# Patient Record
Sex: FEMALE | Race: WHITE | NOT HISPANIC OR LATINO | Employment: FULL TIME | URBAN - METROPOLITAN AREA
[De-identification: names, ages, dates, MRNs, and addresses within clinical notes are randomized per-mention and may not be internally consistent; named-entity substitution may affect disease eponyms.]

---

## 2017-07-17 ENCOUNTER — HOSPITAL ENCOUNTER (OUTPATIENT)
Dept: LAB | Facility: CLINIC | Age: 57
Discharge: HOME | End: 2017-07-17
Attending: ORTHOPAEDIC SURGERY | Admitting: ORTHOPAEDIC SURGERY
Payer: COMMERCIAL

## 2017-07-17 DIAGNOSIS — Z01.812 PRE-OPERATIVE LABORATORY EXAMINATION: ICD-10-CM

## 2017-07-17 LAB
MRSA DNA SPEC QL NAA+PROBE: NORMAL
SPECIMEN SOURCE: NORMAL

## 2017-07-17 PROCEDURE — 87641 MR-STAPH DNA AMP PROBE: CPT | Performed by: ORTHOPAEDIC SURGERY

## 2017-07-17 PROCEDURE — 40000830 ZZHCL STATISTIC STAPH AUREUS METH RESIST SCREEN PCR: Performed by: ORTHOPAEDIC SURGERY

## 2017-07-17 PROCEDURE — 40000829 ZZHCL STATISTIC STAPH AUREUS SUSCEPT SCREEN PCR: Performed by: ORTHOPAEDIC SURGERY

## 2017-07-17 PROCEDURE — 87640 STAPH A DNA AMP PROBE: CPT | Performed by: ORTHOPAEDIC SURGERY

## 2017-08-09 RX ORDER — ALBUTEROL SULFATE 90 UG/1
2 AEROSOL, METERED RESPIRATORY (INHALATION) EVERY 6 HOURS PRN
Status: ON HOLD | COMMUNITY
End: 2019-10-11

## 2017-08-09 RX ORDER — FLUTICASONE PROPIONATE 50 MCG
1 SPRAY, SUSPENSION (ML) NASAL 2 TIMES DAILY
COMMUNITY

## 2017-08-10 ENCOUNTER — ANESTHESIA (OUTPATIENT)
Dept: SURGERY | Facility: CLINIC | Age: 57
DRG: 470 | End: 2017-08-10
Payer: COMMERCIAL

## 2017-08-10 ENCOUNTER — HOSPITAL ENCOUNTER (INPATIENT)
Facility: CLINIC | Age: 57
LOS: 4 days | Discharge: NURSING, REHAB OR LONG-TERM CARE | DRG: 470 | End: 2017-08-14
Attending: ORTHOPAEDIC SURGERY | Admitting: ORTHOPAEDIC SURGERY
Payer: COMMERCIAL

## 2017-08-10 ENCOUNTER — ANESTHESIA EVENT (OUTPATIENT)
Dept: SURGERY | Facility: CLINIC | Age: 57
DRG: 470 | End: 2017-08-10
Payer: COMMERCIAL

## 2017-08-10 DIAGNOSIS — Z96.651 S/P TOTAL KNEE REPLACEMENT USING CEMENT, RIGHT: Primary | ICD-10-CM

## 2017-08-10 LAB
CREAT SERPL-MCNC: 0.6 MG/DL (ref 0.52–1.04)
GFR SERPL CREATININE-BSD FRML MDRD: NORMAL ML/MIN/1.7M2
PLATELET # BLD AUTO: 300 10E9/L (ref 150–450)
POTASSIUM SERPL-SCNC: 3.2 MMOL/L (ref 3.4–5.3)

## 2017-08-10 PROCEDURE — 25000128 H RX IP 250 OP 636: Performed by: ORTHOPAEDIC SURGERY

## 2017-08-10 PROCEDURE — 40000169 ZZH STATISTIC PRE-PROCEDURE ASSESSMENT I: Performed by: ORTHOPAEDIC SURGERY

## 2017-08-10 PROCEDURE — 25000128 H RX IP 250 OP 636

## 2017-08-10 PROCEDURE — 25000125 ZZHC RX 250: Performed by: ANESTHESIOLOGY

## 2017-08-10 PROCEDURE — 27810169 ZZH OR IMPLANT GENERAL: Performed by: ORTHOPAEDIC SURGERY

## 2017-08-10 PROCEDURE — 25000125 ZZHC RX 250: Performed by: ORTHOPAEDIC SURGERY

## 2017-08-10 PROCEDURE — 25000132 ZZH RX MED GY IP 250 OP 250 PS 637: Performed by: PHYSICIAN ASSISTANT

## 2017-08-10 PROCEDURE — 27110028 ZZH OR GENERAL SUPPLY NON-STERILE: Performed by: ORTHOPAEDIC SURGERY

## 2017-08-10 PROCEDURE — 25000128 H RX IP 250 OP 636: Performed by: ANESTHESIOLOGY

## 2017-08-10 PROCEDURE — 82565 ASSAY OF CREATININE: CPT | Performed by: PHYSICIAN ASSISTANT

## 2017-08-10 PROCEDURE — 25800025 ZZH RX 258: Performed by: ORTHOPAEDIC SURGERY

## 2017-08-10 PROCEDURE — 84132 ASSAY OF SERUM POTASSIUM: CPT | Performed by: ANESTHESIOLOGY

## 2017-08-10 PROCEDURE — 27210995 ZZH RX 272: Performed by: ORTHOPAEDIC SURGERY

## 2017-08-10 PROCEDURE — 37000009 ZZH ANESTHESIA TECHNICAL FEE, EACH ADDTL 15 MIN: Performed by: ORTHOPAEDIC SURGERY

## 2017-08-10 PROCEDURE — 36000063 ZZH SURGERY LEVEL 4 EA 15 ADDTL MIN: Performed by: ORTHOPAEDIC SURGERY

## 2017-08-10 PROCEDURE — 25000125 ZZHC RX 250: Performed by: NURSE ANESTHETIST, CERTIFIED REGISTERED

## 2017-08-10 PROCEDURE — 25000128 H RX IP 250 OP 636: Performed by: NURSE ANESTHETIST, CERTIFIED REGISTERED

## 2017-08-10 PROCEDURE — 85049 AUTOMATED PLATELET COUNT: CPT | Performed by: PHYSICIAN ASSISTANT

## 2017-08-10 PROCEDURE — 36000093 ZZH SURGERY LEVEL 4 1ST 30 MIN: Performed by: ORTHOPAEDIC SURGERY

## 2017-08-10 PROCEDURE — 25000125 ZZHC RX 250: Performed by: PHYSICIAN ASSISTANT

## 2017-08-10 PROCEDURE — 12000007 ZZH R&B INTERMEDIATE

## 2017-08-10 PROCEDURE — 25000566 ZZH SEVOFLURANE, EA 15 MIN: Performed by: ORTHOPAEDIC SURGERY

## 2017-08-10 PROCEDURE — 27210794 ZZH OR GENERAL SUPPLY STERILE: Performed by: ORTHOPAEDIC SURGERY

## 2017-08-10 PROCEDURE — 71000012 ZZH RECOVERY PHASE 1 LEVEL 1 FIRST HR: Performed by: ORTHOPAEDIC SURGERY

## 2017-08-10 PROCEDURE — 36415 COLL VENOUS BLD VENIPUNCTURE: CPT | Performed by: ANESTHESIOLOGY

## 2017-08-10 PROCEDURE — 71000013 ZZH RECOVERY PHASE 1 LEVEL 1 EA ADDTL HR: Performed by: ORTHOPAEDIC SURGERY

## 2017-08-10 PROCEDURE — 0SRC0J9 REPLACEMENT OF RIGHT KNEE JOINT WITH SYNTHETIC SUBSTITUTE, CEMENTED, OPEN APPROACH: ICD-10-PCS | Performed by: ORTHOPAEDIC SURGERY

## 2017-08-10 PROCEDURE — 37000008 ZZH ANESTHESIA TECHNICAL FEE, 1ST 30 MIN: Performed by: ORTHOPAEDIC SURGERY

## 2017-08-10 PROCEDURE — C1776 JOINT DEVICE (IMPLANTABLE): HCPCS | Performed by: ORTHOPAEDIC SURGERY

## 2017-08-10 PROCEDURE — 36415 COLL VENOUS BLD VENIPUNCTURE: CPT | Performed by: PHYSICIAN ASSISTANT

## 2017-08-10 PROCEDURE — 25000128 H RX IP 250 OP 636: Performed by: PHYSICIAN ASSISTANT

## 2017-08-10 DEVICE — IMPLANTABLE DEVICE: Type: IMPLANTABLE DEVICE | Site: KNEE | Status: FUNCTIONAL

## 2017-08-10 DEVICE — BONE CEMENT STRK SIMPLEX P SPEEDSET 6192-1-001: Type: IMPLANTABLE DEVICE | Site: KNEE | Status: FUNCTIONAL

## 2017-08-10 DEVICE — IMP COMP FEM JJ ATTUNE POST STAB RT NRW CEM SZ5 150410225: Type: IMPLANTABLE DEVICE | Site: KNEE | Status: FUNCTIONAL

## 2017-08-10 DEVICE — IMP TIB BASE JJ ATTUNE FX BR SYS CEM SZ5 150600005: Type: IMPLANTABLE DEVICE | Site: KNEE | Status: FUNCTIONAL

## 2017-08-10 RX ORDER — METOCLOPRAMIDE HYDROCHLORIDE 5 MG/ML
10 INJECTION INTRAMUSCULAR; INTRAVENOUS EVERY 6 HOURS PRN
Status: DISCONTINUED | OUTPATIENT
Start: 2017-08-10 | End: 2017-08-14 | Stop reason: HOSPADM

## 2017-08-10 RX ORDER — ACETAMINOPHEN 325 MG/1
975 TABLET ORAL EVERY 8 HOURS
Status: COMPLETED | OUTPATIENT
Start: 2017-08-10 | End: 2017-08-13

## 2017-08-10 RX ORDER — ONDANSETRON 4 MG/1
4 TABLET, ORALLY DISINTEGRATING ORAL EVERY 30 MIN PRN
Status: DISCONTINUED | OUTPATIENT
Start: 2017-08-10 | End: 2017-08-10 | Stop reason: HOSPADM

## 2017-08-10 RX ORDER — ONDANSETRON 2 MG/ML
4 INJECTION INTRAMUSCULAR; INTRAVENOUS EVERY 6 HOURS PRN
Status: DISCONTINUED | OUTPATIENT
Start: 2017-08-10 | End: 2017-08-14 | Stop reason: HOSPADM

## 2017-08-10 RX ORDER — OXYCODONE HYDROCHLORIDE 5 MG/1
5-10 TABLET ORAL
Status: DISCONTINUED | OUTPATIENT
Start: 2017-08-10 | End: 2017-08-14 | Stop reason: HOSPADM

## 2017-08-10 RX ORDER — FENTANYL CITRATE 50 UG/ML
25-50 INJECTION, SOLUTION INTRAMUSCULAR; INTRAVENOUS
Status: DISCONTINUED | OUTPATIENT
Start: 2017-08-10 | End: 2017-08-10 | Stop reason: HOSPADM

## 2017-08-10 RX ORDER — ALBUTEROL SULFATE 90 UG/1
2 AEROSOL, METERED RESPIRATORY (INHALATION) EVERY 6 HOURS PRN
Status: DISCONTINUED | OUTPATIENT
Start: 2017-08-10 | End: 2017-08-14 | Stop reason: HOSPADM

## 2017-08-10 RX ORDER — LEVOTHYROXINE SODIUM 112 UG/1
112 TABLET ORAL EVERY OTHER DAY
Status: DISCONTINUED | OUTPATIENT
Start: 2017-08-11 | End: 2017-08-14 | Stop reason: HOSPADM

## 2017-08-10 RX ORDER — MAGNESIUM HYDROXIDE 1200 MG/15ML
LIQUID ORAL PRN
Status: DISCONTINUED | OUTPATIENT
Start: 2017-08-10 | End: 2017-08-10 | Stop reason: HOSPADM

## 2017-08-10 RX ORDER — SODIUM CHLORIDE, SODIUM LACTATE, POTASSIUM CHLORIDE, CALCIUM CHLORIDE 600; 310; 30; 20 MG/100ML; MG/100ML; MG/100ML; MG/100ML
INJECTION, SOLUTION INTRAVENOUS CONTINUOUS
Status: DISCONTINUED | OUTPATIENT
Start: 2017-08-10 | End: 2017-08-10

## 2017-08-10 RX ORDER — OXYCODONE HCL 10 MG/1
10 TABLET, FILM COATED, EXTENDED RELEASE ORAL EVERY 12 HOURS
Status: DISPENSED | OUTPATIENT
Start: 2017-08-10 | End: 2017-08-12

## 2017-08-10 RX ORDER — NEOSTIGMINE METHYLSULFATE 1 MG/ML
VIAL (ML) INJECTION PRN
Status: DISCONTINUED | OUTPATIENT
Start: 2017-08-10 | End: 2017-08-10

## 2017-08-10 RX ORDER — MONTELUKAST SODIUM 10 MG/1
10 TABLET ORAL AT BEDTIME
Status: DISCONTINUED | OUTPATIENT
Start: 2017-08-10 | End: 2017-08-14 | Stop reason: HOSPADM

## 2017-08-10 RX ORDER — SODIUM CHLORIDE, SODIUM LACTATE, POTASSIUM CHLORIDE, CALCIUM CHLORIDE 600; 310; 30; 20 MG/100ML; MG/100ML; MG/100ML; MG/100ML
INJECTION, SOLUTION INTRAVENOUS CONTINUOUS
Status: DISCONTINUED | OUTPATIENT
Start: 2017-08-10 | End: 2017-08-10 | Stop reason: HOSPADM

## 2017-08-10 RX ORDER — ACETAMINOPHEN 325 MG/1
650 TABLET ORAL EVERY 4 HOURS PRN
Status: DISCONTINUED | OUTPATIENT
Start: 2017-08-13 | End: 2017-08-14 | Stop reason: HOSPADM

## 2017-08-10 RX ORDER — ESTRADIOL 0.1 MG/G
1 CREAM VAGINAL
Status: DISCONTINUED | OUTPATIENT
Start: 2017-08-11 | End: 2017-08-14 | Stop reason: HOSPADM

## 2017-08-10 RX ORDER — LIDOCAINE HYDROCHLORIDE 20 MG/ML
INJECTION, SOLUTION INFILTRATION; PERINEURAL PRN
Status: DISCONTINUED | OUTPATIENT
Start: 2017-08-10 | End: 2017-08-10

## 2017-08-10 RX ORDER — GLYCOPYRROLATE 0.2 MG/ML
INJECTION, SOLUTION INTRAMUSCULAR; INTRAVENOUS PRN
Status: DISCONTINUED | OUTPATIENT
Start: 2017-08-10 | End: 2017-08-10

## 2017-08-10 RX ORDER — MULTIPLE VITAMINS W/ MINERALS TAB 9MG-400MCG
1 TAB ORAL DAILY
Status: DISCONTINUED | OUTPATIENT
Start: 2017-08-10 | End: 2017-08-14 | Stop reason: HOSPADM

## 2017-08-10 RX ORDER — SCOLOPAMINE TRANSDERMAL SYSTEM 1 MG/1
1 PATCH, EXTENDED RELEASE TRANSDERMAL
Status: DISCONTINUED | OUTPATIENT
Start: 2017-08-10 | End: 2017-08-10 | Stop reason: HOSPADM

## 2017-08-10 RX ORDER — PROPOFOL 10 MG/ML
INJECTION, EMULSION INTRAVENOUS CONTINUOUS PRN
Status: DISCONTINUED | OUTPATIENT
Start: 2017-08-10 | End: 2017-08-10

## 2017-08-10 RX ORDER — FENTANYL CITRATE 50 UG/ML
INJECTION, SOLUTION INTRAMUSCULAR; INTRAVENOUS PRN
Status: DISCONTINUED | OUTPATIENT
Start: 2017-08-10 | End: 2017-08-10

## 2017-08-10 RX ORDER — MUPIROCIN 20 MG/G
OINTMENT TOPICAL 2 TIMES DAILY
Status: DISCONTINUED | OUTPATIENT
Start: 2017-08-10 | End: 2017-08-14 | Stop reason: HOSPADM

## 2017-08-10 RX ORDER — SODIUM CHLORIDE 9 MG/ML
INJECTION, SOLUTION INTRAVENOUS CONTINUOUS
Status: DISCONTINUED | OUTPATIENT
Start: 2017-08-10 | End: 2017-08-11

## 2017-08-10 RX ORDER — FLUTICASONE PROPIONATE 50 MCG
1-2 SPRAY, SUSPENSION (ML) NASAL DAILY
Status: DISCONTINUED | OUTPATIENT
Start: 2017-08-11 | End: 2017-08-14 | Stop reason: HOSPADM

## 2017-08-10 RX ORDER — LISINOPRIL AND HYDROCHLOROTHIAZIDE 12.5; 2 MG/1; MG/1
2 TABLET ORAL DAILY
Status: DISCONTINUED | OUTPATIENT
Start: 2017-08-10 | End: 2017-08-14 | Stop reason: HOSPADM

## 2017-08-10 RX ORDER — CEFAZOLIN SODIUM 1 G/3ML
1 INJECTION, POWDER, FOR SOLUTION INTRAMUSCULAR; INTRAVENOUS EVERY 8 HOURS
Status: COMPLETED | OUTPATIENT
Start: 2017-08-10 | End: 2017-08-11

## 2017-08-10 RX ORDER — ONDANSETRON 2 MG/ML
4 INJECTION INTRAMUSCULAR; INTRAVENOUS EVERY 30 MIN PRN
Status: DISCONTINUED | OUTPATIENT
Start: 2017-08-10 | End: 2017-08-10 | Stop reason: HOSPADM

## 2017-08-10 RX ORDER — AMOXICILLIN 250 MG
1-2 CAPSULE ORAL 2 TIMES DAILY
Status: DISCONTINUED | OUTPATIENT
Start: 2017-08-10 | End: 2017-08-14 | Stop reason: HOSPADM

## 2017-08-10 RX ORDER — DEXAMETHASONE SODIUM PHOSPHATE 4 MG/ML
INJECTION, SOLUTION INTRA-ARTICULAR; INTRALESIONAL; INTRAMUSCULAR; INTRAVENOUS; SOFT TISSUE PRN
Status: DISCONTINUED | OUTPATIENT
Start: 2017-08-10 | End: 2017-08-10

## 2017-08-10 RX ORDER — PROCHLORPERAZINE MALEATE 5 MG
5-10 TABLET ORAL EVERY 6 HOURS PRN
Status: DISCONTINUED | OUTPATIENT
Start: 2017-08-10 | End: 2017-08-14 | Stop reason: HOSPADM

## 2017-08-10 RX ORDER — NALOXONE HYDROCHLORIDE 0.4 MG/ML
.1-.4 INJECTION, SOLUTION INTRAMUSCULAR; INTRAVENOUS; SUBCUTANEOUS
Status: DISCONTINUED | OUTPATIENT
Start: 2017-08-10 | End: 2017-08-14 | Stop reason: HOSPADM

## 2017-08-10 RX ORDER — ONDANSETRON 4 MG/1
4 TABLET, ORALLY DISINTEGRATING ORAL EVERY 6 HOURS PRN
Status: DISCONTINUED | OUTPATIENT
Start: 2017-08-10 | End: 2017-08-14 | Stop reason: HOSPADM

## 2017-08-10 RX ORDER — LIDOCAINE 40 MG/G
CREAM TOPICAL
Status: DISCONTINUED | OUTPATIENT
Start: 2017-08-10 | End: 2017-08-14 | Stop reason: HOSPADM

## 2017-08-10 RX ORDER — ONDANSETRON 2 MG/ML
INJECTION INTRAMUSCULAR; INTRAVENOUS PRN
Status: DISCONTINUED | OUTPATIENT
Start: 2017-08-10 | End: 2017-08-10

## 2017-08-10 RX ORDER — CEFAZOLIN SODIUM 1 G/3ML
1 INJECTION, POWDER, FOR SOLUTION INTRAMUSCULAR; INTRAVENOUS SEE ADMIN INSTRUCTIONS
Status: DISCONTINUED | OUTPATIENT
Start: 2017-08-10 | End: 2017-08-10

## 2017-08-10 RX ORDER — HYDROXYZINE HYDROCHLORIDE 25 MG/1
25 TABLET, FILM COATED ORAL EVERY 6 HOURS PRN
Status: DISCONTINUED | OUTPATIENT
Start: 2017-08-10 | End: 2017-08-14 | Stop reason: HOSPADM

## 2017-08-10 RX ORDER — HYDROMORPHONE HYDROCHLORIDE 1 MG/ML
.3-.5 INJECTION, SOLUTION INTRAMUSCULAR; INTRAVENOUS; SUBCUTANEOUS
Status: DISCONTINUED | OUTPATIENT
Start: 2017-08-10 | End: 2017-08-14 | Stop reason: HOSPADM

## 2017-08-10 RX ORDER — EPHEDRINE SULFATE 50 MG/ML
INJECTION, SOLUTION INTRAMUSCULAR; INTRAVENOUS; SUBCUTANEOUS PRN
Status: DISCONTINUED | OUTPATIENT
Start: 2017-08-10 | End: 2017-08-10

## 2017-08-10 RX ORDER — HYDROMORPHONE HYDROCHLORIDE 1 MG/ML
.3-.5 INJECTION, SOLUTION INTRAMUSCULAR; INTRAVENOUS; SUBCUTANEOUS EVERY 5 MIN PRN
Status: DISCONTINUED | OUTPATIENT
Start: 2017-08-10 | End: 2017-08-10 | Stop reason: HOSPADM

## 2017-08-10 RX ORDER — PROPOFOL 10 MG/ML
INJECTION, EMULSION INTRAVENOUS PRN
Status: DISCONTINUED | OUTPATIENT
Start: 2017-08-10 | End: 2017-08-10

## 2017-08-10 RX ORDER — HYDRALAZINE HYDROCHLORIDE 20 MG/ML
2.5-5 INJECTION INTRAMUSCULAR; INTRAVENOUS EVERY 10 MIN PRN
Status: DISCONTINUED | OUTPATIENT
Start: 2017-08-10 | End: 2017-08-10 | Stop reason: HOSPADM

## 2017-08-10 RX ORDER — POLYETHYLENE GLYCOL 3350 17 G/17G
17 POWDER, FOR SOLUTION ORAL DAILY
Status: DISCONTINUED | OUTPATIENT
Start: 2017-08-11 | End: 2017-08-14 | Stop reason: HOSPADM

## 2017-08-10 RX ORDER — LABETALOL HYDROCHLORIDE 5 MG/ML
10 INJECTION, SOLUTION INTRAVENOUS
Status: DISCONTINUED | OUTPATIENT
Start: 2017-08-10 | End: 2017-08-10 | Stop reason: HOSPADM

## 2017-08-10 RX ORDER — CEFAZOLIN SODIUM 2 G/100ML
2 INJECTION, SOLUTION INTRAVENOUS
Status: COMPLETED | OUTPATIENT
Start: 2017-08-10 | End: 2017-08-10

## 2017-08-10 RX ORDER — FEXOFENADINE HCL 180 MG/1
180 TABLET ORAL DAILY
Status: DISCONTINUED | OUTPATIENT
Start: 2017-08-11 | End: 2017-08-14 | Stop reason: HOSPADM

## 2017-08-10 RX ORDER — METOCLOPRAMIDE 5 MG/1
10 TABLET ORAL EVERY 6 HOURS PRN
Status: DISCONTINUED | OUTPATIENT
Start: 2017-08-10 | End: 2017-08-14 | Stop reason: HOSPADM

## 2017-08-10 RX ORDER — ALBUTEROL SULFATE 0.83 MG/ML
2.5 SOLUTION RESPIRATORY (INHALATION) EVERY 4 HOURS PRN
Status: DISCONTINUED | OUTPATIENT
Start: 2017-08-10 | End: 2017-08-10 | Stop reason: HOSPADM

## 2017-08-10 RX ORDER — FENTANYL CITRATE 50 UG/ML
50-100 INJECTION, SOLUTION INTRAMUSCULAR; INTRAVENOUS EVERY 5 MIN PRN
Status: DISCONTINUED | OUTPATIENT
Start: 2017-08-10 | End: 2017-08-10 | Stop reason: HOSPADM

## 2017-08-10 RX ORDER — MEPERIDINE HYDROCHLORIDE 25 MG/ML
12.5 INJECTION INTRAMUSCULAR; INTRAVENOUS; SUBCUTANEOUS EVERY 5 MIN PRN
Status: DISCONTINUED | OUTPATIENT
Start: 2017-08-10 | End: 2017-08-10 | Stop reason: HOSPADM

## 2017-08-10 RX ORDER — HYDROMORPHONE HYDROCHLORIDE 1 MG/ML
INJECTION, SOLUTION INTRAMUSCULAR; INTRAVENOUS; SUBCUTANEOUS
Status: COMPLETED
Start: 2017-08-10 | End: 2017-08-10

## 2017-08-10 RX ADMIN — ROCURONIUM BROMIDE 5 MG: 10 INJECTION INTRAVENOUS at 11:15

## 2017-08-10 RX ADMIN — SCOPALAMINE 1 PATCH: 1 PATCH, EXTENDED RELEASE TRANSDERMAL at 11:05

## 2017-08-10 RX ADMIN — SENNOSIDES AND DOCUSATE SODIUM 2 TABLET: 8.6; 5 TABLET ORAL at 22:08

## 2017-08-10 RX ADMIN — PROPOFOL 200 MG: 10 INJECTION, EMULSION INTRAVENOUS at 11:15

## 2017-08-10 RX ADMIN — PROPOFOL 50 MCG/KG/MIN: 10 INJECTION, EMULSION INTRAVENOUS at 11:15

## 2017-08-10 RX ADMIN — SUCCINYLCHOLINE CHLORIDE 100 MG: 20 INJECTION, SOLUTION INTRAMUSCULAR; INTRAVENOUS at 11:15

## 2017-08-10 RX ADMIN — HYDROMORPHONE HYDROCHLORIDE 0.5 MG: 1 INJECTION, SOLUTION INTRAMUSCULAR; INTRAVENOUS; SUBCUTANEOUS at 16:15

## 2017-08-10 RX ADMIN — OXYCODONE HYDROCHLORIDE 10 MG: 10 TABLET, FILM COATED, EXTENDED RELEASE ORAL at 18:29

## 2017-08-10 RX ADMIN — GLYCOPYRROLATE 0.6 MG: 0.2 INJECTION, SOLUTION INTRAMUSCULAR; INTRAVENOUS at 12:45

## 2017-08-10 RX ADMIN — DEXMEDETOMIDINE HYDROCHLORIDE 8 MCG: 100 INJECTION, SOLUTION INTRAVENOUS at 12:46

## 2017-08-10 RX ADMIN — SODIUM CHLORIDE: 9 INJECTION, SOLUTION INTRAVENOUS at 16:00

## 2017-08-10 RX ADMIN — PHENYLEPHRINE HYDROCHLORIDE 100 MCG: 10 INJECTION, SOLUTION INTRAMUSCULAR; INTRAVENOUS; SUBCUTANEOUS at 11:31

## 2017-08-10 RX ADMIN — MUPIROCIN: 20 OINTMENT TOPICAL at 22:26

## 2017-08-10 RX ADMIN — HYDROMORPHONE HYDROCHLORIDE 0.2 MG: 1 INJECTION, SOLUTION INTRAMUSCULAR; INTRAVENOUS; SUBCUTANEOUS at 12:26

## 2017-08-10 RX ADMIN — Medication 5 MG: at 11:28

## 2017-08-10 RX ADMIN — ACETAMINOPHEN 975 MG: 325 TABLET, FILM COATED ORAL at 18:29

## 2017-08-10 RX ADMIN — ROCURONIUM BROMIDE 10 MG: 10 INJECTION INTRAVENOUS at 11:35

## 2017-08-10 RX ADMIN — PROPOFOL 30 MG: 10 INJECTION, EMULSION INTRAVENOUS at 13:04

## 2017-08-10 RX ADMIN — DEXAMETHASONE SODIUM PHOSPHATE 4 MG: 4 INJECTION, SOLUTION INTRA-ARTICULAR; INTRALESIONAL; INTRAMUSCULAR; INTRAVENOUS; SOFT TISSUE at 11:29

## 2017-08-10 RX ADMIN — FENTANYL CITRATE 100 MCG: 50 INJECTION, SOLUTION INTRAMUSCULAR; INTRAVENOUS at 11:15

## 2017-08-10 RX ADMIN — MONTELUKAST SODIUM 10 MG: 10 TABLET, FILM COATED ORAL at 22:08

## 2017-08-10 RX ADMIN — MIDAZOLAM HYDROCHLORIDE 2 MG: 1 INJECTION, SOLUTION INTRAMUSCULAR; INTRAVENOUS at 10:55

## 2017-08-10 RX ADMIN — DEXMEDETOMIDINE HYDROCHLORIDE 12 MCG: 100 INJECTION, SOLUTION INTRAVENOUS at 12:41

## 2017-08-10 RX ADMIN — LIDOCAINE HYDROCHLORIDE 80 MG: 20 INJECTION, SOLUTION INFILTRATION; PERINEURAL at 11:15

## 2017-08-10 RX ADMIN — ROCURONIUM BROMIDE 10 MG: 10 INJECTION INTRAVENOUS at 11:51

## 2017-08-10 RX ADMIN — SODIUM CHLORIDE, POTASSIUM CHLORIDE, SODIUM LACTATE AND CALCIUM CHLORIDE: 600; 310; 30; 20 INJECTION, SOLUTION INTRAVENOUS at 10:39

## 2017-08-10 RX ADMIN — PHENYLEPHRINE HYDROCHLORIDE 100 MCG: 10 INJECTION, SOLUTION INTRAMUSCULAR; INTRAVENOUS; SUBCUTANEOUS at 11:24

## 2017-08-10 RX ADMIN — CEFAZOLIN SODIUM 2 G: 2 INJECTION, SOLUTION INTRAVENOUS at 11:30

## 2017-08-10 RX ADMIN — FENTANYL CITRATE 50 MCG: 50 INJECTION, SOLUTION INTRAMUSCULAR; INTRAVENOUS at 11:33

## 2017-08-10 RX ADMIN — ONDANSETRON 4 MG: 2 INJECTION INTRAMUSCULAR; INTRAVENOUS at 12:44

## 2017-08-10 RX ADMIN — HYDROMORPHONE HYDROCHLORIDE 0.5 MG: 1 INJECTION, SOLUTION INTRAMUSCULAR; INTRAVENOUS; SUBCUTANEOUS at 19:54

## 2017-08-10 RX ADMIN — ROCURONIUM BROMIDE 25 MG: 10 INJECTION INTRAVENOUS at 11:28

## 2017-08-10 RX ADMIN — NEOSTIGMINE METHYLSULFATE 4 MG: 1 INJECTION INTRAMUSCULAR; INTRAVENOUS; SUBCUTANEOUS at 12:45

## 2017-08-10 RX ADMIN — FLUTICASONE FUROATE AND VILANTEROL TRIFENATATE 1 PUFF: 200; 25 POWDER RESPIRATORY (INHALATION) at 22:26

## 2017-08-10 RX ADMIN — ONDANSETRON 4 MG: 2 INJECTION INTRAMUSCULAR; INTRAVENOUS at 11:11

## 2017-08-10 RX ADMIN — CEFAZOLIN SODIUM 1 G: 1 INJECTION, POWDER, FOR SOLUTION INTRAMUSCULAR; INTRAVENOUS at 19:20

## 2017-08-10 RX ADMIN — FENTANYL CITRATE 100 MCG: 50 INJECTION, SOLUTION INTRAMUSCULAR; INTRAVENOUS at 10:55

## 2017-08-10 RX ADMIN — SODIUM CHLORIDE, POTASSIUM CHLORIDE, SODIUM LACTATE AND CALCIUM CHLORIDE: 600; 310; 30; 20 INJECTION, SOLUTION INTRAVENOUS at 11:39

## 2017-08-10 RX ADMIN — HYDROMORPHONE HYDROCHLORIDE 0.3 MG: 1 INJECTION, SOLUTION INTRAMUSCULAR; INTRAVENOUS; SUBCUTANEOUS at 12:05

## 2017-08-10 NOTE — ANESTHESIA CARE TRANSFER NOTE
Patient: Aleena Ontiveros    Procedure(s):  RIGHT TOTAL KNEE ARTHROPLASTY  - Wound Class: I-Clean    Diagnosis: RIGHT KNEE OSTEOARTHRITIS   Diagnosis Additional Information: No value filed.    Anesthesia Type:   Periph. Nerve Block for postop pain, General, ETT     Note:  Airway :Face Mask  Patient transferred to:PACU  Comments: Neuromuscular blockade reversed after TOF 4/4, spontaneous respirations, adequate tidal volumes, followed commands to voice, oropharynx suctioned with soft flexible catheter, extubated atraumatically, extubated with suction, airway patent after extubation.  Oxygen via facemask at 10 liters per minute to PACU. Oxygen tubing connected to wall O2 in PACU, SpO2, NiBP, and EKG monitors and alarms on and functioning, Jade Hugger warmer connected to patient gown, report on patient's clinical status given to PACU RN, RN questions answered.       Vitals: (Last set prior to Anesthesia Care Transfer)    CRNA VITALS  8/10/2017 1250 - 8/10/2017 1327      8/10/2017             Pulse: 76    SpO2: 99 %    Resp Rate (observed): 15    Resp Rate (set): 10                Electronically Signed By: JAZMIN Vogt CRNA  August 10, 2017  1:27 PM

## 2017-08-10 NOTE — ANESTHESIA PREPROCEDURE EVALUATION
Anesthesia Evaluation     . Pt has had prior anesthetic.     History of anesthetic complications   - PONV        ROS/MED HX    ENT/Pulmonary:     (+)Intermittent asthma , . .   (-) sleep apnea   Neurologic:       Cardiovascular:     (+) hypertension----. : . . . :. .       METS/Exercise Tolerance:     Hematologic:         Musculoskeletal:   (+) arthritis, , , -       GI/Hepatic:        (-) GERD   Renal/Genitourinary:         Endo:     (+) thyroid problem hypothyroidism, .      Psychiatric:         Infectious Disease:         Malignancy:         Other:                                    Anesthesia Plan      History & Physical Review  History and physical reviewed and following examination; no interval change.    ASA Status:  2 .    NPO Status:  > 8 hours    Plan for Periph. Nerve Block for postop pain, General and ETT with Intravenous induction. Maintenance will be Balanced.    PONV prophylaxis:  Ondansetron (or other 5HT-3), Dexamethasone or Solumedrol and Scopolamine patch (Propofol gtt)       Postoperative Care  Postoperative pain management:  Multi-modal analgesia.      Consents  Anesthetic plan, risks, benefits and alternatives discussed with:  Patient..                      Procedure: Procedure(s):  ARTHROPLASTY KNEE  Preop diagnosis: RIGHT KNEE OSTEOARTHRITIS     Allergies   Allergen Reactions     No Clinical Screening - See Comments      Prolonged use bandaids and some tapes.     Past Medical History:   Diagnosis Date     Arthritis     osteoarthritis of both knee     Hypertension      PONV (postoperative nausea and vomiting)      Thyroid disease      Uncomplicated asthma      Past Surgical History:   Procedure Laterality Date     ABDOMEN SURGERY  ,         BACK SURGERY      spinal fusion     ENT SURGERY      tonsilectomy     GYN SURGERY  13    hysterectomy     ORTHOPEDIC SURGERY      sinal fusion,hand     thumb mass resection       THYROIDECTOMY  2014    Procedure: THYROIDECTOMY;   Surgeon: Krzysztof Marquez MD;  Location: [facility]     Prior to Admission medications    Medication Sig Start Date End Date Taking? Authorizing Provider   IBUPROFEN PO Take 200-600 mg by mouth 4 times daily as needed for moderate pain   Yes Reported, Patient   Omega-3 Fatty Acids (FISH OIL PO) Take 1 capsule by mouth daily   Yes Reported, Patient   Multiple Vitamins-Minerals (DAILY DENIZ MAXIMUM MULTIVITAMIN PO) Take 1 packet by mouth 3 times daily DOTERRA   Yes Reported, Patient   albuterol (PROAIR HFA/PROVENTIL HFA/VENTOLIN HFA) 108 (90 BASE) MCG/ACT Inhaler Inhale 2 puffs into the lungs every 6 hours as needed for shortness of breath / dyspnea or wheezing   Yes Reported, Patient   Levothyroxine Sodium (SYNTHROID PO) Take 125 mcg by mouth every other day (alternate with 112 mcg dose)   Yes Reported, Patient   Levothyroxine Sodium (SYNTHROID PO) Take 112 mcg by mouth every other day (alternate with 125 mcg dose)   Yes Reported, Patient   fluticasone (FLONASE) 50 MCG/ACT spray Spray 1-2 sprays into both nostrils daily   Yes Reported, Patient   Fexofenadine HCl (ALLEGRA PO) Take 180 mg by mouth daily   Yes Reported, Patient   Acetaminophen (TYLENOL PO) Take 325-650 mg by mouth 3 times daily as needed    Yes Reported, Patient   mupirocin (BACTROBAN) 2 % nasal ointment Apply 1 each into each nare 2 times daily Apply small amount to each nostril 2 times per day for 7 days prior to surgery. 8/3/17 8/10/17 Yes Reported, Patient   estradiol (ESTRACE VAGINAL) 0.1 MG/GM vaginal cream Place 1 g vaginally three times a week 10/21/15  Yes Efra Fermin MD   lisinopril-hydrochlorothiazide (PRINZIDE,ZESTORETIC) 20-12.5 MG per tablet Take 2 tablets by mouth daily    Yes Reported, Patient   mometasone-formoterol (DULERA) 200-5 MCG/ACT oral inhaler Inhale 2 puffs into the lungs 2 times daily   Yes Reported, Patient   Montelukast Sodium (SINGULAIR PO) Take 10 mg by mouth At Bedtime    Yes Reported, Patient     Current  Facility-Administered Medications Ordered in Epic   Medication Dose Route Frequency Last Rate Last Dose     ceFAZolin sodium-dextrose (ANCEF) infusion 2 g  2 g Intravenous Pre-Op/Pre-procedure x 1 dose         ceFAZolin (ANCEF) 1 g vial to attach to  ml bag for ADULT or 50 ml bag for PEDS  1 g Intravenous See Admin Instructions         lidocaine 1 % 1 mL  1 mL Other Q1H PRN         sodium chloride (PF) 0.9% PF flush 3 mL  3 mL Intracatheter Q1H PRN         lactated ringers infusion   Intravenous Continuous         No current [facility]-ordered outpatient prescriptions on file.     Wt Readings from Last 1 Encounters:   05/13/14 79.5 kg (175 lb 3.2 oz)     Temp Readings from Last 1 Encounters:   05/14/14 36.9  C (98.4  F) (Oral)     BP Readings from Last 6 Encounters:   05/14/14 119/75   03/27/14 112/68     Pulse Readings from Last 4 Encounters:   03/27/14 64     Resp Readings from Last 1 Encounters:   05/14/14 16     SpO2 Readings from Last 1 Encounters:   05/14/14 98%     Recent Labs   Lab Test  05/14/14   0730   GLC  115*   SIXTO  7.5*     No results for input(s): AST, ALT in the last 59537 hours.    Invalid input(s): ALP, BILT, LPSE  Recent Labs   Lab Test  05/13/14   1710   PLT  304     No results for input(s): INR in the last 92146 hours.    Invalid input(s): APTT   No results for input(s): TROPI in the last 57311 hours.  RECENT LABS:   ECG:   ECHO:   CXR:

## 2017-08-10 NOTE — IP AVS SNAPSHOT
MRN:[id number]                      After Visit Summary   8/10/2017    Aleena Ontiveros    MRN: [id number]           Thank you!     Thank you for choosing [facility] for your care. Our goal is always to provide you with excellent care. Hearing back from our patients is one way we can continue to improve our services. Please take a few minutes to complete the written survey that you may receive in the mail after you visit with us. Thank you!        Patient Information     Date Of Birth          1960        Designated Caregiver       Most Recent Value    Caregiver    Will someone help with your care after discharge? no      About your hospital stay     You were admitted on:  August 10, 2017 You last received care in the:  [place] [facility] [address] [facility]    You were discharged on:  August 14, 2017        Reason for your hospital stay       S/P right total knee arthroplasty                  Who to Call     For medical emergencies, please call 911.  For non-urgent questions about your medical care, please call your primary care provider or clinic, [phone]  For questions related to your surgery, please call your surgery clinic        Attending Provider     Provider Specialty    Grady Alvarez MD Orthopedics       Primary Care Provider Office Phone # Fax #    Arabella Conner [phone]      After Care Instructions     Activity - Up ad hernán           Advance Diet as Tolerated       Follow this diet upon discharge: Regular            Fall precautions           General info for SNF       Length of Stay Estimate: Short Term Care: Estimated # of Days <30  Condition at Discharge: Improving  Level of care:skilled   Rehabilitation Potential: Excellent  Admission H&P remains valid and up-to-date: Yes  Recent Chemotherapy: N/A  Use Nursing Home Standing Orders: Yes            Mantoux instructions       Give two-step Mantoux (PPD) Per Facility Policy Yes            Weight  "bearing status       WBAT            Wound care       Site:   Right knee   Instructions:  Dry dressing changes daily                  Follow-up Appointments     Follow Up and recommended labs and tests       Follow up with Dr. Alvarez in clinic in 2 weeks.  Please call [phone] with questions.                  Additional Services     Occupational Therapy Adult Consult       Evaluate and treat as clinically indicated.    Reason:  S/P right total knee arthroplasty            Physical Therapy Adult Consult       Evaluate and treat as clinically indicated.    Reason:  S/P right total knee arthroplasty                  Future tests that were ordered for you     AntiEmbolism Stockings       Bilateral below knee length.On in the morning, off at night                  Further instructions from your care team       TOTAL KNEE REPLACEMENT TAKE HOME INSTRUCTIONS  Your surgeon will answer any questions about your progress. General guidelines for your care are listed below. Your surgeon may give additional instructions for your care at home. Please follow them carefully    Activity Level  1. Physical activity may be resumed gradually according to your comfort level and your surgeon s instructions. Follow your exercise program as instructed by your therapist. Do exercises at least twice a day. you may ice your knee after exercising.  2. Complete exercises two hours before bedtime to minimize the effect pain may have on sleep.  Refer to pages 19-22 of you \"Total Knee Replacement\" booklet for details  3. Do not wear your knee immobilizer unless your doctor has specifically told you to continue it.    Good Health Practices  1. Maintain an adequate fluid intake and eat a well balanced diet.  2. Be sure to include the basic food groups, such as dairy products, meat/fish, vegetables, and fruit. Each of these foods contribute to your wound healing and increasing your strength.  3. Surgery, decreased activity and pain medication all " contribute to a decrease in bowel activity that can result in constipation. It is recommended that you increase your liquid intake, add fiber to your diet, increase activity, and decrease pain medication use. If you have any problems, notify your physician.  4. Wear your anti-embolism stockings day and night until seen by your surgeon if you were issued these at discharge.  (Not all patients will have anti-embolism stockings) Remove twice a day for one hour at a time. You may hand wash and air dry your stockings.  5. Notify your dentist of your total knee surgery and call your dentist one week before a dental appointment for antibiotics, if your dentist will not prescribe antibiotics then call your surgeon to ask for next steps.      Incision/Dressing Care  1. Keep incision clean and dry per surgeons instructions  2. Cover incision if you are still having drainage.  3.  If you have a waterproof dressing __________________________   Shower.    Things to Watch For  1. Check incision daily for increased redness, tenderness, swelling, or drainage along the incision line. If these occur, please notify your doctor. Also, call if you develop a fever above 101 .  2. Please notify your doctor if you experience any calf pain and/or if you have surgical pain not relieved by the pain medication prescribed by your doctor.  Follow up appointment:______________________________  Nurse signature _________________________________ Date ________ Time _____  Patient signature _____________________________ Date _____________________        Revised 05/8/17    You are discharging to:    [facility] [address]   [address]  [id number]      Pending Results     No orders found from 8/8/2017 to 8/11/2017.            Statement of Approval     Ordered          08/13/17 0826  I have reviewed and agree with all the recommendations and orders detailed in this document.  EFFECTIVE NOW     Approved and electronically signed by:   "Grady Alvarez MD             Admission Information     Date & Time Provider Department Dept. Phone    8/10/2017 Grady Alvarez MD [place] [facility] [address] [facility] [phone]      Your Vitals Were     Blood Pressure Pulse Temperature Respirations Height Weight    123/72 (BP Location: Left arm) 84 98.7  F (37.1  C) (Oral) 16 1.575 m (5' 2\") 79.4 kg (175 lb)    Pulse Oximetry BMI (Body Mass Index)                94% 32.01 kg/m2          [facility] Information     [facility] lets you send messages to your doctor, view your test results, renew your prescriptions, schedule appointments and more. To sign up, go to [email or web address] . Click on \"Log in\" on the left side of the screen, which will take you to the Welcome page. Then click on \"Sign up Now\" on the right side of the page.     You will be asked to enter the access code listed below, as well as some personal information. Please follow the directions to create your username and password.     Your access code is: [email or web address]  Expires: 2017  8:11 AM     Your access code will  in 90 days. If you need help or a new code, please call your [facility] clinic or [phone].        Care EveryWhere ID     This is your Care EveryWhere ID. This could be used by other organizations to access your [facility] medical records  [id number]        Equal Access to Services     [facility] JORDI : Hadii eufemia ernst hadasho Somanjuali, waaxda luqadaha, qaybta kaalmada arnavyasai, sonny garcia . So [facility] [place] [phone].    ATENCIÓN: Si habla español, tiene a alejandro disposición servicios gratuitos de asistencia lingüística. Llame al [phone].    We comply with applicable federal civil rights laws and [place] laws. We do not discriminate on the basis of race, color, national origin, age, disability sex, sexual orientation or gender identity.               Review of your medicines      START taking        Dose / Directions    enoxaparin 40 " MG/0.4ML injection   Commonly known as:  LOVENOX        Dose:  40 mg   Inject 0.4 mLs (40 mg) Subcutaneous every 24 hours for 11 days   Quantity:  4.4 mL   Refills:  0       * oxyCODONE 5 MG IR tablet   Commonly known as:  ROXICODONE        Dose:  5-10 mg   Take 1-2 tablets (5-10 mg) by mouth every 3 hours as needed for moderate to severe pain   Quantity:  80 tablet   Refills:  0       * oxyCODONE 10 MG 12 hr tablet   Commonly known as:  OxyCONTIN        Dose:  10 mg   Take 1 tablet (10 mg) by mouth every 12 hours   Quantity:  30 tablet   Refills:  0       senna-docusate 8.6-50 MG per tablet   Commonly known as:  SENOKOT-S;PERICOLACE        Dose:  1-2 tablet   Take 1-2 tablets by mouth 2 times daily   Quantity:  60 tablet   Refills:  0       * Notice:  This list has 2 medication(s) that are the same as other medications prescribed for you. Read the directions carefully, and ask your doctor or other care provider to review them with you.      CONTINUE these medicines which may have CHANGED, or have new prescriptions. If we are uncertain of the size of tablets/capsules you have at home, strength may be listed as something that might have changed.        Dose / Directions    acetaminophen 325 MG tablet   Commonly known as:  TYLENOL   This may have changed:    - medication strength  - how much to take  - when to take this  - reasons to take this        Dose:  650 mg   Take 2 tablets (650 mg) by mouth every 4 hours as needed for other (surgical pain)   Quantity:  60 tablet   Refills:  0         CONTINUE these medicines which have NOT CHANGED        Dose / Directions    albuterol 108 (90 BASE) MCG/ACT Inhaler   Commonly known as:  PROAIR HFA/PROVENTIL HFA/VENTOLIN HFA        Dose:  2 puff   Inhale 2 puffs into the lungs every 6 hours as needed for shortness of breath / dyspnea or wheezing   Refills:  0       ALLEGRA PO        Dose:  180 mg   Take 180 mg by mouth daily   Refills:  0       DAILY DENIZ MAXIMUM MULTIVITAMIN PO         Dose:  1 packet   Take 1 packet by mouth 3 times daily DOTERRA   Refills:  0       estradiol 0.1 MG/GM cream   Commonly known as:  ESTRACE VAGINAL   Used for:  Vaginal atrophy        Dose:  1 g   Place 1 g vaginally three times a week   Quantity:  42.5 g   Refills:  0       FISH OIL PO        Dose:  1 capsule   Take 1 capsule by mouth daily   Refills:  0       fluticasone 50 MCG/ACT spray   Commonly known as:  FLONASE        Dose:  1-2 spray   Spray 1-2 sprays into both nostrils daily   Refills:  0       IBUPROFEN PO        Dose:  200-600 mg   Take 200-600 mg by mouth 4 times daily as needed for moderate pain   Refills:  0       lisinopril-hydrochlorothiazide 20-12.5 MG per tablet   Commonly known as:  PRINZIDE/ZESTORETIC        Dose:  2 tablet   Take 2 tablets by mouth daily   Refills:  0       mometasone-formoterol 200-5 MCG/ACT oral inhaler   Commonly known as:  DULERA        Dose:  2 puff   Inhale 2 puffs into the lungs 2 times daily   Refills:  0       SINGULAIR PO        Dose:  10 mg   Take 10 mg by mouth At Bedtime   Refills:  0       * SYNTHROID PO        Dose:  125 mcg   Take 125 mcg by mouth every other day (alternate with 112 mcg dose)   Refills:  0       * SYNTHROID PO        Dose:  112 mcg   Take 112 mcg by mouth every other day (alternate with 125 mcg dose)   Refills:  0       * Notice:  This list has 2 medication(s) that are the same as other medications prescribed for you. Read the directions carefully, and ask your doctor or other care provider to review them with you.      STOP taking     mupirocin 2 % nasal ointment   Commonly known as:  BACTROBAN                Where to get your medicines      These medications were sent to [facility] - [address]  [address], [place] [address]     Phone:  [phone]     acetaminophen 325 MG tablet    enoxaparin 40 MG/0.4ML injection    senna-docusate 8.6-50 MG per tablet         Some of these will need a  paper prescription and others can be bought over the counter. Ask your nurse if you have questions.     Bring a paper prescription for each of these medications     oxyCODONE 10 MG 12 hr tablet    oxyCODONE 5 MG IR tablet                Protect others around you: Learn how to safely use, store and throw away your medicines at www.disposemymeds.org.             Medication List: This is a list of all your medications and when to take them. Check marks below indicate your daily home schedule. Keep this list as a reference.      Medications           Morning Afternoon Evening Bedtime As Needed    acetaminophen 325 MG tablet   Commonly known as:  TYLENOL   Take 2 tablets (650 mg) by mouth every 4 hours as needed for other (surgical pain)   Last time this was given:  650 mg on 8/14/2017  1:15 PM                                albuterol 108 (90 BASE) MCG/ACT Inhaler   Commonly known as:  PROAIR HFA/PROVENTIL HFA/VENTOLIN HFA   Inhale 2 puffs into the lungs every 6 hours as needed for shortness of breath / dyspnea or wheezing   Last time this was given:  2 puffs on 8/14/2017  8:02 AM                                ALLEGRA PO   Take 180 mg by mouth daily   Last time this was given:  180 mg on 8/14/2017  8:00 AM                                DAILY DENIZ MAXIMUM MULTIVITAMIN PO   Take 1 packet by mouth 3 times daily DOTERRA                                enoxaparin 40 MG/0.4ML injection   Commonly known as:  LOVENOX   Inject 0.4 mLs (40 mg) Subcutaneous every 24 hours for 11 days   Last time this was given:  40 mg on 8/14/2017  7:55 AM                                estradiol 0.1 MG/GM cream   Commonly known as:  ESTRACE VAGINAL   Place 1 g vaginally three times a week   Last time this was given:  1 g on 8/14/2017  8:04 AM                                FISH OIL PO   Take 1 capsule by mouth daily                                fluticasone 50 MCG/ACT spray   Commonly known as:  FLONASE   Spray 1-2 sprays into both nostrils daily    Last time this was given:  2 sprays on 8/14/2017  9:20 AM                                IBUPROFEN PO   Take 200-600 mg by mouth 4 times daily as needed for moderate pain                                lisinopril-hydrochlorothiazide 20-12.5 MG per tablet   Commonly known as:  PRINZIDE/ZESTORETIC   Take 2 tablets by mouth daily   Last time this was given:  2 tablets on 8/14/2017  8:00 AM                                mometasone-formoterol 200-5 MCG/ACT oral inhaler   Commonly known as:  DULERA   Inhale 2 puffs into the lungs 2 times daily                                * oxyCODONE 5 MG IR tablet   Commonly known as:  ROXICODONE   Take 1-2 tablets (5-10 mg) by mouth every 3 hours as needed for moderate to severe pain   Last time this was given:  5 mg on 8/14/2017  1:15 PM                                * oxyCODONE 10 MG 12 hr tablet   Commonly known as:  OxyCONTIN   Take 1 tablet (10 mg) by mouth every 12 hours   Last time this was given:  10 mg on 8/12/2017  5:39 AM                                senna-docusate 8.6-50 MG per tablet   Commonly known as:  SENOKOT-S;PERICOLACE   Take 1-2 tablets by mouth 2 times daily   Last time this was given:  2 tablets on 8/14/2017  8:00 AM                                SINGULAIR PO   Take 10 mg by mouth At Bedtime   Last time this was given:  10 mg on 8/13/2017  9:00 PM                                * SYNTHROID PO   Take 125 mcg by mouth every other day (alternate with 112 mcg dose)   Last time this was given:  125 mcg on 8/14/2017  8:01 AM                                * SYNTHROID PO   Take 112 mcg by mouth every other day (alternate with 125 mcg dose)   Last time this was given:  125 mcg on 8/14/2017  8:01 AM                                * Notice:  This list has 4 medication(s) that are the same as other medications prescribed for you. Read the directions carefully, and ask your doctor or other care provider to review them with you.

## 2017-08-10 NOTE — PLAN OF CARE
Problem: Goal Outcome Summary  Goal: Goal Outcome Summary  Outcome: No Change  Settled at 15:05. CMS intact. VSS on 2L O2. Tolerating ice chips. Moves well, assist of 1 to reposition. Ankle tightness present. Minimal pain. No nausea. Very sleepy/lethargic. Continue to monitor.

## 2017-08-10 NOTE — PROGRESS NOTES
Admission medication history interview status for the 8/10/2017  admission is complete. See EPIC admission navigator for prior to admission medications     Medication history source reliability:Good    Medication history interview source(s):Patient    Medication history resources (including written lists, pill bottles, clinic record):None    [facility]    Additional medication history information not noted on PTA med list :None    Time spent in this activity: 40 minutes    Prior to Admission medications    Medication Sig Last Dose Taking? Auth Provider   IBUPROFEN PO Take 200-600 mg by mouth 4 times daily as needed for moderate pain 8/2/2017 at PRN Yes Reported, Patient   Omega-3 Fatty Acids (FISH OIL PO) Take 1 capsule by mouth daily Past Month at AM Yes Reported, Patient   Multiple Vitamins-Minerals (DAILY DENIZ MAXIMUM MULTIVITAMIN PO) Take 1 packet by mouth 3 times daily DOTERRA 8/3/2017 at Unknown time Yes Reported, Patient   albuterol (PROAIR HFA/PROVENTIL HFA/VENTOLIN HFA) 108 (90 BASE) MCG/ACT Inhaler Inhale 2 puffs into the lungs every 6 hours as needed for shortness of breath / dyspnea or wheezing 8/2/2017 at PRN Yes Reported, Patient   Levothyroxine Sodium (SYNTHROID PO) Take 125 mcg by mouth every other day (alternate with 112 mcg dose) 8/10/2017 at 0715 Yes Reported, Patient   Levothyroxine Sodium (SYNTHROID PO) Take 112 mcg by mouth every other day (alternate with 125 mcg dose) 8/9/2017 at AM Yes Reported, Patient   fluticasone (FLONASE) 50 MCG/ACT spray Spray 1-2 sprays into both nostrils daily 8/10/2017 at 0715 Yes Reported, Patient   Fexofenadine HCl (ALLEGRA PO) Take 180 mg by mouth daily 8/10/2017 at 0715 Yes Reported, Patient   Acetaminophen (TYLENOL PO) Take 325-650 mg by mouth 3 times daily as needed  8/10/2017 at 0000 Yes Reported, Patient   mupirocin (BACTROBAN) 2 % nasal ointment Apply 1 each into each nare 2 times daily Apply small amount to each nostril 2 times per day for 7  days prior to surgery. 8/9/2017 at HS Yes Reported, Patient   estradiol (ESTRACE VAGINAL) 0.1 MG/GM vaginal cream Place 1 g vaginally three times a week 8/9/2017 at Unknown time Yes Efra Fermin MD   lisinopril-hydrochlorothiazide (PRINZIDE,ZESTORETIC) 20-12.5 MG per tablet Take 2 tablets by mouth daily  8/9/2017 at AM Yes Reported, Patient   mometasone-formoterol (DULERA) 200-5 MCG/ACT oral inhaler Inhale 2 puffs into the lungs 2 times daily 8/10/2017 at 0715 Yes Reported, Patient   Montelukast Sodium (SINGULAIR PO) Take 10 mg by mouth At Bedtime  8/9/2017 at HS Yes Reported, Patient

## 2017-08-10 NOTE — IP AVS SNAPSHOT
"` `     [place] [address] [facility]: [phone]                                              INTERAGENCY TRANSFER FORM - NURSING   8/10/2017                    Hospital Admission Date: 8/10/2017  LOREN GAYLE   : 1960  Sex: Female        Attending Provider: Grady Alvarez MD     Allergies:  Erythromycin    Infection:  None   Service:  SURGERY    Ht:  1.575 m (5' 2\")   Wt:  79.4 kg (175 lb)   Admission Wt:  79.4 kg (175 lb)    BMI:  32.01 kg/m 2   BSA:  1.86 m 2            Patient PCP Information     Provider PCP Type    Arabella Conner MD General      Current Code Status     Date Active Code Status Order ID Comments User Context       Prior      Code Status History     Date Active Date Inactive Code Status Order ID Comments User Context    2014  7:33 AM  Full Code [id number]  Dari Benito MD Outpatient    2014  3:58 PM 2014  7:33 AM Full Code [id number]  Dari Benito MD Inpatient      Advance Directives        Does patient have a scanned Advance Directive/ACP document in EPIC?           No        Hospital Problems as of 2017              Priority Class Noted POA    S/P total knee replacement using cement, right Medium  8/10/2017 Yes    S/P total knee arthroplasty, right Medium  2017 Yes      Non-Hospital Problems as of 2017              Priority Class Noted    S/P complete thyroidectomy Medium  2014    S/P total thyroidectomy Medium  2014      Immunizations     None         END      ASSESSMENT     Discharge Profile Flowsheet     EXPECTED DISCHARGE     Passing flatus  yes 17 0931    Expected Discharge Date  17 1216   COMMUNICATION ASSESSMENT      DISCHARGE NEEDS ASSESSMENT     Patient's communication style  spoken language (English or Bilingual) 08/10/17 1033    Patient/family verbalizes understanding of discharge plan recommendations?  Yes 17 1216   FINAL RESOURCES      Medical Team notified of plan?  yes 17 " "1216   Resources List  Transitional Care 08/14/17 1216    Equipment Currently Used at Home  none (Borrowed a std walker) 08/11/17 0851   PAS Number  [id number] 08/14/17 1218    Transportation Available  other (see comments) 08/14/17 1216   SKIN      # of Referrals Placed by CTS  Post Acute Facilities 08/14/17 1216   Inspection of bony prominences  Full 08/14/17 0931    ASSESSMENT OF FUNCTIONAL STATUS     Procedural focused assessment (identify areas inspected)   Spine;Hip, left;Hip, right;Buttock, left;Buttock, right;Sacrum;Coccyx;Knee, left;Knee, right 08/10/17 1315    Assesssment of Functional Status  Needs placement in a SNF/TCF for rehabilitation 08/11/17 1200   Skin WDL  ex 08/14/17 0931    GASTROINTESTINAL (ADULT,PEDIATRIC,OB)     Skin Integrity  incision(s);scar(s) 08/14/17 0931    GI WDL  WDL 08/14/17 0931   SAFETY      All Quadrants Bowel Sounds  audible and normoactive 08/14/17 0931   Safety WDL  WDL 08/14/17 0931    Last Bowel Movement  08/13/17 08/14/17 0931                      Assessment WDL (Within Defined Limits) Definitions           Safety WDL     Effective: 09/28/15    Row Information: <b>WDL Definition:</b> Bed in low position, wheels locked; call light in reach; upper side rails up x 2; ID band on<br> <font color=\"gray\"><i>Item=AS safety wdl>>List=AS safety wdl>>Version=F14</i></font>      Skin WDL     Effective: 09/28/15    Row Information: <b>WDL Definition:</b> Warm; dry; intact; elastic; without discoloration; pressure points without redness<br> <font color=\"gray\"><i>Item=AS skin wdl>>List=AS skin wdl>>Version=F14</i></font>      Vitals     Vital Signs Flowsheet     VITAL SIGNS     Pain Intervention(s)  Medication (See eMAR) 08/14/17 0333    Temp  98.7  F (37.1  C) 08/14/17 0745   Response to Interventions  Relief 08/14/17 0916    Temp src  Oral 08/14/17 0745   ANALGESIA SIDE EFFECTS MONITORING      Resp  16 08/14/17 0916   Side Effects Monitoring: Respiratory Quality  R 08/14/17 0916    " "Pulse  84 08/12/17 1551   Side Effects Monitoring: Respiratory Depth  N 08/14/17 0916    Heart Rate  81 08/14/17 0745   Side Effects Monitoring: Sedation Level  1 08/14/17 0916    Pulse/Heart Rate Source  Monitor 08/14/17 0745   HEIGHT AND WEIGHT      BP  123/72 08/14/17 0745   Height  1.575 m (5' 2\") 08/10/17 1119    BP Location  Left arm 08/14/17 0745   Weight  79.4 kg (175 lb) 08/10/17 1119    OXYGEN THERAPY     BSA (Calculated - sq m)  1.86 08/10/17 1119    SpO2  94 % 08/14/17 0745   BMI (Calculated)  32.07 08/10/17 1119    O2 Device  None (Room air) 08/14/17 0745   POSITIONING      Oxygen Delivery  1.5 LPM 08/11/17 0049   Body Position  supine, head elevated 08/13/17 2302    PAIN/COMFORT     Head of Bed (HOB)  HOB at 20 degrees 08/13/17 2302    Patient Currently in Pain  MARU 08/14/17 0333   Positioning/Transfer Devices  pillows 08/13/17 1017    Preferred Pain Scale  number (Numeric Rating Pain Scale) 08/13/17 1511   ECG      0-10 Pain Scale  4 08/14/17 0234   ECG Rhythm  Normal sinus rhythm 08/10/17 1435    Word Pain Scale  2 08/10/17 1457   Ectopy  None 08/10/17 1435    Pain Location  Knee 08/14/17 0235   DAILY CARE      Pain Orientation  Right 08/13/17 2040   Activity Type  ambulated in magana;ambulated to bathroom 08/14/17 0931    Pain Descriptors  Aching 08/13/17 0305   Activity Level of Assistance  assistance, stand-by 08/14/17 0931    Pain Management Interventions  no interventions per patient request 08/12/17 2258   Activity Assistive Device  walker;gait belt 08/13/17 1017            Patient Lines/Drains/Airways Status    Active LINES/DRAINS/AIRWAYS     Name: Placement date: Placement time: Site: Days: Last dressing change:    Incision/Surgical Site 08/10/17 Right Knee 08/10/17   1215    4             Patient Lines/Drains/Airways Status    Active PICC/CVC     None            Intake/Output Detail Report     Date Intake     Output   Net    Shift P.O. I.V. IV Piggyback Total Urine Blood Total       Noc " 08/12/17 2300 - 08/13/17 0659 -- -- -- -- -- -- -- 0    Day 08/13/17 0700 - 08/13/17 1459 400 -- -- 400 -- -- -- 400    Nisha 08/13/17 1500 - 08/13/17 2259 680 -- -- 680 -- -- -- 680    Noc 08/13/17 2300 - 08/14/17 0659 -- -- -- -- -- -- -- 0    Day 08/14/17 0700 - 08/14/17 1459 -- -- -- -- -- -- -- 0      Last Void/BM       Most Recent Value    Urine Occurrence 1 at 08/14/2017 0238    Stool Occurrence 1 at 08/13/2017 2230      Case Management/Discharge Planning     Case Management/Discharge Planning Flowsheet     REFERRAL INFORMATION     Assesssment of Functional Status  Needs placement in a SNF/TCF for rehabilitation 08/11/17 1200    Admission Type  inpatient 08/11/17 1200   COPING/STRESS      Referral Source  physician 08/11/17 1200   Major Change/Loss/Stressor  none 08/10/17 1033    # of Referrals Placed by CTS  Post Acute Facilities 08/14/17 1216   EXPECTED DISCHARGE      Post Acute Facilities  TCU 08/11/17 1200   Expected Discharge Date  08/14/17 08/14/17 1216    Reason For Consult  discharge planning;facility placement 08/11/17 1200   DISCHARGE PLANNING      Record Reviewed  clinical discipline documentation;history and physical;medical record 08/11/17 1200   Patient/family verbalizes understanding of discharge plan recommendations?  Yes 08/14/17 1216     Assigned to Case  Loly Walden 08/11/17 1200   Medical Team notified of plan?  yes 08/14/17 1216    LIVING ENVIRONMENT     Transportation Available  other (see comments) 08/14/17 1216    Lives With  child(ace), adult;spouse 08/11/17 1200   FINAL RESOURCES      Living Arrangements  house 08/11/17 1200   Equipment Currently Used at Home  none (Borrowed a std walker) 08/11/17 0851    Provides Primary Care For  no one 08/11/17 1200   Resources List  Transitional Care 08/14/17 1216    Quality Of Family Relationships  involved 08/11/17 1200   PAS Number  [id number] 08/14/17 1218    ASSESSMENT OF FAMILY/SOCIAL SUPPORT     ABUSE RISK SCREEN       Marital Status   08/11/17 1200   QUESTION TO PATIENT:  Has a member of your family or a partner(now or in the past) intimidated, hurt, manipulated, or controlled you in any way?  no 08/10/17 1024    Who is your support system?  ;Children 08/11/17 1200   QUESTION TO PATIENT: Do you feel safe going back to the place where you are living?  yes 08/10/17 1024    Description of Support System  Involved 08/11/17 1200   OBSERVATION: Is there reason to believe there has been maltreatment of a vulnerable adult (ie. Physical/Sexual/Emotional abuse, self neglect, lack of adequate food, shelter, medical care, or financial exploitation)?  no 08/10/17 1024    Support Assessment  Adequate family and caregiver support 08/11/17 1200   (R) MENTAL HEALTH SUICIDE RISK      Quality of Family Relationships  involved 08/11/17 1200   Are you depressed or being treated for depression?  No 08/10/17 1050    ASSESSMENT OF FUNCTIONAL STATUS

## 2017-08-10 NOTE — IP AVS SNAPSHOT
[address] [facility]    [address] [place] [address]    Phone:  [phone]                                       After Visit Summary   8/10/2017    Aleena Ontiveros    MRN: [id number]           After Visit Summary Signature Page     I have received my discharge instructions, and my questions have been answered. I have discussed any challenges I see with this plan with the nurse or doctor.    ..........................................................................................................................................  Patient/Patient Representative Signature      ..........................................................................................................................................  Patient Representative Print Name and Relationship to Patient    ..................................................               ................................................  Date                                            Time    ..........................................................................................................................................  Reviewed by Signature/Title    ...................................................              ..............................................  Date                                                            Time

## 2017-08-10 NOTE — IP AVS SNAPSHOT
"          [place] [address] [facility]: [phone]                                              INTERAGENCY TRANSFER FORM - LAB / IMAGING / EKG / EMG RESULTS   8/10/2017                    Hospital Admission Date: 8/10/2017  LOREN GAYLE   : 1960  Sex: Female        Attending Provider: Grady Alvarez MD     Allergies:  Erythromycin    Infection:  None   Service:  SURGERY    Ht:  1.575 m (5' 2\")   Wt:  79.4 kg (175 lb)   Admission Wt:  79.4 kg (175 lb)    BMI:  32.01 kg/m 2   BSA:  1.86 m 2            Patient PCP Information     Provider PCP Type    Arabella Conner MD General         Lab Results - 3 Days      Creatinine [[id number]]  Resulted: 17, Result status: Final result    Ordering provider: Tati Whatley [facility]  17 0000 Resulting lab: [facility]    Specimen Information    Type Source Collected On   Blood  17          Components       Value Reference Range Flag Lab   Creatinine 0.66 0.52 - 1.04 mg/dL  FrStHsLb   GFR Estimate -- >60 mL/min/1.7m2  FrStHsLb   Result:         >90  Non  GFR Calc     GFR Estimate If Black -- >60 mL/min/1.7m2  FrStHsLb   Result:         >90   GFR Calc              Potassium [[id number]]  Resulted: 1753, Result status: Final result    Ordering provider: Grady Alvarez MD  17 0000 Resulting lab: [facility]    Specimen Information    Type Source Collected On   Blood  17          Components       Value Reference Range Flag Lab   Potassium 4.2 3.4 - 5.3 mmol/L  FrStHsLb            Platelet count [[id number]]  Resulted: 17 0635, Result status: Final result    Ordering provider: Elizabeth Merino PA-C  17 0000 Resulting lab: [facility]    Specimen Information    Type Source Collected On   Blood  17          Components       Value Reference Range Flag Lab   Platelet Count 307 150 - 450 10e9/L  " FrStHsLb            Glucose [[id number]] (Abnormal)  Resulted: 08/12/17 0621, Result status: Final result    Ordering provider: Elizabeth Merino PA-C  08/12/17 0317 Resulting lab: [facility]    Specimen Information    Type Source Collected On   Blood  08/12/17 0558          Components       Value Reference Range Flag Lab   Glucose 132 70 - 99 mg/dL H FrStHsLb            Hemoglobin [[id number]] (Abnormal)  Resulted: 08/12/17 0615, Result status: Final result    Ordering provider: Elizabeth Merino PA-C  08/12/17 0000 Resulting lab: [facility]    Specimen Information    Type Source Collected On   Blood  08/12/17 0558          Components       Value Reference Range Flag Lab   Hemoglobin 10.3 11.7 - 15.7 g/dL L FrStHsLb            Potassium [[id number]]  Resulted: 08/11/17 0731, Result status: Final result    Ordering provider: Elizabeth Merino PA-C  08/11/17 0636 Resulting lab: [facility]    Specimen Information    Type Source Collected On     08/11/17 0636          Components       Value Reference Range Flag Lab   Potassium 4.6 3.4 - 5.3 mmol/L  FrStHsLb            Glucose [[id number]] (Abnormal)  Resulted: 08/11/17 0712, Result status: Final result    Ordering provider: Grady Alvarez MD  08/11/17 0001 Resulting lab: [facility]    Specimen Information    Type Source Collected On   Blood  08/11/17 0636          Components       Value Reference Range Flag Lab   Glucose 129 70 - 99 mg/dL H FrStHsLb            Hemoglobin [[id number]] (Abnormal)  Resulted: 08/11/17 0703, Result status: Final result    Ordering provider: Elizabeth Merino PA-C  08/11/17 0001 Resulting lab: [facility]    Specimen Information    Type Source Collected On   Blood  08/11/17 0636          Components       Value Reference Range Flag Lab   Hemoglobin 10.7 11.7 - 15.7 g/dL L FrStHsLb            Creatinine [[id number]]  Resulted: 08/10/17  1914, Result status: Final result    Ordering provider: Elizabeth Merino PA-C  08/10/17 1510 Resulting lab: [facility]    Specimen Information    Type Source Collected On   Blood  08/10/17 1820          Components       Value Reference Range Flag Lab   Creatinine 0.60 0.52 - 1.04 mg/dL  FrStHsLb   GFR Estimate -- >60 mL/min/1.7m2  FrStHsLb   Result:         >90  Non  GFR Calc     GFR Estimate If Black -- >60 mL/min/1.7m2  FrStHsLb   Result:         >90   GFR Calc              Platelet count [[id number]]  Resulted: 08/10/17 1855, Result status: Final result    Ordering provider: Elizabeth Merino PA-C  08/10/17 1510 Resulting lab: [facility]    Specimen Information    Type Source Collected On   Blood  08/10/17 1820          Components       Value Reference Range Flag Lab   Platelet Count 300 150 - 450 10e9/L  FrStHsLb            Potassium [[id number]] (Abnormal)  Resulted: 08/10/17 1056, Result status: Final result    Ordering provider: Fish Vidal MD  08/10/17 1021 Resulting lab: [facility]    Specimen Information    Type Source Collected On   Blood  08/10/17 1030          Components       Value Reference Range Flag Lab   Potassium 3.2 3.4 - 5.3 mmol/L L FrStHsLb            Testing Performed By     Lab - Abbreviation Name Director Address Valid Date Range    14 - FrStHsLb [facility] Unknown [address] [place] [address] 05/08/15 1057 - Present            Unresulted Labs (24h ago through future)    Start       Ordered    08/13/17 0600  Platelet count  (enoxaparin (LOVENOX) (Weight  kg with CrCl greater than 30 mL/min is prechecked))  EVERY THREE DAYS,   Routine     Comments:  Repeat every 3 days while on VTE prophylaxis. If no result is listed, this lab has not been done the past 365 days. LATEST LAB RESULT: Platelet Count (10e9/L)       Date                     Value                  "05/13/2014               304              ----------      08/10/17 1510    08/13/17 0600  Creatinine  EVERY THREE DAYS,   Routine     Comments:  Last Lab Result: No results found for: CR    08/10/17 1845    Unscheduled  Hemoglobin  CONDITIONAL X 2,   Routine     Comments:  Release on POD 1 and POD 2 if the morning Hgb is less than 8.0    08/10/17 1510    Unscheduled  Potassium  (Potassium Replacement - \"Standard\" - For K levels less than 3.4 mmol/L - UU,UR,UA,RH,SH,PH,WY )  CONDITIONAL (SPECIFY),   Routine     Comments:  Obtain Potassium Level for these conditions:  *IF no potassium result within 24 hours before initiation of order set, draw potassium level with next lab collect.    *2 HOURS AFTER last IV potassium replacement dose and 4 hours after an oral replacement dose.  *Next morning after potassium dose.     Repeat Potassium Replacement if necessary.    08/11/17 0000          Encounter-Level Documents:     There are no encounter-level documents.      Order-Level Documents - 08/10/2017:     Scan on 8/9/2017  6:23 AM by Outside, Provider : [id number] (below)            "

## 2017-08-10 NOTE — IP AVS SNAPSHOT
` [place] [id number] [facility]: [phone]            Medication Administration Report for Aleena Ontiveros as of 08/14/17 1318   Legend:    Given Hold Not Given Due Canceled Entry Other Actions    Time Time (Time) Time  Time-Action       Inactive    Active    Linked        Medications 08/08/17 08/09/17 08/10/17 08/11/17 08/12/17 08/13/17 08/14/17    acetaminophen (TYLENOL) tablet 650 mg  Dose: 650 mg Freq: EVERY 4 HOURS PRN Route: PO  PRN Reason: other  PRN Comment: surgical pain  Start: 08/13/17 0000   Admin Instructions: May give first dose 4 hours after last scheduled dose of acetaminophen  Maximum acetaminophen dose from all sources = 75 mg/kg/day not to exceed 4 grams/day.           1315 (650 mg)-Given           albuterol (PROAIR HFA/PROVENTIL HFA/VENTOLIN HFA) Inhaler 2 puff  Dose: 2 puff Freq: EVERY 6 HOURS PRN Route: IN  PRN Reasons: shortness of breath / dyspnea,wheezing  Start: 08/10/17 1510       0526 (2 puff)-Given          0802 (2 puff)-Given           benzocaine-menthol (CHLORASEPTIC) 6-10 MG lozenge 1-2 lozenge  Dose: 1-2 lozenge Freq: EVERY 1 HOUR PRN Route: BU  PRN Reason: sore throat  PRN Comment: sore throat without fever  Start: 08/10/17 1510              bisacodyl (DULCOLAX) Suppository 10 mg  Dose: 10 mg Freq: DAILY PRN Route: RE  PRN Reason: constipation  Start: 08/13/17 1441         2100 (10 mg)-Given            enoxaparin (LOVENOX) injection 40 mg  Dose: 40 mg Freq: EVERY 24 HOURS Route: SC  Start: 08/11/17 0800   Admin Instructions: Check to make sure start date/time is 12-24 hours post op unless documented complication, AND no sooner than 22 hours post op if spinal anesthesia used.   Continue until discharge to home. HOLD if platelet count falls below 50% of baseline or less than 100,000/ L and notify provider.        0747 (40 mg)-Given        0837 (40 mg)-Given        0832 (40 mg)-Given        0755 (40 mg)-Given           estradiol (ESTRACE) cream 1 g  Dose: 1 g  Freq: Once per day on Mon Wed Fri Route: VA  Start: 08/11/17 0900       0831 (1 g)-Given          0804 (1 g)-Given           fexofenadine (ALLEGRA) tablet 180 mg  Dose: 180 mg Freq: DAILY Route: PO  Start: 08/11/17 0900       0825 (180 mg)-Given        0838 (180 mg)-Given        0833 (180 mg)-Given        0800 (180 mg)-Given           fluticasone (FLONASE) 50 MCG/ACT spray 1-2 spray  Dose: 1-2 spray Freq: DAILY Route: BOTH NOSTRIL  Start: 08/11/17 0900       0832 (2 spray)-Given        0846 (2 spray)-Given        0849 (2 spray)-Given        0920 (2 spray)-Given           fluticasone-vilanterol (BREO ELLIPTA) 200-25 MCG/INH oral inhaler 1 puff  Dose: 1 puff Freq: DAILY Route: IN  Start: 08/11/17 0900   Admin Instructions: Therapeutic interchange for Dulera BID.<br>Rinse mouth after use.       2226 (1 puff)-Given        0832 (1 puff)-Given        0846 (1 puff)-Given        0849 (1 puff)-Given        0803 (1 puff)-Given           HYDROmorphone (PF) (DILAUDID) injection 0.3-0.5 mg  Dose: 0.3-0.5 mg Freq: EVERY 2 HOURS PRN Route: IV  PRN Reason: severe pain  PRN Comment: or patient unable to take PO  Start: 08/10/17 1510   Admin Instructions: Hold while on PCA..       1615 (0.5 mg)-Given [C]       1954 (0.5 mg)-Given        0050 (0.5 mg)-Given       0317 (0.5 mg)-Given              hydrOXYzine (ATARAX) tablet 25 mg  Dose: 25 mg Freq: EVERY 6 HOURS PRN Route: PO  PRN Reason: itching  Start: 08/10/17 1510   Admin Instructions: Caution to be used when administering multiple Central Nervous System (CNS) depressing meds within a short time frame.         1000 (25 mg)-Given       1840 (25 mg)-Given        0957 (25 mg)-Given            levothyroxine (SYNTHROID/LEVOTHROID) tablet 112 mcg  Dose: 112 mcg Freq: EVERY OTHER DAY Route: PO  Start: 08/11/17 0900       0830 (112 mcg)-Given         0833 (112 mcg)-Given            levothyroxine (SYNTHROID/LEVOTHROID) tablet 125 mcg  Dose: 125 mcg Freq: EVERY OTHER DAY Route: PO  Start:  "08/12/17 0900        0837 (125 mcg)-Given         0801 (125 mcg)-Given           lidocaine (LMX4) cream  Freq: EVERY 1 HOUR PRN Route: Top  PRN Reason: pain  PRN Comment: with VAD insertion or accessing implanted port.  Start: 08/10/17 1510   Admin Instructions: Do NOT give if patient has a history of allergy to any local anesthetic or any \"ricky\" product.   Apply 30 minutes prior to VAD insertion or port access.  MAX Dose:  2.5 g (  of 5 g tube)               lidocaine 1 % 1 mL  Dose: 1 mL Freq: EVERY 1 HOUR PRN Route: OTHER  PRN Comment: mild pain with VAD insertion or accessing implanted port  Start: 08/10/17 1510   Admin Instructions: Do NOT give if patient has a history of allergy to any local anesthetic or any \"ricky\" product. MAX dose 1 mL subcutaneous OR intradermal in divided doses.               lisinopril-hydrochlorothiazide (PRINZIDE/ZESTORETIC) 20-12.5 MG per tablet 2 tablet  Dose: 2 tablet Freq: DAILY Route: PO  Start: 08/10/17 1515      (1716)-Not Given        (0859)-Not Given        0838 (2 tablet)-Given [C]        (0849)-Not Given        0800 (2 tablet)-Given           metoclopramide (REGLAN) tablet 10 mg  Dose: 10 mg Freq: EVERY 6 HOURS PRN Route: PO  PRN Reasons: nausea,vomiting  Start: 08/10/17 1510   Admin Instructions: This is Step 3 of nausea and vomiting management.  Give if nausea not resolved 15 minutes after giving prochlorperazine (COMPAZINE).  If nausea not resolved in 15-30 minutes, Notify provider.              Or  metoclopramide (REGLAN) injection 10 mg  Dose: 10 mg Freq: EVERY 6 HOURS PRN Route: IV  PRN Reasons: nausea,vomiting  Start: 08/10/17 1510   Admin Instructions: This is Step 3 of nausea and vomiting management.  Give if nausea not resolved 15 minutes after giving prochlorperazine (COMPAZINE).  If nausea not resolved in 15-30 minutes, Notify provider.  Avoid use if patient has full bowel obstruction or perforation. Irritant.               montelukast (SINGULAIR) tablet 10 " mg  Dose: 10 mg Freq: AT BEDTIME Route: PO  Start: 08/10/17 2200      2208 (10 mg)-Given        2157 (10 mg)-Given        2110 (10 mg)-Given        2100 (10 mg)-Given        [ ] 2200           multivitamin, therapeutic with minerals (THERA-VIT-M) tablet 1 tablet  Dose: 1 tablet Freq: DAILY Route: PO  Start: 08/10/17 1715      (1716)-Not Given        0825 (1 tablet)-Given        0838 (1 tablet)-Given        0833 (1 tablet)-Given        0800 (1 tablet)-Given           mupirocin (BACTROBAN) 2 % ointment  Freq: 2 TIMES DAILY Route: BOTH NOSTRIL  Start: 08/10/17 2100      2226 ( )-Given        0832 ( )-Given       2047 ( )-Given        0845 ( )-Given       (2127)-Not Given [C]        0849 ( )-Given       (2003)-Not Given        (0920)-Not Given       [ ] 2100           naloxone (NARCAN) injection 0.1-0.4 mg  Dose: 0.1-0.4 mg Freq: EVERY 2 MIN PRN Route: IV  PRN Reason: opioid reversal  Start: 08/10/17 1510   Admin Instructions: For respiratory rate LESS than or EQUAL to 8.  Partial reversal dose:  0.1 mg titrated q 2 minutes for Analgesia Side Effects Monitoring Sedation Level of 3 (frequently drowsy, arousable, drifts to sleep during conversation).Full reversal dose:  0.4 mg bolus for Analgesia Side Effects Monitoring Sedation Level of 4 (somnolent, minimal or no response to stimulation).               ondansetron (ZOFRAN-ODT) ODT tab 4 mg  Dose: 4 mg Freq: EVERY 6 HOURS PRN Route: PO  PRN Reasons: nausea,vomiting  Start: 08/10/17 1510   Admin Instructions: This is Step 1 of nausea and vomiting management.  If nausea not resolved in 15 minutes, go to Step 2 prochlorperazine (COMPAZINE). Do not push through foil backing. Peel back foil and gently remove. Place on tongue immediately. Administration with liquid unnecessary              Or  ondansetron (ZOFRAN) injection 4 mg  Dose: 4 mg Freq: EVERY 6 HOURS PRN Route: IV  PRN Reasons: nausea,vomiting  Start: 08/10/17 1510   Admin Instructions: This is Step 1 of nausea and  vomiting management.  If nausea not resolved in 15 minutes, go to Step 2 prochlorperazine (COMPAZINE).  Irritant.               oxyCODONE (ROXICODONE) IR tablet 5-10 mg  Dose: 5-10 mg Freq: EVERY 3 HOURS PRN Route: PO  PRN Reason: moderate to severe pain  Start: 08/10/17 1510   Admin Instructions: IF CrCl is UNKNOWN start at lowest end of dosing range. Hold while on PCA or with regular IV opioid dosing.        0825 (10 mg)-Given       1429 (10 mg)-Given       1726 (10 mg)-Given       2046 (10 mg)-Given        0539 (5 mg)-Given       0837 (10 mg)-Given       1255 (10 mg)-Given       1639 (5 mg)-Given        0102 (5 mg)-Given       0957 (5 mg)-Given       1439 (10 mg)-Given       1953 (10 mg)-Given        0233 (10 mg)-Given       0755 (10 mg)-Given       1315 (5 mg)-Given           polyethylene glycol (MIRALAX/GLYCOLAX) Packet 17 g  Dose: 17 g Freq: DAILY Route: PO  Start: 08/11/17 0900   Admin Instructions: 1 Packet = 17 grams. Mixed prescribed dose in 8 ounces of water. Follow with 8 oz. of water.        0830 (17 g)-Given        0837 (17 g)-Given        0832 (17 g)-Given        0757 (17 g)-Given                  potassium chloride (KLOR-CON) Packet 20-40 mEq  Dose: 20-40 mEq Freq: EVERY 2 HOURS PRN Route: ORAL OR FEED  PRN Reason: potassium supplementation  Start: 08/10/17 3209   Admin Instructions: Use if unable to tolerate tablets.  If Serum K+ 3.0-3.3, dose = 60 mEq po total dose (40 mEq x1 followed in 2 hours by 20 mEq x1). Recheck K+ level 4 hours after dose and the next AM.  If Serum K+ 2.5-2.9, dose = 80 mEq po total dose (40 mEq Q2H x2). Recheck K+ level 4 hours after dose and the next AM.  If Serum K+ less than 2.5, See IV order.  Dissolve packet contents in 4-8 ounces of cold water or juice.               potassium chloride 10 mEq in 100 mL intermittent infusion  Dose: 10 mEq Freq: EVERY 1 HOUR PRN Route: IV  PRN Reason: potassium supplementation  Start: 08/10/17 0923   Admin Instructions: Infuse via  PERIPHERAL LINE or CENTRAL LINE. Use for central line replacement if patient weight less than 65 kg, if patient is on TPN with high potassium content or if unit does not stock 20 mEq bags.   If Serum K+ 3.0-3.3, dose = 10 mEq/hr x4 doses (40 mEq IV total dose). Recheck K+ level 2 hours after dose and the next AM.   If Serum K+ less than 3.0, dose = 10 mEq/hr x6 doses (60 mEq IV total dose). Recheck K+ level 2 hours after dose and the next AM.               potassium chloride 10 mEq in 100 mL intermittent infusion with 10 mg lidocaine  Dose: 10 mEq Freq: EVERY 1 HOUR PRN Route: IV  PRN Reason: potassium supplementation  Start: 08/10/17 2359   Admin Instructions: Infuse via PERIPHERAL LINE. Use potassium with lidocaine for pain with peripheral administration.  If Serum K+ 3.0-3.3, dose = 10 mEq/hr x4 doses (40 mEq IV total dose). Recheck K+ level 2 hours after dose and the next AM.  If Serum K+ less than 3.0, dose = 10 mEq/hr x6 doses (60 mEq IV total dose). Recheck K+ level 2 hours after dose and the next AM.               potassium chloride 20 mEq in 50 mL intermittent infusion  Dose: 20 mEq Freq: EVERY 1 HOUR PRN Route: IV  PRN Reason: potassium supplementation  Start: 08/10/17 2359   Admin Instructions: Infuse via CENTRAL LINE Only. May need EKG if less than 65 kg or on TPN - Max rate is 0.3 mEq/kg/hr for patients not on EKG monitoring.   If Serum K+ 3.0-3.3, dose = 20 mEq/hr x2 doses (40 mEq IV total dose). Recheck K+ level 2 hours after dose and the next AM.  If Serum K+ less than 3.0, dose = 20 mEq/hr x3 doses (60 mEq IV total dose). Recheck K+ level 2 hours after dose and the next AM.               potassium chloride SA (K-DUR/KLOR-CON M) CR tablet 20-40 mEq  Dose: 20-40 mEq Freq: EVERY 2 HOURS PRN Route: PO  PRN Reason: potassium supplementation  Start: 08/10/17 2359   Admin Instructions: Use if able to take PO.   If Serum K+ 3.0-3.3, dose = 60 mEq po total dose (40 mEq x1 followed in 2 hours by 20 mEq x1).  Recheck K+ level 4 hours after dose and the next AM.  If Serum K+ 2.5-2.9, dose = 80 mEq po total dose (40 mEq Q2H x2). Recheck K+ level 4 hours after dose and the next AM.  If Serum K+ less than 2.5, See IV order.  DO NOT CRUSH        0043 (40 mEq)-Given       0306 (20 mEq)-Given              prochlorperazine (COMPAZINE) injection 5-10 mg  Dose: 5-10 mg Freq: EVERY 6 HOURS PRN Route: IV  PRN Reasons: nausea,vomiting  Start: 08/10/17 1510   Admin Instructions: This is Step 2 of nausea and vomiting management.   If nausea not resolved in 15 minutes, give metoclopramide (REGLAN), if ordered (step 3 of nausea and vomiting management)              Or  prochlorperazine (COMPAZINE) tablet 5-10 mg  Dose: 5-10 mg Freq: EVERY 6 HOURS PRN Route: PO  PRN Reasons: nausea,vomiting  Start: 08/10/17 1510   Admin Instructions: This is Step 2 of nausea and vomiting management.   If nausea not resolved in 15 minutes, give metoclopramide (REGLAN), if ordered (step 3 of nausea and vomiting management)               senna-docusate (SENOKOT-S;PERICOLACE) 8.6-50 MG per tablet 1-2 tablet  Dose: 1-2 tablet Freq: 2 TIMES DAILY Route: PO  Start: 08/10/17 2100   Admin Instructions: Start with 1 tablet PO BID, If no bowel movement in 24 hours, increase to 2 tablets PO BID.  Hold for loose stools.       2208 (2 tablet)-Given        0919 (2 tablet)-Given       2047 (2 tablet)-Given        0837 (2 tablet)-Given       2110 (2 tablet)-Given        0833 (2 tablet)-Given       2000 (2 tablet)-Given        0800 (2 tablet)-Given       [ ] 2100           sodium chloride (PF) 0.9% PF flush 3 mL  Dose: 3 mL Freq: EVERY 1 HOUR PRN Route: IK  PRN Reason: line flush  PRN Comment: for peripheral IV flush post IV meds  Start: 08/10/17 1510             Completed Medications  Medications 08/08/17 08/09/17 08/10/17 08/11/17 08/12/17 08/13/17 08/14/17         Dose: 975 mg Freq: EVERY 8 HOURS Route: PO  Start: 08/10/17 1700   End: 08/13/17 0832   Admin Instructions:  Do not use if patient has an active opioid/acetaminophen analgesic order for pain  Maximum acetaminophen dose from all sources = 75 mg/kg/day not to exceed 4 grams/day.       1829 (975 mg)-Given        0043 (975 mg)-Given       0825 (975 mg)-Given       1644 (975 mg)-Given        0018 (975 mg)-Given       0838 (975 mg)-Given       1639 (975 mg)-Given        0102 (975 mg)-Given       0832 (975 mg)-Given           Discontinued Medications  Medications 08/08/17 08/09/17 08/10/17 08/11/17 08/12/17 08/13/17 08/14/17         Rate: 75 mL/hr Freq: CONTINUOUS Route: IV  Last Dose: Stopped (08/11/17 1003)  Start: 08/10/17 1515   End: 08/11/17 1635   Admin Instructions: Change to saline lock when PO well tolerated.       1600 ( )-New Bag [C]        0530 ( )-New Bag       1003-Stopped       1635-Med Discontinued            Dose: 10 mg Freq: EVERY 12 HOURS Route: PO  Start: 08/10/17 1800   End: 08/12/17 1759   Admin Instructions: Hold while on PCA or regular IV opioid dosing.  Start 12 hours after pre-op dose.       1829 (10 mg)-Given        (0527)-Not Given [C]       0747 (10 mg)-Given       (1727)-Not Given [C]        0539 (10 mg)-Given       1759-Med Discontinued           Dose: 3 mL Freq: EVERY 8 HOURS Route: IK  Start: 08/10/17 1515   End: 08/13/17 1416   Admin Instructions: And Q1H PRN, to lock peripheral IV dormant line.       (1716)-Not Given               (0859)-Not Given       1426 (3 mL)-Given       2155 (3 mL)-Given        0542 (3 mL)-Given       (1555)-Not Given       (2128)-Not Given               (0619)-Not Given       1416-Med Discontinued

## 2017-08-10 NOTE — ANESTHESIA PROCEDURE NOTES
Peripheral nerve/Neuraxial procedure note : Femoral  Pre-Procedure  Performed by MEGAN NGO  Location: pre-op      Pre-Anesthestic Checklist: patient identified, IV checked, site marked, risks and benefits discussed, informed consent, monitors and equipment checked, pre-op evaluation, at physician/surgeon's request and post-op pain management    Timeout  Correct Patient: Yes   Correct Procedure: Yes   Correct Site: Yes   Correct Laterality: Yes   Correct Position: Yes   Site Marked: Yes   .   Procedure Documentation    .    Procedure:    Femoral.  Local skin infiltrated with mL of 1% lidocaine.     Ultrasound used to identify targeted nerve, plexus, or vascular marker and placed a needle adjacent to it., Ultrasound was used to visualize the spread of the anesthetic in close proximity to the above stated nerve. A permanent image is entered into the patient's record.  Patient Prep;mask, sterile gloves, chlorhexidine gluconate and isopropyl alcohol.  .  Needle: short bevel Insertion Method: Single Shot.       Assessment/Narrative  Paresthesias: No.  Injection made incrementally with aspirations every 5 mL..  The placement was negative for: blood aspirated, painful injection and site bleeding.  Bolus given via needle..   Secured via.   Complications: none.

## 2017-08-10 NOTE — BRIEF OP NOTE
[facility] Brief Operative Note    Pre-operative diagnosis: RIGHT KNEE OSTEOARTHRITIS    Post-operative diagnosis same   Procedure: Procedure(s):  RIGHT TOTAL KNEE ARTHROPLASTY  - Wound Class: I-Clean   Surgeon(s): Surgeon(s) and Role:     * Grady Alvarez MD - Primary     * Elizabeth Merino PA-C - Assisting   Estimated blood loss: 50 mL    Specimens: * No specimens in log *   Findings: See op report

## 2017-08-10 NOTE — IP AVS SNAPSHOT
"[place] [address] [facility]: [phone]                                              INTERAGENCY TRANSFER FORM - PHYSICIAN ORDERS   8/10/2017                    Hospital Admission Date: 8/10/2017  LOREN GAYLE   : 1960  Sex: Female        Attending Provider: (none)    Allergies:  Erythromycin    Infection:  None   Service:  SURGERY    Ht:  1.575 m (5' 2\")   Wt:  79.4 kg (175 lb)   Admission Wt:  79.4 kg (175 lb)    BMI:  32.01 kg/m 2   BSA:  1.86 m 2            Patient PCP Information     Provider PCP Type    Arabella Conner MD General      ED Clinical Impression     Diagnosis Description Comment Added By Time Added    S/P total knee replacement using cement, 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, right [Z96.651]  Elizabeth Merino PA-C 2017  9:49 AM      Hospital Problems as of 2017              Priority Class Noted POA    S/P total knee replacement using cement, right Medium  8/10/2017 Yes    S/P total knee arthroplasty, right Medium  2017 Yes      Non-Hospital Problems as of 2017              Priority Class Noted    S/P complete thyroidectomy Medium  2014    S/P total thyroidectomy Medium  2014      Code Status History     Date Active Date Inactive Code Status Order ID Comments User Context    2014  7:33 AM  Full Code [id number]  Dari Benito MD Outpatient    2014  3:58 PM 2014  7:33 AM Full Code [id number]  Dari Benito MD Inpatient         Medication Review      START taking        Dose / Directions Comments    enoxaparin 40 MG/0.4ML injection   Commonly known as:  LOVENOX   Used for:  S/P total knee replacement using cement, right        Dose:  40 mg   Inject 0.4 mLs (40 mg) Subcutaneous every 24 hours for 11 days   Quantity:  4.4 mL   Refills:  0        * oxyCODONE 5 MG IR tablet   Commonly known as:  ROXICODONE   Used for:  S/P total knee replacement using cement, right        Dose:  5-10 mg   Take 1-2 " tablets (5-10 mg) by mouth every 3 hours as needed for moderate to severe pain   Quantity:  80 tablet   Refills:  0        * oxyCODONE 10 MG 12 hr tablet   Commonly known as:  OxyCONTIN   Used for:  S/P total knee replacement using cement, right        Dose:  10 mg   Take 1 tablet (10 mg) by mouth every 12 hours   Quantity:  30 tablet   Refills:  0    If not covered by insurance, please fill: MS Contin #30 15 mg 1-3 tabs PO Q 12 hours prn pain       senna-docusate 8.6-50 MG per tablet   Commonly known as:  SENOKOT-S;PERICOLACE   Used for:  S/P total knee replacement using cement, right        Dose:  1-2 tablet   Take 1-2 tablets by mouth 2 times daily   Quantity:  60 tablet   Refills:  0        * Notice:  This list has 2 medication(s) that are the same as other medications prescribed for you. Read the directions carefully, and ask your doctor or other care provider to review them with you.      CONTINUE these medications which may have CHANGED, or have new prescriptions. If we are uncertain of the size of tablets/capsules you have at home, strength may be listed as something that might have changed.        Dose / Directions Comments    acetaminophen 325 MG tablet   Commonly known as:  TYLENOL   This may have changed:    - medication strength  - how much to take  - when to take this  - reasons to take this   Used for:  S/P total knee replacement using cement, right        Dose:  650 mg   Take 2 tablets (650 mg) by mouth every 4 hours as needed for other (surgical pain)   Quantity:  60 tablet   Refills:  0          CONTINUE these medications which have NOT CHANGED        Dose / Directions Comments    albuterol 108 (90 BASE) MCG/ACT Inhaler   Commonly known as:  PROAIR HFA/PROVENTIL HFA/VENTOLIN HFA        Dose:  2 puff   Inhale 2 puffs into the lungs every 6 hours as needed for shortness of breath / dyspnea or wheezing   Refills:  0        ALLEGRA PO        Dose:  180 mg   Take 180 mg by mouth daily   Refills:  0         DAILY DENIZ MAXIMUM MULTIVITAMIN PO        Dose:  1 packet   Take 1 packet by mouth 3 times daily DOTERRA   Refills:  0        estradiol 0.1 MG/GM cream   Commonly known as:  ESTRACE VAGINAL   Used for:  Vaginal atrophy        Dose:  1 g   Place 1 g vaginally three times a week   Quantity:  42.5 g   Refills:  0    Over due for annual exam. Needs to be seen in office for more fills.       FISH OIL PO        Dose:  1 capsule   Take 1 capsule by mouth daily   Refills:  0        fluticasone 50 MCG/ACT spray   Commonly known as:  FLONASE        Dose:  1-2 spray   Spray 1-2 sprays into both nostrils daily   Refills:  0        IBUPROFEN PO        Dose:  200-600 mg   Take 200-600 mg by mouth 4 times daily as needed for moderate pain   Refills:  0        lisinopril-hydrochlorothiazide 20-12.5 MG per tablet   Commonly known as:  PRINZIDE/ZESTORETIC        Dose:  2 tablet   Take 2 tablets by mouth daily   Refills:  0        mometasone-formoterol 200-5 MCG/ACT oral inhaler   Commonly known as:  DULERA        Dose:  2 puff   Inhale 2 puffs into the lungs 2 times daily   Refills:  0        SINGULAIR PO        Dose:  10 mg   Take 10 mg by mouth At Bedtime   Refills:  0        * SYNTHROID PO        Dose:  125 mcg   Take 125 mcg by mouth every other day (alternate with 112 mcg dose)   Refills:  0        * SYNTHROID PO        Dose:  112 mcg   Take 112 mcg by mouth every other day (alternate with 125 mcg dose)   Refills:  0        * Notice:  This list has 2 medication(s) that are the same as other medications prescribed for you. Read the directions carefully, and ask your doctor or other care provider to review them with you.      STOP taking     mupirocin 2 % nasal ointment   Commonly known as:  BACTROBAN                     Further instructions from your care team       TOTAL KNEE REPLACEMENT TAKE HOME INSTRUCTIONS  Your surgeon will answer any questions about your progress. General guidelines for your care are listed below. Your  "surgeon may give additional instructions for your care at home. Please follow them carefully    Activity Level  1. Physical activity may be resumed gradually according to your comfort level and your surgeon s instructions. Follow your exercise program as instructed by your therapist. Do exercises at least twice a day. you may ice your knee after exercising.  2. Complete exercises two hours before bedtime to minimize the effect pain may have on sleep.  Refer to pages 19-22 of you \"Total Knee Replacement\" booklet for details  3. Do not wear your knee immobilizer unless your doctor has specifically told you to continue it.    Good Health Practices  1. Maintain an adequate fluid intake and eat a well balanced diet.  2. Be sure to include the basic food groups, such as dairy products, meat/fish, vegetables, and fruit. Each of these foods contribute to your wound healing and increasing your strength.  3. Surgery, decreased activity and pain medication all contribute to a decrease in bowel activity that can result in constipation. It is recommended that you increase your liquid intake, add fiber to your diet, increase activity, and decrease pain medication use. If you have any problems, notify your physician.  4. Wear your anti-embolism stockings day and night until seen by your surgeon if you were issued these at discharge.  (Not all patients will have anti-embolism stockings) Remove twice a day for one hour at a time. You may hand wash and air dry your stockings.  5. Notify your dentist of your total knee surgery and call your dentist one week before a dental appointment for antibiotics, if your dentist will not prescribe antibiotics then call your surgeon to ask for next steps.      Incision/Dressing Care  1. Keep incision clean and dry per surgeons instructions  2. Cover incision if you are still having drainage.  3.  If you have a waterproof dressing __________________________   Shower.    Things to Watch For  1. " Check incision daily for increased redness, tenderness, swelling, or drainage along the incision line. If these occur, please notify your doctor. Also, call if you develop a fever above 101 .  2. Please notify your doctor if you experience any calf pain and/or if you have surgical pain not relieved by the pain medication prescribed by your doctor.  Follow up appointment:______________________________  Nurse signature _________________________________ Date ________ Time _____  Patient signature _____________________________ Date _____________________        Revised 05/8/17    You are discharging to:    [facility]   [address] [place]  [address]  [phone]    Summary of Visit     Reason for your hospital stay       S/P right total knee arthroplasty             After Care     Activity - Up ad hernán           Advance Diet as Tolerated       Follow this diet upon discharge: Regular       Fall precautions           General info for SNF       Length of Stay Estimate: Short Term Care: Estimated # of Days <30  Condition at Discharge: Improving  Level of care:skilled   Rehabilitation Potential: Excellent  Admission H&P remains valid and up-to-date: Yes  Recent Chemotherapy: N/A  Use Nursing Home Standing Orders: Yes       Mantoux instructions       Give two-step Mantoux (PPD) Per Facility Policy Yes       Weight bearing status       WBAT       Wound care       Site:   Right knee   Instructions:  Dry dressing changes daily             Referrals     Occupational Therapy Adult Consult       Evaluate and treat as clinically indicated.    Reason:  S/P right total knee arthroplasty       Physical Therapy Adult Consult       Evaluate and treat as clinically indicated.    Reason:  S/P right total knee arthroplasty             Supplies     AntiEmbolism Stockings       Bilateral below knee length.On in the morning, off at night             Your next 10 appointments already scheduled     Dec 04, 2017   Procedure with Grady  Son Alvarez MD   [facility] (--)    [address]  [facility] [address] [phone]              Follow-Up Appointment Instructions     Future Labs/Procedures    Follow Up and recommended labs and tests     Comments:    Follow up with Dr. Alvarez in clinic in 2 weeks.  Please call [phone] with questions.      Follow-Up Appointment Instructions     Follow Up and recommended labs and tests       Follow up with Dr. Alvarez in clinic in 2 weeks.  Please call [phone] with questions.             Statement of Approval     Ordered          08/13/17 0826  I have reviewed and agree with all the recommendations and orders detailed in this document.  EFFECTIVE NOW     Approved and electronically signed by:  Grady Alvarez MD

## 2017-08-10 NOTE — IP AVS SNAPSHOT
` [place] [address] [facility]: [phone]                 INTERAGENCY TRANSFER FORM - NOTES (H&P, Discharge Summary, Consults, Procedures, Therapies)   8/10/2017                    Hospital Admission Date: 8/10/2017  ALEENA ONTIVEROS   : 1960  Sex: Female        Patient PCP Information     Provider PCP Type    Arabella Conner MD General         History & Physicals      H&P signed by Luis Sheffield at 2017  6:22 AM      Author:  Luis Sheffield Service:  (none) Author Type:  Physician    Filed:  2017  6:22 AM Date of Service:  2017  6:21 AM Creation Time:  2017  6:22 AM    Status:  Signed :  Luis Sheffield (Physician)     Scan on 2017  6:22 AM by Nettie, Provider : [facility] 2017 1          Revision History        User Key Date/Time User Provider Type Action    > [N/A] 2017  6:22 AM Nettie Provider Physician Sign                     Discharge Summaries      Discharge Summaries by Kath White PA-C at 2017  7:38 AM     Author:  Kath White PA-C Service:  Orthopedics Author Type:  Physician Assistant - C    Filed:  2017  7:39 AM Date of Service:  2017  7:38 AM Creation Time:  2017  7:38 AM    Status:  Signed :  Kath White PA-C (Physician Assistant - C)         Discharge Summary    Aleena Ontiveros MRN# [id number]   YOB: 1960 Age: 57 year old     Date of Admission:  8/10/2017  Date of Discharge:  2017  Admitting Physician:  Grady Alvarez MD  Discharge Physician:  Grady Alvarez MD     Primary Provider: Darlene Conner          Admission Diagnoses:   Osteoarthritis right knee          Discharge Diagnosis:   Same           Surgical Procedure:   right knee replacement           Secondary Diagnosis:     Patient Active Problem List   Diagnosis     S/P complete thyroidectomy     S/P total thyroidectomy     S/P total knee replacement using cement,  right              Discharge Disposition:   Discharged to short-term care facility           Medications Prior to Admission:     Prescriptions Prior to Admission   Medication Sig Dispense Refill Last Dose     IBUPROFEN PO Take 200-600 mg by mouth 4 times daily as needed for moderate pain   8/2/2017 at PRN     Omega-3 Fatty Acids (FISH OIL PO) Take 1 capsule by mouth daily   Past Month at AM     Multiple Vitamins-Minerals (DAILY DENIZ MAXIMUM MULTIVITAMIN PO) Take 1 packet by mouth 3 times daily DOTERRA   8/3/2017 at Unknown time     albuterol (PROAIR HFA/PROVENTIL HFA/VENTOLIN HFA) 108 (90 BASE) MCG/ACT Inhaler Inhale 2 puffs into the lungs every 6 hours as needed for shortness of breath / dyspnea or wheezing   8/2/2017 at PRN     Levothyroxine Sodium (SYNTHROID PO) Take 125 mcg by mouth every other day (alternate with 112 mcg dose)   8/10/2017 at 0715     Levothyroxine Sodium (SYNTHROID PO) Take 112 mcg by mouth every other day (alternate with 125 mcg dose)   8/9/2017 at AM     fluticasone (FLONASE) 50 MCG/ACT spray Spray 1-2 sprays into both nostrils daily   8/10/2017 at 0715     Fexofenadine HCl (ALLEGRA PO) Take 180 mg by mouth daily   8/10/2017 at 0715     estradiol (ESTRACE VAGINAL) 0.1 MG/GM vaginal cream Place 1 g vaginally three times a week 42.5 g 0 8/9/2017 at Unknown time     lisinopril-hydrochlorothiazide (PRINZIDE,ZESTORETIC) 20-12.5 MG per tablet Take 2 tablets by mouth daily    8/9/2017 at AM     mometasone-formoterol (DULERA) 200-5 MCG/ACT oral inhaler Inhale 2 puffs into the lungs 2 times daily   8/10/2017 at 0715     Montelukast Sodium (SINGULAIR PO) Take 10 mg by mouth At Bedtime    8/9/2017 at HS     [DISCONTINUED] Acetaminophen (TYLENOL PO) Take 325-650 mg by mouth 3 times daily as needed    8/10/2017 at 0000     [DISCONTINUED] mupirocin (BACTROBAN) 2 % nasal ointment Apply 1 each into each nare 2 times daily Apply small amount to each nostril 2 times per day for 7 days prior to surgery.    8/9/2017 at HS             Discharge Medications:     Current Discharge Medication List      START taking these medications    Details   oxyCODONE (ROXICODONE) 5 MG IR tablet Take 1-2 tablets (5-10 mg) by mouth every 3 hours as needed for moderate to severe pain  Qty: 80 tablet, Refills: 0    Associated Diagnoses: S/P total knee replacement using cement, right      enoxaparin (LOVENOX) 40 MG/0.4ML injection Inject 0.4 mLs (40 mg) Subcutaneous every 24 hours for 11 days  Qty: 4.4 mL, Refills: 0    Associated Diagnoses: S/P total knee replacement using cement, right      senna-docusate (SENOKOT-S;PERICOLACE) 8.6-50 MG per tablet Take 1-2 tablets by mouth 2 times daily  Qty: 60 tablet, Refills: 0    Associated Diagnoses: S/P total knee replacement using cement, right      oxyCODONE (OXYCONTIN) 10 MG 12 hr tablet Take 1 tablet (10 mg) by mouth every 12 hours  Qty: 30 tablet, Refills: 0    Comments: If not covered by insurance, please fill: MS Contin #30 15 mg 1-3 tabs PO Q 12 hours prn pain  Associated Diagnoses: S/P total knee replacement using cement, right         CONTINUE these medications which have CHANGED    Details   acetaminophen (TYLENOL) 325 MG tablet Take 2 tablets (650 mg) by mouth every 4 hours as needed for other (surgical pain)  Qty: 60 tablet, Refills: 0    Associated Diagnoses: S/P total knee replacement using cement, right         CONTINUE these medications which have NOT CHANGED    Details   IBUPROFEN PO Take 200-600 mg by mouth 4 times daily as needed for moderate pain      Omega-3 Fatty Acids (FISH OIL PO) Take 1 capsule by mouth daily      Multiple Vitamins-Minerals (DAILY DENIZ MAXIMUM MULTIVITAMIN PO) Take 1 packet by mouth 3 times daily DOTERRA      albuterol (PROAIR HFA/PROVENTIL HFA/VENTOLIN HFA) 108 (90 BASE) MCG/ACT Inhaler Inhale 2 puffs into the lungs every 6 hours as needed for shortness of breath / dyspnea or wheezing      !! Levothyroxine Sodium (SYNTHROID PO) Take 125 mcg by mouth every  other day (alternate with 112 mcg dose)      !! Levothyroxine Sodium (SYNTHROID PO) Take 112 mcg by mouth every other day (alternate with 125 mcg dose)      fluticasone (FLONASE) 50 MCG/ACT spray Spray 1-2 sprays into both nostrils daily      Fexofenadine HCl (ALLEGRA PO) Take 180 mg by mouth daily      estradiol (ESTRACE VAGINAL) 0.1 MG/GM vaginal cream Place 1 g vaginally three times a week  Qty: 42.5 g, Refills: 0    Comments: Over due for annual exam. Needs to be seen in office for more fills.  Associated Diagnoses: Vaginal atrophy      lisinopril-hydrochlorothiazide (PRINZIDE,ZESTORETIC) 20-12.5 MG per tablet Take 2 tablets by mouth daily       mometasone-formoterol (DULERA) 200-5 MCG/ACT oral inhaler Inhale 2 puffs into the lungs 2 times daily      Montelukast Sodium (SINGULAIR PO) Take 10 mg by mouth At Bedtime        !! - Potential duplicate medications found. Please discuss with provider.      STOP taking these medications       mupirocin (BACTROBAN) 2 % nasal ointment Comments:   Reason for Stopping:                     Consultations:   No consultations were requested during this admission           Hospital Course:   The patient was admitted after the surical procedure.The patient underwent an uneventfulright knee replacement. Postoperatively, anticoagulation  with Lovenox was started. No transfusion was required. The patient will be discharged to short-term care facility. Home medications have been reconciled. oxycodone 5 mg. was prescribed for pain. Lovenox  will be prescribed.             Pending Results:   None           Discharge Instructions and Follow-Up:        Discharge activity: Activity as tolerated   Discharge follow-up: Follow up with Dr. Alvarez in 2 weeks   Outpatient therapy: None    Home Care agency: None    Supplies and equipment: None        Wound care: dry dressing daily and as needed   Other instructions: None[CP1.1]           Revision History        User Key Date/Time User Provider Type  Action    > CP1.1 2017  7:39 AM Kath White PA-C Physician Assistant - LORENA Sign                  Consult Notes     No notes of this type exist for this encounter.         Progress Notes - Physician (Notes from 08/10/17 through 17)      Progress Notes by Kath White PA-C at 2017  7:37 AM     Author:  Kath White PA-C Service:  Orthopedics Author Type:  Physician Assistant - LORENA    Filed:  2017  7:51 AM Date of Service:  2017  7:37 AM Creation Time:  2017  7:37 AM    Status:  Signed :  Kath White PA-C (Physician Assistant - LORENA)         POD #3  S/P Right TKA    Patient awake in bed.  Pain controlled.  Tolerating oral intake.  No events overnight.     Alert and orient to person, place, and time.  Vital Sign Ranges  Temperature Temp  Av.1  F (37.3  C)  Min: 98.5  F (36.9  C)  Max: 99.7  F (37.6  C)   Blood pressure Systolic (24hrs), Av , Min:90 , Max:129        Diastolic (24hrs), Av, Min:50, Max:77      Pulse Pulse  Av  Min: 84  Max: 84   Respirations Resp  Av  Min: 16  Max: 16   Pulse oximetry SpO2  Av.8 %  Min: 92 %  Max: 96 %       Right knee dressing is clean, dry, and intact.  Bilateral calves are soft, non-tender.  Right lower extremity is NVI.    Hgb 10.3 from     A/P  1. S/P Right TKA   Continue Lovenox for DVT prophylaxis. Mobilize with PT/OT WBAT.  Continue current pain regiment.    2. Disposition   Anticipate d/c to TCU today.    Kath White PA-C  [phone]    [facility]  [phone][CP1.1]       Revision History        User Key Date/Time User Provider Type Action    > CP1.1 2017  7:51 AM Kath White PA-C Physician Assistant - C Sign            Progress Notes by Elizabeth Merino PA-C at 2017  9:47 AM     Author:  Elizabeth Merino PA-C Service:  Orthopedics Author Type:  Physician Assistant - C    Filed:  2017  9:48 AM Date of Service:  2017   9:47 AM Creation Time:  2017  9:47 AM    Status:  Signed :  Elizabeth Merino PA-C (Physician Assistant - C)         POD # 2  S/P Right TKA    Patient comfortable.  Pain controlled.  Tolerating oral intake.  No events overnight.     Alert and orient to person, place, and time.  Vital Sign Ranges  Temperature Temp  Av.3  F (37.4  C)  Min: 97.4  F (36.3  C)  Max: 100  F (37.8  C)   Blood pressure Systolic (24hrs), Av , Min:97 , Max:141        Diastolic (24hrs), Av, Min:54, Max:81      Pulse Pulse  Av.5  Min: 81  Max: 84   Respirations Resp  Avg: 15.9  Min: 14  Max: 17   Pulse oximetry SpO2  Av.3 %  Min: 90 %  Max: 97 %       Dressing is clean, dry, and intact.  Bilateral calves are soft, non-tender.  Right lower extremity is NVI.    Hgb 10.3    A/P  1. S/P Right TKA   Continue Lovenox for DVT prophylaxis.  Mobilize with PT/OT WBAT.  Continue current pain regiment.    2. Disposition   Anticipate d/c to TCU tomorrow.[KB1.1]    Elizabeth Merino[KB1.2]   [phone]    [facility]  [phone][KB1.1]     Revision History        User Key Date/Time User Provider Type Action    > KB1.2 2017  9:48 AM Elizabeth Merino PA-C Physician Assistant Jaime CARRILLO Sign     KB1.1 2017  9:47 AM Elizabeth Merino PA-C Physician Assistant - C             Progress Notes by Loly Walden LSW at 2017 12:01 PM     Author:  Loly Walden LSW Service:  Social Work Author Type:      Filed:  2017  3:02 PM Date of Service:  2017 12:01 PM Creation Time:  2017 12:01 PM    Status:  Addendum :  Loly Walden LSW ()         Care Transition Initial Assessment -   Reason For Consult: discharge planning, facility placement  Met with: Patient    Active Problems:    S/P total knee replacement using cement, right         DATA  Lives With: child(ace), adult (17yo), spouse  Living Arrangements:  house  Description of Support System: Involved  Who is your support system?: , Children  Support Assessment: Adequate family and caregiver support. Independent at home.  Identified issues/concerns regarding health management: PT recommends TCU which is in line with ortho note. Pt in agreement. She has pre-registered at [facility]. Is also open to other facilities. Agreed to referrals to [facility] and [facility]. She would like a private room and understands it is an additional fee per day. Her spouse will transport.      Quality Of Family Relationships: involved  Transportation Available: car, family or friend will provide    ASSESSMENT  Cognitive Status:  Appears alert and oriented. Her own decision maker.   Concerns to be addressed: On-going DC planning.     PLAN  Financial costs for the patient includes: Private room fee.  Patient given options and choices for discharge: Yes.  Patient/family is agreeable to the plan?  YES  Patient Goals and Preferences: TCU.  Patient anticipates discharging to: TCU.    HUGO Ward, LGSW  [phone][JJ1.1]    Addendum: Pt accepted at all three facilities. After discussion, pt would like [facility]. Macie at [facility] will start working on [employer] authorization.[JJ1.2]       PAS-RR    D: Per DHS regulation, SW completed and submitted PAS-RR to [place] [employer] on Aging Direct Connect via the Senior LinkAge Line.  PAS-RR confirmation # is : [id number]    I: MICHAELLE spoke with pt and they are aware a PAS-RR has been submitted.  MICHAELLE reviewed with pt that they may be contacted for a follow up appointment within 10 days of hospital discharge if their SNF stay is < 30 days.  Contact information for [facility] LinkAge Line was also provided.    A: Pt verbalized understanding.    P: Further questions may be directed to [facility] LinkAge Line at #[phone], option #4 for PAS-RR staff.[JJ1.3]                   Revision History        User Key Date/Time User Provider Type Action    > JJ1.3 8/11/2017   3:02 PM Loly Walden LSW  Addend     JJ1.2 8/11/2017  2:33 PM Loly Walden LSW  Addend     JJ1.1 8/11/2017 12:08 PM Loly Walden LSW  Sign            Progress Notes by Bert Monroy PT at 8/11/2017  8:00 AM     Author:  Bert Monroy PT Service:  (none) Author Type:  Physical Therapist    Filed:  8/11/2017 10:22 AM Date of Service:  8/11/2017  8:00 AM Creation Time:  8/11/2017 10:22 AM    Status:  Signed :  Bert Monroy PT (Physical Therapist)          08/11/17 0800   Quick Adds   Type of Visit Initial PT Evaluation   Living Environment   Lives With child(ace), dependent;spouse  ( disabled with s/p Guillian-barre' syndrome.)   Living Arrangements house  (2 story with LL)   Home Accessibility stairs to enter home;stairs within home   Number of Stairs to Enter Home 4   Number of Stairs Within Home (Full flights up and down)   Stair Railings at Home inside, present on right side   Transportation Available car;family or friend will provide   Self-Care   Dominant Hand right   Usual Activity Tolerance good   Current Activity Tolerance fair   Regular Exercise no   Equipment Currently Used at Home none  (Borrowed a std walker)   Functional Level Prior   Ambulation 0-->independent   Transferring 0-->independent   Toileting 0-->independent   Bathing 0-->independent   Dressing 0-->independent   Eating 0-->independent   Communication 0-->understands/communicates without difficulty   Swallowing 0-->swallows foods/liquids without difficulty   Cognition 0 - no cognition issues reported   Fall history within last six months yes   Number of times patient has fallen within last six months 4   Which of the above functional risks had a recent onset or change? ambulation   General Information   Onset of Illness/Injury or Date of Surgery - Date 08/10/17   Referring Physician Grady Alvarez   Patient/Family Goals  Statement Disch to TCU prior to returning home with limited help of he  and teenage daughter.   Pertinent History of Current Problem (include personal factors and/or comorbidities that impact the POC) Progressive pain and immobility in both knees.   Precautions/Limitations other (see comments)  (knee immobilizer at noc.)   Weight-Bearing Status - RLE weight-bearing as tolerated   Cognitive Status Examination   Orientation orientation to person, place and time   Level of Consciousness alert   Follows Commands and Answers Questions 100% of the time;able to follow multistep instructions   Personal Safety and Judgment intact   Memory intact   Pain Assessment   Patient Currently in Pain Yes, see Vital Sign flowsheet  (5/10 R knee pain.)   Integumentary/Edema   Integumentary/Edema Comments Surgical site covered by postop dressing and wrap. significant postop edema appearent.   Posture    Posture Forward head position   Posture Comments Able to self-correct sitting and standing slouch   Range of Motion (ROM)   ROM Comment R knee ROM limited by postop pain and edema.   Strength   Strength Comments R L/E astrength inhibited by postop pain and edema.   Bed Mobility   Bed Mobility Bed mobility analysis   Bed Mobility Comments Needs Walter and vc for bed mobility, supine to sit at EOB.   Bed Mobility Analysis   Bed Mobility Limitations decreased ability to use legs for bridging/pushing   Impairments Contributing to Impaired Bed Mobility decreased flexibility;pain;decreased ROM;decreased strength   Transfer Skills   Transfer Comments Needs Walter and vc for transfers, sit to stand and back to sit, WBAT R with FWW.   Gait   Gait Gait Analysis   Gait Comments Needs Walter and vc for gait with FWW, 100', WBAT R, followed by w/c.   Gait Analysis   Gait Pattern Used swing-through gait   Gait Deviations Noted decreased skylar;decreased velocity of limb motion;decreased stride length   Impairments Contributing to Gait Deviations  "decreased flexibility;pain;decreased ROM;decreased strength   Balance   Balance no deficits were identified   Balance Comments Balance well supported on FWW.   Sensory Examination   Sensory Perception no deficits were identified   Coordination   Coordination no deficits were identified   Muscle Tone   Muscle Tone no deficits were identified   Modality Interventions   Planned Modality Interventions Cryotherapy   General Therapy Interventions   Planned Therapy Interventions bed mobility training;gait training   Clinical Impression   Criteria for Skilled Therapeutic Intervention yes, treatment indicated   PT Diagnosis Impaired mobility and gait.   Influenced by the following impairments Pain, edema, and weakness.   Functional limitations due to impairments Limited mobility and gait.   Clinical Presentation Stable/Uncomplicated   Clinical Presentation Rationale Functional assessment and clinical judgement.   Clinical Decision Making (Complexity) Low complexity   Therapy Frequency` 2 times/day   Predicted Duration of Therapy Intervention (days/wks) 3 days.   Anticipated Equipment Needs at Discharge walker   Anticipated Discharge Disposition Transitional Care Facility   Risk & Benefits of therapy have been explained Yes   Patient, Family & other staff in agreement with plan of care Yes   [facility] TM \"6 Clicks\"   2016, Trustees of [facility], under license to [employer].  All rights reserved.   6 Clicks Short Forms Basic Mobility Inpatient Short Form   [facility] AM-PAC  \"6 Clicks\" V.2 Basic Mobility Inpatient Short Form   1. Turning from your back to your side while in a flat bed without using bedrails? 3 - A Little   2. Moving from lying on your back to sitting on the side of a flat bed without using bedrails? 3 - A Little   3. Moving to and from a bed to a chair (including a wheelchair)? 3 - A Little   4. Standing up from a chair using your arms (e.g., wheelchair, or bedside chair)? 3 - A " Little   5. To walk in hospital room? 3 - A Little   6. Climbing 3-5 steps with a railing? 2 - A Lot   Basic Mobility Raw Score (Score out of 24.Lower scores equate to lower levels of function) 17   Total Evaluation Time   Total Evaluation Time (Minutes) 15[CL1.1]        Revision History        User Key Date/Time User Provider Type Action    > CL1.1 2017 10:22 AM Bert Monroy, PT Physical Therapist Sign            Progress Notes by Kath White PA-C at 2017  6:34 AM     Author:  Kath White PA-C Service:  Orthopedics Author Type:  Physician Assistant - C    Filed:  2017  7:28 AM Date of Service:  2017  6:34 AM Creation Time:  2017  6:34 AM    Status:  Signed :  Kath White PA-C (Physician Assistant - LORENA)         POD # 1  S/P Right TKA    Patient[CP1.1] awake in bed[CP1.2].  Pain controlled.  Tolerating oral intake.[CP1.1]  Hard time urinating overnight.[CP1.2]    Alert and orient to person, place, and time.  Vital Sign Ranges  Temperature Temp  Av.8  F (36.6  C)  Min: 96  F (35.6  C)  Max: 99  F (37.2  C)   Blood pressure Systolic (24hrs), Av , Min:94 , Max:136        Diastolic (24hrs), Av, Min:50, Max:88      Pulse Pulse  Av  Min: 64  Max: 64   Respirations Resp  Avg: 15.4  Min: 11  Max: 20   Pulse oximetry SpO2  Av.7 %  Min: 96 %  Max: 100 %       Right knee dressing is clean, dry, and intact.  Bilateral calves are soft, non-tender.  Right lower extremity is NVI.    Hgb pending    A/P  1. S/P Right TKA   Continue Lovenox for DVT prophylaxis.  Finish antibiotics today.  Mobilize with PT/OT WBAT.  Continue current pain regiment.    2. Disposition   Anticipate d/c to[CP1.1] TCU.[CP1.2]    Kath White PA-C  [phone]    [facility]  [phone][CP1.1]       Revision History        User Key Date/Time User Provider Type Action    > CP1.2 2017  7:28 AM Kath White PA-C Physician Assistant - C Sign     CP1.1  8/11/2017  6:34 AM Kath White PA-C Physician Assistant - LORENA             Progress Notes by Yari Brito at 8/10/2017 10:26 AM     Author:  Yari Brito Service:  (none) Author Type:  (none)    Filed:  8/10/2017 10:27 AM Date of Service:  8/10/2017 10:26 AM Creation Time:  8/10/2017 10:26 AM    Status:  Signed :  Yari Brito         Admission medication history interview status for the 8/10/2017  admission is complete. See EPIC admission navigator for prior to admission medications     Medication history source reliability:Good    Medication history interview source(s):Patient    Medication history resources (including written lists, pill bottles, clinic record):None    [facility] pharmacy[facility]    Additional medication history information not noted on PTA med list :None    Time spent in this activity: 40 minutes    Prior to Admission medications    Medication Sig Last Dose Taking? Auth Provider   IBUPROFEN PO Take 200-600 mg by mouth 4 times daily as needed for moderate pain 8/2/2017 at PRN Yes Reported, Patient   Omega-3 Fatty Acids (FISH OIL PO) Take 1 capsule by mouth daily Past Month at AM Yes Reported, Patient   Multiple Vitamins-Minerals (DAILY DENIZ MAXIMUM MULTIVITAMIN PO) Take 1 packet by mouth 3 times daily DOTERRA 8/3/2017 at Unknown time Yes Reported, Patient   albuterol (PROAIR HFA/PROVENTIL HFA/VENTOLIN HFA) 108 (90 BASE) MCG/ACT Inhaler Inhale 2 puffs into the lungs every 6 hours as needed for shortness of breath / dyspnea or wheezing 8/2/2017 at PRN Yes Reported, Patient   Levothyroxine Sodium (SYNTHROID PO) Take 125 mcg by mouth every other day (alternate with 112 mcg dose) 8/10/2017 at 0715 Yes Reported, Patient   Levothyroxine Sodium (SYNTHROID PO) Take 112 mcg by mouth every other day (alternate with 125 mcg dose) 8/9/2017 at AM Yes Reported, Patient   fluticasone (FLONASE) 50 MCG/ACT spray Spray 1-2 sprays into both nostrils daily 8/10/2017 at 0715 Yes Reported,  Patient   Fexofenadine HCl (ALLEGRA PO) Take 180 mg by mouth daily 8/10/2017 at 0715 Yes Reported, Patient   Acetaminophen (TYLENOL PO) Take 325-650 mg by mouth 3 times daily as needed  8/10/2017 at 0000 Yes Reported, Patient   mupirocin (BACTROBAN) 2 % nasal ointment Apply 1 each into each nare 2 times daily Apply small amount to each nostril 2 times per day for 7 days prior to surgery. 8/9/2017 at HS Yes Reported, Patient   estradiol (ESTRACE VAGINAL) 0.1 MG/GM vaginal cream Place 1 g vaginally three times a week 8/9/2017 at Unknown time Yes Efra Fermin MD   lisinopril-hydrochlorothiazide (PRINZIDE,ZESTORETIC) 20-12.5 MG per tablet Take 2 tablets by mouth daily  8/9/2017 at AM Yes Reported, Patient   mometasone-formoterol (DULERA) 200-5 MCG/ACT oral inhaler Inhale 2 puffs into the lungs 2 times daily 8/10/2017 at 0715 Yes Reported, Patient   Montelukast Sodium (SINGULAIR PO) Take 10 mg by mouth At Bedtime  8/9/2017 at HS Yes Reported, Patient[AH1.1]            Revision History        User Key Date/Time User Provider Type Action    > AH1.1 8/10/2017 10:27 AM Yari Brito (none) Sign                  Procedure Notes     No notes of this type exist for this encounter.         Progress Notes - Therapies (Notes from 08/10/17 through 08/13/17)      Progress Notes by Bert Monroy PT at 8/11/2017  8:00 AM     Author:  Bert Monroy PT Service:  (none) Author Type:  Physical Therapist    Filed:  8/11/2017 10:22 AM Date of Service:  8/11/2017  8:00 AM Creation Time:  8/11/2017 10:22 AM    Status:  Signed :  Bert Monroy PT (Physical Therapist)          08/11/17 0800   Quick Adds   Type of Visit Initial PT Evaluation   Living Environment   Lives With child(ace), dependent;spouse  ( disabled with s/p Guillian-barre' syndrome.)   Living Arrangements house  (2 story with LL)   Home Accessibility stairs to enter home;stairs within home   Number of Stairs to Enter Home 4   Number of Stairs  Within Home (Full flights up and down)   Stair Railings at Home inside, present on right side   Transportation Available car;family or friend will provide   Self-Care   Dominant Hand right   Usual Activity Tolerance good   Current Activity Tolerance fair   Regular Exercise no   Equipment Currently Used at Home none  (Borrowed a std walker)   Functional Level Prior   Ambulation 0-->independent   Transferring 0-->independent   Toileting 0-->independent   Bathing 0-->independent   Dressing 0-->independent   Eating 0-->independent   Communication 0-->understands/communicates without difficulty   Swallowing 0-->swallows foods/liquids without difficulty   Cognition 0 - no cognition issues reported   Fall history within last six months yes   Number of times patient has fallen within last six months 4   Which of the above functional risks had a recent onset or change? ambulation   General Information   Onset of Illness/Injury or Date of Surgery - Date 08/10/17   Referring Physician Grady Alvarez   Patient/Family Goals Statement Disch to TCU prior to returning home with limited help of he  and teenage daughter.   Pertinent History of Current Problem (include personal factors and/or comorbidities that impact the POC) Progressive pain and immobility in both knees.   Precautions/Limitations other (see comments)  (knee immobilizer at noc.)   Weight-Bearing Status - RLE weight-bearing as tolerated   Cognitive Status Examination   Orientation orientation to person, place and time   Level of Consciousness alert   Follows Commands and Answers Questions 100% of the time;able to follow multistep instructions   Personal Safety and Judgment intact   Memory intact   Pain Assessment   Patient Currently in Pain Yes, see Vital Sign flowsheet  (5/10 R knee pain.)   Integumentary/Edema   Integumentary/Edema Comments Surgical site covered by postop dressing and wrap. significant postop edema appearent.   Posture    Posture Forward head  position   Posture Comments Able to self-correct sitting and standing slouch   Range of Motion (ROM)   ROM Comment R knee ROM limited by postop pain and edema.   Strength   Strength Comments R L/E astrength inhibited by postop pain and edema.   Bed Mobility   Bed Mobility Bed mobility analysis   Bed Mobility Comments Needs Walter and vc for bed mobility, supine to sit at EOB.   Bed Mobility Analysis   Bed Mobility Limitations decreased ability to use legs for bridging/pushing   Impairments Contributing to Impaired Bed Mobility decreased flexibility;pain;decreased ROM;decreased strength   Transfer Skills   Transfer Comments Needs Walter and vc for transfers, sit to stand and back to sit, WBAT R with FWW.   Gait   Gait Gait Analysis   Gait Comments Needs Walter and vc for gait with FWW, 100', WBAT R, followed by w/c.   Gait Analysis   Gait Pattern Used swing-through gait   Gait Deviations Noted decreased skylar;decreased velocity of limb motion;decreased stride length   Impairments Contributing to Gait Deviations decreased flexibility;pain;decreased ROM;decreased strength   Balance   Balance no deficits were identified   Balance Comments Balance well supported on FWW.   Sensory Examination   Sensory Perception no deficits were identified   Coordination   Coordination no deficits were identified   Muscle Tone   Muscle Tone no deficits were identified   Modality Interventions   Planned Modality Interventions Cryotherapy   General Therapy Interventions   Planned Therapy Interventions bed mobility training;gait training   Clinical Impression   Criteria for Skilled Therapeutic Intervention yes, treatment indicated   PT Diagnosis Impaired mobility and gait.   Influenced by the following impairments Pain, edema, and weakness.   Functional limitations due to impairments Limited mobility and gait.   Clinical Presentation Stable/Uncomplicated   Clinical Presentation Rationale Functional assessment and clinical judgement.   Clinical  "Decision Making (Complexity) Low complexity   Therapy Frequency` 2 times/day   Predicted Duration of Therapy Intervention (days/wks) 3 days.   Anticipated Equipment Needs at Discharge walker   Anticipated Discharge Disposition Transitional Care Facility   Risk & Benefits of therapy have been explained Yes   Patient, Family & other staff in agreement with plan of care Yes   [facility] TM \"6 Clicks\"   2016, Trustees of [facility], under license to [employer].  All rights reserved.   6 Clicks Short Forms Basic Mobility Inpatient Short Form   [facility]  \"6 Clicks\" V.2 Basic Mobility Inpatient Short Form   1. Turning from your back to your side while in a flat bed without using bedrails? 3 - A Little   2. Moving from lying on your back to sitting on the side of a flat bed without using bedrails? 3 - A Little   3. Moving to and from a bed to a chair (including a wheelchair)? 3 - A Little   4. Standing up from a chair using your arms (e.g., wheelchair, or bedside chair)? 3 - A Little   5. To walk in hospital room? 3 - A Little   6. Climbing 3-5 steps with a railing? 2 - A Lot   Basic Mobility Raw Score (Score out of 24.Lower scores equate to lower levels of function) 17   Total Evaluation Time   Total Evaluation Time (Minutes) 15[CL1.1]        Revision History        User Key Date/Time User Provider Type Action    > CL1.1 8/11/2017 10:22 AM Bert Monroy, PT Physical Therapist Sign            Progress Notes by Yari Brito at 8/10/2017 10:26 AM     Author:  Yari Brito Service:  (none) Author Type:  (none)    Filed:  8/10/2017 10:27 AM Date of Service:  8/10/2017 10:26 AM Creation Time:  8/10/2017 10:26 AM    Status:  Signed :  Yari Brito         Admission medication history interview status for the 8/10/2017  admission is complete. See EPIC admission navigator for prior to admission medications     Medication history source reliability:Good    Medication history " interview source(s):Patient    Medication history resources (including written lists, pill bottles, clinic record):None    [facility] pharmacy[facility]    Additional medication history information not noted on PTA med list :None    Time spent in this activity: 40 minutes    Prior to Admission medications    Medication Sig Last Dose Taking? Auth Provider   IBUPROFEN PO Take 200-600 mg by mouth 4 times daily as needed for moderate pain 8/2/2017 at PRN Yes Reported, Patient   Omega-3 Fatty Acids (FISH OIL PO) Take 1 capsule by mouth daily Past Month at AM Yes Reported, Patient   Multiple Vitamins-Minerals (DAILY DENIZ MAXIMUM MULTIVITAMIN PO) Take 1 packet by mouth 3 times daily DOTERRA 8/3/2017 at Unknown time Yes Reported, Patient   albuterol (PROAIR HFA/PROVENTIL HFA/VENTOLIN HFA) 108 (90 BASE) MCG/ACT Inhaler Inhale 2 puffs into the lungs every 6 hours as needed for shortness of breath / dyspnea or wheezing 8/2/2017 at PRN Yes Reported, Patient   Levothyroxine Sodium (SYNTHROID PO) Take 125 mcg by mouth every other day (alternate with 112 mcg dose) 8/10/2017 at 0715 Yes Reported, Patient   Levothyroxine Sodium (SYNTHROID PO) Take 112 mcg by mouth every other day (alternate with 125 mcg dose) 8/9/2017 at AM Yes Reported, Patient   fluticasone (FLONASE) 50 MCG/ACT spray Spray 1-2 sprays into both nostrils daily 8/10/2017 at 0715 Yes Reported, Patient   Fexofenadine HCl (ALLEGRA PO) Take 180 mg by mouth daily 8/10/2017 at 0715 Yes Reported, Patient   Acetaminophen (TYLENOL PO) Take 325-650 mg by mouth 3 times daily as needed  8/10/2017 at 0000 Yes Reported, Patient   mupirocin (BACTROBAN) 2 % nasal ointment Apply 1 each into each nare 2 times daily Apply small amount to each nostril 2 times per day for 7 days prior to surgery. 8/9/2017 at HS Yes Reported, Patient   estradiol (ESTRACE VAGINAL) 0.1 MG/GM vaginal cream Place 1 g vaginally three times a week 8/9/2017 at Unknown time Yes Efra Fermin MD    lisinopril-hydrochlorothiazide (PRINZIDE,ZESTORETIC) 20-12.5 MG per tablet Take 2 tablets by mouth daily  8/9/2017 at AM Yes Reported, Patient   mometasone-formoterol (DULERA) 200-5 MCG/ACT oral inhaler Inhale 2 puffs into the lungs 2 times daily 8/10/2017 at 0715 Yes Reported, Patient   Montelukast Sodium (SINGULAIR PO) Take 10 mg by mouth At Bedtime  8/9/2017 at HS Yes Reported, Patient[AH1.1]            Revision History        User Key Date/Time User Provider Type Action    > AH1.1 8/10/2017 10:27 AM Yari Brito (none) Sign

## 2017-08-10 NOTE — IP AVS SNAPSHOT
` ` Patient Information     Patient Name Sex     Aleena Ontiveros ([id number]) Female 1960       Room Bed    [facility] 55-02      Patient Demographics     Address Phone E-mail Address    [address] [address] [phone] (Home)  [phone] (Mobile) *Preferred* [email or web address] ([employer])      Patient Ethnicity & Race     Ethnic Group Patient Race    American White      Emergency Contact(s)     Name Relation Home Work Mobile    Lam Ontiveros  Spouse [phone]      Documents on File        Status Date Received Description       Documents for the Patient    Insurance Card  () 06     Privacy Notice - [place]  06     Waiver - Payment  06     Insurance Card  () 09     Waiver - Payment  09     DOTTIE Face Sheet Received () 09     Insurance Card  () 09     Waiver - Payment  04/13/10     DOTTIE Face Sheet Received () 04/13/10     Insurance Card  () 04/13/10     DOTTIE Face Sheet Received () 08/30/10     Waiver - Payment  08/30/10     External Medication Information Consent       Patient ID Received 13     Consent for Services - Hospital/Clinic  ()      Consent for Services - Hospital/Clinic Received () 13     Insurance Card Received () 13     Consent for EHR Access  13 Copied from existing Consent for services - C/HOD collected on 2013    [facility] Specified Other       Patient ID Received 14     Insurance Card Received 14     Consent for Services - Hospital/Clinic Received () 14     Consent for EHR Access Received 14     Privacy Notice - [place] Received 14     Consent to Communicate Received 14     HIM QUETA Authorization  14     Consent for Services/Privacy Notice - Hospital/Clinic       Insurance Card Received 08/10/17     Consent for Services/Privacy Notice - Hospital/Clinic Received 08/10/17         Documents for the Encounter    H/P - History and Physical  08/09/17 [facility], HISTORY AND PHY 08/03/2017    CMS IM for Patient Signature       Lab Result - HIM Scan  08/09/17 LABS, [facility]    EKG Cardiac - HIM Scan  08/09/17 EKG, [facility]      Admission Information     Attending Provider Admitting Provider Admission Type Admission Date/Time    Grady Alvarez MD Wulf, Corey Allen, MD Elective 08/10/17  0958    Discharge Date Hospital Service Auth/Cert Status Service Area     Surgery [facility] SERVICES    Unit Room/Bed Admission Status       [facility] ORTHO SPEC UNIT 5510/5510-02 Admission (Confirmed)       Admission     Complaint    RIGHT KNEE OSTEOARTHRITIS , S/P total knee replacement using cement, right, S/P total knee arthroplasty, right, S/P total knee arthroplasty, right      Hospital Account     Name Acct ID Class Status Primary Coverage    Aleena Ontiveros [id number] Inpatient Open MEDICA - MEDICA ESSENTIAL            Guarantor Account (for Hospital Account #[id number])     Name Relation to Pt Service Area Active? Acct Type    Aleena Ontiveros  FCS Yes Personal/Family    Address Phone          [address] [place] [address] [phone](H)  [phone](O)              Coverage Information (for Hospital Account #[id number])     F/O Payor/Plan Precert #    MEDICA/MEDICA ESSENTIAL [id number]    Subscriber Subscriber #    Aleena Ontiveros [id number]    Address Phone    [address] [address]  [place] [address] [phone]

## 2017-08-10 NOTE — ANESTHESIA POSTPROCEDURE EVALUATION
Patient: Aleena Ontiveros    Procedure(s):  RIGHT TOTAL KNEE ARTHROPLASTY  - Wound Class: I-Clean    Diagnosis:RIGHT KNEE OSTEOARTHRITIS   Diagnosis Additional Information: No value filed.    Anesthesia Type:  Periph. Nerve Block for postop pain, General, ETT    Note:  Anesthesia Post Evaluation    Patient location during evaluation: PACU  Patient participation: Able to fully participate in evaluation  Level of consciousness: awake, awake and alert and responsive to verbal stimuli  Pain management: adequate  Airway patency: patent  Cardiovascular status: acceptable  Respiratory status: acceptable  Hydration status: acceptable  PONV: none     Anesthetic complications: None          Last vitals:  Vitals:    08/10/17 1440 08/10/17 1450 08/10/17 1505   BP: 125/81  122/79   Pulse:   64   Resp: 11 16 16   Temp: 37.2  C (99  F)  36.7  C (98  F)   SpO2: 98% 97% 97%         Electronically Signed By: Roxy Galarza  August 10, 2017  4:05 PM

## 2017-08-11 ENCOUNTER — APPOINTMENT (OUTPATIENT)
Dept: PHYSICAL THERAPY | Facility: CLINIC | Age: 57
DRG: 470 | End: 2017-08-11
Attending: PHYSICIAN ASSISTANT
Payer: COMMERCIAL

## 2017-08-11 ENCOUNTER — APPOINTMENT (OUTPATIENT)
Dept: PHYSICAL THERAPY | Facility: CLINIC | Age: 57
DRG: 470 | End: 2017-08-11
Attending: ORTHOPAEDIC SURGERY
Payer: COMMERCIAL

## 2017-08-11 LAB
GLUCOSE SERPL-MCNC: 129 MG/DL (ref 70–99)
HGB BLD-MCNC: 10.7 G/DL (ref 11.7–15.7)
POTASSIUM SERPL-SCNC: 4.6 MMOL/L (ref 3.4–5.3)

## 2017-08-11 PROCEDURE — 84132 ASSAY OF SERUM POTASSIUM: CPT | Performed by: PHYSICIAN ASSISTANT

## 2017-08-11 PROCEDURE — 97116 GAIT TRAINING THERAPY: CPT | Mod: GP | Performed by: PHYSICAL THERAPIST

## 2017-08-11 PROCEDURE — 36415 COLL VENOUS BLD VENIPUNCTURE: CPT | Performed by: PHYSICIAN ASSISTANT

## 2017-08-11 PROCEDURE — 97161 PT EVAL LOW COMPLEX 20 MIN: CPT | Mod: GP | Performed by: PHYSICAL THERAPIST

## 2017-08-11 PROCEDURE — 25000132 ZZH RX MED GY IP 250 OP 250 PS 637: Performed by: ORTHOPAEDIC SURGERY

## 2017-08-11 PROCEDURE — 40000193 ZZH STATISTIC PT WARD VISIT: Performed by: PHYSICAL THERAPIST

## 2017-08-11 PROCEDURE — 85018 HEMOGLOBIN: CPT | Performed by: PHYSICIAN ASSISTANT

## 2017-08-11 PROCEDURE — 82947 ASSAY GLUCOSE BLOOD QUANT: CPT | Performed by: PHYSICIAN ASSISTANT

## 2017-08-11 PROCEDURE — 97530 THERAPEUTIC ACTIVITIES: CPT | Mod: GP | Performed by: PHYSICAL THERAPIST

## 2017-08-11 PROCEDURE — 25000128 H RX IP 250 OP 636: Performed by: PHYSICIAN ASSISTANT

## 2017-08-11 PROCEDURE — 12000007 ZZH R&B INTERMEDIATE

## 2017-08-11 PROCEDURE — 97110 THERAPEUTIC EXERCISES: CPT | Mod: GP | Performed by: PHYSICAL THERAPIST

## 2017-08-11 PROCEDURE — 25000132 ZZH RX MED GY IP 250 OP 250 PS 637: Performed by: PHYSICIAN ASSISTANT

## 2017-08-11 RX ORDER — POTASSIUM CHLORIDE 29.8 MG/ML
20 INJECTION INTRAVENOUS
Status: DISCONTINUED | OUTPATIENT
Start: 2017-08-10 | End: 2017-08-14 | Stop reason: HOSPADM

## 2017-08-11 RX ORDER — POTASSIUM CHLORIDE 7.45 MG/ML
10 INJECTION INTRAVENOUS
Status: DISCONTINUED | OUTPATIENT
Start: 2017-08-10 | End: 2017-08-14 | Stop reason: HOSPADM

## 2017-08-11 RX ORDER — POTASSIUM CHLORIDE 1.5 G/1.58G
20-40 POWDER, FOR SOLUTION ORAL
Status: DISCONTINUED | OUTPATIENT
Start: 2017-08-10 | End: 2017-08-14 | Stop reason: HOSPADM

## 2017-08-11 RX ORDER — POTASSIUM CL/LIDO/0.9 % NACL 10MEQ/0.1L
10 INTRAVENOUS SOLUTION, PIGGYBACK (ML) INTRAVENOUS
Status: DISCONTINUED | OUTPATIENT
Start: 2017-08-10 | End: 2017-08-14 | Stop reason: HOSPADM

## 2017-08-11 RX ORDER — POTASSIUM CHLORIDE 1500 MG/1
20-40 TABLET, EXTENDED RELEASE ORAL
Status: DISCONTINUED | OUTPATIENT
Start: 2017-08-10 | End: 2017-08-14 | Stop reason: HOSPADM

## 2017-08-11 RX ADMIN — SENNOSIDES AND DOCUSATE SODIUM 2 TABLET: 8.6; 5 TABLET ORAL at 09:19

## 2017-08-11 RX ADMIN — SODIUM CHLORIDE: 9 INJECTION, SOLUTION INTRAVENOUS at 05:30

## 2017-08-11 RX ADMIN — OXYCODONE HYDROCHLORIDE 10 MG: 5 TABLET ORAL at 17:26

## 2017-08-11 RX ADMIN — LEVOTHYROXINE SODIUM 112 MCG: 112 TABLET ORAL at 08:30

## 2017-08-11 RX ADMIN — SENNOSIDES AND DOCUSATE SODIUM 2 TABLET: 8.6; 5 TABLET ORAL at 20:47

## 2017-08-11 RX ADMIN — ALBUTEROL SULFATE 2 PUFF: 90 AEROSOL, METERED RESPIRATORY (INHALATION) at 05:26

## 2017-08-11 RX ADMIN — ACETAMINOPHEN 975 MG: 325 TABLET, FILM COATED ORAL at 00:43

## 2017-08-11 RX ADMIN — ENOXAPARIN SODIUM 40 MG: 40 INJECTION SUBCUTANEOUS at 07:47

## 2017-08-11 RX ADMIN — FLUTICASONE FUROATE AND VILANTEROL TRIFENATATE 1 PUFF: 200; 25 POWDER RESPIRATORY (INHALATION) at 08:32

## 2017-08-11 RX ADMIN — MONTELUKAST SODIUM 10 MG: 10 TABLET, FILM COATED ORAL at 21:57

## 2017-08-11 RX ADMIN — OXYCODONE HYDROCHLORIDE 10 MG: 10 TABLET, FILM COATED, EXTENDED RELEASE ORAL at 07:47

## 2017-08-11 RX ADMIN — MUPIROCIN: 20 OINTMENT TOPICAL at 20:47

## 2017-08-11 RX ADMIN — POTASSIUM CHLORIDE 40 MEQ: 1500 TABLET, EXTENDED RELEASE ORAL at 00:43

## 2017-08-11 RX ADMIN — FLUTICASONE PROPIONATE 2 SPRAY: 50 SPRAY, METERED NASAL at 08:32

## 2017-08-11 RX ADMIN — OXYCODONE HYDROCHLORIDE 10 MG: 5 TABLET ORAL at 14:29

## 2017-08-11 RX ADMIN — MULTIPLE VITAMINS W/ MINERALS TAB 1 TABLET: TAB at 08:25

## 2017-08-11 RX ADMIN — CEFAZOLIN SODIUM 1 G: 1 INJECTION, POWDER, FOR SOLUTION INTRAMUSCULAR; INTRAVENOUS at 03:06

## 2017-08-11 RX ADMIN — ACETAMINOPHEN 975 MG: 325 TABLET, FILM COATED ORAL at 16:44

## 2017-08-11 RX ADMIN — MUPIROCIN: 20 OINTMENT TOPICAL at 08:32

## 2017-08-11 RX ADMIN — FEXOFENADINE HYDROCHLORIDE 180 MG: 180 TABLET, FILM COATED ORAL at 08:25

## 2017-08-11 RX ADMIN — ESTRADIOL 1 G: 0.1 CREAM VAGINAL at 08:31

## 2017-08-11 RX ADMIN — HYDROMORPHONE HYDROCHLORIDE 0.5 MG: 1 INJECTION, SOLUTION INTRAMUSCULAR; INTRAVENOUS; SUBCUTANEOUS at 03:17

## 2017-08-11 RX ADMIN — ACETAMINOPHEN 975 MG: 325 TABLET, FILM COATED ORAL at 08:25

## 2017-08-11 RX ADMIN — OXYCODONE HYDROCHLORIDE 10 MG: 5 TABLET ORAL at 20:46

## 2017-08-11 RX ADMIN — OXYCODONE HYDROCHLORIDE 10 MG: 5 TABLET ORAL at 08:25

## 2017-08-11 RX ADMIN — HYDROMORPHONE HYDROCHLORIDE 0.5 MG: 1 INJECTION, SOLUTION INTRAMUSCULAR; INTRAVENOUS; SUBCUTANEOUS at 00:50

## 2017-08-11 RX ADMIN — POLYETHYLENE GLYCOL 3350 17 G: 17 POWDER, FOR SOLUTION ORAL at 08:30

## 2017-08-11 RX ADMIN — POTASSIUM CHLORIDE 20 MEQ: 1500 TABLET, EXTENDED RELEASE ORAL at 03:06

## 2017-08-11 NOTE — PROGRESS NOTES
POD # 1  S/P Right TKA    Patient awake in bed.  Pain controlled.  Tolerating oral intake.  Hard time urinating overnight.    Alert and orient to person, place, and time.  Vital Sign Ranges  Temperature Temp  Av.8  F (36.6  C)  Min: 96  F (35.6  C)  Max: 99  F (37.2  C)   Blood pressure Systolic (24hrs), Av , Min:94 , Max:136        Diastolic (24hrs), Av, Min:50, Max:88      Pulse Pulse  Av  Min: 64  Max: 64   Respirations Resp  Avg: 15.4  Min: 11  Max: 20   Pulse oximetry SpO2  Av.7 %  Min: 96 %  Max: 100 %       Right knee dressing is clean, dry, and intact.  Bilateral calves are soft, non-tender.  Right lower extremity is NVI.    Hgb pending    A/P  1. S/P Right TKA   Continue Lovenox for DVT prophylaxis.  Finish antibiotics today.  Mobilize with PT/OT WBAT.  Continue current pain regiment.    2. Disposition   Anticipate d/c to TCU.    Kath White PA-C  [phone]    CAW  [phone]

## 2017-08-11 NOTE — PROGRESS NOTES
08/11/17 0800   Quick Adds   Type of Visit Initial PT Evaluation   Living Environment   Lives With child(ace), dependent;spouse  ( disabled with s/p Guillian-barre' syndrome.)   Living Arrangements house  (2 story with LL)   Home Accessibility stairs to enter home;stairs within home   Number of Stairs to Enter Home 4   Number of Stairs Within Home (Full flights up and down)   Stair Railings at Home inside, present on right side   Transportation Available car;family or friend will provide   Self-Care   Dominant Hand right   Usual Activity Tolerance good   Current Activity Tolerance fair   Regular Exercise no   Equipment Currently Used at Home none  (Borrowed a std walker)   Functional Level Prior   Ambulation 0-->independent   Transferring 0-->independent   Toileting 0-->independent   Bathing 0-->independent   Dressing 0-->independent   Eating 0-->independent   Communication 0-->understands/communicates without difficulty   Swallowing 0-->swallows foods/liquids without difficulty   Cognition 0 - no cognition issues reported   Fall history within last six months yes   Number of times patient has fallen within last six months 4   Which of the above functional risks had a recent onset or change? ambulation   General Information   Onset of Illness/Injury or Date of Surgery - Date 08/10/17   Referring Physician Grady Alvarez   Patient/Family Goals Statement Disch to TCU prior to returning home with limited help of he  and teenage daughter.   Pertinent History of Current Problem (include personal factors and/or comorbidities that impact the POC) Progressive pain and immobility in both knees.   Precautions/Limitations other (see comments)  (knee immobilizer at noc.)   Weight-Bearing Status - RLE weight-bearing as tolerated   Cognitive Status Examination   Orientation orientation to person, place and time   Level of Consciousness alert   Follows Commands and Answers Questions 100% of the time;able to follow  multistep instructions   Personal Safety and Judgment intact   Memory intact   Pain Assessment   Patient Currently in Pain Yes, see Vital Sign flowsheet  (5/10 R knee pain.)   Integumentary/Edema   Integumentary/Edema Comments Surgical site covered by postop dressing and wrap. significant postop edema appearent.   Posture    Posture Forward head position   Posture Comments Able to self-correct sitting and standing slouch   Range of Motion (ROM)   ROM Comment R knee ROM limited by postop pain and edema.   Strength   Strength Comments R L/E astrength inhibited by postop pain and edema.   Bed Mobility   Bed Mobility Bed mobility analysis   Bed Mobility Comments Needs Walter and vc for bed mobility, supine to sit at EOB.   Bed Mobility Analysis   Bed Mobility Limitations decreased ability to use legs for bridging/pushing   Impairments Contributing to Impaired Bed Mobility decreased flexibility;pain;decreased ROM;decreased strength   Transfer Skills   Transfer Comments Needs Walter and vc for transfers, sit to stand and back to sit, WBAT R with FWW.   Gait   Gait Gait Analysis   Gait Comments Needs Walter and vc for gait with FWW, 100', WBAT R, followed by w/c.   Gait Analysis   Gait Pattern Used swing-through gait   Gait Deviations Noted decreased skylar;decreased velocity of limb motion;decreased stride length   Impairments Contributing to Gait Deviations decreased flexibility;pain;decreased ROM;decreased strength   Balance   Balance no deficits were identified   Balance Comments Balance well supported on FWW.   Sensory Examination   Sensory Perception no deficits were identified   Coordination   Coordination no deficits were identified   Muscle Tone   Muscle Tone no deficits were identified   Modality Interventions   Planned Modality Interventions Cryotherapy   General Therapy Interventions   Planned Therapy Interventions bed mobility training;gait training   Clinical Impression   Criteria for Skilled Therapeutic  "Intervention yes, treatment indicated   PT Diagnosis Impaired mobility and gait.   Influenced by the following impairments Pain, edema, and weakness.   Functional limitations due to impairments Limited mobility and gait.   Clinical Presentation Stable/Uncomplicated   Clinical Presentation Rationale Functional assessment and clinical judgement.   Clinical Decision Making (Complexity) Low complexity   Therapy Frequency` 2 times/day   Predicted Duration of Therapy Intervention (days/wks) 3 days.   Anticipated Equipment Needs at Discharge walker   Anticipated Discharge Disposition Transitional Care Facility   Risk & Benefits of therapy have been explained Yes   Patient, Family & other staff in agreement with plan of care Yes   [facility] TM \"6 Clicks\"   2016, Trustees of [facility], under license to [employer].  All rights reserved.   6 Clicks Short Forms Basic Mobility Inpatient Short Form   [facility] AM-PAC  \"6 Clicks\" V.2 Basic Mobility Inpatient Short Form   1. Turning from your back to your side while in a flat bed without using bedrails? 3 - A Little   2. Moving from lying on your back to sitting on the side of a flat bed without using bedrails? 3 - A Little   3. Moving to and from a bed to a chair (including a wheelchair)? 3 - A Little   4. Standing up from a chair using your arms (e.g., wheelchair, or bedside chair)? 3 - A Little   5. To walk in hospital room? 3 - A Little   6. Climbing 3-5 steps with a railing? 2 - A Lot   Basic Mobility Raw Score (Score out of 24.Lower scores equate to lower levels of function) 17   Total Evaluation Time   Total Evaluation Time (Minutes) 15     "

## 2017-08-11 NOTE — PLAN OF CARE
Problem: Goal Outcome Summary  Goal: Goal Outcome Summary  Outcome: Improving  7084-5178:  A&OX4.  Tolerating clear liquid diet.  Taking IV dilaudid for pain.  Patient is due to void.  Patient transferred from bed to bedside commode and tolerated it well but not able to void.  Bladder scanned for 107mL, denies feeling uncomfortable.  IV fluids infusing.  Fluids encouraged.  Progressing per plan of care.

## 2017-08-11 NOTE — PLAN OF CARE
Problem: Goal Outcome Summary  Goal: Goal Outcome Summary  Outcome: Improving  A&O. CMS intact. Bowel sounds active and present. VSS. Dressing CDI. Up with SBA, walker, gait belt. C/o R knee pain, decreased with oxycodone. Pt voiding adequately, IVF d/c'd. Discharge plan pending, pt anticipates discharging to [place].

## 2017-08-11 NOTE — PLAN OF CARE
Problem: Goal Outcome Summary  Goal: Goal Outcome Summary  Outcome: Improving  Pt A&O, VSS, pain well controlled with IV dilaudid overnight. Refusing PO meds until after breakfast. Up w/ 1 & walker to BR. Voiding adequately. PO K+ replacement, recheck this AM. Progressing per plan of care.

## 2017-08-11 NOTE — OP NOTE
PREOPERATIVE DIAGNOSIS:  Right end-stage patellofemoral degenerative joint disease.       POSTOPERATIVE DIAGNOSES:  Right end-stage patellofemoral degenerative joint disease.      PROCEDURE:  Right total knee arthroplasty.      SURGEON:  Grady Alvarez MD      ASSISTANT:  Elizabeth Bunn PA-C was present for the entire procedure, and her assistance was critical in patient positioning, prepping and draping, implantation of the prosthetic device, deep wound closure, superficial wound closure, dressing placement and patient transfer.      ANESTHESIA:  General.      TOURNIQUET TIME:  80 minutes.      INTRAOPERATIVE COMPLICATIONS:  None.       DESCRIPTION OF PROCEDURE:  After identification of the patient, correct extremity and review of Informed Consent the patient Aleena Ontiveros was brought to the operating room on 08/10/2017 where general anesthesia was induced.  Perioperative antibiotics were given.  A nonsterile tourniquet was applied to the right lower extremity.  The right lower extremity was prepped and draped in the usual sterile manner.  After observation and a surgical pause the right leg was exsanguinated, and the tourniquet was inflated.        A midline incision was then made 2 fingerbreadths superior to the proximal pole of the patella and centered over the medial aspect of the tibial tubercle.  Dissection was taken down sharply, and medial and lateral skin flaps were elevated.  A standard medial parapatellar arthrotomy was then performed.  A small medial release was performed.  The anterior horns of the medial and lateral meniscus were incised.  The soft tissues from the distal anterior femur were excised.  The retropatellar fat pad was excised.  The patella was then everted, and the knee was flexed.        The starting reamer was then placed in the intramedullary canal of the femur from the distal aspect.  The distal femoral cutting guide was pinned for an 11 mm resection.  A 5-degree valgus cut was set,  and the distal femur was resected.  The extramedullary tibial guide was then placed in line with the anterior medial border of the tibia.  The guide was pinned for an 11 mm resection with 0-degree slope.  The proximal tibia was then resected.  The extension gap was balanced with a 7 mm spacer block.  The distal femur was sized for a size 5 femoral component.  The 4-in-1 cutting block was placed in 3 degrees of external rotation.  The anterior and posterior femoral cuts were made.  The flexion gap was balanced with a 7 mm spacer block.  The anterior and posterior chamfer cuts were made.  The medial and lateral menisci were excised.  Osteophytes from the posterolateral and medial femoral condyles were removed.        The box cutting guide was then placed in a slightly lateral position and pinned.  The box cut was made.  A 2-pronged tibial retractor was used to retract the femur posteriorly the tibia anteriorly.  A size 5 tibial component was noted be the appropriate size, and external rotation was set at the junction of the medial and middle third of the tibial tubercle.  The proximal tibia was prepped, and the trial tray was left in place.        The trial 5 femoral component was impacted.  A narrow component was noted be the appropriate width.  A 5 mm trial poly was placed, and the knee was brought out in 2 degrees of hyperextension and 130 degrees of flexion.  The patella was noted to track centrally within the trochlea.  The patella measured 21 mm in width.  A 9 mm resection was performed.  A 35 mm patellar component was noted to be the appropriate size.  The patellar trial was lugged.  The trial was placed.  The patella was noted to track centrally within the trochlea.  The femoral component was lugged.        All trial prosthesis was removed.  The cut ends of the bone were irrigated with copious normal saline via pulse lavage to remove all marrow elements.  The real size 5 tibial component was then cemented into  place.  The real size 5 femoral component was cemented into place.  The real highly cross-linked posterior stabilized poly was impacted into place.  The knee was brought into full extension.  The real size 35 patellar component was cemented into place and clamped.        Dilute Betadine solution was placed in the knee and allowed to sit for 3 minutes while the cement was hardening.  The knee was then irrigated copiously with normal saline via pulse lavage.  The knee was placed in 90 degrees of flexion, and the arthrotomy was closed with interrupted 0 Vicryl suture.  We placed 2-0 Monocryl subcutaneously in the incision.  Staples were placed in the epidermis.  Sterile dressings were applied.  The tourniquet was deflated.  The patient was recovered briefly in the operating room and then transferred to the Post-Anesthetic Care Unit for further recovery.  The patient tolerated the procedure well.         NASRIN SILVESTRE MD             D: 08/10/2017 13:03   T: 2017 02:42   MT: [id number]      Name:     LOREN GAYLE   MRN:      [id number]        Account:        [id number]   :      1960           Procedure Date: 08/10/2017      Document: [id number]       cc: Arabella Conner MD

## 2017-08-11 NOTE — PROGRESS NOTES
Care Transition Initial Assessment - MICHAELLE  Reason For Consult: discharge planning, facility placement  Met with: Patient    Active Problems:    S/P total knee replacement using cement, right         DATA  Lives With: child(ace), adult (17yo), spouse  Living Arrangements: house  Description of Support System: Involved  Who is your support system?: , Children  Support Assessment: Adequate family and caregiver support. Independent at home.  Identified issues/concerns regarding health management: PT recommends TCU which is in line with ortho note. Pt in agreement. She has pre-registered at [facility]. Is also open to other facilities. Agreed to referrals to [facility] and [facility]. She would like a private room and understands it is an additional fee per day. Her spouse will transport.      Quality Of Family Relationships: involved  Transportation Available: car, family or friend will provide    ASSESSMENT  Cognitive Status:  Appears alert and oriented. Her own decision maker.   Concerns to be addressed: On-going DC planning.     PLAN  Financial costs for the patient includes: Private room fee.  Patient given options and choices for discharge: Yes.  Patient/family is agreeable to the plan?  YES  Patient Goals and Preferences: TCU.  Patient anticipates discharging to: TCU.    HUGO Ward, LGSW  [id number]    Addendum: Pt accepted at all three facilities. After discussion, pt would like [facility]. Macie at [facility] will start working on [employer] authorization.       PAS-RR    D: Per DHS regulation, MICHAELLE completed and submitted PAS-RR to [place] [employer] via the Senior Link[employer] Line.  PAS-RR confirmation # is : [id number]    I: MICHAELLE spoke with pt and they are aware a PAS-RR has been submitted.  MICHAELLE reviewed with pt that they may be contacted for a follow up appointment within 10 days of hospital discharge if their SNF stay is < 30 days.  Contact information for Senior [employer] Line was also provided.    A:  Pt verbalized understanding.    P: Further questions may be directed to Senior LinkAge Line at #[phone], option #4 for PAS-RR staff.

## 2017-08-12 ENCOUNTER — APPOINTMENT (OUTPATIENT)
Dept: PHYSICAL THERAPY | Facility: CLINIC | Age: 57
DRG: 470 | End: 2017-08-12
Attending: ORTHOPAEDIC SURGERY
Payer: COMMERCIAL

## 2017-08-12 LAB
GLUCOSE SERPL-MCNC: 132 MG/DL (ref 70–99)
HGB BLD-MCNC: 10.3 G/DL (ref 11.7–15.7)

## 2017-08-12 PROCEDURE — 97116 GAIT TRAINING THERAPY: CPT | Mod: GP

## 2017-08-12 PROCEDURE — 25000132 ZZH RX MED GY IP 250 OP 250 PS 637: Performed by: ORTHOPAEDIC SURGERY

## 2017-08-12 PROCEDURE — 85018 HEMOGLOBIN: CPT | Performed by: PHYSICIAN ASSISTANT

## 2017-08-12 PROCEDURE — 82947 ASSAY GLUCOSE BLOOD QUANT: CPT | Performed by: PHYSICIAN ASSISTANT

## 2017-08-12 PROCEDURE — 97110 THERAPEUTIC EXERCISES: CPT | Mod: GP

## 2017-08-12 PROCEDURE — 12000007 ZZH R&B INTERMEDIATE

## 2017-08-12 PROCEDURE — 36415 COLL VENOUS BLD VENIPUNCTURE: CPT | Performed by: PHYSICIAN ASSISTANT

## 2017-08-12 PROCEDURE — 40000193 ZZH STATISTIC PT WARD VISIT

## 2017-08-12 PROCEDURE — 25000128 H RX IP 250 OP 636: Performed by: PHYSICIAN ASSISTANT

## 2017-08-12 PROCEDURE — 97530 THERAPEUTIC ACTIVITIES: CPT | Mod: GP

## 2017-08-12 PROCEDURE — 25000132 ZZH RX MED GY IP 250 OP 250 PS 637: Performed by: PHYSICIAN ASSISTANT

## 2017-08-12 PROCEDURE — 40000894 ZZH STATISTIC OT IP EVAL DEFER: Performed by: OCCUPATIONAL THERAPIST

## 2017-08-12 RX ORDER — OXYCODONE HCL 10 MG/1
10 TABLET, FILM COATED, EXTENDED RELEASE ORAL EVERY 12 HOURS
Qty: 30 TABLET | Refills: 0 | Status: SHIPPED | OUTPATIENT
Start: 2017-08-12 | End: 2017-08-17

## 2017-08-12 RX ORDER — OXYCODONE HYDROCHLORIDE 5 MG/1
5-10 TABLET ORAL
Qty: 80 TABLET | Refills: 0 | Status: SHIPPED | OUTPATIENT
Start: 2017-08-12 | End: 2017-08-17

## 2017-08-12 RX ORDER — AMOXICILLIN 250 MG
1-2 CAPSULE ORAL 2 TIMES DAILY
Qty: 60 TABLET | Refills: 0 | Status: SHIPPED | OUTPATIENT
Start: 2017-08-12 | End: 2017-08-15

## 2017-08-12 RX ORDER — OXYCODONE HCL 10 MG/1
10 TABLET, FILM COATED, EXTENDED RELEASE ORAL EVERY 12 HOURS
Qty: 30 TABLET | Refills: 0 | Status: SHIPPED | OUTPATIENT
Start: 2017-08-12 | End: 2017-08-12

## 2017-08-12 RX ORDER — ACETAMINOPHEN 325 MG/1
650 TABLET ORAL EVERY 4 HOURS PRN
Qty: 60 TABLET | Refills: 0 | Status: ON HOLD | OUTPATIENT
Start: 2017-08-13 | End: 2017-12-13

## 2017-08-12 RX ADMIN — MUPIROCIN: 20 OINTMENT TOPICAL at 08:45

## 2017-08-12 RX ADMIN — ACETAMINOPHEN 975 MG: 325 TABLET, FILM COATED ORAL at 08:38

## 2017-08-12 RX ADMIN — SENNOSIDES AND DOCUSATE SODIUM 2 TABLET: 8.6; 5 TABLET ORAL at 08:37

## 2017-08-12 RX ADMIN — OXYCODONE HYDROCHLORIDE 10 MG: 5 TABLET ORAL at 08:37

## 2017-08-12 RX ADMIN — OXYCODONE HYDROCHLORIDE 5 MG: 5 TABLET ORAL at 16:39

## 2017-08-12 RX ADMIN — OXYCODONE HYDROCHLORIDE 10 MG: 5 TABLET ORAL at 12:55

## 2017-08-12 RX ADMIN — FLUTICASONE FUROATE AND VILANTEROL TRIFENATATE 1 PUFF: 200; 25 POWDER RESPIRATORY (INHALATION) at 08:46

## 2017-08-12 RX ADMIN — LEVOTHYROXINE SODIUM 125 MCG: 75 TABLET ORAL at 08:37

## 2017-08-12 RX ADMIN — SENNOSIDES AND DOCUSATE SODIUM 2 TABLET: 8.6; 5 TABLET ORAL at 21:10

## 2017-08-12 RX ADMIN — ACETAMINOPHEN 975 MG: 325 TABLET, FILM COATED ORAL at 16:39

## 2017-08-12 RX ADMIN — LISINOPRIL AND HYDROCHLOROTHIAZIDE 2 TABLET: 12.5; 2 TABLET ORAL at 08:38

## 2017-08-12 RX ADMIN — MONTELUKAST SODIUM 10 MG: 10 TABLET, FILM COATED ORAL at 21:10

## 2017-08-12 RX ADMIN — OXYCODONE HYDROCHLORIDE 10 MG: 10 TABLET, FILM COATED, EXTENDED RELEASE ORAL at 05:39

## 2017-08-12 RX ADMIN — OXYCODONE HYDROCHLORIDE 5 MG: 5 TABLET ORAL at 05:39

## 2017-08-12 RX ADMIN — FEXOFENADINE HYDROCHLORIDE 180 MG: 180 TABLET, FILM COATED ORAL at 08:38

## 2017-08-12 RX ADMIN — ACETAMINOPHEN 975 MG: 325 TABLET, FILM COATED ORAL at 00:18

## 2017-08-12 RX ADMIN — ENOXAPARIN SODIUM 40 MG: 40 INJECTION SUBCUTANEOUS at 08:37

## 2017-08-12 RX ADMIN — MULTIPLE VITAMINS W/ MINERALS TAB 1 TABLET: TAB at 08:38

## 2017-08-12 RX ADMIN — POLYETHYLENE GLYCOL 3350 17 G: 17 POWDER, FOR SOLUTION ORAL at 08:37

## 2017-08-12 RX ADMIN — FLUTICASONE PROPIONATE 2 SPRAY: 50 SPRAY, METERED NASAL at 08:46

## 2017-08-12 RX ADMIN — HYDROXYZINE HYDROCHLORIDE 25 MG: 25 TABLET ORAL at 10:00

## 2017-08-12 RX ADMIN — HYDROXYZINE HYDROCHLORIDE 25 MG: 25 TABLET ORAL at 18:40

## 2017-08-12 NOTE — PLAN OF CARE
"Problem: Goal Outcome Summary  Goal: Goal Outcome Summary  PT: Orders received, chart reviewed, Eval completed, and treatment started, s/p R TKA on 8/10/17.  Discharge Planner PT   Patient plan for discharge: \"Anticipate DC to TCU.\"            Current status: PT: Needs Walter and vc for exercise, bed mobility, transfers, and gait with FWW, WBAT R, 100', followed by w/c.  Barriers to return to prior living situation: Postop pain, edema, weakness, and steps to enter and within her home, with very limited assist from her  who is disabled from history of Guillian-barre' syndrome.  Recommendations for discharge: TBD POD#2.  Rationale for recommendations: TBD POD#2.       Entered by: Bert Monroy 08/11/2017 8:55 PM         "

## 2017-08-12 NOTE — PLAN OF CARE
Problem: Goal Outcome Summary  Goal: Goal Outcome Summary  Discharge Planner PT   Patient plan for discharge: TCU  Current status: pt transfers sit/stand with SBA and sit/supine with Min A and use of bed rail. AAROM right knee 11-65. Pt rates pain 8/10 and increasing with activity. Pt declines stairs until this afternoon's session.  Barriers to return to prior living situation: Postop pain, ROM, edema, weakness, and steps to enter and within her home, with very limited assist from her  who is disabled from history of Guillian-barre' syndrome  Recommendations for discharge: TCU per surgeon  Rationale for recommendations: pt needs assist with all functional mobility.       Entered by: Isabela Flores 08/12/2017 11:00 AM

## 2017-08-12 NOTE — PLAN OF CARE
Problem: Goal Outcome Summary  Goal: Goal Outcome Summary  Outcome: No Change  Pt A&O but lethargic at beginning of shift. PRN oxycodone held,VSS w/ low grade temp tylenol given for pain and temp. Pt cleared throughout night and more alert PRN oxycodone given at 6am, pt sleeping comfortably in bed now. Progressing per plan of care.

## 2017-08-12 NOTE — PROGRESS NOTES
POD # 2  S/P Right TKA    Patient comfortable.  Pain controlled.  Tolerating oral intake.  No events overnight.     Alert and orient to person, place, and time.  Vital Sign Ranges  Temperature Temp  Av.3  F (37.4  C)  Min: 97.4  F (36.3  C)  Max: 100  F (37.8  C)   Blood pressure Systolic (24hrs), Av , Min:97 , Max:141        Diastolic (24hrs), Av, Min:54, Max:81      Pulse Pulse  Av.5  Min: 81  Max: 84   Respirations Resp  Avg: 15.9  Min: 14  Max: 17   Pulse oximetry SpO2  Av.3 %  Min: 90 %  Max: 97 %       Dressing is clean, dry, and intact.  Bilateral calves are soft, non-tender.  Right lower extremity is NVI.    Hgb 10.3    A/P  1. S/P Right TKA   Continue Lovenox for DVT prophylaxis.  Mobilize with PT/OT WBAT.  Continue current pain regiment.    2. Disposition   Anticipate d/c to TCU tomorrow.    Elizabeth Merion   [phone]    [facility]  [phone]

## 2017-08-12 NOTE — PLAN OF CARE
Problem: Goal Outcome Summary  Goal: Goal Outcome Summary  OT: Following chart review, OT eval deferred to next level of care, pt discharging to TCU, Orders completed

## 2017-08-12 NOTE — PLAN OF CARE
Problem: Goal Outcome Summary  Goal: Goal Outcome Summary  Outcome: Improving  Up with 1/walker to bathroom. Voiding well. Pain controlled with oxycodone every 3 hours. Immobilizer on at HS. Will continue to monitor.

## 2017-08-13 ENCOUNTER — APPOINTMENT (OUTPATIENT)
Dept: PHYSICAL THERAPY | Facility: CLINIC | Age: 57
DRG: 470 | End: 2017-08-13
Attending: ORTHOPAEDIC SURGERY
Payer: COMMERCIAL

## 2017-08-13 PROBLEM — Z96.651 S/P TOTAL KNEE ARTHROPLASTY, RIGHT: Status: ACTIVE | Noted: 2017-08-13

## 2017-08-13 LAB
CREAT SERPL-MCNC: 0.66 MG/DL (ref 0.52–1.04)
GFR SERPL CREATININE-BSD FRML MDRD: NORMAL ML/MIN/1.7M2
PLATELET # BLD AUTO: 307 10E9/L (ref 150–450)
POTASSIUM SERPL-SCNC: 4.2 MMOL/L (ref 3.4–5.3)

## 2017-08-13 PROCEDURE — 25000132 ZZH RX MED GY IP 250 OP 250 PS 637: Performed by: PHYSICIAN ASSISTANT

## 2017-08-13 PROCEDURE — 97530 THERAPEUTIC ACTIVITIES: CPT | Mod: GP

## 2017-08-13 PROCEDURE — 40000193 ZZH STATISTIC PT WARD VISIT

## 2017-08-13 PROCEDURE — 25000132 ZZH RX MED GY IP 250 OP 250 PS 637: Performed by: ORTHOPAEDIC SURGERY

## 2017-08-13 PROCEDURE — 85049 AUTOMATED PLATELET COUNT: CPT | Performed by: PHYSICIAN ASSISTANT

## 2017-08-13 PROCEDURE — 82565 ASSAY OF CREATININE: CPT | Performed by: PHYSICIAN ASSISTANT

## 2017-08-13 PROCEDURE — 36415 COLL VENOUS BLD VENIPUNCTURE: CPT | Performed by: PHYSICIAN ASSISTANT

## 2017-08-13 PROCEDURE — 12000007 ZZH R&B INTERMEDIATE

## 2017-08-13 PROCEDURE — 25000128 H RX IP 250 OP 636: Performed by: PHYSICIAN ASSISTANT

## 2017-08-13 PROCEDURE — 84132 ASSAY OF SERUM POTASSIUM: CPT | Performed by: PHYSICIAN ASSISTANT

## 2017-08-13 PROCEDURE — 97110 THERAPEUTIC EXERCISES: CPT | Mod: GP

## 2017-08-13 PROCEDURE — 97116 GAIT TRAINING THERAPY: CPT | Mod: GP

## 2017-08-13 RX ORDER — BISACODYL 10 MG
10 SUPPOSITORY, RECTAL RECTAL DAILY PRN
Status: DISCONTINUED | OUTPATIENT
Start: 2017-08-13 | End: 2017-08-14 | Stop reason: HOSPADM

## 2017-08-13 RX ADMIN — ACETAMINOPHEN 975 MG: 325 TABLET, FILM COATED ORAL at 08:32

## 2017-08-13 RX ADMIN — MULTIPLE VITAMINS W/ MINERALS TAB 1 TABLET: TAB at 08:33

## 2017-08-13 RX ADMIN — OXYCODONE HYDROCHLORIDE 10 MG: 5 TABLET ORAL at 19:53

## 2017-08-13 RX ADMIN — OXYCODONE HYDROCHLORIDE 10 MG: 5 TABLET ORAL at 14:39

## 2017-08-13 RX ADMIN — MONTELUKAST SODIUM 10 MG: 10 TABLET, FILM COATED ORAL at 21:00

## 2017-08-13 RX ADMIN — SENNOSIDES AND DOCUSATE SODIUM 2 TABLET: 8.6; 5 TABLET ORAL at 08:33

## 2017-08-13 RX ADMIN — BISACODYL 10 MG: 10 SUPPOSITORY RECTAL at 21:00

## 2017-08-13 RX ADMIN — FLUTICASONE FUROATE AND VILANTEROL TRIFENATATE 1 PUFF: 200; 25 POWDER RESPIRATORY (INHALATION) at 08:49

## 2017-08-13 RX ADMIN — POLYETHYLENE GLYCOL 3350 17 G: 17 POWDER, FOR SOLUTION ORAL at 08:32

## 2017-08-13 RX ADMIN — LEVOTHYROXINE SODIUM 112 MCG: 112 TABLET ORAL at 08:33

## 2017-08-13 RX ADMIN — ACETAMINOPHEN 975 MG: 325 TABLET, FILM COATED ORAL at 01:02

## 2017-08-13 RX ADMIN — FEXOFENADINE HYDROCHLORIDE 180 MG: 180 TABLET, FILM COATED ORAL at 08:33

## 2017-08-13 RX ADMIN — SENNOSIDES AND DOCUSATE SODIUM 2 TABLET: 8.6; 5 TABLET ORAL at 20:00

## 2017-08-13 RX ADMIN — HYDROXYZINE HYDROCHLORIDE 25 MG: 25 TABLET ORAL at 09:57

## 2017-08-13 RX ADMIN — MUPIROCIN: 20 OINTMENT TOPICAL at 08:49

## 2017-08-13 RX ADMIN — ENOXAPARIN SODIUM 40 MG: 40 INJECTION SUBCUTANEOUS at 08:32

## 2017-08-13 RX ADMIN — OXYCODONE HYDROCHLORIDE 5 MG: 5 TABLET ORAL at 09:57

## 2017-08-13 RX ADMIN — OXYCODONE HYDROCHLORIDE 5 MG: 5 TABLET ORAL at 01:02

## 2017-08-13 RX ADMIN — FLUTICASONE PROPIONATE 2 SPRAY: 50 SPRAY, METERED NASAL at 08:49

## 2017-08-13 NOTE — PROGRESS NOTES
SW:  D: Writer called [facility] for discharge today. [facility] stated that they did not have any beds available and had not received insurance Auth for patient on Friday before the end of the day.  Writer called [facility] and [facility] and both said they may have beds available for Monday.   can check in the morning for bed availability at all three facilities.      HUGO Cesar, VESNASW

## 2017-08-13 NOTE — PLAN OF CARE
Problem: Goal Outcome Summary  Goal: Goal Outcome Summary  Outcome: Improving  A/O x4. Up Ax1 GB and walker to BR. Voiding adequately. VSS on RA. CMS intact. Drsg C/D/I pain controlled with oxyCODONE. Progressing per POC. Will cont to monitor.

## 2017-08-13 NOTE — PLAN OF CARE
Problem: Goal Outcome Summary  Goal: Goal Outcome Summary  Outcome: Improving  Pt A/O, VSS on RA. Up with 1. CMS intact. Dressing changed, site WNL. Pain managed with oxycodone and atarax. C/o constipation, orders for suppository & enema available, pt aware of available orders. Awaiting bed availability. Progressing per plan of care. Will continue to monitor.

## 2017-08-13 NOTE — PLAN OF CARE
Problem: Goal Outcome Summary  Goal: Goal Outcome Summary  Outcome: Improving  VSS, baseline numbness/tingling BLE,otherwise CMS intact. Up with 1 and walker. Pain well controlled with oxycodone, tylenol and atarax. Dressing c/d/i. Alert and orient x 4. Discharge to TCU tomorrow.

## 2017-08-13 NOTE — DISCHARGE SUMMARY
Discharge Summary    Aleena Ontiveros MRN# [id number]   YOB: 1960 Age: 57 year old     Date of Admission:  8/10/2017  Date of Discharge:  8/13/2017  Admitting Physician:  Grady Alvarez MD  Discharge Physician:  Grady Alvarez MD     Primary Provider: Darlene Conner          Admission Diagnoses:   Osteoarthritis right knee          Discharge Diagnosis:   Same           Surgical Procedure:   right knee replacement           Secondary Diagnosis:     Patient Active Problem List   Diagnosis     S/P complete thyroidectomy     S/P total thyroidectomy     S/P total knee replacement using cement, right              Discharge Disposition:   Discharged to short-term care facility           Medications Prior to Admission:     Prescriptions Prior to Admission   Medication Sig Dispense Refill Last Dose     IBUPROFEN PO Take 200-600 mg by mouth 4 times daily as needed for moderate pain   8/2/2017 at PRN     Omega-3 Fatty Acids (FISH OIL PO) Take 1 capsule by mouth daily   Past Month at AM     Multiple Vitamins-Minerals (DAILY DENIZ MAXIMUM MULTIVITAMIN PO) Take 1 packet by mouth 3 times daily DOTERRA   8/3/2017 at Unknown time     albuterol (PROAIR HFA/PROVENTIL HFA/VENTOLIN HFA) 108 (90 BASE) MCG/ACT Inhaler Inhale 2 puffs into the lungs every 6 hours as needed for shortness of breath / dyspnea or wheezing   8/2/2017 at PRN     Levothyroxine Sodium (SYNTHROID PO) Take 125 mcg by mouth every other day (alternate with 112 mcg dose)   8/10/2017 at 0715     Levothyroxine Sodium (SYNTHROID PO) Take 112 mcg by mouth every other day (alternate with 125 mcg dose)   8/9/2017 at AM     fluticasone (FLONASE) 50 MCG/ACT spray Spray 1-2 sprays into both nostrils daily   8/10/2017 at 0715     Fexofenadine HCl (ALLEGRA PO) Take 180 mg by mouth daily   8/10/2017 at 0715     estradiol (ESTRACE VAGINAL) 0.1 MG/GM vaginal cream Place 1 g vaginally three times a week 42.5 g 0 8/9/2017 at Unknown time      lisinopril-hydrochlorothiazide (PRINZIDE,ZESTORETIC) 20-12.5 MG per tablet Take 2 tablets by mouth daily    8/9/2017 at AM     mometasone-formoterol (DULERA) 200-5 MCG/ACT oral inhaler Inhale 2 puffs into the lungs 2 times daily   8/10/2017 at 0715     Montelukast Sodium (SINGULAIR PO) Take 10 mg by mouth At Bedtime    8/9/2017 at HS     [DISCONTINUED] Acetaminophen (TYLENOL PO) Take 325-650 mg by mouth 3 times daily as needed    8/10/2017 at 0000     [DISCONTINUED] mupirocin (BACTROBAN) 2 % nasal ointment Apply 1 each into each nare 2 times daily Apply small amount to each nostril 2 times per day for 7 days prior to surgery.   8/9/2017 at HS             Discharge Medications:     Current Discharge Medication List      START taking these medications    Details   oxyCODONE (ROXICODONE) 5 MG IR tablet Take 1-2 tablets (5-10 mg) by mouth every 3 hours as needed for moderate to severe pain  Qty: 80 tablet, Refills: 0    Associated Diagnoses: S/P total knee replacement using cement, right      enoxaparin (LOVENOX) 40 MG/0.4ML injection Inject 0.4 mLs (40 mg) Subcutaneous every 24 hours for 11 days  Qty: 4.4 mL, Refills: 0    Associated Diagnoses: S/P total knee replacement using cement, right      senna-docusate (SENOKOT-S;PERICOLACE) 8.6-50 MG per tablet Take 1-2 tablets by mouth 2 times daily  Qty: 60 tablet, Refills: 0    Associated Diagnoses: S/P total knee replacement using cement, right      oxyCODONE (OXYCONTIN) 10 MG 12 hr tablet Take 1 tablet (10 mg) by mouth every 12 hours  Qty: 30 tablet, Refills: 0    Comments: If not covered by insurance, please fill: MS Contin #30 15 mg 1-3 tabs PO Q 12 hours prn pain  Associated Diagnoses: S/P total knee replacement using cement, right         CONTINUE these medications which have CHANGED    Details   acetaminophen (TYLENOL) 325 MG tablet Take 2 tablets (650 mg) by mouth every 4 hours as needed for other (surgical pain)  Qty: 60 tablet, Refills: 0    Associated Diagnoses:  S/P total knee replacement using cement, right         CONTINUE these medications which have NOT CHANGED    Details   IBUPROFEN PO Take 200-600 mg by mouth 4 times daily as needed for moderate pain      Omega-3 Fatty Acids (FISH OIL PO) Take 1 capsule by mouth daily      Multiple Vitamins-Minerals (DAILY DENIZ MAXIMUM MULTIVITAMIN PO) Take 1 packet by mouth 3 times daily DOTERRA      albuterol (PROAIR HFA/PROVENTIL HFA/VENTOLIN HFA) 108 (90 BASE) MCG/ACT Inhaler Inhale 2 puffs into the lungs every 6 hours as needed for shortness of breath / dyspnea or wheezing      !! Levothyroxine Sodium (SYNTHROID PO) Take 125 mcg by mouth every other day (alternate with 112 mcg dose)      !! Levothyroxine Sodium (SYNTHROID PO) Take 112 mcg by mouth every other day (alternate with 125 mcg dose)      fluticasone (FLONASE) 50 MCG/ACT spray Spray 1-2 sprays into both nostrils daily      Fexofenadine HCl (ALLEGRA PO) Take 180 mg by mouth daily      estradiol (ESTRACE VAGINAL) 0.1 MG/GM vaginal cream Place 1 g vaginally three times a week  Qty: 42.5 g, Refills: 0    Comments: Over due for annual exam. Needs to be seen in office for more fills.  Associated Diagnoses: Vaginal atrophy      lisinopril-hydrochlorothiazide (PRINZIDE,ZESTORETIC) 20-12.5 MG per tablet Take 2 tablets by mouth daily       mometasone-formoterol (DULERA) 200-5 MCG/ACT oral inhaler Inhale 2 puffs into the lungs 2 times daily      Montelukast Sodium (SINGULAIR PO) Take 10 mg by mouth At Bedtime        !! - Potential duplicate medications found. Please discuss with provider.      STOP taking these medications       mupirocin (BACTROBAN) 2 % nasal ointment Comments:   Reason for Stopping:                     Consultations:   No consultations were requested during this admission           Hospital Course:   The patient was admitted after the surical procedure.The patient underwent an uneventfulright knee replacement. Postoperatively, anticoagulation  with Lovenox was  started. No transfusion was required. The patient will be discharged to short-term care facility. Home medications have been reconciled. oxycodone 5 mg. was prescribed for pain. Lovenox  will be prescribed.             Pending Results:   None           Discharge Instructions and Follow-Up:        Discharge activity: Activity as tolerated   Discharge follow-up: Follow up with Dr. Alvarez in 2 weeks   Outpatient therapy: None    Home Care agency: None    Supplies and equipment: None        Wound care: dry dressing daily and as needed   Other instructions: None

## 2017-08-13 NOTE — PLAN OF CARE
Problem: Goal Outcome Summary  Goal: Goal Outcome Summary  Discharge Planner PT   Patient plan for discharge: TCU  Current status: AAROM 11-52 pt very resistive to flexion. Pt's daughter present-supportive. Pt ambulates 160 feet x 1 with FWW SBA slow skylar cues for walker navigation. Performs sit to supine with SBA, supine to sit with Mod A after exercises. Performs sit/stand transfers with SBA and cues for hand placement.  Barriers to return to prior living situation:  ROM . Postop pain, ROM, edema, weakness, and steps to enter and within her home, with very limited assist from her  who is disabled from history of Guillian-barre' syndrome  Recommendations for discharge: TCU per surgeon  Rationale for recommendations: pt needs assist with functional mobility. Pt resistive to knee ROM       Entered by: Isabela Flores 08/13/2017 12:20 PM

## 2017-08-13 NOTE — PLAN OF CARE
Problem: Goal Outcome Summary  Goal: Goal Outcome Summary  Outcome: Improving  Pt A/O, VSS on RA. Up with 1. CMS intact. Dressing changed, site WNL. Pain managed with oxycodone and atarax.  Plan to d/c tomorrow to TCU. Progressing per plan of care, will continue to monitor.

## 2017-08-13 NOTE — PROGRESS NOTES
POD #3  S/P Right TKA    Patient awake in bed.  Pain controlled.  Tolerating oral intake.  No events overnight.     Alert and orient to person, place, and time.  Vital Sign Ranges  Temperature Temp  Av.1  F (37.3  C)  Min: 98.5  F (36.9  C)  Max: 99.7  F (37.6  C)   Blood pressure Systolic (24hrs), Av , Min:90 , Max:129        Diastolic (24hrs), Av, Min:50, Max:77      Pulse Pulse  Av  Min: 84  Max: 84   Respirations Resp  Av  Min: 16  Max: 16   Pulse oximetry SpO2  Av.8 %  Min: 92 %  Max: 96 %       Right knee dressing is clean, dry, and intact.  Bilateral calves are soft, non-tender.  Right lower extremity is NVI.    Hgb 10.3 from 8/12    A/P  1. S/P Right TKA   Continue Lovenox for DVT prophylaxis. Mobilize with PT/OT WBAT.  Continue current pain regiment.    2. Disposition   Anticipate d/c to TCU today.    Kath White PA-C  [phone]    [facility]  [phone]

## 2017-08-14 ENCOUNTER — APPOINTMENT (OUTPATIENT)
Dept: PHYSICAL THERAPY | Facility: CLINIC | Age: 57
DRG: 470 | End: 2017-08-14
Attending: ORTHOPAEDIC SURGERY
Payer: COMMERCIAL

## 2017-08-14 VITALS
TEMPERATURE: 98.7 F | BODY MASS INDEX: 32.2 KG/M2 | DIASTOLIC BLOOD PRESSURE: 72 MMHG | RESPIRATION RATE: 16 BRPM | HEIGHT: 62 IN | WEIGHT: 175 LBS | HEART RATE: 84 BPM | SYSTOLIC BLOOD PRESSURE: 123 MMHG | OXYGEN SATURATION: 94 %

## 2017-08-14 PROCEDURE — 97110 THERAPEUTIC EXERCISES: CPT | Mod: GP | Performed by: PHYSICAL THERAPY ASSISTANT

## 2017-08-14 PROCEDURE — 40000193 ZZH STATISTIC PT WARD VISIT: Performed by: PHYSICAL THERAPY ASSISTANT

## 2017-08-14 PROCEDURE — 97116 GAIT TRAINING THERAPY: CPT | Mod: GP | Performed by: PHYSICAL THERAPY ASSISTANT

## 2017-08-14 PROCEDURE — 25000132 ZZH RX MED GY IP 250 OP 250 PS 637: Performed by: PHYSICIAN ASSISTANT

## 2017-08-14 PROCEDURE — 25000128 H RX IP 250 OP 636: Performed by: PHYSICIAN ASSISTANT

## 2017-08-14 PROCEDURE — 25000132 ZZH RX MED GY IP 250 OP 250 PS 637: Performed by: ORTHOPAEDIC SURGERY

## 2017-08-14 RX ADMIN — OXYCODONE HYDROCHLORIDE 10 MG: 5 TABLET ORAL at 02:33

## 2017-08-14 RX ADMIN — ESTRADIOL 1 G: 0.1 CREAM VAGINAL at 08:04

## 2017-08-14 RX ADMIN — ALBUTEROL SULFATE 2 PUFF: 90 AEROSOL, METERED RESPIRATORY (INHALATION) at 08:02

## 2017-08-14 RX ADMIN — OXYCODONE HYDROCHLORIDE 5 MG: 5 TABLET ORAL at 13:15

## 2017-08-14 RX ADMIN — FLUTICASONE PROPIONATE 2 SPRAY: 50 SPRAY, METERED NASAL at 09:20

## 2017-08-14 RX ADMIN — FEXOFENADINE HYDROCHLORIDE 180 MG: 180 TABLET, FILM COATED ORAL at 08:00

## 2017-08-14 RX ADMIN — POLYETHYLENE GLYCOL 3350 17 G: 17 POWDER, FOR SOLUTION ORAL at 07:57

## 2017-08-14 RX ADMIN — OXYCODONE HYDROCHLORIDE 10 MG: 5 TABLET ORAL at 07:55

## 2017-08-14 RX ADMIN — SENNOSIDES AND DOCUSATE SODIUM 2 TABLET: 8.6; 5 TABLET ORAL at 08:00

## 2017-08-14 RX ADMIN — LISINOPRIL AND HYDROCHLOROTHIAZIDE 2 TABLET: 12.5; 2 TABLET ORAL at 08:00

## 2017-08-14 RX ADMIN — ENOXAPARIN SODIUM 40 MG: 40 INJECTION SUBCUTANEOUS at 07:55

## 2017-08-14 RX ADMIN — LEVOTHYROXINE SODIUM 125 MCG: 75 TABLET ORAL at 08:01

## 2017-08-14 RX ADMIN — ACETAMINOPHEN 650 MG: 325 TABLET, FILM COATED ORAL at 13:15

## 2017-08-14 RX ADMIN — FLUTICASONE FUROATE AND VILANTEROL TRIFENATATE 1 PUFF: 200; 25 POWDER RESPIRATORY (INHALATION) at 08:03

## 2017-08-14 RX ADMIN — MULTIPLE VITAMINS W/ MINERALS TAB 1 TABLET: TAB at 08:00

## 2017-08-14 NOTE — PLAN OF CARE
Problem: Goal Outcome Summary  Goal: Goal Outcome Summary  Discharge Planner PT   Patient plan for discharge: TCU  Current status: Pt performed bed mobility with min assist and sit to/from stand transfers with SBA.  Pt performed gait training x 250 ft using wheeled walker and SBA.  Good stability without use of KI.  Knee AAROM is 5-83 degrees.  Increased time spent on exs as much encouragement and reassurance needed.  Barriers to return to prior living situation: Pain  Recommendations for discharge: TCU per plan established by the PT.   Rationale for recommendations:  Continued PT needed to progress strength, ROM, and independence with mobility. Limited by pain.  Reports  isn't able to provide enough assist for pt to return directly home due to his history of Guillian-barre' syndrome.       Entered by: Loly Robb 08/14/2017 8:42 AM     Pt is discharging to TCU today.  PT goals partially met.

## 2017-08-14 NOTE — PROGRESS NOTES
DYLAN    I: MICHAELLE received call from [facility], asking about referral that was re-sent on this patient.  MICHAELLE reviews notes, sees patient prefers [facility] and was to discharge there over the weekend.  MICHAELLE then placed call to [facility], who confirms they received an authorization from [facility] today.  [facility] is anticipating taking patient today.  MICHAELLE will await call back from [facility] and proceed accordingly.  MICHAELLE left  for [facility] of patient's decision.    P:  Continue to assist with discharge planning as needed.    RAOUL Yoder    UPDATE:  Patient has been accepted at [facility] into a private room (patient requests and  agrees to private pay costs).  [facility] staff will transport via w/c at: 1330.  Staff updated. MICHAELLE faxed PAS and orders to facility. See MICHAELLE note from 8/11 with PAS information.    No further SW interventions anticipated at this time.  Will be available if needs arise.

## 2017-08-14 NOTE — DISCHARGE INSTRUCTIONS
"TOTAL KNEE REPLACEMENT TAKE HOME INSTRUCTIONS  Your surgeon will answer any questions about your progress. General guidelines for your care are listed below. Your surgeon may give additional instructions for your care at home. Please follow them carefully    Activity Level  1. Physical activity may be resumed gradually according to your comfort level and your surgeon s instructions. Follow your exercise program as instructed by your therapist. Do exercises at least twice a day. you may ice your knee after exercising.  2. Complete exercises two hours before bedtime to minimize the effect pain may have on sleep.  Refer to pages 19-22 of you \"Total Knee Replacement\" booklet for details  3. Do not wear your knee immobilizer unless your doctor has specifically told you to continue it.    Good Health Practices  1. Maintain an adequate fluid intake and eat a well balanced diet.  2. Be sure to include the basic food groups, such as dairy products, meat/fish, vegetables, and fruit. Each of these foods contribute to your wound healing and increasing your strength.  3. Surgery, decreased activity and pain medication all contribute to a decrease in bowel activity that can result in constipation. It is recommended that you increase your liquid intake, add fiber to your diet, increase activity, and decrease pain medication use. If you have any problems, notify your physician.  4. Wear your anti-embolism stockings day and night until seen by your surgeon if you were issued these at discharge.  (Not all patients will have anti-embolism stockings) Remove twice a day for one hour at a time. You may hand wash and air dry your stockings.  5. Notify your dentist of your total knee surgery and call your dentist one week before a dental appointment for antibiotics, if your dentist will not prescribe antibiotics then call your surgeon to ask for next steps.      Incision/Dressing Care  1. Keep incision clean and dry per surgeons " instructions  2. Cover incision if you are still having drainage.  3.  If you have a waterproof dressing __________________________   Shower.    Things to Watch For  1. Check incision daily for increased redness, tenderness, swelling, or drainage along the incision line. If these occur, please notify your doctor. Also, call if you develop a fever above 101 .  2. Please notify your doctor if you experience any calf pain and/or if you have surgical pain not relieved by the pain medication prescribed by your doctor.  Follow up appointment:______________________________  Nurse signature _________________________________ Date ________ Time _____  Patient signature _____________________________ Date _____________________        Revised 05/8/17    You are discharging to:    [facility]   [address] [place]  [address]  [phone]

## 2017-08-14 NOTE — PLAN OF CARE
Problem: Goal Outcome Summary  Goal: Goal Outcome Summary  Outcome: Improving  Pt A/O, VSS  Up with 1 X walker. CMS intact. Dressing CDI,  Pain managed with oxy C/o constipation, gave suppository with result . Pt  Awaiting bed availability. Progressing per plan of care.  To D/C TCU this morning.

## 2017-08-14 NOTE — PROGRESS NOTES
Pt discharge to [facility] @ around 1344 via w/c ride. D/C instructions reviewed with pt and her significant others and all questions answered appropriately.

## 2017-08-14 NOTE — PROGRESS NOTES
POD # 4  S/P Right TKA    Patient awake in bed.  Pain controlled.  Tolerating oral intake.  Feeling much better, had bowel movement yesterday.    Alert and orient to person, place, and time.  Vital Sign Ranges  Temperature Temp  Av  F (37.2  C)  Min: 97.6  F (36.4  C)  Max: 99.9  F (37.7  C)   Blood pressure Systolic (24hrs), Av , Min:113 , Max:128        Diastolic (24hrs), Av, Min:60, Max:75      Pulse No Data Recorded   Respirations Resp  Av  Min: 16  Max: 16   Pulse oximetry SpO2  Av.6 %  Min: 94 %  Max: 98 %       Right knee is clean, dry, and intact.  Bilateral calves are soft, non-tender.  Right lower extremity is NVI.    Hgb 10.3    A/P  1. S/P Right TKA   Continue Lovenox for DVT prophylaxis. Mobilize with PT/OT WBAT.  Continue current pain regiment.    2. Disposition   Anticipate d/c to TCU today.    Kath White PA-C  [phone]    CAW  [phone]

## 2017-08-15 ENCOUNTER — NURSING HOME VISIT (OUTPATIENT)
Dept: GERIATRICS | Facility: CLINIC | Age: 57
End: 2017-08-15
Payer: COMMERCIAL

## 2017-08-15 VITALS
HEART RATE: 86 BPM | SYSTOLIC BLOOD PRESSURE: 105 MMHG | DIASTOLIC BLOOD PRESSURE: 70 MMHG | RESPIRATION RATE: 18 BRPM | OXYGEN SATURATION: 98 % | TEMPERATURE: 99.2 F

## 2017-08-15 DIAGNOSIS — R53.81 PHYSICAL DECONDITIONING: ICD-10-CM

## 2017-08-15 DIAGNOSIS — Z96.651 S/P TOTAL KNEE REPLACEMENT USING CEMENT, RIGHT: ICD-10-CM

## 2017-08-15 DIAGNOSIS — I10 BENIGN ESSENTIAL HYPERTENSION: ICD-10-CM

## 2017-08-15 DIAGNOSIS — E89.0 S/P TOTAL THYROIDECTOMY: ICD-10-CM

## 2017-08-15 DIAGNOSIS — Z96.651 S/P TOTAL KNEE ARTHROPLASTY, RIGHT: Primary | ICD-10-CM

## 2017-08-15 DIAGNOSIS — K59.01 SLOW TRANSIT CONSTIPATION: ICD-10-CM

## 2017-08-15 PROCEDURE — 99310 SBSQ NF CARE HIGH MDM 45: CPT | Performed by: NURSE PRACTITIONER

## 2017-08-15 RX ORDER — AMOXICILLIN 250 MG
2 CAPSULE ORAL 2 TIMES DAILY
Qty: 60 TABLET | Refills: 0
Start: 2017-08-15 | End: 2017-08-22

## 2017-08-15 RX ORDER — POLYETHYLENE GLYCOL 3350 17 G/17G
1 POWDER, FOR SOLUTION ORAL DAILY
Qty: 510 G | Refills: 1
Start: 2017-08-15 | End: 2017-08-16

## 2017-08-15 NOTE — PROGRESS NOTES
[place] [facility]  PRIMARY CARE PROVIDER AND CLINIC:  [facility] Conner [address] / [place] [address]  Chief Complaint   Patient presents with     Hospital F/U       HPI:    Aleena Ontiveros is a 57 year old  (1960),admitted to the [facility] from [facility].  Hospital stay 08/10/2017 through 08/14/2017.  Admitted to this facility for  rehab, medical management and nursing care.  HPI information obtained from: facility chart records, facility staff, patient report and [facility] chart review.  Current issues are:      S/P total knee arthroplasty, right  Patient transferred to TCU following hospital stay for right total knee arthroplasty due to DJD. Hospital stay was uneventful. She has oxycodone, acetaminophen, ibuprofen and ice for pain She has been prescribed lovenox for DVT prophylaxis which she should take for 11 more days.     S/P total thyroidectomy  Total thyroidectomy in May 2014 due to mutlinodular goiter.   She continues on levothyroxine. No recent TSH in [facility]  Benign essential hypertension  BP's in TCU: 105/70, 102/66, 97/64    Slow transit constipation  Having infrequent BMs. Needed suppository in hospital. Small BM here.     Physical deconditioning  Due to right TKA. Lives in two story home with . Has been independent.        CODE STATUS/ADVANCE DIRECTIVES DISCUSSION:   CPR/Full code   Patient's living condition: Lives with child(ace), dependent;spouse  ( disabled with s/p Guillian-barre' syndrome.)    ALLERGIES:Erythromycin  PAST MEDICAL HISTORY:  has a past medical history of Arthritis; Hypertension; PONV (postoperative nausea and vomiting); Thyroid disease; and Uncomplicated asthma. She also has no past medical history of Difficult intubation; Malignant hyperthermia; or Spinal headache.  PAST SURGICAL HISTORY:  has a past surgical history that includes Abdomen surgery (1988,1999); orthopedic surgery;  Thyroidectomy (5/13/2014); ENT surgery; back surgery; GYN surgery (5-13-13); thumb mass resection; and Arthroplasty knee (Right, 8/10/2017).  FAMILY HISTORY: family history includes Breast Cancer in her mother; C.A.D. in her father; CEREBROVASCULAR DISEASE in her father; Cancer - colorectal in her father; Thyroid Disease in her brother and sister.  SOCIAL HISTORY:  reports that she has never smoked. She does not have any smokeless tobacco history on file. She reports that she drinks alcohol. She reports that she does not use illicit drugs.    Post Discharge Medication Reconciliation Status: discharge medications reconciled, continue medications without change.  Current Outpatient Prescriptions   Medication Sig Dispense Refill     oxyCODONE (ROXICODONE) 5 MG IR tablet Take 1-2 tablets (5-10 mg) by mouth every 3 hours as needed for moderate to severe pain 80 tablet 0     acetaminophen (TYLENOL) 325 MG tablet Take 2 tablets (650 mg) by mouth every 4 hours as needed for other (surgical pain) 60 tablet 0     enoxaparin (LOVENOX) 40 MG/0.4ML injection Inject 0.4 mLs (40 mg) Subcutaneous every 24 hours for 11 days 4.4 mL 0     oxyCODONE (OXYCONTIN) 10 MG 12 hr tablet Take 1 tablet (10 mg) by mouth every 12 hours 30 tablet 0     Omega-3 Fatty Acids (FISH OIL PO) Take 1 capsule by mouth daily       Multiple Vitamins-Minerals (DAILY DENIZ MAXIMUM MULTIVITAMIN PO) Take 1 packet by mouth 3 times daily DOTERRA       albuterol (PROAIR HFA/PROVENTIL HFA/VENTOLIN HFA) 108 (90 BASE) MCG/ACT Inhaler Inhale 2 puffs into the lungs every 6 hours as needed for shortness of breath / dyspnea or wheezing       Levothyroxine Sodium (SYNTHROID PO) Take 125 mcg by mouth every other day (alternate with 112 mcg dose)       Levothyroxine Sodium (SYNTHROID PO) Take 112 mcg by mouth every other day (alternate with 125 mcg dose)       Fexofenadine HCl (ALLEGRA PO) Take 180 mg by mouth daily       estradiol (ESTRACE VAGINAL) 0.1 MG/GM vaginal cream  Place 1 g vaginally three times a week 42.5 g 0     lisinopril-hydrochlorothiazide (PRINZIDE,ZESTORETIC) 20-12.5 MG per tablet Take 2 tablets by mouth daily        mometasone-formoterol (DULERA) 200-5 MCG/ACT oral inhaler Inhale 2 puffs into the lungs 2 times daily       Montelukast Sodium (SINGULAIR PO) Take 10 mg by mouth At Bedtime        senna-docusate (SENOKOT-S;PERICOLACE) 8.6-50 MG per tablet Take 1-2 tablets by mouth 2 times daily 60 tablet 0     IBUPROFEN PO Take 200-600 mg by mouth 4 times daily as needed for moderate pain       fluticasone (FLONASE) 50 MCG/ACT spray Spray 1-2 sprays into both nostrils daily         ROS:  4 point ROS including Respiratory, CV, GI and , other than that noted in the HPI,  is negative    Exam:  /70  Pulse 86  Temp 99.2  F (37.3  C)  Resp 18  SpO2 98%  GENERAL APPEARANCE:  Alert, in no distress  ENT:  Mouth and posterior oropharynx normal, moist mucous membranes, hearing acuity adequate   EYES:  EOM, conjunctivae, lids, pupils and irises normal  NECK:  No adenopathy,masses or thyromegaly  RESP:  respiratory effort and palpation of chest normal, no respiratory distress, Lung sounds clear  CV:  Palpation and auscultation of heart done , rate and rhythm reg, no murmur, no rub or gallop, Edema none  ABDOMEN:  normal bowel sounds, soft, nontender, no hepatosplenomegaly or other masses  M/S:   Gait and station antalgic, Digits and nails normal   SKIN:  Inspection/Palpation of skin and subcutaneous tissue right knee incision- faint erythema at incision. No drainage  NEURO: 2-12 in normal limits and at patient's baseline  PSYCH:  insight and judgement, memory intact , affect and mood normal      Lab/Diagnostic data:   CBC RESULTS:   Recent Labs   Lab Test  08/13/17 0618 08/12/17   0558  08/11/17   0636  08/10/17   1820   HGB   --   10.3*  10.7*   --    PLT  307   --    --   300       Last Basic Metabolic Panel:  Recent Labs   Lab Test  08/13/17 0618 08/12/17   0558   08/11/17   0636  08/10/17   1820   05/14/14   0730   POTASSIUM  4.2   --   4.6   --    < >   --    SIXTO   --    --    --    --    --   7.5*   CR  0.66   --    --   0.60   --    --    GLC   --   132*  129*   --    --   115*    < > = values in this interval not displayed.       ASSESSMENT/PLAN:  (Z96.651) S/P total knee arthroplasty, right  (primary encounter diagnosis)  Comment: transferred to TCU for rehab following TKA. Working with therapy. Taking oxycontin/oxycodone for pain. Taking lovenox for DVT prophylaxis.   Plan: continue plan of care    (E89.0) S/P total thyroidectomy  Plan: per PCP.    (I10) Benign essential hypertension  Comment: low BPs  Plan: hold lisinopril/hctz if SBP<100    (K59.01) Slow transit constipation  Comment: due to opioids  Plan: senna s ii po BID and miralax 17g q dah    (R53.81) Physical deconditioning  Comment: due to right TKR  Plan: physical therapy and OCCUPATIONAL THERAPY         Total time spent with patient visit at the [facility] nursing [facility] was 45 min including patient visit and review of past records. Greater than 50% of total time spent with counseling and coordinating care due to review of meds and medical issues- pain, constipation    Electronically signed by:  JAZMIN Zepeda CNP

## 2017-08-16 ENCOUNTER — NURSING HOME VISIT (OUTPATIENT)
Dept: GERIATRICS | Facility: CLINIC | Age: 57
End: 2017-08-16
Payer: COMMERCIAL

## 2017-08-16 VITALS
HEIGHT: 63 IN | OXYGEN SATURATION: 95 % | SYSTOLIC BLOOD PRESSURE: 95 MMHG | BODY MASS INDEX: 27.61 KG/M2 | TEMPERATURE: 98.4 F | HEART RATE: 84 BPM | RESPIRATION RATE: 16 BRPM | DIASTOLIC BLOOD PRESSURE: 56 MMHG | WEIGHT: 155.8 LBS

## 2017-08-16 DIAGNOSIS — K59.01 SLOW TRANSIT CONSTIPATION: ICD-10-CM

## 2017-08-16 DIAGNOSIS — Z96.651 S/P TOTAL KNEE ARTHROPLASTY, RIGHT: Primary | ICD-10-CM

## 2017-08-16 DIAGNOSIS — I10 BENIGN ESSENTIAL HYPERTENSION: ICD-10-CM

## 2017-08-16 DIAGNOSIS — R53.81 PHYSICAL DECONDITIONING: ICD-10-CM

## 2017-08-16 PROCEDURE — 99309 SBSQ NF CARE MODERATE MDM 30: CPT | Performed by: NURSE PRACTITIONER

## 2017-08-16 RX ORDER — POLYETHYLENE GLYCOL 3350 17 G/17G
1 POWDER, FOR SOLUTION ORAL 2 TIMES DAILY
Qty: 510 G | Refills: 1
Start: 2017-08-16 | End: 2017-08-22

## 2017-08-16 NOTE — PROGRESS NOTES
[place] [facility]    Chief Complaint   Patient presents with     RECHECK       HPI:    Aleena Ontiveros is a 57 year old  (1960), who is being seen today for an episodic care visit at [facility].  HPI information obtained from: facility chart records, facility staff, patient report and [facility] chart review.    Today's concern is:     S/P total knee arthroplasty, right  Patient transferred to TCU following hospital stay for right total knee arthroplasty due to DJD. Hospital stay was uneventful. She has oxycontin,  oxycodone, acetaminophen, ibuprofen and ice for pain She has been prescribed lovenox for DVT prophylaxis which she should take for a total of 11 days.       S/P total thyroidectomy  Total thyroidectomy in May 2014 due to mutlinodular goiter.   She continues on levothyroxine. No recent TSH in [facility]    Benign essential hypertension  BP's in TCU: 95/56, 99/58, 105/70    Slow transit constipation  Having infrequent BMs. Needed suppository in hospital. Small BM here.     Physical deconditioning  Due to right TKA. Lives in two story home with . Has been independent.     ALLERGIES: Erythromycin  Past Medical, Surgical, Family and Social History reviewed and updated in [facility].    Current Outpatient Prescriptions   Medication Sig Dispense Refill     senna-docusate (SENOKOT-S;PERICOLACE) 8.6-50 MG per tablet Take 2 tablets by mouth 2 times daily 60 tablet 0     polyethylene glycol (MIRALAX) powder Take 17 g (1 capful) by mouth daily 510 g 1     oxyCODONE (ROXICODONE) 5 MG IR tablet Take 1-2 tablets (5-10 mg) by mouth every 3 hours as needed for moderate to severe pain 80 tablet 0     acetaminophen (TYLENOL) 325 MG tablet Take 2 tablets (650 mg) by mouth every 4 hours as needed for other (surgical pain) 60 tablet 0     enoxaparin (LOVENOX) 40 MG/0.4ML injection Inject 0.4 mLs (40 mg) Subcutaneous every 24 hours for 11 days 4.4 mL 0     oxyCODONE (OXYCONTIN) 10 MG 12 hr tablet  Take 1 tablet (10 mg) by mouth every 12 hours 30 tablet 0     Multiple Vitamins-Minerals (DAILY DENIZ MAXIMUM MULTIVITAMIN PO) Take 1 packet by mouth 3 times daily DOTERRA       albuterol (PROAIR HFA/PROVENTIL HFA/VENTOLIN HFA) 108 (90 BASE) MCG/ACT Inhaler Inhale 2 puffs into the lungs every 6 hours as needed for shortness of breath / dyspnea or wheezing       Levothyroxine Sodium (SYNTHROID PO) Take 125 mcg by mouth every other day (alternate with 112 mcg dose)       Levothyroxine Sodium (SYNTHROID PO) Take 112 mcg by mouth every other day (alternate with 125 mcg dose)       fluticasone (FLONASE) 50 MCG/ACT spray Spray 1-2 sprays into both nostrils daily       Fexofenadine HCl (ALLEGRA PO) Take 180 mg by mouth daily       estradiol (ESTRACE VAGINAL) 0.1 MG/GM vaginal cream Place 1 g vaginally three times a week 42.5 g 0     lisinopril-hydrochlorothiazide (PRINZIDE,ZESTORETIC) 20-12.5 MG per tablet Take 2 tablets by mouth daily        mometasone-formoterol (DULERA) 200-5 MCG/ACT oral inhaler Inhale 2 puffs into the lungs 2 times daily       Montelukast Sodium (SINGULAIR PO) Take 10 mg by mouth At Bedtime        Omega-3 Fatty Acids (FISH OIL PO) Take 1 capsule by mouth daily       Medications reviewed:  Medications reconciled to facility chart and changes were made to reflect current medications as identified as above med list. Below are the changes that were made:   Medications stopped since last EPIC medication reconciliation:   Medications Discontinued During This Encounter   Medication Reason     IBUPROFEN PO Medication Reconciliation Clean Up       Medications started since last [facility] medication reconciliation:  No orders of the defined types were placed in this encounter.        REVIEW OF SYSTEMS:  10 point ROS of systems including Constitutional, Eyes, Respiratory, Cardiovascular, Gastroenterology, Genitourinary, Integumentary, Muscularskeletal, Psychiatric were all negative except for pertinent positives noted  "in my HPI.    Physical Exam:  BP 95/56  Pulse 84  Temp 98.4  F (36.9  C)  Resp 16  Ht 5' 3\" (1.6 m)  Wt 155 lb 12.8 oz (70.7 kg)  SpO2 95%  BMI 27.6 kg/m2  GENERAL APPEARANCE:  Alert, in no distress  ENT:  Mouth and posterior oropharynx normal, moist mucous membranes, hearing acuity adequate   EYES:  EOM, conjunctivae, lids, pupils and irises normal  NECK:  No adenopathy,masses or thyromegaly  RESP:  respiratory effort and palpation of chest normal, no respiratory distress, Lung sounds clear  CV:  Palpation and auscultation of heart done , rate and rhythm reg, no murmur, no rub or gallop, Edema none  ABDOMEN:  normal bowel sounds, soft, nontender, no hepatosplenomegaly or other masses  M/S:   Gait and station antalgic, Digits and nails normal   SKIN:  Inspection/Palpation of skin and subcutaneous tissue right knee incision- faint erythema at incision. No drainage  NEURO: 2-12 in normal limits and at patient's baseline  PSYCH:  insight and judgement, memory intact , affect and mood normal    Recent Labs:    CBC RESULTS:   Recent Labs   Lab Test  08/13/17   0618  08/12/17   0558  08/11/17   0636  08/10/17   1820   HGB   --   10.3*  10.7*   --    PLT  307   --    --   300       Last Basic Metabolic Panel:  Recent Labs   Lab Test  08/13/17   0618  08/12/17   0558  08/11/17   0636  08/10/17   1820   05/14/14   0730   POTASSIUM  4.2   --   4.6   --    < >   --    SIXTO   --    --    --    --    --   7.5*   CR  0.66   --    --   0.60   --    --    GLC   --   132*  129*   --    --   115*    < > = values in this interval not displayed.       Assessment/Plan:  (Z96.651) S/P total knee arthroplasty, right  (primary encounter diagnosis)  Comment: she continues to have pain. The adhesive on the bandage is irritating her skin. She is discouraged with the pain management. We did discuss scheduling the oxycodone. I did contact [facility] SIM[facility] for Dr. Alvarez regarding redness at incision. She will review the incision.   Plan: " continue physical therapy, Ice. oxycontin 10mg at 6am and 10pm. Then schedule oxycodone 5-10 mg q 8am, noon, 4pm, 8pm. And q h 4 prn at hs.   HC 2.5% cream to incision BID    (I10) Benign essential hypertension  Comment: adequate control  Plan: no change    (K59.01) Slow transit constipation  Comment: she continues to be quite constipated.   Plan: senna s BID, miralax BID    (R53.81) Physical deconditioning  Comment: due to right TKA  Plan: physical therapy and OCCUPATIONAL THERAPY       Electronically signed by  JAZMIN Zepeda CNP

## 2017-08-17 VITALS
TEMPERATURE: 97.4 F | RESPIRATION RATE: 16 BRPM | BODY MASS INDEX: 26.68 KG/M2 | DIASTOLIC BLOOD PRESSURE: 79 MMHG | HEIGHT: 63 IN | SYSTOLIC BLOOD PRESSURE: 119 MMHG | HEART RATE: 88 BPM | WEIGHT: 150.6 LBS | OXYGEN SATURATION: 99 %

## 2017-08-17 RX ORDER — OXYCODONE HCL 10 MG/1
10 TABLET, FILM COATED, EXTENDED RELEASE ORAL EVERY 12 HOURS
COMMUNITY
End: 2017-08-22

## 2017-08-17 RX ORDER — HYDROCORTISONE 2.5 %
CREAM (GRAM) TOPICAL 2 TIMES DAILY
Status: ON HOLD | COMMUNITY
End: 2017-12-13

## 2017-08-18 ENCOUNTER — NURSING HOME VISIT (OUTPATIENT)
Dept: GERIATRICS | Facility: CLINIC | Age: 57
End: 2017-08-18
Payer: COMMERCIAL

## 2017-08-18 DIAGNOSIS — Z96.651 S/P TOTAL KNEE ARTHROPLASTY, RIGHT: ICD-10-CM

## 2017-08-18 DIAGNOSIS — I10 BENIGN ESSENTIAL HYPERTENSION: ICD-10-CM

## 2017-08-18 DIAGNOSIS — M17.11 PRIMARY OSTEOARTHRITIS OF RIGHT KNEE: ICD-10-CM

## 2017-08-18 DIAGNOSIS — R53.81 PHYSICAL DECONDITIONING: Primary | ICD-10-CM

## 2017-08-18 DIAGNOSIS — K59.01 SLOW TRANSIT CONSTIPATION: ICD-10-CM

## 2017-08-18 PROCEDURE — 99305 1ST NF CARE MODERATE MDM 35: CPT | Performed by: INTERNAL MEDICINE

## 2017-08-18 NOTE — PROGRESS NOTES
PRIMARY CARE PROVIDER AND CLINIC RESPONSIBLE:  Arabella Conner, [address] [facility] [address] / [place] [address]        ADMISSION HISTORY AND PHYSICAL EXAMINATION     Chief Complaint   Patient presents with     Fatigue     Musculoskeletal Problem         HISTORY OF PRESENT ILLNESS:  57 year old female, (1960), admitted to the [facility]CU for continuation of medical care and rehab.    Aleena Ontiveros is a 57 year old female with history of hypertension, post-thyroidectomy hypothyroidism and bilateral knee DJD who was admitted at [facility] from 8/10/17 to 17 due to elective right total knee arthroplasty for DJD. Post-op course was significant with mild anemia and constipation. She is on Lovenox for DVT prophylaxis. She is scheduled left knee TKA on 2017 for DJD too. She has mild right knee pain, has allergy for most dressing materials. She has poor appetite and not sleeping well at night. Patient denies chest pain, dyspnea, abdominal pain, n&v, fever, chills, dysuria. The rest of ROS is unremarkable. Patient is seen and examined by me with Anisha Dunbar NP. Please see MCKENNA Dunbar's admit noted dated 8/15/17 for details of admission, past medical history, family history, allergies, medication list, social history and other details pertinent with this admission. Hospital admission and dc summary reviewed.    Past Medical History:   Diagnosis Date     Arthritis     osteoarthritis of both knee     Hypertension      PONV (postoperative nausea and vomiting)      Thyroid disease      Uncomplicated asthma        Past Surgical History:   Procedure Laterality Date     ABDOMEN SURGERY  ,         ARTHROPLASTY KNEE Right 8/10/2017    Procedure: ARTHROPLASTY KNEE;  RIGHT TOTAL KNEE ARTHROPLASTY ;  Surgeon: Grady Alvarez MD;  Location:  OR     BACK SURGERY      spinal fusion     ENT SURGERY      tonsilectomy     GYN SURGERY  13    hysterectomy     ORTHOPEDIC SURGERY      [facility]  fusion,hand     thumb mass resection       THYROIDECTOMY  5/13/2014    Procedure: THYROIDECTOMY;  Surgeon: Krzysztof Marquez MD;  Location: [facility]       Current Outpatient Prescriptions   Medication Sig     hydrocortisone 2.5 % cream Apply topically 2 times daily Apply to around knee incision     OXYCODONE HCL PO Take 5-10 mg by mouth 4 times daily     OXYCODONE HCL PO Take 5-10 mg by mouth every 4 hours as needed     oxyCODONE (OXYCONTIN) 10 MG 12 hr tablet Take 10 mg by mouth every 12 hours     polyethylene glycol (MIRALAX) powder Take 17 g (1 capful) by mouth 2 times daily     senna-docusate (SENOKOT-S;PERICOLACE) 8.6-50 MG per tablet Take 2 tablets by mouth 2 times daily     acetaminophen (TYLENOL) 325 MG tablet Take 2 tablets (650 mg) by mouth every 4 hours as needed for other (surgical pain)     enoxaparin (LOVENOX) 40 MG/0.4ML injection Inject 0.4 mLs (40 mg) Subcutaneous every 24 hours for 11 days     albuterol (PROAIR HFA/PROVENTIL HFA/VENTOLIN HFA) 108 (90 BASE) MCG/ACT Inhaler Inhale 2 puffs into the lungs every 6 hours as needed for shortness of breath / dyspnea or wheezing     Levothyroxine Sodium (SYNTHROID PO) Take 125 mcg by mouth every other day (alternate with 112 mcg dose)     Levothyroxine Sodium (SYNTHROID PO) Take 112 mcg by mouth every other day (alternate with 125 mcg dose)     fluticasone (FLONASE) 50 MCG/ACT spray Spray 1 spray into both nostrils daily      Fexofenadine HCl (ALLEGRA PO) Take 180 mg by mouth daily     estradiol (ESTRACE VAGINAL) 0.1 MG/GM vaginal cream Place 1 g vaginally three times a week     lisinopril-hydrochlorothiazide (PRINZIDE,ZESTORETIC) 20-12.5 MG per tablet Take 2 tablets by mouth daily      mometasone-formoterol (DULERA) 200-5 MCG/ACT oral inhaler Inhale 2 puffs into the lungs 2 times daily     Montelukast Sodium (SINGULAIR PO) Take 10 mg by mouth At Bedtime      Omega-3 Fatty Acids (FISH OIL PO) Take 1 capsule by mouth daily     Multiple Vitamins-Minerals  "(DAILY DENIZ MAXIMUM MULTIVITAMIN PO) Take 1 packet by mouth 3 times daily DOTERRA     No current facility-administered medications for this visit.        Allergies   Allergen Reactions     Erythromycin Nausea and Vomiting       Social History     Social History     Marital status:      Spouse name: N/A     Number of children: N/A     Years of education: N/A     Occupational History     Not on file.     Social History Main Topics     Smoking status: Never Smoker     Smokeless tobacco: Not on file     Alcohol use Yes      Comment: 1-2 per month     Drug use: No     Sexual activity: Not on file     Other Topics Concern     Not on file     Social History Narrative          Information reviewed:  Medications, vital signs, orders, nursing notes, problem list, hospital information.     ROS: All 10 point review of system completed, those pertinent positive, please see H&P, the remaining ROS is negative.    /79  Pulse 88  Temp 97.4  F (36.3  C)  Resp 16  Ht 5' 3\" (1.6 m)  Wt 150 lb 9.6 oz (68.3 kg)  SpO2 99%  BMI 26.68 kg/m2    PHYSICAL EXAMINATION:   GENERAL:  No acute distress.  SKIN:  Dry and warm.  There is ecchymosis of right distal leg and ankle.  HEENT:  Head without trauma.  Pupils round, reactive. Exam of conjunctiva and lids are normal. Sclera without icterus. There is no oral thrush.  NECK:  Supple. No jugular venous distension.  CHEST: No reproducible chest tenderness.   LUNGS:  Normal respiratory effort. Lungs reveal decreased breath sounds at bases. No rales or wheezes.  HEART:  Regular rate and rhythm.  No murmur, gallops or rubs auscultated.  ABDOMEN:  Soft, bowel sounds positive.  There is no tenderness or guarding.   EXTREMITIES: trace edema right leg. Surgical scar of rt knee with dressing.  NEUROLOGIC:  Alert and oriented x3.  There is no focal deficit appreciated.  PSYCH: Recent and remote memory is normal. Mood and affect are normal.    Lab/Diagnostic data:  Reviewed    CBC Lab " Results (Last 5 results in 365 days)       WBC RBC Hgb Hct MCV MCH MCHC RDW Plt Neut # Lymph # Eos # Baso # Immat. Gran #   08/13/17 0618 -- -- -- -- -- -- -- -- 307 -- -- -- -- --   08/12/17 0558 -- -- 10.3 -- -- -- -- -- -- -- -- -- -- --   08/11/17 0636 -- -- 10.7 -- -- -- -- -- -- -- -- -- -- --   08/10/17 1820 -- -- -- -- -- -- -- -- 300 -- -- -- -- --   CMP Labs (Last 5 results in 365 days)       Na+ K+ Cl CO2 ANION GAP Glc BUN Creat GFR GFR-Black Calcium Mg ALT AST A1C TSH LDL HIV   08/13/17 0618 -- -- -- -- -- -- -- 0.66 >90 Non  GFR Calc    >90  GFR Calc    -- -- -- -- -- -- -- --   08/13/17 0618 -- 4.2 -- -- -- -- -- -- -- -- -- -- -- -- -- -- -- --   08/12/17 0558 -- -- -- -- -- 132 -- -- -- -- -- -- -- -- -- -- -- --   08/11/17 0636 -- -- -- -- -- 129 -- -- -- -- -- -- -- -- -- -- -- --   08/11/17 0636 -- 4.6 -- -- -- -- -- -- -- -- -- -- -- --             ASSESSMENT / PLAN:     Physical deconditioning  Plan: PT/OT with increase independence, self-care, strength and endurance and other cares that help return to home/facility of origin, fall precautions. Care conference with patient and family for the progress of rehab and disposition issues will be discussed as planned. Rehab evaluation and other evaluations including CPT are at rehab logs, to be reviewed separately.  Fall risk assessment as well as cognitive evaluation will be formed during rehab stay if indicated.    Primary osteoarthritis of right knee S/P total knee arthroplasty, right  Plan: PT/OT, pain medication, DVT prophylaxis with Lovenox as per orthopedic team. Follow up orthopedic clinic as scheduled. Staples removed as instructed. Incentive spirometry prn.    Benign essential hypertension  Plan: Continue Lisinopril/HTCZ. BP stable.    Slow transit constipation  Plan: Continue and optimize bowel regimen.     Other problems with same care. Primary care doctor and other specialists to address those chronic  problems in next clinic appointment to be scheduled upon discharge from the TCU.        Total time spent with patient visit was 33 min including patient visit, review of past records, patients counseling and coordinating care.        Benjamin Borrego MD

## 2017-08-18 NOTE — MR AVS SNAPSHOT
"              After Visit Summary   8/18/2017    Aleena Ontiveros    MRN: [id number]           Patient Information     Date Of Birth          1960        Visit Information        Provider Department      8/18/2017 11:18 AM Benjamin Borrego MD Ger[facility] Transitional [facility]        Today's Diagnoses     Physical deconditioning    -  1    Primary osteoarthritis of right knee        S/P total knee arthroplasty, right        Benign essential hypertension        Slow transit constipation           Follow-ups after your visit        Your next 10 appointments already scheduled     Dec 11, 2017   Procedure with Grady Alvarez MD   [facility] (--)    [address], [address]  [facility] [address] [phone]              Who to contact     If you have questions or need follow up information about today's clinic visit or your schedule please contact G[facility]AT[facility] directly at [phone].  Normal or non-critical lab and imaging results will be communicated to you by MyChart, letter or phone within 4 business days after the clinic has received the results. If you do not hear from us within 7 days, please contact the clinic through MyChart or phone. If you have a critical or abnormal lab result, we will notify you by phone as soon as possible.  Submit refill requests through QA on Request or call your pharmacy and they will forward the refill request to us. Please allow 3 business days for your refill to be completed.          Additional Information About Your Visit        MyChart Information     QA on Request lets you send messages to your doctor, view your test results, renew your prescriptions, schedule appointments and more. To sign up, go to [email or web address] . Click on \"Log in\" on the left side of the screen, which will take you to the Welcome page. Then click on \"Sign up Now\" on the right side of the page.     You will be asked to enter the access code listed below, as well " "as some personal information. Please follow the directions to create your username and password.     Your access code is: [email or web address]  Expires: 2017  8:11 AM     Your access code will  in 90 days. If you need help or a new code, please call your [facility] clinic or [phone].        Care EveryWhere ID     This is your Care EveryWhere ID. This could be used by other organizations to access your [facility] medical records  [id number]        Your Vitals Were     Pulse Temperature Respirations Height Pulse Oximetry BMI (Body Mass Index)    88 97.4  F (36.3  C) 16 5' 3\" (1.6 m) 99% 26.68 kg/m2       Blood Pressure from Last 3 Encounters:   17 119/79   17 95/56   08/15/17 105/70    Weight from Last 3 Encounters:   17 150 lb 9.6 oz (68.3 kg)   17 155 lb 12.8 oz (70.7 kg)   08/10/17 175 lb (79.4 kg)              Today, you had the following     No orders found for display       Primary Care Provider Office Phone # Fax #    Arabella Conner [phone] [address] [address]        Equal Access to Services     [facility]: Hadii aad ku hadasho Soomaali, waaxda luqadaha, qaybta kaalmada adeegyada, sonny garcia . So [facility] [phone].    ATENCIÓN: Si habla español, tiene a alejandro disposición servicios gratuitos de asistencia lingüística. Llame al [phone].    We comply with applicable federal civil rights laws and [place] laws. We do not discriminate on the basis of race, color, national origin, age, disability sex, sexual orientation or gender identity.            Thank you!     Thank you for choosing [facility]  for your care. Our goal is always to provide you with excellent care. Hearing back from our patients is one way we can continue to improve our services. Please take a few minutes to complete the written survey that you may receive in the mail after your visit with us. Thank you!      "        Your Updated Medication List - Protect others around you: Learn how to safely use, store and throw away your medicines at www.disposemymeds.org.          This list is accurate as of: 8/18/17  8:26 AM.  Always use your most recent med list.                   Brand Name Dispense Instructions for use Diagnosis    acetaminophen 325 MG tablet    TYLENOL    60 tablet    Take 2 tablets (650 mg) by mouth every 4 hours as needed for other (surgical pain)    S/P total knee replacement using cement, right       albuterol 108 (90 BASE) MCG/ACT Inhaler    PROAIR HFA/PROVENTIL HFA/VENTOLIN HFA     Inhale 2 puffs into the lungs every 6 hours as needed for shortness of breath / dyspnea or wheezing        ALLEGRA PO      Take 180 mg by mouth daily        DAILY DENIZ MAXIMUM MULTIVITAMIN PO      Take 1 packet by mouth 3 times daily DOTERRA        enoxaparin 40 MG/0.4ML injection    LOVENOX    4.4 mL    Inject 0.4 mLs (40 mg) Subcutaneous every 24 hours for 11 days    S/P total knee replacement using cement, right       estradiol 0.1 MG/GM cream    ESTRACE VAGINAL    42.5 g    Place 1 g vaginally three times a week    Vaginal atrophy       FISH OIL PO      Take 1 capsule by mouth daily        fluticasone 50 MCG/ACT spray    FLONASE     Spray 1 spray into both nostrils daily        hydrocortisone 2.5 % cream      Apply topically 2 times daily Apply to around knee incision        lisinopril-hydrochlorothiazide 20-12.5 MG per tablet    PRINZIDE/ZESTORETIC     Take 2 tablets by mouth daily        mometasone-formoterol 200-5 MCG/ACT oral inhaler    DULERA     Inhale 2 puffs into the lungs 2 times daily        * OXYCODONE HCL PO      Take 5-10 mg by mouth 4 times daily        * OXYCODONE HCL PO      Take 5-10 mg by mouth every 4 hours as needed        * oxyCODONE 10 MG 12 hr tablet    OxyCONTIN     Take 10 mg by mouth every 12 hours        polyethylene glycol powder    MIRALAX    510 g    Take 17 g (1 capful) by mouth 2 times daily     Slow transit constipation       senna-docusate 8.6-50 MG per tablet    SENOKOT-S;PERICOLACE    60 tablet    Take 2 tablets by mouth 2 times daily    S/P total knee replacement using cement, right       SINGULAIR PO      Take 10 mg by mouth At Bedtime        * SYNTHROID PO      Take 125 mcg by mouth every other day (alternate with 112 mcg dose)        * SYNTHROID PO      Take 112 mcg by mouth every other day (alternate with 125 mcg dose)        * Notice:  This list has 5 medication(s) that are the same as other medications prescribed for you. Read the directions carefully, and ask your doctor or other care provider to review them with you.

## 2017-08-22 ENCOUNTER — DISCHARGE SUMMARY NURSING HOME (OUTPATIENT)
Dept: GERIATRICS | Facility: CLINIC | Age: 57
End: 2017-08-22
Payer: COMMERCIAL

## 2017-08-22 VITALS
SYSTOLIC BLOOD PRESSURE: 101 MMHG | DIASTOLIC BLOOD PRESSURE: 71 MMHG | HEART RATE: 98 BPM | HEIGHT: 63 IN | RESPIRATION RATE: 16 BRPM | BODY MASS INDEX: 26.68 KG/M2 | OXYGEN SATURATION: 97 % | TEMPERATURE: 97.9 F | WEIGHT: 150.6 LBS

## 2017-08-22 DIAGNOSIS — Z96.651 S/P TOTAL KNEE REPLACEMENT USING CEMENT, RIGHT: ICD-10-CM

## 2017-08-22 DIAGNOSIS — Z96.651 S/P TOTAL KNEE ARTHROPLASTY, RIGHT: Primary | ICD-10-CM

## 2017-08-22 DIAGNOSIS — R53.81 PHYSICAL DECONDITIONING: ICD-10-CM

## 2017-08-22 DIAGNOSIS — K59.01 SLOW TRANSIT CONSTIPATION: ICD-10-CM

## 2017-08-22 DIAGNOSIS — E89.0 S/P TOTAL THYROIDECTOMY: ICD-10-CM

## 2017-08-22 DIAGNOSIS — I10 BENIGN ESSENTIAL HYPERTENSION: ICD-10-CM

## 2017-08-22 PROCEDURE — 99316 NF DSCHRG MGMT 30 MIN+: CPT | Performed by: NURSE PRACTITIONER

## 2017-08-22 RX ORDER — AMOXICILLIN 250 MG
2 CAPSULE ORAL 2 TIMES DAILY PRN
Qty: 60 TABLET | Refills: 0 | Status: ON HOLD
Start: 2017-08-22 | End: 2017-12-13

## 2017-08-22 RX ORDER — POLYETHYLENE GLYCOL 3350 17 G/17G
1 POWDER, FOR SOLUTION ORAL DAILY PRN
Qty: 510 G | Refills: 1 | Status: ON HOLD
Start: 2017-08-22 | End: 2017-12-13

## 2017-08-22 NOTE — PROGRESS NOTES
[facility] DISCHARGE SUMMARY    PATIENT'S NAME: Aleena Ontiveros  YOB: 1960  MEDICAL RECORD NUMBER:  [id number]    PRIMARY CARE PROVIDER AND CLINIC RESPONSIBLE AFTER TRANSFER: Arabella Conner [address] / [place] [address]     CODE STATUS/ADVANCE DIRECTIVES DISCUSSION:   CPR/Full code        Allergies   Allergen Reactions     Erythromycin Nausea and Vomiting       TRANSFERRING PROVIDERS: JAZMIN Zepeda CNP, Dr. Salima MD  DATE OF SNF ADMISSION:  August / 14 / 2017  DATE OF SNF (anticipated) DISCHARGE: August / 23 / 2017  DISCHARGE DISPOSITION: Non-FMG Provider   Nursing Facility: [facility] stay 8/10/2017 to 8/14/2017.     Condition on Discharge:  Improving.  Function:  Needs minimal assist with ADLs  Cognitive Scores: intact    Equipment: no aids    DISCHARGE DIAGNOSIS:   1. S/P total knee arthroplasty, right    2. S/P total thyroidectomy    3. Benign essential hypertension    4. Slow transit constipation    5. Physical deconditioning    6. S/P total knee replacement using cement, right        HPI Nursing Facility Course:  HPI information obtained from: facility chart records, facility staff, patient report and [facility] chart review.    S/P total knee arthroplasty, right  Patient transferred to TCU following hospital stay for right total knee arthroplasty due to DJD. Hospital stay was uneventful. She has oxycontin,  oxycodone, acetaminophen, ibuprofen and ice for pain She has been prescribed lovenox for DVT prophylaxis which she should take for a total of 11 days.         S/P total thyroidectomy  Total thyroidectomy in May 2014 due to mutlinodular goiter.   She continues on levothyroxine. No recent TSH in [facility] system     Benign essential hypertension  Last 3 BP's: 101/71, 110/74, 116/73 - prinizide held when SBP <100     Slow transit constipation  She was quite constipated upon arrival to TCU. She was  given senna s and miralax and eventually bowels started moving.      Physical deconditioning  Due to right TKA. Lives in two story home with . Has been independent        PAST MEDICAL HISTORY:  has a past medical history of Arthritis; Hypertension; PONV (postoperative nausea and vomiting); Thyroid disease; and Uncomplicated asthma. She also has no past medical history of Difficult intubation; Malignant hyperthermia; or Spinal headache.    DISCHARGE MEDICATIONS:  Current Outpatient Prescriptions   Medication Sig Dispense Refill     senna-docusate (SENOKOT-S;PERICOLACE) 8.6-50 MG per tablet Take 2 tablets by mouth 2 times daily as needed for constipation 60 tablet 0     polyethylene glycol (MIRALAX) powder Take 17 g (1 capful) by mouth daily as needed for constipation 510 g 1     hydrocortisone 2.5 % cream Apply topically 2 times daily Apply to around knee incision       OXYCODONE HCL PO Take 5 mg by mouth every 4 hours as needed       acetaminophen (TYLENOL) 325 MG tablet Take 2 tablets (650 mg) by mouth every 4 hours as needed for other (surgical pain) 60 tablet 0     enoxaparin (LOVENOX) 40 MG/0.4ML injection Inject 0.4 mLs (40 mg) Subcutaneous every 24 hours for 11 days 4.4 mL 0     albuterol (PROAIR HFA/PROVENTIL HFA/VENTOLIN HFA) 108 (90 BASE) MCG/ACT Inhaler Inhale 2 puffs into the lungs every 6 hours as needed for shortness of breath / dyspnea or wheezing       Levothyroxine Sodium (SYNTHROID PO) Take 125 mcg by mouth every other day (alternate with 112 mcg dose)       fluticasone (FLONASE) 50 MCG/ACT spray Spray 1 spray into both nostrils daily        Fexofenadine HCl (ALLEGRA PO) Take 180 mg by mouth daily       estradiol (ESTRACE VAGINAL) 0.1 MG/GM vaginal cream Place 1 g vaginally three times a week 42.5 g 0     lisinopril-hydrochlorothiazide (PRINZIDE,ZESTORETIC) 20-12.5 MG per tablet Take 2 tablets by mouth daily        mometasone-formoterol (DULERA) 200-5 MCG/ACT oral inhaler Inhale 2 puffs into  "the lungs 2 times daily       Montelukast Sodium (SINGULAIR PO) Take 10 mg by mouth At Bedtime        Omega-3 Fatty Acids (FISH OIL PO) Take 1 capsule by mouth daily       Multiple Vitamins-Minerals (DAILY DENIZ MAXIMUM MULTIVITAMIN PO) Take 1 packet by mouth 3 times daily DOTERRA       [DISCONTINUED] Levothyroxine Sodium (SYNTHROID PO) Take 112 mcg by mouth every other day (alternate with 125 mcg dose)         MEDICATION CHANGES/RATIONALE:   8/15/17 senna s ii BID and  miralax 17g q am  8/16/17 HC cream to area surrounding incision due to irritation from tape  8/18/17 DISCONTINUE oxycontin  8/22 decrease oxycodone 5mg po q 4 h prn.   Controlled medications sent with patient: Medication: oxycodone 5mg  , 20 tabs given to patient at the time of discharge to take home     ROS:    4 point ROS including Respiratory, CV, GI and , other than that noted in the HPI,  is negative    Physical Exam:   Vitals: /71  Pulse 98  Temp 97.9  F (36.6  C)  Resp 16  Ht 5' 3\" (1.6 m)  Wt 150 lb 9.6 oz (68.3 kg)  SpO2 97%  BMI 26.68 kg/m2  BMI= Body mass index is 26.68 kg/(m^2).    GENERAL APPEARANCE:  Alert, in no distress  ENT:  Mouth and posterior oropharynx normal, moist mucous membranes, hearing acuity adequate   EYES:  EOM, conjunctivae, lids, pupils and irises normal  NECK:  No adenopathy,masses or thyromegaly  RESP:  respiratory effort and palpation of chest normal, no respiratory distress, Lung sounds adequate   CV:  Palpation and auscultation of heart done , rate and rhythm reg, no murmur, no rub or gallop, Edema none  ABDOMEN:  normal bowel sounds, soft, nontender, no hepatosplenomegaly or other masses  M/S:   Gait and station antalgic, Digits and nails normal   SKIN:  Inspection/Palpation of skin and subcutaneous tissue right knee incision- staples intact. No erythema or drainage  NEURO: 2-12 in normal limits and at patient's baseline  PSYCH:  insight and judgement, memory intact , affect and mood " normal  DISCHARGE PLAN:  Physical Therapy  Patient instructed to follow-up with:  PCP in 7 days      [facility] scheduled appointments:  No future appointments.    MTM referral needed and placed by this provider: No    Pending labs: none  SNF labs  CBC RESULTS:   Recent Labs   Lab Test  08/13/17   0618  08/12/17   0558  08/11/17   0636  08/10/17   1820   HGB   --   10.3*  10.7*   --    PLT  307   --    --   300       Last Basic Metabolic Panel:  Recent Labs   Lab Test  08/13/17   0618  08/12/17   0558  08/11/17   0636  08/10/17   1820   05/14/14   0730   POTASSIUM  4.2   --   4.6   --    < >   --    SIXTO   --    --    --    --    --   7.5*   CR  0.66   --    --   0.60   --    --    GLC   --   132*  129*   --    --   115*    < > = values in this interval not displayed.     Discharge Treatments:daily dressing change to right knee    TOTAL DISCHARGE TIME:   Greater than 30 minutes  Electronically signed by:  JAZMIN Zepeda CNP

## 2017-08-24 ENCOUNTER — DOCUMENTATION ONLY (OUTPATIENT)
Dept: OTHER | Facility: CLINIC | Age: 57
End: 2017-08-24

## 2017-08-24 PROBLEM — Z71.89 ADVANCED DIRECTIVES, COUNSELING/DISCUSSION: Chronic | Status: ACTIVE | Noted: 2017-08-24

## 2017-08-29 ENCOUNTER — CARE COORDINATION (OUTPATIENT)
Dept: CARE COORDINATION | Facility: CLINIC | Age: 57
End: 2017-08-29

## 2017-08-29 NOTE — PROGRESS NOTES
"Clinic Care Coordination Contact  OUTREACH    Referral Information:  Referral Source: IP/TCU Report  Reason for Contact: Initial TCU Discharge. Post TKA  DATE OF SNF ADMISSION:  August / 14 / 2017  DATE OF SNF (anticipated) DISCHARGE: August / 23 / 2017  DISCHARGE DISPOSITION: Non-FMG Provider   Nursing Facility: [facility] stay 8/10/2017 to 8/14/2017.  Care Conference: No     Universal Utilization:   ED Visits in last year: 0  Hospital visits in last year: 1  Last PCP appointment: 08/03/17  Missed Appointments: 0  Concerns: Good Utilization  Multiple Providers or Specialists: Ortho    Clinical Concerns:  Current Medical Concerns:   Patient Active Problem List   Diagnosis     S/P complete thyroidectomy     S/P total thyroidectomy     S/P total knee replacement using cement, right     S/P total knee arthroplasty, right     Benign essential hypertension     Slow transit constipation     Physical deconditioning     Advance Care Planning       Current Behavioral Concerns: None    Education Provided to patient: To call clinic with all concerns.  Pt states understanding.   Clinical Pathway: None    Medication Management:  Pt is independent with medication management.  Med reconciliation completed today.  Pt is using 500 mg Tylenol instead of 650 mg of Tylenol.  Pt is understanding and adherent to schedule. No side effects reported    Functional Status:  Mobility Status: Independent  Equipment Currently Used at Home: none  Transportation:  provides  ADL's:  Minimal assist     Psychosocial:  Current living arrangement:: I live in a private home with spouse  Financial/Insurance: [employer].  Denies financial concerns.    Resources and Interventions:  Advanced Care Plans/Directives on file:: No  Patient/Caregiver understanding: Pt states she is doing \"good\" since returning home.  She \"is tired today\" as she \"[place] yesterday\"  She states she \"has moderate " "pain today\" during conversation as \"has not taken any pain medication yet today\"  She states she is alternating Tylenol, Ibuprofen and Oxycodone for pain and this is effective.  Frequency of Care Coordination:  Every 2-3 weeks  Upcoming appointment: PCP on 9/14/17, Ortho f/u  in 1 month     Plan: CC to f/u with pt in 2-3 weeks.      Erika Martines RN  [facility]  Care Coordination  [phone]  "

## 2017-10-11 ENCOUNTER — CARE COORDINATION (OUTPATIENT)
Dept: CARE COORDINATION | Facility: CLINIC | Age: 57
End: 2017-10-11

## 2017-10-11 NOTE — PROGRESS NOTES
Clinic Care Coordination Contact  [facility]/Voicemail    Referral Source: IP/TCU Report  Clinical Data: Care Coordinator Outreach  Outreach attempted x 1.  Left message on voicemail with call back information and requested return call.  Plan:  Care Coordinator will try to reach patient again in 3-5 business days.    Erika Martines RN  [facility]  Care Coordination  [phone]

## 2017-12-06 DIAGNOSIS — Z01.812 PRE-OPERATIVE LABORATORY EXAMINATION: Primary | ICD-10-CM

## 2017-12-11 ENCOUNTER — HOSPITAL ENCOUNTER (OUTPATIENT)
Dept: LAB | Facility: CLINIC | Age: 57
Discharge: HOME | End: 2017-12-11
Attending: ORTHOPAEDIC SURGERY | Admitting: ORTHOPAEDIC SURGERY
Payer: COMMERCIAL

## 2017-12-11 DIAGNOSIS — Z01.812 PRE-OPERATIVE LABORATORY EXAMINATION: ICD-10-CM

## 2017-12-11 LAB
MRSA DNA SPEC QL NAA+PROBE: NEGATIVE
SPECIMEN SOURCE: NORMAL

## 2017-12-11 PROCEDURE — 87641 MR-STAPH DNA AMP PROBE: CPT | Performed by: ORTHOPAEDIC SURGERY

## 2017-12-11 PROCEDURE — 40000830 ZZHCL STATISTIC STAPH AUREUS METH RESIST SCREEN PCR: Performed by: ORTHOPAEDIC SURGERY

## 2017-12-11 PROCEDURE — 40000829 ZZHCL STATISTIC STAPH AUREUS SUSCEPT SCREEN PCR: Performed by: ORTHOPAEDIC SURGERY

## 2017-12-11 PROCEDURE — 87640 STAPH A DNA AMP PROBE: CPT | Performed by: ORTHOPAEDIC SURGERY

## 2017-12-12 ENCOUNTER — CARE COORDINATION (OUTPATIENT)
Dept: CARE COORDINATION | Facility: CLINIC | Age: 57
End: 2017-12-12

## 2017-12-13 ENCOUNTER — ANESTHESIA EVENT (OUTPATIENT)
Dept: SURGERY | Facility: CLINIC | Age: 57
DRG: 470 | End: 2017-12-13
Payer: COMMERCIAL

## 2017-12-13 ENCOUNTER — APPOINTMENT (OUTPATIENT)
Dept: PHYSICAL THERAPY | Facility: CLINIC | Age: 57
DRG: 470 | End: 2017-12-13
Attending: ORTHOPAEDIC SURGERY
Payer: COMMERCIAL

## 2017-12-13 ENCOUNTER — HOSPITAL ENCOUNTER (INPATIENT)
Facility: CLINIC | Age: 57
LOS: 3 days | Discharge: NURSING, REHAB OR LONG-TERM CARE | DRG: 470 | End: 2017-12-16
Attending: ORTHOPAEDIC SURGERY | Admitting: ORTHOPAEDIC SURGERY
Payer: COMMERCIAL

## 2017-12-13 ENCOUNTER — ANESTHESIA (OUTPATIENT)
Dept: SURGERY | Facility: CLINIC | Age: 57
DRG: 470 | End: 2017-12-13
Payer: COMMERCIAL

## 2017-12-13 DIAGNOSIS — T78.40XD ALLERGIC REACTION, SUBSEQUENT ENCOUNTER: ICD-10-CM

## 2017-12-13 DIAGNOSIS — I10 BENIGN ESSENTIAL HYPERTENSION: ICD-10-CM

## 2017-12-13 DIAGNOSIS — Z96.652 S/P TOTAL KNEE ARTHROPLASTY, LEFT: Primary | ICD-10-CM

## 2017-12-13 DIAGNOSIS — K59.03 DRUG-INDUCED CONSTIPATION: ICD-10-CM

## 2017-12-13 LAB — POTASSIUM SERPL-SCNC: 3.3 MMOL/L (ref 3.4–5.3)

## 2017-12-13 PROCEDURE — 97116 GAIT TRAINING THERAPY: CPT | Mod: GP | Performed by: PHYSICAL THERAPIST

## 2017-12-13 PROCEDURE — 27210794 ZZH OR GENERAL SUPPLY STERILE: Performed by: ORTHOPAEDIC SURGERY

## 2017-12-13 PROCEDURE — 37000008 ZZH ANESTHESIA TECHNICAL FEE, 1ST 30 MIN: Performed by: ORTHOPAEDIC SURGERY

## 2017-12-13 PROCEDURE — C1776 JOINT DEVICE (IMPLANTABLE): HCPCS | Performed by: ORTHOPAEDIC SURGERY

## 2017-12-13 PROCEDURE — 71000012 ZZH RECOVERY PHASE 1 LEVEL 1 FIRST HR: Performed by: ORTHOPAEDIC SURGERY

## 2017-12-13 PROCEDURE — 25000128 H RX IP 250 OP 636: Performed by: NURSE ANESTHETIST, CERTIFIED REGISTERED

## 2017-12-13 PROCEDURE — 0SRD0J9 REPLACEMENT OF LEFT KNEE JOINT WITH SYNTHETIC SUBSTITUTE, CEMENTED, OPEN APPROACH: ICD-10-PCS | Performed by: ORTHOPAEDIC SURGERY

## 2017-12-13 PROCEDURE — 36415 COLL VENOUS BLD VENIPUNCTURE: CPT | Performed by: ANESTHESIOLOGY

## 2017-12-13 PROCEDURE — 25000125 ZZHC RX 250: Performed by: ORTHOPAEDIC SURGERY

## 2017-12-13 PROCEDURE — 25000128 H RX IP 250 OP 636: Performed by: ORTHOPAEDIC SURGERY

## 2017-12-13 PROCEDURE — 36000063 ZZH SURGERY LEVEL 4 EA 15 ADDTL MIN: Performed by: ORTHOPAEDIC SURGERY

## 2017-12-13 PROCEDURE — 25800025 ZZH RX 258: Performed by: ORTHOPAEDIC SURGERY

## 2017-12-13 PROCEDURE — 12000007 ZZH R&B INTERMEDIATE

## 2017-12-13 PROCEDURE — 84132 ASSAY OF SERUM POTASSIUM: CPT | Performed by: ANESTHESIOLOGY

## 2017-12-13 PROCEDURE — 97110 THERAPEUTIC EXERCISES: CPT | Mod: GP | Performed by: PHYSICAL THERAPIST

## 2017-12-13 PROCEDURE — 25000128 H RX IP 250 OP 636: Performed by: ANESTHESIOLOGY

## 2017-12-13 PROCEDURE — 25000125 ZZHC RX 250: Performed by: NURSE ANESTHETIST, CERTIFIED REGISTERED

## 2017-12-13 PROCEDURE — 27810169 ZZH OR IMPLANT GENERAL: Performed by: ORTHOPAEDIC SURGERY

## 2017-12-13 PROCEDURE — 25000128 H RX IP 250 OP 636: Performed by: PHYSICIAN ASSISTANT

## 2017-12-13 PROCEDURE — 97161 PT EVAL LOW COMPLEX 20 MIN: CPT | Mod: GP | Performed by: PHYSICAL THERAPIST

## 2017-12-13 PROCEDURE — 27210995 ZZH RX 272: Performed by: ORTHOPAEDIC SURGERY

## 2017-12-13 PROCEDURE — 37000009 ZZH ANESTHESIA TECHNICAL FEE, EACH ADDTL 15 MIN: Performed by: ORTHOPAEDIC SURGERY

## 2017-12-13 PROCEDURE — 25000566 ZZH SEVOFLURANE, EA 15 MIN: Performed by: ORTHOPAEDIC SURGERY

## 2017-12-13 PROCEDURE — 25000132 ZZH RX MED GY IP 250 OP 250 PS 637: Performed by: PHYSICIAN ASSISTANT

## 2017-12-13 PROCEDURE — 71000013 ZZH RECOVERY PHASE 1 LEVEL 1 EA ADDTL HR: Performed by: ORTHOPAEDIC SURGERY

## 2017-12-13 PROCEDURE — 36000093 ZZH SURGERY LEVEL 4 1ST 30 MIN: Performed by: ORTHOPAEDIC SURGERY

## 2017-12-13 PROCEDURE — 25000125 ZZHC RX 250: Performed by: ANESTHESIOLOGY

## 2017-12-13 PROCEDURE — 27110028 ZZH OR GENERAL SUPPLY NON-STERILE: Performed by: ORTHOPAEDIC SURGERY

## 2017-12-13 PROCEDURE — 40000170 ZZH STATISTIC PRE-PROCEDURE ASSESSMENT II: Performed by: ORTHOPAEDIC SURGERY

## 2017-12-13 PROCEDURE — 40000193 ZZH STATISTIC PT WARD VISIT: Performed by: PHYSICAL THERAPIST

## 2017-12-13 DEVICE — IMPLANTABLE DEVICE: Type: IMPLANTABLE DEVICE | Site: KNEE | Status: FUNCTIONAL

## 2017-12-13 DEVICE — BONE CEMENT STRK SIMPLEX P SPEEDSET 6192-1-001: Type: IMPLANTABLE DEVICE | Site: KNEE | Status: FUNCTIONAL

## 2017-12-13 RX ORDER — EPHEDRINE SULFATE 50 MG/ML
INJECTION, SOLUTION INTRAMUSCULAR; INTRAVENOUS; SUBCUTANEOUS PRN
Status: DISCONTINUED | OUTPATIENT
Start: 2017-12-13 | End: 2017-12-13

## 2017-12-13 RX ORDER — MAGNESIUM HYDROXIDE 1200 MG/15ML
LIQUID ORAL PRN
Status: DISCONTINUED | OUTPATIENT
Start: 2017-12-13 | End: 2017-12-13 | Stop reason: HOSPADM

## 2017-12-13 RX ORDER — SODIUM CHLORIDE, SODIUM LACTATE, POTASSIUM CHLORIDE, CALCIUM CHLORIDE 600; 310; 30; 20 MG/100ML; MG/100ML; MG/100ML; MG/100ML
INJECTION, SOLUTION INTRAVENOUS CONTINUOUS
Status: DISCONTINUED | OUTPATIENT
Start: 2017-12-13 | End: 2017-12-13

## 2017-12-13 RX ORDER — AMOXICILLIN 250 MG
1-2 CAPSULE ORAL 2 TIMES DAILY
Status: DISCONTINUED | OUTPATIENT
Start: 2017-12-13 | End: 2017-12-16 | Stop reason: HOSPADM

## 2017-12-13 RX ORDER — HYDROMORPHONE HYDROCHLORIDE 1 MG/ML
.3-.5 INJECTION, SOLUTION INTRAMUSCULAR; INTRAVENOUS; SUBCUTANEOUS
Status: DISCONTINUED | OUTPATIENT
Start: 2017-12-13 | End: 2017-12-16 | Stop reason: HOSPADM

## 2017-12-13 RX ORDER — NALOXONE HYDROCHLORIDE 0.4 MG/ML
.1-.4 INJECTION, SOLUTION INTRAMUSCULAR; INTRAVENOUS; SUBCUTANEOUS
Status: DISCONTINUED | OUTPATIENT
Start: 2017-12-13 | End: 2017-12-13

## 2017-12-13 RX ORDER — LISINOPRIL AND HYDROCHLOROTHIAZIDE 12.5; 2 MG/1; MG/1
2 TABLET ORAL DAILY
Status: DISCONTINUED | OUTPATIENT
Start: 2017-12-13 | End: 2017-12-14

## 2017-12-13 RX ORDER — MONTELUKAST SODIUM 10 MG/1
10 TABLET ORAL AT BEDTIME
Status: DISCONTINUED | OUTPATIENT
Start: 2017-12-13 | End: 2017-12-16 | Stop reason: HOSPADM

## 2017-12-13 RX ORDER — ONDANSETRON 2 MG/ML
4 INJECTION INTRAMUSCULAR; INTRAVENOUS EVERY 6 HOURS PRN
Status: DISCONTINUED | OUTPATIENT
Start: 2017-12-13 | End: 2017-12-16 | Stop reason: HOSPADM

## 2017-12-13 RX ORDER — DEXAMETHASONE SODIUM PHOSPHATE 4 MG/ML
INJECTION, SOLUTION INTRA-ARTICULAR; INTRALESIONAL; INTRAMUSCULAR; INTRAVENOUS; SOFT TISSUE PRN
Status: DISCONTINUED | OUTPATIENT
Start: 2017-12-13 | End: 2017-12-13

## 2017-12-13 RX ORDER — ONDANSETRON 2 MG/ML
INJECTION INTRAMUSCULAR; INTRAVENOUS PRN
Status: DISCONTINUED | OUTPATIENT
Start: 2017-12-13 | End: 2017-12-13

## 2017-12-13 RX ORDER — NALOXONE HYDROCHLORIDE 0.4 MG/ML
.1-.4 INJECTION, SOLUTION INTRAMUSCULAR; INTRAVENOUS; SUBCUTANEOUS
Status: DISCONTINUED | OUTPATIENT
Start: 2017-12-13 | End: 2017-12-16 | Stop reason: HOSPADM

## 2017-12-13 RX ORDER — PROPOFOL 10 MG/ML
INJECTION, EMULSION INTRAVENOUS PRN
Status: DISCONTINUED | OUTPATIENT
Start: 2017-12-13 | End: 2017-12-13

## 2017-12-13 RX ORDER — PROPOFOL 10 MG/ML
INJECTION, EMULSION INTRAVENOUS CONTINUOUS PRN
Status: DISCONTINUED | OUTPATIENT
Start: 2017-12-13 | End: 2017-12-13

## 2017-12-13 RX ORDER — ACETAMINOPHEN 325 MG/1
975 TABLET ORAL EVERY 8 HOURS
Status: COMPLETED | OUTPATIENT
Start: 2017-12-13 | End: 2017-12-16

## 2017-12-13 RX ORDER — LEVOTHYROXINE SODIUM 125 UG/1
125 TABLET ORAL
Status: DISCONTINUED | OUTPATIENT
Start: 2017-12-14 | End: 2017-12-16 | Stop reason: HOSPADM

## 2017-12-13 RX ORDER — SODIUM CHLORIDE, SODIUM LACTATE, POTASSIUM CHLORIDE, CALCIUM CHLORIDE 600; 310; 30; 20 MG/100ML; MG/100ML; MG/100ML; MG/100ML
INJECTION, SOLUTION INTRAVENOUS CONTINUOUS
Status: DISCONTINUED | OUTPATIENT
Start: 2017-12-13 | End: 2017-12-13 | Stop reason: HOSPADM

## 2017-12-13 RX ORDER — OXYCODONE HCL 10 MG/1
10 TABLET, FILM COATED, EXTENDED RELEASE ORAL EVERY 12 HOURS
Status: DISCONTINUED | OUTPATIENT
Start: 2017-12-13 | End: 2017-12-14

## 2017-12-13 RX ORDER — FENTANYL CITRATE 50 UG/ML
100 INJECTION, SOLUTION INTRAMUSCULAR; INTRAVENOUS ONCE
Status: COMPLETED | OUTPATIENT
Start: 2017-12-13 | End: 2017-12-13

## 2017-12-13 RX ORDER — LIDOCAINE HYDROCHLORIDE 20 MG/ML
INJECTION, SOLUTION INFILTRATION; PERINEURAL PRN
Status: DISCONTINUED | OUTPATIENT
Start: 2017-12-13 | End: 2017-12-13

## 2017-12-13 RX ORDER — FENTANYL CITRATE 50 UG/ML
INJECTION, SOLUTION INTRAMUSCULAR; INTRAVENOUS PRN
Status: DISCONTINUED | OUTPATIENT
Start: 2017-12-13 | End: 2017-12-13

## 2017-12-13 RX ORDER — ONDANSETRON 2 MG/ML
4 INJECTION INTRAMUSCULAR; INTRAVENOUS EVERY 30 MIN PRN
Status: DISCONTINUED | OUTPATIENT
Start: 2017-12-13 | End: 2017-12-13 | Stop reason: HOSPADM

## 2017-12-13 RX ORDER — SODIUM CHLORIDE, SODIUM LACTATE, POTASSIUM CHLORIDE, CALCIUM CHLORIDE 600; 310; 30; 20 MG/100ML; MG/100ML; MG/100ML; MG/100ML
INJECTION, SOLUTION INTRAVENOUS CONTINUOUS
Status: DISCONTINUED | OUTPATIENT
Start: 2017-12-13 | End: 2017-12-15

## 2017-12-13 RX ORDER — CEFAZOLIN SODIUM 2 G/100ML
2 INJECTION, SOLUTION INTRAVENOUS
Status: COMPLETED | OUTPATIENT
Start: 2017-12-13 | End: 2017-12-13

## 2017-12-13 RX ORDER — FEXOFENADINE HCL 180 MG/1
180 TABLET ORAL DAILY
Status: DISCONTINUED | OUTPATIENT
Start: 2017-12-13 | End: 2017-12-16 | Stop reason: HOSPADM

## 2017-12-13 RX ORDER — CEFAZOLIN SODIUM 1 G/3ML
1 INJECTION, POWDER, FOR SOLUTION INTRAMUSCULAR; INTRAVENOUS SEE ADMIN INSTRUCTIONS
Status: DISCONTINUED | OUTPATIENT
Start: 2017-12-13 | End: 2017-12-13

## 2017-12-13 RX ORDER — ONDANSETRON 4 MG/1
4 TABLET, ORALLY DISINTEGRATING ORAL EVERY 30 MIN PRN
Status: DISCONTINUED | OUTPATIENT
Start: 2017-12-13 | End: 2017-12-13 | Stop reason: HOSPADM

## 2017-12-13 RX ORDER — FENTANYL CITRATE 50 UG/ML
25-50 INJECTION, SOLUTION INTRAMUSCULAR; INTRAVENOUS
Status: DISCONTINUED | OUTPATIENT
Start: 2017-12-13 | End: 2017-12-13 | Stop reason: HOSPADM

## 2017-12-13 RX ORDER — ACETAMINOPHEN 325 MG/1
650 TABLET ORAL EVERY 4 HOURS PRN
Status: DISCONTINUED | OUTPATIENT
Start: 2017-12-16 | End: 2017-12-16 | Stop reason: HOSPADM

## 2017-12-13 RX ORDER — HYDROXYZINE HYDROCHLORIDE 25 MG/1
25 TABLET, FILM COATED ORAL EVERY 6 HOURS PRN
Status: DISCONTINUED | OUTPATIENT
Start: 2017-12-13 | End: 2017-12-16 | Stop reason: HOSPADM

## 2017-12-13 RX ORDER — ESTRADIOL 0.1 MG/G
2 CREAM VAGINAL AT BEDTIME
COMMUNITY

## 2017-12-13 RX ORDER — PROCHLORPERAZINE MALEATE 5 MG
10 TABLET ORAL EVERY 6 HOURS PRN
Status: DISCONTINUED | OUTPATIENT
Start: 2017-12-13 | End: 2017-12-16 | Stop reason: HOSPADM

## 2017-12-13 RX ORDER — POLYETHYLENE GLYCOL 3350 17 G/17G
17 POWDER, FOR SOLUTION ORAL 2 TIMES DAILY PRN
Status: DISCONTINUED | OUTPATIENT
Start: 2017-12-13 | End: 2017-12-16 | Stop reason: HOSPADM

## 2017-12-13 RX ORDER — LIDOCAINE 40 MG/G
CREAM TOPICAL
Status: DISCONTINUED | OUTPATIENT
Start: 2017-12-13 | End: 2017-12-16 | Stop reason: HOSPADM

## 2017-12-13 RX ORDER — HYDROMORPHONE HYDROCHLORIDE 1 MG/ML
.3-.5 INJECTION, SOLUTION INTRAMUSCULAR; INTRAVENOUS; SUBCUTANEOUS EVERY 5 MIN PRN
Status: DISCONTINUED | OUTPATIENT
Start: 2017-12-13 | End: 2017-12-13 | Stop reason: HOSPADM

## 2017-12-13 RX ORDER — METOCLOPRAMIDE 5 MG/1
10 TABLET ORAL EVERY 6 HOURS PRN
Status: DISCONTINUED | OUTPATIENT
Start: 2017-12-13 | End: 2017-12-16 | Stop reason: HOSPADM

## 2017-12-13 RX ORDER — OXYCODONE HYDROCHLORIDE 5 MG/1
5-10 TABLET ORAL
Status: DISCONTINUED | OUTPATIENT
Start: 2017-12-13 | End: 2017-12-16 | Stop reason: HOSPADM

## 2017-12-13 RX ORDER — METOCLOPRAMIDE HYDROCHLORIDE 5 MG/ML
10 INJECTION INTRAMUSCULAR; INTRAVENOUS EVERY 6 HOURS PRN
Status: DISCONTINUED | OUTPATIENT
Start: 2017-12-13 | End: 2017-12-16 | Stop reason: HOSPADM

## 2017-12-13 RX ORDER — ONDANSETRON 4 MG/1
4 TABLET, ORALLY DISINTEGRATING ORAL EVERY 6 HOURS PRN
Status: DISCONTINUED | OUTPATIENT
Start: 2017-12-13 | End: 2017-12-16 | Stop reason: HOSPADM

## 2017-12-13 RX ORDER — ALBUTEROL SULFATE 90 UG/1
2 AEROSOL, METERED RESPIRATORY (INHALATION) EVERY 6 HOURS PRN
Status: DISCONTINUED | OUTPATIENT
Start: 2017-12-13 | End: 2017-12-16 | Stop reason: HOSPADM

## 2017-12-13 RX ADMIN — MIDAZOLAM 2 MG: 1 INJECTION INTRAMUSCULAR; INTRAVENOUS at 07:33

## 2017-12-13 RX ADMIN — Medication 10 MG: at 07:45

## 2017-12-13 RX ADMIN — OXYCODONE HYDROCHLORIDE 10 MG: 10 TABLET, FILM COATED, EXTENDED RELEASE ORAL at 17:37

## 2017-12-13 RX ADMIN — MONTELUKAST SODIUM 10 MG: 10 TABLET, FILM COATED ORAL at 21:45

## 2017-12-13 RX ADMIN — Medication 0.5 MG: at 14:49

## 2017-12-13 RX ADMIN — MIDAZOLAM HYDROCHLORIDE 2 MG: 1 INJECTION, SOLUTION INTRAMUSCULAR; INTRAVENOUS at 06:54

## 2017-12-13 RX ADMIN — SODIUM CHLORIDE, POTASSIUM CHLORIDE, SODIUM LACTATE AND CALCIUM CHLORIDE: 600; 310; 30; 20 INJECTION, SOLUTION INTRAVENOUS at 08:07

## 2017-12-13 RX ADMIN — Medication 10 MG: at 07:39

## 2017-12-13 RX ADMIN — ACETAMINOPHEN 975 MG: 325 TABLET, FILM COATED ORAL at 13:27

## 2017-12-13 RX ADMIN — PROPOFOL 100 MCG/KG/MIN: 10 INJECTION, EMULSION INTRAVENOUS at 07:49

## 2017-12-13 RX ADMIN — SODIUM CHLORIDE, POTASSIUM CHLORIDE, SODIUM LACTATE AND CALCIUM CHLORIDE: 600; 310; 30; 20 INJECTION, SOLUTION INTRAVENOUS at 11:25

## 2017-12-13 RX ADMIN — PHENYLEPHRINE HYDROCHLORIDE 50 MCG: 10 INJECTION INTRAVENOUS at 07:39

## 2017-12-13 RX ADMIN — CEFAZOLIN SODIUM 1 G: 1 SOLUTION INTRAVENOUS at 17:37

## 2017-12-13 RX ADMIN — SENNOSIDES AND DOCUSATE SODIUM 2 TABLET: 8.6; 5 TABLET ORAL at 21:44

## 2017-12-13 RX ADMIN — PHENYLEPHRINE HYDROCHLORIDE 100 MCG: 10 INJECTION INTRAVENOUS at 07:45

## 2017-12-13 RX ADMIN — OXYCODONE HYDROCHLORIDE 5 MG: 5 TABLET ORAL at 22:18

## 2017-12-13 RX ADMIN — ACETAMINOPHEN 975 MG: 325 TABLET, FILM COATED ORAL at 21:44

## 2017-12-13 RX ADMIN — ONDANSETRON 4 MG: 2 SOLUTION INTRAMUSCULAR; INTRAVENOUS at 17:42

## 2017-12-13 RX ADMIN — EPINEPHRINE 25 ML GIVEN: 1 INJECTION INTRAMUSCULAR; INTRAVENOUS; SUBCUTANEOUS at 07:09

## 2017-12-13 RX ADMIN — SODIUM CHLORIDE, POTASSIUM CHLORIDE, SODIUM LACTATE AND CALCIUM CHLORIDE: 600; 310; 30; 20 INJECTION, SOLUTION INTRAVENOUS at 22:19

## 2017-12-13 RX ADMIN — Medication 0.5 MG: at 10:29

## 2017-12-13 RX ADMIN — FENTANYL CITRATE 100 MCG: 50 INJECTION, SOLUTION INTRAMUSCULAR; INTRAVENOUS at 07:36

## 2017-12-13 RX ADMIN — PROPOFOL 150 MG: 10 INJECTION, EMULSION INTRAVENOUS at 07:36

## 2017-12-13 RX ADMIN — SODIUM CHLORIDE, POTASSIUM CHLORIDE, SODIUM LACTATE AND CALCIUM CHLORIDE: 600; 310; 30; 20 INJECTION, SOLUTION INTRAVENOUS at 07:32

## 2017-12-13 RX ADMIN — LISINOPRIL AND HYDROCHLOROTHIAZIDE 2 TABLET: 12.5; 2 TABLET ORAL at 13:27

## 2017-12-13 RX ADMIN — DEXAMETHASONE SODIUM PHOSPHATE 4 MG: 4 INJECTION, SOLUTION INTRA-ARTICULAR; INTRALESIONAL; INTRAMUSCULAR; INTRAVENOUS; SOFT TISSUE at 07:51

## 2017-12-13 RX ADMIN — SODIUM CHLORIDE, POTASSIUM CHLORIDE, SODIUM LACTATE AND CALCIUM CHLORIDE: 600; 310; 30; 20 INJECTION, SOLUTION INTRAVENOUS at 06:50

## 2017-12-13 RX ADMIN — LIDOCAINE HYDROCHLORIDE 100 MG: 20 INJECTION, SOLUTION INFILTRATION; PERINEURAL at 07:36

## 2017-12-13 RX ADMIN — ONDANSETRON 4 MG: 2 INJECTION INTRAMUSCULAR; INTRAVENOUS at 07:51

## 2017-12-13 RX ADMIN — CEFAZOLIN SODIUM 2 G: 2 INJECTION, SOLUTION INTRAVENOUS at 07:45

## 2017-12-13 RX ADMIN — FENTANYL CITRATE 100 MCG: 50 INJECTION, SOLUTION INTRAMUSCULAR; INTRAVENOUS at 07:22

## 2017-12-13 RX ADMIN — Medication 0.5 MG: at 09:48

## 2017-12-13 RX ADMIN — OXYCODONE HYDROCHLORIDE 5 MG: 5 TABLET ORAL at 21:45

## 2017-12-13 ASSESSMENT — ACTIVITIES OF DAILY LIVING (ADL)
COMMUNICATION: 0 - UNDERSTANDS/COMMUNICATES WITHOUT DIFFICULTY
BATHING: 0 - INDEPENDENT
TRANSFERRING: 0 - INDEPENDENT
AMBULATION: 0 - INDEPENDENT
TOILETING: 0 - INDEPENDENT
DRESS: 0 - INDEPENDENT
CHANGE_IN_FUNCTIONAL_STATUS_SINCE_ONSET_OF_CURRENT_ILLNESS/INJURY: YES
SWALLOWING: 0 - SWALLOWS FOODS/LIQUIDS WITHOUT DIFFICULTY
EATING: 0 - INDEPENDENT

## 2017-12-13 NOTE — IP AVS SNAPSHOT
"[place] [address] ORTHO SPECIALTY UNIT: [phone]                                              INTERAGENCY TRANSFER FORM - PHYSICIAN ORDERS   2017                    Hospital Admission Date: 2017  LOREN GALYE   : 1960  Sex: Female        Attending Provider: Grady Alvarez MD     Allergies:  Adhesive Tape, Erythromycin    Infection:  None   Service:  SURGERY    Ht:  1.575 m (5' 2\")   Wt:  62.6 kg (138 lb)   Admission Wt:  62.6 kg (138 lb)    BMI:  25.24 kg/m 2   BSA:  1.65 m 2            Patient PCP Information     Provider PCP Type    Arabella Conner MD General      ED Clinical Impression     Diagnosis Description Comment Added By Time Added    S/P total knee arthroplasty, left [Z96.652] S/P total knee arthroplasty, left [Z96.652]  Elizabeth Merino PA-C 2017  7:09 AM    Benign essential hypertension [I10] Benign essential hypertension [I10]  Arabella Jara MD 2017  8:20 AM    Allergic reaction, subsequent encounter [T78.40XD] Allergic reaction, subsequent encounter [T78.40XD]  Arabella Jara MD 2017  8:23 AM    Drug-induced constipation [K59.03] Drug-induced constipation [K59.03]  Arabella Jara MD 2017  1:17 PM      Hospital Problems as of 2017              Priority Class Noted POA    S/P total knee arthroplasty, left Medium  2017 Yes      Non-Hospital Problems as of 2017              Priority Class Noted    S/P complete thyroidectomy Medium  2014    S/P total thyroidectomy Medium  2014    S/P total knee replacement using cement, right Medium  8/10/2017    S/P total knee arthroplasty, right Medium  2017    Benign essential hypertension Medium  8/15/2017    Slow transit constipation Medium  8/15/2017    Physical deconditioning Medium  8/15/2017    Advance Care Planning Medium  2017      Code Status History     Date Active Date Inactive Code Status Order ID Comments User Context    2014  " 7:33 AM  Full Code [id number]  Dari Benito MD Outpatient    5/13/2014  3:58 PM 5/14/2014  7:33 AM Full Code [id number]  Dari Benito MD Inpatient         Medication Review      START taking        Dose / Directions Comments    bisacodyl 10 MG Suppository   Commonly known as:  DULCOLAX   Used for:  Drug-induced constipation        Dose:  10 mg   Place 1 suppository (10 mg) rectally daily as needed for constipation   Quantity:  30 suppository   Refills:  0        diphenhydrAMINE 25 MG tablet   Commonly known as:  BENADRYL ALLERGY   Used for:  Allergic reaction, subsequent encounter        Dose:  25 mg   Take 1 tablet (25 mg) by mouth every 6 hours as needed for itching or allergies   Quantity:  56 tablet   Refills:  0        enoxaparin 40 MG/0.4ML injection   Commonly known as:  LOVENOX        Dose:  40 mg   Inject 0.4 mLs (40 mg) Subcutaneous every 24 hours for 11 days   Quantity:  4.4 mL   Refills:  0        hydrOXYzine 25 MG tablet   Commonly known as:  ATARAX        Dose:  25-50 mg   Take 1-2 tablets (25-50 mg) by mouth every 4 hours as needed (pain/muscle spasms)   Quantity:  50 tablet   Refills:  0        lisinopril 40 MG tablet   Commonly known as:  PRINIVIL/ZESTRIL   Used for:  Benign essential hypertension        Dose:  40 mg   Take 1 tablet (40 mg) by mouth daily   Quantity:  30 tablet   Refills:  0        oxyCODONE IR 5 MG tablet   Commonly known as:  ROXICODONE        Dose:  5-10 mg   Take 1-2 tablets (5-10 mg) by mouth every 3 hours as needed for moderate to severe pain   Quantity:  80 tablet   Refills:  0        senna-docusate 8.6-50 MG per tablet   Commonly known as:  SENOKOT-S;PERICOLACE        Dose:  1-2 tablet   Take 1-2 tablets by mouth 2 times daily   Quantity:  60 tablet   Refills:  0          CONTINUE these medications which may have CHANGED, or have new prescriptions. If we are uncertain of the size of tablets/capsules you have at home, strength may be listed as something that might have  changed.        Dose / Directions Comments    acetaminophen 325 MG tablet   Commonly known as:  TYLENOL   This may have changed:    - medication strength  - how much to take  - reasons to take this        Dose:  650 mg   Take 2 tablets (650 mg) by mouth every 4 hours as needed for other (surgical pain)   Quantity:  100 tablet   Refills:  0          CONTINUE these medications which have NOT CHANGED        Dose / Directions Comments    albuterol 108 (90 BASE) MCG/ACT Inhaler   Commonly known as:  PROAIR HFA/PROVENTIL HFA/VENTOLIN HFA        Dose:  2 puff   Inhale 2 puffs into the lungs every 6 hours as needed for shortness of breath / dyspnea or wheezing   Refills:  0        ALLEGRA PO        Dose:  180 mg   Take 180 mg by mouth daily   Refills:  0        DAILY DENIZ MAXIMUM MULTIVITAMIN PO        Dose:  1 packet   Take 1 packet by mouth 3 times daily DOTERRA   Refills:  0        estradiol 0.1 MG/GM cream   Commonly known as:  ESTRACE        Dose:  2 g   Place 2 g vaginally daily   Refills:  0        FISH OIL PO        Dose:  1 capsule   Take 1 capsule by mouth daily   Refills:  0        fluticasone 50 MCG/ACT spray   Commonly known as:  FLONASE        Dose:  1 spray   Spray 1 spray into both nostrils daily   Refills:  0        fluticasone-salmeterol 500-50 MCG/DOSE diskus inhaler   Commonly known as:  ADVAIR        Dose:  1 puff   Inhale 1 puff into the lungs 2 times daily   Refills:  0        SINGULAIR PO        Dose:  10 mg   Take 10 mg by mouth At Bedtime   Refills:  0        SYNTHROID PO        Dose:  125 mcg   Take 125 mcg by mouth daily   Refills:  0          STOP taking     lisinopril-hydrochlorothiazide 20-12.5 MG per tablet   Commonly known as:  PRINZIDE/ZESTORETIC                     Further instructions from your care team       Patient will discharge to [facility] today via the [facility] and [facility] between 15:30 and 16:00.  [facility]'s phone number is [phone].    Summary of Visit     Reason  for your hospital stay       S/P left total knee arthroplasty             After Care     Activity - Up ad hernán           Additional Discharge Instructions       Check BP's 3 times daily and keep log for TCU MD and PCP to follow. Patient's HCTZ was discontinued this stay due to hypotension, remains on ACEI. Further dosing per TCU MD.       Advance Diet as Tolerated       Follow this diet upon discharge: Regular       Fall precautions           General info for SNF       Length of Stay Estimate: Short Term Care: Estimated # of Days <30  Condition at Discharge: Improving  Level of care:skilled   Rehabilitation Potential: Excellent  Admission H&P remains valid and up-to-date: Yes  Recent Chemotherapy: N/A  Use Nursing Home Standing Orders: Yes       Mantoux instructions       Give two-step Mantoux (PPD) Per Facility Policy Yes       Weight bearing status       WBAT       Wound care (specify)       Site:   Left knee  Instructions:  Dry dressing changes daily. Pt is allergic to adhesive tape. Keep dressing in place using brandi hose             Referrals     Occupational Therapy Adult Consult       Evaluate and treat as clinically indicated.    Reason:  Left total knee arthoplasty       Physical Therapy Adult Consult       Evaluate and treat as clinically indicated.     Reason:  Left total knee arthoplasty             Supplies     AntiEmbolism Stockings       Bilateral thigh length.On in the morning, off at night             Follow-Up Appointment Instructions     Future Labs/Procedures    Follow Up and recommended labs and tests     Comments:    Follow up with Dr. Alvarez in clinic in 2 weeks.  Please call [phone] with questions.  F/u RUPA OCAMPO, follow BP's and adjust meds as indicated.      Follow-Up Appointment Instructions     Follow Up and recommended labs and tests       Follow up with Dr. Alvarez in clinic in 2 weeks.  Please call [phone] with questions.  F/u RUPA OCAMPO, follow BP's and adjust meds as indicated.              Statement of Approval     Ordered          12/16/17 0813  I have reviewed and agree with all the recommendations and orders detailed in this document.  EFFECTIVE NOW     Approved and electronically signed by:  Grady Alvarez MD

## 2017-12-13 NOTE — PLAN OF CARE
Problem: Knee Arthroplasty (Total, Partial) (Adult)  Goal: Signs and Symptoms of Listed Potential Problems Will be Absent, Minimized or Managed (Knee Arthroplasty)  Signs and symptoms of listed potential problems will be absent, minimized or managed by discharge/transition of care (reference Knee Arthroplasty (Total, Partial) (Adult) CPG).   Outcome: Improving  Pt is DOS and arrived around 1120, A&Ox4 but sedated. Pt is much more alert now. VSS on 2L O2 and IV infusing. Pt is tolerating a regular diet well. Pt denies pain just a heaviness. [place] is patent with adequate output. Dressing is C/D/I and CMS intact with +2 pulses. She had general anesthesia with adductor canal block and EBL of 4cc per PACU nurse. Pt has not gotten OOB yet or dangled, first therapy is at 1600. Capnography has been WNL. Pt did have one emesis but did not want any interventions for this. She is progressing well per plan of care.

## 2017-12-13 NOTE — IP AVS SNAPSHOT
MRN:[id number]                      After Visit Summary   12/13/2017    Aleena Ontiveros    MRN: [id number]           Thank you!     Thank you for choosing [facility] for your care. Our goal is always to provide you with excellent care. Hearing back from our patients is one way we can continue to improve our services. Please take a few minutes to complete the written survey that you may receive in the mail after you visit with us. Thank you!        Patient Information     Date Of Birth          1960        Designated Caregiver       Most Recent Value    Caregiver    Will someone help with your care after discharge? yes    Name of designated caregiver Lam spouse    Phone number of caregiver     Caregiver address [place]      About your hospital stay     You were admitted on:  December 13, 2017 You last received care in the:  [place] [address] Ortho Specialty Unit    You were discharged on:  December 16, 2017        Reason for your hospital stay       S/P left total knee arthroplasty                  Who to Call     For medical emergencies, please call 911.  For non-urgent questions about your medical care, please call your primary care provider or clinic, [phone]  For questions related to your surgery, please call your surgery clinic        Attending Provider     Provider Specialty    Grady Alvarez MD Orthopedics       Primary Care Provider Office Phone # Fax #    Arabella Conner [phone]      After Care Instructions     Activity - Up ad hernán           Additional Discharge Instructions       Check BP's 3 times daily and keep log for TCU MD and PCP to follow. Patient's HCTZ was discontinued this stay due to hypotension, remains on ACEI. Further dosing per TCU MD.            Advance Diet as Tolerated       Follow this diet upon discharge: Regular            Fall precautions           General info for SNF       Length of Stay Estimate: Short Term  Care: Estimated # of Days <30  Condition at Discharge: Improving  Level of care:skilled   Rehabilitation Potential: Excellent  Admission H&P remains valid and up-to-date: Yes  Recent Chemotherapy: N/A  Use Nursing Home Standing Orders: Yes            Mantoux instructions       Give two-step Mantoux (PPD) Per Facility Policy Yes            Weight bearing status       WBAT            Wound care (specify)       Site:   Left knee  Instructions:  Dry dressing changes daily. Pt is allergic to adhesive tape. Keep dressing in place using brandi hose                  Follow-up Appointments     Follow Up and recommended labs and tests       Follow up with Dr. Alvarez in clinic in 2 weeks.  Please call [phone] with questions.  F/u TCU MD, follow BP's and adjust meds as indicated.                  Additional Services     Occupational Therapy Adult Consult       Evaluate and treat as clinically indicated.    Reason:  Left total knee arthoplasty            Physical Therapy Adult Consult       Evaluate and treat as clinically indicated.     Reason:  Left total knee arthoplasty                  Future tests that were ordered for you     AntiEmbolism Stockings       Bilateral thigh length.On in the morning, off at night                  Further instructions from your care team       Patient will discharge to [facility] today via the [facility] and the [facility] and family between 15:30 and 16:00.  [facility]'s phone number is [phone].    Pending Results     Date and Time Order Name Status Description    12/14/2017 0908 EKG 12-lead, tracing only Preliminary             Statement of Approval     Ordered          12/16/17 0813  I have reviewed and agree with all the recommendations and orders detailed in this document.  EFFECTIVE NOW     Approved and electronically signed by:  Grady Alvarez MD             Admission Information     Date & Time Provider Department Dept. Phone    12/13/2017 Grady Alvarez MD [address]  "Specialty Unit [phone]      Your Vitals Were     Blood Pressure Pulse Temperature Respirations Height Weight    113/76 (BP Location: Left arm) 102 98.8  F (37.1  C) (Oral) 16 1.575 m (5' 2\") 62.6 kg (138 lb)    Pulse Oximetry BMI (Body Mass Index)                100% 25.24 kg/m2          [employer] Information     [employer] lets you send messages to your doctor, view your test results, renew your prescriptions, schedule appointments and more. To sign up, go to [email or web address] . Click on \"Log in\" on the left side of the screen, which will take you to the Welcome page. Then click on \"Sign up Now\" on the right side of the page.     You will be asked to enter the access code listed below, as well as some personal information. Please follow the directions to create your username and password.     Your access code is: [email or web address]  Expires: 3/15/2018  9:47 PM     Your access code will  in 90 days. If you need help or a new code, please call your [facility] clinic or [phone].        Care EveryWhere ID     This is your Care EveryWhere ID. This could be used by other organizations to access your [facility] medical records  [id number]        Equal Access to Services     THOMAS BACON AH: Johnny Mohan, waaxda luqadaha, qaybta kaalmada adetavo, sonny garcia . So [facility] [phone].    ATENCIÓN: Si habla español, tiene a alejandro disposición servicios gratuitos de asistencia lingüística. Llame al [phone].    We comply with applicable federal civil rights laws and [place] laws. We do not discriminate on the basis of race, color, national origin, age, disability, sex, sexual orientation, or gender identity.               Review of your medicines      START taking        Dose / Directions    bisacodyl 10 MG Suppository   Commonly known as:  DULCOLAX   Used for:  Drug-induced constipation        Dose:  10 mg   Place 1 suppository (10 mg) rectally daily as needed for " constipation   Quantity:  30 suppository   Refills:  0       diphenhydrAMINE 25 MG tablet   Commonly known as:  BENADRYL ALLERGY   Used for:  Allergic reaction, subsequent encounter        Dose:  25 mg   Take 1 tablet (25 mg) by mouth every 6 hours as needed for itching or allergies   Quantity:  56 tablet   Refills:  0       enoxaparin 40 MG/0.4ML injection   Commonly known as:  LOVENOX        Dose:  40 mg   Inject 0.4 mLs (40 mg) Subcutaneous every 24 hours for 11 days   Quantity:  4.4 mL   Refills:  0       hydrOXYzine 25 MG tablet   Commonly known as:  ATARAX        Dose:  25-50 mg   Take 1-2 tablets (25-50 mg) by mouth every 4 hours as needed (pain/muscle spasms)   Quantity:  50 tablet   Refills:  0       lisinopril 40 MG tablet   Commonly known as:  PRINIVIL/ZESTRIL   Used for:  Benign essential hypertension        Dose:  40 mg   Take 1 tablet (40 mg) by mouth daily   Quantity:  30 tablet   Refills:  0       oxyCODONE IR 5 MG tablet   Commonly known as:  ROXICODONE        Dose:  5-10 mg   Take 1-2 tablets (5-10 mg) by mouth every 3 hours as needed for moderate to severe pain   Quantity:  80 tablet   Refills:  0       senna-docusate 8.6-50 MG per tablet   Commonly known as:  SENOKOT-S;PERICOLACE        Dose:  1-2 tablet   Take 1-2 tablets by mouth 2 times daily   Quantity:  60 tablet   Refills:  0         CONTINUE these medicines which may have CHANGED, or have new prescriptions. If we are uncertain of the size of tablets/capsules you have at home, strength may be listed as something that might have changed.        Dose / Directions    acetaminophen 325 MG tablet   Commonly known as:  TYLENOL   This may have changed:    - medication strength  - how much to take  - reasons to take this        Dose:  650 mg   Take 2 tablets (650 mg) by mouth every 4 hours as needed for other (surgical pain)   Quantity:  100 tablet   Refills:  0         CONTINUE these medicines which have NOT CHANGED        Dose / Directions     albuterol 108 (90 BASE) MCG/ACT Inhaler   Commonly known as:  PROAIR HFA/PROVENTIL HFA/VENTOLIN HFA        Dose:  2 puff   Inhale 2 puffs into the lungs every 6 hours as needed for shortness of breath / dyspnea or wheezing   Refills:  0       ALLEGRA PO        Dose:  180 mg   Take 180 mg by mouth daily   Refills:  0       DAILY DENIZ MAXIMUM MULTIVITAMIN PO        Dose:  1 packet   Take 1 packet by mouth 3 times daily DOTERRA   Refills:  0       estradiol 0.1 MG/GM cream   Commonly known as:  ESTRACE        Dose:  2 g   Place 2 g vaginally daily   Refills:  0       FISH OIL PO        Dose:  1 capsule   Take 1 capsule by mouth daily   Refills:  0       fluticasone 50 MCG/ACT spray   Commonly known as:  FLONASE        Dose:  1 spray   Spray 1 spray into both nostrils daily   Refills:  0       fluticasone-salmeterol 500-50 MCG/DOSE diskus inhaler   Commonly known as:  ADVAIR        Dose:  1 puff   Inhale 1 puff into the lungs 2 times daily   Refills:  0       SINGULAIR PO        Dose:  10 mg   Take 10 mg by mouth At Bedtime   Refills:  0       SYNTHROID PO        Dose:  125 mcg   Take 125 mcg by mouth daily   Refills:  0         STOP taking     lisinopril-hydrochlorothiazide 20-12.5 MG per tablet   Commonly known as:  PRINZIDE/ZESTORETIC                Where to get your medicines      These medications were sent to [facility] - [address]  [address] [facility], [place] [address]     Phone:  [phone]     acetaminophen 325 MG tablet    enoxaparin 40 MG/0.4ML injection    hydrOXYzine 25 MG tablet    senna-docusate 8.6-50 MG per tablet         Some of these will need a paper prescription and others can be bought over the counter. Ask your nurse if you have questions.     Bring a paper prescription for each of these medications     oxyCODONE IR 5 MG tablet       You don't need a prescription for these medications     bisacodyl 10 MG Suppository    diphenhydrAMINE 25 MG tablet     lisinopril 40 MG tablet                Protect others around you: Learn how to safely use, store and throw away your medicines at www.disposemymeds.org.             Medication List: This is a list of all your medications and when to take them. Check marks below indicate your daily home schedule. Keep this list as a reference.      Medications           Morning Afternoon Evening Bedtime As Needed    acetaminophen 325 MG tablet   Commonly known as:  TYLENOL   Take 2 tablets (650 mg) by mouth every 4 hours as needed for other (surgical pain)   Last time this was given:  975 mg on 12/16/2017  6:25 AM                                albuterol 108 (90 BASE) MCG/ACT Inhaler   Commonly known as:  PROAIR HFA/PROVENTIL HFA/VENTOLIN HFA   Inhale 2 puffs into the lungs every 6 hours as needed for shortness of breath / dyspnea or wheezing   Last time this was given:  2 puffs on 12/14/2017 11:49 AM                                ALLEGRA PO   Take 180 mg by mouth daily   Last time this was given:  180 mg on 12/16/2017  8:40 AM                                bisacodyl 10 MG Suppository   Commonly known as:  DULCOLAX   Place 1 suppository (10 mg) rectally daily as needed for constipation   Last time this was given:  10 mg on 12/16/2017  1:51 PM                                DAILY DENIZ MAXIMUM MULTIVITAMIN PO   Take 1 packet by mouth 3 times daily DOTERRA                                diphenhydrAMINE 25 MG tablet   Commonly known as:  BENADRYL ALLERGY   Take 1 tablet (25 mg) by mouth every 6 hours as needed for itching or allergies                                enoxaparin 40 MG/0.4ML injection   Commonly known as:  LOVENOX   Inject 0.4 mLs (40 mg) Subcutaneous every 24 hours for 11 days   Last time this was given:  40 mg on 12/16/2017  8:40 AM                                estradiol 0.1 MG/GM cream   Commonly known as:  ESTRACE   Place 2 g vaginally daily                                FISH OIL PO   Take 1 capsule by mouth daily                                 fluticasone 50 MCG/ACT spray   Commonly known as:  FLONASE   Spray 1 spray into both nostrils daily                                fluticasone-salmeterol 500-50 MCG/DOSE diskus inhaler   Commonly known as:  ADVAIR   Inhale 1 puff into the lungs 2 times daily                                hydrOXYzine 25 MG tablet   Commonly known as:  ATARAX   Take 1-2 tablets (25-50 mg) by mouth every 4 hours as needed (pain/muscle spasms)   Last time this was given:  25 mg on 12/15/2017  8:03 AM                                lisinopril 40 MG tablet   Commonly known as:  PRINIVIL/ZESTRIL   Take 1 tablet (40 mg) by mouth daily   Last time this was given:  40 mg on 12/16/2017  8:40 AM                                oxyCODONE IR 5 MG tablet   Commonly known as:  ROXICODONE   Take 1-2 tablets (5-10 mg) by mouth every 3 hours as needed for moderate to severe pain   Last time this was given:  10 mg on 12/16/2017  3:43 PM                                senna-docusate 8.6-50 MG per tablet   Commonly known as:  SENOKOT-S;PERICOLACE   Take 1-2 tablets by mouth 2 times daily   Last time this was given:  2 tablets on 12/16/2017  8:40 AM                                SINGULAIR PO   Take 10 mg by mouth At Bedtime   Last time this was given:  10 mg on 12/15/2017  9:21 PM                                SYNTHROID PO   Take 125 mcg by mouth daily   Last time this was given:  125 mcg on 12/16/2017  6:25 AM

## 2017-12-13 NOTE — IP AVS SNAPSHOT
"` `     [place] [address] [facility]: [phone]                                              INTERAGENCY TRANSFER FORM - NURSING   2017                    Hospital Admission Date: 2017  LOREN GAYLE   : 1960  Sex: Female        Attending Provider: Grady Alvarez MD     Allergies:  Adhesive Tape, Erythromycin    Infection:  None   Service:  SURGERY    Ht:  1.575 m (5' 2\")   Wt:  62.6 kg (138 lb)   Admission Wt:  62.6 kg (138 lb)    BMI:  25.24 kg/m 2   BSA:  1.65 m 2            Patient PCP Information     Provider PCP Type    Arabella Conner MD General      Current Code Status     Date Active Code Status Order ID Comments User Context       Prior      Code Status History     Date Active Date Inactive Code Status Order ID Comments User Context    2014  7:33 AM  Full Code [id number]  Dari Benito MD Outpatient    2014  3:58 PM 2014  7:33 AM Full Code [id number]  Dari Benito MD Inpatient      Advance Directives        Does patient have a scanned Advance Directive/ACP document in EPIC?           No        Hospital Problems as of 2017              Priority Class Noted POA    S/P total knee arthroplasty, left Medium  2017 Yes      Non-Hospital Problems as of 2017              Priority Class Noted    S/P complete thyroidectomy Medium  2014    S/P total thyroidectomy Medium  2014    S/P total knee replacement using cement, right Medium  8/10/2017    S/P total knee arthroplasty, right Medium  2017    Benign essential hypertension Medium  8/15/2017    Slow transit constipation Medium  8/15/2017    Physical deconditioning Medium  8/15/2017    Advance Care Planning Medium  2017      Immunizations     None         END      ASSESSMENT     Discharge Profile Flowsheet     EXPECTED DISCHARGE     Passing flatus  yes 17 1432    Expected Discharge Date  17 (tcu) 12/15/17 1518   COMMUNICATION ASSESSMENT      DISCHARGE NEEDS " ASSESSMENT     Patient's communication style  spoken language (English or Bilingual) 12/13/17 0733    Concerns To Be Addressed  all concerns addressed in this encounter 12/12/17 1134   FINAL RESOURCES      Equipment Currently Used at Home  -- (has standard walker) 12/13/17 1614   Resources List  Skilled Nursing Facility 12/16/17 1137    Transportation Available  car;family or friend will provide 12/13/17 1614   Other Resources  -- (N/A) 08/29/17 1122    # of Referrals Placed by CTS  Post Acute Facilities 12/16/17 1137   Skilled Nursing Facility  [facility] ([facility]) [phone], Fax:[phone] 12/16/17 1137    FUNCTIONAL LEVEL CURRENT     PAS Number  -- (N/A) 08/29/17 1124    Ambulation  3 - assistive equipment and person 12/14/17 0603   Senior Linkage Line Referral Placed  -- (N/A) 08/29/17 1124    Transferring  0 - independent 12/13/17 0741   F/U Appointment Brochure Provided  -- (N/A) 08/29/17 1124    Toileting  0 - independent 12/13/17 0741   Referrals Placed  -- (N/a) 08/29/17 1124    Bathing  0 - independent 12/13/17 0741   SKIN      Dressing  0 - independent 12/13/17 0741   Inspection of bony prominences  Full 12/16/17 1432    Eating  0 - independent 12/13/17 0741   Full except areas not inspected   -- (surgical) 12/13/17 1824    Communication  0 - understands/communicates without difficulty 12/13/17 0741   Inspection under devices  Full 12/16/17 1432    Swallowing  0 - swallows foods/liquids without difficulty 12/13/17 0741   Not Inspected under devices.  Compression wrap 12/15/17 0254    Change in Functional Status Since Onset of Current Illness/Injury  yes 12/13/17 0741   Skin WDL  ex 12/16/17 1432    GASTROINTESTINAL (ADULT,PEDIATRIC,OB)     Skin Integrity  incision(s);rash(es) (rash from surgical tape. MD aware) 12/16/17 1432    GI WDL  ex 12/16/17 1432   SAFETY      All Quadrants Bowel Sounds  audible and normoactive 12/16/17 1432   Safety WDL  WDL 12/16/17 1514    Last Bowel Movement  12/12/17  "12/16/17 1432   All Alarms  alarm(s) activated and audible 12/16/17 1514    GI Signs/Symptoms  constipation 12/16/17 1432                      Assessment WDL (Within Defined Limits) Definitions           Safety WDL     Effective: 09/28/15    Row Information: <b>WDL Definition:</b> Bed in low position, wheels locked; call light in reach; upper side rails up x 2; ID band on<br> <font color=\"gray\"><i>Item=AS safety wdl>>List=AS safety wdl>>Version=F14</i></font>      Skin WDL     Effective: 09/28/15    Row Information: <b>WDL Definition:</b> Warm; dry; intact; elastic; without discoloration; pressure points without redness<br> <font color=\"gray\"><i>Item=AS skin wdl>>List=AS skin wdl>>Version=F14</i></font>      Vitals     Vital Signs Flowsheet     VITAL SIGNS     Pain Descriptors  Aching 12/16/17 0137    Temp  98.8  F (37.1  C) 12/16/17 0754   Pain Management Interventions  analgesia administered 12/16/17 0629    Temp src  Oral 12/16/17 0754   Pain Intervention(s)  Medication (See eMAR) 12/16/17 1341    Resp  16 12/16/17 1342   Response to Interventions  Decrease in pain 12/16/17 1342    Pulse  102 12/15/17 1159   ANALGESIA SIDE EFFECTS MONITORING      Heart Rate  98 12/16/17 0754   Side Effects Monitoring: Respiratory Quality  R 12/16/17 1342    Pulse/Heart Rate Source  Monitor 12/16/17 0754   Side Effects Monitoring: Respiratory Depth  N 12/16/17 1342    BP  113/76 12/16/17 0754   Side Effects Monitoring: Sedation Level  1 12/16/17 1342    BP Location  Left arm 12/16/17 0754   HEIGHT AND WEIGHT      Patient Position  Sitting 12/13/17 0726   Height  1.575 m (5' 2\") 12/13/17 0726    OXYGEN THERAPY     Weight  62.6 kg (138 lb) 12/13/17 0726    SpO2  100 % 12/16/17 0754   BSA (Calculated - sq m)  1.65 12/13/17 0726    O2 Device  None (Room air) 12/16/17 0754   BMI (Calculated)  25.29 12/13/17 0726    Oxygen Delivery  2 LPM 12/14/17 1131   ECG      RESPIRATORY MONITORING     ECG Rhythm  Normal sinus rhythm 12/13/17 1017 "    Respiratory Monitoring (EtCO2)  49 mmHg 12/14/17 1131   Ectopy  None 12/13/17 1017    Integrated Pulmonary Index (IPI)  7 12/14/17 1131   POSITIONING      PAIN/COMFORT     Body Position  independently positioning 12/16/17 1514    Patient Currently in Pain  yes 12/16/17 1341   Head of Bed (HOB)  HOB at 20-30 degrees 12/16/17 1514    Preferred Pain Scale  number (Numeric Rating Pain Scale) 12/16/17 1341   Positioning/Transfer Devices  pillows;in use 12/16/17 0134    Patient's Stated Pain Goal  No pain 12/13/17 2224   DAILY CARE      0-10 Pain Scale  6 12/16/17 1544   Activity Management  activity encouraged;activity adjusted per tolerance 12/16/17 1514    Word Pain Scale  4 12/13/17 0950   Activity Assistance Provided  assistance, 1 person 12/16/17 1514    Pain Location  Knee 12/16/17 0137   Assistive Device Utilized  gait belt;walker 12/16/17 1514    Pain Orientation  Right 12/16/17 0137                 Patient Lines/Drains/Airways Status    Active LINES/DRAINS/AIRWAYS     Name: Placement date: Placement time: Site: Days: Last dressing change:    Peripheral IV 12/13/17 Left Upper forearm 12/13/17   0640   Upper forearm   3     Incision/Surgical Site 08/10/17 Right Knee 08/10/17   1215    128     Incision/Surgical Site 12/13/17 Left Knee 12/13/17   0818    3             Patient Lines/Drains/Airways Status    Active PICC/CVC     None            Intake/Output Detail Report     Date Intake     Output   Net    Shift P.O. I.V. IV Piggyback Total Urine Blood Total       Day 12/15/17 0700 - 12/15/17 1459 480 -- -- 480 2100 -- 2100 -1620    Nisha 12/15/17 1500 - 12/15/17 2259 400 -- -- 400 650 -- 650 -250    Noc 12/15/17 2300 - 12/16/17 0659 360 -- -- 360 -- -- -- 360    Day 12/16/17 0700 - 12/16/17 1459 -- -- -- -- -- -- -- 0    Nisha 12/16/17 1500 - 12/16/17 2259 -- -- -- -- -- -- -- 0      Last Void/BM       Most Recent Value    Urine Occurrence 1 at 12/16/2017 1100    Stool Occurrence       Case Management/Discharge  Planning     Case Management/Discharge Planning Flowsheet     REFERRAL INFORMATION     DISCHARGE PLANNING      Did the Initial Social Work Assessment result in a Social Work Case?  Yes 12/14/17 1423   Transportation Available  car;family or friend will provide 12/13/17 1614    Admission Type  inpatient 12/14/17 1423   FINAL NOTE      Arrived From  home or self-care 12/14/17 1423   Final Note  D/C to [facility] 12/16/17 1137    Referral Source  nursing 12/14/17 1423   FINAL RESOURCES      # of Referrals Placed by CTS  Post Acute Facilities 12/16/17 1137   Equipment Currently Used at Home  -- (has standard walker) 12/13/17 1614    Post Acute Facilities  TCU 12/16/17 1137   Resources List  Skilled Nursing Facility 12/16/17 1137    Reason For Consult  discharge planning 12/16/17 1137   Other Resources  -- (N/A) 08/29/17 1122    Record Reviewed  medical record 12/16/17 1137   Skilled Nursing Facility  [facility] ([facility]) [phone], Fax:[phone] 12/16/17 1137    CTS Assigned to Case  Brandy Mack 12/16/17 1137   PAS Number  -- (N/A) 08/29/17 1124    LIVING ENVIRONMENT     Senior Linkage Line Referral Placed  -- (N/A) 08/29/17 1124    Lives With  spouse 12/14/17 1423   F/U Appointment Brochure Provided  -- (N/A) 08/29/17 1124    Living Arrangements  house 12/14/17 1423   Referrals Placed  -- (N/a) 08/29/17 1124    Able to Return to Prior Living Arrangements  no 12/14/17 1423   ABUSE RISK SCREEN      COPING/STRESS     QUESTION TO PATIENT:  Has a member of your family or a partner(now or in the past) intimidated, hurt, manipulated, or controlled you in any way?  no 12/13/17 0724    Major Change/Loss/Stressor  medical condition/diagnosis 12/13/17 0733   QUESTION TO PATIENT: Do you feel safe going back to the place where you are living?  yes 12/13/17 0724    EXPECTED DISCHARGE     OBSERVATION: Is there reason to believe there has been maltreatment of a vulnerable adult (ie. Physical/Sexual/Emotional abuse,  self neglect, lack of adequate food, shelter, medical care, or financial exploitation)?  no 12/13/17 0724    Expected Discharge Date  12/16/17 (tcu) 12/15/17 1518   (R) MENTAL HEALTH SUICIDE RISK      ASSESSMENT/CONCERNS TO BE ADDRESSED     Are you depressed or being treated for depression?  No 12/13/17 0733    Concerns To Be Addressed  all concerns addressed in this encounter 12/12/17 8649

## 2017-12-13 NOTE — ANESTHESIA PREPROCEDURE EVALUATION
Procedure: Procedure(s):  ARTHROPLASTY KNEE  Preop diagnosis: LEFT KNEE OSTEOARTHRITIS    Allergies   Allergen Reactions     Adhesive Tape      Erythromycin Nausea and Vomiting     Pills cause vomiting,      Past Medical History:   Diagnosis Date     Arthritis     osteoarthritis of both knee     Hypertension      PONV (postoperative nausea and vomiting)      Thyroid disease      Uncomplicated asthma      Past Surgical History:   Procedure Laterality Date     ABDOMEN SURGERY  ,         ARTHROPLASTY KNEE Right 8/10/2017    Procedure: ARTHROPLASTY KNEE;  RIGHT TOTAL KNEE ARTHROPLASTY ;  Surgeon: Grady Alvarez MD;  Location:  OR     BACK SURGERY      spinal fusion     ENT SURGERY      tonsilectomy     GYN SURGERY  13    hysterectomy     ORTHOPEDIC SURGERY      sinal fusion,hand     thumb mass resection       THYROIDECTOMY  2014    Procedure: THYROIDECTOMY;  Surgeon: Krzysztof Marquez MD;  Location: [facility]     Social History   Substance Use Topics     Smoking status: Never Smoker     Smokeless tobacco: Never Used     Alcohol use Yes      Comment: 1-2 per month     Prior to Admission medications    Medication Sig Start Date End Date Taking? Authorizing Provider   Acetaminophen (TYLENOL PO) Take 325-650 mg by mouth every 4 hours as needed for mild pain or fever   Yes Reported, Patient   estradiol (ESTRACE) 0.1 MG/GM cream Place 2 g vaginally daily   Yes Reported, Patient   fluticasone-salmeterol (ADVAIR) 500-50 MCG/DOSE diskus inhaler Inhale 1 puff into the lungs 2 times daily   Yes Reported, Patient   Omega-3 Fatty Acids (FISH OIL PO) Take 1 capsule by mouth daily   Yes Reported, Patient   Multiple Vitamins-Minerals (DAILY DENIZ MAXIMUM MULTIVITAMIN PO) Take 1 packet by mouth 3 times daily DOTERRA   Yes Reported, Patient   albuterol (PROAIR HFA/PROVENTIL HFA/VENTOLIN HFA) 108 (90 BASE) MCG/ACT Inhaler Inhale 2 puffs into the lungs every 6 hours as needed for shortness of breath /  dyspnea or wheezing   Yes Reported, Patient   Levothyroxine Sodium (SYNTHROID PO) Take 125 mcg by mouth daily    Yes Reported, Patient   fluticasone (FLONASE) 50 MCG/ACT spray Spray 1 spray into both nostrils daily    Yes Reported, Patient   Fexofenadine HCl (ALLEGRA PO) Take 180 mg by mouth daily   Yes Reported, Patient   lisinopril-hydrochlorothiazide (PRINZIDE,ZESTORETIC) 20-12.5 MG per tablet Take 2 tablets by mouth daily    Yes Reported, Patient   Montelukast Sodium (SINGULAIR PO) Take 10 mg by mouth At Bedtime    Yes Reported, Patient     Current Facility-Administered Medications Ordered in Epic   Medication Dose Route Frequency Last Rate Last Dose     ceFAZolin (ANCEF) intermittent infusion 2 g in 100 mL dextrose PRE-MIX  2 g Intravenous Pre-Op/Pre-procedure x 1 dose         ceFAZolin (ANCEF) 1 g vial to attach to  ml bag for ADULT or 50 ml bag for PEDS  1 g Intravenous See Admin Instructions         lidocaine 1 % 1 mL  1 mL Other Q1H PRN         lactated ringers infusion   Intravenous Continuous         No current [facility]-ordered outpatient prescriptions on file.       lactated ringers       Wt Readings from Last 1 Encounters:   08/22/17 68.3 kg (150 lb 9.6 oz)     Temp Readings from Last 1 Encounters:   08/22/17 36.6  C (97.9  F)     BP Readings from Last 6 Encounters:   08/22/17 101/71   08/17/17 119/79   08/16/17 95/56   08/15/17 105/70   08/14/17 123/72   05/14/14 119/75     Pulse Readings from Last 4 Encounters:   08/22/17 98   08/17/17 88   08/16/17 84   08/15/17 86     Resp Readings from Last 1 Encounters:   08/22/17 16     SpO2 Readings from Last 1 Encounters:   08/22/17 97%     Recent Labs   Lab Test  08/13/17   0618  08/12/17   0558  08/11/17   0636  08/10/17   1820   05/14/14   0730   POTASSIUM  4.2   --   4.6   --    < >   --    GLC   --   132*  129*   --    --   115*   CR  0.66   --    --   0.60   --    --    SIXTO   --    --    --    --    --   7.5*    < > = values in this interval not  displayed.     No results for input(s): AST, ALT, ALKPHOS, BILITOTAL, LIPASE in the last 31606 hours.  Recent Labs   Lab Test  08/13/17   0618  08/12/17   0558  08/11/17   0636  08/10/17   1820   HGB   --   10.3*  10.7*   --    PLT  307   --    --   300         Anesthesia Evaluation     .             ROS/MED HX    ENT/Pulmonary:     (+)allergic rhinitis, asthma , . .    Neurologic:       Cardiovascular:     (+) hypertension----. : . . . :. .       METS/Exercise Tolerance:     Hematologic:         Musculoskeletal:   (+) arthritis, , , -       GI/Hepatic:        (-) GERD and liver disease   Renal/Genitourinary:      (-) renal disease   Endo:     (+) thyroid problem hypothyroidism, .      Psychiatric:         Infectious Disease:         Malignancy:         Other:                     Physical Exam  Normal systems: dental    Airway   Mallampati: III  TM distance: <3 FB  Neck ROM: full    Dental     Cardiovascular   Rhythm and rate: regular      Pulmonary    breath sounds clear to auscultation                    Anesthesia Plan      History & Physical Review  History and physical reviewed and following examination; no interval change.    ASA Status:  2 .    NPO Status:  > 8 hours    Plan for General, LMA, Peripheral Nerve Block and Periph. Nerve Block for postop pain   PONV prophylaxis:  Ondansetron (or other 5HT-3) and Dexamethasone or Solumedrol  Propofol gtt     Decadron, zofran    Patient requests general anesthesia.   H/o of spinal fusion with rods       Postoperative Care      Consents  Anesthetic plan, risks, benefits and alternatives discussed with:  Patient..                          .

## 2017-12-13 NOTE — ANESTHESIA PROCEDURE NOTES
Peripheral nerve/Neuraxial procedure note : femoral and Adductor canal  Pre-Procedure  Performed by JOSE LUJAN  Location: pre-op      Pre-Anesthestic Checklist: patient identified, IV checked, site marked, risks and benefits discussed, informed consent, monitors and equipment checked, at physician/surgeon's request and post-op pain management    Timeout  Correct Patient: Yes   Correct Procedure: Yes   Correct Site: Yes   Correct Laterality: Yes   Correct Position: Yes   Site Marked: Yes   .   Procedure Documentation    .    Procedure:    Femoral and Adductor canal.  Local skin infiltrated with 5 mL of 1% lidocaine.     Ultrasound used to identify targeted nerve, plexus, or vascular marker and placed a needle adjacent to it., Ultrasound was used to visualize the spread of the anesthetic in close proximity to the above stated nerve. A permanent image is entered into the patient's record.  Patient Prep;mask, sterile gloves, chlorhexidine gluconate and isopropyl alcohol, patient draped.  .  Needle: insulated, short bevel Needle Gauge: 17.    Needle Length (Inches) 2  .   Catheter: 20 G . .  Catheter threaded easily  .  .   .    Assessment/Narrative  Paresthesias: No.  .  The placement was negative for: blood aspirated, painful injection and site bleeding.  Bolus given via needle. No blood aspirated via catheter.   Secured via Tunneling.   Complications: none. Comments:  Continuous Femoral Nerve Block  25 ml 0.5% Bupivacaine with 1:400,000 Epinephrine    The surgeon has given a verbal order transferring care of this patient to me for the performance of a regional analgesia block for post-op pain control. It is requested of me because I am uniquely trained and qualified to perform this block and the surgeon is neither trained nor qualified to perform this procedure.

## 2017-12-13 NOTE — IP AVS SNAPSHOT
[place] [id number] [facility]: [phone]            Medication Administration Report for Aleena Ontiveros as of 12/16/17 9474   Legend:    Given Hold Not Given Due Canceled Entry Other Actions    Time Time (Time) Time  Time-Action       Inactive    Active    Linked        Medications 12/10/17 12/11/17 12/12/17 12/13/17 12/14/17 12/15/17 12/16/17    acetaminophen (TYLENOL) tablet 650 mg  Dose: 650 mg Freq: EVERY 4 HOURS PRN Route: PO  PRN Reason: other  PRN Comment: surgical pain  Start: 12/16/17 0000   Admin Instructions: May give first dose 4 hours after last scheduled dose of acetaminophen  Maximum acetaminophen dose from all sources = 75 mg/kg/day not to exceed 4 grams/day.               albuterol (PROAIR HFA/PROVENTIL HFA/VENTOLIN HFA) Inhaler 2 puff  Dose: 2 puff Freq: EVERY 6 HOURS PRN Route: IN  PRN Reasons: shortness of breath / dyspnea,wheezing  Start: 12/13/17 1124        1149 (2 puff)-Given             benzocaine-menthol (CHLORASEPTIC) 6-10 MG lozenge 1-2 lozenge  Dose: 1-2 lozenge Freq: EVERY 1 HOUR PRN Route: BU  PRN Reason: sore throat  PRN Comment: sore throat without fever  Start: 12/13/17 1124              bisacodyl (DULCOLAX) Suppository 10 mg  Dose: 10 mg Freq: DAILY PRN Route: RE  PRN Reason: constipation  Start: 12/16/17 1316          1351 (10 mg)-Given           diphenhydrAMINE (BENADRYL) capsule 25 mg  Dose: 25 mg Freq: EVERY 6 HOURS PRN Route: PO  PRN Reasons: itching,allergies  Start: 12/15/17 1250         1341 (25 mg)-Given       2127 (25 mg)-Given        0853 (25 mg)-Given       1543 (25 mg)-Given           enoxaparin (LOVENOX) injection 40 mg  Dose: 40 mg Freq: EVERY 24 HOURS Route: SC  Start: 12/14/17 0800   Admin Instructions: Check to make sure start date/time is 12-24 hours post op unless documented complication, AND no sooner than 22 hours post op if spinal anesthesia used.   Continue until discharge to home. HOLD if platelet count falls below 50% of baseline  or less than 100,000/ L and notify provider.         0943 (40 mg)-Given        0803 (40 mg)-Given        0840 (40 mg)-Given           fexofenadine (ALLEGRA) tablet 180 mg  Dose: 180 mg Freq: DAILY Route: PO  Start: 12/13/17 1130       (1151)-Not Given [C]        0943 (180 mg)-Given        0803 (180 mg)-Given        0840 (180 mg)-Given           fluticasone-vilanterol (BREO ELLIPTA) 200-25 MCG/INH oral inhaler 1 puff  Dose: 1 puff Freq: DAILY Route: IN  Start: 12/14/17 0900   Admin Instructions: *Do not use more frequently than once daily.*  Rinse mouth after use.<br>Formulary alternate for Advair         0840 (1 puff)-Given        (0842)-Not Given        [ ] 0900           HYDROmorphone (PF) (DILAUDID) injection 0.3-0.5 mg  Dose: 0.3-0.5 mg Freq: EVERY 2 HOURS PRN Route: IV  PRN Reason: severe pain  PRN Comment: or patient unable to take PO  Start: 12/13/17 1124   Admin Instructions: Hold while on PCA..  For ordered doses up to 4 mg give IV Push undiluted. Administer each 2mg over 2-5 minutes.        1449 (0.5 mg)-Given        0024 (0.5 mg)-Given             hydrOXYzine (ATARAX) tablet 25 mg  Dose: 25 mg Freq: EVERY 6 HOURS PRN Route: PO  PRN Reason: itching  Start: 12/13/17 1124   Admin Instructions: Caution to be used when administering multiple Central Nervous System (CNS) depressing meds within a short time frame.         0024 (25 mg)-Given       1852 (25 mg)-Given        0803 (25 mg)-Given            levothyroxine (SYNTHROID/LEVOTHROID) tablet 125 mcg  Dose: 125 mcg Freq: EVERY MORNING BEFORE BREAKFAST Route: PO  Start: 12/14/17 0730        0640 (125 mcg)-Given        0905 (125 mcg)-Given        0625 (125 mcg)-Given       (0947)-Not Given [C]           lidocaine (LMX4) cream  Freq: EVERY 1 HOUR PRN Route: Top  PRN Reason: pain  PRN Comment: with VAD insertion or accessing implanted port.  Start: 12/13/17 1124   Admin Instructions: Do NOT give if patient has a history of allergy to any local anesthetic or any  "\"ricky\" product.   Apply 30 minutes prior to VAD insertion or port access.  MAX Dose:  2.5 g (  of 5 g tube)               lidocaine 1 % 1 mL  Dose: 1 mL Freq: EVERY 1 HOUR PRN Route: OTHER  PRN Comment: mild pain with VAD insertion or accessing implanted port  Start: 12/13/17 1124   Admin Instructions: Do NOT give if patient has a history of allergy to any local anesthetic or any \"ricky\" product. MAX dose 1 mL subcutaneous OR intradermal in divided doses.               lisinopril (PRINIVIL/ZESTRIL) tablet 40 mg  Dose: 40 mg Freq: DAILY Route: PO  Start: 12/15/17 1030   Admin Instructions: Hold for SBP < 100          1039 (40 mg)-Given        0840 (40 mg)-Given           metoclopramide (REGLAN) tablet 10 mg  Dose: 10 mg Freq: EVERY 6 HOURS PRN Route: PO  PRN Reasons: nausea,vomiting  Start: 12/13/17 1124   Admin Instructions: This is Step 3 of nausea and vomiting management.  Give if nausea not resolved 15 minutes after giving prochlorperazine (COMPAZINE).  If nausea not resolved in 15-30 minutes, Notify provider.              Or  metoclopramide (REGLAN) injection 10 mg  Dose: 10 mg Freq: EVERY 6 HOURS PRN Route: IV  PRN Reasons: nausea,vomiting  Start: 12/13/17 1124   Admin Instructions: This is Step 3 of nausea and vomiting management.  Give if nausea not resolved 15 minutes after giving prochlorperazine (COMPAZINE).  If nausea not resolved in 15-30 minutes, Notify provider.  Avoid use if patient has full bowel obstruction or perforation. Irritant. For ordered doses up to 10 mg, give IV Push undiluted over 2 minutes.               montelukast (SINGULAIR) tablet 10 mg  Dose: 10 mg Freq: AT BEDTIME Route: PO  Start: 12/13/17 2200       2145 (10 mg)-Given        2138 (10 mg)-Given        2121 (10 mg)-Given        [ ] 2200           naloxone (NARCAN) injection 0.1-0.4 mg  Dose: 0.1-0.4 mg Freq: EVERY 2 MIN PRN Route: IV  PRN Reason: opioid reversal  Start: 12/13/17 1124   Admin Instructions: For respiratory rate LESS " than or EQUAL to 8.  Partial reversal dose:  0.1 mg titrated q 2 minutes for Analgesia Side Effects Monitoring Sedation Level of 3 (frequently drowsy, arousable, drifts to sleep during conversation).Full reversal dose:  0.4 mg bolus for Analgesia Side Effects Monitoring Sedation Level of 4 (somnolent, minimal or no response to stimulation).  For ordered doses up to 2mg give IVP. Give each 0.4mg over 15 seconds in emergency situations. For non-emergent situations further dilute in 9mL of NS to facilitate titration of response.               ondansetron (ZOFRAN-ODT) ODT tab 4 mg  Dose: 4 mg Freq: EVERY 6 HOURS PRN Route: PO  PRN Reasons: nausea,vomiting  Start: 12/13/17 1124   Admin Instructions: This is Step 1 of nausea and vomiting management.  If nausea not resolved in 15 minutes, go to Step 2 prochlorperazine (COMPAZINE). Do not push through foil backing. Peel back foil and gently remove. Place on tongue immediately. Administration with liquid unnecessary                            Or  ondansetron (ZOFRAN) injection 4 mg  Dose: 4 mg Freq: EVERY 6 HOURS PRN Route: IV  PRN Reasons: nausea,vomiting  Start: 12/13/17 1124   Admin Instructions: This is Step 1 of nausea and vomiting management.  If nausea not resolved in 15 minutes, go to Step 2 prochlorperazine (COMPAZINE).  Irritant. For ordered doses up to 4 mg, give IV Push undiluted over 2-5 minutes.        1742 (4 mg)-Given        1351 (4 mg)-Given             oxyCODONE IR (ROXICODONE) tablet 5-10 mg  Dose: 5-10 mg Freq: EVERY 3 HOURS PRN Route: PO  PRN Reason: moderate to severe pain  Start: 12/13/17 1124   Admin Instructions: IF CrCl is UNKNOWN start at lowest end of dosing range. Hold while on PCA or with regular IV opioid dosing.        2145 (5 mg)-Given       2218 (5 mg)-Given        0323 (10 mg)-Given       0651 (10 mg)-Given       1007 (5 mg)-Given       1338 (5 mg)-Given       1747 (5 mg)-Given       2144 (5 mg)-Given        0232 (5 mg)-Given       0609 (5  mg)-Given       0904 (10 mg)-Given       1441 (10 mg)-Given       1823 (10 mg)-Given        0124 (5 mg)-Given       0625 (10 mg)-Given       0947 (10 mg)-Given       1249 (10 mg)-Given       1543 (10 mg)-Given           polyethylene glycol (MIRALAX/GLYCOLAX) Packet 17 g  Dose: 17 g Freq: DAILY Route: PO  Start: 12/14/17 0900   Admin Instructions: 1 Packet = 17 grams. Mixed prescribed dose in 8 ounces of water. Follow with 8 oz. of water.         0943 (17 g)-Given        0803 (17 g)-Given        0841 (17 g)-Given           polyethylene glycol (MIRALAX/GLYCOLAX) Packet 17 g  Dose: 17 g Freq: 2 TIMES DAILY PRN Route: PO  PRN Reason: constipation  Start: 12/13/17 1535   Admin Instructions: 1 Packet = 17 grams. Mixed prescribed dose in 8 ounces of water. Follow with 8 oz. of water.          1823 (17 g)-Given        1210 (17 g)-Given           prochlorperazine (COMPAZINE) injection 10 mg  Dose: 10 mg Freq: EVERY 6 HOURS PRN Route: IV  PRN Reasons: nausea,vomiting  Start: 12/13/17 1124   Admin Instructions: This is Step 2 of nausea and vomiting management.   If nausea not resolved in 15 minutes, give metoclopramide (REGLAN), if ordered (step 3 of nausea and vomiting management)  For ordered doses up to 10 mg, give IV Push undiluted. Each 5mg over 1 minute.              Or  prochlorperazine (COMPAZINE) tablet 10 mg  Dose: 10 mg Freq: EVERY 6 HOURS PRN Route: PO  PRN Reasons: nausea,vomiting  Start: 12/13/17 1124   Admin Instructions: This is Step 2 of nausea and vomiting management.   If nausea not resolved in 15 minutes, give metoclopramide (REGLAN), if ordered (step 3 of nausea and vomiting management)               senna-docusate (SENOKOT-S;PERICOLACE) 8.6-50 MG per tablet 1-2 tablet  Dose: 1-2 tablet Freq: 2 TIMES DAILY Route: PO  Start: 12/13/17 1130   Admin Instructions: Start with 1 tablet PO BID, If no bowel movement in 24 hours, increase to 2 tablets PO BID.  Hold for loose stools.        (9813)-Not Given [C]        2144 (2 tablet)-Given        0943 (2 tablet)-Given       2138 (1 tablet)-Given        0803 (1 tablet)-Given       2121 (2 tablet)-Given        0840 (2 tablet)-Given       [ ] 2100           sodium chloride (PF) 0.9% PF flush 3 mL  Dose: 3 mL Freq: EVERY 8 HOURS Route: IK  Start: 12/13/17 1400   Admin Instructions: And Q1H PRN, to lock peripheral IV dormant line.        (1302)-Not Given        (0021)-Not Given       (0713)-Not Given       (1443)-Not Given       (2139)-Not Given        (0621)-Not Given       1342 (3 mL)-Given       [ ] 2200        0625 (3 mL)-Given       (1544)-Not Given       [ ] 2200           sodium chloride (PF) 0.9% PF flush 3 mL  Dose: 3 mL Freq: EVERY 1 HOUR PRN Route: IK  PRN Reason: line flush  PRN Comment: for peripheral IV flush post IV meds  Start: 12/13/17 1124             Completed Medications  Medications 12/10/17 12/11/17 12/12/17 12/13/17 12/14/17 12/15/17 12/16/17         Dose: 500 mL Freq: ONCE Route: IV  Last Dose: 500 mL (12/14/17 0842)  Start: 12/14/17 0830   End: 12/14/17 1042        0842 (500 mL)-New Bag               Dose: 975 mg Freq: EVERY 8 HOURS Route: PO  Start: 12/13/17 1400   End: 12/16/17 0625   Admin Instructions: Do not use if patient has an active opioid/acetaminophen analgesic order for pain  Maximum acetaminophen dose from all sources = 75 mg/kg/day not to exceed 4 grams/day.        1327 (975 mg)-Given       2144 (975 mg)-Given        0508 (975 mg)-Given       1338 (975 mg)-Given       2138 (975 mg)-Given        0609 (975 mg)-Given       1341 (975 mg)-Given       2121 (975 mg)-Given        0625 (975 mg)-Given             Dose: 1 g Freq: EVERY 8 HOURS Route: IV  Indications of Use: PERIOPERATIVE PHARMACOPROPHYLAXIS  Last Dose: 1 g (12/14/17 0310)  Start: 12/13/17 1600   End: 12/14/17 0340   Admin Instructions: First post-op dose due 8 hours after intra-op dose, see eMAR.        1737 (1 g)-New Bag        0310 (1 g)-New Bag            Discontinued  Medications  Medications 12/10/17 12/11/17 12/12/17 12/13/17 12/14/17 12/15/17 12/16/17         Rate: 125 mL/hr Freq: CONTINUOUS Route: IV  Start: 12/13/17 1130   End: 12/15/17 1012   Admin Instructions: Change to saline lock when PO well tolerated.        1125 ( )-New Bag       2219 ( )-New Bag        0021 ( )-Rate/Dose Verify       1546 ( )-Rate/Dose Change        0109 ( )-New Bag       0905 ( )-New Bag       1012-Med Discontinued          Dose: 2 tablet Freq: DAILY Route: PO  Start: 12/13/17 1130   End: 12/14/17 0837       1327 (2 tablet)-Given        0837-Med Discontinued           Dose: 10 mg Freq: EVERY 12 HOURS Route: PO  Start: 12/13/17 1800   End: 12/14/17 0912   Admin Instructions: Hold while on PCA or regular IV opioid dosing.  Start 12 hours after pre-op dose.        1737 (10 mg)-Given        0508 (10 mg)-Given       0912-Med Discontinued

## 2017-12-13 NOTE — BRIEF OP NOTE
[facility] Brief Operative Note    Pre-operative diagnosis: LEFT KNEE OSTEOARTHRITIS   Post-operative diagnosis same   Procedure: Procedure(s):  LEFT TOTAL KNEE ARTHROPLASTY - Wound Class: I-Clean   Surgeon(s): Surgeon(s) and Role:     * Grady Alvarez MD - Primary     * Elizabeth Merino PA-C - Assisting     * Kath White PA-C - Assisting   Estimated blood loss: 50 mL   Specimens: * No specimens in log *   Findings: See op report

## 2017-12-13 NOTE — PROGRESS NOTES
Admission medication history interview status for the 12/13/2017  admission is complete. See EPIC admission navigator for prior to admission medications     Medication history source reliability:Good    Medication history interview source(s):Patient    Medication history resources (including written lists, pill bottles, clinic record):None    [facility] pharmacy[facility]    Additional medication history information not noted on PTA med list :None    Time spent in this activity: 45 minutes    Prior to Admission medications    Medication Sig Last Dose Taking? Auth Provider   Acetaminophen (TYLENOL PO) Take 325-650 mg by mouth every 4 hours as needed for mild pain or fever 12/12/2017 at 1930 Yes Reported, Patient   estradiol (ESTRACE) 0.1 MG/GM cream Place 2 g vaginally daily 12/10/2017 Yes Reported, Patient   fluticasone-salmeterol (ADVAIR) 500-50 MCG/DOSE diskus inhaler Inhale 1 puff into the lungs 2 times daily 12/13/2017 at 0430 Yes Reported, Patient   Omega-3 Fatty Acids (FISH OIL PO) Take 1 capsule by mouth daily 12/6/2017 Yes Reported, Patient   Multiple Vitamins-Minerals (DAILY DENIZ MAXIMUM MULTIVITAMIN PO) Take 1 packet by mouth 3 times daily DOTERRA 12/6/2017 Yes Reported, Patient   albuterol (PROAIR HFA/PROVENTIL HFA/VENTOLIN HFA) 108 (90 BASE) MCG/ACT Inhaler Inhale 2 puffs into the lungs every 6 hours as needed for shortness of breath / dyspnea or wheezing MORE THAN A WEEK at PRN Yes Reported, Patient   Levothyroxine Sodium (SYNTHROID PO) Take 125 mcg by mouth daily  12/13/2017 at 0500 Yes Reported, Patient   fluticasone (FLONASE) 50 MCG/ACT spray Spray 1 spray into both nostrils daily  12/12/2017 at PM Yes Reported, Patient   Fexofenadine HCl (ALLEGRA PO) Take 180 mg by mouth daily 12/12/2017 at AM Yes Reported, Patient   lisinopril-hydrochlorothiazide (PRINZIDE,ZESTORETIC) 20-12.5 MG per tablet Take 2 tablets by mouth daily  12/12/2017 at AM Yes Reported, Patient   Montelukast Sodium (SINGULAIR PO) Take 10  mg by mouth At Bedtime  12/12/2017 at PM Yes Reported, Patient

## 2017-12-13 NOTE — ANESTHESIA CARE TRANSFER NOTE
Patient: Aleena Ontiveros    Procedure(s):  LEFT TOTAL KNEE ARTHROPLASTY - Wound Class: I-Clean    Diagnosis: LEFT KNEE OSTEOARTHRITIS  Diagnosis Additional Information: No value filed.    Anesthesia Type:   General, LMA     Note:  Airway :Face Mask  Patient transferred to:PACU  Comments: Pt to PACU. VSS. Report complete to RNHandoff Report: Identifed the Patient, Identified the Reponsible Provider, Reviewed the pertinent medical history, Discussed the surgical course, Reviewed Intra-OP anesthesia mangement and issues during anesthesia, Set expectations for post-procedure period and Allowed opportunity for questions and acknowledgement of understanding      Vitals: (Last set prior to Anesthesia Care Transfer)    CRNA VITALS  12/13/2017 0850 - 12/13/2017 0920      12/13/2017             Resp Rate (set): 10                Electronically Signed By: Brunilda Mack CRNA, JAZMIN JAKCSON  December 13, 2017  9:20 AM

## 2017-12-13 NOTE — IP AVS SNAPSHOT
` `     [place] [address] [facility]: [phone]                 INTERAGENCY TRANSFER FORM - NOTES (H&P, Discharge Summary, Consults, Procedures, Therapies)   2017                    Hospital Admission Date: 2017  ALEENA GAYLE   : 1960  Sex: Female        Patient PCP Information     Provider PCP Type    Arabella Conner MD General         History & Physicals      H&P signed by Luis Sheffield at 2017  8:41 AM      Author:  Luis Sheffield Service:  (none) Author Type:  Physician    Filed:  2017  8:41 AM Date of Service:  2017  8:38 AM Creation Time:  2017  8:41 AM    Status:  Signed :  Luis Sheffield (Physician)     Nettie on 2017  8:41 AM by Nettie, Provider : LUIGI TORRES, HISTORY AND PHY 2017 1          Revision History        User Key Date/Time User Provider Type Action    > [N/A] 2017  8:41 AM Nettie, Provider Physician Sign                  Discharge Summaries     No notes of this type exist for this encounter.         Consult Notes      Consults by Yaneli Kincaid APRN CNP at 2017  9:17 AM     Author:  Yaneli Kincaid APRN CNP Service:  Hospitalist Author Type:  Nurse Practitioner    Filed:  2017  5:55 PM Date of Service:  2017  9:17 AM Creation Time:  2017  9:17 AM    Status:  Attested :  Yaneli Kincaid APRN CNP (Nurse Practitioner)    Cosigner:  Salima Tello MD at 2017  5:58 PM         Consult Orders:    1. Hospitalist IP Consult: Patient to be seen: Routine - within 24 hours; hypotension; Consultant may enter orders: Yes [[id number]] ordered by Elizabeth Merino PA-C at 17 0822           Attestation signed by Salima Tello MD at 2017  5:58 PM        Physician Attestation   ISalima, have reviewed and discussed with the advanced practice provider their history, physical and plan for Aleena Conley Weston. I did not participate in a shared visit by interviewing  "or examining the patient and this should be billed as an advanced practice provider only visit.    Salima Tello  Date of Service (when I saw the patient): I did not personally see this patient today.                               [facility]    Hospitalist Consultation    Date of Admission:  12/13/2017    Assessment & Plan   Aleena Ontiveros is a 57 year old female who was admitted on 12/13/2017 status post left total knee arthroplasty by Dr. Alvarez. Surgery was performed under general anesthesia with LMA; EBL 50- ml. The Hospitalist Service is being consulted this morning for concern of hypotension, 84/52.    Hypotension, improving  Mrs. Ontiveros developed hypotension this morning. She endorses feeling \"off,\" though specifically denies headache, dizziness, chest pain, and shortness of breath. She has been receiving opiate pain medication: IV Dilaudid 0.5- mg at 0024, Oxycontin 12- hour at 1700 yesterday and 0500 today, and Oxycodone IR 10- mg at 2200 last evening, 0300, and 0700. A 500- cc IVF bolus had been started prior to my arrival and SBP 92 when I entered the room, improved to 97 with straight leg raise. UPO 1400- cc since midnight. She did not take her lisinopril yesterday or today. BP on pre-op exam 115/77 on lisinopril. On 12/4/2017 her TSH was 7.250 and levothyroxine was adjusted. She states she has never previously been hypotensive with stress of illness or surgery. Blood glucose 105. She is not febrile and is not tachycardic. She is on appropriate DVT prophylaxis. She is drowsy, but able to wake and provide accurate history and conversation. Her respiratory status is not compromised. Hemoglobin this AM 11.2, pre-operatively 13. EKG without obvious acute ischemia.This is likely related to the amount of opiate pain medication she has received.  - finish 500- cc bolus  - avoid further hypotension  - discontinue lisinopril   - discontinue Oxycontin 12- hour tablet  - cautious continued use of " IV dilaudid and oxycodone IR  - has not had adequate pain relief with tramadol in the past  - would avoid Toradol given episode of hypotension and appears GFR slightly per EMR review   - would not reverse with Narcan at this time as she appears well perfused, has adequate pain control, and respiratory status not compromised  - troponin now    Personal history of hypertension, treated  She states she has been on Prinizide for approximately 10- years. No cause of hypertension was identified. She has recently lost 45- pounds intentionally through eating the Whole30 diet. Pre-operative .   - discontinue Prinizide; consider not resuming and have her follow-up in one week with PCP to resume[SM1.1]    Concern for renal insufficiency  GFR on pre-operative labs with 73, today 106. Given hypotension today should take renal protective measures.[SM1.2]   - BMP now  - avoid NSAIDS, including Toradol  - optimize hydration, avoid hypotension  - avoid nephrotoxic agents, renal dosing of medications as appropriate     Status post thyroidectomy for precancerous thyroid nodules, 2013  Mrs. Ontiveros notes on 12/4/2017 TSH ws 7.250. Her levothyroxine dosing was adjusted and she received a dose today. She has never had issues with mentation or blood pressure after anesthesia or illness. She is not bradycardic and her mental status is sleepy, but she wakes easily and carries on conversation.  - continue levothyroxine  - monitor for altered mental status, bradycardia     DVT Prophylaxis: Defer to primary service  Code Status: Prior    Disposition: Pending orthopedic plan of care    The above assessment and plan has been discussed with Dr. Tello, who is in agreement with the above assessment and plan.     JAZMIN Vargas, CNP  Hospitalist Service, House Officer  [facility]     Text Page  Pager: [phone]    Reason for Consult   Reason for consult: I was asked by Elizabeth Merino PA-C to evaluate this patient  "for hypotension.    Primary Care Physician   Dr. Arabella Conner    History is obtained from the patient    History of Present Illness   Aleena Ontiveros is a 57 year old female who was admitted to [facility] on 2017 status post left total knee arthroplasty by Dr. Alvarez. Surgery was well tolerated with general anesthesia and LMA; EBL 50- ml. The Hospitalist Service was consulted this morning for concern of hypotension. A 500- cc IVF bolus was ordered by Orthopedic Surgery. Mrs. Ontiveros endorses feeling \"off,\" but specifically denies dizziness, headaches, chest pain, and shortness of breath. She states her TSH was recently 7.250 and levothyroxine dosing was titrated. She endorses a 10- year history of mild hypertension, which she feels is better with a 45- pound intentional weight loss since 2017.     At this time she is endorsing adequate pain control. She has received Oxycontin 12- hour x 2, Oxycodone IR 10- mg x 3, and Dilaudid 0.5- IV x 1 since 5: 00 PM last night.     Past Medical History    I personally reviewed history with patient:  - sinus infection, treated and now symptom free, 2017  - hypertension, treated  - asthma, treated  - post-operative hypothyroidism, treated    Past Surgical History   I personally reviewed history with patient:  - right total knee arthroplasty, 2017  - thyroidectomy, for precancerous nodules,   -  section x 2  - tonsillectomy and adenoidectomy  - spine fusion, Carpio rods  - total abdominal hysterectomy     Prior to Admission Medications   Prior to Admission Medications   Prescriptions Last Dose Informant Patient Reported? Taking?   Acetaminophen (TYLENOL PO) 2017 at 1930 Self Yes Yes   Sig: Take 325-650 mg by mouth every 4 hours as needed for mild pain or fever   Fexofenadine HCl (ALLEGRA PO) 2017 at AM Self Yes Yes   Sig: Take 180 mg by mouth daily   Levothyroxine Sodium (SYNTHROID PO) 2017 at 0500 Self Yes Yes   Sig: Take 125 mcg by " "mouth daily    Montelukast Sodium (SINGULAIR PO) 12/12/2017 at PM Self Yes Yes   Sig: Take 10 mg by mouth At Bedtime    Multiple Vitamins-Minerals (DAILY DENIZ MAXIMUM MULTIVITAMIN PO) 12/6/2017 Self Yes Yes   Sig: Take 1 packet by mouth 3 times daily DOTERRA   Omega-3 Fatty Acids (FISH OIL PO) 12/6/2017 Self Yes Yes   Sig: Take 1 capsule by mouth daily   albuterol (PROAIR HFA/PROVENTIL HFA/VENTOLIN HFA) 108 (90 BASE) MCG/ACT Inhaler MORE THAN A WEEK at PRN Self Yes Yes   Sig: Inhale 2 puffs into the lungs every 6 hours as needed for shortness of breath / dyspnea or wheezing   estradiol (ESTRACE) 0.1 MG/GM cream 12/10/2017 Self Yes Yes   Sig: Place 2 g vaginally daily   fluticasone (FLONASE) 50 MCG/ACT spray 12/12/2017 at PM Self Yes Yes   Sig: Spray 1 spray into both nostrils daily    fluticasone-salmeterol (ADVAIR) 500-50 MCG/DOSE diskus inhaler 12/13/2017 at 0430 Self Yes Yes   Sig: Inhale 1 puff into the lungs 2 times daily   lisinopril-hydrochlorothiazide (PRINZIDE,ZESTORETIC) 20-12.5 MG per tablet 12/12/2017 at AM Self Yes Yes   Sig: Take 2 tablets by mouth daily       Facility-Administered Medications: None     Allergies   Allergies   Allergen Reactions     Adhesive Tape      Erythromycin Nausea and Vomiting     Pills cause vomiting,        Social History   I personally reviewed history with patient:  - lives locally with her    - lifelong non-smoker  - occasional alcohol use  - denies drug use    Family History   I personally reviewed history with patient:  - several siblings with thyroid issues  - Dad: CHF, CAD, DM  - Mother: breast cancer     Review of Systems   The 10 point Review of Systems is negative other than noted in the HPI or here.     Physical Exam   Nursing Notes Reviewed.  BP (!) 85/49  Pulse 80  Temp 97.5  F (36.4  C) (Oral)  Resp 16  Ht 1.575 m (5' 2\")  Wt 62.6 kg (138 lb)  SpO2 99%  BMI 25.24 kg/m2     General: Appears stated age, no acute distress.  Skin:  Warm, dry. No rashes " or lesions on exposed skin.  HEENT:  Normocephalic, atraumatic.  Chest:  Bilateral anterior and posterior lung fields clear to auscultation. No increased work of breathing. Does require supplemental oxygen.  Cardiovascular:  Regular rate and rhythm, without murmur, rub, or gallop. Bilateral upper and lower distal pulses palpable.   Abdomen:  Soft, non-tender, non-distended. Bowel sounds present.   Musculoskeletal:  Moves all four extremities.   Neurological:  Alert and oriented x 4. Cranial nerves II-XII grossly intact. Sleepy, but able to wake with interaction and provide accurate history.  Psychiatric:  Affect and mood congruent.      Data   -Data reviewed today: All pertinent laboratory and imaging results from this encounter were reviewed. I personally reviewed the EKG tracing showing no acute ischemia..    Recent Labs  Lab 12/14/17  0658 12/13/17  0635   HGB 11.2*  --      --    POTASSIUM  --  3.3*       Imaging:  No results found for this or any previous visit (from the past 24 hour(s)).[SM1.1]             Revision History        User Key Date/Time User Provider Type Action    > SM1.2 12/14/2017  5:55 PM Yaneli Kincaid APRN CNP Nurse Practitioner Sign     SM1.1 12/14/2017  9:17 AM Yaneli Kincaid APRN CNP Nurse Practitioner                      Progress Notes - Physician (Notes from 12/12/17 through 12/15/17)      Progress Notes by Erin Araujo MSW at 12/15/2017 12:02 PM     Author:  Erin Araujo MSW Service:  (none) Author Type:      Filed:  12/15/2017  2:54 PM Date of Service:  12/15/2017 12:02 PM Creation Time:  12/15/2017 12:02 PM    Status:  Addendum :  Erin Araujo MSW ()         MICHAELLE    D: Spoke to Macie at [facility]. Patient was accepted for 12/16. Macie said the room will be available any time.     Phone: [phone]  Fax: [phone]    D: Continue to follow.[NO1.1]      PAS-RR    D: Per DHS regulation, MICHAELLE completed and submitted PAS-RR to [place]  Board on Aging Direct Connect via the Senior LinkAge Line.  PAS-RR confirmation # is : [id number]    I: SW spoke with patient and they are aware a PAS-RR has been submitted.  SW reviewed with patient that they may be contacted for a follow up appointment within 10 days of hospital discharge if their SNF stay is < 30 days.  Contact information for Senior LinkAge Line was also provided.    A: patient verbalized understanding.    P: Further questions may be directed to Senior LinkAge Line at #[phone], option #4 for PAS-RR staff.[NO1.2]       Revision History        User Key Date/Time User Provider Type Action    > NO1.2 12/15/2017  2:54 PM Erin Araujo MSW  Addend     NO1.1 12/15/2017 12:05 PM Erin Araujo MSW  Sign            Progress Notes by Arabella Jara MD at 12/15/2017 10:09 AM     Author:  Arabella Jara MD Service:  Hospitalist Author Type:  Physician    Filed:  12/15/2017 10:19 AM Date of Service:  12/15/2017 10:09 AM Creation Time:  12/15/2017 10:09 AM    Status:  Signed :  Arabella Jara MD (Physician)         [facility]    Hospitalist Progress Note    Date of Service (when I saw the patient): 12/15/2017    Assessment & Plan   Aleena Ontiveros is a 57 year old female who was admitted on 12/13/2017 status post left total knee arthroplasty by Dr. Alvarez. Surgery was performed under general anesthesia with LMA; EBL 50- ml. Hospitalist Service consulted for hypotension.     Hypotension, resolved  Due to narcotic side effect. Denied headache, dizziness, chest pain, and shortness of breath. She had been receiving opiate pain medication: as of 12/14 IV Dilaudid 0.5mg at 0024, Oxycontin 12- hour at 1700 12/13 and 0500 12/14, and Oxycodone IR 10- mg at 2200 12/13, 0300, and 0700. On 12/4/2017 her TSH was 7.250 and levothyroxine was adjusted. She states she has never previously been hypotensive with stress of illness or surgery. Blood  glucose 105. UOP adequate. She is not febrile and is not tachycardic. She is on appropriate DVT prophylaxis. Was drowsy, but able to wake and provide accurate history and conversation. Her respiratory status is not compromised. Hemoglobin was 11.2, pre-operatively 13. EKG without obvious acute ischemia. Discontinued Oxycontin 12- hour tablet. TnI negative x 3.  - improved with IVF and bolus, will discontinue as tolerating po intake  - Hgb 10.3 on 12/15, stable compared to 8/2017  - continue holding HCTZ  - resume PTA lisinopril 40mg daily today  - cautious continued use of IV dilaudid and oxycodone IR     Personal history of hypertension, treated  She states she has been on Prinizide for approximately 10- years. No cause of hypertension was identified. She has recently lost 45- pounds intentionally through eating the Whole30 diet. Pre-operative .   - discontinue Prinizide; consider not resuming and have her follow-up in one week with PCP to resume  - ACEI as above     Concern for renal insufficiency   GFR on pre-operative labs with 73, today 106.   - BMP stable 12/15  - avoid NSAIDS, including Toradol     Status post thyroidectomy for precancerous thyroid nodules, 2013  Mrs. Ontiveros notes on 12/4/2017 TSH ws 7.250. Her levothyroxine dosing was adjusted and she received a dose today. She has never had issues with mentation or blood pressure after anesthesia or illness. She is not bradycardic and her mental status is sleepy, but she wakes easily and carries on conversation.  - PTA levothyroxine  - monitor for altered mental status, bradycardia     DVT Prophylaxis: Defer to primary service  Code Status: Prior    Disposition: Expected discharge per primary service.    Arabella Jara MD    Interval History   Feels improved today. Denies SOB, dizziness, palpitations, chest pain. Tolerating oral intake. Pain controlled.    -Data reviewed today: I reviewed all new labs and imaging results over the last 24 hours. I  personally reviewed no images or EKG's today.    Physical Exam   Temp: 98.3  F (36.8  C) Temp src: Oral BP: 132/88 Pulse: 101 Heart Rate: 96 Resp: 16 SpO2: 97 % O2 Device: None (Room air) Oxygen Delivery: 2 LPM  Vitals:    12/13/17 0630   Weight: 62.6 kg (138 lb)     Vital Signs with Ranges  Temp:  [98  F (36.7  C)-99.4  F (37.4  C)] 98.3  F (36.8  C)  Pulse:  [] 101  Heart Rate:  [74-96] 96  Resp:  [16-18] 16  BP: ()/(52-88) 132/88  SpO2:  [94 %-100 %] 97 %  I/O last 3 completed shifts:  In: 4130 [P.O.:850; I.V.:3280]  Out: 4300 [Urine:4300]    Constitutional: Awake, alert, cooperative, no apparent distress. arousable to voice  Respiratory: Clear to auscultation bilaterally, no crackles or wheezing  Cardiovascular: Regular rate and rhythm, normal S1 and S2, and no murmur noted  GI: Normal bowel sounds, soft, non-distended, non-tender  Skin/Integumen: No rashes, no cyanosis, no edema  Other: Follows commands.    Medications     lactated ringers 125 mL/hr at 12/15/17 0905[SN1.1]       lisinopril  40 mg Oral Daily     polyethylene glycol  17 g Oral Daily     sodium chloride (PF)  3 mL Intracatheter Q8H     enoxaparin  40 mg Subcutaneous Q24H     acetaminophen  975 mg Oral Q8H     senna-docusate  1-2 tablet Oral BID     fexofenadine (ALLEGRA) tablet 180 mg  180 mg Oral Daily     fluticasone-vilanterol  1 puff Inhalation Daily     levothyroxine  125 mcg Oral QAM AC     montelukast (SINGULAIR) tablet 10 mg  10 mg Oral At Bedtime[SN1.2]       Data[SN1.1]     Recent Labs  Lab 12/15/17  0652 12/14/17  1705 12/14/17  1325 12/14/17  0940 12/14/17  0658 12/13/17  0635   HGB 10.3*  --   --   --  11.2*  --    PLT  --   --   --   --  242  --    NA  --   --   --  139  --   --    POTASSIUM  --   --   --  3.4  --  3.3*   CHLORIDE  --   --   --  102  --   --    CO2  --   --   --  31  --   --    BUN  --   --   --  7  --   --    CR  --   --   --  0.61  --   --    ANIONGAP  --   --   --  6  --   --    SIXTO  --   --   --  7.9*   --   --    *  --   --  106*  --   --    TROPI  --  <0.015 <0.015 <0.015  --   --[SN1.2]        No results found for this or any previous visit (from the past 24 hour(s)).[SN1.1]     Revision History        User Key Date/Time User Provider Type Action    > SN1.2 12/15/2017 10:19 AM Arabella Jara MD Physician Sign     SN1.1 12/15/2017 10:09 AM Arabella Jara MD Physician             Progress Notes by Kath White PA-C at 12/15/2017  6:49 AM     Author:  Kath White PA-C Service:  Orthopedics Author Type:  Physician Assistant - C    Filed:  12/15/2017  6:51 AM Date of Service:  12/15/2017  6:49 AM Creation Time:  12/15/2017  6:49 AM    Status:  Signed :  Kath White PA-C (Physician Assistant - C)         POD # 2  S/P Left TKA    Patient awake in bed.  Pain controlled ok.  Tolerating oral intake.  No events overnight.     Alert and orient to person, place, and time.  Vital Sign Ranges  Temperature Temp  Av.3  F (36.8  C)  Min: 97.5  F (36.4  C)  Max: 99.4  F (37.4  C)   Blood pressure Systolic (24hrs), Av , Min:81 , Max:142        Diastolic (24hrs), Av, Min:49, Max:86      Pulse Pulse  Av.8  Min: 70  Max: 80   Respirations Resp  Av.9  Min: 16  Max: 18   Pulse oximetry SpO2  Av.8 %  Min: 94 %  Max: 100 %       Left knee dressing is clean, dry, and intact.  Bilateral calves are soft, non-tender.  Left lower extremity is NVI.    Hgb 11.2 from     A/P  1. S/P Left TKA   Continue Lovenox for DVT prophylaxis.  Mobilize with PT/OT WBAT.  Continue current pain regiment, patient may have 5-10mg of oxycodone q 3hrs for pain. D/C torres today.    2. Disposition   Anticipate d/c to TCU tomorrow.    Kath White PA-C  [phone]    [facility]  [phone][CP1.1]       Revision History        User Key Date/Time User Provider Type Action    > CP1.1 12/15/2017  6:51 AM Kath White PA-C Physician Assistant - C Sign             Progress Notes by Erin Araujo MSW at 12/14/2017  2:24 PM     Author:  Erin Araujo MSW Service:  (none) Author Type:      Filed:  12/14/2017  2:30 PM Date of Service:  12/14/2017  2:24 PM Creation Time:  12/14/2017  2:24 PM    Status:  Signed :  Erin Araujo MSW ()         Care Transition Initial Assessment - SW  Reason For Consult: discharge planning  Met with: Patient    Active Problems:    S/P total knee arthroplasty, left         DATA  Lives With: spouse  Living Arrangements: house     Identified issues/concerns regarding health management: SW was consulted for discharge planning. Patient is Aleena Conley, a 57 year-old female who was admitted on 12/13 for a total knee arthroplasty. Her tentative discharge date is 12/15 or 12/16. SW met with patient and reviewed medical record. Prior to this admission, patient lived with her spouse in a house with 4 stairs to enter and 14 stairs within. She had a standard walker, but was otherwise independent with ADLs. Per physician, the recommendation is TCU. Patient requested [facility]. SW placed this referral via [facility].      Transportation Available: car, family or friend will provide      ASSESSMENT  Cognitive Status:  Patient was awake, but fell asleep for a few seconds multiple times during this conversation  Concerns to be addressed: Discharge planning.     PLAN  Financial costs for the patient includes Possible private room fees.  Patient given options and choices for discharge TCU.  Patient/family is agreeable to the plan?  Yes  Patient Goals and Preferences: TCU.  Patient anticipates discharging to:  TCU.[NO1.1]    Erin Araujo[NO1.2], ADAMARIS ARIAS[NO1.1]             Revision History        User Key Date/Time User Provider Type Action    > NO1.2 12/14/2017  2:30 PM Erin Araujo MSW  Sign     NO1.1 12/14/2017  2:24 PM Erin Araujo MSW              Progress Notes by Kath White PA-C at  2017  7:12 AM     Author:  Kath White PA-C Service:  Orthopedics Author Type:  Physician Assistant Jaime CARRILLO    Filed:  2017  7:13 AM Date of Service:  2017  7:12 AM Creation Time:  2017  7:12 AM    Status:  Signed :  Kath White PA-C (Physician Assistant - LORE[place])         POD # 1  S/P Left TKA    Patient awake in bed.  Pain controlled.  Tolerating oral intake.  No events overnight.     Alert and orient to person, place, and time.  Vital Sign Ranges  Temperature Temp  Av.1  F (36.2  C)  Min: 95.9  F (35.5  C)  Max: 98.1  F (36.7  C)   Blood pressure Systolic (24hrs), Av , Min:97 , Max:148        Diastolic (24hrs), Av, Min:60, Max:96      Pulse No Data Recorded   Respirations Resp  Avg: 15.6  Min: 12  Max: 23   Pulse oximetry SpO2  Av.2 %  Min: 95 %  Max: 100 %       Left knee dressing is clean, dry, and intact.  Bilateral calves are soft, non-tender.  Left lower extremity is NVI.    Hgb Pending    A/P  1. S/P Left TKA   Continue Lovenox for DVT prophylaxis.  Finish antibiotics today.  Mobilize with PT/OT WBAT.  Continue current pain regiment.    2. Disposition   Anticipate d/c to TCU, likely Friday.    Kath White PA-C  [phone]    [place]  [phone][CP1.1]       Revision History        User Key Date/Time User Provider Type Action    > CP1.1 2017  7:13 AM Kath White PA-C Physician Assistant Jaime CARRILLO Sign            Progress Notes by Malorie Eaton PT at 2017  5:04 PM     Author:  Malorie Eaton PT Service:  (none) Author Type:  Physical Therapist    Filed:  2017  5:04 PM Date of Service:  2017  5:04 PM Creation Time:  2017  5:04 PM    Status:  Signed :  Malorie Eaton PT (Physical Therapist)          17 1611   Quick Adds   Type of Visit Initial PT Evaluation   Living Environment   Lives With spouse   Living Arrangements house   Home Accessibility stairs to enter home;stairs within home    Number of Stairs to Enter Home 4  (no rail)   Number of Stairs Within Home 14  (laundry in basement)   Stair Railings at Home inside, present on right side   Transportation Available car;family or friend will provide   Self-Care   Dominant Hand right   Usual Activity Tolerance good   Current Activity Tolerance moderate   Equipment Currently Used at Home (has standard walker)   Functional Level Prior   Ambulation 0-->independent   Transferring 0-->independent   Toileting 0-->independent   Bathing 0-->independent   Dressing 0-->independent   Eating 0-->independent   Communication 0-->understands/communicates without difficulty   Swallowing 0-->swallows foods/liquids without difficulty   Cognition 0 - no cognition issues reported   Fall history within last six months no   Which of the above functional risks had a recent onset or change? ambulation;transferring   Prior Functional Level Comment IND with functional mobility and ADLs   General Information   Onset of Illness/Injury or Date of Surgery - Date 12/13/17   Referring Physician Kath White PA-C   Patient/Family Goals Statement to go to TCU   Pertinent History of Current Problem (include personal factors and/or comorbidities that impact the POC) Pt is POD #0 s/p L TKA, previous R TKA earlier this year   Precautions/Limitations fall precautions   Weight-Bearing Status - LLE weight-bearing as tolerated   General Info Comments Activity: Up in chair   Cognitive Status Examination   Orientation orientation to person, place and time   Level of Consciousness alert   Follows Commands and Answers Questions 100% of the time   Personal Safety and Judgment intact   Memory intact   Pain Assessment   Patient Currently in Pain (rates pain 4/10 in L knee at rest)   Integumentary/Edema   Integumentary/Edema Comments ACE bandage along LLE   Range of Motion (ROM)   ROM Comment WFL in BLE, L knee: 0-5-103   Strength   Strength Comments WFL in BLE, IND SLR, very minimal  "extensor lag   Bed Mobility   Bed Mobility Comments SBA supine <> sit with HOB elevated   Transfer Skills   Transfer Comments CGA sit <> stand with FWW   Gait   Gait Comments CGA with FWW x160', no KI, x1 very minor knee buckling right at the end; step thru pattern, occasionally toe first weight acceptance on LLE   Balance   Balance Comments Good, no LOB/lateral path deviaiton noted with static/dynamic standing activities   Sensory Examination   Sensory Perception Comments reports at baseline gets tingling occasionally on L side and into L side back/glute   General Therapy Interventions   Planned Therapy Interventions balance training;bed mobility training;gait training;ROM;strengthening;stretching;transfer training;risk factor education;home program guidelines;progressive activity/exercise   Clinical Impression   Criteria for Skilled Therapeutic Intervention yes, treatment indicated   PT Diagnosis decreased functional mobility   Influenced by the following impairments functional weakness, pain   Functional limitations due to impairments bed mobility, transfers, ambulation, stair climbing   Clinical Presentation Stable/Uncomplicated   Clinical Presentation Rationale stable clinical picture this date (pain, vitals, neuro)   Clinical Decision Making (Complexity) Low complexity   Therapy Frequency` 2 times/day   Predicted Duration of Therapy Intervention (days/wks) 4 days   Anticipated Discharge Disposition Home with Assist;Home with Outpatient Therapy   Risk & Benefits of therapy have been explained Yes   Patient, Family & other staff in agreement with plan of care Yes   [facility] [employer] TM \"6 Clicks\"   2016, Trustees of [facility], under license to [employer].  All rights reserved.   6 Clicks Short Forms Basic Mobility Inpatient Short Form   [facility] [employer]  \"6 Clicks\" V.2 Basic Mobility Inpatient Short Form   1. Turning from your back to your side while in a flat bed without using bedrails? " 3 - A Little   2. Moving from lying on your back to sitting on the side of a flat bed without using bedrails? 3 - A Little   3. Moving to and from a bed to a chair (including a wheelchair)? 3 - A Little   4. Standing up from a chair using your arms (e.g., wheelchair, or bedside chair)? 3 - A Little   5. To walk in hospital room? 3 - A Little   6. Climbing 3-5 steps with a railing? 3 - A Little   Basic Mobility Raw Score (Score out of 24.Lower scores equate to lower levels of function) 18   Total Evaluation Time   Total Evaluation Time (Minutes) 9[RF1.1]        Revision History        User Key Date/Time User Provider Type Action    > RF1.1 12/13/2017  5:04 PM Malorie Eaton, PT Physical Therapist Sign            Progress Notes by Hina Corea at 12/13/2017  6:51 AM     Author:  Hina Croea Service:  (none) Author Type:  (none)    Filed:  12/13/2017  6:52 AM Date of Service:  12/13/2017  6:51 AM Creation Time:  12/13/2017  6:51 AM    Status:  Signed :  Hina Corea         Admission medication history interview status for the 12/13/2017  admission is complete. See EPIC admission navigator for prior to admission medications     Medication history source reliability:Good    Medication history interview source(s):Patient    Medication history resources (including written lists, pill bottles, clinic record):None    [facility]    Additional medication history information not noted on PTA med list :None    Time spent in this activity: 45 minutes    Prior to Admission medications    Medication Sig Last Dose Taking? Auth Provider   Acetaminophen (TYLENOL PO) Take 325-650 mg by mouth every 4 hours as needed for mild pain or fever 12/12/2017 at 1930 Yes Reported, Patient   estradiol (ESTRACE) 0.1 MG/GM cream Place 2 g vaginally daily 12/10/2017 Yes Reported, Patient   fluticasone-salmeterol (ADVAIR) 500-50 MCG/DOSE diskus inhaler Inhale 1 puff into the lungs 2 times daily 12/13/2017 at  0430 Yes Reported, Patient   Omega-3 Fatty Acids (FISH OIL PO) Take 1 capsule by mouth daily 12/6/2017 Yes Reported, Patient   Multiple Vitamins-Minerals (DAILY DENIZ MAXIMUM MULTIVITAMIN PO) Take 1 packet by mouth 3 times daily DOTERRA 12/6/2017 Yes Reported, Patient   albuterol (PROAIR HFA/PROVENTIL HFA/VENTOLIN HFA) 108 (90 BASE) MCG/ACT Inhaler Inhale 2 puffs into the lungs every 6 hours as needed for shortness of breath / dyspnea or wheezing MORE THAN A WEEK at PRN Yes Reported, Patient   Levothyroxine Sodium (SYNTHROID PO) Take 125 mcg by mouth daily  12/13/2017 at 0500 Yes Reported, Patient   fluticasone (FLONASE) 50 MCG/ACT spray Spray 1 spray into both nostrils daily  12/12/2017 at PM Yes Reported, Patient   Fexofenadine HCl (ALLEGRA PO) Take 180 mg by mouth daily 12/12/2017 at AM Yes Reported, Patient   lisinopril-hydrochlorothiazide (PRINZIDE,ZESTORETIC) 20-12.5 MG per tablet Take 2 tablets by mouth daily  12/12/2017 at AM Yes Reported, Patient   Montelukast Sodium (SINGULAIR PO) Take 10 mg by mouth At Bedtime  12/12/2017 at PM Yes Reported, Patient[DS1.1]            Revision History        User Key Date/Time User Provider Type Action    > DS1.1 12/13/2017  6:52 AM Hina Corea (none) Sign                  Procedure Notes     No notes of this type exist for this encounter.         Progress Notes - Therapies (Notes from 12/12/17 through 12/15/17)      Progress Notes by Malorie Eaton PT at 12/13/2017  5:04 PM     Author:  Malorie Eaton, PT Service:  (none) Author Type:  Physical Therapist    Filed:  12/13/2017  5:04 PM Date of Service:  12/13/2017  5:04 PM Creation Time:  12/13/2017  5:04 PM    Status:  Signed :  Malorie Eaton PT (Physical Therapist)          12/13/17 1611   Quick Adds   Type of Visit Initial PT Evaluation   Living Environment   Lives With spouse   Living Arrangements house   Home Accessibility stairs to enter home;stairs within home   Number of Stairs to Enter Home 4  (no  rail)   Number of Stairs Within Home 14  (laundry in basement)   Stair Railings at Home inside, present on right side   Transportation Available car;family or friend will provide   Self-Care   Dominant Hand right   Usual Activity Tolerance good   Current Activity Tolerance moderate   Equipment Currently Used at Home (has standard walker)   Functional Level Prior   Ambulation 0-->independent   Transferring 0-->independent   Toileting 0-->independent   Bathing 0-->independent   Dressing 0-->independent   Eating 0-->independent   Communication 0-->understands/communicates without difficulty   Swallowing 0-->swallows foods/liquids without difficulty   Cognition 0 - no cognition issues reported   Fall history within last six months no   Which of the above functional risks had a recent onset or change? ambulation;transferring   Prior Functional Level Comment IND with functional mobility and ADLs   General Information   Onset of Illness/Injury or Date of Surgery - Date 12/13/17   Referring Physician Kath White PA-C   Patient/Family Goals Statement to go to TCU   Pertinent History of Current Problem (include personal factors and/or comorbidities that impact the POC) Pt is POD #0 s/p L TKA, previous R TKA earlier this year   Precautions/Limitations fall precautions   Weight-Bearing Status - LLE weight-bearing as tolerated   General Info Comments Activity: Up in chair   Cognitive Status Examination   Orientation orientation to person, place and time   Level of Consciousness alert   Follows Commands and Answers Questions 100% of the time   Personal Safety and Judgment intact   Memory intact   Pain Assessment   Patient Currently in Pain (rates pain 4/10 in L knee at rest)   Integumentary/Edema   Integumentary/Edema Comments ACE bandage along LLE   Range of Motion (ROM)   ROM Comment WFL in BLE, L knee: 0-5-103   Strength   Strength Comments WFL in BLE, IND SLR, very minimal extensor lag   Bed Mobility   Bed  "Mobility Comments SBA supine <> sit with HOB elevated   Transfer Skills   Transfer Comments CGA sit <> stand with FWW   Gait   Gait Comments CGA with FWW x160', no KI, x1 very minor knee buckling right at the end; step thru pattern, occasionally toe first weight acceptance on LLE   Balance   Balance Comments Good, no LOB/lateral path deviaiton noted with static/dynamic standing activities   Sensory Examination   Sensory Perception Comments reports at baseline gets tingling occasionally on L side and into L side back/glute   General Therapy Interventions   Planned Therapy Interventions balance training;bed mobility training;gait training;ROM;strengthening;stretching;transfer training;risk factor education;home program guidelines;progressive activity/exercise   Clinical Impression   Criteria for Skilled Therapeutic Intervention yes, treatment indicated   PT Diagnosis decreased functional mobility   Influenced by the following impairments functional weakness, pain   Functional limitations due to impairments bed mobility, transfers, ambulation, stair climbing   Clinical Presentation Stable/Uncomplicated   Clinical Presentation Rationale stable clinical picture this date (pain, vitals, neuro)   Clinical Decision Making (Complexity) Low complexity   Therapy Frequency` 2 times/day   Predicted Duration of Therapy Intervention (days/wks) 4 days   Anticipated Discharge Disposition Home with Assist;Home with Outpatient Therapy   Risk & Benefits of therapy have been explained Yes   Patient, Family & other staff in agreement with plan of care Yes   [facility] [employer] TM \"6 Clicks\"   2016, Trustees of [facility], under license to [employer].  All rights reserved.   6 Clicks Short Forms Basic Mobility Inpatient Short Form   [facility] [employer]  \"6 Clicks\" V.2 Basic Mobility Inpatient Short Form   1. Turning from your back to your side while in a flat bed without using bedrails? 3 - A Little   2. Moving from " lying on your back to sitting on the side of a flat bed without using bedrails? 3 - A Little   3. Moving to and from a bed to a chair (including a wheelchair)? 3 - A Little   4. Standing up from a chair using your arms (e.g., wheelchair, or bedside chair)? 3 - A Little   5. To walk in hospital room? 3 - A Little   6. Climbing 3-5 steps with a railing? 3 - A Little   Basic Mobility Raw Score (Score out of 24.Lower scores equate to lower levels of function) 18   Total Evaluation Time   Total Evaluation Time (Minutes) 9[RF1.1]        Revision History        User Key Date/Time User Provider Type Action    > RF1.1 12/13/2017  5:04 PM Malorie Eaton, PT Physical Therapist Sign            Progress Notes by Hina Corea at 12/13/2017  6:51 AM     Author:  Hina Corea Service:  (none) Author Type:  (none)    Filed:  12/13/2017  6:52 AM Date of Service:  12/13/2017  6:51 AM Creation Time:  12/13/2017  6:51 AM    Status:  Signed :  Hina Corea         Admission medication history interview status for the 12/13/2017  admission is complete. See EPIC admission navigator for prior to admission medications     Medication history source reliability:Good    Medication history interview source(s):Patient    Medication history resources (including written lists, pill bottles, clinic record):None    [facility] pharmacy[facility]    Additional medication history information not noted on PTA med list :None    Time spent in this activity: 45 minutes    Prior to Admission medications    Medication Sig Last Dose Taking? Auth Provider   Acetaminophen (TYLENOL PO) Take 325-650 mg by mouth every 4 hours as needed for mild pain or fever 12/12/2017 at 1930 Yes Reported, Patient   estradiol (ESTRACE) 0.1 MG/GM cream Place 2 g vaginally daily 12/10/2017 Yes Reported, Patient   fluticasone-salmeterol (ADVAIR) 500-50 MCG/DOSE diskus inhaler Inhale 1 puff into the lungs 2 times daily 12/13/2017 at 0430 Yes Reported, Patient    Omega-3 Fatty Acids (FISH OIL PO) Take 1 capsule by mouth daily 12/6/2017 Yes Reported, Patient   Multiple Vitamins-Minerals (DAILY DENIZ MAXIMUM MULTIVITAMIN PO) Take 1 packet by mouth 3 times daily DOTERRA 12/6/2017 Yes Reported, Patient   albuterol (PROAIR HFA/PROVENTIL HFA/VENTOLIN HFA) 108 (90 BASE) MCG/ACT Inhaler Inhale 2 puffs into the lungs every 6 hours as needed for shortness of breath / dyspnea or wheezing MORE THAN A WEEK at PRN Yes Reported, Patient   Levothyroxine Sodium (SYNTHROID PO) Take 125 mcg by mouth daily  12/13/2017 at 0500 Yes Reported, Patient   fluticasone (FLONASE) 50 MCG/ACT spray Spray 1 spray into both nostrils daily  12/12/2017 at PM Yes Reported, Patient   Fexofenadine HCl (ALLEGRA PO) Take 180 mg by mouth daily 12/12/2017 at AM Yes Reported, Patient   lisinopril-hydrochlorothiazide (PRINZIDE,ZESTORETIC) 20-12.5 MG per tablet Take 2 tablets by mouth daily  12/12/2017 at AM Yes Reported, Patient   Montelukast Sodium (SINGULAIR PO) Take 10 mg by mouth At Bedtime  12/12/2017 at PM Yes Reported, Patient[DS1.1]            Revision History        User Key Date/Time User Provider Type Action    > DS1.1 12/13/2017  6:52 AM Hina Corea (none) Sign

## 2017-12-13 NOTE — IP AVS SNAPSHOT
"          [place] [address] [facility]: [phone]                                              INTERAGENCY TRANSFER FORM - LAB / IMAGING / EKG / EMG RESULTS   2017                    Hospital Admission Date: 2017  LOREN GAYLE   : 1960  Sex: Female        Attending Provider: Grady Alvarez MD     Allergies:  Adhesive Tape, Erythromycin    Infection:  None   Service:  SURGERY    Ht:  1.575 m (5' 2\")   Wt:  62.6 kg (138 lb)   Admission Wt:  62.6 kg (138 lb)    BMI:  25.24 kg/m 2   BSA:  1.65 m 2            Patient PCP Information     Provider PCP Type    Arabella Conner MD General         Lab Results - 3 Days      Glucose [[id number]] (Abnormal)  Resulted: 12/15/17 0739, Result status: Final result    Ordering provider: Grady Alvarez MD  12/15/17 0000 Resulting lab: [facility]    Specimen Information    Type Source Collected On   Blood  12/15/17 0652          Components       Value Reference Range Flag Lab   Glucose 115 70 - 99 mg/dL H FrStHsLb            Hemoglobin [[id number]] (Abnormal)  Resulted: 12/15/17 0729, Result status: Final result    Ordering provider: Kath White PA-C  12/15/17 0000 Resulting lab: [facility]    Specimen Information    Type Source Collected On   Blood  12/15/17 0652          Components       Value Reference Range Flag Lab   Hemoglobin 10.3 11.7 - 15.7 g/dL L FrStHsLb            Troponin I [[id number]]  Resulted: 17 1730, Result status: Final result    Ordering provider: Yaneli Kincaid APRN CNP  17 1115 Resulting lab: [facility]    Specimen Information    Type Source Collected On   Blood  17 1705          Components       Value Reference Range Flag Lab   Troponin I ES <0.015 0.000 - 0.045 ug/L  FrStHsLb   Comment:         The 99th percentile for upper reference range is 0.045 ug/L.  Troponin values   in the range of 0.045 - 0.120 ug/L may be associated with " risks of adverse   clinical events.              Troponin I [[id number]]  Resulted: 12/14/17 1409, Result status: Final result    Ordering provider: Yaneli Kincaid APRN CNP  12/14/17 0908 Resulting lab: [facility]    Specimen Information    Type Source Collected On   Blood  12/14/17 1325          Components       Value Reference Range Flag Lab   Troponin I ES <0.015 0.000 - 0.045 ug/L  FrStHsLb   Comment:         The 99th percentile for upper reference range is 0.045 ug/L.  Troponin values   in the range of 0.045 - 0.120 ug/L may be associated with risks of adverse   clinical events.              Troponin I [[id number]]  Resulted: 12/14/17 1008, Result status: Final result    Ordering provider: Yaneli Kincaid APRN CNP  12/14/17 0908 Resulting lab: [facility]    Specimen Information    Type Source Collected On   Blood  12/14/17 0940          Components       Value Reference Range Flag Lab   Troponin I ES <0.015 0.000 - 0.045 ug/L  FrStHsLb   Comment:         The 99th percentile for upper reference range is 0.045 ug/L.  Troponin values   in the range of 0.045 - 0.120 ug/L may be associated with risks of adverse   clinical events.              Basic metabolic panel [[id number]] (Abnormal)  Resulted: 12/14/17 1008, Result status: Final result    Ordering provider: Yaneli Kincaid APRN CNP  12/14/17 0936 Resulting lab: [facility]    Specimen Information    Type Source Collected On     12/14/17 0940          Components       Value Reference Range Flag Lab   Sodium 139 133 - 144 mmol/L  FrStHsLb   Potassium 3.4 3.4 - 5.3 mmol/L  FrStHsLb   Chloride 102 94 - 109 mmol/L  FrStHsLb   Carbon Dioxide 31 20 - 32 mmol/L  FrStHsLb   Anion Gap 6 3 - 14 mmol/L  FrStHsLb   Glucose 106 70 - 99 mg/dL H FrStHsLb   Urea Nitrogen 7 7 - 30 mg/dL  FrStHsLb   Creatinine 0.61 0.52 - 1.04 mg/dL  FrStHsLb   GFR Estimate >90 >60 mL/min/1.7m2  FrStHsLb   Comment:  Non  GFR  Calc   GFR Estimate If Black >90 >60 mL/min/1.7m2  FrStHsLb   Comment:  African American GFR Calc   Calcium 7.9 8.5 - 10.1 mg/dL L FrStHsLb            Glucose by meter [[id number]] (Abnormal)  Resulted: 12/14/17 0901, Result status: Final result    Ordering provider: Grady Alvarez MD  12/14/17 0854 Resulting lab: POINT OF CARE TEST, GLUCOSE    Specimen Information    Type Source Collected On     12/14/17 0854          Components       Value Reference Range Flag Lab   Glucose 105 70 - 99 mg/dL H 170            Hemoglobin [[id number]] (Abnormal)  Resulted: 12/14/17 0728, Result status: Final result    Ordering provider: Kath White PA-C  12/14/17 0001 Resulting lab: [facility]    Specimen Information    Type Source Collected On   Blood  12/14/17 0658          Components       Value Reference Range Flag Lab   Hemoglobin 11.2 11.7 - 15.7 g/dL L FrStHsLb            Platelet count [[id number]]  Resulted: 12/14/17 0728, Result status: Final result    Ordering provider: Kath White PA-C  12/14/17 0658 Resulting lab: [facility]    Specimen Information    Type Source Collected On     12/14/17 0658          Components       Value Reference Range Flag Lab   Platelet Count 242 150 - 450 10e9/L  FrStHsLb            Potassium [[id number]]  Resulted: 12/14/17 0727, Result status: In process    Ordering provider: Grady Alvarez MD  12/14/17 0801 Resulting lab: [facility]    Specimen Information    Type Source Collected On     12/14/17 0801            Creatinine [[id number]]  Resulted: 12/14/17 0726, Result status: In process    Ordering provider: Grady Alvarez MD  12/14/17 0801 Resulting lab: [facility]    Specimen Information    Type Source Collected On     12/14/17 0801            Potassium [[id number]]  Resulted: 12/14/17 0726, Result status: In process    Ordering provider: Elizabeth Merino PA-C  12/14/17 0001 Resulting lab: [facility]    Specimen Information    Type  Source Collected On   Blood  12/14/17 0658            Potassium [[id number]] (Abnormal)  Resulted: 12/13/17 0704, Result status: Final result    Ordering provider: Bandar Desai,   12/13/17 0619 Resulting lab: [facility]    Specimen Information    Type Source Collected On   Blood  12/13/17 0635          Components       Value Reference Range Flag Lab   Potassium 3.3 3.4 - 5.3 mmol/L L FrStHsLb            Testing Performed By     Lab - Abbreviation Name Director Address Valid Date Range    14 - FrStHsLb [facility] Unknown [address] [place] [address] 05/08/15 1057 - Present    45 - NIP713 [facility] Unknown Unknown 01/28/02 0000 - Present    170 - Unknown POINT OF CARE TEST, GLUCOSE Unknown Unknown 10/31/11 1114 - Present            Unresulted Labs (24h ago through future)    Start       Ordered    12/16/17 0600  Platelet count  (enoxaparin (LOVENOX) (Weight  kg with CrCl greater than 30 mL/min is prechecked))  EVERY THREE DAYS,   Routine     Comments:  Repeat every 3 days while on VTE prophylaxis. If no result is listed, this lab has not been done the past 365 days. LATEST LAB RESULT: Platelet Count (10e9/L)       Date                     Value                 08/13/2017               307              ----------      12/13/17 1124    12/16/17 0600  Creatinine  EVERY THREE DAYS,   Routine     Comments:  Last Lab Result: Creatinine (mg/dL)       Date                     Value                 08/13/2017               0.66             ----------    12/13/17 1143    Unscheduled  Hemoglobin  CONDITIONAL X 2,   Routine     Comments:  IF morning Hemoglobin result is LESS than 8.0 on POD 1 and/or POD 2, release order and recheck Hemoglobin in the evening at 1700.  Notify Provider if second Hemoglobin result is LESS than 7.5.    12/13/17 1124      Encounter-Level Documents:     There are no encounter-level documents.      Order-Level Documents:     There are no order-level  documents.

## 2017-12-13 NOTE — PROGRESS NOTES
12/13/17 1611   Quick Adds   Type of Visit Initial PT Evaluation   Living Environment   Lives With spouse   Living Arrangements house   Home Accessibility stairs to enter home;stairs within home   Number of Stairs to Enter Home 4  (no rail)   Number of Stairs Within Home 14  (laundry in basement)   Stair Railings at Home inside, present on right side   Transportation Available car;family or friend will provide   Self-Care   Dominant Hand right   Usual Activity Tolerance good   Current Activity Tolerance moderate   Equipment Currently Used at Home (has standard walker)   Functional Level Prior   Ambulation 0-->independent   Transferring 0-->independent   Toileting 0-->independent   Bathing 0-->independent   Dressing 0-->independent   Eating 0-->independent   Communication 0-->understands/communicates without difficulty   Swallowing 0-->swallows foods/liquids without difficulty   Cognition 0 - no cognition issues reported   Fall history within last six months no   Which of the above functional risks had a recent onset or change? ambulation;transferring   Prior Functional Level Comment IND with functional mobility and ADLs   General Information   Onset of Illness/Injury or Date of Surgery - Date 12/13/17   Referring Physician Kath White PA-C   Patient/Family Goals Statement to go to TCU   Pertinent History of Current Problem (include personal factors and/or comorbidities that impact the POC) Pt is POD #0 s/p L TKA, previous R TKA earlier this year   Precautions/Limitations fall precautions   Weight-Bearing Status - LLE weight-bearing as tolerated   General Info Comments Activity: Up in chair   Cognitive Status Examination   Orientation orientation to person, place and time   Level of Consciousness alert   Follows Commands and Answers Questions 100% of the time   Personal Safety and Judgment intact   Memory intact   Pain Assessment   Patient Currently in Pain (rates pain 4/10 in L knee at rest)  "  Integumentary/Edema   Integumentary/Edema Comments ACE bandage along LLE   Range of Motion (ROM)   ROM Comment WFL in BLE, L knee: 0-5-103   Strength   Strength Comments WFL in BLE, IND SLR, very minimal extensor lag   Bed Mobility   Bed Mobility Comments SBA supine <> sit with HOB elevated   Transfer Skills   Transfer Comments CGA sit <> stand with FWW   Gait   Gait Comments CGA with FWW x160', no KI, x1 very minor knee buckling right at the end; step thru pattern, occasionally toe first weight acceptance on LLE   Balance   Balance Comments Good, no LOB/lateral path deviaiton noted with static/dynamic standing activities   Sensory Examination   Sensory Perception Comments reports at baseline gets tingling occasionally on L side and into L side back/glute   General Therapy Interventions   Planned Therapy Interventions balance training;bed mobility training;gait training;ROM;strengthening;stretching;transfer training;risk factor education;home program guidelines;progressive activity/exercise   Clinical Impression   Criteria for Skilled Therapeutic Intervention yes, treatment indicated   PT Diagnosis decreased functional mobility   Influenced by the following impairments functional weakness, pain   Functional limitations due to impairments bed mobility, transfers, ambulation, stair climbing   Clinical Presentation Stable/Uncomplicated   Clinical Presentation Rationale stable clinical picture this date (pain, vitals, neuro)   Clinical Decision Making (Complexity) Low complexity   Therapy Frequency` 2 times/day   Predicted Duration of Therapy Intervention (days/wks) 4 days   Anticipated Discharge Disposition Home with Assist;Home with Outpatient Therapy   Risk & Benefits of therapy have been explained Yes   Patient, Family & other staff in agreement with plan of care Yes   [facility] AM-PAC TM \"6 Clicks\"   2016, Trustees of [facility], under license to [employer].  All rights reserved.   6 Clicks Short " "Forms Basic Mobility Inpatient Short Form   [facility] AM-PAC  \"6 Clicks\" V.2 Basic Mobility Inpatient Short Form   1. Turning from your back to your side while in a flat bed without using bedrails? 3 - A Little   2. Moving from lying on your back to sitting on the side of a flat bed without using bedrails? 3 - A Little   3. Moving to and from a bed to a chair (including a wheelchair)? 3 - A Little   4. Standing up from a chair using your arms (e.g., wheelchair, or bedside chair)? 3 - A Little   5. To walk in hospital room? 3 - A Little   6. Climbing 3-5 steps with a railing? 3 - A Little   Basic Mobility Raw Score (Score out of 24.Lower scores equate to lower levels of function) 18   Total Evaluation Time   Total Evaluation Time (Minutes) 9     "

## 2017-12-13 NOTE — PLAN OF CARE
Problem: Patient Care Overview  Goal: Plan of Care/Patient Progress Review  PT: Orders received, eval completed, treatment initiated.  Pt POD #0 s/p L TKA, previous R TKA 8/2017.  Lives in 2 story home with spouse (spouse unable to assist much d/t s/p GBS); x4 steps to enter and could stay on main level (laundry in basement, spouse unable to do the stairs); reports she just graduated from OPPT from R TKA last Friday.  IND with functional mobility and ADLs.    Discharge Planner PT   Patient plan for discharge: Per pt, TCU, [facility] choice as she went there last time  Current status: CGA supine <> sit with HOB elevated, CGA sit <> stand with FWW, CGA with FWW to amb x160', no KI and no knee buckling noted, ROM: 0-5-103  Barriers to return to prior living situation: stairs- will assess in therapy  Recommendations for discharge: TBD POD #2  Rationale for recommendations: TBD       Entered by: Malorie Eaton 12/13/2017 5:10 PM

## 2017-12-13 NOTE — OP NOTE
DATE OF PROCEDURE:  12/13/2017      PREOPERATIVE DIAGNOSIS:  Left knee degenerative joint disease.      POSTOPERATIVE DIAGNOSES:  Left knee degenerative joint disease.      PROCEDURE:  Left total knee arthroplasty.      SURGEON:  Grady Alvarez MD      1ST ASSISTANT:   MARISSA CALHOUN PA-C   2ND ASSISTANT:   NAN MORELAND PA-C      ANESTHESIA TYPE:  Spinal with adductor canal block.      TOURNIQUET TIME:  60 minutes.      IMPLANTS:  [employer] size 5 posterior stabilized femoral component, size 5 fixed bearing tibial component, 5 mm highly cross-linked posterior stabilized poly, 35 mm patellar component.      DESCRIPTION OF PROCEDURE:  After identification of Aleena Ontiveros, correct extremity, and review of informed consent, the patient was brought to the operating, general anesthesia was induced.  Perioperative antibiotics were given.  Nonsterile tourniquet was applied to the left lower extremity.  Left lower extremity was then prepped and draped in the usual sterile manner.  After observation and surgical pause, the leg was exsanguinated and the tourniquet was inflated.  A midline incision was then made 2 fingerbreadths superior to the proximal pole of the patella and centered over the medial aspect of the tibial tubercle.  Dissection was taken down sharply.  Medial skin flaps were elevated.  Standard medial parapatellar arthrotomy was then performed.  Small medial release was performed.  Soft tissues were cleared from the distal aspect of the femur.  The retropatellar fat pad was excised.  The patella was then everted and the knee was flexed.  The starting reamer was then placed in the intramedullary canal from the distal aspect.  The distal femoral cutting guide set on an 11 mm resection for 5 degree valgus cut was placed and pinned.  The distal femur was resected.  Extramedullary tibial guide set on 0-degree slope and in line with the anteromedial border of the tibia was pinned for a 10 mm  resection off the lateral side.  The proximal tibia was resected.  The extension gap was then balanced with a 7 mm spacer block.  The knee was then flexed, and the distal femur was sized.  A size 5 femoral component was noted to be the appropriate size.  The 4-in-1 cutting block was then pinned in 3 degrees of external rotation.  The anterior and posterior femoral cuts were made.  The flexion gap was balanced to 7 mm spacer block.  The anterior and posterior chamfer cuts were made.  The 4-in-1 cutting guide was removed.  The box cutting guide was placed.  The box cut was made.  The medial and lateral menisci were excised.  Osteophytes were removed from the posterior aspects of the medial and lateral condyle with a curved osteotome.  The tibia was then subluxated anteriorly with a 2-prong tibial retractor.  A size 5 tibial component was noted be appropriate size.  External rotation was set at the junction of the middle third of the tibial tubercle.  The guide was pinned and the proximal tibia was prepped.  Trial tibial tray was left in place.  The trial femoral component was impacted into place.  A 5 mm trial fixed bearing poly was placed.  The knee was noted to have 1 degree of hyperextension and 135 degrees of flexion.  The knee was balanced to both varus and valgus stress at 0 or 30 degrees of flexion.  The patella was then measured and noted to be 18 mm in width.  A 5 mm resection was performed for a 13 mm remnant.  A 35 patellar component was noted be the appropriate size.  The drill holes were lugged.  Trial patellar component was placed and the patella was noted to track centrally within the trochlear groove.  The femoral component was lugged.  Trial components were then removed.  Cut ends of the bone were irrigated with copious normal saline via pulse lavage.  The real size 5 fixed bearing tibial tray was then cemented into place.  The real size 5 posterior stabilized femoral component was cemented into  place.  A 5 mm highly cross-linked posterior stabilized poly was placed and was impacted.  The knee was then brought out into full extension.  The real 35 mm patellar component was cemented into place and clamped.  The knee was allowed to sit in Betadine diluted solution until adequate hardening of the cement was obtained.  The knee was then evacuated and irrigated copiously with normal saline via pulse lavage.  Range of motion and stability was assessed and was found as previously noted.  The knee was then placed in 90 degrees of flexion and the arthrotomy was closed with interrupted 0 Vicryl sutures.  2-0 Monocryl was placed subcutaneously in the incision sites.  Staples were placed in the epidermis.  Sterile dressing was applied.  Tourniquet was deflated.  The patient was then recovered briefly in the operating room and then transferred to the PACU for further recovery.  The patient tolerated the procedure well.  There were no known complications.      Elizabeth Merino and Kath White PA-C were present for the entire portion of the procedure and assistance was critical in patient positioning, prepping, draping, implantation of the prosthetic device, deep wound closure, superficial wound closure, dressing placement and patient transfer.         NASRIN SILVESTRE MD             D: 2017 08:58   T: 2017 09:59   MT: [id number]      Name:     LOREN GAYLE   MRN:      [id number]        Account:        [id number]   :      1960           Procedure Date: 2017      Document: [id number]

## 2017-12-13 NOTE — ANESTHESIA POSTPROCEDURE EVALUATION
Patient: Aleena Ontiveros    Procedure(s):  LEFT TOTAL KNEE ARTHROPLASTY - Wound Class: I-Clean    Diagnosis:LEFT KNEE OSTEOARTHRITIS  Diagnosis Additional Information: No value filed.    Anesthesia Type:  General, LMA    Note:  Anesthesia Post Evaluation    Patient location during evaluation: PACU  Patient participation: Able to fully participate in evaluation  Level of consciousness: awake, awake and alert and responsive to verbal stimuli  Pain management: adequate  Airway patency: patent  Cardiovascular status: acceptable  Respiratory status: acceptable  Hydration status: acceptable  PONV: none     Anesthetic complications: None          Last vitals:  Vitals:    12/13/17 1220 12/13/17 1320 12/13/17 1420   BP: 118/80 122/80 117/70   Pulse:      Resp: 14 16 12   Temp:      SpO2: 98% 97% 99%         Electronically Signed By: Roxy Galarza  December 13, 2017  3:42 PM

## 2017-12-14 ENCOUNTER — APPOINTMENT (OUTPATIENT)
Dept: PHYSICAL THERAPY | Facility: CLINIC | Age: 57
DRG: 470 | End: 2017-12-14
Attending: ORTHOPAEDIC SURGERY
Payer: COMMERCIAL

## 2017-12-14 LAB
ANION GAP SERPL CALCULATED.3IONS-SCNC: 6 MMOL/L (ref 3–14)
BUN SERPL-MCNC: 7 MG/DL (ref 7–30)
CALCIUM SERPL-MCNC: 7.9 MG/DL (ref 8.5–10.1)
CHLORIDE SERPL-SCNC: 102 MMOL/L (ref 94–109)
CO2 SERPL-SCNC: 31 MMOL/L (ref 20–32)
CREAT SERPL-MCNC: 0.61 MG/DL (ref 0.52–1.04)
GFR SERPL CREATININE-BSD FRML MDRD: >90 ML/MIN/1.7M2
GLUCOSE BLDC GLUCOMTR-MCNC: 105 MG/DL (ref 70–99)
GLUCOSE SERPL-MCNC: 106 MG/DL (ref 70–99)
HGB BLD-MCNC: 11.2 G/DL (ref 11.7–15.7)
PLATELET # BLD AUTO: 242 10E9/L (ref 150–450)
POTASSIUM SERPL-SCNC: 3.4 MMOL/L (ref 3.4–5.3)
SODIUM SERPL-SCNC: 139 MMOL/L (ref 133–144)
TROPONIN I SERPL-MCNC: <0.015 UG/L (ref 0–0.04)

## 2017-12-14 PROCEDURE — 97530 THERAPEUTIC ACTIVITIES: CPT | Mod: GP | Performed by: PHYSICAL THERAPIST

## 2017-12-14 PROCEDURE — 36415 COLL VENOUS BLD VENIPUNCTURE: CPT | Performed by: PHYSICIAN ASSISTANT

## 2017-12-14 PROCEDURE — 84484 ASSAY OF TROPONIN QUANT: CPT | Performed by: NURSE PRACTITIONER

## 2017-12-14 PROCEDURE — 36415 COLL VENOUS BLD VENIPUNCTURE: CPT | Performed by: NURSE PRACTITIONER

## 2017-12-14 PROCEDURE — 25000132 ZZH RX MED GY IP 250 OP 250 PS 637: Performed by: PHYSICIAN ASSISTANT

## 2017-12-14 PROCEDURE — 97110 THERAPEUTIC EXERCISES: CPT | Mod: GP | Performed by: PHYSICAL THERAPIST

## 2017-12-14 PROCEDURE — 40000193 ZZH STATISTIC PT WARD VISIT: Performed by: PHYSICAL THERAPY ASSISTANT

## 2017-12-14 PROCEDURE — 99222 1ST HOSP IP/OBS MODERATE 55: CPT | Performed by: NURSE PRACTITIONER

## 2017-12-14 PROCEDURE — 99207 ZZC CONSULT E&M CHANGED TO INITIAL LEVEL: CPT | Performed by: NURSE PRACTITIONER

## 2017-12-14 PROCEDURE — 93005 ELECTROCARDIOGRAM TRACING: CPT

## 2017-12-14 PROCEDURE — 85018 HEMOGLOBIN: CPT | Performed by: PHYSICIAN ASSISTANT

## 2017-12-14 PROCEDURE — 12000007 ZZH R&B INTERMEDIATE

## 2017-12-14 PROCEDURE — 97530 THERAPEUTIC ACTIVITIES: CPT | Mod: GP | Performed by: PHYSICAL THERAPY ASSISTANT

## 2017-12-14 PROCEDURE — 80048 BASIC METABOLIC PNL TOTAL CA: CPT | Performed by: NURSE PRACTITIONER

## 2017-12-14 PROCEDURE — 85049 AUTOMATED PLATELET COUNT: CPT | Performed by: PHYSICIAN ASSISTANT

## 2017-12-14 PROCEDURE — 25000128 H RX IP 250 OP 636: Performed by: PHYSICIAN ASSISTANT

## 2017-12-14 PROCEDURE — 97110 THERAPEUTIC EXERCISES: CPT | Mod: GP | Performed by: PHYSICAL THERAPY ASSISTANT

## 2017-12-14 PROCEDURE — 93010 ELECTROCARDIOGRAM REPORT: CPT | Performed by: INTERNAL MEDICINE

## 2017-12-14 PROCEDURE — 40000193 ZZH STATISTIC PT WARD VISIT: Performed by: PHYSICAL THERAPIST

## 2017-12-14 PROCEDURE — 00000146 ZZHCL STATISTIC GLUCOSE BY METER IP

## 2017-12-14 PROCEDURE — 25000128 H RX IP 250 OP 636: Performed by: ORTHOPAEDIC SURGERY

## 2017-12-14 RX ORDER — POLYETHYLENE GLYCOL 3350 17 G/17G
17 POWDER, FOR SOLUTION ORAL DAILY
Status: DISCONTINUED | OUTPATIENT
Start: 2017-12-14 | End: 2017-12-16 | Stop reason: HOSPADM

## 2017-12-14 RX ADMIN — HYDROXYZINE HYDROCHLORIDE 25 MG: 25 TABLET ORAL at 18:52

## 2017-12-14 RX ADMIN — ACETAMINOPHEN 975 MG: 325 TABLET, FILM COATED ORAL at 21:38

## 2017-12-14 RX ADMIN — MONTELUKAST SODIUM 10 MG: 10 TABLET, FILM COATED ORAL at 21:38

## 2017-12-14 RX ADMIN — SENNOSIDES AND DOCUSATE SODIUM 2 TABLET: 8.6; 5 TABLET ORAL at 09:43

## 2017-12-14 RX ADMIN — ACETAMINOPHEN 975 MG: 325 TABLET, FILM COATED ORAL at 05:08

## 2017-12-14 RX ADMIN — OXYCODONE HYDROCHLORIDE 5 MG: 5 TABLET ORAL at 17:47

## 2017-12-14 RX ADMIN — OXYCODONE HYDROCHLORIDE 10 MG: 5 TABLET ORAL at 06:51

## 2017-12-14 RX ADMIN — Medication 0.5 MG: at 00:24

## 2017-12-14 RX ADMIN — SENNOSIDES AND DOCUSATE SODIUM 1 TABLET: 8.6; 5 TABLET ORAL at 21:38

## 2017-12-14 RX ADMIN — FLUTICASONE FUROATE AND VILANTEROL TRIFENATATE 1 PUFF: 200; 25 POWDER RESPIRATORY (INHALATION) at 08:40

## 2017-12-14 RX ADMIN — FEXOFENADINE HYDROCHLORIDE 180 MG: 180 TABLET, FILM COATED ORAL at 09:43

## 2017-12-14 RX ADMIN — POLYETHYLENE GLYCOL 3350 17 G: 17 POWDER, FOR SOLUTION ORAL at 09:43

## 2017-12-14 RX ADMIN — HYDROXYZINE HYDROCHLORIDE 25 MG: 25 TABLET ORAL at 00:24

## 2017-12-14 RX ADMIN — OXYCODONE HYDROCHLORIDE 5 MG: 5 TABLET ORAL at 21:44

## 2017-12-14 RX ADMIN — LEVOTHYROXINE SODIUM 125 MCG: 125 TABLET ORAL at 06:40

## 2017-12-14 RX ADMIN — ACETAMINOPHEN 975 MG: 325 TABLET, FILM COATED ORAL at 13:38

## 2017-12-14 RX ADMIN — OXYCODONE HYDROCHLORIDE 10 MG: 10 TABLET, FILM COATED, EXTENDED RELEASE ORAL at 05:08

## 2017-12-14 RX ADMIN — ONDANSETRON 4 MG: 2 SOLUTION INTRAMUSCULAR; INTRAVENOUS at 13:51

## 2017-12-14 RX ADMIN — OXYCODONE HYDROCHLORIDE 10 MG: 5 TABLET ORAL at 03:23

## 2017-12-14 RX ADMIN — ALBUTEROL SULFATE 2 PUFF: 90 AEROSOL, METERED RESPIRATORY (INHALATION) at 11:49

## 2017-12-14 RX ADMIN — OXYCODONE HYDROCHLORIDE 5 MG: 5 TABLET ORAL at 10:07

## 2017-12-14 RX ADMIN — CEFAZOLIN SODIUM 1 G: 1 SOLUTION INTRAVENOUS at 03:10

## 2017-12-14 RX ADMIN — ENOXAPARIN SODIUM 40 MG: 40 INJECTION SUBCUTANEOUS at 09:43

## 2017-12-14 RX ADMIN — OXYCODONE HYDROCHLORIDE 5 MG: 5 TABLET ORAL at 13:38

## 2017-12-14 RX ADMIN — SODIUM CHLORIDE 500 ML: 9 INJECTION, SOLUTION INTRAVENOUS at 08:42

## 2017-12-14 ASSESSMENT — PAIN DESCRIPTION - DESCRIPTORS
DESCRIPTORS: CONSTANT
DESCRIPTORS: CONSTANT

## 2017-12-14 NOTE — PLAN OF CARE
Problem: Patient Care Overview  Goal: Plan of Care/Patient Progress Review  Discharge Planner PT   Patient plan for discharge: Disch to TCU prior to returning to home with her  and OPPT.  Current status: PT: Needs Walter and vc for exercise, bed mobility, transfers, and standing weight shift in FWW x 3 min prior to becoming SOB and returning to supine.  Nursing notified.  Barriers to return to prior living situation: Postop pain, edema, weakness, and stairs to enter and within her home.  Recommendations for discharge: TBD POD#2.  Rationale for recommendations: TBD POD#2       Entered by: Bert Monroy 12/14/2017 1:02 PM

## 2017-12-14 NOTE — PROGRESS NOTES
Care Transition Initial Assessment - MICHAELLE  Reason For Consult: discharge planning  Met with: Patient    Active Problems:    S/P total knee arthroplasty, left         DATA  Lives With: spouse  Living Arrangements: house     Identified issues/concerns regarding health management: SW was consulted for discharge planning. Patient is Aleena Conley, a 57 year-old female who was admitted on 12/13 for a total knee arthroplasty. Her tentative discharge date is 12/15 or 12/16. SW met with patient and reviewed medical record. Prior to this admission, patient lived with her spouse in a house with 4 stairs to enter and 14 stairs within. She had a standard walker, but was otherwise independent with ADLs. Per physician, the recommendation is TCU. Patient requested [facility]. SW placed this referral via DOD.      Transportation Available: car, family or friend will provide      ASSESSMENT  Cognitive Status:  Patient was awake, but fell asleep for a few seconds multiple times during this conversation  Concerns to be addressed: Discharge planning.     PLAN  Financial costs for the patient includes Possible private room fees.  Patient given options and choices for discharge TCU.  Patient/family is agreeable to the plan?  Yes  Patient Goals and Preferences: TCU.  Patient anticipates discharging to:  TCU.    Erin Araujo, MSW, LGSW

## 2017-12-14 NOTE — CONSULTS
"[facility]    Hospitalist Consultation    Date of Admission:  12/13/2017    Assessment & Plan   Aleena Ontiveros is a 57 year old female who was admitted on 12/13/2017 status post left total knee arthroplasty by Dr. Alvarez. Surgery was performed under general anesthesia with LMA; EBL 50- ml. The Hospitalist Service is being consulted this morning for concern of hypotension, 84/52.    Hypotension, improving  Mrs. Ontiveros developed hypotension this morning. She endorses feeling \"off,\" though specifically denies headache, dizziness, chest pain, and shortness of breath. She has been receiving opiate pain medication: IV Dilaudid 0.5- mg at 0024, Oxycontin 12- hour at 1700 yesterday and 0500 today, and Oxycodone IR 10- mg at 2200 last evening, 0300, and 0700. A 500- cc IVF bolus had been started prior to my arrival and SBP 92 when I entered the room, improved to 97 with straight leg raise. UPO 1400- cc since midnight. She did not take her lisinopril yesterday or today. BP on pre-op exam 115/77 on lisinopril. On 12/4/2017 her TSH was 7.250 and levothyroxine was adjusted. She states she has never previously been hypotensive with stress of illness or surgery. Blood glucose 105. She is not febrile and is not tachycardic. She is on appropriate DVT prophylaxis. She is drowsy, but able to wake and provide accurate history and conversation. Her respiratory status is not compromised. Hemoglobin this AM 11.2, pre-operatively 13. EKG without obvious acute ischemia.This is likely related to the amount of opiate pain medication she has received.  - finish 500- cc bolus  - avoid further hypotension  - discontinue lisinopril   - discontinue Oxycontin 12- hour tablet  - cautious continued use of IV dilaudid and oxycodone IR  - has not had adequate pain relief with tramadol in the past  - would avoid Toradol given episode of hypotension and appears GFR slightly per EMR review   - would not reverse with Narcan at this time as " she appears well perfused, has adequate pain control, and respiratory status not compromised  - troponin now    Personal history of hypertension, treated  She states she has been on Prinizide for approximately 10- years. No cause of hypertension was identified. She has recently lost 45- pounds intentionally through eating the Whole30 diet. Pre-operative .   - discontinue Prinizide; consider not resuming and have her follow-up in one week with PCP to resume    Concern for renal insufficiency  GFR on pre-operative labs with 73, today 106. Given hypotension today should take renal protective measures.   - BMP now  - avoid NSAIDS, including Toradol  - optimize hydration, avoid hypotension  - avoid nephrotoxic agents, renal dosing of medications as appropriate     Status post thyroidectomy for precancerous thyroid nodules, 2013  Mrs. Ontiveros notes on 12/4/2017 TSH ws 7.250. Her levothyroxine dosing was adjusted and she received a dose today. She has never had issues with mentation or blood pressure after anesthesia or illness. She is not bradycardic and her mental status is sleepy, but she wakes easily and carries on conversation.  - continue levothyroxine  - monitor for altered mental status, bradycardia     DVT Prophylaxis: Defer to primary service  Code Status: Prior    Disposition: Pending orthopedic plan of care    The above assessment and plan has been discussed with Dr. Tello, who is in agreement with the above assessment and plan.     JAZMIN Vargas, CNP  Hospitalist Service, House Officer  [facility]     Text Page  Pager: [phone]    Reason for Consult   Reason for consult: I was asked by Elizabeth Merino PA-C to evaluate this patient for hypotension.    Primary Care Physician   Dr. Arabella Conner    History is obtained from the patient    History of Present Illness   Aleena Ontiveros is a 57 year old female who was admitted to [facility] on 12/13/2017 status post left total knee  "arthroplasty by Dr. Alvarez. Surgery was well tolerated with general anesthesia and LMA; EBL 50- ml. The Hospitalist Service was consulted this morning for concern of hypotension. A 500- cc IVF bolus was ordered by Orthopedic Surgery. Mrs. Ontiveros endorses feeling \"off,\" but specifically denies dizziness, headaches, chest pain, and shortness of breath. She states her TSH was recently 7.250 and levothyroxine dosing was titrated. She endorses a 10- year history of mild hypertension, which she feels is better with a 45- pound intentional weight loss since 2017.     At this time she is endorsing adequate pain control. She has received Oxycontin 12- hour x 2, Oxycodone IR 10- mg x 3, and Dilaudid 0.5- IV x 1 since 5: 00 PM last night.     Past Medical History    I personally reviewed history with patient:  - sinus infection, treated and now symptom free, 2017  - hypertension, treated  - asthma, treated  - post-operative hypothyroidism, treated    Past Surgical History   I personally reviewed history with patient:  - right total knee arthroplasty, 2017  - thyroidectomy, for precancerous nodules,   -  section x 2  - tonsillectomy and adenoidectomy  - spine fusion, Carpio rods  - total abdominal hysterectomy     Prior to Admission Medications   Prior to Admission Medications   Prescriptions Last Dose Informant Patient Reported? Taking?   Acetaminophen (TYLENOL PO) 2017 at 1930 Self Yes Yes   Sig: Take 325-650 mg by mouth every 4 hours as needed for mild pain or fever   Fexofenadine HCl (ALLEGRA PO) 2017 at AM Self Yes Yes   Sig: Take 180 mg by mouth daily   Levothyroxine Sodium (SYNTHROID PO) 2017 at 0500 Self Yes Yes   Sig: Take 125 mcg by mouth daily    Montelukast Sodium (SINGULAIR PO) 2017 at PM Self Yes Yes   Sig: Take 10 mg by mouth At Bedtime    Multiple Vitamins-Minerals (DAILY DENIZ MAXIMUM MULTIVITAMIN PO) 2017 Self Yes Yes   Sig: Take 1 packet by mouth 3 times " "daily DOTERRA   Omega-3 Fatty Acids (FISH OIL PO) 12/6/2017 Self Yes Yes   Sig: Take 1 capsule by mouth daily   albuterol (PROAIR HFA/PROVENTIL HFA/VENTOLIN HFA) 108 (90 BASE) MCG/ACT Inhaler MORE THAN A WEEK at PRN Self Yes Yes   Sig: Inhale 2 puffs into the lungs every 6 hours as needed for shortness of breath / dyspnea or wheezing   estradiol (ESTRACE) 0.1 MG/GM cream 12/10/2017 Self Yes Yes   Sig: Place 2 g vaginally daily   fluticasone (FLONASE) 50 MCG/ACT spray 12/12/2017 at PM Self Yes Yes   Sig: Spray 1 spray into both nostrils daily    fluticasone-salmeterol (ADVAIR) 500-50 MCG/DOSE diskus inhaler 12/13/2017 at 0430 Self Yes Yes   Sig: Inhale 1 puff into the lungs 2 times daily   lisinopril-hydrochlorothiazide (PRINZIDE,ZESTORETIC) 20-12.5 MG per tablet 12/12/2017 at AM Self Yes Yes   Sig: Take 2 tablets by mouth daily       Facility-Administered Medications: None     Allergies   Allergies   Allergen Reactions     Adhesive Tape      Erythromycin Nausea and Vomiting     Pills cause vomiting,        Social History   I personally reviewed history with patient:  - lives locally with her    - lifelong non-smoker  - occasional alcohol use  - denies drug use    Family History   I personally reviewed history with patient:  - several siblings with thyroid issues  - Dad: CHF, CAD, DM  - Mother: breast cancer     Review of Systems   The 10 point Review of Systems is negative other than noted in the HPI or here.     Physical Exam   Nursing Notes Reviewed.  BP (!) 85/49  Pulse 80  Temp 97.5  F (36.4  C) (Oral)  Resp 16  Ht 1.575 m (5' 2\")  Wt 62.6 kg (138 lb)  SpO2 99%  BMI 25.24 kg/m2     General: Appears stated age, no acute distress.  Skin:  Warm, dry. No rashes or lesions on exposed skin.  HEENT:  Normocephalic, atraumatic.  Chest:  Bilateral anterior and posterior lung fields clear to auscultation. No increased work of breathing. Does require supplemental oxygen.  Cardiovascular:  Regular rate and " rhythm, without murmur, rub, or gallop. Bilateral upper and lower distal pulses palpable.   Abdomen:  Soft, non-tender, non-distended. Bowel sounds present.   Musculoskeletal:  Moves all four extremities.   Neurological:  Alert and oriented x 4. Cranial nerves II-XII grossly intact. Sleepy, but able to wake with interaction and provide accurate history.  Psychiatric:  Affect and mood congruent.      Data   -Data reviewed today: All pertinent laboratory and imaging results from this encounter were reviewed. I personally reviewed the EKG tracing showing no acute ischemia..    Recent Labs  Lab 12/14/17  0658 12/13/17  0635   HGB 11.2*  --      --    POTASSIUM  --  3.3*       Imaging:  No results found for this or any previous visit (from the past 24 hour(s)).

## 2017-12-14 NOTE — PROVIDER NOTIFICATION
Called Dr. Alvarez's care coordinator and left VM regarding K level of 3.3 prior to surgery. No replacement done, and K not rechecked this morning. Awaiting call back.

## 2017-12-14 NOTE — PROVIDER NOTIFICATION
Spoke with AURORA Johnson regarding pain management for PT session. States that patient will continue taking oxycodone at this time (oxycontin was DC'd), but try to keep pt limited to 5mg if pain can be managed with this dosing.

## 2017-12-14 NOTE — PLAN OF CARE
Problem: Patient Care Overview  Goal: Plan of Care/Patient Progress Review  Outcome: No Change  VSS, A&Ox4 but lethargic. CMS intact, dressing CDI. Up A1 to side of bed. [place] patent; to be removed POD 2. Oxycodone for pain control. Will continue to monitor.

## 2017-12-14 NOTE — PROVIDER NOTIFICATION
Sent page out to Dr. Alvarez regarding pt's hypotension. Hgb 11.2. pt states she is feeling lightheaded. VS otherwise WNL.    8:25: AURORA Johnson called back and ordered hospitalist consult and 500 ml bolus.

## 2017-12-14 NOTE — PLAN OF CARE
Problem: Knee Arthroplasty (Total, Partial) (Adult)  Goal: Signs and Symptoms of Listed Potential Problems Will be Absent, Minimized or Managed (Knee Arthroplasty)  Signs and symptoms of listed potential problems will be absent, minimized or managed by discharge/transition of care (reference Knee Arthroplasty (Total, Partial) (Adult) CPG).   Outcome: No Change  Oriented X4, pt lethargic. CMS intact. Drg CDI. Pt was hypotensive this am, SBP in mid 80's, small bolus given and seen by hospitalist. Hypotension possibly due to narcotic use, but pt states pain is still moderate-severe with movement. decreasing narcs as able (decreased to 5mg oxy for PT and oxycontin was DC'd).  A-2 to stand with PT. Pt still c/o of lightheadedness while in bed with slight increase in lightheadedness with standing. Eating and drinking adequately, maintenance fluids continued. [place] patent with adequate o/p. Pt c/o of dyspnea on exertion with PT, albuterol inhaler ordered for pt.

## 2017-12-14 NOTE — PLAN OF CARE
Problem: Patient Care Overview  Goal: Plan of Care/Patient Progress Review  Outcome: Improving  Up with assist of 1. Dressing CDI. Hemovac and torres in place. Nausea beginning of shift relieved with Zofran. Pain managed with PO medications.

## 2017-12-15 ENCOUNTER — APPOINTMENT (OUTPATIENT)
Dept: PHYSICAL THERAPY | Facility: CLINIC | Age: 57
DRG: 470 | End: 2017-12-15
Attending: ORTHOPAEDIC SURGERY
Payer: COMMERCIAL

## 2017-12-15 LAB
GLUCOSE SERPL-MCNC: 115 MG/DL (ref 70–99)
HGB BLD-MCNC: 10.3 G/DL (ref 11.7–15.7)

## 2017-12-15 PROCEDURE — 25000128 H RX IP 250 OP 636: Performed by: ORTHOPAEDIC SURGERY

## 2017-12-15 PROCEDURE — 12000007 ZZH R&B INTERMEDIATE

## 2017-12-15 PROCEDURE — 25000132 ZZH RX MED GY IP 250 OP 250 PS 637: Performed by: PHYSICIAN ASSISTANT

## 2017-12-15 PROCEDURE — 97530 THERAPEUTIC ACTIVITIES: CPT | Mod: GP | Performed by: PHYSICAL THERAPIST

## 2017-12-15 PROCEDURE — 85018 HEMOGLOBIN: CPT | Performed by: PHYSICIAN ASSISTANT

## 2017-12-15 PROCEDURE — 97116 GAIT TRAINING THERAPY: CPT | Mod: GP | Performed by: PHYSICAL THERAPIST

## 2017-12-15 PROCEDURE — 40000894 ZZH STATISTIC OT IP EVAL DEFER

## 2017-12-15 PROCEDURE — 82947 ASSAY GLUCOSE BLOOD QUANT: CPT | Performed by: PHYSICIAN ASSISTANT

## 2017-12-15 PROCEDURE — 97110 THERAPEUTIC EXERCISES: CPT | Mod: GP | Performed by: PHYSICAL THERAPIST

## 2017-12-15 PROCEDURE — 99232 SBSQ HOSP IP/OBS MODERATE 35: CPT | Performed by: INTERNAL MEDICINE

## 2017-12-15 PROCEDURE — 40000193 ZZH STATISTIC PT WARD VISIT: Performed by: PHYSICAL THERAPIST

## 2017-12-15 PROCEDURE — 25000128 H RX IP 250 OP 636: Performed by: PHYSICIAN ASSISTANT

## 2017-12-15 PROCEDURE — 25000132 ZZH RX MED GY IP 250 OP 250 PS 637: Performed by: INTERNAL MEDICINE

## 2017-12-15 PROCEDURE — 36415 COLL VENOUS BLD VENIPUNCTURE: CPT | Performed by: PHYSICIAN ASSISTANT

## 2017-12-15 RX ORDER — DIPHENHYDRAMINE HCL 25 MG
25 CAPSULE ORAL EVERY 6 HOURS PRN
Status: DISCONTINUED | OUTPATIENT
Start: 2017-12-15 | End: 2017-12-16 | Stop reason: HOSPADM

## 2017-12-15 RX ORDER — LISINOPRIL 40 MG/1
40 TABLET ORAL DAILY
Status: DISCONTINUED | OUTPATIENT
Start: 2017-12-15 | End: 2017-12-16 | Stop reason: HOSPADM

## 2017-12-15 RX ADMIN — ENOXAPARIN SODIUM 40 MG: 40 INJECTION SUBCUTANEOUS at 08:03

## 2017-12-15 RX ADMIN — LEVOTHYROXINE SODIUM 125 MCG: 125 TABLET ORAL at 09:05

## 2017-12-15 RX ADMIN — OXYCODONE HYDROCHLORIDE 5 MG: 5 TABLET ORAL at 02:32

## 2017-12-15 RX ADMIN — ACETAMINOPHEN 975 MG: 325 TABLET, FILM COATED ORAL at 06:09

## 2017-12-15 RX ADMIN — SODIUM CHLORIDE, POTASSIUM CHLORIDE, SODIUM LACTATE AND CALCIUM CHLORIDE: 600; 310; 30; 20 INJECTION, SOLUTION INTRAVENOUS at 09:05

## 2017-12-15 RX ADMIN — OXYCODONE HYDROCHLORIDE 5 MG: 5 TABLET ORAL at 06:09

## 2017-12-15 RX ADMIN — POLYETHYLENE GLYCOL 3350 17 G: 17 POWDER, FOR SOLUTION ORAL at 18:23

## 2017-12-15 RX ADMIN — MONTELUKAST SODIUM 10 MG: 10 TABLET, FILM COATED ORAL at 21:21

## 2017-12-15 RX ADMIN — DIPHENHYDRAMINE HYDROCHLORIDE 25 MG: 25 CAPSULE ORAL at 13:41

## 2017-12-15 RX ADMIN — SENNOSIDES AND DOCUSATE SODIUM 2 TABLET: 8.6; 5 TABLET ORAL at 21:21

## 2017-12-15 RX ADMIN — FEXOFENADINE HYDROCHLORIDE 180 MG: 180 TABLET, FILM COATED ORAL at 08:03

## 2017-12-15 RX ADMIN — HYDROXYZINE HYDROCHLORIDE 25 MG: 25 TABLET ORAL at 08:03

## 2017-12-15 RX ADMIN — DIPHENHYDRAMINE HYDROCHLORIDE 25 MG: 25 CAPSULE ORAL at 21:27

## 2017-12-15 RX ADMIN — POLYETHYLENE GLYCOL 3350 17 G: 17 POWDER, FOR SOLUTION ORAL at 08:03

## 2017-12-15 RX ADMIN — ACETAMINOPHEN 975 MG: 325 TABLET, FILM COATED ORAL at 21:21

## 2017-12-15 RX ADMIN — SODIUM CHLORIDE, POTASSIUM CHLORIDE, SODIUM LACTATE AND CALCIUM CHLORIDE: 600; 310; 30; 20 INJECTION, SOLUTION INTRAVENOUS at 01:09

## 2017-12-15 RX ADMIN — OXYCODONE HYDROCHLORIDE 10 MG: 5 TABLET ORAL at 09:04

## 2017-12-15 RX ADMIN — SENNOSIDES AND DOCUSATE SODIUM 1 TABLET: 8.6; 5 TABLET ORAL at 08:03

## 2017-12-15 RX ADMIN — ACETAMINOPHEN 975 MG: 325 TABLET, FILM COATED ORAL at 13:41

## 2017-12-15 RX ADMIN — OXYCODONE HYDROCHLORIDE 10 MG: 5 TABLET ORAL at 14:41

## 2017-12-15 RX ADMIN — OXYCODONE HYDROCHLORIDE 10 MG: 5 TABLET ORAL at 18:23

## 2017-12-15 RX ADMIN — LISINOPRIL 40 MG: 40 TABLET ORAL at 10:39

## 2017-12-15 NOTE — PLAN OF CARE
Problem: Patient Care Overview  Goal: Plan of Care/Patient Progress Review  VSS on room air. Dressing cdi, cms intact. [place] with adequate urine output. Alert and oriented x 4.

## 2017-12-15 NOTE — PLAN OF CARE
Problem: Patient Care Overview  Goal: Plan of Care/Patient Progress Review  Outcome: No Change  A&O x4. VSS on RA. POD 1 L Knee. L knee dressing CDI, CMS intact. Pain controlled w/PO oxycodone. LR @125. Nadya present, patent, remove today.

## 2017-12-15 NOTE — PROGRESS NOTES
[facility]    Hospitalist Progress Note    Date of Service (when I saw the patient): 12/15/2017    Assessment & Plan   Aleena Ontiveros is a 57 year old female who was admitted on 12/13/2017 status post left total knee arthroplasty by Dr. Alvarez. Surgery was performed under general anesthesia with LMA; EBL 50- ml. Hospitalist Service consulted for hypotension.     Hypotension, resolved  Due to narcotic side effect. Denied headache, dizziness, chest pain, and shortness of breath. She had been receiving opiate pain medication: as of 12/14 IV Dilaudid 0.5mg at 0024, Oxycontin 12- hour at 1700 12/13 and 0500 12/14, and Oxycodone IR 10- mg at 2200 12/13, 0300, and 0700. On 12/4/2017 her TSH was 7.250 and levothyroxine was adjusted. She states she has never previously been hypotensive with stress of illness or surgery. Blood glucose 105. UOP adequate. She is not febrile and is not tachycardic. She is on appropriate DVT prophylaxis. Was drowsy, but able to wake and provide accurate history and conversation. Her respiratory status is not compromised. Hemoglobin was 11.2, pre-operatively 13. EKG without obvious acute ischemia. Discontinued Oxycontin 12- hour tablet. TnI negative x 3.  - improved with IVF and bolus, will discontinue as tolerating po intake  - Hgb 10.3 on 12/15, stable compared to 8/2017  - continue holding HCTZ  - resume PTA lisinopril 40mg daily today  - cautious continued use of IV dilaudid and oxycodone IR     Personal history of hypertension, treated  She states she has been on Prinizide for approximately 10- years. No cause of hypertension was identified. She has recently lost 45- pounds intentionally through eating the Whole30 diet. Pre-operative .   - discontinue Prinizide; consider not resuming and have her follow-up in one week with PCP to resume  - ACEI as above     Concern for renal insufficiency   GFR on pre-operative labs with 73, today 106.   - BMP stable 12/15  - avoid  NSAIDS, including Toradol     Status post thyroidectomy for precancerous thyroid nodules, 2013  Mrs. Ontiveros notes on 12/4/2017 TSH ws 7.250. Her levothyroxine dosing was adjusted and she received a dose today. She has never had issues with mentation or blood pressure after anesthesia or illness. She is not bradycardic and her mental status is sleepy, but she wakes easily and carries on conversation.  - PTA levothyroxine  - monitor for altered mental status, bradycardia     DVT Prophylaxis: Defer to primary service  Code Status: Prior    Disposition: Expected discharge per primary service.    Arabella Jara MD    Interval History   Feels improved today. Denies SOB, dizziness, palpitations, chest pain. Tolerating oral intake. Pain controlled.    -Data reviewed today: I reviewed all new labs and imaging results over the last 24 hours. I personally reviewed no images or EKG's today.    Physical Exam   Temp: 98.3  F (36.8  C) Temp src: Oral BP: 132/88 Pulse: 101 Heart Rate: 96 Resp: 16 SpO2: 97 % O2 Device: None (Room air) Oxygen Delivery: 2 LPM  Vitals:    12/13/17 0630   Weight: 62.6 kg (138 lb)     Vital Signs with Ranges  Temp:  [98  F (36.7  C)-99.4  F (37.4  C)] 98.3  F (36.8  C)  Pulse:  [] 101  Heart Rate:  [74-96] 96  Resp:  [16-18] 16  BP: ()/(52-88) 132/88  SpO2:  [94 %-100 %] 97 %  I/O last 3 completed shifts:  In: 4130 [P.O.:850; I.V.:3280]  Out: 4300 [Urine:4300]    Constitutional: Awake, alert, cooperative, no apparent distress. arousable to voice  Respiratory: Clear to auscultation bilaterally, no crackles or wheezing  Cardiovascular: Regular rate and rhythm, normal S1 and S2, and no murmur noted  GI: Normal bowel sounds, soft, non-distended, non-tender  Skin/Integumen: No rashes, no cyanosis, no edema  Other: Follows commands.    Medications     lactated ringers 125 mL/hr at 12/15/17 0905       lisinopril  40 mg Oral Daily     polyethylene glycol  17 g Oral Daily     sodium chloride (PF)   3 mL Intracatheter Q8H     enoxaparin  40 mg Subcutaneous Q24H     acetaminophen  975 mg Oral Q8H     senna-docusate  1-2 tablet Oral BID     fexofenadine (ALLEGRA) tablet 180 mg  180 mg Oral Daily     fluticasone-vilanterol  1 puff Inhalation Daily     levothyroxine  125 mcg Oral QAM AC     montelukast (SINGULAIR) tablet 10 mg  10 mg Oral At Bedtime       Data     Recent Labs  Lab 12/15/17  0652 12/14/17  1705 12/14/17  1325 12/14/17  0940 12/14/17  0658 12/13/17  0635   HGB 10.3*  --   --   --  11.2*  --    PLT  --   --   --   --  242  --    NA  --   --   --  139  --   --    POTASSIUM  --   --   --  3.4  --  3.3*   CHLORIDE  --   --   --  102  --   --    CO2  --   --   --  31  --   --    BUN  --   --   --  7  --   --    CR  --   --   --  0.61  --   --    ANIONGAP  --   --   --  6  --   --    SIXTO  --   --   --  7.9*  --   --    *  --   --  106*  --   --    TROPI  --  <0.015 <0.015 <0.015  --   --        No results found for this or any previous visit (from the past 24 hour(s)).

## 2017-12-15 NOTE — PLAN OF CARE
Problem: Patient Care Overview  Goal: Plan of Care/Patient Progress Review  Discharge Planner PT   Patient plan for discharge: TCU  Current status: Pt performed supine TKA exercises on L. L knee ROM: extension 8 degrees, flexion 55 degrees. Supine <> sit with Walter x1, sit <> stand with CGA and FWW. Pt ambulated 100' with CGA and FWW. Tolerated session well.   Barriers to return to prior living situation: level of assist needed, dec activity tolerance, pain, weakness  Recommendations for discharge: TCU  Rationale for recommendations: Pt would benefit from continued skilled PT services to improve independence and safety with mobility. Pt does not have assistance for mobility at home.        Entered by: Lupe Katz 12/15/2017 10:01 AM

## 2017-12-15 NOTE — PROGRESS NOTES
POD # 2  S/P Left TKA    Patient awake in bed.  Pain controlled ok.  Tolerating oral intake.  No events overnight.     Alert and orient to person, place, and time.  Vital Sign Ranges  Temperature Temp  Av.3  F (36.8  C)  Min: 97.5  F (36.4  C)  Max: 99.4  F (37.4  C)   Blood pressure Systolic (24hrs), Av , Min:81 , Max:142        Diastolic (24hrs), Av, Min:49, Max:86      Pulse Pulse  Av.8  Min: 70  Max: 80   Respirations Resp  Av.9  Min: 16  Max: 18   Pulse oximetry SpO2  Av.8 %  Min: 94 %  Max: 100 %       Left knee dressing is clean, dry, and intact.  Bilateral calves are soft, non-tender.  Left lower extremity is NVI.    Hgb 11.2 from 12/14    A/P  1. S/P Left TKA   Continue Lovenox for DVT prophylaxis.  Mobilize with PT/OT WBAT.  Continue current pain regiment, patient may have 5-10mg of oxycodone q 3hrs for pain. D/C melissa today.    2. Disposition   Anticipate d/c to TCU tomorrow.    Kath White PA-C  [phone]    CAW  [phone]

## 2017-12-15 NOTE — PLAN OF CARE
Problem: Patient Care Overview  Goal: Plan of Care/Patient Progress Review  OT: Order received, chart reviewed. Per chart, pt planning for discharge to TCU tomorrow. Defer OT eval and intervention to TCU.

## 2017-12-15 NOTE — PROGRESS NOTES
MICHAELLE    D: Spoke to Macie at [facility]. Patient was accepted for 12/16. Macie said the room will be available any time.     Phone: [phone]  Fax: [phone]    D: Continue to follow.      PAS-RR    D: Per DHS regulation, MICHAELLE completed and submitted PAS-RR to [place] [employer] Direct Connect via the Senior LinkAge Line.  PAS-RR confirmation # is : [id number]    I: SW spoke with patient and they are aware a PAS-RR has been submitted.  MICHAELLE reviewed with patient that they may be contacted for a follow up appointment within 10 days of hospital discharge if their SNF stay is < 30 days.  Contact information for [facility] LinkAge Line was also provided.    A: patient verbalized understanding.    P: Further questions may be directed to [facility] LinkAge Line at #[phone], option #4 for PAS-RR staff.

## 2017-12-15 NOTE — PLAN OF CARE
Problem: Patient Care Overview  Goal: Plan of Care/Patient Progress Review  Outcome: Improving  VSS, A&Ox4. CMS intact, dressing CDI. Up A1 to BR and voiding adequately. Oxycodone and atarax given for pain control. PRN benadryl given for rash on LLE. Pt plans d/c to TCU tomorrow. Will continue to monitor.

## 2017-12-16 ENCOUNTER — APPOINTMENT (OUTPATIENT)
Dept: PHYSICAL THERAPY | Facility: CLINIC | Age: 57
DRG: 470 | End: 2017-12-16
Attending: ORTHOPAEDIC SURGERY
Payer: COMMERCIAL

## 2017-12-16 VITALS
DIASTOLIC BLOOD PRESSURE: 76 MMHG | HEART RATE: 102 BPM | OXYGEN SATURATION: 100 % | BODY MASS INDEX: 25.4 KG/M2 | HEIGHT: 62 IN | SYSTOLIC BLOOD PRESSURE: 113 MMHG | TEMPERATURE: 98.8 F | RESPIRATION RATE: 16 BRPM | WEIGHT: 138 LBS

## 2017-12-16 LAB
CREAT SERPL-MCNC: 0.66 MG/DL (ref 0.52–1.04)
GFR SERPL CREATININE-BSD FRML MDRD: >90 ML/MIN/1.7M2
PLATELET # BLD AUTO: 267 10E9/L (ref 150–450)

## 2017-12-16 PROCEDURE — 97116 GAIT TRAINING THERAPY: CPT | Mod: GP

## 2017-12-16 PROCEDURE — 85049 AUTOMATED PLATELET COUNT: CPT | Performed by: PHYSICIAN ASSISTANT

## 2017-12-16 PROCEDURE — 82565 ASSAY OF CREATININE: CPT | Performed by: PHYSICIAN ASSISTANT

## 2017-12-16 PROCEDURE — 25000128 H RX IP 250 OP 636: Performed by: PHYSICIAN ASSISTANT

## 2017-12-16 PROCEDURE — 97110 THERAPEUTIC EXERCISES: CPT | Mod: GP

## 2017-12-16 PROCEDURE — 97530 THERAPEUTIC ACTIVITIES: CPT | Mod: GP

## 2017-12-16 PROCEDURE — 25000132 ZZH RX MED GY IP 250 OP 250 PS 637: Performed by: PHYSICIAN ASSISTANT

## 2017-12-16 PROCEDURE — 25000132 ZZH RX MED GY IP 250 OP 250 PS 637: Performed by: INTERNAL MEDICINE

## 2017-12-16 PROCEDURE — 99232 SBSQ HOSP IP/OBS MODERATE 35: CPT | Performed by: INTERNAL MEDICINE

## 2017-12-16 PROCEDURE — 36415 COLL VENOUS BLD VENIPUNCTURE: CPT | Performed by: PHYSICIAN ASSISTANT

## 2017-12-16 PROCEDURE — 40000193 ZZH STATISTIC PT WARD VISIT

## 2017-12-16 RX ORDER — DIPHENHYDRAMINE HCL 25 MG
25 TABLET ORAL EVERY 6 HOURS PRN
Qty: 56 TABLET | DISCHARGE
Start: 2017-12-16 | End: 2019-09-17

## 2017-12-16 RX ORDER — BISACODYL 10 MG
10 SUPPOSITORY, RECTAL RECTAL DAILY PRN
Qty: 30 SUPPOSITORY | Status: ON HOLD | DISCHARGE
Start: 2017-12-16 | End: 2019-10-11

## 2017-12-16 RX ORDER — AMOXICILLIN 250 MG
1-2 CAPSULE ORAL 2 TIMES DAILY
Qty: 60 TABLET | Refills: 0 | Status: SHIPPED | OUTPATIENT
Start: 2017-12-16 | End: 2017-12-18

## 2017-12-16 RX ORDER — HYDROXYZINE HYDROCHLORIDE 25 MG/1
25-50 TABLET, FILM COATED ORAL EVERY 4 HOURS PRN
Qty: 50 TABLET | Refills: 0 | Status: SHIPPED | OUTPATIENT
Start: 2017-12-16 | End: 2017-12-18

## 2017-12-16 RX ORDER — LISINOPRIL 40 MG/1
40 TABLET ORAL DAILY
Qty: 30 TABLET | Status: ON HOLD | DISCHARGE
Start: 2017-12-16 | End: 2019-10-16

## 2017-12-16 RX ORDER — BISACODYL 10 MG
10 SUPPOSITORY, RECTAL RECTAL DAILY PRN
Status: DISCONTINUED | OUTPATIENT
Start: 2017-12-16 | End: 2017-12-16 | Stop reason: HOSPADM

## 2017-12-16 RX ORDER — ACETAMINOPHEN 325 MG/1
650 TABLET ORAL EVERY 4 HOURS PRN
Qty: 100 TABLET | Refills: 0 | Status: SHIPPED | OUTPATIENT
Start: 2017-12-16 | End: 2017-12-18

## 2017-12-16 RX ORDER — OXYCODONE HYDROCHLORIDE 5 MG/1
5-10 TABLET ORAL
Qty: 80 TABLET | Refills: 0 | Status: SHIPPED | OUTPATIENT
Start: 2017-12-16 | End: 2019-09-17

## 2017-12-16 RX ADMIN — LISINOPRIL 40 MG: 40 TABLET ORAL at 08:40

## 2017-12-16 RX ADMIN — OXYCODONE HYDROCHLORIDE 10 MG: 5 TABLET ORAL at 09:47

## 2017-12-16 RX ADMIN — ACETAMINOPHEN 975 MG: 325 TABLET, FILM COATED ORAL at 06:25

## 2017-12-16 RX ADMIN — OXYCODONE HYDROCHLORIDE 10 MG: 5 TABLET ORAL at 06:25

## 2017-12-16 RX ADMIN — LEVOTHYROXINE SODIUM 125 MCG: 125 TABLET ORAL at 06:25

## 2017-12-16 RX ADMIN — SENNOSIDES AND DOCUSATE SODIUM 2 TABLET: 8.6; 5 TABLET ORAL at 08:40

## 2017-12-16 RX ADMIN — OXYCODONE HYDROCHLORIDE 10 MG: 5 TABLET ORAL at 12:49

## 2017-12-16 RX ADMIN — BISACODYL 10 MG: 10 SUPPOSITORY RECTAL at 13:51

## 2017-12-16 RX ADMIN — ENOXAPARIN SODIUM 40 MG: 40 INJECTION SUBCUTANEOUS at 08:40

## 2017-12-16 RX ADMIN — POLYETHYLENE GLYCOL 3350 17 G: 17 POWDER, FOR SOLUTION ORAL at 08:41

## 2017-12-16 RX ADMIN — OXYCODONE HYDROCHLORIDE 5 MG: 5 TABLET ORAL at 01:24

## 2017-12-16 RX ADMIN — OXYCODONE HYDROCHLORIDE 10 MG: 5 TABLET ORAL at 15:43

## 2017-12-16 RX ADMIN — DIPHENHYDRAMINE HYDROCHLORIDE 25 MG: 25 CAPSULE ORAL at 15:43

## 2017-12-16 RX ADMIN — POLYETHYLENE GLYCOL 3350 17 G: 17 POWDER, FOR SOLUTION ORAL at 12:10

## 2017-12-16 RX ADMIN — FEXOFENADINE HYDROCHLORIDE 180 MG: 180 TABLET, FILM COATED ORAL at 08:40

## 2017-12-16 RX ADMIN — DIPHENHYDRAMINE HYDROCHLORIDE 25 MG: 25 CAPSULE ORAL at 08:53

## 2017-12-16 NOTE — PROGRESS NOTES
POD # 3  S/P Left TKA    Patient comfortable.  Pain controlled.  Tolerating oral intake.  No events overnight.     Alert and orient to person, place, and time.  Vital Sign Ranges  Temperature Temp  Av.3  F (36.8  C)  Min: 97.8  F (36.6  C)  Max: 98.8  F (37.1  C)   Blood pressure Systolic (24hrs), Av , Min:113 , Max:130        Diastolic (24hrs), Av, Min:73, Max:80      Pulse Pulse  Av  Min: 102  Max: 102   Respirations Resp  Av  Min: 16  Max: 16   Pulse oximetry SpO2  Av.2 %  Min: 96 %  Max: 100 %       Wound is clean, dry, and intact.  Bilateral calves are soft, non-tender.  Left lower extremity is NVI.      A/P  1. Left TKA   Continue Lovenox for DVT prophylaxis. Mobilize with PT/OT WBAT.  Continue current pain regiment.    2. Disposition   d/c to today.    CAW  [phone]

## 2017-12-16 NOTE — DISCHARGE INSTRUCTIONS
Patient will discharge to [facility] today via the [facility] and [facility] between 15:30 and 16:00.  [facility]'s phone number is [phone].

## 2017-12-16 NOTE — PROGRESS NOTES
SW:  D:  Received discharge orders for patient.  Bed available at [facility] for today.  Patient's family will transport, via the skyway, between 15:30 - 16:00 today.  Patient informed of the plan and in agreement to the plan.  Updated [facility] and faxed the orders.

## 2017-12-16 NOTE — PLAN OF CARE
Problem: Patient Care Overview  Goal: Plan of Care/Patient Progress Review  VSS, dressing cdi, cms intact. Rash below anterior knee. Benadryl for itching. Up with assist of 1. Had miralax PRN, no results. Oxycodone for pain.

## 2017-12-16 NOTE — PROGRESS NOTES
[facility]    Hospitalist Progress Note    Date of Service (when I saw the patient): 12/16/2017    Assessment & Plan   Aleena Ontiveros is a 57 year old female who was admitted on 12/13/2017 status post left total knee arthroplasty by Dr. Alvarez. Surgery was performed under general anesthesia with LMA; EBL 50- ml. Hospitalist Service consulted for hypotension.     Hypotension, resolved  Due to narcotic side effect. Denied headache, dizziness, chest pain, and shortness of breath. She had been receiving opiate pain medication: as of 12/14 IV Dilaudid 0.5mg at 0024, Oxycontin 12- hour at 1700 12/13 and 0500 12/14, and Oxycodone IR 10- mg at 2200 12/13, 0300, and 0700. On 12/4/2017 her TSH was 7.250 and levothyroxine was adjusted. She states she has never previously been hypotensive with stress of illness or surgery. Blood glucose 105. UOP adequate. She is not febrile and is not tachycardic. She is on appropriate DVT prophylaxis. Was drowsy, but able to wake and provide accurate history and conversation. Her respiratory status is not compromised. Hemoglobin was 11.2, pre-operatively 13. EKG without obvious acute ischemia. Discontinued Oxycontin 12- hour tablet. TnI negative x 3. Improved with IVF and bolus, will discontinue 12/15 as tolerating po intake. Hgb 10.3 on 12/15, stable compared to 8/2017.  - continue holding HCTZ  - PTA lisinopril 40mg daily today, renal function stable as of 12/16  - cautious continued use of narcotics      Personal history of hypertension, treated  She states she has been on Prinizide for approximately 10- years. No cause of hypertension was identified. She has recently lost 45- pounds intentionally through eating the Whole30 diet. Pre-operative .   - discontinue Prinizide; consider not resuming and have her follow-up in one week with PCP or TCU MD  - ACEI as above  - check BP 3x/daily at TCU      Concern for renal insufficiency   GFR on pre-operative labs with 73,  today 106.   - BMP stable 12/16  - avoid NSAIDS, including Toradol      Status post thyroidectomy for precancerous thyroid nodules, 2013  Mrs. Ontiveros notes on 12/4/2017 TSH ws 7.250. Her levothyroxine dosing was adjusted and she received a dose today. She has never had issues with mentation or blood pressure after anesthesia or illness. She is not bradycardic and her mental status is sleepy, but she wakes easily and carries on conversation.  - PTA levothyroxine  - monitor for altered mental status, bradycardia     Rash due to adhesive tape:  Noted rash around left knee bandage 12/15.  - prn benadryl    DVT Prophylaxis: Defer to primary service  Code Status: Prior    Disposition: Expected discharge per primary service.    Arabella Jara MD    Interval History   No new complaints. Denies dizziness. Ambulating with therapy. Tolerating diet. Passing gas, no BM yet.    -Data reviewed today: I reviewed all new labs and imaging results over the last 24 hours. I personally reviewed no images or EKG's today.    Physical Exam   Temp: 98.8  F (37.1  C) Temp src: Oral BP: 113/76 Pulse: 102 Heart Rate: 98 Resp: 16 SpO2: 100 % O2 Device: None (Room air)    Vitals:    12/13/17 0630   Weight: 62.6 kg (138 lb)     Vital Signs with Ranges  Temp:  [97.8  F (36.6  C)-98.8  F (37.1  C)] 98.8  F (37.1  C)  Pulse:  [102] 102  Heart Rate:  [91-99] 98  Resp:  [16] 16  BP: (113-130)/(73-80) 113/76  SpO2:  [96 %-100 %] 100 %  I/O last 3 completed shifts:  In: 1240 [P.O.:1240]  Out: 2750 [Urine:2750]    Constitutional: Awake, alert, cooperative, no apparent distress  Respiratory: Clear to auscultation bilaterally, no crackles or wheezing  Cardiovascular: Regular rate and rhythm, normal S1 and S2, and no murmur noted  GI: Normal bowel sounds, soft, non-distended, non-tender  Skin/Integumen: Mild non-raised rash noted around ACE wrap over left knee, no skin breaks/warmth, no cyanosis, L. knee bandage clean, 1+ pitting edema LLE pretibial  area    Medications        lisinopril  40 mg Oral Daily     polyethylene glycol  17 g Oral Daily     sodium chloride (PF)  3 mL Intracatheter Q8H     enoxaparin  40 mg Subcutaneous Q24H     senna-docusate  1-2 tablet Oral BID     fexofenadine (ALLEGRA) tablet 180 mg  180 mg Oral Daily     fluticasone-vilanterol  1 puff Inhalation Daily     levothyroxine  125 mcg Oral QAM AC     montelukast (SINGULAIR) tablet 10 mg  10 mg Oral At Bedtime       Data     Recent Labs  Lab 12/16/17  0646 12/15/17  0652 12/14/17  1705 12/14/17  1325 12/14/17  0940 12/14/17  0658 12/13/17  0635   HGB  --  10.3*  --   --   --  11.2*  --      --   --   --   --  242  --    NA  --   --   --   --  139  --   --    POTASSIUM  --   --   --   --  3.4  --  3.3*   CHLORIDE  --   --   --   --  102  --   --    CO2  --   --   --   --  31  --   --    BUN  --   --   --   --  7  --   --    CR 0.66  --   --   --  0.61  --   --    ANIONGAP  --   --   --   --  6  --   --    SIXTO  --   --   --   --  7.9*  --   --    GLC  --  115*  --   --  106*  --   --    TROPI  --   --  <0.015 <0.015 <0.015  --   --        No results found for this or any previous visit (from the past 24 hour(s)).

## 2017-12-16 NOTE — DISCHARGE SUMMARY
Discharge Summary    Aleena Ontiveros MRN# [id number]   YOB: 1960 Age: 57 year old     Date of Admission:  12/13/2017  Date of Discharge:  12/16/2017  Admitting Physician:  Grady Alvarez MD  Discharge Physician:  Grady Alvarez MD     Primary Provider: Darlene Conner          Admission Diagnoses:   Osteoarthritis left knee          Discharge Diagnosis:   Same           Surgical Procedure:   left knee replacement           Secondary Diagnosis:     Patient Active Problem List   Diagnosis     S/P complete thyroidectomy     S/P total thyroidectomy     S/P total knee replacement using cement, right     S/P total knee arthroplasty, right     Benign essential hypertension     Slow transit constipation     Physical deconditioning     Advance Care Planning     S/P total knee arthroplasty, left              Discharge Disposition:   Discharged to short-term care facility           Medications Prior to Admission:     Prescriptions Prior to Admission   Medication Sig Dispense Refill Last Dose     estradiol (ESTRACE) 0.1 MG/GM cream Place 2 g vaginally daily   12/10/2017     fluticasone-salmeterol (ADVAIR) 500-50 MCG/DOSE diskus inhaler Inhale 1 puff into the lungs 2 times daily   12/13/2017 at 0430     Omega-3 Fatty Acids (FISH OIL PO) Take 1 capsule by mouth daily   12/6/2017     Multiple Vitamins-Minerals (DAILY DENIZ MAXIMUM MULTIVITAMIN PO) Take 1 packet by mouth 3 times daily DOTERRA   12/6/2017     albuterol (PROAIR HFA/PROVENTIL HFA/VENTOLIN HFA) 108 (90 BASE) MCG/ACT Inhaler Inhale 2 puffs into the lungs every 6 hours as needed for shortness of breath / dyspnea or wheezing   MORE THAN A WEEK at PRN     Levothyroxine Sodium (SYNTHROID PO) Take 125 mcg by mouth daily    12/13/2017 at 0500     fluticasone (FLONASE) 50 MCG/ACT spray Spray 1 spray into both nostrils daily    12/12/2017 at PM     Fexofenadine HCl (ALLEGRA PO) Take 180 mg by mouth daily   12/12/2017 at AM      lisinopril-hydrochlorothiazide (PRINZIDE,ZESTORETIC) 20-12.5 MG per tablet Take 2 tablets by mouth daily    12/12/2017 at AM     Montelukast Sodium (SINGULAIR PO) Take 10 mg by mouth At Bedtime    12/12/2017 at PM     [DISCONTINUED] Acetaminophen (TYLENOL PO) Take 325-650 mg by mouth every 4 hours as needed for mild pain or fever   12/12/2017 at 1930             Discharge Medications:     Current Discharge Medication List      START taking these medications    Details   oxyCODONE IR (ROXICODONE) 5 MG tablet Take 1-2 tablets (5-10 mg) by mouth every 3 hours as needed for moderate to severe pain  Qty: 80 tablet, Refills: 0    Associated Diagnoses: S/P total knee arthroplasty, left      hydrOXYzine (ATARAX) 25 MG tablet Take 1-2 tablets (25-50 mg) by mouth every 4 hours as needed (pain/muscle spasms)  Qty: 50 tablet, Refills: 0    Associated Diagnoses: S/P total knee arthroplasty, left      enoxaparin (LOVENOX) 40 MG/0.4ML injection Inject 0.4 mLs (40 mg) Subcutaneous every 24 hours for 11 days  Qty: 4.4 mL, Refills: 0    Associated Diagnoses: S/P total knee arthroplasty, left      senna-docusate (SENOKOT-S;PERICOLACE) 8.6-50 MG per tablet Take 1-2 tablets by mouth 2 times daily  Qty: 60 tablet, Refills: 0    Associated Diagnoses: S/P total knee arthroplasty, left         CONTINUE these medications which have CHANGED    Details   acetaminophen (TYLENOL) 325 MG tablet Take 2 tablets (650 mg) by mouth every 4 hours as needed for other (surgical pain)  Qty: 100 tablet, Refills: 0    Associated Diagnoses: S/P total knee arthroplasty, left         CONTINUE these medications which have NOT CHANGED    Details   estradiol (ESTRACE) 0.1 MG/GM cream Place 2 g vaginally daily      fluticasone-salmeterol (ADVAIR) 500-50 MCG/DOSE diskus inhaler Inhale 1 puff into the lungs 2 times daily      Omega-3 Fatty Acids (FISH OIL PO) Take 1 capsule by mouth daily      Multiple Vitamins-Minerals (DAILY DENIZ MAXIMUM MULTIVITAMIN PO) Take 1  packet by mouth 3 times daily DOTERRA      albuterol (PROAIR HFA/PROVENTIL HFA/VENTOLIN HFA) 108 (90 BASE) MCG/ACT Inhaler Inhale 2 puffs into the lungs every 6 hours as needed for shortness of breath / dyspnea or wheezing      Levothyroxine Sodium (SYNTHROID PO) Take 125 mcg by mouth daily       fluticasone (FLONASE) 50 MCG/ACT spray Spray 1 spray into both nostrils daily       Fexofenadine HCl (ALLEGRA PO) Take 180 mg by mouth daily      lisinopril-hydrochlorothiazide (PRINZIDE,ZESTORETIC) 20-12.5 MG per tablet Take 2 tablets by mouth daily       Montelukast Sodium (SINGULAIR PO) Take 10 mg by mouth At Bedtime                    Consultations:   Consultation during this admission received from internal medicine           Hospital Course:   The patient was admitted after the surical procedure. The patient underwent an uneventfulleft knee replacement. Postoperatively, anticoagulation  with Lovenox was started. No transfusion was required. The patient will be discharged to short-term care facility. Home medications have been reconciled. oxycodone 5 mg. was prescribed for pain. Lovenox  will be prescribed.             Pending Results:   None           Discharge Instructions and Follow-Up:        Discharge activity: Activity as tolerated   Discharge follow-up: Follow up with Dr. Alvarez in 2 weeks   Outpatient therapy: None    Home Care agency: None    Supplies and equipment: None        Wound care: dry dressing daily and as needed   Other instructions: None

## 2017-12-16 NOTE — PLAN OF CARE
Problem: Patient Care Overview  Goal: Plan of Care/Patient Progress Review  Outcome: Improving  A&O x4 VSS on RA CMS intact Dressing C/D/I Up with A1 Voiding per BR, BM x1 Taking oxycodone for pain Progressing per plan of care. Pt d/c'd to TCU, packet and prescriptions sent with pt.

## 2017-12-16 NOTE — PLAN OF CARE
Problem: Patient Care Overview  Goal: Plan of Care/Patient Progress Review  Discharge Planner PT   Patient plan for discharge: TCU  Current status: Pt performed supine TKA exercises. L knee ROM: 5 ext - 67 flex degrees. Supine <> sit Walter x1, support required for L leg due to pain. Sit <> stand CGA and FWW. Pt ambulated 170' with SBA and FWW. Demonstrated L hip circumduction with gait, correct with VC for knee flexion.   Barriers to return to prior living situation: dec activity tolerance, pain, no assist at home  Recommendations for discharge: TCU  Rationale for recommendations: Pt would benefit from continued skilled PT service to increase independence and safety with mobility. Pt doesn't have assistance at home for mobility as she is primary caretaker of .        Entered by: Lupe Katz 12/16/2017 9:27 AM     Physical Therapy Discharge Summary    Reason for therapy discharge:    Discharged to transitional care facility.    Progress towards therapy goal(s). See goals on Care Plan in [facility] electronic health record for goal details.  Goals not met.  Barriers to achieving goals:   discharge from facility.    Therapy recommendation(s):    Continued therapy is recommended.  Rationale/Recommendations:  incease independence and safety with mobility.

## 2017-12-16 NOTE — PLAN OF CARE
Problem: Patient Care Overview  Goal: Plan of Care/Patient Progress Review  Outcome: Improving  Up with 1 and a walker. Pt states pain well controlled with oxycodone. Planning to D/C to TCU this afternoon.

## 2017-12-16 NOTE — PROVIDER NOTIFICATION
Notified Dr. Alvarez regarding pt PRISCILA hose order for knee high and pt states she was told to use the Priscila hose to cover the dressing. MD stated to change the order to thigh high and to use the priscila hose to keep the dressing in place due to pt swelling and tape reaction.

## 2017-12-16 NOTE — PLAN OF CARE
Problem: Patient Care Overview  Goal: Plan of Care/Patient Progress Review  Outcome: Improving  Progressing per plan of the care.Discharge to TCU today.

## 2017-12-18 ENCOUNTER — NURSING HOME VISIT (OUTPATIENT)
Dept: GERIATRICS | Facility: CLINIC | Age: 57
End: 2017-12-18
Payer: COMMERCIAL

## 2017-12-18 VITALS
SYSTOLIC BLOOD PRESSURE: 127 MMHG | RESPIRATION RATE: 12 BRPM | DIASTOLIC BLOOD PRESSURE: 83 MMHG | HEIGHT: 62 IN | OXYGEN SATURATION: 97 % | TEMPERATURE: 99.2 F | HEART RATE: 96 BPM | WEIGHT: 147.2 LBS | BODY MASS INDEX: 27.09 KG/M2

## 2017-12-18 DIAGNOSIS — D62 ANEMIA DUE TO BLOOD LOSS, ACUTE: ICD-10-CM

## 2017-12-18 DIAGNOSIS — M17.12 PRIMARY OSTEOARTHRITIS OF LEFT KNEE: Primary | ICD-10-CM

## 2017-12-18 DIAGNOSIS — J45.20 INTERMITTENT ASTHMA WITHOUT COMPLICATION, UNSPECIFIED ASTHMA SEVERITY: ICD-10-CM

## 2017-12-18 DIAGNOSIS — I10 BENIGN ESSENTIAL HYPERTENSION: ICD-10-CM

## 2017-12-18 DIAGNOSIS — Z71.89 ADVANCED DIRECTIVES, COUNSELING/DISCUSSION: Chronic | ICD-10-CM

## 2017-12-18 DIAGNOSIS — K59.01 SLOW TRANSIT CONSTIPATION: ICD-10-CM

## 2017-12-18 DIAGNOSIS — Z96.652 S/P TOTAL KNEE ARTHROPLASTY, LEFT: ICD-10-CM

## 2017-12-18 PROBLEM — M17.10 PRIMARY OSTEOARTHRITIS OF KNEE: Status: ACTIVE | Noted: 2017-12-18

## 2017-12-18 LAB — INTERPRETATION ECG - MUSE: NORMAL

## 2017-12-18 PROCEDURE — 99310 SBSQ NF CARE HIGH MDM 45: CPT | Performed by: NURSE PRACTITIONER

## 2017-12-18 RX ORDER — POLYETHYLENE GLYCOL 3350 17 G/17G
17 POWDER, FOR SOLUTION ORAL 2 TIMES DAILY PRN
Status: ON HOLD | COMMUNITY
End: 2019-10-03

## 2017-12-18 RX ORDER — MULTIPLE VITAMINS W/ MINERALS TAB 9MG-400MCG
1 TAB ORAL DAILY
COMMUNITY
End: 2019-09-17

## 2017-12-18 RX ORDER — AMOXICILLIN 250 MG
2 CAPSULE ORAL DAILY PRN
Status: ON HOLD | COMMUNITY
End: 2019-10-03

## 2017-12-18 NOTE — PROGRESS NOTES
[place] [facility]  PRIMARY CARE PROVIDER AND CLINIC:  Arabella Conner [address] / [place] [address]  Chief Complaint   Patient presents with     Hospital F/U       HPI:    Aleena Ontiveros is a 57 year old  (1960),admitted to the [facility] TCU from [facility].  Hospital stay 12/13/2017 through 12/16/2017.  Admitted to this facility for  rehab, medical management and nursing care.  HPI information obtained from: facility chart records, facility staff, patient report and [facility] chart review.    She is status post left TKA for osteoarthritis 12/13/2017 by Dr Alvarez. Tolerated the procedure well. Post op course complicated by hypotension, felt to be related to narcotics. Oxycontin and dilaudid discontinued with improvement. Prinizide discontinued and lisinopril resumed.   Hgb 10.3 at discharge. Lovenox started for DVT prophylaxis.     Current issues are:         Primary osteoarthritis of left knee-reports pain with therapies Relieved with oxycodone. She would like to have ice available more often. Ambulating with walker and stand by assist.   S/P total knee arthroplasty, left  Slow transit constipation-no BM since receiving an enema prior to hospital discharge. No nausea or vomiting. Poor appetite and feels bloated. No urinary symptoms.   Anemia due to blood loss, acute-pre op Hgb 13  Intermittent asthma without complication, unspecified asthma severity-denies cough, shortness of breath or chest pain  Benign essential hypertension-BP's: 127/83, 128/80, 106/70  HR: 74-96    CODE STATUS/ADVANCE DIRECTIVES DISCUSSION:   CPR/Full code   Patient's living condition: lives with spouse    ALLERGIES:Adhesive tape and Erythromycin  PAST MEDICAL HISTORY:  has a past medical history of Arthritis; Asthma; Hypertension; PONV (postoperative nausea and vomiting); Thyroid disease; and Uncomplicated asthma. She also has no past medical history of Difficult  intubation; Malignant hyperthermia; or Spinal headache.  PAST SURGICAL HISTORY:  has a past surgical history that includes Abdomen surgery (1988,1999); orthopedic surgery; Thyroidectomy (5/13/2014); ENT surgery; back surgery; GYN surgery (5-13-13); thumb mass resection; Arthroplasty knee (Right, 8/10/2017); and Arthroplasty knee (Left, 12/13/2017).  FAMILY HISTORY: family history includes Breast Cancer in her mother; C.A.D. in her father; CEREBROVASCULAR DISEASE in her father; Cancer - colorectal in her father; Thyroid Disease in her brother and sister.  SOCIAL HISTORY:  reports that she has never smoked. She has never used smokeless tobacco. She reports that she drinks alcohol. She reports that she does not use illicit drugs.    Post Discharge Medication Reconciliation Status: discharge medications reconciled, continue medications without change.  Current Outpatient Prescriptions   Medication Sig Dispense Refill     HYDROXYZINE PAMOATE PO Take 25 mg by mouth every 4 hours as needed for itching       polyethylene glycol (MIRALAX/GLYCOLAX) powder Take 17 g by mouth 2 times daily       multivitamin, therapeutic with minerals (MULTI-VITAMIN) TABS tablet Take 1 tablet by mouth daily       senna-docusate (SENOKOT-S;PERICOLACE) 8.6-50 MG per tablet Take 2 tablets by mouth 2 times daily       Acetaminophen (TYLENOL EXTRA STRENGTH PO) Take 100 mg by mouth 2 times daily And 1000mg daily as needed.       oxyCODONE IR (ROXICODONE) 5 MG tablet Take 1-2 tablets (5-10 mg) by mouth every 3 hours as needed for moderate to severe pain 80 tablet 0     enoxaparin (LOVENOX) 40 MG/0.4ML injection Inject 0.4 mLs (40 mg) Subcutaneous every 24 hours for 11 days 4.4 mL 0     lisinopril (PRINIVIL/ZESTRIL) 40 MG tablet Take 1 tablet (40 mg) by mouth daily 30 tablet      diphenhydrAMINE (BENADRYL ALLERGY) 25 MG tablet Take 1 tablet (25 mg) by mouth every 6 hours as needed for itching or allergies 56 tablet      bisacodyl (DULCOLAX) 10 MG  "Suppository Place 1 suppository (10 mg) rectally daily as needed for constipation 30 suppository      estradiol (ESTRACE) 0.1 MG/GM cream Place 2 g vaginally At Bedtime        fluticasone-salmeterol (ADVAIR) 500-50 MCG/DOSE diskus inhaler Inhale 1 puff into the lungs 2 times daily       Omega-3 Fatty Acids (FISH OIL PO) Take 1 capsule by mouth daily       Multiple Vitamins-Minerals (DAILY DENIZ MAXIMUM MULTIVITAMIN PO) Take 1 packet by mouth 3 times daily DOTERRA       albuterol (PROAIR HFA/PROVENTIL HFA/VENTOLIN HFA) 108 (90 BASE) MCG/ACT Inhaler Inhale 2 puffs into the lungs every 6 hours as needed for shortness of breath / dyspnea or wheezing       Levothyroxine Sodium (SYNTHROID PO) Take 125 mcg by mouth daily        fluticasone (FLONASE) 50 MCG/ACT spray Spray 1 spray into both nostrils 2 times daily        Fexofenadine HCl (ALLEGRA PO) Take 180 mg by mouth daily       Montelukast Sodium (SINGULAIR PO) Take 10 mg by mouth At Bedtime          ROS:  10 point ROS of systems including Constitutional, Eyes, Respiratory, Cardiovascular, Gastroenterology, Genitourinary, Integumentary, Muscularskeletal, Psychiatric were all negative except for pertinent positives noted in my HPI.    Exam:  /83  Pulse 96  Temp 99.2  F (37.3  C)  Resp 12  Ht 5' 2\" (1.575 m)  Wt 147 lb 3.2 oz (66.8 kg)  SpO2 97%  BMI 26.92 kg/m2  GENERAL APPEARANCE:  Alert, in no distress, appears healthy  ENT:  Mouth and posterior oropharynx normal, moist mucous membranes, normal hearing acuity  EYES:  EOM normal, conjunctiva and lids normal, PERRL  NECK:  No adenopathy,masses or thyromegaly  RESP:  respiratory effort and palpation of chest normal, lungs clear to auscultation , no respiratory distress  CV:  Palpation and auscultation of heart done , regular rate and rhythm, no murmur, no edema, +2 pedal pulses  ABDOMEN:  normal bowel sounds, soft, nontender, no hepatosplenomegaly or other masses  M/S:   in bed. Mild edema and decreased ROM " left knee. Good strength and ROM other extremities. No joint inflammation  SKIN:  left knee incision with staples clean, dry, intact, no erythema. No rashes or open areas  PSYCH:  oriented X 3, normal insight, judgement and memory    Lab/Diagnostic data:  Last Basic Metabolic Panel:  Lab Results   Component Value Date     12/14/2017      Lab Results   Component Value Date    POTASSIUM 3.4 12/14/2017     Lab Results   Component Value Date    CHLORIDE 102 12/14/2017     Lab Results   Component Value Date    SIXTO 7.9 12/14/2017     Lab Results   Component Value Date    CO2 31 12/14/2017     Lab Results   Component Value Date    BUN 7 12/14/2017     Lab Results   Component Value Date    CR 0.66 12/16/2017     Lab Results   Component Value Date     12/15/2017     Lab Results   Component Value Date    HGB 10.3 12/15/2017     Lab Results   Component Value Date     12/16/2017     ASSESSMENT / PLAN:  (M17.12) Primary osteoarthritis of left knee  (primary encounter diagnosis)  (Z96.652) S/P total knee arthroplasty, left  Comment: incision healing without signs of infection. Fair pain control.   Plan: PHYSICAL THERAPY/OT. Schedule tylenol, continue prn oxycodone and hydroxyzine. Ice prn. Continue lovenox through 12/27/2017. Daily dry dressing change. Ortho follow up 2 weeks.     (K59.01) Slow transit constipation  Comment: due to surgery, narcotics, impaired mobility  Plan: increase senna to 2 bid, start miralax bid. Suppository today    (D62) Anemia due to blood loss, acute  Comment: mild  Plan: Hgb    (J45.20) Intermittent asthma without complication, unspecified asthma severity  Comment: no acute issues  Plan: continue albuterol prn, fexofenadine, Advair, flonase. Encourage IS.     (I10) Benign essential hypertension  Comment: controlled  Plan: continue lisinopril. Monitor VS. BMP    (Z71.89) Advance Care Planning  Comment: does not have a health care directive. Confirms Full Code  Plan: POLST  completed    Total time spent with patient visit at the skilled nursing facility was 35 minutes including patient visit, review of past records. Greater than 50% of total time spent with counseling and coordinating care due to complexity of care.     Electronically signed by:  JAZMIN Marquis CNP

## 2017-12-21 ENCOUNTER — HOSPITAL LABORATORY (OUTPATIENT)
Dept: OTHER | Facility: CLINIC | Age: 57
End: 2017-12-21

## 2017-12-21 LAB
ANION GAP SERPL CALCULATED.3IONS-SCNC: 9 MMOL/L (ref 3–14)
BUN SERPL-MCNC: 11 MG/DL (ref 7–30)
CALCIUM SERPL-MCNC: 9 MG/DL (ref 8.5–10.1)
CHLORIDE SERPL-SCNC: 104 MMOL/L (ref 94–109)
CO2 SERPL-SCNC: 24 MMOL/L (ref 20–32)
CREAT SERPL-MCNC: 0.56 MG/DL (ref 0.52–1.04)
GFR SERPL CREATININE-BSD FRML MDRD: >90 ML/MIN/1.7M2
GLUCOSE SERPL-MCNC: 111 MG/DL (ref 70–99)
HGB BLD-MCNC: 11.3 G/DL (ref 11.7–15.7)
POTASSIUM SERPL-SCNC: 4 MMOL/L (ref 3.4–5.3)
SODIUM SERPL-SCNC: 137 MMOL/L (ref 133–144)

## 2017-12-22 ENCOUNTER — DISCHARGE SUMMARY NURSING HOME (OUTPATIENT)
Dept: GERIATRICS | Facility: CLINIC | Age: 57
End: 2017-12-22
Payer: COMMERCIAL

## 2017-12-22 VITALS
WEIGHT: 144.6 LBS | SYSTOLIC BLOOD PRESSURE: 133 MMHG | HEART RATE: 79 BPM | OXYGEN SATURATION: 98 % | BODY MASS INDEX: 26.61 KG/M2 | DIASTOLIC BLOOD PRESSURE: 86 MMHG | RESPIRATION RATE: 16 BRPM | TEMPERATURE: 98.1 F | HEIGHT: 62 IN

## 2017-12-22 DIAGNOSIS — D62 ANEMIA DUE TO BLOOD LOSS, ACUTE: ICD-10-CM

## 2017-12-22 DIAGNOSIS — Z96.652 S/P TOTAL KNEE ARTHROPLASTY, LEFT: ICD-10-CM

## 2017-12-22 DIAGNOSIS — M17.12 PRIMARY OSTEOARTHRITIS OF LEFT KNEE: Primary | ICD-10-CM

## 2017-12-22 DIAGNOSIS — J45.20 INTERMITTENT ASTHMA WITHOUT COMPLICATION, UNSPECIFIED ASTHMA SEVERITY: ICD-10-CM

## 2017-12-22 DIAGNOSIS — I10 BENIGN ESSENTIAL HYPERTENSION: ICD-10-CM

## 2017-12-22 DIAGNOSIS — R53.81 PHYSICAL DECONDITIONING: ICD-10-CM

## 2017-12-22 DIAGNOSIS — K59.01 SLOW TRANSIT CONSTIPATION: ICD-10-CM

## 2017-12-22 PROCEDURE — 99316 NF DSCHRG MGMT 30 MIN+: CPT | Performed by: NURSE PRACTITIONER

## 2017-12-22 NOTE — PROGRESS NOTES
[place] [facility] DISCHARGE SUMMARY    PATIENT'S NAME: Aleena Ontiveros  YOB: 1960  MEDICAL RECORD NUMBER:  [id number]    PRIMARY CARE PROVIDER AND CLINIC RESPONSIBLE AFTER TRANSFER: Arabella Conner [address] / [place] [address]     CODE STATUS/ADVANCE DIRECTIVES DISCUSSION:   CPR/Full code        Allergies   Allergen Reactions     Adhesive Tape      Erythromycin Nausea and Vomiting     Pills cause vomiting,        TRANSFERRING PROVIDERS: JAZMIN Marquis CNP, Benjamin Borrego MD  DATE OF SNF ADMISSION:  December / 16 / 2017  DATE OF SNF (anticipated) DISCHARGE: December / 24 / 2017  DISCHARGE DISPOSITION: [facility] Provider   Nursing Facility: [facility] stay 12/13/2017 to 12/16/2017.     Condition on Discharge:  Improving.    Equipment: walker    DISCHARGE DIAGNOSIS:   1. Primary osteoarthritis of left knee    2. S/P total knee arthroplasty, left    3. Slow transit constipation    4. Anemia due to blood loss, acute    5. Intermittent asthma without complication, unspecified asthma severity    6. Benign essential hypertension    7. Physical deconditioning        HPI Nursing Facility Course:  HPI information obtained from: facility chart records, facility staff, patient report and [facility] chart review. She came to this facility for short term rehab and medical management status post left TKA for osteoarthritis 12/13/2017 by Dr Alvarez. Tolerated the procedure well. Post op course complicated by hypotension, felt to be related to narcotics. Oxycontin and dilaudid discontinued with improvement. Prinizide was discontinued and lisinopril prior to hospital resumed. Lovenox started for DVT prophylaxis. Hgb 10.3 at hospital discharge.     She has worked with PHYSICAL THERAPY and OT with good results. Ambulating 300 ft with walker and supervision. Requires stand by to min assist with bathing, otherwise independent with  cares.   ASSESSMENT / PLAN:  (M17.12) Primary osteoarthritis of left knee  (primary encounter diagnosis)  (Z96.652) S/P total knee arthroplasty, left  Comment: incision healing without signs of infection. Pain and mobility improved  Plan: discharge home with . Continue tylenol, oxycodone, hydroxyzine prn. Last dose lovenox 12/27/2017. Ortho follow up 12/27/2017.     (K59.01) Slow transit constipation  Comment: improved with current regimen  Plan: continue bowel meds while on narcotic    (D62) Anemia due to blood loss, acute  Comment: Hgb improved  Plan: follow up labs per PCP    (J45.20) Intermittent asthma without complication, unspecified asthma severity  Comment: no acute issues during tcu stay  Plan: continue fexofenadine, Advair, flonase, albuterol prn.     (I10) Benign essential hypertension  Comment: controlled with BPs: 133/86, 122/75, 134/83   HR: 79-96. Renal function at baseline.   Plan: continue lisinopril.    (R53.81) Physical deconditioning  Comment: progress in therapies as above  Plan: outpatient PHYSICAL THERAPY at [place] [facility] for ongoing gait training, endurance and ROM left knee.     PAST MEDICAL HISTORY:  has a past medical history of Arthritis; Asthma; Hypertension; PONV (postoperative nausea and vomiting); Thyroid disease; and Uncomplicated asthma. She also has no past medical history of Difficult intubation; Malignant hyperthermia; or Spinal headache.    DISCHARGE MEDICATIONS:  Current Outpatient Prescriptions   Medication Sig Dispense Refill     HYDROXYZINE PAMOATE PO Take 25 mg by mouth every 4 hours as needed for itching       polyethylene glycol (MIRALAX/GLYCOLAX) powder Take 17 g by mouth 2 times daily       multivitamin, therapeutic with minerals (MULTI-VITAMIN) TABS tablet Take 1 tablet by mouth daily       senna-docusate (SENOKOT-S;PERICOLACE) 8.6-50 MG per tablet Take 2 tablets by mouth 2 times daily       oxyCODONE IR (ROXICODONE) 5 MG tablet Take 1-2 tablets (5-10 mg) by mouth  every 3 hours as needed for moderate to severe pain 80 tablet 0     enoxaparin (LOVENOX) 40 MG/0.4ML injection Inject 0.4 mLs (40 mg) Subcutaneous every 24 hours for 11 days 4.4 mL 0     lisinopril (PRINIVIL/ZESTRIL) 40 MG tablet Take 1 tablet (40 mg) by mouth daily 30 tablet      diphenhydrAMINE (BENADRYL ALLERGY) 25 MG tablet Take 1 tablet (25 mg) by mouth every 6 hours as needed for itching or allergies 56 tablet      bisacodyl (DULCOLAX) 10 MG Suppository Place 1 suppository (10 mg) rectally daily as needed for constipation 30 suppository      estradiol (ESTRACE) 0.1 MG/GM cream Place 2 g vaginally At Bedtime        fluticasone-salmeterol (ADVAIR) 500-50 MCG/DOSE diskus inhaler Inhale 1 puff into the lungs 2 times daily       Omega-3 Fatty Acids (FISH OIL PO) Take 1 capsule by mouth daily       Multiple Vitamins-Minerals (DAILY DENIZ MAXIMUM MULTIVITAMIN PO) Take 1 packet by mouth 3 times daily DOTERRA       albuterol (PROAIR HFA/PROVENTIL HFA/VENTOLIN HFA) 108 (90 BASE) MCG/ACT Inhaler Inhale 2 puffs into the lungs every 6 hours as needed for shortness of breath / dyspnea or wheezing       Levothyroxine Sodium (SYNTHROID PO) Take 125 mcg by mouth daily        fluticasone (FLONASE) 50 MCG/ACT spray Spray 1 spray into both nostrils 2 times daily        Fexofenadine HCl (ALLEGRA PO) Take 180 mg by mouth daily       Montelukast Sodium (SINGULAIR PO) Take 10 mg by mouth At Bedtime          MEDICATION CHANGES/RATIONALE:   Tylenol scheduled for pain  Bowel regimen  Controlled medications sent with patient: Script for oxycodone medication for 60 tabs and 0 refills given to patient at dischage to have them fill at their out patient pharmacy     ROS:    10 point ROS of systems including Constitutional, Eyes, Respiratory, Cardiovascular, Gastroenterology, Genitourinary, Integumentary, Muscularskeletal, Psychiatric were all negative except for pertinent positives noted in my HPI.    Physical Exam:   Vitals: /86  Pulse  "79  Temp 98.1  F (36.7  C)  Resp 16  Ht 5' 2\" (1.575 m)  Wt 144 lb 9.6 oz (65.6 kg)  SpO2 98%  BMI 26.45 kg/m2  BMI= Body mass index is 26.45 kg/(m^2).    GENERAL APPEARANCE:  Alert, in no distress, appears healthy  ENT:  Mouth and posterior oropharynx normal, moist mucous membranes, normal hearing acuity  EYES:  EOM normal, conjunctiva and lids normal  NECK:  No adenopathy,masses or thyromegaly  RESP:  respiratory effort and palpation of chest normal, lungs clear to auscultation , no respiratory distress  CV:  Palpation and auscultation of heart done , regular rate and rhythm, no murmur, no edema, +2 pedal pulses  ABDOMEN: soft, nontender, no hepatosplenomegaly or other masses  M/S:   Gait steady.  Mild edema and decreased ROM left knee. Good strength and ROM other extremities. No joint inflammation  SKIN:  left knee incision  clean, dry, intact, no erythema. No rashes or open areas  PSYCH:  oriented X 3, normal insight, judgement and memory    DISCHARGE PLAN:  outpatient PT at [facility]  Patient instructed to follow-up with:  PCP in 7 days      Current [place] scheduled appointments:  No future appointments.    MTM referral needed and placed by this provider: No    Pending labs: None  SNF labs   Last Basic Metabolic Panel:  Lab Results   Component Value Date     12/21/2017      Lab Results   Component Value Date    POTASSIUM 4.0 12/21/2017     Lab Results   Component Value Date    CHLORIDE 104 12/21/2017     Lab Results   Component Value Date    SIXTO 9.0 12/21/2017     Lab Results   Component Value Date    CO2 24 12/21/2017     Lab Results   Component Value Date    BUN 11 12/21/2017     Lab Results   Component Value Date    CR 0.56 12/21/2017     Lab Results   Component Value Date     12/21/2017     Lab Results   Component Value Date    HGB 11.3 12/21/2017     Lab Results   Component Value Date     12/16/2017     Discharge Treatments: Keep incision clean and dry    TOTAL DISCHARGE " TIME:   Greater than 30 minutes  Electronically signed by:  JAZMIN Marquis CNP

## 2017-12-27 ENCOUNTER — CARE COORDINATION (OUTPATIENT)
Dept: CARE COORDINATION | Facility: CLINIC | Age: 57
End: 2017-12-27

## 2017-12-27 NOTE — PROGRESS NOTES
Clinic Care Coordination Contact  [facility]/Voicemail    Referral Source: IP/TCU Report  Clinical Data: Care Coordinator Outreach  Outreach attempted x 1.  Left message on voicemail with call back information and requested return call.  Plan:  Care Coordinator will try to reach patient again in 3-5 business days.    Erika Martines RN  [facility]  Care Coordination  [phone]

## 2019-03-21 ENCOUNTER — TRANSFERRED RECORDS (OUTPATIENT)
Dept: HEALTH INFORMATION MANAGEMENT | Facility: CLINIC | Age: 59
End: 2019-03-21

## 2019-04-05 ENCOUNTER — TRANSFERRED RECORDS (OUTPATIENT)
Dept: HEALTH INFORMATION MANAGEMENT | Facility: CLINIC | Age: 59
End: 2019-04-05

## 2019-04-10 ENCOUNTER — TRANSFERRED RECORDS (OUTPATIENT)
Dept: HEALTH INFORMATION MANAGEMENT | Facility: CLINIC | Age: 59
End: 2019-04-10

## 2019-04-11 ENCOUNTER — TRANSFERRED RECORDS (OUTPATIENT)
Dept: HEALTH INFORMATION MANAGEMENT | Facility: CLINIC | Age: 59
End: 2019-04-11

## 2019-04-23 ENCOUNTER — MEDICAL CORRESPONDENCE (OUTPATIENT)
Dept: HEALTH INFORMATION MANAGEMENT | Facility: CLINIC | Age: 59
End: 2019-04-23

## 2019-04-23 DIAGNOSIS — M41.9 SCOLIOSIS: Primary | ICD-10-CM

## 2019-04-23 NOTE — TELEPHONE ENCOUNTER
RECORDS RECEIVED FROM: Consult for Scoliosis, needing surgery, [facility], [facility]/CT done 2 weeks ago, per Pt, will have referral/recs sent, ok'd per Brendan Valentin   DATE RECEIVED: May 2, 2019    NOTES STATUS DETAILS   OFFICE NOTE from referring provider Received Dr. Aguilar 3/21/19   OFFICE NOTE from other specialist N/A    DISCHARGE SUMMARY from hospital N/A    DISCHARGE REPORT from the ER N/A    OPERATIVE REPORT N/A    MEDICATION LIST Received    IMPLANT RECORD/STICKER N/A    LABS     CBC/DIFF N/A    CULTURES N/A    INJECTIONS DONE IN RADIOLOGY N/A    MRI Received 4/7/19...    CT SCAN Received 4/5/19    XRAYS (IMAGES & REPORTS) Received 4/11/19...   TUMOR     PATHOLOGY  Slides & report N/A      04/24/19   8:34 AM  Called [facility] for imaging, lvm  Faxed request to  [facility]  8:59 AM received [facility] images.

## 2019-04-24 ENCOUNTER — DOCUMENTATION ONLY (OUTPATIENT)
Dept: CARE COORDINATION | Facility: CLINIC | Age: 59
End: 2019-04-24

## 2019-04-25 ENCOUNTER — HOSPITAL ENCOUNTER (OUTPATIENT)
Dept: MAMMOGRAPHY | Facility: CLINIC | Age: 59
Discharge: HOME | End: 2019-04-25
Attending: FAMILY MEDICINE | Admitting: FAMILY MEDICINE
Payer: COMMERCIAL

## 2019-04-25 DIAGNOSIS — Z12.31 VISIT FOR SCREENING MAMMOGRAM: ICD-10-CM

## 2019-04-25 PROCEDURE — 77063 BREAST TOMOSYNTHESIS BI: CPT

## 2019-05-01 NOTE — PROGRESS NOTES
Spine Surgical Hx:  1974 - PISF with Carpio rods x 2 (Dr. Bandar Rose, [place]) for AIS.      REASON FOR CONSULTATION: Consult For of the Spine and Consult For (Scoliosis, needing surgery )     REFERRING PHYSICIAN: Jasson Aguilar   PCP:Arabella Conner    History of Present Illness:    58/f, RHD, with a PMH of HTN and asthma, also s/p T4-L4 posterior instrumentation and fusion in 1974 with Dr. Rose at [facility], who presents for evaluation of low back pain and bilateral leg symptoms. The patient reports that she first developed low back pain in about 1980. She saw Dr. Rose for this in '80 at [facility], at which point she was found to have L5-S1 spondylolisthesis and was offered activity restrictions vs. fusion. At that time, her pain was mild and she chose to hold off on surgery.     In February of 2018, she noticed that her pain had become progressively worse. She saw Dr. Aguilar at [facility] and was offered surgery, however she was not able to move forward with this due to insurance coverage issues.    Today, she describes her symptoms as 99% low back pain and 1% L>R leg symptoms. Her low back pain is located to the right of midline, and radiates to the lateral aspect of her thigh, the lateral aspect of her leg, and the lateral and plantar aspects of her foot bilaterally. She also has numbness in the same distribution. Her pain is worse with standing and laying down. Her pain is also worse with leaning forward. She denies weakness, but reports impaired coordination of her legs and impaired balance. She has been using a walking stick on occasion, which she started using after her knee replacements in 2018.    She also has intermittent radiating pain in her right arm that extends from her neck to her right posterior arm. She denies neck pain, but reports neck stiffness. She reports right upper extremity weakness.    She is currently taking Celebrex 200mg every day and  gabapentin 300mg ever night for her symptoms. She was previously doing PT, which he felt helped her symptoms, however she has not been in 3-4 months because she got busy at work.      ROS:  A 12-point review of systems was completed and is negative except for otherwise noted above in the history of present illness.    Med Hx:  Past Medical History:   Diagnosis Date     Arthritis     osteoarthritis of both knee     Asthma      Hypertension      PONV (postoperative nausea and vomiting)      Thyroid disease      Uncomplicated asthma        Surg Hx:  Past Surgical History:   Procedure Laterality Date     ABDOMEN SURGERY  ,         ARTHROPLASTY KNEE Right 8/10/2017    Procedure: ARTHROPLASTY KNEE;  RIGHT TOTAL KNEE ARTHROPLASTY ;  Surgeon: Grady Alvarez MD;  Location:  OR     ARTHROPLASTY KNEE Left 2017    Procedure: ARTHROPLASTY KNEE;  LEFT TOTAL KNEE ARTHROPLASTY;  Surgeon: Grady Alvarez MD;  Location:  OR     BACK SURGERY      spinal fusion     ENT SURGERY      tonsilectomy     GYN SURGERY  13    hysterectomy     ORTHOPEDIC SURGERY      sinal fusion,hand     thumb mass resection       THYROIDECTOMY  2014    Procedure: THYROIDECTOMY;  Surgeon: Krzysztof Marquez MD;  Location:  SD       Allergies:  Allergies   Allergen Reactions     Adhesive Tape      Erythromycin Nausea and Vomiting     Pills cause vomiting,        Meds:  Current Outpatient Medications   Medication     albuterol (PROAIR HFA/PROVENTIL HFA/VENTOLIN HFA) 108 (90 BASE) MCG/ACT Inhaler     cyclobenzaprine (FLEXERIL) 10 MG tablet     diphenhydrAMINE (BENADRYL ALLERGY) 25 MG tablet     Fexofenadine HCl (ALLEGRA PO)     fluticasone (FLONASE) 50 MCG/ACT spray     fluticasone-salmeterol (ADVAIR) 500-50 MCG/DOSE diskus inhaler     gabapentin (NEURONTIN) 300 MG capsule     HYDROXYZINE PAMOATE PO     Levothyroxine Sodium (SYNTHROID PO)     lisinopril (PRINIVIL/ZESTRIL) 40 MG tablet     bisacodyl (DULCOLAX) 10  "MG Suppository     estradiol (ESTRACE) 0.1 MG/GM cream     Montelukast Sodium (SINGULAIR PO)     Multiple Vitamins-Minerals (DAILY DENIZ MAXIMUM MULTIVITAMIN PO)     multivitamin, therapeutic with minerals (MULTI-VITAMIN) TABS tablet     Omega-3 Fatty Acids (FISH OIL PO)     oxyCODONE IR (ROXICODONE) 5 MG tablet     polyethylene glycol (MIRALAX/GLYCOLAX) powder     senna-docusate (SENOKOT-S;PERICOLACE) 8.6-50 MG per tablet     No current facility-administered medications for this visit.        Fam Hx:  Family History   Problem Relation Age of Onset     Breast Cancer Mother      Cancer - colorectal Father      C.A.D. 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 Denies smoking and illicit drug use.    Physical Exam:  Vitals: Ht 1.588 m (5' 2.5\")   Wt 70.4 kg (155 lb 3.2 oz)   BMI 27.93 kg/m    Constitutional: awake, alert, cooperative, no apparent distress, appears stated age.    Eyes: The sclera are white.  Ears, Nose, Throat: The trachea is midline.  Psychiatric: The patient has a normal affect.  Respiratory: breathing non-labored  Cardiovascular: The extremities are warm and perfused.  Skin: no obvious rashes or lesions.  Musculoskeletal, Neurologic, and Spine:    Cervical spine:    Appearance - positive kyphosis, no gross step-offs    Motor -     C5: Deltoids R 5/5 and L 5/5 strength    C6: Biceps R 5/5 and L 5/5 strength     C7: Triceps R 5/5 and L 5/5 strength     C8:  R 5/5 and L 5/5 strength     T1: Dorsal interossei R 4/5 and L 4/5 strength        Sensation: intact to light touch in C5-T1      Special Tests -      Spurling's Test - positive on the right (causes radiating pain to " right triceps), negative on the left     Miller's Test - positive on the right, negative on the left       Thoracic/Lumbar Spine:    Appearance - Incisions well healed, No gross stepoffs or deformities    Motor -     L2-3: Hip flexion 5/5 R and 5/5 L strength          L3/4:  Knee extension R 5/5 and L 5/5 strength         L4/5:  Foot dorsiflexion R 5/5 L 5/5 and       EHL dorsiflexion R 4/5 L 4/5 strength         S1:  Plantarflexion/Peroneal Muscles  R 5/5 and L 5/5 strength    Sensation: intact to light touch L3-S1 distribution BLE      Neurologic:      REFLEXES Left Right   Biceps 3+ 3+   Triceps 2+ 2+   Brachioradialis 3+ 3+   Clonus 0 beats 0 beats         Imaging:  Cervical AP lateral x-ray from 4/11/2019 reviewed.  AP x-ray shows mild cervical scoliosis primarily at the as an extension of the thoracolumbar scoliosis.  Lateral x-ray shows spondylolisthesis with kyphotic alignment C4-5.  At the lower levels there is significant multilevel spondylosis or degenerative changes with straightening of cervical lordosis.    Full spine AP lateral standing x-rays 2/14/2018 reviewed.  Patient had previous posterior instrumented fusion from T4 to approximately L4 or L5 with 2 Carpio rods in place.  It is difficult to tell but there also seems to be high-grade lumbosacral spondylolisthesis with lumbosacral kyphosis.  There is also significant sagittal malalignment with straight and thoracic kyphosis and lumbar lordosis.  Sagittal measurements as follow:  LL 14  L4-S1 16, ideal 51  PI 77  PI-LL mismatch 63  PT 57  SVA +10.4cm  TPA 57  GT 70  T10-L2 kyph 3    Lumbar CT scan 4/5/2019 show solid thoracolumbar fusion down to L4.  There is high-grade or grade 4 lumbosacral spondylolisthesis with kyphotic alignment.  There is seems to be autofusion at this level or at least seems to be very stiff and unlikely to be mobile.    Cervical MRI 4/5/2019 showed the previously noted spondylolisthesis C4-5 with multilevel spondylosis  and kyphotic alignment.  There is also cervical stenosis at C4-5, C5-6 and C6-7.  No significant deformation of the spinal cord no myelomalacia.    Upright or open cervical MRI from 2019 show similar findings as described above.  In addition there is also mild spondylolisthesis at T2-3.    Impression:   58 year old RHD female with a PMH of HTN and asthma, also s/p T4-L4 posterior instrumentation and fusion in  with Dr. Rose at [facility], currently with right upper extremity pain and weakness, low back pain, and bilateral S1 radiculopathy in the setting of the followin.  Severe spinal sagittal malalignment, with PI-LL mismatch = 64; lower lumbar hypolordosis (L4-S1 = 16, ideal 51); severe compensatory pelvic retroversion (PT = 57); SVA = +10.4cm.  2. High grade (Gr 4) isthmic spondylolisthesis L5-S1, with lumbosacral kyphosis.  3.  C4-5 spondylolisthesis with kyphotic alignment  4.  Cervical stenosis at C4-5, C5-6, and C6-7  5.  43 yrs s/p PISF T4-L4 using for AIS (Dr. Rose, [facility]), with solid arthrodesis and retained Carpio rods x 2.      We discussed that the patient currently has a very complex problem given her previous fusion and the severity of her L5-S1 spondylolisthesis. We discussed that she will need fusions of her cervical and lumbosacral spine.  Because each of these is a very major surgery, cannot be done at same time, and will need to prioritize.  Unless with severe / progressive myelopathy, I prefer to prioritize her lumbosacral spine.  We discussed that given the complex nature of her problem, there is not a straightforward solution.   Will present her to other spine surgeons at next Complex spine conference, that is held on the  of the month.    We discussed that in the interim, we would like to obtain further imaging in the form of a cervical/thoracic/lumbar myelogram to assist with pre-op planning. We would also like to obtain an EMG of her  BUE and BLE.    We will have the patient return to clinic for further discussion after these studies are obtained. The patient expressed understanding and is in agreement with this plan.    Plan:   - Discuss case at complex spine conference  - Cervical/thoracic/lumbar CT myelogram  - Bilateral UE and LE EMG  - Flexeril 10mg TID prn for muscle spasms  - Follow up in clinic after obtaining the above studies  - NEED to fill out NDI, EPI and Pain Scores at next visit.    All questions and concerns were answered to the patient's apparent satisfaction before leaving the clinic.     Jocelynn Pringle MD  Orthopaedic Surgery PGY-1    Attestation:  I (Dr. Evangelist Vasquez - Spine Surgeon) have personally evaluated patient with PGY-1 Pino, and agree with findings and plan outlined in the note, which I also edited.  I discussed at length with the patient/family, explained the nature of spinal condition, and formulated workup and/or treatment plan together.  All questions were answered to the best of my ability and to patient's apparent satisfaction.    Total visit time > 45 mins, > 50% counseling and coordination of care.    Respectfully,    Evangelist Vasquez MD    Orthopaedic Spine Surgery  Dept Orthopaedic Surgery, [facility]  [phone] office, [phone] pager  [email or web address]

## 2019-05-02 ENCOUNTER — OFFICE VISIT (OUTPATIENT)
Dept: ORTHOPEDICS | Facility: CLINIC | Age: 59
End: 2019-05-02
Payer: COMMERCIAL

## 2019-05-02 ENCOUNTER — ANCILLARY PROCEDURE (OUTPATIENT)
Dept: GENERAL RADIOLOGY | Facility: CLINIC | Age: 59
End: 2019-05-02
Attending: ORTHOPAEDIC SURGERY
Payer: COMMERCIAL

## 2019-05-02 ENCOUNTER — PRE VISIT (OUTPATIENT)
Dept: ORTHOPEDICS | Facility: CLINIC | Age: 59
End: 2019-05-02

## 2019-05-02 VITALS — HEIGHT: 63 IN | WEIGHT: 155.2 LBS | BODY MASS INDEX: 27.5 KG/M2

## 2019-05-02 DIAGNOSIS — M43.12 SPONDYLOLISTHESIS OF CERVICAL REGION: ICD-10-CM

## 2019-05-02 DIAGNOSIS — Z98.1 S/P SPINAL FUSION: ICD-10-CM

## 2019-05-02 DIAGNOSIS — M40.209 KYPHOSIS, ACQUIRED: ICD-10-CM

## 2019-05-02 DIAGNOSIS — M40.35 FLATBACK SYNDROME OF THORACOLUMBAR REGION: ICD-10-CM

## 2019-05-02 DIAGNOSIS — M41.9 SCOLIOSIS: ICD-10-CM

## 2019-05-02 DIAGNOSIS — M43.17 SPONDYLOLISTHESIS OF LUMBOSACRAL REGION: Primary | ICD-10-CM

## 2019-05-02 RX ORDER — CYCLOBENZAPRINE HCL 10 MG
10 TABLET ORAL 3 TIMES DAILY PRN
Qty: 50 TABLET | Refills: 1 | Status: SHIPPED | OUTPATIENT
Start: 2019-05-02 | End: 2019-08-15

## 2019-05-02 RX ORDER — GABAPENTIN 300 MG/1
300 CAPSULE ORAL 4 TIMES DAILY
COMMUNITY
End: 2019-09-17

## 2019-05-02 ASSESSMENT — MIFFLIN-ST. JEOR: SCORE: 1235.17

## 2019-05-02 NOTE — LETTER
5/2/2019       RE: Aleena Ontiveros  [address]  [place] [address]     Dear Colleague,    Thank you for referring your patient, Aleena Ontiveros, to the [facility]. Please see a copy of my visit note below.    Spine Surgical Hx:  1974 - PISF with Carpio rods x 2 (Dr. Bandar Rose, [place]) for AIS.    REASON FOR CONSULTATION: Consult For of the Spine and Consult For (Scoliosis, needing surgery )     REFERRING PHYSICIAN: Jasson Aguilar   PCP:Arabella Conner    History of Present Illness:    58/f, RHD, with a PMH of HTN and asthma, also s/p T4-L4 posterior instrumentation and fusion in 1974 with Dr. Rose at [facility], who presents for evaluation of low back pain and bilateral leg symptoms. The patient reports that she first developed low back pain in about 1980. She saw Dr. Rose for this in '80 at [facility], at which point she was found to have L5-S1 spondylolisthesis and was offered activity restrictions vs. fusion. At that time, her pain was mild and she chose to hold off on surgery.     In February of 2018, she noticed that her pain had become progressively worse. She saw Dr. Aguilar at [facility] and was offered surgery, however she was not able to move forward with this due to insurance coverage issues.    Today, she describes her symptoms as 99% low back pain and 1% L>R leg symptoms. Her low back pain is located to the right of midline, and radiates to the lateral aspect of her thigh, the lateral aspect of her leg, and the lateral and plantar aspects of her foot bilaterally. She also has numbness in the same distribution. Her pain is worse with standing and laying down. Her pain is also worse with leaning forward. She denies weakness, but reports impaired coordination of her legs and impaired balance. She has been using a walking stick on occasion, which she started using after her knee  replacements in 2018.    She also has intermittent radiating pain in her right arm that extends from her neck to her right posterior arm. She denies neck pain, but reports neck stiffness. She reports right upper extremity weakness.    She is currently taking Celebrex 200mg every day and gabapentin 300mg ever night for her symptoms. She was previously doing PT, which he felt helped her symptoms, however she has not been in 3-4 months because she got busy at work.    ROS:  A 12-point review of systems was completed and is negative except for otherwise noted above in the history of present illness.    Med Hx:  Past Medical History:   Diagnosis Date     Arthritis     osteoarthritis of both knee     Asthma      Hypertension      PONV (postoperative nausea and vomiting)      Thyroid disease      Uncomplicated asthma        Surg Hx:  Past Surgical History:   Procedure Laterality Date     ABDOMEN SURGERY  ,         ARTHROPLASTY KNEE Right 8/10/2017    Procedure: ARTHROPLASTY KNEE;  RIGHT TOTAL KNEE ARTHROPLASTY ;  Surgeon: Grady Alvarez MD;  Location:  OR     ARTHROPLASTY KNEE Left 2017    Procedure: ARTHROPLASTY KNEE;  LEFT TOTAL KNEE ARTHROPLASTY;  Surgeon: Grady Alvarez MD;  Location:  OR     BACK SURGERY      spinal fusion     ENT SURGERY      tonsilectomy     GYN SURGERY  13    hysterectomy     ORTHOPEDIC SURGERY      sinal fusion,hand     thumb mass resection       THYROIDECTOMY  2014    Procedure: THYROIDECTOMY;  Surgeon: Krzysztof Marquez MD;  Location: [facility]       Allergies:  Allergies   Allergen Reactions     Adhesive Tape      Erythromycin Nausea and Vomiting     Pills cause vomiting,        Meds:  Current Outpatient Medications   Medication     albuterol (PROAIR HFA/PROVENTIL HFA/VENTOLIN HFA) 108 (90 BASE) MCG/ACT Inhaler     cyclobenzaprine (FLEXERIL) 10 MG tablet     diphenhydrAMINE (BENADRYL ALLERGY) 25 MG tablet     Fexofenadine HCl (ALLEGRA PO)      "fluticasone (FLONASE) 50 MCG/ACT spray     fluticasone-salmeterol (ADVAIR) 500-50 MCG/DOSE diskus inhaler     gabapentin (NEURONTIN) 300 MG capsule     HYDROXYZINE PAMOATE PO     Levothyroxine Sodium (SYNTHROID PO)     lisinopril (PRINIVIL/ZESTRIL) 40 MG tablet     bisacodyl (DULCOLAX) 10 MG Suppository     estradiol (ESTRACE) 0.1 MG/GM cream     Montelukast Sodium (SINGULAIR PO)     Multiple Vitamins-Minerals (DAILY DENIZ MAXIMUM MULTIVITAMIN PO)     multivitamin, therapeutic with minerals (MULTI-VITAMIN) TABS tablet     Omega-3 Fatty Acids (FISH OIL PO)     oxyCODONE IR (ROXICODONE) 5 MG tablet     polyethylene glycol (MIRALAX/GLYCOLAX) powder     senna-docusate (SENOKOT-S;PERICOLACE) 8.6-50 MG per tablet     No current facility-administered medications for this visit.        Fam Hx:  Family History   Problem Relation Age of Onset     Breast Cancer Mother      Cancer - colorectal Father      C.A.D. 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 Denies smoking and illicit drug use.    Physical Exam:  Vitals: Ht 1.588 m (5' 2.5\")   Wt 70.4 kg (155 lb 3.2 oz)   BMI 27.93 kg/m     Constitutional: awake, alert, cooperative, no apparent distress, appears stated age.    Eyes: The sclera are white.  Ears, Nose, Throat: The trachea is midline.  Psychiatric: The patient has a normal affect.  Respiratory: breathing non-labored  Cardiovascular: The extremities are warm and perfused.  Skin: no obvious rashes or lesions.  Musculoskeletal, Neurologic, and Spine:    Cervical spine:    Appearance - positive kyphosis, no gross step-offs    Motor -     C5: Deltoids R 5/5 and L 5/5 strength    C6: Biceps R 5/5 " and L 5/5 strength     C7: Triceps R 5/5 and L 5/5 strength     C8:  R 5/5 and L 5/5 strength     T1: Dorsal interossei R 4/5 and L 4/5 strength        Sensation: intact to light touch in C5-T1      Special Tests -      Spurling's Test - positive on the right (causes radiating pain to right triceps), negative on the left     Miller's Test - positive on the right, negative on the left       Thoracic/Lumbar Spine:    Appearance - Incisions well healed, No gross stepoffs or deformities    Motor -     L2-3: Hip flexion 5/5 R and 5/5 L strength          L3/4:  Knee extension R 5/5 and L 5/5 strength         L4/5:  Foot dorsiflexion R 5/5 L 5/5 and       EHL dorsiflexion R 4/5 L 4/5 strength         S1:  Plantarflexion/Peroneal Muscles  R 5/5 and L 5/5 strength    Sensation: intact to light touch L3-S1 distribution BLE      Neurologic:      REFLEXES Left Right   Biceps 3+ 3+   Triceps 2+ 2+   Brachioradialis 3+ 3+   Clonus 0 beats 0 beats         Imaging:  Cervical AP lateral x-ray from 4/11/2019 reviewed.  AP x-ray shows mild cervical scoliosis primarily at the as an extension of the thoracolumbar scoliosis.  Lateral x-ray shows spondylolisthesis with kyphotic alignment C4-5.  At the lower levels there is significant multilevel spondylosis or degenerative changes with straightening of cervical lordosis.    Full spine AP lateral standing x-rays 2/14/2018 reviewed.  Patient had previous posterior instrumented fusion from T4 to approximately L4 or L5 with 2 Carpio rods in place.  It is difficult to tell but there also seems to be high-grade lumbosacral spondylolisthesis with lumbosacral kyphosis.  There is also significant sagittal malalignment with straight and thoracic kyphosis and lumbar lordosis.  Sagittal measurements as follow:  LL 14  L4-S1 16, ideal 51  PI 77  PI-LL mismatch 63  PT 57  SVA +10.4cm  TPA 57  GT 70  T10-L2 kyph 3    Lumbar CT scan 4/5/2019 show solid thoracolumbar fusion down to L4.  There is  high-grade or grade 4 lumbosacral spondylolisthesis with kyphotic alignment.  There is seems to be autofusion at this level or at least seems to be very stiff and unlikely to be mobile.    Cervical MRI 2019 showed the previously noted spondylolisthesis C4-5 with multilevel spondylosis and kyphotic alignment.  There is also cervical stenosis at C4-5, C5-6 and C6-7.  No significant deformation of the spinal cord no myelomalacia.    Upright or open cervical MRI from 2019 show similar findings as described above.  In addition there is also mild spondylolisthesis at T2-3.    Impression:   58 year old RHD female with a PMH of HTN and asthma, also s/p T4-L4 posterior instrumentation and fusion in  with Dr. Rose at [facility], currently with right upper extremity pain and weakness, low back pain, and bilateral S1 radiculopathy in the setting of the followin.  Severe spinal sagittal malalignment, with PI-LL mismatch = 64; lower lumbar hypolordosis (L4-S1 = 16, ideal 51); severe compensatory pelvic retroversion (PT = 57); SVA = +10.4cm.  2. High grade (Gr 4) isthmic spondylolisthesis L5-S1, with lumbosacral kyphosis.  3.  C4-5 spondylolisthesis with kyphotic alignment  4.  Cervical stenosis at C4-5, C5-6, and C6-7  5.  43 yrs s/p PISF T4-L4 using for AIS (Dr. Rose, [facility]), with solid arthrodesis and retained Carpio rods x 2.    We discussed that the patient currently has a very complex problem given her previous fusion and the severity of her L5-S1 spondylolisthesis. We discussed that she will need fusions of her cervical and lumbosacral spine.  Because each of these is a very major surgery, cannot be done at same time, and will need to prioritize.  Unless with severe / progressive myelopathy, I prefer to prioritize her lumbosacral spine.  We discussed that given the complex nature of her problem, there is not a straightforward solution.   Will present her to other spine  surgeons at next Complex spine conference, that is held on the 3rd Wednesday of the month.    We discussed that in the interim, we would like to obtain further imaging in the form of a cervical/thoracic/lumbar myelogram to assist with pre-op planning. We would also like to obtain an EMG of her BUE and BLE.    We will have the patient return to clinic for further discussion after these studies are obtained. The patient expressed understanding and is in agreement with this plan.    Plan:   - Discuss case at complex spine conference  - Cervical/thoracic/lumbar CT myelogram  - Bilateral UE and LE EMG  - Flexeril 10mg TID prn for muscle spasms  - Follow up in clinic after obtaining the above studies  - NEED to fill out NDI, EPI and Pain Scores at next visit.    All questions and concerns were answered to the patient's apparent satisfaction before leaving the clinic.     Jocelynn Pringle MD  Orthopaedic Surgery PGY-1    Attestation:  I (Dr. Evangelist Vasquez - Spine Surgeon) have personally evaluated patient with PGY-1 Pino, and agree with findings and plan outlined in the note, which I also edited.  I discussed at length with the patient/family, explained the nature of spinal condition, and formulated workup and/or treatment plan together.  All questions were answered to the best of my ability and to patient's apparent satisfaction.    Total visit time > 45 mins, > 50% counseling and coordination of care.    Respectfully,    Evangelist Vasquez MD    Orthopaedic Spine Surgery  Dept Orthopaedic Surgery, [facility]  [phone] office, [phone] pager  [email or web address]

## 2019-05-02 NOTE — NURSING NOTE
"Reason For Visit:   Chief Complaint   Patient presents with     Spine - Consult For     Consult For     Scoliosis, needing surgery        Primary MD: Arabella Conner  Ref. MD: Jasson Aguilar    ?  No  Occupation  .  Currently working? Yes.  Work status?  Full time.  Ht 1.588 m (5' 2.5\")   Wt 70.4 kg (155 lb 3.2 oz)   BMI 27.93 kg/m      Pain Assessment  Patient Currently in Pain: Yes  0-10 Pain Scale: 5  Primary Pain Location: Back  Pain Descriptors: Sore, Sharp, Aching    Oswestry (EPI) Questionnaire    No flowsheet data found.         Neck Disability Index (NDI) Questionnaire    No flowsheet data found.           Visual Analog Pain Scale  Back Pain Scale 0-10: 5  Right leg pain: 1  Left leg pain: 2    Promis 10 Assessment    PROMIS 10 5/2/2019   In general, would you say your health is: Excellent   In general, would you say your quality of life is: Good   In general, how would you rate your physical health? Excellent   In general, how would you rate your mental health, including your mood and your ability to think? Excellent   In general, how would you rate your satisfaction with your social activities and relationships? Excellent   In general, please rate how well you carry out your usual social activities and roles Good   To what extent are you able to carry out your everyday physical activities such as walking, climbing stairs, carrying groceries, or moving a chair? A little   How often have you been bothered by emotional problems such as feeling anxious, depressed or irritable? Rarely   How would you rate your fatigue on average? Mild   How would you rate your pain on average?   0 = No Pain  to  10 = Worst Imaginable Pain 6   In general, would you say your health is: 5   In general, would you say your quality of life is: 3   In general, how would you rate your physical health? 5   In general, how would you rate your mental health, including your mood and your ability to think? 5 "   In general, how would you rate your satisfaction with your social activities and relationships? 5   In general, please rate how well you carry out your usual social activities and roles. (This includes activities at home, at work and in your community, and responsibilities as a parent, child, spouse, employee, friend, etc.) 3   To what extent are you able to carry out your everyday physical activities such as walking, climbing stairs, carrying groceries, or moving a chair? 2   In the past 7 days, how often have you been bothered by emotional problems such as feeling anxious, depressed, or irritable? 2   In the past 7 days, how would you rate your fatigue on average? 2   In the past 7 days, how would you rate your pain on average, where 0 means no pain, and 10 means worst imaginable pain? 6   Global Mental Health Score 17   Global Physical Health Score 14   PROMIS TOTAL - SUBSCORES 31   Some recent data might be hidden                Kat Khan CMA

## 2019-05-09 ENCOUNTER — MEDICAL CORRESPONDENCE (OUTPATIENT)
Dept: HEALTH INFORMATION MANAGEMENT | Facility: CLINIC | Age: 59
End: 2019-05-09

## 2019-05-16 ENCOUNTER — TELEPHONE (OUTPATIENT)
Dept: ORTHOPEDICS | Facility: CLINIC | Age: 59
End: 2019-05-16

## 2019-05-16 ENCOUNTER — HEALTH MAINTENANCE LETTER (OUTPATIENT)
Age: 59
End: 2019-05-16

## 2019-05-16 NOTE — TELEPHONE ENCOUNTER
Complex spine case review    Date of meetin/15/19  Document status:  Auth (verified)  Presented by:   DR marc MD  Recorded by:   Jameson Mcdonald PA-C    *Final Report*    [facility]    Aleena Ontiveros was discussed at the Complex Spine Case Review on 5/15/19.  Representatives from Spine Surgery, Neurosurgery, Radiology and Anesthesia were present at the meeting.    Concerns:  Surgical options,   Concern with lumbar/thoracic vs including cervical for correction,  What levels    Recommendation:  Will probably end up with C2-pelvis  Start with thoracic/lumbar  Maybe start with L4-5. L5-S1, slight spondy reduction  Aim for changing SVA ~5 cm  Get more opinions    Electronically signed by:  Jameson Mcdonald PA-C ........................ 2019, 10:17 AM

## 2019-05-28 ENCOUNTER — TELEPHONE (OUTPATIENT)
Dept: ORTHOPEDICS | Facility: CLINIC | Age: 59
End: 2019-05-28

## 2019-05-28 DIAGNOSIS — M43.16 SPONDYLOLISTHESIS OF LUMBAR REGION: ICD-10-CM

## 2019-05-28 DIAGNOSIS — M48.02 CERVICAL STENOSIS OF SPINE: ICD-10-CM

## 2019-05-28 DIAGNOSIS — M40.05 POSTURAL KYPHOSIS OF LUMBAR REGION: ICD-10-CM

## 2019-05-28 DIAGNOSIS — Z98.1 S/P SPINAL FUSION: ICD-10-CM

## 2019-05-28 NOTE — PROGRESS NOTES
Addendum 5/28/19:    Of note, after last visit, I received a letter from referring provider, spine surgeon Dr. Jasson Aguilar, advocating for her c-spine to be addressed first, arguing that she is at risk of spinal cord injury and catastrophic complication if this is not prioritized.    Presented at complex spine case conference 5/15/2019.  Neurosurgeon present (Dr. Cameron) agreed with prioritizing lumbar spinal deformity correction.  No urgent reason to prioritize cervical spine; spinal cord not at high risk.  Although may also probably need c-spine surgery in future, there is no strong reason that it should be done urgently nor that it should necessarily be prioritized over lumbar spine.    I called patient today, discussed both Dr. Aguilar's letter, as well as discussion at Complex Spine Conf.  I told her that although I highly respect Dr. Aguilar and his opinion, I am still of the opinion that her low back and leg symptoms are mainly from her lumbar spine; and that I would recommend, if she would choose to pursue surgery, to prioritize her lumbar spine.  That said, both her lumbar and her cervical spine issues are complex, and surgery for either will be very big, risky and with prolonged recovery.    We discussed possible 2nd (3rd) opinion.  I recommend either going to [facility] (gave her names of Dr. Phillips and Dr. Sullivan) or to see one of our Neurosurgeons here at [facility] (Dr. Cameron; may also see Dr. Agustin or Dr. Mike).    I asked her again re her sxs:  No neck pain.  R arm pain and some weakness.  Back and leg sxs are her main complaint.  Worse with standing in one place; walking a little better, but also sxtic.  Better with sitting.  Does not seem affected by neck motion.  Neck extension (looking up) does not hurt, nor does it produce radiating pain down her back/legs.  Her main neck-related complaint is re posture, that her head seems to be hunching more forward.    Total phone time 20 mins.

## 2019-05-28 NOTE — TELEPHONE ENCOUNTER
[facility] Call Center    Phone Message    May a detailed message be left on voicemail: yes    Reason for Call: Other: Pt is calling back to speak with Chyna MILAN Please have Chyna MILAN call Pt back.     Action Taken: Message routed to:  [facility] ([facility] Ortho.    See complex spine meeting note dated 5-16-19 when her case was discussed with neurosurgeon DR.Matthew Cameron.   called pt. Yesterday 5-28-19 & explained info. In note from meeting & he ordered Neurosurgery consult for 3rd opinion.  I placed new order.  He stated if  is not accepting any more new pts because he is transferring to [facility], then pt. Should make appt  With either DR.Ann Agustin or DR.Kristen Mike.  I left pt message to call [phone].  Call back prn. V.O.R.B./Chyna Bonner RN.

## 2019-05-28 NOTE — TELEPHONE ENCOUNTER
[facility] Call Center    Phone Message    May a detailed message be left on voicemail: yes    Reason for Call: Other: Dr Vasquez told Aleena to call back and speak directly to EAN Clemente     Action Taken: Message routed to:  [facility] ORTHO

## 2019-05-30 ENCOUNTER — OFFICE VISIT (OUTPATIENT)
Dept: NEUROLOGY | Facility: CLINIC | Age: 59
End: 2019-05-30
Attending: ORTHOPAEDIC SURGERY
Payer: COMMERCIAL

## 2019-05-30 DIAGNOSIS — M43.17 SPONDYLOLISTHESIS OF LUMBOSACRAL REGION: ICD-10-CM

## 2019-05-30 NOTE — PROGRESS NOTES
[facility]  Electrodiagnostic Laboratory    Nerve Conduction & EMG Report          Patient:       Aleena Ontiveros  Patient ID:    [id number]  Gender:        Female  YOB: 1960  Age:           58 Years 11 Months        History & Examination: This is a very pleasant 58-year-old female referred by Dr. Vasquez.  She has lumbar and cervical spine disease.  She describes leg pain worse on the left than the right and arm pain worse on the right than the left.  EMG evaluation of all 4 limbs is requested.    Techniques: Motor conduction studies were done with surface recording electrodes. Sensory conduction studies were performed with surface electrodes, unless indicated otherwise by (n), designating the use of subdermal recording electrodes. Temperature was monitored and recorded throughout the study. Upper extremities were maintained at a temperature of 32 degrees Centigrade or higher.  Lower extremities were maintained at a temperature of 31degrees Centigrade or higher. EMG was done with a concentric needle electrode.        Results: The examination was difficult as the patient could not lie supine.  Median antidromic sensory nerve action potentials were normal in amplitude and distal conduction velocity.  Ulnar antidromic sensory nerve action potentials were normal in amplitude and distal conduction velocity.  The bilateral sural and right superficial peroneal sensory nerve action potentials were normal in amplitude.  The right sural distal conduction velocity was minimally slowed.  Bilateral median, ulnar, peroneal and tibial motor conduction studies were normal.  Needle exam revealed long-duration motor unit potentials in bilateral, proximal and distal L5 and S1 innervated muscles.  The changes were most pronounced in the left S1 myotome where fibrillation was seen in one muscle.  The lumbar paraspinal muscles were not examined due to the previous surgery.  In the arms long-duration  motor unit potentials were seen in right C7 innervated muscles.  No changes were seen in the left arm.      Interpretation: The EMG is abnormal.  There is EMG evidence for chronic bilateral L5-S1 radiculopathies with active denervation in the left S1 myotome.  There is evidence for a chronic right C7 radiculopathy.      EMG Physician: Jasson Khan MD          Sensory NCS      Nerve / Sites Rec. Site Onset Peak NP Amp Ref. PP Amp Dist Ryland Ref. Temp     ms ms  V  V  V cm m/s m/s  C   R MEDIAN - Dig II Anti      Wrist Dig II 2.19 2.81 27.7 10.0 43.9 14 64.0 48.0 32.3   L MEDIAN - Dig II Anti      Wrist Dig II 2.14 2.92 29.4 10.0 40.5 14 65.6 48.0 32.5   R ULNAR - Dig V Anti      Wrist Dig V 2.03 2.66 28.7 8.0 47.5 12.5 61.5 48.0 32.4   L ULNAR - Dig V Anti      Wrist Dig V 1.93 2.50 18.6 8.0 38.1 12.5 64.9 48.0 24.3   R SURAL - Lat Mall 60      Calf Ankle 3.80 4.48 5.5 5.0 7.3 14 36.8 38.0 31      Calf Ankle 3.49 4.43 6.5 5.0 58.0 14 40.1  31   L SURAL - Lat Mall 60      Calf Ankle 2.86 3.59 7.0 5.0 6.6 14 48.9 38.0 31.1   R SUP PERONEAL      Lat Leg Singleton 2.55 3.44 3.3  7.1 12.5 49.0 38.0 31       Motor NCS      Nerve / Sites Rec. Site Lat Ref. Amp Ref. Rel Amp Dist Ryland Ref. Dur. Area Temp.     ms ms mV mV % cm m/s m/s ms %  C   R MEDIAN - APB      Wrist APB 3.23 4.40 5.8 5.0 100 8   6.20 100 32.5      Elbow APB 6.67  5.8  99.3 19.5 56.7 48.0 6.30 96.4 32.5   L MEDIAN - APB      Wrist APB 3.02 4.40 10.1 5.0 100 8   6.04 100 32.5      Elbow APB 6.35  10.0  98.5 19.5 58.5 48.0 5.94 96.9 32.6   R ULNAR - ADM      Wrist ADM 2.50 3.50 11.7 5.0 100 8   5.57 100 32.5      B.Elbow ADM 5.16  11.6  99.3 17.5 65.9 48.0 5.47 93.6 32.5      A.Elbow ADM 6.56  10.6  90.2 8.5 60.4 48.0 5.94 90.5 32.5   L ULNAR - ADM      Wrist ADM 2.60 3.50 8.9 5.0 100 8   5.99 100 32.5      B.Elbow ADM 5.42  8.6  96.5 18 64.0 48.0 5.68 88.1 32.4      A.Elbow ADM 7.03  7.7  86.8 10 61.9 48.0 5.99 74.1 32.4   R DEEP PERONEAL - EDB 60      Ankle  EDB 3.54 6.00 5.5 2.0 100 8   5.47 100 31      FibHead EDB 9.43  4.7  86.6 28.5 48.4 38.0 5.99 78.7 31.1      Pop Fos EDB 11.15  4.6  84 8.5 49.5 38.0 5.99 75.4 31.1   L DEEP PERONEAL - EDB 60      Ankle EDB 3.80 6.00 4.8 2.0 100 8   6.09 100 31   R TIBIAL - AH      Ankle AH 3.80 6.00 12.2 4.0 100 8   6.56 100 31      Pop Fos AH 10.89  8.2  67.4 36 50.8 38.0 7.76 77.2 31.1   L TIBIAL - AH      Ankle AH 2.97 6.00 7.5 4.0 100 8   7.03 100 24.3       Needle EMG (W)          EMG Summary Table     Spontaneous MUAP Recruitment    IA Fib/PSW Fasc H.F. Amp Dur. PPP Pattern   R. TIB ANTERIOR N None None None 1+ 1+ N N   L. TIB ANTERIOR N None None None 1+ 1+ N N   L. GASTROCN (MED) Increased 1+ None None 2+ 2+ 1+ N   R. GASTROCN (MED) N None None None 1+ 1+ N N   R. VAST LATERALIS N None None None N N N N   L. VAST LATERALIS N None None None N N N N   R. T FASCIA JUVENTINO N None None None N 1+ 1+ N   L. T FASCIA JUVENTINO N None None None 1+ 1+ N N   L. GLUTEUS MAX N None None None 1+ 2+ 3+ N   R. GLUTEUS MAX N None None None 2+ 2+ 2+ N   R. FIRST D INTEROSS N None None None N N N N   R. PRON TERES N None None None 2+ 2+ N N   R. BICEPS N None None None N N N N   R. TRICEPS N None None None 1+ 1+ N N   R. DELTOID N None None None N N N N   R. C7 PSP N None None None 2+ 2+ N Moderately Reduced   L. FIRST D INTEROSS N None None None N N N N   L. EXT DIG COMM N None None None N N N N   L. TRICEPS N None None None N N N N   L. BICEPS N None None None N N N N

## 2019-05-30 NOTE — LETTER
5/30/2019       RE: Aleena Ontiveros  [address] [facility] [address]     Dear Colleague,    Thank you for referring your patient, Aleena Ontiveros, to the [facility] EMG at [facility]. Please see a copy of my visit note below.     [facility]  Electrodiagnostic Laboratory    Nerve Conduction & EMG Report          Patient:       Aleena Ontiveros  Patient ID:    [id number]  Gender:        Female  YOB: 1960  Age:           58 Years 11 Months        History & Examination: This is a very pleasant 58-year-old female referred by Dr. Vasquez.  She has lumbar and cervical spine disease.  She describes leg pain worse on the left than the right and arm pain worse on the right than the left.  EMG evaluation of all 4 limbs is requested.    Techniques: Motor conduction studies were done with surface recording electrodes. Sensory conduction studies were performed with surface electrodes, unless indicated otherwise by (n), designating the use of subdermal recording electrodes. Temperature was monitored and recorded throughout the study. Upper extremities were maintained at a temperature of 32 degrees Centigrade or higher.  Lower extremities were maintained at a temperature of 31degrees Centigrade or higher. EMG was done with a concentric needle electrode.        Results: The examination was difficult as the patient could not lie supine.  Median antidromic sensory nerve action potentials were normal in amplitude and distal conduction velocity.  Ulnar antidromic sensory nerve action potentials were normal in amplitude and distal conduction velocity.  The bilateral sural and right superficial peroneal sensory nerve action potentials were normal in amplitude.  The right sural distal conduction velocity was minimally slowed.  Bilateral median, ulnar, peroneal and tibial motor conduction studies were normal.  Needle exam revealed long-duration motor unit  potentials in bilateral, proximal and distal L5 and S1 innervated muscles.  The changes were most pronounced in the left S1 myotome where fibrillation was seen in one muscle.  The lumbar paraspinal muscles were not examined due to the previous surgery.  In the arms long-duration motor unit potentials were seen in right C7 innervated muscles.  No changes were seen in the left arm.      Interpretation: The EMG is abnormal.  There is EMG evidence for chronic bilateral L5-S1 radiculopathies with active denervation in the left S1 myotome.  There is evidence for a chronic right C7 radiculopathy.      EMG Physician: Jasson Khan MD          Sensory NCS      Nerve / Sites Rec. Site Onset Peak NP Amp Ref. PP Amp Dist Ryland Ref. Temp     ms ms  V  V  V cm m/s m/s  C   R MEDIAN - Dig II Anti      Wrist Dig II 2.19 2.81 27.7 10.0 43.9 14 64.0 48.0 32.3   L MEDIAN - Dig II Anti      Wrist Dig II 2.14 2.92 29.4 10.0 40.5 14 65.6 48.0 32.5   R ULNAR - Dig V Anti      Wrist Dig V 2.03 2.66 28.7 8.0 47.5 12.5 61.5 48.0 32.4   L ULNAR - Dig V Anti      Wrist Dig V 1.93 2.50 18.6 8.0 38.1 12.5 64.9 48.0 24.3   R SURAL - Lat Mall 60      Calf Ankle 3.80 4.48 5.5 5.0 7.3 14 36.8 38.0 31      Calf Ankle 3.49 4.43 6.5 5.0 58.0 14 40.1  31   L SURAL - Lat Mall 60      Calf Ankle 2.86 3.59 7.0 5.0 6.6 14 48.9 38.0 31.1   R SUP PERONEAL      Lat Leg Singleton 2.55 3.44 3.3  7.1 12.5 49.0 38.0 31       Motor NCS      Nerve / Sites Rec. Site Lat Ref. Amp Ref. Rel Amp Dist Ryland Ref. Dur. Area Temp.     ms ms mV mV % cm m/s m/s ms %  C   R MEDIAN - APB      Wrist APB 3.23 4.40 5.8 5.0 100 8   6.20 100 32.5      Elbow APB 6.67  5.8  99.3 19.5 56.7 48.0 6.30 96.4 32.5   L MEDIAN - APB      Wrist APB 3.02 4.40 10.1 5.0 100 8   6.04 100 32.5      Elbow APB 6.35  10.0  98.5 19.5 58.5 48.0 5.94 96.9 32.6   R ULNAR - ADM      Wrist ADM 2.50 3.50 11.7 5.0 100 8   5.57 100 32.5      B.Elbow ADM 5.16  11.6  99.3 17.5 65.9 48.0 5.47 93.6 32.5      A.Elbow ADM  6.56  10.6  90.2 8.5 60.4 48.0 5.94 90.5 32.5   L ULNAR - ADM      Wrist ADM 2.60 3.50 8.9 5.0 100 8   5.99 100 32.5      B.Elbow ADM 5.42  8.6  96.5 18 64.0 48.0 5.68 88.1 32.4      A.Elbow ADM 7.03  7.7  86.8 10 61.9 48.0 5.99 74.1 32.4   R DEEP PERONEAL - EDB 60      Ankle EDB 3.54 6.00 5.5 2.0 100 8   5.47 100 31      FibHead EDB 9.43  4.7  86.6 28.5 48.4 38.0 5.99 78.7 31.1      Pop Fos EDB 11.15  4.6  84 8.5 49.5 38.0 5.99 75.4 31.1   L DEEP PERONEAL - EDB 60      Ankle EDB 3.80 6.00 4.8 2.0 100 8   6.09 100 31   R TIBIAL - AH      Ankle AH 3.80 6.00 12.2 4.0 100 8   6.56 100 31      Pop Fos AH 10.89  8.2  67.4 36 50.8 38.0 7.76 77.2 31.1   L TIBIAL - AH      Ankle AH 2.97 6.00 7.5 4.0 100 8   7.03 100 24.3       Needle EMG (W)          EMG Summary Table     Spontaneous MUAP Recruitment    IA Fib/PSW Fasc H.F. Amp Dur. PPP Pattern   R. TIB ANTERIOR N None None None 1+ 1+ N N   L. TIB ANTERIOR N None None None 1+ 1+ N N   L. GASTROCN (MED) Increased 1+ None None 2+ 2+ 1+ N   R. GASTROCN (MED) N None None None 1+ 1+ N N   R. VAST LATERALIS N None None None N N N N   L. VAST LATERALIS N None None None N N N N   R. T FASCIA JUVENTINO N None None None N 1+ 1+ N   L. T FASCIA JUVENTINO N None None None 1+ 1+ N N   L. GLUTEUS MAX N None None None 1+ 2+ 3+ N   R. GLUTEUS MAX N None None None 2+ 2+ 2+ N   R. FIRST D INTEROSS N None None None N N N N   R. PRON TERES N None None None 2+ 2+ N N   R. BICEPS N None None None N N N N   R. TRICEPS N None None None 1+ 1+ N N   R. DELTOID N None None None N N N N   R. C7 PSP N None None None 2+ 2+ N Moderately Reduced   L. FIRST D INTEROSS N None None None N N N N   L. EXT DIG COMM N None None None N N N N   L. TRICEPS N None None None N N N N   L. BICEPS N None None None N N N N

## 2019-06-03 ENCOUNTER — TELEPHONE (OUTPATIENT)
Dept: ORTHOPEDICS | Facility: CLINIC | Age: 59
End: 2019-06-03

## 2019-06-03 NOTE — PROGRESS NOTES
Last Visit: 5/2/19    Previous Impression:  1.  Severe spinal sagittal malalignment, with PI-LL mismatch = 64; lower lumbar hypolordosis (L4-S1 = 16, ideal 51); severe compensatory pelvic retroversion (PT = 57); SVA = +10.4cm.  2. High grade (Gr 4) isthmic spondylolisthesis L5-S1, with lumbosacral kyphosis.  3.  C4-5 spondylolisthesis with kyphotic alignment  4.  Cervical stenosis at C4-5, C5-6, and C6-7  5.  43 yrs s/p PISF T4-L4 using for AIS (Dr. Rose, [facility]), with solid arthrodesis and retained Carpio rods x 2.    Previous Plan:  - Discuss case at complex spine conference  - Cervical/thoracic/lumbar CT myelogram  - Bilateral UE and LE EMG  - Flexeril 10mg TID prn for muscle spasms  - Follow up in clinic after obtaining the above studies  - NEED to fill out NDI, EPI and Pain Scores at next visit.      S>  58/f, here to review further and discuss.  Pls also see addenda previously written by me.    Accompanied by  Qasim.  Presented at complex spine case conference 5/15/2019.  Neurosurgeon present (Dr. Cameron) agreed with prioritizing lumbar spinal deformity correction.  No urgent reason to prioritize cervical spine; spinal cord not at high risk.  Although may also probably need c-spine surgery in future, there is no strong reason that it should be done urgently nor that it should necessarily be prioritized over lumbar spine.  I had spoken with patient over the phone on 5/28/19 re the above.  We agreed to have her see Dr. Cameron or one of our other spine neurosurgeons (Dr. Agustin / Dr. Mike) for 2nd (3rd actually) opinion, mainly to help weigh in on whether to do the neck or the lower back first.  She said she tried calling to see Dr. Cameron, but has not yet received a call back from his .    Reviewed EMG done 5/30/19 (Dr. Khan).  Impression:  Abnormal EMG.  (+) chronic bilateral L5-S1 radiculopathies with active denervation in the left S1 myotome.  (+) chronic right C7  "radiculopathy.    No interval change in symptoms from last visit/conversation.    I told her that I also presented/discussed her case with my two ortho spine partners (Dr. Terry and Dr. Shelton).  They both thought that doing the lower back surgery first was reasonable.  In addition, Dr. Terry offered to send her case to other leading spine surgeons.  I put her images together in a powerpoint presentation and sent it over.  This morning, we got a response back from one of the foremost spine deformity surgeons in the world (based at [place], [place]), who agreed with fixing the lower back first.      Oswestry (EPI) Questionnaire    OSWESTRY DISABILITY INDEX 6/4/2019   Count 10   Sum 25   Oswestry Score (%) 50   Some recent data might be hidden        Neck Disability Index (NDI) Questionnaire    Neck Disability Index (NDI) 6/4/2019   Neck Disability Index: Count 8   NDI: Total Score = SUM (points for all 10 findings) 4   Neck Disability in Percent = (Total Score) / 50 * 100 10 (%)   Some recent data might be hidden           O>   Alert, oriented x 3, cooperative.  Not in CP distress.  Ht 1.588 m (5' 2.5\")   Wt 70.8 kg (156 lb)   BMI 28.08 kg/m    Ambulates independently.   Motor intact all extremities.  (+) mild numbness right posterior arm (C7).  (+) tingling bilateral L5/S1 dermatomes.    Neck full painless ROM (flexion, extension, R and L rotation).  (-) Lhermitte's.  (-) Miller's.  Normoreflexic.      Imaging:   Cervical flexion-extension x-rays taken today after clinic visit showed the previously noted cervical kyphosis and multilevel spondylosis.  There is spondylolisthesis at C3-4 and C4-5.  At the C4-5 level there is segmental kyphosis.  There does not seem to be any significant instability between flexion and extension.  There is only a 5 degree difference in C4-5 segmental sagittal alignment.  There is no translational difference.     Cervical and thoracic CT scans taken today after clinic visit show " Carpio gavino fixation from T4 down.  There is solid posterior arthrodesis from T3 down.  There is spondylolisthesis with advanced disc space narrowing at T1-2 and T2-3.    Cervical CT shows advanced multilevel cervical spondylosis with kyphosis.  (+) spondylolisthesis C3-4 and C4-5.  (+) significant facet arthropathy such that there already seems to be solid posterior autofusion across C3-4 and C4-5 especially on the left side.  This would further argue that the spondylolisthesis levels are stable.    A>  1.  Severe spinal sagittal malalignment, with PI-LL mismatch = 64; lower lumbar hypolordosis (L4-S1 = 16, ideal 51); severe compensatory pelvic retroversion (PT = 57); SVA = +10.4cm.  2.  High grade (Gr 4) isthmic spondylolisthesis L5-S1, with lumbosacral kyphosis.  3.  Subjacent level degenerative spondylolisthesis with stenosis L4-5.  4.  Severe foraminal and subarticular stenosis L5-S1.  5.  Multilevel cervical spondylosis and kyphosis.  6.  Cervical spondylolisthesis C3-4 and C4-5 without instability.  7.  Cervical stenosis at C4-5, C5-6, and C6-7, without myelopathy.  8.  Chronic radiculopathies bilateral L5/S1, and right C7 (shown on EMG 5/30/19).  9.  43 yrs s/p PISF T4-L4 using for AIS (Dr. Rose, [facility]), with solid arthrodesis and retained Carpio rods x 2.      P>   Had very good long discussion with patient and .  Discussed her condition at length.  Does not present with cervical myelopathy; no myelomalacia on imaging; no gross instability on imaging.  Symptoms primarily low back (lumbosacral) pain.    My opinion is still that it would likely be more beneficial for her to have her lumbosacral spine addressed first with surgery, prior to her neck, even as she also has neck issues and will likely eventually need surgery for that as well.    I have presented/discussed her case with many other colleagues, and they all have agreed with this approach.  I acknowledge Dr. Aguilar's  opinion (that her neck should be done first), and I respect his opinion, but I also disagree with it.  As before, I don't think both problems could be addressed at the same time.    She is still interested in seeing one of our neurosurgeons.  We will try to get him in to see either Dr. Cameron/Vamsi/Rashid.  I told her that I have the highest regard for all three of them.    If we proceed with surgery, will also likely get whomever she sees as a co-surgeon; although I'm thinking perhaps more for the neck surgery (2nd stage).  (Note: we were to schedule her for an appt with Dr. Cameron on 7/15/19).  For the lumbosacral spine, am thinking of asking my , Dr. Terry, to be co-surgeon, given his previous experience with sacral dome/pedicle subtraction osteotomy.  I explained to patient that hers is a very rare/unique and severe problem; that surgery is going to be very complex and difficult.  In addition to general anesthetic risks, we discussed not insignificant risk of nerve root injury (L5/S1) leading to foot weakness (e.g. Footdrop), dural tears, wound infection, nonunion with fixation failure requiring revision.    We also discussed the problems that her sagittal alignment poses, with risk of overcorrection (ie, birdwatcher's gaze).  This is another reason why I recommend doing the lumbosacral procedure first, as it would be easier to dial up or down the cervical correction later on for head positioning.    Patient asked re return to work.  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  I told her best case scenario is RTW at 6 wks, part time, light duty.  However, to be prepared to be off work x 12 wks.    Because of technical challenges, we decided to forego CT myelograms, and instead obtain plain CT's.  - Cervical and thoracic CT (done; pls see above).  - Cervical flex-ext x-rays (done; pls see above).    Questions answered.  TT > 40 mins, > 50%  CC.      Evangelist Vasquez MD    Orthopaedic Spine Surgery  Dept Orthopaedic Surgery, [facility]  [phone] office, [phone] pager  [email or web address]    Addendum 7/18/19:  Saw Dr. Cameron of Neurosurgery 7/15/19, who agreed that the TL spine surgery could be done first before the cervical spine (if necessary).  Also willing to be co-surgeon.    - Surg request placed: Distal extension of previous T4-L4 spinal fusion and fixation down to pelvis; TLIF-SPO's L4-5, L5-S1; reduction of high-grade spondylolisthesis L5-S1.  - Co-surgeon: Dr. Cameron  - [place]; will arrange for Stabilization unit bed  - PAC referral.

## 2019-06-03 NOTE — TELEPHONE ENCOUNTER
Has not had CT myelogram which she has concerns about.     Had EMG done    Wants to make neuro surg appt but has not heard back. Will set up appt tomorrow when she is in.     All questions answered.     Virgie

## 2019-06-03 NOTE — TELEPHONE ENCOUNTER
[facility] Call Center    Phone Message    May a detailed message be left on voicemail: yes    Reason for Call: Other: Pt requesting call back from Chyna SALAZAR today. Pt stated she have 3 very important questions for her prior to her appt with Dr. Helm tomorrow 6/4     Action Taken: Message routed to:  [facility] ortho

## 2019-06-03 NOTE — PROGRESS NOTES
Addendum 6/3/19:    Reviewed EMG done 5/30/19 (Dr. Khan).  Impression:  Abnormal EMG.  (+) chronic bilateral L5-S1 radiculopathies with active denervation in the left S1 myotome.  (+) chronic right C7 radiculopathy.  Will discuss more with patient at next visit tomorrow 6/4/19.

## 2019-06-04 ENCOUNTER — ANCILLARY PROCEDURE (OUTPATIENT)
Dept: GENERAL RADIOLOGY | Facility: CLINIC | Age: 59
End: 2019-06-04
Attending: ORTHOPAEDIC SURGERY
Payer: COMMERCIAL

## 2019-06-04 ENCOUNTER — OFFICE VISIT (OUTPATIENT)
Dept: ORTHOPEDICS | Facility: CLINIC | Age: 59
End: 2019-06-04
Payer: COMMERCIAL

## 2019-06-04 ENCOUNTER — ANCILLARY PROCEDURE (OUTPATIENT)
Dept: CT IMAGING | Facility: CLINIC | Age: 59
End: 2019-06-04
Attending: ORTHOPAEDIC SURGERY
Payer: COMMERCIAL

## 2019-06-04 VITALS — HEIGHT: 63 IN | BODY MASS INDEX: 27.64 KG/M2 | WEIGHT: 156 LBS

## 2019-06-04 DIAGNOSIS — M43.12 SPONDYLOLISTHESIS OF CERVICAL REGION: ICD-10-CM

## 2019-06-04 DIAGNOSIS — M40.35 FLATBACK SYNDROME OF THORACOLUMBAR REGION: ICD-10-CM

## 2019-06-04 DIAGNOSIS — M43.17 SPONDYLOLISTHESIS OF LUMBOSACRAL REGION: ICD-10-CM

## 2019-06-04 DIAGNOSIS — M40.209 KYPHOSIS, ACQUIRED: Primary | ICD-10-CM

## 2019-06-04 DIAGNOSIS — M54.12 CERVICAL RADICULOPATHY: ICD-10-CM

## 2019-06-04 DIAGNOSIS — M40.209 KYPHOSIS, ACQUIRED: ICD-10-CM

## 2019-06-04 DIAGNOSIS — M54.17 LUMBOSACRAL RADICULOPATHY: ICD-10-CM

## 2019-06-04 ASSESSMENT — ENCOUNTER SYMPTOMS
MUSCLE WEAKNESS: 1
POSTURAL DYSPNEA: 1
SHORTNESS OF BREATH: 1
COUGH: 0
TINGLING: 1
DYSPNEA ON EXERTION: 0
MYALGIAS: 1
SPUTUM PRODUCTION: 0
ARTHRALGIAS: 1
MEMORY LOSS: 0
BACK PAIN: 1
COUGH DISTURBING SLEEP: 0
JOINT SWELLING: 0
NECK PAIN: 0
HEADACHES: 0
SEIZURES: 0
SNORES LOUDLY: 0
PARALYSIS: 0
DIZZINESS: 0
DISTURBANCES IN COORDINATION: 1
STIFFNESS: 1
HEMOPTYSIS: 0
WEAKNESS: 1
TREMORS: 0
LOSS OF CONSCIOUSNESS: 0
WHEEZING: 0
SPEECH CHANGE: 0
MUSCLE CRAMPS: 0
NUMBNESS: 1

## 2019-06-04 ASSESSMENT — MIFFLIN-ST. JEOR: SCORE: 1238.8

## 2019-06-04 NOTE — LETTER
6/4/2019       RE: Aleena Ontiveros  [address]  [facility] [address]     Dear Colleague,    Thank you for referring your patient, Aleena Ontiveros, to the [facility]. Please see a copy of my visit note below.    Last Visit: 5/2/19    Previous Impression:  1.  Severe spinal sagittal malalignment, with PI-LL mismatch = 64; lower lumbar hypolordosis (L4-S1 = 16, ideal 51); severe compensatory pelvic retroversion (PT = 57); SVA = +10.4cm.  2. High grade (Gr 4) isthmic spondylolisthesis L5-S1, with lumbosacral kyphosis.  3.  C4-5 spondylolisthesis with kyphotic alignment  4.  Cervical stenosis at C4-5, C5-6, and C6-7  5.  43 yrs s/p PISF T4-L4 using for AIS (Dr. Rose, [facility]), with solid arthrodesis and retained Carpio rods x 2.    Previous Plan:  - Discuss case at complex spine conference  - Cervical/thoracic/lumbar CT myelogram  - Bilateral UE and LE EMG  - Flexeril 10mg TID prn for muscle spasms  - Follow up in clinic after obtaining the above studies  - NEED to fill out NDI, EPI and Pain Scores at next visit.      S>  58/f, here to review further and discuss.  Pls also see addenda previously written by me.    Accompanied by  Qasim.  Presented at complex spine case conference 5/15/2019.  Neurosurgeon present (Dr. Cameron) agreed with prioritizing lumbar spinal deformity correction.  No urgent reason to prioritize cervical spine; spinal cord not at high risk.  Although may also probably need c-spine surgery in future, there is no strong reason that it should be done urgently nor that it should necessarily be prioritized over lumbar spine.  I had spoken with patient over the phone on 5/28/19 re the above.  We agreed to have her see Dr. Cameron or one of our other spine neurosurgeons (Dr. Agustin / Dr. Mike) for 2nd (3rd actually) opinion, mainly to help weigh in on whether to do the neck or the lower back first.  She said she  "tried calling to see Dr. Cameron, but has not yet received a call back from his .    Reviewed EMG done 5/30/19 (Dr. Khan).  Impression:  Abnormal EMG.  (+) chronic bilateral L5-S1 radiculopathies with active denervation in the left S1 myotome.  (+) chronic right C7 radiculopathy.    No interval change in symptoms from last visit/conversation.    I told her that I also presented/discussed her case with my two ortho spine partners (Dr. Terry and Dr. Shelton).  They both thought that doing the lower back surgery first was reasonable.  In addition, Dr. Terry offered to send her case to other leading spine surgeons.  I put her images together in a powerpoint presentation and sent it over.  This morning, we got a response back from one of the foremost spine deformity surgeons in the world (based at [place], [place]), who agreed with fixing the lower back first.      Oswestry (EPI) Questionnaire    OSWESTRY DISABILITY INDEX 6/4/2019   Count 10   Sum 25   Oswestry Score (%) 50   Some recent data might be hidden        Neck Disability Index (NDI) Questionnaire    Neck Disability Index (NDI) 6/4/2019   Neck Disability Index: Count 8   NDI: Total Score = SUM (points for all 10 findings) 4   Neck Disability in Percent = (Total Score) / 50 * 100 10 (%)   Some recent data might be hidden           O>   Alert, oriented x 3, cooperative.  Not in CP distress.  Ht 1.588 m (5' 2.5\")   Wt 70.8 kg (156 lb)   BMI 28.08 kg/m     Ambulates independently.   Motor intact all extremities.  (+) mild numbness right posterior arm (C7).  (+) tingling bilateral L5/S1 dermatomes.    Neck full painless ROM (flexion, extension, R and L rotation).  (-) Lhermitte's.  (-) Miller's.  Normoreflexic.      Imaging:   Cervical flexion-extension x-rays taken today after clinic visit showed the previously noted cervical kyphosis and multilevel spondylosis.  There is spondylolisthesis at C3-4 and C4-5.  At the C4-5 level there is segmental kyphosis.  " There does not seem to be any significant instability between flexion and extension.  There is only a 5 degree difference in C4-5 segmental sagittal alignment.  There is no translational difference.     Cervical and thoracic CT scans taken today after clinic visit show Carpio gavino fixation from T4 down.  There is solid posterior arthrodesis from T3 down.  There is spondylolisthesis with advanced disc space narrowing at T1-2 and T2-3.    Cervical CT shows advanced multilevel cervical spondylosis with kyphosis.  (+) spondylolisthesis C3-4 and C4-5.  (+) significant facet arthropathy such that there already seems to be solid posterior autofusion across C3-4 and C4-5 especially on the left side.  This would further argue that the spondylolisthesis levels are stable.    A>  1.  Severe spinal sagittal malalignment, with PI-LL mismatch = 64; lower lumbar hypolordosis (L4-S1 = 16, ideal 51); severe compensatory pelvic retroversion (PT = 57); SVA = +10.4cm.  2.  High grade (Gr 4) isthmic spondylolisthesis L5-S1, with lumbosacral kyphosis.  3.  Subjacent level degenerative spondylolisthesis with stenosis L4-5.  4.  Severe foraminal and subarticular stenosis L5-S1.  5.  Multilevel cervical spondylosis and kyphosis.  6.   Cervical spondylolisthesis C3-4 and C4-5 without instability.  7.  Cervical stenosis at C4-5, C5-6, and C6-7, without myelopathy.  8.  Chronic radiculopathies bilateral L5/S1, and right C7 (shown on EMG 5/30/19).  9.  43 yrs s/p PISF T4-L4 using for AIS (Dr. Rose, [facility]), with solid arthrodesis and retained Carpio rods x 2.      P>   Had very good long discussion with patient and .  Discussed her condition at length.  Does not present with cervical myelopathy; no myelomalacia on imaging; no gross instability on imaging.  Symptoms primarily low back (lumbosacral) pain.    My opinion is still that it would likely be more beneficial for her to have her lumbosacral spine addressed  first with surgery, prior to her neck, even as she also has neck issues and will likely eventually need surgery for that as well.    I have presented/discussed her case with many other colleagues, and they all have agreed with this approach.  I acknowledge Dr. Aguilar's opinion (that her neck should be done first), and I respect his opinion, but I also disagree with it.  As before, I don't think both problems could be addressed at the same time.    She is still interested in seeing one of our neurosurgeons.  We will try to get him in to see either Dr. Cameron/Vamsi/Rashid.  I told her that I have the highest regard for all three of them.    If we proceed with surgery, will also likely get whomever she sees as a co-surgeon; although I'm thinking perhaps more for the neck surgery (2nd stage).  (Note: we were to schedule her for an appt with Dr. Cameron on 7/15/19).  For the lumbosacral spine, am thinking of asking my , Dr. Terry, to be co-surgeon, given his previous experience with sacral dome/pedicle subtraction osteotomy.  I explained to patient that hers is a very rare/unique and severe problem; that surgery is going to be very complex and difficult.  In addition to general anesthetic risks, we discussed not insignificant risk of nerve root injury (L5/S1) leading to foot weakness (e.g. Footdrop), dural tears, wound infection, nonunion with fixation failure requiring revision.    We also discussed the problems that her sagittal alignment poses, with risk of overcorrection (ie, birdwatcher's gaze).  This is another reason why I recommend doing the lumbosacral procedure first, as it would be easier to dial up or down the cervical correction later on for head positioning.    Patient asked re return to work.  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  I told her best case scenario is RTW at 6 wks, part time, light duty.  However, to be prepared to be  off work x 12 wks.    Because of technical challenges, we decided to forego CT myelograms, and instead obtain plain CT's.  - Cervical and thoracic CT (done; pls see above).  - Cervical flex-ext x-rays (done; pls see above).    Questions answered.  TT > 40 mins, > 50% CC.      Evangelist Vasquez MD    Orthopaedic Spine Surgery  Dept Orthopaedic Surgery, [facility]  [phone] office, [phone] pager  [email or web address]

## 2019-06-04 NOTE — NURSING NOTE
"Reason For Visit:   Chief Complaint   Patient presents with     RECHECK     F/U EMG and Spine confrence discussion. Did not get CT meylogram or nurosurgery appt done. States she has questions.      Primary MD: Arabella Conner  Ref. MD: Jasson Aguilar     ?  No  Occupation  .  Currently working? Yes.  Work status?  Full time.        Ht 1.588 m (5' 2.5\")   Wt 70.8 kg (156 lb)   BMI 28.08 kg/m      Pain Assessment  Patient Currently in Pain: Yes  0-10 Pain Scale: 4  Primary Pain Location: Back  Pain Descriptors: Discomfort    Oswestry (EPI) Questionnaire    OSWESTRY DISABILITY INDEX 6/4/2019   Count 10   Sum 25   Oswestry Score (%) 50   Some recent data might be hidden            Neck Disability Index (NDI) Questionnaire    Neck Disability Index (NDI) 6/4/2019   Neck Disability Index: Count 8   NDI: Total Score = SUM (points for all 10 findings) 4   Neck Disability in Percent = (Total Score) / 50 * 100 10 (%)   Some recent data might be hidden          Visual Analog Pain Scale  Back Pain Scale 0-10: 4  Right leg pain: 4  Left leg pain: 4  Neck Pain Scale 0-10: 2  Right arm pain: 1  Left arm pain: 0    Promis 10 Assessment    PROMIS 10 6/4/2019   In general, would you say your health is: Excellent   In general, would you say your quality of life is: Good   In general, how would you rate your physical health? Very good   In general, how would you rate your mental health, including your mood and your ability to think? Excellent   In general, how would you rate your satisfaction with your social activities and relationships? Very good   In general, please rate how well you carry out your usual social activities and roles Very good   To what extent are you able to carry out your everyday physical activities such as walking, climbing stairs, carrying groceries, or moving a chair? Moderately   How often have you been bothered by emotional problems such as feeling anxious, depressed or irritable? " Rarely   How would you rate your fatigue on average? Mild   How would you rate your pain on average?   0 = No Pain  to  10 = Worst Imaginable Pain 6   In general, would you say your health is: 5   In general, would you say your quality of life is: 3   In general, how would you rate your physical health? 4   In general, how would you rate your mental health, including your mood and your ability to think? 5   In general, how would you rate your satisfaction with your social activities and relationships? 4   In general, please rate how well you carry out your usual social activities and roles. (This includes activities at home, at work and in your community, and responsibilities as a parent, child, spouse, employee, friend, etc.) 4   To what extent are you able to carry out your everyday physical activities such as walking, climbing stairs, carrying groceries, or moving a chair? 3   In the past 7 days, how often have you been bothered by emotional problems such as feeling anxious, depressed, or irritable? 2   In the past 7 days, how would you rate your fatigue on average? 2   In the past 7 days, how would you rate your pain on average, where 0 means no pain, and 10 means worst imaginable pain? 6   Global Mental Health Score 16   Global Physical Health Score 14   PROMIS TOTAL - SUBSCORES 30   Some recent data might be hidden                Virgie Boyer LPN

## 2019-06-07 ENCOUNTER — TELEPHONE (OUTPATIENT)
Dept: ORTHOPEDICS | Facility: CLINIC | Age: 59
End: 2019-06-07

## 2019-06-13 ENCOUNTER — TELEPHONE (OUTPATIENT)
Dept: ORTHOPEDICS | Facility: CLINIC | Age: 59
End: 2019-06-13

## 2019-06-13 DIAGNOSIS — M54.17 LUMBOSACRAL RADICULOPATHY: ICD-10-CM

## 2019-06-13 DIAGNOSIS — M40.35 FLATBACK SYNDROME OF THORACOLUMBAR REGION: ICD-10-CM

## 2019-06-13 DIAGNOSIS — M40.209 KYPHOSIS, ACQUIRED: Primary | ICD-10-CM

## 2019-06-13 DIAGNOSIS — M54.12 CERVICAL RADICULOPATHY: ICD-10-CM

## 2019-06-13 DIAGNOSIS — M43.12 SPONDYLOLISTHESIS OF CERVICAL REGION: ICD-10-CM

## 2019-06-13 DIAGNOSIS — M43.17 SPONDYLOLISTHESIS OF LUMBOSACRAL REGION: ICD-10-CM

## 2019-06-13 NOTE — TELEPHONE ENCOUNTER
[facility] Call Center    Phone Message    May a detailed message be left on voicemail: no    Reason for Call: Other: Pt says Dr. Vasquez was going to refer to a pain specialist at [facility]. Pt needs a referral in her chart. Please call Pt back to confirm.      Action Taken: Message routed to:  [facility] ([facility] PAIN [facility]

## 2019-07-15 ENCOUNTER — OFFICE VISIT (OUTPATIENT)
Dept: NEUROSURGERY | Facility: CLINIC | Age: 59
End: 2019-07-15
Payer: COMMERCIAL

## 2019-07-15 VITALS
DIASTOLIC BLOOD PRESSURE: 82 MMHG | BODY MASS INDEX: 28.08 KG/M2 | HEIGHT: 63 IN | HEART RATE: 81 BPM | SYSTOLIC BLOOD PRESSURE: 134 MMHG

## 2019-07-15 DIAGNOSIS — M43.17 ACQUIRED SPONDYLOLISTHESIS OF LUMBOSACRAL REGION: ICD-10-CM

## 2019-07-15 DIAGNOSIS — M40.15 OTHER SECONDARY KYPHOSIS, THORACOLUMBAR REGION: Primary | ICD-10-CM

## 2019-07-15 DIAGNOSIS — Z98.1 H/O SPINAL FUSION: ICD-10-CM

## 2019-07-15 ASSESSMENT — ENCOUNTER SYMPTOMS
MEMORY LOSS: 0
SEIZURES: 0
STIFFNESS: 1
DISTURBANCES IN COORDINATION: 1
MYALGIAS: 0
DIZZINESS: 1
LOSS OF CONSCIOUSNESS: 0
NECK PAIN: 0
NECK PAIN: 0
SPEECH CHANGE: 0
LOSS OF CONSCIOUSNESS: 0
TINGLING: 1
MUSCLE WEAKNESS: 1
MUSCLE CRAMPS: 0
BACK PAIN: 1
ARTHRALGIAS: 1
MYALGIAS: 0
ARTHRALGIAS: 1
DIZZINESS: 1
DISTURBANCES IN COORDINATION: 1
SPEECH CHANGE: 0
TINGLING: 1
MUSCLE WEAKNESS: 1
JOINT SWELLING: 1
HEADACHES: 0
SEIZURES: 0
STIFFNESS: 1
BACK PAIN: 1
HEADACHES: 0
JOINT SWELLING: 1
MEMORY LOSS: 0
MUSCLE CRAMPS: 0

## 2019-07-15 ASSESSMENT — PAIN SCALES - GENERAL: PAINLEVEL: EXTREME PAIN (8)

## 2019-07-15 NOTE — LETTER
"7/15/2019       RE: Aleena Ontiveros  [address] [facility] [address]     Dear Evangelist,    It was a pleasure to see Aleena Ontiveros today in Neurosurgery Clinic. She is a 59 year old female who has previously undergone correction of scoliosis with Carpio rods in the distant past.  She currently has symptoms related to back and particularly right leg pain more down the back and side of the leg.  She also has some neck and right arm symptoms above the elbow.  She has numbness in the left lower extremity as well.  She is seen you and other providers about addressing her issues.  She has some known cervical kyphosis.  She also has clear thoracolumbar kyphosis and spondylolisthesis.    She denies any symptoms consistent with myelopathy.     Vitals:    07/15/19 0801   BP: 134/82   BP Location: Left arm   Patient Position: Chair   Cuff Size: Adult Regular   Pulse: 81   Height: 1.588 m (5' 2.5\")     Body mass index is 28.08 kg/m .  Extreme Pain (8)     Oswestry (EPI) Questionnaire    OSWESTRY DISABILITY INDEX 6/4/2019   Count 10   Sum 25   Oswestry Score (%) 50   Some recent data might be hidden     Visual Analog Scale (VAS) Questionnaire    VISUAL ANALOG PAIN SCALE 6/4/2019   Back Pain Scale 0-10 4   Right leg pain 4   Left leg pain 4   Neck Pain Scale 0-10 2   Right arm pain 1   Left arm pain 0        Awake, alert.  Needs to bend knees to stand erect  Well healed midline spinal incision  Strength BUE/LE 5/5 all muscle groups.  Reflexes 1+ patella/biceps B  LT intact to PP    Imaging: As noted, severe PI/LL mismatch in TL spine.  Her cervical MRI shows some draping of the cord anteriorly but does not show significant cord compression or spinal cord signal change.  Imaging was reviewed with the patient shown to the patient in clinic today.    Assessment: 1.  Thoracolumbar kyphosis and spondylolisthesis.  2.  Cervical kyphosis with possible radiculopathy.    Plan: We discussed her problems today and I do " not think that her cervical spine needs to be addressed prior to addressing the thoracolumbar issues.  She is much more symptomatic from her thoracolumbar spine.  She will likely require surgery from the mid thoracic to pelvis with a pedicle subtraction osteotomy.  She is ready to proceed with surgery and we will work on scheduling in the near future.    Again, thank you for allowing me to participate in the care of your patient.      Sincerely,    Ba Cameron MD

## 2019-07-15 NOTE — PROGRESS NOTES
"Dear Evangelist,    It was a pleasure to see Aleena Ontiveros today in Neurosurgery Clinic. She is a 59 year old female who has previously undergone correction of scoliosis with Carpio rods in the distant past.  She currently has symptoms related to back and particularly right leg pain more down the back and side of the leg.  She also has some neck and right arm symptoms above the elbow.  She has numbness in the left lower extremity as well.  She is seen you and other providers about addressing her issues.  She has some known cervical kyphosis.  She also has clear thoracolumbar kyphosis and spondylolisthesis.    She denies any symptoms consistent with myelopathy.     Vitals:    07/15/19 0801   BP: 134/82   BP Location: Left arm   Patient Position: Chair   Cuff Size: Adult Regular   Pulse: 81   Height: 1.588 m (5' 2.5\")     Body mass index is 28.08 kg/m .  Extreme Pain (8)     Oswestry (EPI) Questionnaire    OSWESTRY DISABILITY INDEX 6/4/2019   Count 10   Sum 25   Oswestry Score (%) 50   Some recent data might be hidden     Visual Analog Scale (VAS) Questionnaire    VISUAL ANALOG PAIN SCALE 6/4/2019   Back Pain Scale 0-10 4   Right leg pain 4   Left leg pain 4   Neck Pain Scale 0-10 2   Right arm pain 1   Left arm pain 0          Awake, alert.  Needs to bend knees to stand erect  Well healed midline spinal incision  Strength BUE/LE 5/5 all muscle groups.  Reflexes 1+ patella/biceps B  LT intact to PP    Imaging: As noted, severe PI/LL mismatch in TL spine.  Her cervical MRI shows some draping of the cord anteriorly but does not show significant cord compression or spinal cord signal change.  Imaging was reviewed with the patient shown to the patient in clinic today.    Assessment: 1.  Thoracolumbar kyphosis and spondylolisthesis.  2.  Cervical kyphosis with possible radiculopathy.    Plan: We discussed her problems today and I do not think that her cervical spine needs to be addressed prior to addressing the " thoracolumbar issues.  She is much more symptomatic from her thoracolumbar spine.  She will likely require surgery from the mid thoracic to pelvis with a pedicle subtraction osteotomy.  She is ready to proceed with surgery and we will work on scheduling in the near future.  Answers for HPI/ROS submitted by the patient on 7/15/2019   General Symptoms: No  Skin Symptoms: No  HENT Symptoms: No  EYE SYMPTOMS: No  HEART SYMPTOMS: No  LUNG SYMPTOMS: No  INTESTINAL SYMPTOMS: No  URINARY SYMPTOMS: No  GYNECOLOGIC SYMPTOMS: No  BREAST SYMPTOMS: No  SKELETAL SYMPTOMS: Yes  BLOOD SYMPTOMS: No  NERVOUS SYSTEM SYMPTOMS: Yes  MENTAL HEALTH SYMPTOMS: No  Back pain: Yes  Muscle aches: No  Neck pain: No  Swollen joints: Yes  Joint pain: Yes  Bone pain: Yes  Muscle cramps: No  Muscle weakness: Yes  Joint stiffness: Yes  Bone fracture: No  Trouble with coordination: Yes  Dizziness or trouble with balance: Yes  Fainting or black-out spells: No  Memory loss: No  Headache: No  Seizures: No  Speech problems: No  Tingling: Yes

## 2019-07-17 PROBLEM — M43.17 ACQUIRED SPONDYLOLISTHESIS OF LUMBOSACRAL REGION: Status: ACTIVE | Noted: 2019-07-17

## 2019-07-17 PROBLEM — Z98.1 H/O SPINAL FUSION: Status: ACTIVE | Noted: 2019-07-17

## 2019-07-17 PROBLEM — M40.15 OTHER SECONDARY KYPHOSIS, THORACOLUMBAR REGION: Status: ACTIVE | Noted: 2019-07-17

## 2019-07-23 ENCOUNTER — TELEPHONE (OUTPATIENT)
Dept: ORTHOPEDICS | Facility: CLINIC | Age: 59
End: 2019-07-23

## 2019-07-23 NOTE — TELEPHONE ENCOUNTER
Pt. Saw  in Neurosurgery 7-15-19 &  discussed case with  & wrote surgery order.   I left above message for pt. & That after Prior Auth.  Dept gets Insurance approval then surgery scheduler will call her to pick date & schedule PAC H&P appt.  Plan ICU after surgery.  Mailed preop pkt.  Call back prn.  W.O./Chyna Bonner RN.

## 2019-07-25 ENCOUNTER — OFFICE VISIT (OUTPATIENT)
Dept: ANESTHESIOLOGY | Facility: CLINIC | Age: 59
End: 2019-07-25
Payer: COMMERCIAL

## 2019-07-25 VITALS
HEIGHT: 63 IN | OXYGEN SATURATION: 98 % | SYSTOLIC BLOOD PRESSURE: 133 MMHG | HEART RATE: 82 BPM | DIASTOLIC BLOOD PRESSURE: 81 MMHG | BODY MASS INDEX: 27.64 KG/M2 | WEIGHT: 156 LBS

## 2019-07-25 DIAGNOSIS — M43.17 ACQUIRED SPONDYLOLISTHESIS OF LUMBOSACRAL REGION: Primary | ICD-10-CM

## 2019-07-25 RX ORDER — GABAPENTIN 600 MG/1
TABLET ORAL
Qty: 120 TABLET | Refills: 1 | Status: SHIPPED | OUTPATIENT
Start: 2019-07-25 | End: 2019-08-09

## 2019-07-25 ASSESSMENT — MIFFLIN-ST. JEOR: SCORE: 1233.8

## 2019-07-25 ASSESSMENT — PAIN SCALES - GENERAL: PAINLEVEL: SEVERE PAIN (6)

## 2019-07-25 NOTE — PROGRESS NOTES
[facility] Consultation    Date of visit: 7/25/2019    Reason for consultation:    Aleena Ontiveros is a 59 year old female who is seen in consultation today at the request of her provider, Dr Vasquez.    Primary Care Provider is Arabella Conner.  Pain medications are being prescribed by her PCP.    Please see the [facility] health questionnaire which the patient completed and reviewed with me in detail.    Chief Complaint:    Chief Complaint   Patient presents with     Pain Management     New consult        Pain history:  Aleena Ontiveros is a 59 year old female who first started having problems with pain approximately three months ago. She has a history of scoliosis and had a extensive fusion surgery as a teenager. She recently began to develop pain in her low back region, that has become significantly worse in the last three weeks. She describes a constant dull aching pain her low back that radiates into her left leg. There is associated numbness her left thigh as well as a sharp pain that radiates down into her foot. She states that in the last three weeks the intensity has increased and she has also started noticing pain in her right lower extremity as well. She describes a new dull ache and pain radiating from her buttock into her posterior thigh and lateral aspect of her thigh, not below the knee.  She denies any falls or weakness in her legs. However, she has been using a cane for ambulation due to balance issues that she has had for many years.     The pain has particularly impacted her sleep as she averages approximately 2 to 3 hours of sleep per night due to the pain. She is able to distract herself during the day with her job and other activities, but unfortunately is unable to find a comfortable position during the night. She has been taking 300 to 600 mg of gabapentin at bedtime and finds this somewhat helpful for her back and leg pain. She  takes gabapentin throughout the day too, taking 300 mg sometime in the morning and another 300 mg sometime during the day. She does not take it on a scheduled interval. She has also been taking up to 2 g of Tylenol per day.      She has met with Dr Vasquez and Dr Cameron and will be having surgery for this issue in the next few months. She is awaiting insurance approval as well as scheduling coordination between the surgeons.  She is hoping for some relief of pain while she awaits surgery.      She previously has done physical therapy for core strengthening. However, she has not had any PT recently due to the pain.      Pain rating: intensity ranges from 6/10 to 9/10, and Averages 6/10 on a 0-10 scale.  Aggravating factors include: remaining in any single position for extended period of time  Relieving factors include: cushions, air mattress for sleep, gabapentin  Any bowel or bladder incontinence: no    Current treatments include:  Cyclobenzaprine 10mg  Gabapentin 300mg   Celebrex  Tylenol  325mg up to 6/day  Biofreeze    Previous medication treatments included:  Lidocaine patches     Other treatments have included:  Aleena Ontiveros has not been seen at a pain clinic in the past.    PT: in the past  Acupuncture: no  TENs Unit: in the past  Injections: no    Past Medical History:  Past Medical History:   Diagnosis Date     Arthritis     osteoarthritis of both knee     Asthma      Hypertension      PONV (postoperative nausea and vomiting)      Thyroid disease      Uncomplicated asthma      Patient Active Problem List    Diagnosis Date Noted     Other secondary kyphosis, thoracolumbar region 07/17/2019     Priority: Medium     Acquired spondylolisthesis of lumbosacral region 07/17/2019     Priority: Medium     H/O spinal fusion 07/17/2019     Priority: Medium     Primary osteoarthritis of knee 12/18/2017     Priority: Medium     Anemia due to blood loss, acute 12/18/2017     Priority: Medium     Asthma      Priority:  Medium     S/P total knee arthroplasty, left 2017     Priority: Medium     Advance Care Planning 2017     Priority: Medium     Benign essential hypertension 08/15/2017     Priority: Medium     Slow transit constipation 08/15/2017     Priority: Medium     Physical deconditioning 08/15/2017     Priority: Medium     S/P total knee arthroplasty, right 2017     Priority: Medium     S/P total knee replacement using cement, right 08/10/2017     Priority: Medium     S/P total thyroidectomy 2014     Priority: Medium     S/P complete thyroidectomy 2014     Priority: Medium       Past Surgical History:  Past Surgical History:   Procedure Laterality Date     ABDOMEN SURGERY  ,         ARTHROPLASTY KNEE Right 8/10/2017    Procedure: ARTHROPLASTY KNEE;  RIGHT TOTAL KNEE ARTHROPLASTY ;  Surgeon: Grady Alvarez MD;  Location:  OR     ARTHROPLASTY KNEE Left 2017    Procedure: ARTHROPLASTY KNEE;  LEFT TOTAL KNEE ARTHROPLASTY;  Surgeon: Grady Alvarez MD;  Location:  OR     BACK SURGERY      spinal fusion     ENT SURGERY      tonsilectomy     GYN SURGERY  13    hysterectomy     ORTHOPEDIC SURGERY      sinal fusion,hand     thumb mass resection       THYROIDECTOMY  2014    Procedure: THYROIDECTOMY;  Surgeon: Krzysztof Marquez MD;  Location: [facility]     Medications:  Current Outpatient Medications   Medication Sig Dispense Refill     albuterol (PROAIR HFA/PROVENTIL HFA/VENTOLIN HFA) 108 (90 BASE) MCG/ACT Inhaler Inhale 2 puffs into the lungs every 6 hours as needed for shortness of breath / dyspnea or wheezing       cyclobenzaprine (FLEXERIL) 10 MG tablet Take 1 tablet (10 mg) by mouth 3 times daily as needed for muscle spasms 50 tablet 1     estradiol (ESTRACE) 0.1 MG/GM cream Place 2 g vaginally At Bedtime        Fexofenadine HCl (ALLEGRA PO) Take 180 mg by mouth daily       fluticasone (FLONASE) 50 MCG/ACT spray Spray 1 spray into both nostrils 2 times  daily        fluticasone-salmeterol (ADVAIR) 500-50 MCG/DOSE diskus inhaler Inhale 1 puff into the lungs 2 times daily       gabapentin (NEURONTIN) 300 MG capsule Take 300 mg by mouth 4 times daily        HYDROXYZINE PAMOATE PO Take 25 mg by mouth every 4 hours as needed for itching       Levothyroxine Sodium (SYNTHROID PO) Take 125 mcg by mouth daily        lisinopril (PRINIVIL/ZESTRIL) 40 MG tablet Take 1 tablet (40 mg) by mouth daily 30 tablet      Montelukast Sodium (SINGULAIR PO) Take 10 mg by mouth At Bedtime        Multiple Vitamins-Minerals (DAILY DENIZ MAXIMUM MULTIVITAMIN PO) Take 1 packet by mouth 3 times daily DOTERRA       multivitamin, therapeutic with minerals (MULTI-VITAMIN) TABS tablet Take 1 tablet by mouth daily       Omega-3 Fatty Acids (FISH OIL PO) Take 1 capsule by mouth daily       polyethylene glycol (MIRALAX/GLYCOLAX) powder Take 17 g by mouth 2 times daily       senna-docusate (SENOKOT-S;PERICOLACE) 8.6-50 MG per tablet Take 2 tablets by mouth daily as needed        bisacodyl (DULCOLAX) 10 MG Suppository Place 1 suppository (10 mg) rectally daily as needed for constipation (Patient not taking: Reported on 7/15/2019) 30 suppository      diphenhydrAMINE (BENADRYL ALLERGY) 25 MG tablet Take 1 tablet (25 mg) by mouth every 6 hours as needed for itching or allergies (Patient not taking: Reported on 7/15/2019) 56 tablet      oxyCODONE IR (ROXICODONE) 5 MG tablet Take 1-2 tablets (5-10 mg) by mouth every 3 hours as needed for moderate to severe pain (Patient not taking: Reported on 7/15/2019) 80 tablet 0     Allergies:     Allergies   Allergen Reactions     Adhesive Tape      Erythromycin Nausea and Vomiting     Pills cause vomiting,      Social History:  Home situation:   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 Father      Cerebrovascular Disease Father      Thyroid Disease Brother      Thyroid Disease Sister          Review of Systems:    POSTIVE IN BOLD  GENERAL: fever/chills, fatigue, general unwell feeling, weight gain/loss.  HEAD/EYES:  headache, dizziness, or vision changes.    EARS/NOSE/THROAT:  Nosebleeds, hearing loss, sinus infection, earache, tinnitus.  IMMUNE:  Allergies, cancer, immune deficiency, or infections.  SKIN:  Urticaria, rash, hives  HEME/Lymphatic:   anemia, easy bruising, easy bleeding.  RESPIRATORY:  cough, wheezing, or shortness of breath, asthma  CARDIOVASCULAR/Circulation:  Extremity edema, syncope, hypertension, tachycardia, or angina.  GASTROINTESTINAL:  abdominal pain, nausea/emesis, diarrhea, constipation,  hematochezia, or melena.  ENDOCRINE:  Diabetes, steroid use,  thyroid disease or osteoporosis.  MUSCULOSKELETAL: neck pain, back pain, arthralgia, arthritis, or gout.  GENITOURINARY:  frequency, urgency, dysuria, difficulty voiding, hematuria or incontinence.  NEUROLOGIC:  weakness, numbness, paresthesias, seizure, tremor, stroke or memory loss.  PSYCHIATRIC:  depression, anxiety, stress, suicidal thoughts or mood swings.     Physical Exam:  Vitals:    07/25/19 0703   BP: 133/81   Pulse: 82   SpO2: 98%   Weight: 70.8 kg (156 lb)   Height: 1.588 m (5' 2.5\")     Exam:  Constitutional: healthy, alert and no distress  Head: normocephalic. Atraumatic.   Eyes: no redness or jaundice noted   ENT: oropharnx normal.  MMM.  Neck supple.    Cardiovascular: RRR no m/g/r   Respiratory: clear   Gastrointestinal: soft, non-tender, normoactive bowel sounds   : deferred  Skin: no suspicious lesions or rashes  Psychiatric: mentation appears normal and affect normal/bright    Musculoskeletal exam:  Gait/Station/Posture: scoliotic posture; able to ambulate with cane or without with no change in posture    Lumbar spine: mild " paraspinal tenderness; midline scar along thoracolumbar region.  No ROM due to fusion      Straight leg exam: positive  Lukasz's maneuver: negative    Neurologic exam:  CN:  Cranial nerves 2-12 are normal  Motor:  5/5 UE and LE strength  Reflexes:     Patella:  R:  2/4 L: 2/4   Achilles:  R:  2/4 L: 2/4  Other reflexes:  Toes downgoing   Sensory:  (upper and lower extremities):   Light touch: diminished on left LE in L5 distribtion   Allodynia: absent   Dysethesia: present in LLE L5 distribution   Hyperalgesia: absent    Diagnostic tests:  EMG 5/30/2019:   Interpretation: The EMG is abnormal.  There is EMG evidence for chronic bilateral L5-S1 radiculopathies with active denervation in the left S1 myotome.  There is evidence for a chronic right C7 radiculopathy.    CT C and T Spine 6/4/2019:  CT CERVICAL SPINE W/O CONTRAST, CT THORACIC SPINE W/O CONTRAST  6/4/2019 12:43 PM     Provided History: Kyphosis, acquired     ICD-10: Kyphosis, acquired     Comparison: Radiograph 5/2/2019 , 2/14/2018     Technique: Using multidetector thin collimation helical acquisition  technique, axial, sagittal and coronal 3 mm thickness CT  reconstructions were obtained through the cervical and thoracic spine  without intravenous contrast.  Images were viewed in bone and soft  tissue windows.     Findings:     Two posterior stabilization rods in the thoracolumbar spine. No  evidence of hardware complication.      Abnormal kyphotic curvature of the C-spine centered at C4-C5.  Additional exaggerated kyphosis of the thoracic spine centered at  T2-T3. S-shaped curvature of the thoracic spine with dextroscoliosis  at the T7-T8 level. Stepwise 3 to 4 mm grade 1 anterolisthesis at  C3-C4 and C4-C5. Approximately 3 mm grade 1 retrolisthesis at C6-C7.  Trace anterolisthesis at T1-T2 and 4 mm anterolisthesis at T2-T3.  Solid fusion of the posterior elements on both sides extending from  the T4 down to the visualized upper lumbar  spine.     Multilevel severe facet hypertrophy. No acute fracture. No  prevertebral edema. Severe disc height narrowing at C4-C5. Multilevel  osteophytic spurring, most pronounced at C4-C7 level. Mild spinal  canal narrowing at C5-C6 and asymmetric right at C6-C7. Severe neural  foraminal stenosis at the right C6-C7 and C7-T1 secondary to foraminal  disc osteophyte.     No significant spinal canal stenosis in the thoracic spine. Advanced  neural foraminal stenosis at right T1-T2 and T2-T3. Advanced neural  foraminal stenosis at the left T1-T2, T2-T3, and T4-T5.  Additional  multilevel mild neural foraminal narrowings.      No abnormality of the paraspinous soft tissues. Left lung base  atelectasis.                                                                      Impression:   1. Abnormal C and T-spine curvature as described above.   2. Advanced cervical spine spondylosis.    Personally reviewed imaging on 7/25/2019    Other testing (labs, diagnostics) reviewed:  Labs  Last Comprehensive Metabolic Panel:  Sodium   Date Value Ref Range Status   12/21/2017 137 133 - 144 mmol/L Final     Potassium   Date Value Ref Range Status   12/21/2017 4.0 3.4 - 5.3 mmol/L Final     Chloride   Date Value Ref Range Status   12/21/2017 104 94 - 109 mmol/L Final     Carbon Dioxide   Date Value Ref Range Status   12/21/2017 24 20 - 32 mmol/L Final     Anion Gap   Date Value Ref Range Status   12/21/2017 9 3 - 14 mmol/L Final     Glucose   Date Value Ref Range Status   12/21/2017 111 (H) 70 - 99 mg/dL Final     Urea Nitrogen   Date Value Ref Range Status   12/21/2017 11 7 - 30 mg/dL Final     Creatinine   Date Value Ref Range Status   12/21/2017 0.56 0.52 - 1.04 mg/dL Final     GFR Estimate   Date Value Ref Range Status   12/21/2017 >90 >60 mL/min/1.7m2 Final     Comment:     Non  GFR Calc     Calcium   Date Value Ref Range Status   12/21/2017 9.0 8.5 - 10.1 mg/dL Final       EKG: NSR HR 71    [place] Prescription  Monitoring Program reviewed on 7/25/2019 - appropriate    Outside records reviewed on 07/25/19    Screening tools:  DIRE Score for ongoing opioid management - not prescribing therefore not indicated today    Assessment:  1. Lumbar radiculopathy  2. Scoliosis s/p fusion    Aleena Ontiveros is a 59 year old female who presents with the complaints of worsening low back and leg pain in association with radiculopathy. She has a history of fusion throughout entire vertebral column excluding the lowest lumbar vertebral level. In the last several months she has developed worse pain and is working on scheduling surgery with Dr. Vasquez. She presents for recommendations of pain management while waiting to schedule her surgical procedure. We discussed increasing her Gabapentin dose and scheduling the intake of her medications in order to maintain a steady state of medication. We recommended that she increase to 600/600/900mg of gabapentin as tolerated. We also recommended that she increase her tylenol to 975mg TID.  We also encouraged limited physical therapy as tolerated for core strengthening prior to her surgery.      Plan:  Diagnosis reviewed, treatment option addressed, and risk/benefits discussed.  Self-care instructions given.  I am recommending a multidisciplinary treatment plan to help this patient better manage her pain.      1. Physical Therapy: Referral placed  2. Pain Psychologist to address issues of relaxation, behavioral change, coping style, and other factors important to improvement: not indicated  3. Diagnostic Studies: not indicated  4. Medication Management:   1. Increase gabapentin dose to 600/600/900 or as tolerated  2. Tylenol 1g TID  5. Further procedures recommended: none  6. Other treatments:  7. Urine toxicology screen: not indicated   8. Recommendations/follow-up for PCP:  no  9. Follow up: 4 weeks unless surgery scheduled sooner    Total time spent was 40 minutes, and more than 50% of face to  face time was spent in counseling and/or coordination of care regarding principles of multidisciplinary care, medication management.     Ana Luna MD  , Department of Anesthesiology  Pain Medicine, [facility]      Answers for HPI/ROS submitted by the patient on 7/15/2019   General Symptoms: No  Skin Symptoms: No  HENT Symptoms: No  EYE SYMPTOMS: No  HEART SYMPTOMS: No  LUNG SYMPTOMS: No  INTESTINAL SYMPTOMS: No  URINARY SYMPTOMS: No  GYNECOLOGIC SYMPTOMS: No  BREAST SYMPTOMS: No  SKELETAL SYMPTOMS: Yes  BLOOD SYMPTOMS: No  NERVOUS SYSTEM SYMPTOMS: Yes  MENTAL HEALTH SYMPTOMS: No  Back pain: Yes  Muscle aches: No  Neck pain: No  Swollen joints: Yes  Joint pain: Yes  Bone pain: Yes  Muscle cramps: No  Muscle weakness: Yes  Joint stiffness: Yes  Bone fracture: No  Trouble with coordination: Yes  Dizziness or trouble with balance: Yes  Fainting or black-out spells: No  Memory loss: No  Headache: No  Seizures: No  Speech problems: No  Tingling: Yes

## 2019-07-25 NOTE — LETTER
7/25/2019       RE: Aleena Ontiveros  [address]  [facility] [address]     Dear Colleague,    Thank you for referring your patient, Aleena Ontiveros, to the [facility]NAGEMENT at [facility]. Please see a copy of my visit note below.                          [facility] Consultation    Date of visit: 7/25/2019    Reason for consultation:    Aleena Ontiveros is a 59 year old female who is seen in consultation today at the request of her provider, Dr Vasquez.    Primary Care Provider is Arabella Conner.  Pain medications are being prescribed by her PCP.    Please see the [facility] health questionnaire which the patient completed and reviewed with me in detail.    Chief Complaint:    Chief Complaint   Patient presents with     Pain Management     New consult        Pain history:  Aleena Ontiveros is a 59 year old female who first started having problems with pain approximately three months ago. She has a history of scoliosis and had a extensive fusion surgery as a teenager. She recently began to develop pain in her low back region, that has become significantly worse in the last three weeks. She describes a constant dull aching pain her low back that radiates into her left leg. There is associated numbness her left thigh as well as a sharp pain that radiates down into her foot. She states that in the last three weeks the intensity has increased and she has also started noticing pain in her right lower extremity as well. She describes a new dull ache and pain radiating from her buttock into her posterior thigh and lateral aspect of her thigh, not below the knee.  She denies any falls or weakness in her legs. However, she has been using a cane for ambulation due to balance issues that she has had for many years.     The pain has particularly impacted her sleep as she averages approximately 2 to 3 hours  of sleep per night due to the pain. She is able to distract herself during the day with her job and other activities, but unfortunately is unable to find a comfortable position during the night. She has been taking 300 to 600 mg of gabapentin at bedtime and finds this somewhat helpful for her back and leg pain. She takes gabapentin throughout the day too, taking 300 mg sometime in the morning and another 300 mg sometime during the day. She does not take it on a scheduled interval. She has also been taking up to 2 g of Tylenol per day.      She has met with Dr Vasquez and Dr Cameron and will be having surgery for this issue in the next few months. She is awaiting insurance approval as well as scheduling coordination between the surgeons.  She is hoping for some relief of pain while she awaits surgery.      She previously has done physical therapy for core strengthening. However, she has not had any PT recently due to the pain.      Pain rating: intensity ranges from 6/10 to 9/10, and Averages 6/10 on a 0-10 scale.  Aggravating factors include: remaining in any single position for extended period of time  Relieving factors include: cushions, air mattress for sleep, gabapentin  Any bowel or bladder incontinence: no    Current treatments include:  Cyclobenzaprine 10mg  Gabapentin 300mg   Celebrex  Tylenol  325mg up to 6/day  Biofreeze    Previous medication treatments included:  Lidocaine patches     Other treatments have included:  Aleena Ontiveros has not been seen at a pain clinic in the past.    PT: in the past  Acupuncture: no  TENs Unit: in the past  Injections: no    Past Medical History:  Past Medical History:   Diagnosis Date     Arthritis     osteoarthritis of both knee     Asthma      Hypertension      PONV (postoperative nausea and vomiting)      Thyroid disease      Uncomplicated asthma      Patient Active Problem List    Diagnosis Date Noted     Other secondary kyphosis, thoracolumbar region 07/17/2019      Priority: Medium     Acquired spondylolisthesis of lumbosacral region 2019     Priority: Medium     H/O spinal fusion 2019     Priority: Medium     Primary osteoarthritis of knee 2017     Priority: Medium     Anemia due to blood loss, acute 2017     Priority: Medium     Asthma      Priority: Medium     S/P total knee arthroplasty, left 2017     Priority: Medium     Advance Care Planning 2017     Priority: Medium     Benign essential hypertension 08/15/2017     Priority: Medium     Slow transit constipation 08/15/2017     Priority: Medium     Physical deconditioning 08/15/2017     Priority: Medium     S/P total knee arthroplasty, right 2017     Priority: Medium     S/P total knee replacement using cement, right 08/10/2017     Priority: Medium     S/P total thyroidectomy 2014     Priority: Medium     S/P complete thyroidectomy 2014     Priority: Medium       Past Surgical History:  Past Surgical History:   Procedure Laterality Date     ABDOMEN SURGERY  ,         ARTHROPLASTY KNEE Right 8/10/2017    Procedure: ARTHROPLASTY KNEE;  RIGHT TOTAL KNEE ARTHROPLASTY ;  Surgeon: Grady Alvarez MD;  Location:  OR     ARTHROPLASTY KNEE Left 2017    Procedure: ARTHROPLASTY KNEE;  LEFT TOTAL KNEE ARTHROPLASTY;  Surgeon: Grady Alvarez MD;  Location:  OR     BACK SURGERY      spinal fusion     ENT SURGERY      tonsilectomy     GYN SURGERY  13    hysterectomy     ORTHOPEDIC SURGERY      sinal fusion,hand     thumb mass resection       THYROIDECTOMY  2014    Procedure: THYROIDECTOMY;  Surgeon: Krzysztof Marquez MD;  Location: [facility]     Medications:  Current Outpatient Medications   Medication Sig Dispense Refill     albuterol (PROAIR HFA/PROVENTIL HFA/VENTOLIN HFA) 108 (90 BASE) MCG/ACT Inhaler Inhale 2 puffs into the lungs every 6 hours as needed for shortness of breath / dyspnea or wheezing       cyclobenzaprine (FLEXERIL) 10 MG  tablet Take 1 tablet (10 mg) by mouth 3 times daily as needed for muscle spasms 50 tablet 1     estradiol (ESTRACE) 0.1 MG/GM cream Place 2 g vaginally At Bedtime        Fexofenadine HCl (ALLEGRA PO) Take 180 mg by mouth daily       fluticasone (FLONASE) 50 MCG/ACT spray Spray 1 spray into both nostrils 2 times daily        fluticasone-salmeterol (ADVAIR) 500-50 MCG/DOSE diskus inhaler Inhale 1 puff into the lungs 2 times daily       gabapentin (NEURONTIN) 300 MG capsule Take 300 mg by mouth 4 times daily        HYDROXYZINE PAMOATE PO Take 25 mg by mouth every 4 hours as needed for itching       Levothyroxine Sodium (SYNTHROID PO) Take 125 mcg by mouth daily        lisinopril (PRINIVIL/ZESTRIL) 40 MG tablet Take 1 tablet (40 mg) by mouth daily 30 tablet      Montelukast Sodium (SINGULAIR PO) Take 10 mg by mouth At Bedtime        Multiple Vitamins-Minerals (DAILY DENIZ MAXIMUM MULTIVITAMIN PO) Take 1 packet by mouth 3 times daily DOTERRA       multivitamin, therapeutic with minerals (MULTI-VITAMIN) TABS tablet Take 1 tablet by mouth daily       Omega-3 Fatty Acids (FISH OIL PO) Take 1 capsule by mouth daily       polyethylene glycol (MIRALAX/GLYCOLAX) powder Take 17 g by mouth 2 times daily       senna-docusate (SENOKOT-S;PERICOLACE) 8.6-50 MG per tablet Take 2 tablets by mouth daily as needed        bisacodyl (DULCOLAX) 10 MG Suppository Place 1 suppository (10 mg) rectally daily as needed for constipation (Patient not taking: Reported on 7/15/2019) 30 suppository      diphenhydrAMINE (BENADRYL ALLERGY) 25 MG tablet Take 1 tablet (25 mg) by mouth every 6 hours as needed for itching or allergies (Patient not taking: Reported on 7/15/2019) 56 tablet      oxyCODONE IR (ROXICODONE) 5 MG tablet Take 1-2 tablets (5-10 mg) by mouth every 3 hours as needed for moderate to severe pain (Patient not taking: Reported on 7/15/2019) 80 tablet 0     Allergies:     Allergies   Allergen Reactions     Adhesive Tape      Erythromycin  "Nausea and Vomiting     Pills cause vomiting,      Social History:  Home situation:   Occupation/Schooling: Works for [employer] full time  Tobacco use: no  Alcohol use: 2 drinks per month  Drug use: no  History of chemical dependency treatment: no    Family history:  Family History   Problem Relation Age of Onset     Breast Cancer Mother      Cancer - colorectal Father      C.A.D. Father      Cerebrovascular Disease Father      Thyroid Disease Brother      Thyroid Disease Sister          Review of Systems:    POSTIVE IN BOLD  GENERAL: fever/chills, fatigue, general unwell feeling, weight gain/loss.  HEAD/EYES:  headache, dizziness, or vision changes.    EARS/NOSE/THROAT:  Nosebleeds, hearing loss, sinus infection, earache, tinnitus.  IMMUNE:  Allergies, cancer, immune deficiency, or infections.  SKIN:  Urticaria, rash, hives  HEME/Lymphatic:   anemia, easy bruising, easy bleeding.  RESPIRATORY:  cough, wheezing, or shortness of breath, asthma  CARDIOVASCULAR/Circulation:  Extremity edema, syncope, hypertension, tachycardia, or angina.  GASTROINTESTINAL:  abdominal pain, nausea/emesis, diarrhea, constipation,  hematochezia, or melena.  ENDOCRINE:  Diabetes, steroid use,  thyroid disease or osteoporosis.  MUSCULOSKELETAL: neck pain, back pain, arthralgia, arthritis, or gout.  GENITOURINARY:  frequency, urgency, dysuria, difficulty voiding, hematuria or incontinence.  NEUROLOGIC:  weakness, numbness, paresthesias, seizure, tremor, stroke or memory loss.  PSYCHIATRIC:  depression, anxiety, stress, suicidal thoughts or mood swings.     Physical Exam:  Vitals:    07/25/19 0703   BP: 133/81   Pulse: 82   SpO2: 98%   Weight: 70.8 kg (156 lb)   Height: 1.588 m (5' 2.5\")     Exam:  Constitutional: healthy, alert and no distress  Head: normocephalic. Atraumatic.   Eyes: no redness or jaundice noted   ENT: oropharnx normal.  MMM.  Neck supple.    Cardiovascular: RRR no m/g/r   Respiratory: clear   Gastrointestinal: " soft, non-tender, normoactive bowel sounds   : deferred  Skin: no suspicious lesions or rashes  Psychiatric: mentation appears normal and affect normal/bright    Musculoskeletal exam:  Gait/Station/Posture: scoliotic posture; able to ambulate with cane or without with no change in posture    Lumbar spine: mild paraspinal tenderness; midline scar along thoracolumbar region.  No ROM due to fusion      Straight leg exam: positive  Lukasz's maneuver: negative    Neurologic exam:  CN:  Cranial nerves 2-12 are normal  Motor:  5/5 UE and LE strength  Reflexes:     Patella:  R:  2/4 L: 2/4   Achilles:  R:  2/4 L: 2/4  Other reflexes:  Toes downgoing   Sensory:  (upper and lower extremities):   Light touch: diminished on left LE in L5 distribtion   Allodynia: absent   Dysethesia: present in LLE L5 distribution   Hyperalgesia: absent    Diagnostic tests:  EMG 5/30/2019:   Interpretation: The EMG is abnormal.  There is EMG evidence for chronic bilateral L5-S1 radiculopathies with active denervation in the left S1 myotome.  There is evidence for a chronic right C7 radiculopathy.    CT C and T Spine 6/4/2019:  CT CERVICAL SPINE W/O CONTRAST, CT THORACIC SPINE W/O CONTRAST  6/4/2019 12:43 PM     Provided History: Kyphosis, acquired     ICD-10: Kyphosis, acquired     Comparison: Radiograph 5/2/2019 , 2/14/2018     Technique: Using multidetector thin collimation helical acquisition  technique, axial, sagittal and coronal 3 mm thickness CT  reconstructions were obtained through the cervical and thoracic spine  without intravenous contrast.  Images were viewed in bone and soft  tissue windows.     Findings:     Two posterior stabilization rods in the thoracolumbar spine. No  evidence of hardware complication.      Abnormal kyphotic curvature of the C-spine centered at C4-C5.  Additional exaggerated kyphosis of the thoracic spine centered at  T2-T3. S-shaped curvature of the thoracic spine with dextroscoliosis  at the T7-T8 level.  Stepwise 3 to 4 mm grade 1 anterolisthesis at  C3-C4 and C4-C5. Approximately 3 mm grade 1 retrolisthesis at C6-C7.  Trace anterolisthesis at T1-T2 and 4 mm anterolisthesis at T2-T3.  Solid fusion of the posterior elements on both sides extending from  the T4 down to the visualized upper lumbar spine.     Multilevel severe facet hypertrophy. No acute fracture. No  prevertebral edema. Severe disc height narrowing at C4-C5. Multilevel  osteophytic spurring, most pronounced at C4-C7 level. Mild spinal  canal narrowing at C5-C6 and asymmetric right at C6-C7. Severe neural  foraminal stenosis at the right C6-C7 and C7-T1 secondary to foraminal  disc osteophyte.     No significant spinal canal stenosis in the thoracic spine. Advanced  neural foraminal stenosis at right T1-T2 and T2-T3. Advanced neural  foraminal stenosis at the left T1-T2, T2-T3, and T4-T5.  Additional  multilevel mild neural foraminal narrowings.      No abnormality of the paraspinous soft tissues. Left lung base  atelectasis.                                                                      Impression:   1. Abnormal C and T-spine curvature as described above.   2. Advanced cervical spine spondylosis.    Personally reviewed imaging on 7/25/2019    Other testing (labs, diagnostics) reviewed:  Labs  Last Comprehensive Metabolic Panel:  Sodium   Date Value Ref Range Status   12/21/2017 137 133 - 144 mmol/L Final     Potassium   Date Value Ref Range Status   12/21/2017 4.0 3.4 - 5.3 mmol/L Final     Chloride   Date Value Ref Range Status   12/21/2017 104 94 - 109 mmol/L Final     Carbon Dioxide   Date Value Ref Range Status   12/21/2017 24 20 - 32 mmol/L Final     Anion Gap   Date Value Ref Range Status   12/21/2017 9 3 - 14 mmol/L Final     Glucose   Date Value Ref Range Status   12/21/2017 111 (H) 70 - 99 mg/dL Final     Urea Nitrogen   Date Value Ref Range Status   12/21/2017 11 7 - 30 mg/dL Final     Creatinine   Date Value Ref Range Status    12/21/2017 0.56 0.52 - 1.04 mg/dL Final     GFR Estimate   Date Value Ref Range Status   12/21/2017 >90 >60 mL/min/1.7m2 Final     Comment:     Non  GFR Calc     Calcium   Date Value Ref Range Status   12/21/2017 9.0 8.5 - 10.1 mg/dL Final       EKG: NSR HR 71    [place] Prescription Monitoring Program reviewed on 7/25/2019 - appropriate    Outside records reviewed on 07/25/19    Screening tools:  DIRE Score for ongoing opioid management - not prescribing therefore not indicated today    Assessment:  1. Lumbar radiculopathy  2. Scoliosis s/p fusion    Aleena Ontiveros is a 59 year old female who presents with the complaints of worsening low back and leg pain in association with radiculopathy. She has a history of fusion throughout entire vertebral column excluding the lowest lumbar vertebral level. In the last several months she has developed worse pain and is working on scheduling surgery with Dr. Vasquez. She presents for recommendations of pain management while waiting to schedule her surgical procedure. We discussed increasing her Gabapentin dose and scheduling the intake of her medications in order to maintain a steady state of medication. We recommended that she increase to 600/600/900mg of gabapentin as tolerated. We also recommended that she increase her tylenol to 975mg TID.  We also encouraged limited physical therapy as tolerated for core strengthening prior to her surgery.      Plan:  Diagnosis reviewed, treatment option addressed, and risk/benefits discussed.  Self-care instructions given.  I am recommending a multidisciplinary treatment plan to help this patient better manage her pain.      1. Physical Therapy: Referral placed  2. Pain Psychologist to address issues of relaxation, behavioral change, coping style, and other factors important to improvement: not indicated  3. Diagnostic Studies: not indicated  4. Medication Management:   1. Increase gabapentin dose to 600/600/900 or as  tolerated  2. Tylenol 1g TID  5. Further procedures recommended: none  6. Other treatments:  7. Urine toxicology screen: not indicated   8. Recommendations/follow-up for PCP:  no  9. Follow up: 4 weeks unless surgery scheduled sooner    Total time spent was 40 minutes, and more than 50% of face to face time was spent in counseling and/or coordination of care regarding principles of multidisciplinary care, medication management.     Ana Luna MD  , Department of Anesthesiology  Pain Medicine, [facility]      Answers for HPI/ROS submitted by the patient on 7/15/2019   General Symptoms: No  Skin Symptoms: No  HENT Symptoms: No  EYE SYMPTOMS: No  HEART SYMPTOMS: No  LUNG SYMPTOMS: No  INTESTINAL SYMPTOMS: No  URINARY SYMPTOMS: No  GYNECOLOGIC SYMPTOMS: No  BREAST SYMPTOMS: No  SKELETAL SYMPTOMS: Yes  BLOOD SYMPTOMS: No  NERVOUS SYSTEM SYMPTOMS: Yes  MENTAL HEALTH SYMPTOMS: No  Back pain: Yes  Muscle aches: No  Neck pain: No  Swollen joints: Yes  Joint pain: Yes  Bone pain: Yes  Muscle cramps: No  Muscle weakness: Yes  Joint stiffness: Yes  Bone fracture: No  Trouble with coordination: Yes  Dizziness or trouble with balance: Yes  Fainting or black-out spells: No  Memory loss: No  Headache: No  Seizures: No  Speech problems: No  Tingling: Yes

## 2019-07-25 NOTE — NURSING NOTE
LPN reviewed AVS with Pt.  Pt verbalized an understanding of information, and was asked to contact clinic with questions.    Loly Cisneros LPN

## 2019-07-25 NOTE — PATIENT INSTRUCTIONS
1. Increase Gabapentin as tolerated.     AM   PM   Bedtime  300 mg    300 mg   600mg .  Current dose.     600 mg    300 mg   600mg .  After 3 days increase as tolerated to next line.    600 mg    300 mg   900mg .  After 3 days increase as tolerated to next line.     600 mg    600 mg   900mg .  After 3 days increase as tolerated to next line.     Caution for sedation.    Do not drive until you know how the medication affects you.   You can go slower if you need to or increasing only one dose at a time.  Do not stop abruptly once at higher doses.  This medication must be tapered off.    A prescription of 600 mg has been called into the clinic for you. Please break in half to equal directed dose.     2. Recommend using Tylenol 975 mg Three times daily as needed for pain.     3. TENS UNIT Ordered- Please call your insurance to Verify coverage and locations to  the device.     4. Pain PT Ordered- [phone]    5. Acupuncture referral placed with the [facility]-   Call to pgnhtdsm [phone].  -Please call your insurance provider to find out about acupuncture coverage, being that not all policies cover acupuncture services.      Follow up: 3 months, or earlier if indicated.     We are relocating to the 5th floor after August 2, 2019. If your next appointment is after August 2nd, check in on the 5th floor to be seen for your next appointment.      To speak with a nurse, schedule/reschedule/cancel a clinic appointment, or request a medication refill call: [phone]     You can also reach us by [email or web address]: [email or web address]    For refills, please call on Monday, 1 week before your medication runs out. The doctors are not always in clinic, so this gives us time to get your prescriptions ready.  Please let us know the name of the medication you are requesting a refill of.

## 2019-07-26 ENCOUNTER — MYC MEDICAL ADVICE (OUTPATIENT)
Dept: ORTHOPEDICS | Facility: CLINIC | Age: 59
End: 2019-07-26

## 2019-08-06 ENCOUNTER — TELEPHONE (OUTPATIENT)
Dept: ORTHOPEDICS | Facility: CLINIC | Age: 59
End: 2019-08-06

## 2019-08-06 ENCOUNTER — TELEPHONE (OUTPATIENT)
Dept: ANESTHESIOLOGY | Facility: CLINIC | Age: 59
End: 2019-08-06

## 2019-08-06 NOTE — TELEPHONE ENCOUNTER
[facility] Call Center    Phone Message    May a detailed message be left on voicemail: yes    Reason for Call: Other: Pt states [employer] has no record of Dr. Bowles. Need new dr to resubmit orders. Having issues with insurance.     Action Taken: Message routed to:  [facility] Pain

## 2019-08-06 NOTE — TELEPHONE ENCOUNTER
Health Call Center    Phone Message    May a detailed message be left on voicemail: yes    Reason for Call: Other: Patient called her insurance, [employer], and they have not received any paperwork about an prior auth for an upcoming surgery.  Patient would like a call back from HODAN Clemente.  Thank you!     Action Taken: Message routed to:  [facility]

## 2019-08-08 ENCOUNTER — TELEPHONE (OUTPATIENT)
Dept: ORTHOPEDICS | Facility: CLINIC | Age: 59
End: 2019-08-08

## 2019-08-08 ENCOUNTER — THERAPY VISIT (OUTPATIENT)
Dept: PHYSICAL THERAPY | Facility: CLINIC | Age: 59
End: 2019-08-08
Payer: COMMERCIAL

## 2019-08-08 DIAGNOSIS — G89.29 CHRONIC BILATERAL LOW BACK PAIN WITH BILATERAL SCIATICA: ICD-10-CM

## 2019-08-08 DIAGNOSIS — M54.42 CHRONIC BILATERAL LOW BACK PAIN WITH BILATERAL SCIATICA: ICD-10-CM

## 2019-08-08 DIAGNOSIS — M54.41 CHRONIC BILATERAL LOW BACK PAIN WITH BILATERAL SCIATICA: ICD-10-CM

## 2019-08-08 DIAGNOSIS — M43.17 ACQUIRED SPONDYLOLISTHESIS OF LUMBOSACRAL REGION: ICD-10-CM

## 2019-08-08 PROCEDURE — 97112 NEUROMUSCULAR REEDUCATION: CPT | Mod: GP | Performed by: PHYSICAL THERAPIST

## 2019-08-08 PROCEDURE — 97162 PT EVAL MOD COMPLEX 30 MIN: CPT | Mod: GP | Performed by: PHYSICAL THERAPIST

## 2019-08-08 NOTE — TELEPHONE ENCOUNTER
"[facility] Call Center    Phone Message    May a detailed message be left on voicemail: yes    Reason for Call: Other: Aleena states that she has spoken with Dhruv.  [employer] has informed her that it has not received.  Aleena has spoken with Nusrat, and Nusrat had informed her that PA was submitted on 7.31.19 at 1pm.  Again, [employer] has no record of that request.  Dhruv may be contacted at Provider Services [phone] or Fax PA to [phone].  Dhruv has advised Aleena to inform clinic to place \"Urgent\", Nusrat previously indicated that an urgent distinction would require imminent death or  loss of limb.  [employer] states that Urgent should be indicated due to time frame and Aleena's condition (extreme pain).     Action Taken: Message routed to:  [facility]    "

## 2019-08-08 NOTE — TELEPHONE ENCOUNTER
I called and left a voice message for the patient informing them that I was calling to clarify what orders you are talking about in your message.    You can call me back at [phone].    Etelvina So CMA

## 2019-08-08 NOTE — PROGRESS NOTES
[place] [facility] Initial Evaluation  Subjective:  The history is provided by the patient. No  was used.   Type of problem:  Lumbar       Problem details: Pt had lumbar fusion surgery T1-L5 when she was 14 years old for scoliosis which did not improved with back brace, she had been doing fine with on and off pain until she had her knee replacements in 2017, it affected her posture and gait which made her back pain worse, LBP radiating to trhe L leg; walking would help but standing made things worse; slowly started to report numbness over L foot, she had drop foot L for a while which improved, 2 months ago she started to report R leg upto the ankle lateral MD MATTHEW recommended PT 7/25/19  Difficulty with walking - walking piper 3 mins, uses cane for ambulation - did not bring it today   Difficulty sitting with wt on the R side - uses pillows   Difficulty standing >3 mins   .   Patient reports pain:  Lower lumbar spine. Radiates to:  Thigh right, gluteals right, lower leg right and foot left. Associated symptoms:  Loss of motion/stiffness and numbness. Symptoms are exacerbated by sitting, standing and walking and relieved by NSAID's and analgesics.      and reported as 8/10 on pain scale. General health as reported by patient is excellent. Pertinent medical history includes:  Asthma, high blood pressure and numbness/tingling.         Primary job tasks include:  Prolonged sitting and computer work.  Pain is described as burning, aching, sharp and shooting and is constant. Pain is worse during the night (lacks 4-5 hours of sleep at night ). Since onset symptoms are gradually worsening.  Previous treatment includes physical therapy. There was significant improvement following previous treatment.   Patient is theatre  .                           Objective:  System         Lumbar/SI Evaluation  ROM:  Arom wnl lumbar: no lumbar motion present due to fusion of the entire spine        Lumbar Myotomes:      L2-4 (Quads):  Left:  5    Right:  5  L4 (Ankle DF):  Left:  5    Right:  5            Neural Tension/Mobility:      Right side:   Slump positive.                                                       General     ROS   Unable to lie down in any position supine, SL or prone     Assessment/Plan:    Patient is a 59 year old female with thoracic and lumbar complaints.    Patient has the following significant findings with corresponding treatment plan.                Diagnosis 1:  Acquired spondylolesthesis   Pain -  hot/cold therapy, manual therapy, self management, education, directional preference exercise and home program, electrical stimulation  Decreased ROM/flexibility - manual therapy, therapeutic exercise and home program  Decreased joint mobility - manual therapy, therapeutic exercise and home program  Decreased strength - therapeutic exercise, therapeutic activities and home program  Impaired gait - gait training and home program  Impaired muscle performance - neuro re-education and home program  Decreased function - therapeutic activities and home program  Impaired posture - neuro re-education and home program    Therapy Evaluation Codes:   1) History comprised of:   Personal factors that impact the plan of care:      Time since onset of symptoms.    Comorbidity factors that impact the plan of care are:      Asthma, High blood pressure, Numbness/tingling, Osteoarthritis and Pain at night/rest.     Medications impacting care: Anti-inflammatory, High blood pressure, Muscle relaxant and Pain.  2) Examination of Body Systems comprised of:   Body structures and functions that impact the plan of care:      Lumbar spine, Sacral illiac joint and Thoracic Spine.   Activity limitations that impact the plan of care are:      Bending, Dressing, Lifting, Sitting, Standing, Walking, Working and Sleeping.  3) Clinical presentation characteristics  are:   Evolving/Changing.  4) Decision-Making    High complexity using standardized patient assessment instrument and/or measureable assessment of functional outcome.  Cumulative Therapy Evaluation is: Moderate complexity.    Previous and current functional limitations:  (See Goal Flow Sheet for this information)    Short term and Long term goals: (See Goal Flow Sheet for this information)     Communication ability:  Patient appears to be able to clearly communicate and understand verbal and written communication and follow directions correctly.  Treatment Explanation - The following has been discussed with the patient:   RX ordered/plan of care  Anticipated outcomes  Possible risks and side effects  This patient would benefit from PT intervention to resume normal activities.   Rehab potential is good.    Frequency:  2 X week, once daily  Duration:  for 3 weeks tapering to 1 X a week over 4 weeks  Discharge Plan:  Achieve all LTG.  Independent in home treatment program.  Return to previous functional level by discharge.  Reach maximal therapeutic benefit.    Please refer to the daily flowsheet for treatment today, total treatment time and time spent performing 1:1 timed codes.

## 2019-08-08 NOTE — TELEPHONE ENCOUNTER
Health Call Center    Phone Message    May a detailed message be left on voicemail: no    Reason for Call: Other: Pt says the orders are for PT, Acupuncture, and a 10s Unit. Pt says [facility] doesn't recognize Dr. Luna as being one of the in network providers through their system. She also wants someone to call [facility] to change the name on the order so it's valid, and Pt is concerned about her medications being prescribed by her. Please call Pt back.     Action Taken: Message routed to:  [facility] ([facility]

## 2019-08-09 RX ORDER — GABAPENTIN 600 MG/1
TABLET ORAL
Qty: 120 TABLET | Refills: 1 | Status: SHIPPED | OUTPATIENT
Start: 2019-08-09 | End: 2019-08-27

## 2019-08-09 NOTE — NURSING NOTE
Note: See other Telephone encounter.   LPN was advised to re-order medication and therapies under Dr. Huerta, per pt's insurance.     Loly Cisneros LPN

## 2019-08-12 NOTE — PROGRESS NOTES
[facility] [facility] Initial Evaluation  Subjective:       Pertinent medical history includes:  Asthma, high blood pressure, osteoarthritis, thyroid problems and numbness/tingling.  Medical allergies: adhesives.  Surgeries include:  Orthopedic surgery and other (spinal fusion and knee replacement). Other surgery history details: hysterectomy and thyroidectomy.  Current medications:  Anti-inflammatory, high blood pressure medication, muscle relaxants, pain medication, thyroid medication and other. Other medications details: asthma and allergy medications.   Primary job tasks include:  Computer work and prolonged sitting.           Patient is a theatrelighting .     Red flags:  Foot drop, numbness in perianal region and pain at rest/night.    Oswestry Score: 70 %                 Objective:  System    Physical Exam    General     ROS    Assessment/Plan:

## 2019-08-13 ENCOUNTER — MYC MEDICAL ADVICE (OUTPATIENT)
Dept: ANESTHESIOLOGY | Facility: CLINIC | Age: 59
End: 2019-08-13

## 2019-08-13 ENCOUNTER — MYC MEDICAL ADVICE (OUTPATIENT)
Dept: ORTHOPEDICS | Facility: CLINIC | Age: 59
End: 2019-08-13

## 2019-08-13 ENCOUNTER — THERAPY VISIT (OUTPATIENT)
Dept: PHYSICAL THERAPY | Facility: CLINIC | Age: 59
End: 2019-08-13
Payer: COMMERCIAL

## 2019-08-13 DIAGNOSIS — M54.41 CHRONIC BILATERAL LOW BACK PAIN WITH BILATERAL SCIATICA: ICD-10-CM

## 2019-08-13 DIAGNOSIS — M54.42 CHRONIC BILATERAL LOW BACK PAIN WITH BILATERAL SCIATICA: ICD-10-CM

## 2019-08-13 DIAGNOSIS — M43.17 ACQUIRED SPONDYLOLISTHESIS OF LUMBOSACRAL REGION: ICD-10-CM

## 2019-08-13 DIAGNOSIS — G89.29 CHRONIC BILATERAL LOW BACK PAIN WITH BILATERAL SCIATICA: ICD-10-CM

## 2019-08-13 DIAGNOSIS — M43.17 SPONDYLOLISTHESIS OF LUMBOSACRAL REGION: ICD-10-CM

## 2019-08-13 PROCEDURE — 97112 NEUROMUSCULAR REEDUCATION: CPT | Mod: GP | Performed by: PHYSICAL THERAPIST

## 2019-08-13 PROCEDURE — 97110 THERAPEUTIC EXERCISES: CPT | Mod: GP | Performed by: PHYSICAL THERAPIST

## 2019-08-14 NOTE — TELEPHONE ENCOUNTER
Sent to [facility] pain clinic.     Routed to Dr. Bowles.    Uma Morris, RN-BSN  [facility]

## 2019-08-15 RX ORDER — CYCLOBENZAPRINE HCL 10 MG
10 TABLET ORAL 3 TIMES DAILY PRN
Qty: 90 TABLET | Refills: 1
Start: 2019-08-15 | End: 2019-11-26

## 2019-08-15 NOTE — TELEPHONE ENCOUNTER
See My chart message.  I called [employer] [phone] who had been out for her fathers  & she stated request was submitted to Insurance on 19 & she got a conformation fax back at 1:03 that it was received.  She will call Insurance for update & then she will update pt.  I called pt back today 8-15-19 & let her know above & she stated understanding & agreed with plan & was very nice on phone. She asked for refill of Flexeril stating that helps pain.  Called pharmacy.   call back prn. Pt. Agreed.  Chyna Bonner RN.

## 2019-08-16 NOTE — TELEPHONE ENCOUNTER
TENS unit re-ordered under Dr. Huerta.    Other orders requested- Pain PT and [facility]-Acupunctre were prev    Loly Cisneros LPN

## 2019-08-21 NOTE — TELEPHONE ENCOUNTER
LPN called and spoke with the patient. They were informed that the orders for Acupuncture and Pain PT have been corrected- and placed under Dr. Huerta, as Dr. Luna was not recognized by [facility] (due to them being a New MD to the Practice.)    Pt stated that they have already talked to their insurance, and that Acupuncture is not covered under [facility], but that they will get in touch with the clinic with a list of other approved locations. LPN can change the acupuncture order to include the information, once its received.     Pt stated they would reach out to the clinic if they had other questions or concerns with the corrected orders.     LPN informed pt that a new order for a TENS unit was placed under Dr. Huerta as well and was mailed to their home address. Pt was informed that they should check with their insurance to be sure the device is approved and if they have suggestions on locations to  the device. LPN informed the pt, that it would most likely be a Medical Supply company- that they would get the TENS unit dispensed from.   Pt Verbalized understanding and denied needing further assistance at this time.     Loly Cisneros LPN

## 2019-08-22 ENCOUNTER — THERAPY VISIT (OUTPATIENT)
Dept: PHYSICAL THERAPY | Facility: CLINIC | Age: 59
End: 2019-08-22
Payer: COMMERCIAL

## 2019-08-22 ENCOUNTER — OFFICE VISIT (OUTPATIENT)
Dept: ORTHOPEDICS | Facility: CLINIC | Age: 59
End: 2019-08-22
Payer: COMMERCIAL

## 2019-08-22 DIAGNOSIS — M54.17 LUMBOSACRAL RADICULOPATHY: ICD-10-CM

## 2019-08-22 DIAGNOSIS — G89.29 CHRONIC BILATERAL LOW BACK PAIN WITH BILATERAL SCIATICA: ICD-10-CM

## 2019-08-22 DIAGNOSIS — M43.17 SPONDYLOLISTHESIS OF LUMBOSACRAL REGION: Primary | ICD-10-CM

## 2019-08-22 DIAGNOSIS — M54.41 CHRONIC BILATERAL LOW BACK PAIN WITH BILATERAL SCIATICA: ICD-10-CM

## 2019-08-22 DIAGNOSIS — M54.42 CHRONIC BILATERAL LOW BACK PAIN WITH BILATERAL SCIATICA: ICD-10-CM

## 2019-08-22 DIAGNOSIS — M40.209 KYPHOSIS, ACQUIRED: ICD-10-CM

## 2019-08-22 PROCEDURE — 97110 THERAPEUTIC EXERCISES: CPT | Mod: GP | Performed by: PHYSICAL THERAPIST

## 2019-08-22 PROCEDURE — 97112 NEUROMUSCULAR REEDUCATION: CPT | Mod: GP | Performed by: PHYSICAL THERAPIST

## 2019-08-22 NOTE — PROGRESS NOTES
"Last Visit Date: 6/4/19  Previous Impression:  1.  Severe spinal sagittal malalignment, with PI-LL mismatch = 64; lower lumbar hypolordosis (L4-S1 = 16, ideal 51); severe compensatory pelvic retroversion (PT = 57); SVA = +10.4cm.  2.  High grade (Gr 4) isthmic spondylolisthesis L5-S1, with lumbosacral kyphosis.  3.  Subjacent level degenerative spondylolisthesis with stenosis L4-5.  4.  Severe foraminal and subarticular stenosis L5-S1.  5.  Multilevel cervical spondylosis and kyphosis.  6.  Cervical spondylolisthesis C3-4 and C4-5 without instability.  7.  Cervical stenosis at C4-5, C5-6, and C6-7, without myelopathy.  8.  Chronic radiculopathies bilateral L5/S1, and right C7 (shown on EMG 5/30/19).  9.  43 yrs s/p PISF T4-L4 using for AIS (Dr. Rose, [facility]), with solid arthrodesis and retained Carpio rods x 2.  Previous Plan:  Addendum 7/18/19:  Saw Dr. Cameron of Neurosurgery 7/15/19, who agreed that the TL spine surgery could be done first before the cervical spine (if necessary).  Also willing to be co-surgeon.  - Surg request placed: Distal extension of previous T4-L4 spinal fusion and fixation down to pelvis; TLIF-SPO's L4-5, L5-S1; reduction of high-grade spondylolisthesis L5-S1.  - Co-surgeon: Dr. Cameron  - [place]; will arrange for Stabilization unit bed  - PAC referral.      S>  59/f, accompanied by .    Pain had gotten much worse.  Progressively worse.  Uses different sitting pillows; uses walking stick.  Sitting could sometimes be very painful.  Needs to sit without putting weight on R buttock; also raises her R leg when she sits.  Otherwise, pain becomes very bad and she wouldn't be able to sleep that night.  (+) low back pain radiating down R leg.    [place] name was \"Aleena Sorensen\".  Had previous surgeries done by Dr. Rose at [facility].    NOTE: Has multiple allergies to adhesives/tape.  The only tapes that she does not react to are: Cloth tape and Coban.  She does not think she " reacts to Dermabone (skin glue), but she reacted to Steri-strips (when she had TKA).    Gabapentin   Tylenol   Celebrex  Flexeril.  (-) opioids.      Oswestry (EPI) Questionnaire    OSWESTRY DISABILITY INDEX 8/8/2019   Count 10   Sum 35   Oswestry Score (%) 70   Some recent data might be hidden            Neck Disability Index (NDI) Questionnaire    Neck Disability Index (NDI) 6/4/2019   Neck Disability Index: Count 8   NDI: Total Score = SUM (points for all 10 findings) 4   Neck Disability in Percent = (Total Score) / 50 * 100 10 (%)   Some recent data might be hidden            Visual Analog Pain Scale  Back Pain Scale 0-10: 0  Right leg pain: 9  Left leg pain: 0    PROMIS-10 Scores  Global Mental Health Score: (P) 11  Global Physical Health Score: (P) 8  PROMIS TOTAL - SUBSCORES: (P) 19    O>   Alert, oriented x 3, cooperative.  Not in CP distress.  There were no vitals taken for this visit.  Ambulates independently.   Grossly neurologically intact.  Detailed exam not performed today; please see previous note.    Imaging:   No new x-rays today; pls refer to previous notes.    A>  1.  Severe spinal sagittal malalignment, with PI-LL mismatch = 64; lower lumbar hypolordosis (L4-S1 = 16, ideal 51); severe compensatory pelvic retroversion (PT = 57); SVA = +10.4cm.  2.  High grade (Gr 4) isthmic spondylolisthesis L5-S1, with lumbosacral kyphosis.  3.  Subjacent level degenerative spondylolisthesis with stenosis L4-5.  4.  Severe foraminal and subarticular stenosis L5-S1.  5.  Multilevel cervical spondylosis and kyphosis.  6.  Cervical spondylolisthesis C3-4 and C4-5 without instability.  7.  Cervical stenosis at C4-5, C5-6, and C6-7, without myelopathy.  8.  Chronic radiculopathies bilateral L5/S1, and right C7 (shown on EMG 5/30/19).  9.  43 yrs s/p PISF T4-L4 using for AIS (Dr. Rose, [facility]), with solid arthrodesis and retained Carpio rods x 2.    P>   Had good long discussion with patient and  .  I will try to reach out to Dr. Cameron / his  to see if we can get an earlier surgery schedule than 11/5/19.    I told her I could not promise that we'd be able to do surgery sooner, but will try our best.  Challenges include Dr. Cameron's schedule (will be decreasing time here at [facility]), the long surgery she needs that requires an entire OR day block, and lots of coordination because of very high case complexity.    Discussed surgical details; rationale, risks, benefits, alternatives.  Elects and willing to proceed.  Surgical procedure as previously outlined/discussed.    TT > 25 mins, > 50% CC.        Evangelist Vasquez MD    Orthopaedic Spine Surgery  Dept Orthopaedic Surgery, [facility]  [phone] office, [phone] pager  [email or web address]

## 2019-08-22 NOTE — NURSING NOTE
Reason For Visit:   Chief Complaint   Patient presents with     RECHECK     discuss surgery     Primary MD: Arabella Conner  Ref. MD: Jasson Aguilar     ?  No  Occupation  .  Currently working? Yes.  Work status?  Full time.         There were no vitals taken for this visit.    Pain Assessment  Patient Currently in Pain: Yes  0-10 Pain Scale: 9  Primary Pain Location: Buttocks    Oswestry (EPI) Questionnaire    OSWESTRY DISABILITY INDEX 8/8/2019   Count 10   Sum 35   Oswestry Score (%) 70   Some recent data might be hidden            Visual Analog Pain Scale  Back Pain Scale 0-10: 0  Right leg pain: 9  Left leg pain: 0    Promis 10 Assessment    PROMIS 10 8/22/2019   In general, would you say your health is: Excellent   In general, would you say your quality of life is: Fair   In general, how would you rate your physical health? Fair   In general, how would you rate your mental health, including your mood and your ability to think? Good   In general, how would you rate your satisfaction with your social activities and relationships? Fair   In general, please rate how well you carry out your usual social activities and roles Fair   To what extent are you able to carry out your everyday physical activities such as walking, climbing stairs, carrying groceries, or moving a chair? Not at all   How often have you been bothered by emotional problems such as feeling anxious, depressed or irritable? Rarely   How would you rate your fatigue on average? Moderate   How would you rate your pain on average?   0 = No Pain  to  10 = Worst Imaginable Pain 8   In general, would you say your health is: 5   In general, would you say your quality of life is: 2   In general, how would you rate your physical health? 2   In general, how would you rate your mental health, including your mood and your ability to think? 3   In general, how would you rate your satisfaction with your social activities and  relationships? 2   In general, please rate how well you carry out your usual social activities and roles. (This includes activities at home, at work and in your community, and responsibilities as a parent, child, spouse, employee, friend, etc.) 2   To what extent are you able to carry out your everyday physical activities such as walking, climbing stairs, carrying groceries, or moving a chair? 1   In the past 7 days, how often have you been bothered by emotional problems such as feeling anxious, depressed, or irritable? 2   In the past 7 days, how would you rate your fatigue on average? 3   In the past 7 days, how would you rate your pain on average, where 0 means no pain, and 10 means worst imaginable pain? 8   Global Mental Health Score 11   Global Physical Health Score 8   PROMIS TOTAL - SUBSCORES 19   Some recent data might be hidden                Virgie Boyer LPN

## 2019-08-22 NOTE — LETTER
8/22/2019       RE: Aleena Ontiveros  [address]  [place] [address]     Dear Colleague,    Thank you for referring your patient, Aleena Ontiveros, to the [facility]. Please see a copy of my visit note below.    Last Visit Date: 6/4/19  Previous Impression:  1.  Severe spinal sagittal malalignment, with PI-LL mismatch = 64; lower lumbar hypolordosis (L4-S1 = 16, ideal 51); severe compensatory pelvic retroversion (PT = 57); SVA = +10.4cm.  2.  High grade (Gr 4) isthmic spondylolisthesis L5-S1, with lumbosacral kyphosis.  3.  Subjacent level degenerative spondylolisthesis with stenosis L4-5.  4.  Severe foraminal and subarticular stenosis L5-S1.  5.  Multilevel cervical spondylosis and kyphosis.  6.  Cervical spondylolisthesis C3-4 and C4-5 without instability.  7.  Cervical stenosis at C4-5, C5-6, and C6-7, without myelopathy.  8.  Chronic radiculopathies bilateral L5/S1, and right C7 (shown on EMG 5/30/19).  9.  43 yrs s/p PISF T4-L4 using for AIS (Dr. Rose, [facility]), with solid arthrodesis and retained Carpio rods x 2.  Previous Plan:  Addendum 7/18/19:  Saw Dr. Cameron of Neurosurgery 7/15/19, who agreed that the TL spine surgery could be done first before the cervical spine (if necessary).  Also willing to be co-surgeon.  - Surg request placed: Distal extension of previous T4-L4 spinal fusion and fixation down to pelvis; TLIF-SPO's L4-5, L5-S1; reduction of high-grade spondylolisthesis L5-S1.  - Co-surgeon: Dr. Cameron  - [place]; will arrange for Stabilization unit bed  - PAC referral.      S>  59/f, accompanied by .    Pain had gotten much worse.  Progressively worse.  Uses different sitting pillows; uses walking stick.  Sitting could sometimes be very painful.  Needs to sit without putting weight on R buttock; also raises her R leg when she sits.  Otherwise, pain becomes very bad and she wouldn't be able to sleep  "that night.  (+) low back pain radiating down R leg.    [place] name was \"Aleena Sorensen\".  Had previous surgeries done by Dr. Rose at [facility].    NOTE: Has multiple allergies to adhesives/tape.  The only tapes that she does not react to are: Cloth tape and Coban.  She does not think she reacts to Dermabone (skin glue), but she reacted to Steri-strips (when she had TKA).    Gabapentin   Tylenol   Celebrex  Flexeril.  (-) opioids.      Oswestry (EPI) Questionnaire    OSWESTRY DISABILITY INDEX 8/8/2019   Count 10   Sum 35   Oswestry Score (%) 70   Some recent data might be hidden            Neck Disability Index (NDI) Questionnaire    Neck Disability Index (NDI) 6/4/2019   Neck Disability Index: Count 8   NDI: Total Score = SUM (points for all 10 findings) 4   Neck Disability in Percent = (Total Score) / 50 * 100 10 (%)   Some recent data might be hidden            Visual Analog Pain Scale  Back Pain Scale 0-10: 0  Right leg pain: 9  Left leg pain: 0    PROMIS-10 Scores  Global Mental Health Score: (P) 11  Global Physical Health Score: (P) 8  PROMIS TOTAL - SUBSCORES: (P) 19    O>   Alert, oriented x 3, cooperative.  Not in CP distress.  There were no vitals taken for this visit.  Ambulates independently.   Grossly neurologically intact.  Detailed exam not performed today; please see previous note.    Imaging:   No new x-rays today; pls refer to previous notes.    A>  1.  Severe spinal sagittal malalignment, with PI-LL mismatch = 64; lower lumbar hypolordosis (L4-S1 = 16, ideal 51); severe compensatory pelvic retroversion (PT = 57); SVA = +10.4cm.  2.  High grade (Gr 4) isthmic spondylolisthesis L5-S1, with lumbosacral kyphosis.  3.  Subjacent level degenerative spondylolisthesis with stenosis L4-5.  4.  Severe foraminal and subarticular stenosis L5-S1.  5.  Multilevel cervical spondylosis and kyphosis.  6.  Cervical spondylolisthesis C3-4 and C4-5 without instability.  7.  Cervical stenosis at C4-5, C5-6, and C6-7, " without myelopathy.  8.  Chronic radiculopathies bilateral L5/S1, and right C7 (shown on EMG 5/30/19).  9.  43 yrs s/p PISF T4-L4 using for AIS (Dr. Rose, [facility]), with solid arthrodesis and retained Carpio rods x 2.    P>   Had good long discussion with patient and .  I will try to reach out to Dr. Cameron / his  to see if we can get an earlier surgery schedule than 11/5/19.    I told her I could not promise that we'd be able to do surgery sooner, but will try our best.  Challenges include Dr. Cameron's schedule (will be decreasing time here at [facility]), the long surgery she needs that requires an entire OR day block, and lots of coordination because of very high case complexity.    Discussed surgical details; rationale, risks, benefits, alternatives.  Elects and willing to proceed.  Surgical procedure as previously outlined/discussed.    TT > 25 mins, > 50% CC.        Evangelist Vasquez MD    Orthopaedic Spine Surgery  Dept Orthopaedic Surgery, [facility]  [phone] office, [phone] pager  [email or web address]

## 2019-08-26 ENCOUNTER — THERAPY VISIT (OUTPATIENT)
Dept: PHYSICAL THERAPY | Facility: CLINIC | Age: 59
End: 2019-08-26
Payer: COMMERCIAL

## 2019-08-26 ENCOUNTER — TELEPHONE (OUTPATIENT)
Dept: ORTHOPEDICS | Facility: CLINIC | Age: 59
End: 2019-08-26

## 2019-08-26 DIAGNOSIS — M54.41 CHRONIC BILATERAL LOW BACK PAIN WITH BILATERAL SCIATICA: ICD-10-CM

## 2019-08-26 DIAGNOSIS — M54.42 CHRONIC BILATERAL LOW BACK PAIN WITH BILATERAL SCIATICA: ICD-10-CM

## 2019-08-26 DIAGNOSIS — G89.29 CHRONIC BILATERAL LOW BACK PAIN WITH BILATERAL SCIATICA: ICD-10-CM

## 2019-08-26 PROCEDURE — 97112 NEUROMUSCULAR REEDUCATION: CPT | Mod: GP | Performed by: PHYSICAL THERAPIST

## 2019-08-26 PROCEDURE — 97110 THERAPEUTIC EXERCISES: CPT | Mod: GP | Performed by: PHYSICAL THERAPIST

## 2019-08-26 NOTE — TELEPHONE ENCOUNTER
Spoke with patient about surgery with Dr. Vasquez and Dr. Cameron. Informed patient that first available with both doctors was 11/5 but Dr. Vasquez was going to reach out to Dr. Cameron to see if we can get surgery scheduler sooner. Patient was informed that we are holding 11/5. Patient states that there is no way she can wait that long for surgery when she was supposed to have this back in May. Patient was informed I will follow up with Dr. Vasquez in clinic [facility]

## 2019-08-27 ENCOUNTER — OFFICE VISIT (OUTPATIENT)
Dept: ANESTHESIOLOGY | Facility: CLINIC | Age: 59
End: 2019-08-27
Payer: COMMERCIAL

## 2019-08-27 VITALS
WEIGHT: 156 LBS | BODY MASS INDEX: 27.64 KG/M2 | HEART RATE: 79 BPM | HEIGHT: 63 IN | DIASTOLIC BLOOD PRESSURE: 85 MMHG | SYSTOLIC BLOOD PRESSURE: 153 MMHG | RESPIRATION RATE: 16 BRPM

## 2019-08-27 DIAGNOSIS — M43.17 ACQUIRED SPONDYLOLISTHESIS OF LUMBOSACRAL REGION: ICD-10-CM

## 2019-08-27 RX ORDER — GABAPENTIN 600 MG/1
TABLET ORAL
Qty: 150 TABLET | Refills: 1 | Status: ON HOLD | OUTPATIENT
Start: 2019-08-27 | End: 2019-10-11

## 2019-08-27 ASSESSMENT — ANXIETY QUESTIONNAIRES
1. FEELING NERVOUS, ANXIOUS, OR ON EDGE: SEVERAL DAYS
2. NOT BEING ABLE TO STOP OR CONTROL WORRYING: SEVERAL DAYS
4. TROUBLE RELAXING: NEARLY EVERY DAY
6. BECOMING EASILY ANNOYED OR IRRITABLE: NOT AT ALL
GAD7 TOTAL SCORE: 5
3. WORRYING TOO MUCH ABOUT DIFFERENT THINGS: NOT AT ALL
5. BEING SO RESTLESS THAT IT IS HARD TO SIT STILL: NOT AT ALL
GAD7 TOTAL SCORE: 5
7. FEELING AFRAID AS IF SOMETHING AWFUL MIGHT HAPPEN: NOT AT ALL
7. FEELING AFRAID AS IF SOMETHING AWFUL MIGHT HAPPEN: NOT AT ALL

## 2019-08-27 ASSESSMENT — MIFFLIN-ST. JEOR: SCORE: 1233.8

## 2019-08-27 ASSESSMENT — PAIN SCALES - GENERAL: PAINLEVEL: EXTREME PAIN (8)

## 2019-08-27 NOTE — PATIENT INSTRUCTIONS
1. Increase Gabapentin to 900 mg Three times daily.     2. Okay to take 1,000 mg of Tylenol up to 4 times daily. (Maximum of 4,000 mg in a 24 hour period.)     3. We recommend you call to schedule your acupuncture appointment.     4. Once TENS unit is obtained, we recommend you continue use- to help with pain.     Follow up: 3 months after Surgery- or earlier if indicated.       To speak with a nurse, schedule/reschedule/cancel a clinic appointment, or request a medication refill call: [phone]     You can also reach us by [place]: [email or web address]    For refills, please call on Monday, 1 week before your medication runs out. The doctors are not always in clinic, so this gives us time to get your prescriptions ready.  Please let us know the name of the medication you are requesting a refill of.

## 2019-08-27 NOTE — TELEPHONE ENCOUNTER
Patient stopped in clinic after pain clinic visit to follow up on surgery date. Patient was informed that we still have 11/5 on hold for her and that Dr. Vasquez just yesterday sent a message to Dr. Cameron about trying to find a sooner date. Patient became upset at this stating that she has been trying to have surgery since May and she would like surgery tomorrow.     Informed patient that I would follow up with Dr. Vasquez care team.

## 2019-08-27 NOTE — PROGRESS NOTES
[facility]    Date of visit: 8/27/2019    Chief complaint:   Chief Complaint   Patient presents with     Pain Management     Follow up       Interval history:  Aleena Ontiveros was last seen by me on 7/25/2019.      Recommendations/plan at the last visit included:  1. Physical Therapy: Referral placed  2. Pain Psychologist to address issues of relaxation, behavioral change, coping style, and other factors important to improvement: not indicated  3. Diagnostic Studies: not indicated  4. Medication Management:   1. Increase gabapentin dose to 600/600/900 or as tolerated  2. Tylenol 1g TID  5. Further procedures recommended: none  6. Other treatments:  7. Urine toxicology screen: not indicated   8. Recommendations/follow-up for PCP:  no  9. Follow up: 4 weeks unless surgery scheduled sooner    Since her last visit, Aleena Ontiveros reports:  - pain has worsened since prior visit  - waiting to schedule surgery with difficulty finding availability for Sembrano/Cameron    Pain scores:  Pain intensity on average is {NUMBERS 0-10:777138} on a scale of 0-10.     Current pain treatments:   ***    Past pain treatments:  ***    Side Effects: {SIDE EFFECTS:444534}    Medications:  Current Outpatient Medications   Medication Sig Dispense Refill     albuterol (PROAIR HFA/PROVENTIL HFA/VENTOLIN HFA) 108 (90 BASE) MCG/ACT Inhaler Inhale 2 puffs into the lungs every 6 hours as needed for shortness of breath / dyspnea or wheezing       cyclobenzaprine (FLEXERIL) 10 MG tablet Take 1 tablet (10 mg) by mouth 3 times daily as needed for muscle spasms 90 tablet 1     estradiol (ESTRACE) 0.1 MG/GM cream Place 2 g vaginally At Bedtime        Fexofenadine HCl (ALLEGRA PO) Take 180 mg by mouth daily       fluticasone (FLONASE) 50 MCG/ACT spray Spray 1 spray into both nostrils 2 times daily        fluticasone-salmeterol (ADVAIR) 500-50 MCG/DOSE diskus inhaler Inhale 1 puff into the lungs 2 times daily       gabapentin  (NEURONTIN) 300 MG capsule Take 300 mg by mouth 4 times daily        gabapentin (NEURONTIN) 600 MG tablet Take 600mg in AM, 600mg midday, and 900mg at bedtime; titrate to this dose as discussed in clinic 120 tablet 1     HYDROXYZINE PAMOATE PO Take 25 mg by mouth every 4 hours as needed for itching       Levothyroxine Sodium (SYNTHROID PO) Take 125 mcg by mouth daily        lisinopril (PRINIVIL/ZESTRIL) 40 MG tablet Take 1 tablet (40 mg) by mouth daily 30 tablet      Montelukast Sodium (SINGULAIR PO) Take 10 mg by mouth At Bedtime        Multiple Vitamins-Minerals (DAILY DENIZ MAXIMUM MULTIVITAMIN PO) Take 1 packet by mouth 3 times daily DOTERRA       multivitamin, therapeutic with minerals (MULTI-VITAMIN) TABS tablet Take 1 tablet by mouth daily       Omega-3 Fatty Acids (FISH OIL PO) Take 1 capsule by mouth daily       order for DME Equipment being ordered: TENS.    TENS UNIT Ordered- Please call your insurance to Verify coverage and locations to  the device.     Please Dispense Device, leads, pads, wires, and all other necessary equipment that is needed for use.     Length of need: 36 months. 1 Device 0     polyethylene glycol (MIRALAX/GLYCOLAX) powder Take 17 g by mouth 2 times daily       senna-docusate (SENOKOT-S;PERICOLACE) 8.6-50 MG per tablet Take 2 tablets by mouth daily as needed        bisacodyl (DULCOLAX) 10 MG Suppository Place 1 suppository (10 mg) rectally daily as needed for constipation (Patient not taking: Reported on 7/15/2019) 30 suppository      diphenhydrAMINE (BENADRYL ALLERGY) 25 MG tablet Take 1 tablet (25 mg) by mouth every 6 hours as needed for itching or allergies (Patient not taking: Reported on 7/15/2019) 56 tablet      oxyCODONE IR (ROXICODONE) 5 MG tablet Take 1-2 tablets (5-10 mg) by mouth every 3 hours as needed for moderate to severe pain (Patient not taking: Reported on 7/15/2019) 80 tablet 0       Medical History: any changes in medical history since they were last seen? {  ":794177::\"No\"}    Review of Systems:  The 14 system ROS was reviewed from the intake questionnaire, and is positive for: ***  Any bowel or bladder problems: ***  Mood: ***    Physical Exam:  Resp. rate 16, height 1.588 m (5' 2.5\"), weight 70.8 kg (156 lb).  General: ***  Gait: {GAIT:696277}  MSK exam: ***    Assessment:   ***    Aleena Ontiveros is a 59 year old female who is seen at the pain clinic for ***.    Plan:  1. Physical Therapy:  ***  2. Clinical Health Psychologist to address issues of relaxation, behavioral change, coping style, and other factors important to improvement.  ***  3. Diagnostic Studies:  ***  4. Medication Management:  ***  5. Further procedures recommended: ***  6. Recommendations to PCP: ***  7. Follow up: ***    Ana Luna MD    Pain Medicine  Department of Anesthesiology  [facility]            Answers for HPI/ROS submitted by the patient on 8/27/2019   SKIP 7 TOTAL SCORE: 5    Answers for HPI/ROS submitted by the patient on 8/27/2019   SKIP 7 TOTAL SCORE: 5    "

## 2019-08-27 NOTE — LETTER
RE: Aleena Ontiveros  [address] [id number]     Dear Colleague,    Thank you for referring your patient, Aleena Ontiveros, to the [facility] at [facility]. Please see a copy of my visit note below.    [facility]    Date of visit: 8/27/2019    Chief complaint:   Chief Complaint   Patient presents with     Pain Management     Follow up       Interval history:  Aleena Ontiveros was last seen by me on 7/25/2019.      Recommendations/plan at the last visit included:  1. Physical Therapy: Referral placed  2. Pain Psychologist to address issues of relaxation, behavioral change, coping style, and other factors important to improvement: not indicated  3. Diagnostic Studies: not indicated  4. Medication Management:   1. Increase gabapentin dose to 600/600/900 or as tolerated  2. Tylenol 1g TID  5. Further procedures recommended: none  6. Other treatments:  7. Urine toxicology screen: not indicated   8. Recommendations/follow-up for PCP:  no  9. Follow up: 4 weeks unless surgery scheduled sooner    Since her last visit, Aleena Ontiveros reports:  - pain has worsened since prior visit  - waiting to schedule surgery 2/2 difficulty finding availability for Christina/Rakesh  - tolerating gabapentin at higher dose of 600/600/900 without side effects    Pain scores:  Pain intensity on average is 8 on a scale of 0-10.     Current pain treatments:   - Gabapentin 600/600/900  - Cyclobenzaprine 10mg BID  - Tylenol 1g TID    Past pain treatments:  - Oxycodone 10 mg Q8H    Side Effects: no side effect    Medications:  Current Outpatient Medications   Medication Sig Dispense Refill     albuterol (PROAIR HFA/PROVENTIL HFA/VENTOLIN HFA) 108 (90 BASE) MCG/ACT Inhaler Inhale 2 puffs into the lungs every 6 hours as needed for shortness of breath / dyspnea or wheezing       cyclobenzaprine (FLEXERIL) 10 MG tablet Take 1 tablet  (10 mg) by mouth 3 times daily as needed for muscle spasms 90 tablet 1     estradiol (ESTRACE) 0.1 MG/GM cream Place 2 g vaginally At Bedtime        Fexofenadine HCl (ALLEGRA PO) Take 180 mg by mouth daily       fluticasone (FLONASE) 50 MCG/ACT spray Spray 1 spray into both nostrils 2 times daily        fluticasone-salmeterol (ADVAIR) 500-50 MCG/DOSE diskus inhaler Inhale 1 puff into the lungs 2 times daily       gabapentin (NEURONTIN) 300 MG capsule Take 300 mg by mouth 4 times daily        gabapentin (NEURONTIN) 600 MG tablet Take 600mg in AM, 600mg midday, and 900mg at bedtime; titrate to this dose as discussed in clinic 120 tablet 1     HYDROXYZINE PAMOATE PO Take 25 mg by mouth every 4 hours as needed for itching       Levothyroxine Sodium (SYNTHROID PO) Take 125 mcg by mouth daily        lisinopril (PRINIVIL/ZESTRIL) 40 MG tablet Take 1 tablet (40 mg) by mouth daily 30 tablet      Montelukast Sodium (SINGULAIR PO) Take 10 mg by mouth At Bedtime        Multiple Vitamins-Minerals (DAILY DENIZ MAXIMUM MULTIVITAMIN PO) Take 1 packet by mouth 3 times daily DOTERRA       multivitamin, therapeutic with minerals (MULTI-VITAMIN) TABS tablet Take 1 tablet by mouth daily       Omega-3 Fatty Acids (FISH OIL PO) Take 1 capsule by mouth daily       order for DME Equipment being ordered: TENS.    TENS UNIT Ordered- Please call your insurance to Verify coverage and locations to  the device.     Please Dispense Device, leads, pads, wires, and all other necessary equipment that is needed for use.     Length of need: 36 months. 1 Device 0     polyethylene glycol (MIRALAX/GLYCOLAX) powder Take 17 g by mouth 2 times daily       senna-docusate (SENOKOT-S;PERICOLACE) 8.6-50 MG per tablet Take 2 tablets by mouth daily as needed        bisacodyl (DULCOLAX) 10 MG Suppository Place 1 suppository (10 mg) rectally daily as needed for constipation (Patient not taking: Reported on 7/15/2019) 30 suppository      diphenhydrAMINE  "(BENADRYL ALLERGY) 25 MG tablet Take 1 tablet (25 mg) by mouth every 6 hours as needed for itching or allergies (Patient not taking: Reported on 7/15/2019) 56 tablet      oxyCODONE IR (ROXICODONE) 5 MG tablet Take 1-2 tablets (5-10 mg) by mouth every 3 hours as needed for moderate to severe pain (Patient not taking: Reported on 7/15/2019) 80 tablet 0       Medical History: any changes in medical history since they were last seen? No    Review of Systems:  The 14 system ROS was reviewed from the intake questionnaire, and is positive for: severe back pain  Any bowel or bladder problems: denies  Mood: depressed    Physical Exam:  Blood pressure (!) 153/85, pulse 79, resp. rate 16, height 1.588 m (5' 2.5\"), weight 70.8 kg (156 lb).  General: AAOx3, NAD, patient appears in discomfort due to significant low back pain R> L  Gait: Antalgic, cane dependent  MSK exam: axila low back TTP    Assessment:   1.  Lumbar Radiculopathy  2. Scoliosis s/p fusion    Aleena Ontiveros is a 59 year old female who presents with the complaints of worsening low back and leg pain in association with radiculopathy. She has a history of fusion throughout entire vertebral column excluding the lowest lumbar vertebral level. Since her appointment in July, she continues to have worsening low back and leg pain, and is still awaiting surgery scheduling with Dr. Vasquez. We discussed further increasing her Gabapentin to 900 mg TID if tolerated. We also discussed taking tylenol maximum of 4g in a 24 hour period. We discussed other alternative options, including acupuncture and trialing a TENs unit.      Plan:  1. Physical Therapy:  Continue HEPs as tolerated and trial TENS unit  2. Clinical Health Psychologist to address issues of relaxation, behavioral change, coping style, and other factors important to improvement.  Not indicated  3. Other referrals:  Acupuncture  4. Medication Management:  Increase Gabapentin to 900 mg TID; can take up to 4g " Tylenol/day  5. Further procedures recommended: none  6. Recommendations to PCP: none  7. Follow up: 3 months or earlier if indicated    Ana Luna MD    Pain Medicine  Department of Anesthesiology  [facility]

## 2019-08-28 ASSESSMENT — ANXIETY QUESTIONNAIRES: GAD7 TOTAL SCORE: 5

## 2019-08-28 NOTE — PROGRESS NOTES
[facility]    Date of visit: 8/27/2019    Chief complaint:   Chief Complaint   Patient presents with     Pain Management     Follow up       Interval history:  Aleena Ontiveros was last seen by me on 7/25/2019.      Recommendations/plan at the last visit included:  1. Physical Therapy: Referral placed  2. Pain Psychologist to address issues of relaxation, behavioral change, coping style, and other factors important to improvement: not indicated  3. Diagnostic Studies: not indicated  4. Medication Management:   1. Increase gabapentin dose to 600/600/900 or as tolerated  2. Tylenol 1g TID  5. Further procedures recommended: none  6. Other treatments:  7. Urine toxicology screen: not indicated   8. Recommendations/follow-up for PCP:  no  9. Follow up: 4 weeks unless surgery scheduled sooner    Since her last visit, Aleena Ontiveros reports:  - pain has worsened since prior visit  - waiting to schedule surgery 2/2 difficulty finding availability for Christina/Rakesh  - tolerating gabapentin at higher dose of 600/600/900 without side effects    Pain scores:  Pain intensity on average is 8 on a scale of 0-10.     Current pain treatments:   - Gabapentin 600/600/900  - Cyclobenzaprine 10mg BID  - Tylenol 1g TID    Past pain treatments:  - Oxycodone 10 mg Q8H    Side Effects: no side effect    Medications:  Current Outpatient Medications   Medication Sig Dispense Refill     albuterol (PROAIR HFA/PROVENTIL HFA/VENTOLIN HFA) 108 (90 BASE) MCG/ACT Inhaler Inhale 2 puffs into the lungs every 6 hours as needed for shortness of breath / dyspnea or wheezing       cyclobenzaprine (FLEXERIL) 10 MG tablet Take 1 tablet (10 mg) by mouth 3 times daily as needed for muscle spasms 90 tablet 1     estradiol (ESTRACE) 0.1 MG/GM cream Place 2 g vaginally At Bedtime        Fexofenadine HCl (ALLEGRA PO) Take 180 mg by mouth daily       fluticasone (FLONASE) 50 MCG/ACT spray Spray 1 spray into both nostrils 2 times daily         fluticasone-salmeterol (ADVAIR) 500-50 MCG/DOSE diskus inhaler Inhale 1 puff into the lungs 2 times daily       gabapentin (NEURONTIN) 300 MG capsule Take 300 mg by mouth 4 times daily        gabapentin (NEURONTIN) 600 MG tablet Take 600mg in AM, 600mg midday, and 900mg at bedtime; titrate to this dose as discussed in clinic 120 tablet 1     HYDROXYZINE PAMOATE PO Take 25 mg by mouth every 4 hours as needed for itching       Levothyroxine Sodium (SYNTHROID PO) Take 125 mcg by mouth daily        lisinopril (PRINIVIL/ZESTRIL) 40 MG tablet Take 1 tablet (40 mg) by mouth daily 30 tablet      Montelukast Sodium (SINGULAIR PO) Take 10 mg by mouth At Bedtime        Multiple Vitamins-Minerals (DAILY DENIZ MAXIMUM MULTIVITAMIN PO) Take 1 packet by mouth 3 times daily DOTERRA       multivitamin, therapeutic with minerals (MULTI-VITAMIN) TABS tablet Take 1 tablet by mouth daily       Omega-3 Fatty Acids (FISH OIL PO) Take 1 capsule by mouth daily       order for DME Equipment being ordered: TENS.    TENS UNIT Ordered- Please call your insurance to Verify coverage and locations to  the device.     Please Dispense Device, leads, pads, wires, and all other necessary equipment that is needed for use.     Length of need: 36 months. 1 Device 0     polyethylene glycol (MIRALAX/GLYCOLAX) powder Take 17 g by mouth 2 times daily       senna-docusate (SENOKOT-S;PERICOLACE) 8.6-50 MG per tablet Take 2 tablets by mouth daily as needed        bisacodyl (DULCOLAX) 10 MG Suppository Place 1 suppository (10 mg) rectally daily as needed for constipation (Patient not taking: Reported on 7/15/2019) 30 suppository      diphenhydrAMINE (BENADRYL ALLERGY) 25 MG tablet Take 1 tablet (25 mg) by mouth every 6 hours as needed for itching or allergies (Patient not taking: Reported on 7/15/2019) 56 tablet      oxyCODONE IR (ROXICODONE) 5 MG tablet Take 1-2 tablets (5-10 mg) by mouth every 3 hours as needed for moderate to severe pain (Patient not  "taking: Reported on 7/15/2019) 80 tablet 0       Medical History: any changes in medical history since they were last seen? No    Review of Systems:  The 14 system ROS was reviewed from the intake questionnaire, and is positive for: severe back pain  Any bowel or bladder problems: denies  Mood: depressed    Physical Exam:  Blood pressure (!) 153/85, pulse 79, resp. rate 16, height 1.588 m (5' 2.5\"), weight 70.8 kg (156 lb).  General: AAOx3, NAD, patient appears in discomfort due to significant low back pain R> L  Gait: Antalgic, cane dependent  MSK exam: axila low back TTP    Assessment:   1.  Lumbar Radiculopathy  2. Scoliosis s/p fusion    Aleena Ontiveros is a 59 year old female who presents with the complaints of worsening low back and leg pain in association with radiculopathy. She has a history of fusion throughout entire vertebral column excluding the lowest lumbar vertebral level. Since her appointment in July, she continues to have worsening low back and leg pain, and is still awaiting surgery scheduling with Dr. Vasquez. We discussed further increasing her Gabapentin to 900 mg TID if tolerated. We also discussed taking tylenol maximum of 4g in a 24 hour period. We discussed other alternative options, including acupuncture and trialing a TENs unit.      Plan:  1. Physical Therapy:  Continue HEPs as tolerated and trial TENS unit  2. Clinical Health Psychologist to address issues of relaxation, behavioral change, coping style, and other factors important to improvement.  Not indicated  3. Other referrals:  Acupuncture  4. Medication Management:  Increase Gabapentin to 900 mg TID; can take up to 4g Tylenol/day  5. Further procedures recommended: none  6. Recommendations to PCP: none  7. Follow up: 3 months or earlier if indicated    Ana Luna MD    Pain Medicine  Department of Anesthesiology  [facility]            Answers for HPI/ROS submitted by the patient on 8/27/2019 "   SKIP 7 TOTAL SCORE: 5    Answers for HPI/ROS submitted by the patient on 8/27/2019   SKIP 7 TOTAL SCORE: 5    Answers for HPI/ROS submitted by the patient on 8/27/2019   SKIP 7 TOTAL SCORE: 5

## 2019-08-29 NOTE — TELEPHONE ENCOUNTER
Patient is scheduled for surgery with Dr. Vasquez and Dr. Cameron      Spoke or left message with: Spoke with Aleena    Date of Surgery: 11/5/19    Location: [facility]    Informed patient they will need an adult  Yes    H&P: Scheduled with PAC 10/7/19    Additional imaging/appointments: N/A    Surgery packet: Given in clinic    Additional comments: Patient will receive arrival time at PAC

## 2019-08-30 ENCOUNTER — PRE VISIT (OUTPATIENT)
Dept: SURGERY | Facility: CLINIC | Age: 59
End: 2019-08-30

## 2019-08-30 ENCOUNTER — THERAPY VISIT (OUTPATIENT)
Dept: PHYSICAL THERAPY | Facility: CLINIC | Age: 59
End: 2019-08-30
Payer: COMMERCIAL

## 2019-08-30 DIAGNOSIS — G89.29 CHRONIC BILATERAL LOW BACK PAIN WITH BILATERAL SCIATICA: ICD-10-CM

## 2019-08-30 DIAGNOSIS — M54.42 CHRONIC BILATERAL LOW BACK PAIN WITH BILATERAL SCIATICA: ICD-10-CM

## 2019-08-30 DIAGNOSIS — M54.41 CHRONIC BILATERAL LOW BACK PAIN WITH BILATERAL SCIATICA: ICD-10-CM

## 2019-08-30 PROCEDURE — 97110 THERAPEUTIC EXERCISES: CPT | Mod: GP | Performed by: PHYSICAL THERAPIST

## 2019-08-30 PROCEDURE — 97112 NEUROMUSCULAR REEDUCATION: CPT | Mod: GP | Performed by: PHYSICAL THERAPIST

## 2019-08-30 NOTE — TELEPHONE ENCOUNTER
FUTURE VISIT INFORMATION      SURGERY INFORMATION:    Date: 19    Location: [facility]    Surgeon:  Ba Davis    Anesthesia Type:  General    RECORDS REQUESTED FROM:       Primary Care Provider: [facility]- requested recs/testing    Pertinent Medical History: Hypertension    Most recent EKG+ [id number]

## 2019-09-03 ENCOUNTER — TELEPHONE (OUTPATIENT)
Dept: NEUROSURGERY | Facility: CLINIC | Age: 59
End: 2019-09-03

## 2019-09-03 NOTE — TELEPHONE ENCOUNTER
Returned call to patient she is concerned about some new increased burning pain and numbness in her right hip, buttock, thigh, and foot. She denies weakness and bowel or bladder incontinence. She does report a decrease in her gait and difficulty using the bathroom (due to lack of sensation).     She is wanting to schedule surgery with Dr. Cameron and Dr. Vasquez as discussed at her last office visit, but is told there is no availability until 11/2019 and feels that something needs to be done sooner. She is scheduled to see Dr. Cameron on 9/9/19 to try and help facilitate the surgery getting done sooner.     Patient would like message forwarded to Dr. Cameron to see if surgery can be coordinated sooner.

## 2019-09-04 NOTE — TELEPHONE ENCOUNTER
Called and informed patient that Dr. Cameron reviewed message and unfortunately he is unable to get her surgery scheduled any sooner. Patient will reach out to Dr. Vasquez to discuss further.

## 2019-09-09 ENCOUNTER — ANCILLARY PROCEDURE (OUTPATIENT)
Dept: GENERAL RADIOLOGY | Facility: CLINIC | Age: 59
End: 2019-09-09
Attending: NEUROLOGICAL SURGERY
Payer: COMMERCIAL

## 2019-09-09 ENCOUNTER — OFFICE VISIT (OUTPATIENT)
Dept: NEUROSURGERY | Facility: CLINIC | Age: 59
End: 2019-09-09
Attending: NEUROLOGICAL SURGERY
Payer: COMMERCIAL

## 2019-09-09 VITALS
BODY MASS INDEX: 28.16 KG/M2 | OXYGEN SATURATION: 96 % | SYSTOLIC BLOOD PRESSURE: 145 MMHG | HEART RATE: 88 BPM | HEIGHT: 62 IN | TEMPERATURE: 97.1 F | RESPIRATION RATE: 16 BRPM | WEIGHT: 153 LBS | DIASTOLIC BLOOD PRESSURE: 97 MMHG

## 2019-09-09 DIAGNOSIS — M40.15 OTHER SECONDARY KYPHOSIS, THORACOLUMBAR REGION: Primary | ICD-10-CM

## 2019-09-09 DIAGNOSIS — M40.15 OTHER SECONDARY KYPHOSIS, THORACOLUMBAR REGION: ICD-10-CM

## 2019-09-09 PROCEDURE — 99213 OFFICE O/P EST LOW 20 MIN: CPT | Performed by: NEUROLOGICAL SURGERY

## 2019-09-09 PROCEDURE — G0463 HOSPITAL OUTPT CLINIC VISIT: HCPCS

## 2019-09-09 PROCEDURE — 72082 X-RAY EXAM ENTIRE SPI 2/3 VW: CPT

## 2019-09-09 ASSESSMENT — MIFFLIN-ST. JEOR: SCORE: 1212.25

## 2019-09-09 NOTE — PATIENT INSTRUCTIONS
-Order placed for CT thoracic/lumbar and Scoliosis xray imaging. Dr. Cameron will call you with the results and next steps once imaging is completed.    Please call our clinic with any further concerns or questions at the number below.     [facility]  Phone: [phone]  Fax: [phone]

## 2019-09-09 NOTE — NURSING NOTE
"Aleena Ontiveros is a 59 year old female who presents for:  Chief Complaint   Patient presents with     Consult For     LBP, right leg, and arms        Initial Vitals:  BP (!) 145/97   Pulse 88   Temp 97.1  F (36.2  C) (Oral)   Resp 16   Ht 5' 2\" (1.575 m)   Wt 153 lb (69.4 kg)   SpO2 96%   BMI 27.98 kg/m   Estimated body mass index is 27.98 kg/m  as calculated from the following:    Height as of this encounter: 5' 2\" (1.575 m).    Weight as of this encounter: 153 lb (69.4 kg).. Body surface area is 1.74 meters squared. BP completed using cuff size: regular  Data Unavailable        Nursing Comments: Pt present today for consult LBP, right leg, and bilateral arms.Pt states that she has numbness and tingling on her left leg. Pt states that she is in constant pain of 7/10.        Lalo Headley, ELTON    "

## 2019-09-09 NOTE — PROGRESS NOTES
It was a pleasure to see Aleena Ontiveros today in Neurosurgery Clinic. She is a 59 year old female who is been previously scheduled by myself and Dr. Vasquez for surgery to correct her lumbosacral deformity.  She returns to clinic today with worsening symptoms in the back and right lower extremity.  She states that these of gotten significantly worse over the last few months.  She saw Dr. Vasquez a few weeks ago as well.    Past Medical History:   Diagnosis Date     Arthritis     osteoarthritis of both knee     Asthma      Hypertension      PONV (postoperative nausea and vomiting)      Thyroid disease      Uncomplicated asthma        Past Surgical History:   Procedure Laterality Date     ABDOMEN SURGERY  ,         ARTHROPLASTY KNEE Right 8/10/2017    Procedure: ARTHROPLASTY KNEE;  RIGHT TOTAL KNEE ARTHROPLASTY ;  Surgeon: Grady Alvarez MD;  Location:  OR     ARTHROPLASTY KNEE Left 2017    Procedure: ARTHROPLASTY KNEE;  LEFT TOTAL KNEE ARTHROPLASTY;  Surgeon: Grady Alvarez MD;  Location:  OR     BACK SURGERY      spinal fusion     ENT SURGERY      tonsilectomy     GYN SURGERY  13    hysterectomy     ORTHOPEDIC SURGERY      sinal fusion,hand     thumb mass resection       THYROIDECTOMY  2014    Procedure: THYROIDECTOMY;  Surgeon: Krzysztof Marquez MD;  Location: [facility]       Allergies   Allergen Reactions     Adhesive Tape      Erythromycin Nausea and Vomiting     Pills cause vomiting,        Current Outpatient Medications:      albuterol (PROAIR HFA/PROVENTIL HFA/VENTOLIN HFA) 108 (90 BASE) MCG/ACT Inhaler, Inhale 2 puffs into the lungs every 6 hours as needed for shortness of breath / dyspnea or wheezing, Disp: , Rfl:      bisacodyl (DULCOLAX) 10 MG Suppository, Place 1 suppository (10 mg) rectally daily as needed for constipation, Disp: 30 suppository, Rfl:      cyclobenzaprine (FLEXERIL) 10 MG tablet, Take 1 tablet (10 mg) by mouth 3 times daily as needed for  muscle spasms, Disp: 90 tablet, Rfl: 1     diphenhydrAMINE (BENADRYL ALLERGY) 25 MG tablet, Take 1 tablet (25 mg) by mouth every 6 hours as needed for itching or allergies, Disp: 56 tablet, Rfl:      estradiol (ESTRACE) 0.1 MG/GM cream, Place 2 g vaginally At Bedtime , Disp: , Rfl:      Fexofenadine HCl (ALLEGRA PO), Take 180 mg by mouth daily, Disp: , Rfl:      fluticasone (FLONASE) 50 MCG/ACT spray, Spray 1 spray into both nostrils 2 times daily , Disp: , Rfl:      fluticasone-salmeterol (ADVAIR) 500-50 MCG/DOSE diskus inhaler, Inhale 1 puff into the lungs 2 times daily, Disp: , Rfl:      gabapentin (NEURONTIN) 300 MG capsule, Take 300 mg by mouth 4 times daily , Disp: , Rfl:      gabapentin (NEURONTIN) 600 MG tablet, Take 900mg (1.5 tablets) three times per day, Disp: 150 tablet, Rfl: 1     HYDROXYZINE PAMOATE PO, Take 25 mg by mouth every 4 hours as needed for itching, Disp: , Rfl:      Levothyroxine Sodium (SYNTHROID PO), Take 125 mcg by mouth daily , Disp: , Rfl:      lisinopril (PRINIVIL/ZESTRIL) 40 MG tablet, Take 1 tablet (40 mg) by mouth daily, Disp: 30 tablet, Rfl:      Montelukast Sodium (SINGULAIR PO), Take 10 mg by mouth At Bedtime , Disp: , Rfl:      Multiple Vitamins-Minerals (DAILY DENIZ MAXIMUM MULTIVITAMIN PO), Take 1 packet by mouth 3 times daily DOTERRA, Disp: , Rfl:      multivitamin, therapeutic with minerals (MULTI-VITAMIN) TABS tablet, Take 1 tablet by mouth daily, Disp: , Rfl:      Omega-3 Fatty Acids (FISH OIL PO), Take 1 capsule by mouth daily, Disp: , Rfl:      order for DME, Equipment being ordered: TENS.  TENS UNIT Ordered- Please call your insurance to Verify coverage and locations to  the device.   Please Dispense Device, leads, pads, wires, and all other necessary equipment that is needed for use.   Length of need: 36 months., Disp: 1 Device, Rfl: 0     oxyCODONE IR (ROXICODONE) 5 MG tablet, Take 1-2 tablets (5-10 mg) by mouth every 3 hours as needed for moderate to severe  "pain, Disp: 80 tablet, Rfl: 0     polyethylene glycol (MIRALAX/GLYCOLAX) powder, Take 17 g by mouth 2 times daily, Disp: , Rfl:      senna-docusate (SENOKOT-S;PERICOLACE) 8.6-50 MG per tablet, Take 2 tablets by mouth daily as needed , Disp: , Rfl:   Social History     Socioeconomic History     Marital status:      Spouse name: None     Number of children: None     Years of education: None     Highest education level: None   Occupational History     None   Social Needs     Financial resource strain: None     Food insecurity:     Worry: None     Inability: None     Transportation needs:     Medical: None     Non-medical: None   Tobacco Use     Smoking status: Never Smoker     Smokeless tobacco: Never Used   Substance and Sexual Activity     Alcohol use: Yes     Comment: 1-2 per month     Drug use: No     Sexual activity: None   Lifestyle     Physical activity:     Days per week: None     Minutes per session: None     Stress: None   Relationships     Social connections:     Talks on phone: None     Gets together: None     Attends Mandaen service: None     Active member of club or organization: None     Attends meetings of clubs or organizations: None     Relationship status: None     Intimate partner violence:     Fear of current or ex partner: None     Emotionally abused: None     Physically abused: None     Forced sexual activity: None   Other Topics Concern     Parent/sibling w/ CABG, MI or angioplasty before 65F 55M? Not Asked   Social History Narrative     None     Family History   Problem Relation Age of Onset     Breast Cancer Mother      Cancer - colorectal Father      C.A.D. Father      Cerebrovascular Disease Father      Thyroid Disease Brother      Thyroid Disease Sister          Vitals:    09/09/19 1119   BP: (!) 145/97   Pulse: 88   Resp: 16   Temp: 97.1  F (36.2  C)   TempSrc: Oral   SpO2: 96%   Weight: 69.4 kg (153 lb)   Height: 1.575 m (5' 2\")     Body mass index is 27.98 kg/m .  Data " Unavailable    Oswestry (EPI) Questionnaire    OSWESTRY DISABILITY INDEX 8/8/2019   Count 10   Sum 35   Oswestry Score (%) 70   Some recent data might be hidden         Visual Analog Scale (VAS) Questionnaire    VISUAL ANALOG PAIN SCALE 8/22/2019   Back Pain Scale 0-10 0   Right leg pain 9   Left leg pain 0   Neck Pain Scale 0-10 -   Right arm pain -   Left arm pain -      Awake alert and oriented.  Seated with multiple pillows.    Imaging: No new imaging today.    Assessment: Thoracolumbar kyphosis and spondylolisthesis with worsening symptoms.    Plan: I have recommended repeat xrays and a CT of the thoracolumbar spine. I will discuss with Dr. Vasquez whether a change in schedule is possible.

## 2019-09-09 NOTE — LETTER
2019         RE: Aleena Ontiveros  [address] [facility] [address]        Dear Colleague,    Thank you for referring your patient, Aleena Ontiveros, to the [facility]. Please see a copy of my visit note below.    It was a pleasure to see Aleena Ontiveros today in Neurosurgery Clinic. She is a 59 year old female who is been previously scheduled by myself and Dr. Vasquez for surgery to correct her lumbosacral deformity.  She returns to clinic today with worsening symptoms in the back and right lower extremity.  She states that these of gotten significantly worse over the last few months.  She saw Dr. Vasquez a few weeks ago as well.    Past Medical History:   Diagnosis Date     Arthritis     osteoarthritis of both knee     Asthma      Hypertension      PONV (postoperative nausea and vomiting)      Thyroid disease      Uncomplicated asthma        Past Surgical History:   Procedure Laterality Date     ABDOMEN SURGERY  ,         ARTHROPLASTY KNEE Right 8/10/2017    Procedure: ARTHROPLASTY KNEE;  RIGHT TOTAL KNEE ARTHROPLASTY ;  Surgeon: Grady Alvarez MD;  Location:  OR     ARTHROPLASTY KNEE Left 2017    Procedure: ARTHROPLASTY KNEE;  LEFT TOTAL KNEE ARTHROPLASTY;  Surgeon: Grady Alvarez MD;  Location:  OR     BACK SURGERY      spinal fusion     ENT SURGERY      tonsilectomy     GYN SURGERY  13    hysterectomy     ORTHOPEDIC SURGERY      sinal fusion,hand     thumb mass resection       THYROIDECTOMY  2014    Procedure: THYROIDECTOMY;  Surgeon: Krzysztof Marquez MD;  Location: [facility]       Allergies   Allergen Reactions     Adhesive Tape      Erythromycin Nausea and Vomiting     Pills cause vomiting,        Current Outpatient Medications:      albuterol (PROAIR HFA/PROVENTIL HFA/VENTOLIN HFA) 108 (90 BASE) MCG/ACT Inhaler, Inhale 2 puffs into the lungs every 6 hours as needed for shortness of breath / dyspnea or wheezing, Disp: ,  Rfl:      bisacodyl (DULCOLAX) 10 MG Suppository, Place 1 suppository (10 mg) rectally daily as needed for constipation, Disp: 30 suppository, Rfl:      cyclobenzaprine (FLEXERIL) 10 MG tablet, Take 1 tablet (10 mg) by mouth 3 times daily as needed for muscle spasms, Disp: 90 tablet, Rfl: 1     diphenhydrAMINE (BENADRYL ALLERGY) 25 MG tablet, Take 1 tablet (25 mg) by mouth every 6 hours as needed for itching or allergies, Disp: 56 tablet, Rfl:      estradiol (ESTRACE) 0.1 MG/GM cream, Place 2 g vaginally At Bedtime , Disp: , Rfl:      Fexofenadine HCl (ALLEGRA PO), Take 180 mg by mouth daily, Disp: , Rfl:      fluticasone (FLONASE) 50 MCG/ACT spray, Spray 1 spray into both nostrils 2 times daily , Disp: , Rfl:      fluticasone-salmeterol (ADVAIR) 500-50 MCG/DOSE diskus inhaler, Inhale 1 puff into the lungs 2 times daily, Disp: , Rfl:      gabapentin (NEURONTIN) 300 MG capsule, Take 300 mg by mouth 4 times daily , Disp: , Rfl:      gabapentin (NEURONTIN) 600 MG tablet, Take 900mg (1.5 tablets) three times per day, Disp: 150 tablet, Rfl: 1     HYDROXYZINE PAMOATE PO, Take 25 mg by mouth every 4 hours as needed for itching, Disp: , Rfl:      Levothyroxine Sodium (SYNTHROID PO), Take 125 mcg by mouth daily , Disp: , Rfl:      lisinopril (PRINIVIL/ZESTRIL) 40 MG tablet, Take 1 tablet (40 mg) by mouth daily, Disp: 30 tablet, Rfl:      Montelukast Sodium (SINGULAIR PO), Take 10 mg by mouth At Bedtime , Disp: , Rfl:      Multiple Vitamins-Minerals (DAILY DENIZ MAXIMUM MULTIVITAMIN PO), Take 1 packet by mouth 3 times daily DOTERRA, Disp: , Rfl:      multivitamin, therapeutic with minerals (MULTI-VITAMIN) TABS tablet, Take 1 tablet by mouth daily, Disp: , Rfl:      Omega-3 Fatty Acids (FISH OIL PO), Take 1 capsule by mouth daily, Disp: , Rfl:      order for DME, Equipment being ordered: TENS.  TENS UNIT Ordered- Please call your insurance to Verify coverage and locations to  the device.   Please Dispense Device, leads,  pads, wires, and all other necessary equipment that is needed for use.   Length of need: 36 months., Disp: 1 Device, Rfl: 0     oxyCODONE IR (ROXICODONE) 5 MG tablet, Take 1-2 tablets (5-10 mg) by mouth every 3 hours as needed for moderate to severe pain, Disp: 80 tablet, Rfl: 0     polyethylene glycol (MIRALAX/GLYCOLAX) powder, Take 17 g by mouth 2 times daily, Disp: , Rfl:      senna-docusate (SENOKOT-S;PERICOLACE) 8.6-50 MG per tablet, Take 2 tablets by mouth daily as needed , Disp: , Rfl:   Social History     Socioeconomic History     Marital status:      Spouse name: None     Number of children: None     Years of education: None     Highest education level: None   Occupational History     None   Social Needs     Financial resource strain: None     Food insecurity:     Worry: None     Inability: None     Transportation needs:     Medical: None     Non-medical: None   Tobacco Use     Smoking status: Never Smoker     Smokeless tobacco: Never Used   Substance and Sexual Activity     Alcohol use: Yes     Comment: 1-2 per month     Drug use: No     Sexual activity: None   Lifestyle     Physical activity:     Days per week: None     Minutes per session: None     Stress: None   Relationships     Social connections:     Talks on phone: None     Gets together: None     Attends Islam service: None     Active member of club or organization: None     Attends meetings of clubs or organizations: None     Relationship status: None     Intimate partner violence:     Fear of current or ex partner: None     Emotionally abused: None     Physically abused: None     Forced sexual activity: None   Other Topics Concern     Parent/sibling w/ CABG, MI or angioplasty before 65F 55M? Not Asked   Social History Narrative     None     Family History   Problem Relation Age of Onset     Breast Cancer Mother      Cancer - colorectal Father      C.A.D. Father      Cerebrovascular Disease Father      Thyroid Disease Brother       "Thyroid Disease Sister          Vitals:    09/09/19 1119   BP: (!) 145/97   Pulse: 88   Resp: 16   Temp: 97.1  F (36.2  C)   TempSrc: Oral   SpO2: 96%   Weight: 69.4 kg (153 lb)   Height: 1.575 m (5' 2\")     Body mass index is 27.98 kg/m .  Data Unavailable    Oswestry (EPI) Questionnaire    OSWESTRY DISABILITY INDEX 8/8/2019   Count 10   Sum 35   Oswestry Score (%) 70   Some recent data might be hidden         Visual Analog Scale (VAS) Questionnaire    VISUAL ANALOG PAIN SCALE 8/22/2019   Back Pain Scale 0-10 0   Right leg pain 9   Left leg pain 0   Neck Pain Scale 0-10 -   Right arm pain -   Left arm pain -      Awake alert and oriented.  Seated with multiple pillows.    Imaging: No new imaging today.    Assessment: Thoracolumbar kyphosis and spondylolisthesis with worsening symptoms.    Plan: I have recommended repeat xrays and a CT of the thoracolumbar spine. I will discuss with Dr. Vasquez whether a change in schedule is possible.         Again, thank you for allowing me to participate in the care of your patient.        Sincerely,        Ba Cameron MD    "

## 2019-09-10 ENCOUNTER — THERAPY VISIT (OUTPATIENT)
Dept: PHYSICAL THERAPY | Facility: CLINIC | Age: 59
End: 2019-09-10
Payer: COMMERCIAL

## 2019-09-10 DIAGNOSIS — M54.41 CHRONIC BILATERAL LOW BACK PAIN WITH BILATERAL SCIATICA: ICD-10-CM

## 2019-09-10 DIAGNOSIS — G89.29 CHRONIC BILATERAL LOW BACK PAIN WITH BILATERAL SCIATICA: ICD-10-CM

## 2019-09-10 DIAGNOSIS — M54.42 CHRONIC BILATERAL LOW BACK PAIN WITH BILATERAL SCIATICA: ICD-10-CM

## 2019-09-10 PROCEDURE — 97112 NEUROMUSCULAR REEDUCATION: CPT | Mod: GP | Performed by: PHYSICAL THERAPIST

## 2019-09-10 PROCEDURE — G0283 ELEC STIM OTHER THAN WOUND: HCPCS | Mod: GP | Performed by: PHYSICAL THERAPIST

## 2019-09-10 PROCEDURE — 97110 THERAPEUTIC EXERCISES: CPT | Mod: GP | Performed by: PHYSICAL THERAPIST

## 2019-09-10 NOTE — LETTER
[facility]  [address]  [facility] [address]  [phone]    2019    Re: Aleena Ontiveros   :   1960  MRN:  [id number]   REFERRING PHYSICIAN:   Ana Luna    [facility]    Date of Initial Evaluation:  19  Visits:  Rxs Used: 6  Reason for Referral:  Chronic bilateral low back pain with bilateral sciatica     PROGRESS  REPORT    Progress reporting period is from 19 to 9/10/19.       SUBJECTIVE  Subjective changes noted by patient:  Saw Dr Cameron yesterday.  Is communicating with Dr Vasquez and Dr Cameron.  Patient plans to have neurosurgery .  She notes she's been very painful much of the past week.  She is having a better day today.   Pain nearing 10/10 over weekend, but today is more 5-7/10.  Ambulates with walking stick.  Working on her HEP, though did break from it when pain was severe.      Current Pain level: (5-7/10).     Initial Pain level: 8/10.   Changes in function:  Yes (See Goal flowsheet attached for changes in current functional level)  Adverse reaction to treatment or activity: None    OBJECTIVE  Changes noted in objective findings:  Yes,   Objective:   Able to contract TA with cuing.    Posture is still with pillows to unweight R side in sitting.  Posture with gait is forward bent at hips.      Trial of Electrical Stimulation in clinic: patient notes relief up to 80% reduction in pain level and improved walking, relief lasting 4-5 hours prior to some increase in pain back toward baseline level.  Good temporary relief from Estim.  Patient may benefit from home TENS unit.  She does report that she has written orders for one at home and wanted to trial Estim in clinc.  She was asket to call after appointment to report on pain relief and indicated the aforementioned benefit.                  ASSESSMENT/PLAN  Updated problem list and treatment plan: Diagnosis 1:   Acquired spondylolisthesis, hx of T1-L5 fusion for scoliosis, with pending revision likely in November of 2019  Pain -  hot/cold therapy, electric stimulation and manual therapy  Decreased ROM/flexibility - manual therapy and therapeutic exercise  Decreased strength - therapeutic exercise and therapeutic activities  Decreased proprioception - neuro re-education and therapeutic activities  Impaired gait - gait training  Decreased function - therapeutic activities  Impaired posture - neuro re-education  STG/LTGs have been met or progress has been made towards goals:  Symptoms vary, modest improvement in gait tolerance, but still limited.  Pain level fluctuates day to day, 5/10 at best 9-10/10 at worst.   Assessment of Progress: The patient's condition has potential to improve.  Self Management Plans:  Patient has been instructed in a home treatment program.  I have re-evaluated this patient and find that the nature, scope, duration and intensity of the therapy is appropriate for the medical condition of the patient.  Aleena continues to require the following intervention to meet STG and LTG's:  PT    Recommendations:  This patient would benefit from continued therapy.     Frequency:  1 X week, once daily  Duration:  for 6 weeks  Continue to work to strengthen, work on posture, work on pain management, as able, up to ultimate planned fusion revision surgery planned for 11/19.         Thank you for your referral.    INQUIRIES  Therapist: Fredi Jewell, PT   [facility] [place] [facility]  [address] [place] [address] [id number]  [facility] [address]  Phone: [phone]  Fax: [phone]

## 2019-09-11 NOTE — PROGRESS NOTES
Subjective:  HPI                    Objective:  System    Physical Exam    General     ROS    Assessment/Plan:    PROGRESS  REPORT    Progress reporting period is from 8/8/19 to 9/10/19 (6 visits).       SUBJECTIVE  Subjective changes noted by patient:  Saw Dr Cameron yesterday.  Is communicating with Dr Vasquez and Dr Cameron.  Patient plans to have neurosurgery 11/5.  She notes she's been very painful much of the past week.  She is having a better day today.   Pain nearing 10/10 over weekend, but today is more 5-7/10.  Ambulates with walking stick.  Working on her HEP, though did break from it when pain was severe.      Current Pain level: (5-7/10).     Initial Pain level: 8/10.   Changes in function:  Yes (See Goal flowsheet attached for changes in current functional level)  Adverse reaction to treatment or activity: None    OBJECTIVE  Changes noted in objective findings:  Yes,   Objective:   Able to contract TA with cuing.    Posture is still with pillows to unweight R side in sitting.  Posture with gait is forward bent at hips.      Trial of Electrical Stimulation in clinic: patient notes relief up to 80% reduction in pain level and improved walking, relief lasting 4-5 hours prior to some increase in pain back toward baseline level.  Good temporary relief from Estim.  Patient may benefit from home TENS unit.  She does report that she has written orders for one at home and wanted to trial Estim in clinc.  She was asket to call after appointment to report on pain relief and indicated the aforementioned benefit.      ASSESSMENT/PLAN  Updated problem list and treatment plan: Diagnosis 1:  Acquired spondylolisthesis, hx of T1-L5 fusion for scoliosis, with pending revision likely in November of 2019  Pain -  hot/cold therapy, electric stimulation and manual therapy  Decreased ROM/flexibility - manual therapy and therapeutic exercise  Decreased strength - therapeutic exercise and therapeutic activities  Decreased  proprioception - neuro re-education and therapeutic activities  Impaired gait - gait training  Decreased function - therapeutic activities  Impaired posture - neuro re-education  STG/LTGs have been met or progress has been made towards goals:  Symptoms vary, modest improvement in gait tolerance, but still limited.  Pain level fluctuates day to day, 5/10 at best 9-10/10 at worst.   Assessment of Progress: The patient's condition has potential to improve.  Self Management Plans:  Patient has been instructed in a home treatment program.  I have re-evaluated this patient and find that the nature, scope, duration and intensity of the therapy is appropriate for the medical condition of the patient.  Aleena continues to require the following intervention to meet STG and LTG's:  PT    Recommendations:  This patient would benefit from continued therapy.     Frequency:  1 X week, once daily  Duration:  for 6 weeks  Continue to work to strengthen, work on posture, work on pain management, as able, up to ultimate planned fusion revision surgery planned for 11/19.         Please refer to the daily flowsheet for treatment today, total treatment time and time spent performing 1:1 timed codes.

## 2019-09-12 ENCOUNTER — HOSPITAL ENCOUNTER (OUTPATIENT)
Dept: CT IMAGING | Facility: CLINIC | Age: 59
End: 2019-09-12
Attending: NEUROLOGICAL SURGERY
Payer: COMMERCIAL

## 2019-09-12 ENCOUNTER — HOSPITAL ENCOUNTER (OUTPATIENT)
Dept: CT IMAGING | Facility: CLINIC | Age: 59
Discharge: HOME | End: 2019-09-12
Attending: NEUROLOGICAL SURGERY | Admitting: NEUROLOGICAL SURGERY
Payer: COMMERCIAL

## 2019-09-12 DIAGNOSIS — M40.15 OTHER SECONDARY KYPHOSIS, THORACOLUMBAR REGION: ICD-10-CM

## 2019-09-12 PROCEDURE — 72131 CT LUMBAR SPINE W/O DYE: CPT

## 2019-09-12 PROCEDURE — 72128 CT CHEST SPINE W/O DYE: CPT

## 2019-09-13 ENCOUNTER — TELEPHONE (OUTPATIENT)
Dept: ORTHOPEDICS | Facility: CLINIC | Age: 59
End: 2019-09-13

## 2019-09-13 NOTE — TELEPHONE ENCOUNTER
Attempted to reach out to patient to discuss rescheduling surgery from 11/5 to 10/1 and also rescheduling PAC. Left best call back number of [phone]

## 2019-09-16 ENCOUNTER — TELEPHONE (OUTPATIENT)
Dept: NEUROSURGERY | Facility: CLINIC | Age: 59
End: 2019-09-16

## 2019-09-16 NOTE — TELEPHONE ENCOUNTER
Patient is calling for the results of her CT and XR completed last week. Will forward message to provider for review.

## 2019-09-17 ENCOUNTER — ANESTHESIA EVENT (OUTPATIENT)
Dept: SURGERY | Facility: CLINIC | Age: 59
End: 2019-09-17

## 2019-09-17 ENCOUNTER — OFFICE VISIT (OUTPATIENT)
Dept: SURGERY | Facility: CLINIC | Age: 59
End: 2019-09-17
Payer: COMMERCIAL

## 2019-09-17 VITALS
SYSTOLIC BLOOD PRESSURE: 133 MMHG | HEART RATE: 83 BPM | HEIGHT: 62 IN | WEIGHT: 148 LBS | TEMPERATURE: 98.3 F | BODY MASS INDEX: 27.23 KG/M2 | RESPIRATION RATE: 16 BRPM | DIASTOLIC BLOOD PRESSURE: 87 MMHG | OXYGEN SATURATION: 98 %

## 2019-09-17 DIAGNOSIS — R11.2 PONV (POSTOPERATIVE NAUSEA AND VOMITING): ICD-10-CM

## 2019-09-17 DIAGNOSIS — Z01.818 PREOP EXAMINATION: ICD-10-CM

## 2019-09-17 DIAGNOSIS — Z98.890 PONV (POSTOPERATIVE NAUSEA AND VOMITING): ICD-10-CM

## 2019-09-17 DIAGNOSIS — Z01.818 PREOP EXAMINATION: Primary | ICD-10-CM

## 2019-09-17 LAB
ANION GAP SERPL CALCULATED.3IONS-SCNC: 5 MMOL/L (ref 3–14)
BUN SERPL-MCNC: 11 MG/DL (ref 7–30)
CALCIUM SERPL-MCNC: 9.3 MG/DL (ref 8.5–10.1)
CHLORIDE SERPL-SCNC: 100 MMOL/L (ref 94–109)
CO2 SERPL-SCNC: 30 MMOL/L (ref 20–32)
CREAT SERPL-MCNC: 0.63 MG/DL (ref 0.52–1.04)
ERYTHROCYTE [DISTWIDTH] IN BLOOD BY AUTOMATED COUNT: 12.8 % (ref 10–15)
GFR SERPL CREATININE-BSD FRML MDRD: >90 ML/MIN/{1.73_M2}
GLUCOSE SERPL-MCNC: 88 MG/DL (ref 70–99)
HCT VFR BLD AUTO: 44.5 % (ref 35–47)
HGB BLD-MCNC: 14.7 G/DL (ref 11.7–15.7)
MCH RBC QN AUTO: 29 PG (ref 26.5–33)
MCHC RBC AUTO-ENTMCNC: 33 G/DL (ref 31.5–36.5)
MCV RBC AUTO: 88 FL (ref 78–100)
MRSA DNA SPEC QL NAA+PROBE: NEGATIVE
PLATELET # BLD AUTO: 281 10E9/L (ref 150–450)
POTASSIUM SERPL-SCNC: 4 MMOL/L (ref 3.4–5.3)
RBC # BLD AUTO: 5.07 10E12/L (ref 3.8–5.2)
SODIUM SERPL-SCNC: 135 MMOL/L (ref 133–144)
SPECIMEN SOURCE: NORMAL
WBC # BLD AUTO: 5.6 10E9/L (ref 4–11)

## 2019-09-17 RX ORDER — CELECOXIB 400 MG/1
200 CAPSULE ORAL EVERY MORNING
Status: ON HOLD | COMMUNITY
End: 2019-10-03

## 2019-09-17 RX ORDER — SODIUM CHLORIDE, SODIUM LACTATE, POTASSIUM CHLORIDE, CALCIUM CHLORIDE 600; 310; 30; 20 MG/100ML; MG/100ML; MG/100ML; MG/100ML
1-3 INJECTION, SOLUTION INTRAVENOUS CONTINUOUS
Status: CANCELLED | OUTPATIENT
Start: 2019-09-17

## 2019-09-17 RX ORDER — GABAPENTIN 300 MG/1
300 CAPSULE ORAL ONCE
Status: CANCELLED | OUTPATIENT
Start: 2019-09-17 | End: 2019-09-17

## 2019-09-17 RX ORDER — PROPOFOL 10 MG/ML
25-150 INJECTION, EMULSION INTRAVENOUS CONTINUOUS
Status: CANCELLED | OUTPATIENT
Start: 2019-09-17

## 2019-09-17 RX ORDER — SODIUM CHLORIDE, SODIUM GLUCONATE, SODIUM ACETATE, POTASSIUM CHLORIDE AND MAGNESIUM CHLORIDE 526; 502; 368; 37; 30 MG/100ML; MG/100ML; MG/100ML; MG/100ML; MG/100ML
1-3 INJECTION, SOLUTION INTRAVENOUS CONTINUOUS
Status: CANCELLED | OUTPATIENT
Start: 2019-09-17

## 2019-09-17 RX ORDER — MAGNESIUM SULFATE HEPTAHYDRATE 40 MG/ML
2 INJECTION, SOLUTION INTRAVENOUS ONCE
Status: CANCELLED | OUTPATIENT
Start: 2019-09-17 | End: 2019-09-17

## 2019-09-17 RX ORDER — ACETAMINOPHEN 325 MG/1
975 TABLET ORAL ONCE
Status: CANCELLED | OUTPATIENT
Start: 2019-09-17 | End: 2019-09-17

## 2019-09-17 RX ORDER — APREPITANT 40 MG/1
40 CAPSULE ORAL ONCE
Status: DISCONTINUED | OUTPATIENT
Start: 2019-09-17 | End: 2019-10-11

## 2019-09-17 SDOH — HEALTH STABILITY: MENTAL HEALTH: HOW OFTEN DO YOU HAVE 6 OR MORE DRINKS ON ONE OCCASION?: PATIENT DECLINED

## 2019-09-17 ASSESSMENT — MIFFLIN-ST. JEOR: SCORE: 1189.57

## 2019-09-17 NOTE — PATIENT INSTRUCTIONS
Preparing for Your Surgery      Name:  Aleena Ontiveros   MRN:  [id number]   :  1960   Today's Date:  2019     Arriving for surgery:  Surgery date:  10/1/19  Arrival time:  7:00 am  Please come to:     [facility] [address]  [address] [place]  [address]    - parking is available in front of [facility] from 5:15AM to 8:00PM. If you prefer, park your car in the Green Lot.    -Proceed to the 3rd floor, check in at the Adult Surgery Waiting Lounge. [phone]    If an escort is needed stop at the Information Desk in the lobby. Inform the information person that you are here for surgery. An escort to the Adult Surgery Waiting Lounge will be provided.        What can I eat or drink?  -  You may have solid food or milk products until 8 hours prior to your surgery (midnight).  -  You may have water, apple juice or 7up/Sprite until 2 hours prior to your surgery (6:30 am).    Which medicines can I take?  Stop Aspirin, vitamins and supplements one week prior to surgery.  Hold Ibuprofen for 24 hours and/or Naproxen for 48 hours prior to surgery.   -  Do NOT take these medications in the morning, the day of surgery:  Lisinopril (Prinivil)    -  Please take these medications the day of surgery:      Albuterol (Proair)    Gabapentin (Neurontin)  Cyclobenzaprine (Flexeril)   Levothyroxine (Synthroid)  Fexofenadine (Allegra)   Fluticasone (Flonase)  Fluticasone-Salmeterol (Advair)     How do I prepare myself?  -  Take two showers: one the night before surgery; and one the morning of surgery.         Use Scrubcare or Hibiclens to wash from neck down, leave soap on your skin for up to one minute.  Do not get soap in your eyes or ears.  You may use your own shampoo and conditioner; no other hair products.   -  Do NOT use lotion, powder, deodorant, or antiperspirant the day of your surgery.  -  Do NOT wear any makeup, fingernail polish or jewelry.  -  Begin  using Incentive Spirometer 1 week prior to surgery.  Use 4 times per day, up to 5 breaths each time.  Bring Incentive Spirometer to hospital.  -  Do not bring your own medications to the hospital, except for inhalers.  -  Bring your ID and insurance card.    Questions or Concerns:  -If you are scheduled on the East or West campus and have questions or concerns regarding the day of surgery, please call Preadmission Nursing at [phone].       -For questions after surgery please call your surgeons office.       Enhanced Recovery After Surgery     This is a team effort, including you, to get you back on your feet, eating and drinking normally and out of the hospital as quickly as possible.  The goals are:    1) NO INFECTIONS and   2) RETURN TO NORMAL DIET    How can we achieve these goals?  1) STAY ACTIVE: Walk every day before your surgery; try to increase the amount every day.  Walk after surgery as much as you can-the nurses will help you.  Walking speeds healing and gets you home quicker, you heal better at home and have less risk of infection.     2) INCENTIVE SPIROMETER: Practice your incentive spirometer 4 times per day with 5 repetitions each time.  Using the incentive spirometer can strengthen your muscles between your ribs and help you have a strong cough after surgery.  A more effective cough can help prevent problems with your lungs.    3) STAY HYDRATED: Drink clear liquids up until 2 hours before your surgery. We would like you to purchase a drink such as Gatorade or Ensure Clear (not the milkshake type).  Drink this before bedtime and on the way into the hospital, drink between 8-10 ounces or until you feel hydrated.  Keeping well hydrated leads to your veins being plump, you wake up faster, and you are less likely to be nauseated. Start drinking water as soon as you can after surgery and advance to clear liquids and food as tolerated.  IV fluids contain salt, drinking fluids will minimize the amount  of IV fluids you need and decrease the amount of salt you get.    The most common reason for the patient to be readmitted is dehydration. Staying hydrated after you go home from the hospital is very important.  Ensure or Ensure Clear are good options to keep you hydrated.     4) PAIN MANAGEMENT: If we minimize the amount of opioids and narcotics, and use regional blocks (which numb the area where your surgery is) along with oral pain medications; you will have less side effects of nausea and constipation. Narcotics can slow down your bowels and cause you to stay in the hospital longer.     Our goal is to keep you comfortable; eating and drinking normally and back home safely.   Using an Incentive Spirometer    An incentive spirometer is a device that helps you do deep breathing exercises. These exercises expand your lungs, aid in circulation, and help prevent pneumonia. Deep breathing exercises also help you breathe better and improve the function of your lungs by:    Keeping your lungs clear    Strengthening your breathing muscles    Helping prevent respiratory complications or problems  The incentive spirometer gives you a way to take an active part in your care. A nurse or therapist will teach you breathing exercises. To do these exercises, you will breathe in through your mouth and not your nose. The incentive spirometer only works correctly if you breathe in through your mouth.    Steps to clear lungs  Step 1. Exhale normally. Then, inhale normally.    Relax and breathe out.  Step 2. Place your lips tightly around the mouthpiece.    Make sure the device is upright and not tilted.  Step 3. Inhale as much air as you can through the mouthpiece (don't breath through your nose).    Inhale slowly and deeply.    Hold your breath long enough to keep the balls or disk raised for at least 3 to 5 seconds, or as instructed by your healthcare provider.  Step 4. Repeat the exercise regularly.  Begin using the Incentive  Spirometer one week prior to your surgery, 4 times per day-5 times each.    AFTER YOUR SURGERY  Breathing exercises   Breathing exercises help you recover faster. Take deep breaths and let the air out slowly. This will:     Help you wake up after surgery.    Help prevent complications like pneumonia.  Preventing complications will help you go home sooner.   We may give you a breathing device (incentive spirometer) to encourage you to breathe deeply.   Nausea and vomiting   You may feel sick to your stomach after surgery; if so, let your nurse know.    Pain control:  After surgery, you may have pain. Our goal is to help you manage your pain. Pain medicine will help you feel comfortable enough to do activities that will help you heal.  These activities may include breathing exercises, walking and physical therapy.   To help your health care team treat your pain we will ask: 1) If you have pain  2) where it is located 3) describe your pain in your words  Methods of pain control include medications given by mouth, vein or by nerve block for some surgeries.  Sequential Compression Device (SCD) or Pneumo Boots:  You may need to wear SCD S on your legs or feet. These are wraps connected to a machine that pumps in air and releases it. The repeated pumping helps prevent blood clots from forming.

## 2019-09-17 NOTE — H&P
Pre-Operative H & P     CC:  Preoperative exam to assess for increased cardiopulmonary risk while undergoing surgery and anesthesia.    Date of Encounter: 9/17/2019  Primary Care Physician:  Arabella Conner  Associated Diagnosis: thoracolumbar kyphosis, spondylolisthesis    HPI  Aleena Ontiveros is a 59 year old female who presents for pre-operative H & P in preparation for TLIF-SPO L4-5,L5-S1; Reduction of high-grade spondylolisthesis L5-S1; Distal extension of T4-L4 fusion and fixation down to pelvis with Dr. Vasquez on 10/1/2019 at [facility] under general anesthesia.    This is a 59 year old female with a history of hypertension, asthma, hypothyroidism and adolescent idiopathic scoliosis.  At 13 yo she underwent PISF T4-L4 with solid arthrodesis and retained Carpio rods x2.     Per Dr. Vasquez's note 8/22/19, the patient now suffers:  1.  Severe spinal sagittal malalignment, with PI-LL mismatch = 64; lower lumbar hypolordosis (L4-S1 = 16, ideal 51); severe compensatory pelvic retroversion (PT = 57); SVA = +10.4cm.  2.  High grade (Gr 4) isthmic spondylolisthesis L5-S1, with lumbosacral kyphosis.  3.  Subjacent level degenerative spondylolisthesis with stenosis L4-5.  4.  Severe foraminal and subarticular stenosis L5-S1.  5.  Multilevel cervical spondylosis and kyphosis.  6.  Cervical spondylolisthesis C3-4 and C4-5 without instability.  7.  Cervical stenosis at C4-5, C5-6, and C6-7, without myelopathy.  8.  Chronic radiculopathies bilateral L5/S1, and right C7 (shown on EMG 5/30/19).    It has been determined that she needs her lumbar surgery to take precedence over her cervical issues although she will most likely need cervical surgery in the future as well.      History is obtained from the patient.     Past Medical History  Past Medical History:   Diagnosis Date     Arthritis     osteoarthritis of both knee     Asthma      Hypertension      PONV  (postoperative nausea and vomiting)      Thyroid disease      Uncomplicated asthma        Past Surgical History  Past Surgical History:   Procedure Laterality Date     ABDOMEN SURGERY  ,         ARTHROPLASTY KNEE Right 8/10/2017    Procedure: ARTHROPLASTY KNEE;  RIGHT TOTAL KNEE ARTHROPLASTY ;  Surgeon: Grady Alvarez MD;  Location:  OR     ARTHROPLASTY KNEE Left 2017    Procedure: ARTHROPLASTY KNEE;  LEFT TOTAL KNEE ARTHROPLASTY;  Surgeon: Grady Alvarez MD;  Location:  OR     BACK SURGERY      spinal fusion     ENT SURGERY      tonsilectomy     GYN SURGERY  11    hysterectomy     ORTHOPEDIC SURGERY      sinal fusion,hand     thumb mass resection       THYROIDECTOMY  2014    Procedure: THYROIDECTOMY;  Surgeon: Krzysztof Marquez MD;  Location: [facility]       Hx of Blood transfusions/reactions: yes, no reported reaction     Hx of abnormal bleeding or anti-platelet use: none    Menstrual history: No LMP recorded. Patient has had a hysterectomy.:     Steroid use in the last year: none    Personal or FH with difficulty with Anesthesia:  PONV    Prior to Admission Medications  Current Outpatient Medications   Medication Sig Dispense Refill     celecoxib (CELEBREX) 400 MG capsule Take 400 mg by mouth every morning       albuterol (PROAIR HFA/PROVENTIL HFA/VENTOLIN HFA) 108 (90 BASE) MCG/ACT Inhaler Inhale 2 puffs into the lungs every 6 hours as needed for shortness of breath / dyspnea or wheezing       bisacodyl (DULCOLAX) 10 MG Suppository Place 1 suppository (10 mg) rectally daily as needed for constipation 30 suppository      cyclobenzaprine (FLEXERIL) 10 MG tablet Take 1 tablet (10 mg) by mouth 3 times daily as needed for muscle spasms 90 tablet 1     estradiol (ESTRACE) 0.1 MG/GM cream Place 2 g vaginally At Bedtime        Fexofenadine HCl (ALLEGRA PO) Take 180 mg by mouth every morning        fluticasone (FLONASE) 50 MCG/ACT spray Spray 1 spray into both nostrils 2  times daily        fluticasone-salmeterol (ADVAIR) 500-50 MCG/DOSE diskus inhaler Inhale 1 puff into the lungs 2 times daily       gabapentin (NEURONTIN) 600 MG tablet Take 900mg (1.5 tablets) three times per day 150 tablet 1     Levothyroxine Sodium (SYNTHROID PO) Take 125 mcg by mouth every morning        lisinopril (PRINIVIL/ZESTRIL) 40 MG tablet Take 1 tablet (40 mg) by mouth daily (Patient taking differently: Take 40 mg by mouth every morning ) 30 tablet      Montelukast Sodium (SINGULAIR PO) Take 10 mg by mouth At Bedtime        Multiple Vitamins-Minerals (DAILY DENIZ MAXIMUM MULTIVITAMIN PO) Take 1 packet by mouth 3 times daily DOTERRA       Omega-3 Fatty Acids (FISH OIL PO) Take 1 capsule by mouth every morning        order for DME Equipment being ordered: TENS.    TENS UNIT Ordered- Please call your insurance to Verify coverage and locations to  the device.     Please Dispense Device, leads, pads, wires, and all other necessary equipment that is needed for use.     Length of need: 36 months. 1 Device 0     polyethylene glycol (MIRALAX/GLYCOLAX) powder Take 17 g by mouth 2 times daily as needed        senna-docusate (SENOKOT-S;PERICOLACE) 8.6-50 MG per tablet Take 2 tablets by mouth daily as needed          Allergies  Allergies   Allergen Reactions     Adhesive Tape      Erythromycin Nausea and Vomiting     Pills cause vomiting,        Social History  Social History     Socioeconomic History     Marital status:      Spouse name: Not on file     Number of children: Not on file     Years of education: Not on file     Highest education level: Not on file   Occupational History     Not on file   Social Needs     Financial resource strain: Not on file     Food insecurity:     Worry: Not on file     Inability: Not on file     Transportation needs:     Medical: Not on file     Non-medical: Not on file   Tobacco Use     Smoking status: Never Smoker     Smokeless tobacco: Never Used   Substance and Sexual  Activity     Alcohol use: Yes     Binge frequency: Patient refused     Comment: 1-2 per month     Drug use: No     Sexual activity: Not on file   Lifestyle     Physical activity:     Days per week: Not on file     Minutes per session: Not on file     Stress: Not on file   Relationships     Social connections:     Talks on phone: Not on file     Gets together: Not on file     Attends Yazidism service: Not on file     Active member of club or organization: Not on file     Attends meetings of clubs or organizations: Not on file     Relationship status: Not on file     Intimate partner violence:     Fear of current or ex partner: Not on file     Emotionally abused: Not on file     Physically abused: Not on file     Forced sexual activity: Not on file   Other Topics Concern     Parent/sibling w/ CABG, MI or angioplasty before 65F 55M? Not Asked   Social History Narrative     Not on file       Family History  Family History   Problem Relation Age of Onset     Breast Cancer Mother      Cancer - colorectal Father      C.A.D. Father      Cerebrovascular Disease Father      Thyroid Disease Brother      Thyroid Disease Sister        Anesthesia Evaluation     . Pt has had prior anesthetic.     History of anesthetic complications   - PONV  difficulty urinating and having BM.  left catheter in for several days after knee replacement in 2017 which was beneficial to her      ROS/MED HX  The complete review of systems is negative other than noted in the HPI or here.   ENT/Pulmonary:     (+)AUBREY risk factors hypertension, Moderate Persistent asthma Treatment: Inhaler prn and Inhaled steroids,  , . .   (-) recent URI   Neurologic:  - neg neurologic ROS     Cardiovascular:     (+) hypertension----. : . . . :. . No previous cardiac testing       METS/Exercise Tolerance:  >4 METS   Hematologic:     (+) History of Transfusion no previous transfusion reaction -     (-) history of blood clots   Musculoskeletal:   (+) arthritis,  -      "  GI/Hepatic:  - neg GI/hepatic ROS       Renal/Genitourinary:  - ROS Renal section negative       Endo:     (+) thyroid problem hypothyroidism, .   (-) chronic steroid usage   Psychiatric:     (+) psychiatric history anxiety      Infectious Disease:  - neg infectious disease ROS       Malignancy:      - no malignancy   Other:    (+) No chance of pregnancy no H/O Chronic Pain,no other significant disability              PHYSICAL EXAM:   Mental Status/Neuro: A/A/O; Age Appropriate   Airway: Facies: Feasible  Mallampati: I  Mouth/Opening: Full  TM distance: > 6 cm  Neck ROM: Full   Respiratory: Auscultation: CTAB     Resp. Rate: Normal     Resp. Effort: Normal      CV: Rhythm: Regular  Rate: Age appropriate  Heart: Normal Sounds  Edema: None   Comments:      Dental: Normal Dentition              Temp: 98.3  F (36.8  C) Temp src: Oral BP: 133/87 Pulse: 83   Resp: 16 SpO2: 98 %         148 lbs 0 oz  5' 2\"   Body mass index is 27.07 kg/m .       Physical Exam  Constitutional: Awake, alert, cooperative, no apparent distress, and appears stated age.  Using cane for ambulation.  Eyes: Pupils equal, round and reactive to light, extra ocular muscles intact, sclera clear, conjunctiva normal.  HENT: Normocephalic, oral pharynx with moist mucus membranes, good dentition. No goiter appreciated.   Respiratory: Clear to auscultation bilaterally, no crackles or wheezing.  Cardiovascular: Regular rate and rhythm, normal S1 and S2, and no murmur noted.  Carotids +2, no bruits. No edema. Palpable pulses to radial  DP and PT arteries.   GI: Normal bowel sounds, soft, non-distended, non-tender, no masses palpated.  Lymph/Hematologic: No cervical lymphadenopathy and no supraclavicular lymphadenopathy.  Genitourinary:  deferred  Skin: Warm and dry.    Musculoskeletal: Somewhat limited ROM of neck. There is no redness, warmth, or swelling of the joints. Gross motor strength is normal.    Neurologic: Awake, alert, oriented to name, place and " time. Cranial nerves II-XII are grossly intact. Gait is normal.   Neuropsychiatric: Calm, cooperative. Normal affect.     Labs: (personally reviewed)  Lab Results   Component Value Date    WBC 5.6 09/17/2019     Lab Results   Component Value Date    RBC 5.07 09/17/2019     Lab Results   Component Value Date    HGB 14.7 09/17/2019     Lab Results   Component Value Date    HCT 44.5 09/17/2019     Lab Results   Component Value Date    MCV 88 09/17/2019     Lab Results   Component Value Date    MCH 29.0 09/17/2019     Lab Results   Component Value Date    MCHC 33.0 09/17/2019     Lab Results   Component Value Date    RDW 12.8 09/17/2019     Lab Results   Component Value Date     09/17/2019     Last Comprehensive Metabolic Panel:  Sodium   Date Value Ref Range Status   09/17/2019 135 133 - 144 mmol/L Final     Potassium   Date Value Ref Range Status   09/17/2019 4.0 3.4 - 5.3 mmol/L Final     Chloride   Date Value Ref Range Status   09/17/2019 100 94 - 109 mmol/L Final     Carbon Dioxide   Date Value Ref Range Status   09/17/2019 30 20 - 32 mmol/L Final     Anion Gap   Date Value Ref Range Status   09/17/2019 5 3 - 14 mmol/L Final     Glucose   Date Value Ref Range Status   09/17/2019 88 70 - 99 mg/dL Final     Urea Nitrogen   Date Value Ref Range Status   09/17/2019 11 7 - 30 mg/dL Final     Creatinine   Date Value Ref Range Status   09/17/2019 0.63 0.52 - 1.04 mg/dL Final     GFR Estimate   Date Value Ref Range Status   09/17/2019 >90 >60 mL/min/[1.73_m2] Final     Comment:     Non  GFR Calc  Starting 12/18/2018, serum creatinine based estimated GFR (eGFR) will be   calculated using the Chronic Kidney Disease Epidemiology Collaboration   (CKD-EPI) equation.       Calcium   Date Value Ref Range Status   09/17/2019 9.3 8.5 - 10.1 mg/dL Final       EKG:not indicated      Outside records reviewed from: care everywhere    ASSESSMENT and PLAN  Aleena Ontiveros is a 59 year old female scheduled for  TLIF-SPO L4-5,L5-S1; Reduction of high-grade spondylolisthesis L5-S1; Distal extension of T4-L4 fusion and fixation down to pelvis  on 10/1/2019 by Dr. Vasquez and Dr. Mike in treatment of thoracolumbar kyphosis and spondylolisthesis.  PAC referral for risk assessment and optimization for anesthesia with comorbid conditions of hypertension, asthma, and hypothyroidism:    Pre-operative considerations:  1.  Cardiac:  Functional status- METS >4.  Pt limited by her back pain, not cardiopulmonary issue. Intermediate risk surgery with 0.4% (RCRI #) risk of major adverse cardiac event.   2.  Pulm:  Airway feasible.  AUBREY risk: low  3.  GI:  Risk of PONV score =4.  Pt with known PONV.  Emend ordered      VTE risk: 0.5% (age)    #cardiac  -denies cardiac symptoms such as CP, SOB, GARCIA, palpitations  -hypertension, will hold lisinopril DOS    #pulmonary  -never smoker  -asthma, Advair daily and albuterol prn  -no recent exacerbation    #endo  -hypothyroidism, (levothyroxine) will take DOS    #other  -pt expressed that she has had difficulty with urinating/bowel movement after first TKA in 2017.  She was in a lot of pain as a result.  She also reports a hypotensive episode in the hospital which may have been related to her narcotic pain medication for pain control.  After her 2nd TKA in 2017 she says that the urinary catheter was left in place longer (a couple of days) and this was very beneficial to her as she did not experience urinary retention.  She would like the catheter left in place post operatively if at all possible.    #ERAS, Type and Screen, CBC, BMP, and MRSA today    Patient is optimized and is acceptable candidate for the proposed procedure.  No further diagnostic evaluation is needed.       Patient was discussed with Dr Silvestre.    CARISSA MurryC  Preoperative Assessment Center  [facility]  Cl[facility] and [facility] Center  Phone: [phone]  Fax: [phone]

## 2019-09-17 NOTE — ANESTHESIA PREPROCEDURE EVALUATION
Anesthesia Pre-Procedure Evaluation    Patient: Aleena Ontiveros   MRN:     [id number] Gender:   female   Age:    59 year old :      1960        Preoperative Diagnosis: * No surgery found *        Past Medical History:   Diagnosis Date     Arthritis     osteoarthritis of both knee     Asthma      Hypertension      PONV (postoperative nausea and vomiting)      Thyroid disease      Uncomplicated asthma       Past Surgical History:   Procedure Laterality Date     ABDOMEN SURGERY  ,         ARTHROPLASTY KNEE Right 8/10/2017    Procedure: ARTHROPLASTY KNEE;  RIGHT TOTAL KNEE ARTHROPLASTY ;  Surgeon: Grady Alvarez MD;  Location:  OR     ARTHROPLASTY KNEE Left 2017    Procedure: ARTHROPLASTY KNEE;  LEFT TOTAL KNEE ARTHROPLASTY;  Surgeon: Grady Alvarez MD;  Location:  OR     BACK SURGERY      spinal fusion     ENT SURGERY      tonsilectomy     GYN SURGERY  13    hysterectomy     ORTHOPEDIC SURGERY      sinal fusion,hand     thumb mass resection       THYROIDECTOMY  2014    Procedure: THYROIDECTOMY;  Surgeon: Krzysztof Marquez MD;  Location: [facility]          Anesthesia Evaluation     . Pt has had prior anesthetic.     History of anesthetic complications   - PONV  difficulty urinating and having BM.  left catheter in for several days after knee replacement in  which was beneficial to her      ROS/MED HX    ENT/Pulmonary:     (+)AUBREY risk factors hypertension, Moderate Persistent asthma Treatment: Inhaler prn and Inhaled steroids,  , . .   (-) recent URI   Neurologic:  - neg neurologic ROS     Cardiovascular:     (+) hypertension----. : . . . :. . No previous cardiac testing       METS/Exercise Tolerance:  >4 METS   Hematologic:     (+) History of Transfusion no previous transfusion reaction -     (-) history of blood clots   Musculoskeletal:   (+) arthritis,  -       GI/Hepatic:  - neg GI/hepatic ROS       Renal/Genitourinary:  - ROS Renal section negative        Endo:     (+) thyroid problem hypothyroidism, .   (-) chronic steroid usage   Psychiatric:     (+) psychiatric history anxiety      Infectious Disease:  - neg infectious disease ROS       Malignancy:      - no malignancy   Other:    (+) No chance of pregnancy no H/O Chronic Pain,no other significant disability                        PHYSICAL EXAM:   Mental Status/Neuro: A/A/O; Age Appropriate   Airway: Facies: Feasible  Mallampati: I  Mouth/Opening: Full  TM distance: > 6 cm  Neck ROM: Full   Respiratory: Auscultation: CTAB     Resp. Rate: Normal     Resp. Effort: Normal      CV: Rhythm: Regular  Rate: Age appropriate  Heart: Normal Sounds  Edema: None   Comments:      Dental: Normal Dentition                LABS:  CBC:   Lab Results   Component Value Date    HGB 11.3 (L) 12/21/2017    HGB 10.3 (L) 12/15/2017     12/16/2017     12/14/2017     BMP:   Lab Results   Component Value Date     12/21/2017     12/14/2017    POTASSIUM 4.0 12/21/2017    POTASSIUM 3.4 12/14/2017    CHLORIDE 104 12/21/2017    CHLORIDE 102 12/14/2017    CO2 24 12/21/2017    CO2 31 12/14/2017    BUN 11 12/21/2017    BUN 7 12/14/2017    CR 0.56 12/21/2017    CR 0.66 12/16/2017     (H) 12/21/2017     (H) 12/15/2017     COAGS: No results found for: PTT, INR, FIBR  POC:   Lab Results   Component Value Date     (H) 12/14/2017    HCG Negative 11/17/2009     OTHER:   Lab Results   Component Value Date    SIXTO 9.0 12/21/2017        Preop Vitals    BP Readings from Last 3 Encounters:   09/09/19 (!) 145/97   08/27/19 (!) 153/85   07/25/19 133/81    Pulse Readings from Last 3 Encounters:   09/09/19 88   08/27/19 79   07/25/19 82      Resp Readings from Last 3 Encounters:   09/09/19 16   08/27/19 16   12/22/17 16    SpO2 Readings from Last 3 Encounters:   09/09/19 96%   07/25/19 98%   12/22/17 98%      Temp Readings from Last 1 Encounters:   09/09/19 97.1  F (36.2  C) (Oral)    Ht Readings from Last 1  "Encounters:   09/09/19 1.575 m (5' 2\")      Wt Readings from Last 1 Encounters:   09/09/19 69.4 kg (153 lb)    Estimated body mass index is 27.98 kg/m  as calculated from the following:    Height as of 9/9/19: 1.575 m (5' 2\").    Weight as of 9/9/19: 69.4 kg (153 lb).     LDA:        JZG FV AN PLAN NO PONV RULE       PAC Discussion and Assessment    ASA Classification: 2  Case is suitable for: [place]  Anesthetic techniques and relevant risks discussed: GA  Invasive monitoring and risk discussed: No  Types:   Possibility and Risk of blood transfusion discussed: Yes  NPO instructions given:   Additional anesthetic preparation and risks discussed:   Needs early admission to pre-op area:   Other:     PAC Resident/NP Anesthesia Assessment:  Aleena Ontiveros is a 59 year old female scheduled for TLIF-SPO L4-5,L5-S1; Reduction of high-grade spondylolisthesis L5-S1; Distal extension of T4-L4 fusion and fixation down to pelvis  on 10/1/2019 by Dr. Vasquez and Dr. Mike in treatment of thoracolumbar kyphosis and spondylolisthesis.  PAC referral for risk assessment and optimization for anesthesia with comorbid conditions of hypertension, asthma, and hypothyroidism:    Pre-operative considerations:  1.  Cardiac:  Functional status- METS >4.  Pt limited by her back pain, not cardiopulmonary issue. Intermediate risk surgery with 0.4% (RCRI #) risk of major adverse cardiac event.   2.  Pulm:  Airway feasible.  AUBREY risk: low  3.  GI:  Risk of PONV score =4.  Pt with known PONV.  Emend ordered      VTE risk: 0.5% (age)    #cardiac  -denies cardiac symptoms such as CP, SOB, GARCIA, palpitations  -hypertension, will hold lisinopril DOS    #pulmonary  -never smoker  -asthma, Advair daily and albuterol prn  -no recent exacerbation    #endo  -hypothyroidism, (levothyroxine) will take DOS    #other  -pt expressed that she has had difficulty with urinating/bowel movement after first TKA in 2017.  She was in a lot of pain as a result.  She " also reports a hypotensive episode in the hospital which may have been related to her narcotic pain medication for pain control.  After her 2nd TKA in 2017 she says that the urinary catheter was left in place longer (a couple of days) and this was very beneficial to her as she did not experience urinary retention.  She would like the catheter left in place post operatively if at all possible.    Patient is optimized and is acceptable candidate for the proposed procedure.  No further diagnostic evaluation is needed.       **For further details of assessment, testing, and physical exam please see H and P completed on same date.          Ruby Sandoval PA-C, [facility]      Reviewed and Signed by PAC Mid-Level Provider/Resident  Mid-Level Provider/Resident: Ruby Sandoval  Date: 9/17/2019  Time:     Attending Anesthesiologist Anesthesia Assessment:        Anesthesiologist:   Date:   Time:   Pass/Fail:   Disposition:     PAC Pharmacist Assessment:        Pharmacist:   Date:   Time:    Ruby Sandoval PA-C

## 2019-09-18 ENCOUNTER — THERAPY VISIT (OUTPATIENT)
Dept: PHYSICAL THERAPY | Facility: CLINIC | Age: 59
End: 2019-09-18
Payer: COMMERCIAL

## 2019-09-18 DIAGNOSIS — M54.42 CHRONIC BILATERAL LOW BACK PAIN WITH BILATERAL SCIATICA: ICD-10-CM

## 2019-09-18 DIAGNOSIS — M54.41 CHRONIC BILATERAL LOW BACK PAIN WITH BILATERAL SCIATICA: ICD-10-CM

## 2019-09-18 DIAGNOSIS — G89.29 CHRONIC BILATERAL LOW BACK PAIN WITH BILATERAL SCIATICA: ICD-10-CM

## 2019-09-18 PROCEDURE — 97112 NEUROMUSCULAR REEDUCATION: CPT | Mod: GP | Performed by: PHYSICAL THERAPIST

## 2019-09-18 PROCEDURE — 97110 THERAPEUTIC EXERCISES: CPT | Mod: GP | Performed by: PHYSICAL THERAPIST

## 2019-09-18 PROCEDURE — G0283 ELEC STIM OTHER THAN WOUND: HCPCS | Mod: GP | Performed by: PHYSICAL THERAPIST

## 2019-09-20 ENCOUNTER — PATIENT OUTREACH (OUTPATIENT)
Dept: NEUROSURGERY | Facility: CLINIC | Age: 59
End: 2019-09-20

## 2019-09-20 NOTE — PROGRESS NOTES
Writer spoke with pt regarding an appt with MD.   has spoken with pt and she understands that the clinic schedule if full on 9/26.  Pt is at the top of the list if there is a cancellation.  She can be to the clinic in 15 minutes if there is a last minute cancellation.  Some of the questions were too specific regarding the surgery and writer could not answer.  Pt wanted to know if she would see MD in the hospital after the surgery.  Writer will need to check with MD and let her know.

## 2019-09-23 ENCOUNTER — THERAPY VISIT (OUTPATIENT)
Dept: PHYSICAL THERAPY | Facility: CLINIC | Age: 59
End: 2019-09-23
Payer: COMMERCIAL

## 2019-09-23 DIAGNOSIS — M54.42 CHRONIC BILATERAL LOW BACK PAIN WITH BILATERAL SCIATICA: ICD-10-CM

## 2019-09-23 DIAGNOSIS — M54.41 CHRONIC BILATERAL LOW BACK PAIN WITH BILATERAL SCIATICA: ICD-10-CM

## 2019-09-23 DIAGNOSIS — G89.29 CHRONIC BILATERAL LOW BACK PAIN WITH BILATERAL SCIATICA: ICD-10-CM

## 2019-09-23 PROCEDURE — 97110 THERAPEUTIC EXERCISES: CPT | Mod: GP | Performed by: PHYSICAL THERAPIST

## 2019-09-23 PROCEDURE — G0283 ELEC STIM OTHER THAN WOUND: HCPCS | Mod: GP | Performed by: PHYSICAL THERAPIST

## 2019-09-23 PROCEDURE — 97112 NEUROMUSCULAR REEDUCATION: CPT | Mod: GP | Performed by: PHYSICAL THERAPIST

## 2019-09-25 ENCOUNTER — THERAPY VISIT (OUTPATIENT)
Dept: PHYSICAL THERAPY | Facility: CLINIC | Age: 59
End: 2019-09-25
Payer: COMMERCIAL

## 2019-09-25 DIAGNOSIS — M54.42 CHRONIC BILATERAL LOW BACK PAIN WITH BILATERAL SCIATICA: ICD-10-CM

## 2019-09-25 DIAGNOSIS — G89.29 CHRONIC BILATERAL LOW BACK PAIN WITH BILATERAL SCIATICA: ICD-10-CM

## 2019-09-25 DIAGNOSIS — M54.41 CHRONIC BILATERAL LOW BACK PAIN WITH BILATERAL SCIATICA: ICD-10-CM

## 2019-09-25 PROCEDURE — 97112 NEUROMUSCULAR REEDUCATION: CPT | Mod: GP | Performed by: PHYSICAL THERAPIST

## 2019-09-25 PROCEDURE — 97110 THERAPEUTIC EXERCISES: CPT | Mod: GP | Performed by: PHYSICAL THERAPIST

## 2019-09-25 PROCEDURE — G0283 ELEC STIM OTHER THAN WOUND: HCPCS | Mod: GP | Performed by: PHYSICAL THERAPIST

## 2019-09-25 NOTE — LETTER
[facility]  [id number]  [place] [facility] [address]  [phone]    2019    Re: Aleena Ontiveros   :   1960  MRN:  [id number]   REFERRING PHYSICIAN:   Ana Luna    [facility]    Date of Initial Evaluation:  2019  Visits:  Rxs Used: 9  Reason for Referral:  Chronic bilateral low back pain with bilateral sciatica    EVALUATION SUMMARY    DISCHARGE REPORT    Progress reporting period is from 19 to 19.       SUBJECTIVE  Subjective changes noted by patient:  Patient notes she was in a lot of pain the past 2 days, but feels better today.  Up for doing some exercise in PT.  Would also like to review use of TENS unit as well as discuss upcoming surgery.  She is scheduled for revision of thoracolumbar fusion on 10/1/19.   Will follow up in PT once instrcted to do so by her MD in the post-op phase.     Current Pain level: 8/10.     Initial Pain level: 8/10.   Changes in function:  Minimal change in pain/funciton, managing pain with TENS, better muscle recruitment,still likely to benefit from surgical intervention as planned  Adverse reaction to treatment or activity: None    OBJECTIVE  Changes noted in objective findings:    Objective: Improved ability to contract transverse abdominus and gluteal muscles.  Demonstrates appropriate set up and use of TENS unit.  Has HEP in place and understands this.      ASSESSMENT/PLAN  Updated problem list and treatment plan: Diagnosis 1:  Acquired spondylolesthesis, old thoracolumbar spinal fusion--due for revision on 10/1/19    Will continue HEP and TENS usage up to planned surgery.   STG/LTGs have been met or progress has been made towards goals:  Minimal improvement toward goals, but likely will have better success in post op phase.   Assessment of Progress: The patient's condition has potential to improve.  Self Management Plans:  Patient  has been instructed in a home treatment program.  I have re-evaluated this patient and find that the nature, scope, duration and intensity of the therapy is appropriate for the medical condition of the patient.  Aleena continues to require the following intervention to meet STG and LTG's:  PT intervention is no longer required to meet STG/LTG.    Recommendations:  This patient is ready to be discharged from therapy and continue their home treatment program in this phase.  Will have surgery 10/1/19 and will likely benefit from PT in her post operative phase, once instructed by her surgeon to initiate PT.         Thank you for your referral.    INQUIRIES  Therapist: Scar Jewell, DPT, SCS, Cert. MDT  [facility]  [address] [place] [address] [id number]  [facility] [address]  Phone: [phone]  Fax: [phone]

## 2019-09-26 NOTE — PROGRESS NOTES
Subjective:  HPI                    Objective:  System    Physical Exam    General     ROS    Assessment/Plan:    DISCHARGE REPORT    Progress reporting period is from 8/8/19 to 9/25//19.       SUBJECTIVE  Subjective changes noted by patient:  Patient notes she was in a lot of pain the past 2 days, but feels better today.  Up for doing some exercise in PT.  Would also like to review use of TENS unit as well as discuss upcoming surgery.  She is scheduled for revision of thoracolumbar fusion on 10/1/19.   Will follow up in PT once instrcted to do so by her MD in the post-op phase.     Current Pain level: 8/10.     Initial Pain level: 8/10.   Changes in function:  Minimal change in pain/funciton, managing pain with TENS, better muscle recruitment,still likely to benefit from surgical intervention as planned  Adverse reaction to treatment or activity: None    OBJECTIVE  Changes noted in objective findings:    Objective: Improved ability to contract transverse abdominus and gluteal muscles.  Demonstrates appropriate set up and use of TENS unit.  Has HEP in place and understands this.      ASSESSMENT/PLAN  Updated problem list and treatment plan: Diagnosis 1:  Acquired spondylolesthesis, old thoracolumbar spinal fusion--due for revision on 10/1/19    Will continue HEP and TENS usage up to planned surgery.   STG/LTGs have been met or progress has been made towards goals:  Minimal improvement toward goals, but likely will have better success in post op phase.   Assessment of Progress: The patient's condition has potential to improve.  Self Management Plans:  Patient has been instructed in a home treatment program.  I have re-evaluated this patient and find that the nature, scope, duration and intensity of the therapy is appropriate for the medical condition of the patient.  Aleena continues to require the following intervention to meet STG and LTG's:  PT intervention is no longer required to meet  STG/LTG.    Recommendations:  This patient is ready to be discharged from therapy and continue their home treatment program in this phase.  Will have surgery 10/1/19 and will likely benefit from PT in her post operative phase, once instructed by her surgeon to initiate PT.     Please refer to the daily flowsheet for treatment today, total treatment time and time spent performing 1:1 timed codes.

## 2019-09-27 ENCOUNTER — TELEPHONE (OUTPATIENT)
Dept: SURGERY | Facility: CLINIC | Age: 59
End: 2019-09-27

## 2019-09-27 NOTE — TELEPHONE ENCOUNTER
Called patient to let her know that MSSA was positive.  I instructed her to obtain additional Hibiclens surgical soap and mupiricin 2% ointment from her pharmacy. She understands that her insurance probably won't cover the surgical scrub.  She has been instructed to use the surgical soap (as she was taught to do at her clinic visit) for 5 showers (one per night) until the surgery and then the morning of surgery as well.  She was instructed to place a pea size amount of mupirocin ointment in each nostril twice per day until DOS.  She verbalized understanding and will  prescriptions today.    I called her pharmacy, [facility] on [address], spoke to the pharmacist and called in prescriptions.

## 2019-09-29 ENCOUNTER — HEALTH MAINTENANCE LETTER (OUTPATIENT)
Age: 59
End: 2019-09-29

## 2019-09-30 ENCOUNTER — ANESTHESIA EVENT (OUTPATIENT)
Dept: SURGERY | Facility: CLINIC | Age: 59
DRG: 460 | End: 2019-09-30
Payer: COMMERCIAL

## 2019-10-01 ENCOUNTER — APPOINTMENT (OUTPATIENT)
Dept: GENERAL RADIOLOGY | Facility: CLINIC | Age: 59
DRG: 460 | End: 2019-10-01
Attending: ORTHOPAEDIC SURGERY
Payer: COMMERCIAL

## 2019-10-01 ENCOUNTER — MEDICAL CORRESPONDENCE (OUTPATIENT)
Dept: HEALTH INFORMATION MANAGEMENT | Facility: CLINIC | Age: 59
End: 2019-10-01

## 2019-10-01 ENCOUNTER — HOSPITAL ENCOUNTER (INPATIENT)
Facility: CLINIC | Age: 59
LOS: 10 days | Discharge: NURSING, REHAB OR LONG-TERM CARE | DRG: 460 | End: 2019-10-11
Attending: ORTHOPAEDIC SURGERY | Admitting: ORTHOPAEDIC SURGERY
Payer: COMMERCIAL

## 2019-10-01 ENCOUNTER — ANESTHESIA (OUTPATIENT)
Dept: SURGERY | Facility: CLINIC | Age: 59
DRG: 460 | End: 2019-10-01
Payer: COMMERCIAL

## 2019-10-01 DIAGNOSIS — J45.20 INTERMITTENT ASTHMA WITHOUT COMPLICATION, UNSPECIFIED ASTHMA SEVERITY: ICD-10-CM

## 2019-10-01 DIAGNOSIS — E03.9 HYPOTHYROIDISM, UNSPECIFIED TYPE: ICD-10-CM

## 2019-10-01 DIAGNOSIS — R11.2 PONV (POSTOPERATIVE NAUSEA AND VOMITING): ICD-10-CM

## 2019-10-01 DIAGNOSIS — G89.18 POSTOPERATIVE PAIN AFTER SPINAL SURGERY: Primary | ICD-10-CM

## 2019-10-01 DIAGNOSIS — Z98.890 PONV (POSTOPERATIVE NAUSEA AND VOMITING): ICD-10-CM

## 2019-10-01 DIAGNOSIS — K59.03 DRUG-INDUCED CONSTIPATION: ICD-10-CM

## 2019-10-01 LAB
ABO + RH BLD: NORMAL
ABO + RH BLD: NORMAL
ALBUMIN SERPL-MCNC: 3 G/DL (ref 3.4–5)
ALP SERPL-CCNC: 36 U/L (ref 40–150)
ALT SERPL W P-5'-P-CCNC: 31 U/L (ref 0–50)
ANGLE RATE OF CLOT STRENGTH: 72.2 DEGREES (ref 53–72)
ANGLE RATE OF CLOT STRENGTH: 73.4 DEGREES (ref 53–72)
ANION GAP SERPL CALCULATED.3IONS-SCNC: 11 MMOL/L (ref 3–14)
ANION GAP SERPL CALCULATED.3IONS-SCNC: 8 MMOL/L (ref 3–14)
APTT PPP: 31 SEC (ref 22–37)
APTT PPP: 33 SEC (ref 22–37)
AST SERPL W P-5'-P-CCNC: 24 U/L (ref 0–45)
BASE DEFICIT BLDA-SCNC: 1.3 MMOL/L
BASE EXCESS BLDA CALC-SCNC: 0.1 MMOL/L
BASE EXCESS BLDA CALC-SCNC: 0.2 MMOL/L
BASE EXCESS BLDA CALC-SCNC: 0.4 MMOL/L
BILIRUB SERPL-MCNC: 0.2 MG/DL (ref 0.2–1.3)
BLD GP AB SCN SERPL QL: NORMAL
BLD PROD TYP BPU: NORMAL
BLD UNIT ID BPU: 0
BLD UNIT ID BPU: 0
BLOOD BANK CMNT PATIENT-IMP: NORMAL
BLOOD PRODUCT CODE: NORMAL
BLOOD PRODUCT CODE: NORMAL
BPU ID: NORMAL
BPU ID: NORMAL
BUN SERPL-MCNC: 5 MG/DL (ref 7–30)
BUN SERPL-MCNC: 6 MG/DL (ref 7–30)
CA-I BLD-MCNC: 4.3 MG/DL (ref 4.4–5.2)
CA-I BLD-MCNC: 4.4 MG/DL (ref 4.4–5.2)
CALCIUM SERPL-MCNC: 6.9 MG/DL (ref 8.5–10.1)
CALCIUM SERPL-MCNC: 7.3 MG/DL (ref 8.5–10.1)
CHLORIDE SERPL-SCNC: 105 MMOL/L (ref 94–109)
CHLORIDE SERPL-SCNC: 105 MMOL/L (ref 94–109)
CI HYPERCOAGULATION INDEX: 1.8 RATIO (ref 0–3)
CI HYPERCOAGULATION INDEX: 2.8 RATIO (ref 0–3)
CO2 SERPL-SCNC: 23 MMOL/L (ref 20–32)
CO2 SERPL-SCNC: 26 MMOL/L (ref 20–32)
CREAT SERPL-MCNC: 0.52 MG/DL (ref 0.52–1.04)
CREAT SERPL-MCNC: 0.53 MG/DL (ref 0.52–1.04)
ERYTHROCYTE [DISTWIDTH] IN BLOOD BY AUTOMATED COUNT: 12.5 % (ref 10–15)
ERYTHROCYTE [DISTWIDTH] IN BLOOD BY AUTOMATED COUNT: 12.7 % (ref 10–15)
FIBRINOGEN PPP-MCNC: 189 MG/DL (ref 200–420)
FIBRINOGEN PPP-MCNC: 250 MG/DL (ref 200–420)
G ACTUAL CLOT STRENGTH: 11.1 KD/SC (ref 4.5–11)
G ACTUAL CLOT STRENGTH: 7.9 KD/SC (ref 4.5–11)
GFR SERPL CREATININE-BSD FRML MDRD: >90 ML/MIN/{1.73_M2}
GFR SERPL CREATININE-BSD FRML MDRD: >90 ML/MIN/{1.73_M2}
GLUCOSE BLD-MCNC: 102 MG/DL (ref 70–99)
GLUCOSE BLD-MCNC: 106 MG/DL (ref 70–99)
GLUCOSE BLD-MCNC: 116 MG/DL (ref 70–99)
GLUCOSE BLD-MCNC: 96 MG/DL (ref 70–99)
GLUCOSE BLDC GLUCOMTR-MCNC: 123 MG/DL (ref 70–99)
GLUCOSE SERPL-MCNC: 148 MG/DL (ref 70–99)
GLUCOSE SERPL-MCNC: 99 MG/DL (ref 70–99)
HCO3 BLD-SCNC: 23 MMOL/L (ref 21–28)
HCO3 BLD-SCNC: 24 MMOL/L (ref 21–28)
HCT VFR BLD AUTO: 29.4 % (ref 35–47)
HCT VFR BLD AUTO: 33.7 % (ref 35–47)
HGB BLD-MCNC: 10 G/DL (ref 11.7–15.7)
HGB BLD-MCNC: 10.1 G/DL (ref 11.7–15.7)
HGB BLD-MCNC: 10.3 G/DL (ref 11.7–15.7)
HGB BLD-MCNC: 11.4 G/DL (ref 11.7–15.7)
HGB BLD-MCNC: 11.6 G/DL (ref 11.7–15.7)
HGB BLD-MCNC: 12.1 G/DL (ref 11.7–15.7)
INR PPP: 1.05 (ref 0.86–1.14)
INR PPP: 1.11 (ref 0.86–1.14)
K TIME TO SPEC CLOT STRENGTH: 1.2 MINUTE (ref 1–3)
K TIME TO SPEC CLOT STRENGTH: 1.3 MINUTE (ref 1–3)
LACTATE BLD-SCNC: 1.5 MMOL/L (ref 0.7–2)
LACTATE BLD-SCNC: 2 MMOL/L (ref 0.7–2)
LACTATE BLD-SCNC: 2.3 MMOL/L (ref 0.7–2)
LACTATE BLD-SCNC: 2.6 MMOL/L (ref 0.7–2)
LY30 LYSIS AT 30 MINUTES: 0.1 % (ref 0–8)
LY30 LYSIS AT 30 MINUTES: 0.8 % (ref 0–8)
LY60 LYSIS AT 60 MINUTES: 1.8 % (ref 0–15)
LY60 LYSIS AT 60 MINUTES: 3.9 % (ref 0–15)
MA MAXIMUM CLOT STRENGTH: 61.2 MM (ref 50–70)
MA MAXIMUM CLOT STRENGTH: 68.9 MM (ref 50–70)
MAGNESIUM SERPL-MCNC: 2.1 MG/DL (ref 1.6–2.3)
MCH RBC QN AUTO: 28.4 PG (ref 26.5–33)
MCH RBC QN AUTO: 28.6 PG (ref 26.5–33)
MCHC RBC AUTO-ENTMCNC: 33.8 G/DL (ref 31.5–36.5)
MCHC RBC AUTO-ENTMCNC: 34 G/DL (ref 31.5–36.5)
MCV RBC AUTO: 84 FL (ref 78–100)
MCV RBC AUTO: 84 FL (ref 78–100)
NUM BPU REQUESTED: 2
O2/TOTAL GAS SETTING VFR VENT: 35 %
O2/TOTAL GAS SETTING VFR VENT: ABNORMAL %
PCO2 BLD: 32 MM HG (ref 35–45)
PCO2 BLD: 35 MM HG (ref 35–45)
PCO2 BLD: 36 MM HG (ref 35–45)
PCO2 BLD: 41 MM HG (ref 35–45)
PH BLD: 7.37 PH (ref 7.35–7.45)
PH BLD: 7.44 PH (ref 7.35–7.45)
PH BLD: 7.45 PH (ref 7.35–7.45)
PH BLD: 7.47 PH (ref 7.35–7.45)
PLATELET # BLD AUTO: 243 10E9/L (ref 150–450)
PLATELET # BLD AUTO: 305 10E9/L (ref 150–450)
PO2 BLD: 149 MM HG (ref 80–105)
PO2 BLD: 155 MM HG (ref 80–105)
PO2 BLD: 156 MM HG (ref 80–105)
PO2 BLD: 253 MM HG (ref 80–105)
POTASSIUM BLD-SCNC: 3 MMOL/L (ref 3.4–5.3)
POTASSIUM BLD-SCNC: 3.2 MMOL/L (ref 3.4–5.3)
POTASSIUM BLD-SCNC: 3.4 MMOL/L (ref 3.4–5.3)
POTASSIUM BLD-SCNC: 3.5 MMOL/L (ref 3.4–5.3)
POTASSIUM SERPL-SCNC: 3.5 MMOL/L (ref 3.4–5.3)
POTASSIUM SERPL-SCNC: 3.9 MMOL/L (ref 3.4–5.3)
PROT SERPL-MCNC: 4.9 G/DL (ref 6.8–8.8)
R TIME UNTIL CLOT FORMS: 4.4 MINUTE (ref 5–10)
R TIME UNTIL CLOT FORMS: 4.5 MINUTE (ref 5–10)
RBC # BLD AUTO: 3.5 10E12/L (ref 3.8–5.2)
RBC # BLD AUTO: 4.01 10E12/L (ref 3.8–5.2)
SODIUM BLD-SCNC: 134 MMOL/L (ref 133–144)
SODIUM BLD-SCNC: 135 MMOL/L (ref 133–144)
SODIUM SERPL-SCNC: 139 MMOL/L (ref 133–144)
SODIUM SERPL-SCNC: 139 MMOL/L (ref 133–144)
SPECIMEN EXP DATE BLD: NORMAL
TRANSFUSION STATUS PATIENT QL: NORMAL
WBC # BLD AUTO: 14.1 10E9/L (ref 4–11)
WBC # BLD AUTO: 5.9 10E9/L (ref 4–11)

## 2019-10-01 PROCEDURE — 4A10X4G MONITORING OF CENTRAL NERVOUS ELECTRICAL ACTIVITY, INTRAOPERATIVE, EXTERNAL APPROACH: ICD-10-PCS | Performed by: ORTHOPAEDIC SURGERY

## 2019-10-01 PROCEDURE — 37000009 ZZH ANESTHESIA TECHNICAL FEE, EACH ADDTL 15 MIN: Performed by: ORTHOPAEDIC SURGERY

## 2019-10-01 PROCEDURE — 25000566 ZZH SEVOFLURANE, EA 15 MIN: Performed by: ORTHOPAEDIC SURGERY

## 2019-10-01 PROCEDURE — 25000125 ZZHC RX 250: Performed by: NURSE ANESTHETIST, CERTIFIED REGISTERED

## 2019-10-01 PROCEDURE — 00000146 ZZHCL STATISTIC GLUCOSE BY METER IP

## 2019-10-01 PROCEDURE — 01NB0ZZ RELEASE LUMBAR NERVE, OPEN APPROACH: ICD-10-PCS | Performed by: NEUROLOGICAL SURGERY

## 2019-10-01 PROCEDURE — 82330 ASSAY OF CALCIUM: CPT | Performed by: ANESTHESIOLOGY

## 2019-10-01 PROCEDURE — 25000132 ZZH RX MED GY IP 250 OP 250 PS 637: Performed by: ANESTHESIOLOGY

## 2019-10-01 PROCEDURE — 25000128 H RX IP 250 OP 636: Performed by: PHYSICIAN ASSISTANT

## 2019-10-01 PROCEDURE — 85610 PROTHROMBIN TIME: CPT | Performed by: ORTHOPAEDIC SURGERY

## 2019-10-01 PROCEDURE — 0SG3071 FUSION OF LUMBOSACRAL JOINT WITH AUTOLOGOUS TISSUE SUBSTITUTE, POSTERIOR APPROACH, POSTERIOR COLUMN, OPEN APPROACH: ICD-10-PCS | Performed by: NEUROLOGICAL SURGERY

## 2019-10-01 PROCEDURE — 84295 ASSAY OF SERUM SODIUM: CPT | Performed by: ANESTHESIOLOGY

## 2019-10-01 PROCEDURE — C1713 ANCHOR/SCREW BN/BN,TIS/BN: HCPCS | Performed by: ORTHOPAEDIC SURGERY

## 2019-10-01 PROCEDURE — 20000004 ZZH R&B ICU UMMC

## 2019-10-01 PROCEDURE — 8E0WXBG COMPUTER ASSISTED PROCEDURE OF TRUNK REGION, WITH COMPUTERIZED TOMOGRAPHY: ICD-10-PCS | Performed by: ORTHOPAEDIC SURGERY

## 2019-10-01 PROCEDURE — 25000125 ZZHC RX 250: Performed by: ANESTHESIOLOGY

## 2019-10-01 PROCEDURE — C1762 CONN TISS, HUMAN(INC FASCIA): HCPCS | Performed by: ORTHOPAEDIC SURGERY

## 2019-10-01 PROCEDURE — 85384 FIBRINOGEN ACTIVITY: CPT | Performed by: ANESTHESIOLOGY

## 2019-10-01 PROCEDURE — 99223 1ST HOSP IP/OBS HIGH 75: CPT | Mod: AI | Performed by: INTERNAL MEDICINE

## 2019-10-01 PROCEDURE — 85730 THROMBOPLASTIN TIME PARTIAL: CPT | Performed by: ORTHOPAEDIC SURGERY

## 2019-10-01 PROCEDURE — 25800030 ZZH RX IP 258 OP 636: Performed by: PHYSICIAN ASSISTANT

## 2019-10-01 PROCEDURE — 0QH204Z INSERTION OF INTERNAL FIXATION DEVICE INTO RIGHT PELVIC BONE, OPEN APPROACH: ICD-10-PCS | Performed by: ORTHOPAEDIC SURGERY

## 2019-10-01 PROCEDURE — 25000128 H RX IP 250 OP 636: Performed by: ORTHOPAEDIC SURGERY

## 2019-10-01 PROCEDURE — 40000014 ZZH STATISTIC ARTERIAL MONITORING DAILY

## 2019-10-01 PROCEDURE — 0QH304Z INSERTION OF INTERNAL FIXATION DEVICE INTO LEFT PELVIC BONE, OPEN APPROACH: ICD-10-PCS | Performed by: ORTHOPAEDIC SURGERY

## 2019-10-01 PROCEDURE — P9041 ALBUMIN (HUMAN),5%, 50ML: HCPCS | Performed by: NURSE ANESTHETIST, CERTIFIED REGISTERED

## 2019-10-01 PROCEDURE — 85027 COMPLETE CBC AUTOMATED: CPT | Performed by: ANESTHESIOLOGY

## 2019-10-01 PROCEDURE — 27210995 ZZH RX 272: Performed by: ORTHOPAEDIC SURGERY

## 2019-10-01 PROCEDURE — 36000076 ZZH SURGERY LEVEL 6 EA 15 ADDTL MIN - UMMC: Performed by: ORTHOPAEDIC SURGERY

## 2019-10-01 PROCEDURE — 85396 CLOTTING ASSAY WHOLE BLOOD: CPT | Performed by: ORTHOPAEDIC SURGERY

## 2019-10-01 PROCEDURE — 0SG1071 FUSION OF 2 OR MORE LUMBAR VERTEBRAL JOINTS WITH AUTOLOGOUS TISSUE SUBSTITUTE, POSTERIOR APPROACH, POSTERIOR COLUMN, OPEN APPROACH: ICD-10-PCS | Performed by: NEUROLOGICAL SURGERY

## 2019-10-01 PROCEDURE — 27211022 ZZHC OR IOM SUPPLIES OPNP: Performed by: ORTHOPAEDIC SURGERY

## 2019-10-01 PROCEDURE — 25000128 H RX IP 250 OP 636: Performed by: ANESTHESIOLOGY

## 2019-10-01 PROCEDURE — 25000125 ZZHC RX 250: Performed by: ORTHOPAEDIC SURGERY

## 2019-10-01 PROCEDURE — 40000196 ZZH STATISTIC RAPCV CVP MONITORING

## 2019-10-01 PROCEDURE — 80048 BASIC METABOLIC PNL TOTAL CA: CPT | Performed by: ORTHOPAEDIC SURGERY

## 2019-10-01 PROCEDURE — 84132 ASSAY OF SERUM POTASSIUM: CPT | Performed by: ORTHOPAEDIC SURGERY

## 2019-10-01 PROCEDURE — 83605 ASSAY OF LACTIC ACID: CPT | Performed by: ORTHOPAEDIC SURGERY

## 2019-10-01 PROCEDURE — P9016 RBC LEUKOCYTES REDUCED: HCPCS | Performed by: PHYSICIAN ASSISTANT

## 2019-10-01 PROCEDURE — 85384 FIBRINOGEN ACTIVITY: CPT | Performed by: ORTHOPAEDIC SURGERY

## 2019-10-01 PROCEDURE — 85730 THROMBOPLASTIN TIME PARTIAL: CPT | Performed by: ANESTHESIOLOGY

## 2019-10-01 PROCEDURE — 37000008 ZZH ANESTHESIA TECHNICAL FEE, 1ST 30 MIN: Performed by: ORTHOPAEDIC SURGERY

## 2019-10-01 PROCEDURE — 0SS30ZZ REPOSITION LUMBOSACRAL JOINT, OPEN APPROACH: ICD-10-PCS | Performed by: ORTHOPAEDIC SURGERY

## 2019-10-01 PROCEDURE — 83735 ASSAY OF MAGNESIUM: CPT | Performed by: INTERNAL MEDICINE

## 2019-10-01 PROCEDURE — 83605 ASSAY OF LACTIC ACID: CPT | Performed by: ANESTHESIOLOGY

## 2019-10-01 PROCEDURE — 25800030 ZZH RX IP 258 OP 636: Performed by: ANESTHESIOLOGY

## 2019-10-01 PROCEDURE — 84295 ASSAY OF SERUM SODIUM: CPT | Performed by: ORTHOPAEDIC SURGERY

## 2019-10-01 PROCEDURE — 82803 BLOOD GASES ANY COMBINATION: CPT | Performed by: ANESTHESIOLOGY

## 2019-10-01 PROCEDURE — 82803 BLOOD GASES ANY COMBINATION: CPT | Performed by: ORTHOPAEDIC SURGERY

## 2019-10-01 PROCEDURE — 95940 IONM IN OPERATNG ROOM 15 MIN: CPT | Performed by: ORTHOPAEDIC SURGERY

## 2019-10-01 PROCEDURE — 82947 ASSAY GLUCOSE BLOOD QUANT: CPT | Performed by: ANESTHESIOLOGY

## 2019-10-01 PROCEDURE — 0SB40ZZ EXCISION OF LUMBOSACRAL DISC, OPEN APPROACH: ICD-10-PCS | Performed by: ORTHOPAEDIC SURGERY

## 2019-10-01 PROCEDURE — 85027 COMPLETE CBC AUTOMATED: CPT | Performed by: ORTHOPAEDIC SURGERY

## 2019-10-01 PROCEDURE — 25800030 ZZH RX IP 258 OP 636: Performed by: NURSE ANESTHETIST, CERTIFIED REGISTERED

## 2019-10-01 PROCEDURE — L8699 PROSTHETIC IMPLANT NOS: HCPCS | Performed by: ORTHOPAEDIC SURGERY

## 2019-10-01 PROCEDURE — 82947 ASSAY GLUCOSE BLOOD QUANT: CPT | Performed by: ORTHOPAEDIC SURGERY

## 2019-10-01 PROCEDURE — 82330 ASSAY OF CALCIUM: CPT | Performed by: ORTHOPAEDIC SURGERY

## 2019-10-01 PROCEDURE — 83735 ASSAY OF MAGNESIUM: CPT | Performed by: ORTHOPAEDIC SURGERY

## 2019-10-01 PROCEDURE — 25000128 H RX IP 250 OP 636: Performed by: NEUROLOGICAL SURGERY

## 2019-10-01 PROCEDURE — 25000128 H RX IP 250 OP 636: Performed by: INTERNAL MEDICINE

## 2019-10-01 PROCEDURE — 25000128 H RX IP 250 OP 636: Performed by: NURSE ANESTHETIST, CERTIFIED REGISTERED

## 2019-10-01 PROCEDURE — 40000171 ZZH STATISTIC PRE-PROCEDURE ASSESSMENT III: Performed by: ORTHOPAEDIC SURGERY

## 2019-10-01 PROCEDURE — 36000078 ZZH SURGERY LEVEL 6 W FLUORO 1ST 30 MIN - UMMC: Performed by: ORTHOPAEDIC SURGERY

## 2019-10-01 PROCEDURE — 40000278 XR SURGERY CARM FLUORO LESS THAN 5 MIN: Mod: TC

## 2019-10-01 PROCEDURE — 85610 PROTHROMBIN TIME: CPT | Performed by: ANESTHESIOLOGY

## 2019-10-01 PROCEDURE — 71000014 ZZH RECOVERY PHASE 1 LEVEL 2 FIRST HR: Performed by: ORTHOPAEDIC SURGERY

## 2019-10-01 PROCEDURE — 27211020 ZZHC OR CELL SAVER OPNP: Performed by: ORTHOPAEDIC SURGERY

## 2019-10-01 PROCEDURE — 27210794 ZZH OR GENERAL SUPPLY STERILE: Performed by: ORTHOPAEDIC SURGERY

## 2019-10-01 PROCEDURE — 25000132 ZZH RX MED GY IP 250 OP 250 PS 637: Performed by: PHYSICIAN ASSISTANT

## 2019-10-01 PROCEDURE — 40000275 ZZH STATISTIC RCP TIME EA 10 MIN

## 2019-10-01 PROCEDURE — 84132 ASSAY OF SERUM POTASSIUM: CPT | Performed by: ANESTHESIOLOGY

## 2019-10-01 PROCEDURE — 85396 CLOTTING ASSAY WHOLE BLOOD: CPT | Performed by: ANESTHESIOLOGY

## 2019-10-01 PROCEDURE — 80053 COMPREHEN METABOLIC PANEL: CPT | Performed by: ORTHOPAEDIC SURGERY

## 2019-10-01 PROCEDURE — 71000015 ZZH RECOVERY PHASE 1 LEVEL 2 EA ADDTL HR: Performed by: ORTHOPAEDIC SURGERY

## 2019-10-01 PROCEDURE — 25000125 ZZHC RX 250: Performed by: PHYSICIAN ASSISTANT

## 2019-10-01 DEVICE — GRAFT BONE CRUSH CANC 30ML 400080: Type: IMPLANTABLE DEVICE | Site: SACRUM | Status: FUNCTIONAL

## 2019-10-01 DEVICE — IMPLANTABLE DEVICE: Type: IMPLANTABLE DEVICE | Site: SPINE LUMBAR | Status: FUNCTIONAL

## 2019-10-01 DEVICE — IMP SCR MEDT 5.5/6.0MM SOLERA 7.5X35MM MA 55840007535: Type: IMPLANTABLE DEVICE | Site: SPINE LUMBAR | Status: FUNCTIONAL

## 2019-10-01 DEVICE — IMP SCR MEDT 5.5/6.0MM SOLERA 5.5X50MM MA 55840005550: Type: IMPLANTABLE DEVICE | Site: SPINE LUMBAR | Status: FUNCTIONAL

## 2019-10-01 DEVICE — GRAFT BONE INFUSE BMP LG 7510600: Type: IMPLANTABLE DEVICE | Site: SPINE LUMBAR | Status: FUNCTIONAL

## 2019-10-01 DEVICE — IMP SCR MEDT 5.5/6.0MM SOLERA 5.5X55MM MA 55840005555: Type: IMPLANTABLE DEVICE | Site: SPINE LUMBAR | Status: FUNCTIONAL

## 2019-10-01 DEVICE — IMP ROD MEDT SOLERA LINED 5.5X500MM CHR 1555200500: Type: IMPLANTABLE DEVICE | Site: SPINE LUMBAR | Status: FUNCTIONAL

## 2019-10-01 DEVICE — IMP SCR SET MEDT SOLERA BREAK OFF 5.5MM TI 5540030: Type: IMPLANTABLE DEVICE | Site: SPINE LUMBAR | Status: FUNCTIONAL

## 2019-10-01 RX ORDER — SODIUM CHLORIDE, SODIUM LACTATE, POTASSIUM CHLORIDE, CALCIUM CHLORIDE 600; 310; 30; 20 MG/100ML; MG/100ML; MG/100ML; MG/100ML
INJECTION, SOLUTION INTRAVENOUS CONTINUOUS
Status: DISCONTINUED | OUTPATIENT
Start: 2019-10-01 | End: 2019-10-01 | Stop reason: HOSPADM

## 2019-10-01 RX ORDER — ACETAMINOPHEN 325 MG/1
975 TABLET ORAL ONCE
Status: DISCONTINUED | OUTPATIENT
Start: 2019-10-01 | End: 2019-10-01

## 2019-10-01 RX ORDER — ONDANSETRON 4 MG/1
4 TABLET, ORALLY DISINTEGRATING ORAL EVERY 6 HOURS PRN
Status: DISCONTINUED | OUTPATIENT
Start: 2019-10-01 | End: 2019-10-01

## 2019-10-01 RX ORDER — CYCLOBENZAPRINE HCL 5 MG
10 TABLET ORAL 3 TIMES DAILY PRN
Status: DISCONTINUED | OUTPATIENT
Start: 2019-10-01 | End: 2019-10-02

## 2019-10-01 RX ORDER — FEXOFENADINE HCL 180 MG/1
180 TABLET ORAL EVERY MORNING
Status: DISCONTINUED | OUTPATIENT
Start: 2019-10-02 | End: 2019-10-11 | Stop reason: HOSPADM

## 2019-10-01 RX ORDER — GABAPENTIN 100 MG/1
300 CAPSULE ORAL
Status: DISCONTINUED | OUTPATIENT
Start: 2019-10-01 | End: 2019-10-01 | Stop reason: HOSPADM

## 2019-10-01 RX ORDER — ONDANSETRON 2 MG/ML
4 INJECTION INTRAMUSCULAR; INTRAVENOUS EVERY 6 HOURS PRN
Status: DISCONTINUED | OUTPATIENT
Start: 2019-10-01 | End: 2019-10-01

## 2019-10-01 RX ORDER — SODIUM CHLORIDE, SODIUM GLUCONATE, SODIUM ACETATE, POTASSIUM CHLORIDE AND MAGNESIUM CHLORIDE 526; 502; 368; 37; 30 MG/100ML; MG/100ML; MG/100ML; MG/100ML; MG/100ML
1-3 INJECTION, SOLUTION INTRAVENOUS CONTINUOUS
Status: DISCONTINUED | OUTPATIENT
Start: 2019-10-01 | End: 2019-10-01 | Stop reason: HOSPADM

## 2019-10-01 RX ORDER — CEFAZOLIN SODIUM 1 G/3ML
1 INJECTION, POWDER, FOR SOLUTION INTRAMUSCULAR; INTRAVENOUS SEE ADMIN INSTRUCTIONS
Status: DISCONTINUED | OUTPATIENT
Start: 2019-10-01 | End: 2019-10-01 | Stop reason: HOSPADM

## 2019-10-01 RX ORDER — DIAZEPAM 10 MG/2ML
2.5 INJECTION, SOLUTION INTRAMUSCULAR; INTRAVENOUS EVERY 4 HOURS PRN
Status: DISCONTINUED | OUTPATIENT
Start: 2019-10-01 | End: 2019-10-02

## 2019-10-01 RX ORDER — FENTANYL CITRATE 50 UG/ML
INJECTION, SOLUTION INTRAMUSCULAR; INTRAVENOUS PRN
Status: DISCONTINUED | OUTPATIENT
Start: 2019-10-01 | End: 2019-10-01

## 2019-10-01 RX ORDER — DEXAMETHASONE SODIUM PHOSPHATE 4 MG/ML
INJECTION, SOLUTION INTRA-ARTICULAR; INTRALESIONAL; INTRAMUSCULAR; INTRAVENOUS; SOFT TISSUE PRN
Status: DISCONTINUED | OUTPATIENT
Start: 2019-10-01 | End: 2019-10-01

## 2019-10-01 RX ORDER — SODIUM CHLORIDE, SODIUM LACTATE, POTASSIUM CHLORIDE, CALCIUM CHLORIDE 600; 310; 30; 20 MG/100ML; MG/100ML; MG/100ML; MG/100ML
1-3 INJECTION, SOLUTION INTRAVENOUS CONTINUOUS
Status: DISCONTINUED | OUTPATIENT
Start: 2019-10-01 | End: 2019-10-01 | Stop reason: HOSPADM

## 2019-10-01 RX ORDER — ACETAMINOPHEN 500 MG
500 TABLET ORAL
Status: ON HOLD | COMMUNITY
End: 2020-12-24

## 2019-10-01 RX ORDER — NALOXONE HYDROCHLORIDE 0.4 MG/ML
.1-.4 INJECTION, SOLUTION INTRAMUSCULAR; INTRAVENOUS; SUBCUTANEOUS
Status: DISCONTINUED | OUTPATIENT
Start: 2019-10-01 | End: 2019-10-11 | Stop reason: HOSPADM

## 2019-10-01 RX ORDER — DIPHENHYDRAMINE HYDROCHLORIDE 50 MG/ML
25 INJECTION INTRAMUSCULAR; INTRAVENOUS EVERY 6 HOURS PRN
Status: DISCONTINUED | OUTPATIENT
Start: 2019-10-01 | End: 2019-10-11 | Stop reason: HOSPADM

## 2019-10-01 RX ORDER — GABAPENTIN 100 MG/1
300 CAPSULE ORAL ONCE
Status: DISCONTINUED | OUTPATIENT
Start: 2019-10-01 | End: 2019-10-01

## 2019-10-01 RX ORDER — HYDROMORPHONE HYDROCHLORIDE 1 MG/ML
.3-.5 INJECTION, SOLUTION INTRAMUSCULAR; INTRAVENOUS; SUBCUTANEOUS EVERY 5 MIN PRN
Status: DISCONTINUED | OUTPATIENT
Start: 2019-10-01 | End: 2019-10-01 | Stop reason: HOSPADM

## 2019-10-01 RX ORDER — PROCHLORPERAZINE MALEATE 5 MG
10 TABLET ORAL EVERY 6 HOURS PRN
Status: DISCONTINUED | OUTPATIENT
Start: 2019-10-01 | End: 2019-10-11 | Stop reason: HOSPADM

## 2019-10-01 RX ORDER — SODIUM CHLORIDE, SODIUM LACTATE, POTASSIUM CHLORIDE, CALCIUM CHLORIDE 600; 310; 30; 20 MG/100ML; MG/100ML; MG/100ML; MG/100ML
INJECTION, SOLUTION INTRAVENOUS CONTINUOUS PRN
Status: DISCONTINUED | OUTPATIENT
Start: 2019-10-01 | End: 2019-10-01

## 2019-10-01 RX ORDER — LIDOCAINE 40 MG/G
CREAM TOPICAL
Status: DISCONTINUED | OUTPATIENT
Start: 2019-10-01 | End: 2019-10-01 | Stop reason: HOSPADM

## 2019-10-01 RX ORDER — ACETAMINOPHEN 325 MG/1
650 TABLET ORAL EVERY 4 HOURS PRN
Status: DISCONTINUED | OUTPATIENT
Start: 2019-10-04 | End: 2019-10-11 | Stop reason: HOSPADM

## 2019-10-01 RX ORDER — POTASSIUM CHLORIDE 1.5 G/1.58G
20-40 POWDER, FOR SOLUTION ORAL
Status: DISCONTINUED | OUTPATIENT
Start: 2019-10-01 | End: 2019-10-11 | Stop reason: HOSPADM

## 2019-10-01 RX ORDER — CEFAZOLIN SODIUM 2 G/100ML
2 INJECTION, SOLUTION INTRAVENOUS
Status: COMPLETED | OUTPATIENT
Start: 2019-10-01 | End: 2019-10-01

## 2019-10-01 RX ORDER — LORAZEPAM 2 MG/ML
.5-1 INJECTION INTRAMUSCULAR
Status: DISCONTINUED | OUTPATIENT
Start: 2019-10-01 | End: 2019-10-01 | Stop reason: HOSPADM

## 2019-10-01 RX ORDER — CELECOXIB 200 MG/1
200 CAPSULE ORAL ONCE
Status: COMPLETED | OUTPATIENT
Start: 2019-10-01 | End: 2019-10-01

## 2019-10-01 RX ORDER — ALBUTEROL SULFATE 90 UG/1
2 AEROSOL, METERED RESPIRATORY (INHALATION) EVERY 6 HOURS PRN
Status: DISCONTINUED | OUTPATIENT
Start: 2019-10-01 | End: 2019-10-03

## 2019-10-01 RX ORDER — POTASSIUM CHLORIDE 7.45 MG/ML
10 INJECTION INTRAVENOUS
Status: DISCONTINUED | OUTPATIENT
Start: 2019-10-01 | End: 2019-10-11 | Stop reason: HOSPADM

## 2019-10-01 RX ORDER — LIDOCAINE HYDROCHLORIDE 20 MG/ML
INJECTION, SOLUTION INFILTRATION; PERINEURAL PRN
Status: DISCONTINUED | OUTPATIENT
Start: 2019-10-01 | End: 2019-10-01

## 2019-10-01 RX ORDER — PROPOFOL 10 MG/ML
INJECTION, EMULSION INTRAVENOUS PRN
Status: DISCONTINUED | OUTPATIENT
Start: 2019-10-01 | End: 2019-10-01

## 2019-10-01 RX ORDER — MAGNESIUM SULFATE HEPTAHYDRATE 40 MG/ML
4 INJECTION, SOLUTION INTRAVENOUS EVERY 4 HOURS PRN
Status: DISCONTINUED | OUTPATIENT
Start: 2019-10-01 | End: 2019-10-11 | Stop reason: HOSPADM

## 2019-10-01 RX ORDER — ONDANSETRON 2 MG/ML
INJECTION INTRAMUSCULAR; INTRAVENOUS PRN
Status: DISCONTINUED | OUTPATIENT
Start: 2019-10-01 | End: 2019-10-01

## 2019-10-01 RX ORDER — VANCOMYCIN HYDROCHLORIDE 1 G/20ML
INJECTION, POWDER, LYOPHILIZED, FOR SOLUTION INTRAVENOUS PRN
Status: DISCONTINUED | OUTPATIENT
Start: 2019-10-01 | End: 2019-10-01 | Stop reason: HOSPADM

## 2019-10-01 RX ORDER — BUPIVACAINE HYDROCHLORIDE AND EPINEPHRINE 2.5; 5 MG/ML; UG/ML
INJECTION, SOLUTION INFILTRATION; PERINEURAL PRN
Status: DISCONTINUED | OUTPATIENT
Start: 2019-10-01 | End: 2019-10-01 | Stop reason: HOSPADM

## 2019-10-01 RX ORDER — MAGNESIUM HYDROXIDE 1200 MG/15ML
LIQUID ORAL PRN
Status: DISCONTINUED | OUTPATIENT
Start: 2019-10-01 | End: 2019-10-01 | Stop reason: HOSPADM

## 2019-10-01 RX ORDER — LORAZEPAM 0.5 MG/1
0.5 TABLET ORAL EVERY 6 HOURS PRN
Status: DISCONTINUED | OUTPATIENT
Start: 2019-10-01 | End: 2019-10-02

## 2019-10-01 RX ORDER — LISINOPRIL 40 MG/1
40 TABLET ORAL EVERY MORNING
Status: DISCONTINUED | OUTPATIENT
Start: 2019-10-02 | End: 2019-10-01

## 2019-10-01 RX ORDER — ONDANSETRON 4 MG/1
4 TABLET, ORALLY DISINTEGRATING ORAL EVERY 6 HOURS PRN
Status: DISCONTINUED | OUTPATIENT
Start: 2019-10-01 | End: 2019-10-11 | Stop reason: HOSPADM

## 2019-10-01 RX ORDER — METOCLOPRAMIDE 10 MG/1
10 TABLET ORAL EVERY 6 HOURS PRN
Status: DISCONTINUED | OUTPATIENT
Start: 2019-10-01 | End: 2019-10-01

## 2019-10-01 RX ORDER — CEFAZOLIN SODIUM 1 G/3ML
1 INJECTION, POWDER, FOR SOLUTION INTRAMUSCULAR; INTRAVENOUS EVERY 8 HOURS
Status: COMPLETED | OUTPATIENT
Start: 2019-10-01 | End: 2019-10-02

## 2019-10-01 RX ORDER — DIPHENHYDRAMINE HCL 25 MG
25 CAPSULE ORAL EVERY 6 HOURS PRN
Status: DISCONTINUED | OUTPATIENT
Start: 2019-10-01 | End: 2019-10-11 | Stop reason: HOSPADM

## 2019-10-01 RX ORDER — OXYCODONE HYDROCHLORIDE 10 MG/1
10 TABLET ORAL EVERY 4 HOURS PRN
Status: DISCONTINUED | OUTPATIENT
Start: 2019-10-01 | End: 2019-10-01

## 2019-10-01 RX ORDER — GABAPENTIN 300 MG/1
900 CAPSULE ORAL 3 TIMES DAILY
Status: DISCONTINUED | OUTPATIENT
Start: 2019-10-01 | End: 2019-10-04

## 2019-10-01 RX ORDER — PROCHLORPERAZINE MALEATE 10 MG
10 TABLET ORAL EVERY 6 HOURS PRN
Status: DISCONTINUED | OUTPATIENT
Start: 2019-10-01 | End: 2019-10-01

## 2019-10-01 RX ORDER — FLUTICASONE PROPIONATE 50 MCG
1 SPRAY, SUSPENSION (ML) NASAL 2 TIMES DAILY
Status: DISCONTINUED | OUTPATIENT
Start: 2019-10-01 | End: 2019-10-11 | Stop reason: HOSPADM

## 2019-10-01 RX ORDER — PROPOFOL 10 MG/ML
25-150 INJECTION, EMULSION INTRAVENOUS CONTINUOUS
Status: DISCONTINUED | OUTPATIENT
Start: 2019-10-01 | End: 2019-10-01 | Stop reason: HOSPADM

## 2019-10-01 RX ORDER — FENTANYL CITRATE 50 UG/ML
25-50 INJECTION, SOLUTION INTRAMUSCULAR; INTRAVENOUS
Status: DISCONTINUED | OUTPATIENT
Start: 2019-10-01 | End: 2019-10-01 | Stop reason: HOSPADM

## 2019-10-01 RX ORDER — LIDOCAINE 40 MG/G
CREAM TOPICAL
Status: DISCONTINUED | OUTPATIENT
Start: 2019-10-01 | End: 2019-10-11 | Stop reason: HOSPADM

## 2019-10-01 RX ORDER — POTASSIUM CL/LIDO/0.9 % NACL 10MEQ/0.1L
10 INTRAVENOUS SOLUTION, PIGGYBACK (ML) INTRAVENOUS
Status: DISCONTINUED | OUTPATIENT
Start: 2019-10-01 | End: 2019-10-11 | Stop reason: HOSPADM

## 2019-10-01 RX ORDER — DEXTROSE MONOHYDRATE, SODIUM CHLORIDE, AND POTASSIUM CHLORIDE 50; 1.49; 4.5 G/1000ML; G/1000ML; G/1000ML
INJECTION, SOLUTION INTRAVENOUS CONTINUOUS
Status: DISCONTINUED | OUTPATIENT
Start: 2019-10-01 | End: 2019-10-03

## 2019-10-01 RX ORDER — PROCHLORPERAZINE 25 MG
25 SUPPOSITORY, RECTAL RECTAL EVERY 12 HOURS PRN
Status: DISCONTINUED | OUTPATIENT
Start: 2019-10-01 | End: 2019-10-11 | Stop reason: HOSPADM

## 2019-10-01 RX ORDER — HYDROMORPHONE HYDROCHLORIDE 1 MG/ML
.3-.5 INJECTION, SOLUTION INTRAMUSCULAR; INTRAVENOUS; SUBCUTANEOUS
Status: DISCONTINUED | OUTPATIENT
Start: 2019-10-01 | End: 2019-10-03

## 2019-10-01 RX ORDER — ONDANSETRON 2 MG/ML
4 INJECTION INTRAMUSCULAR; INTRAVENOUS EVERY 6 HOURS PRN
Status: DISCONTINUED | OUTPATIENT
Start: 2019-10-01 | End: 2019-10-11 | Stop reason: HOSPADM

## 2019-10-01 RX ORDER — ONDANSETRON 2 MG/ML
4 INJECTION INTRAMUSCULAR; INTRAVENOUS EVERY 30 MIN PRN
Status: DISCONTINUED | OUTPATIENT
Start: 2019-10-01 | End: 2019-10-01 | Stop reason: HOSPADM

## 2019-10-01 RX ORDER — ALBUMIN, HUMAN INJ 5% 5 %
SOLUTION INTRAVENOUS CONTINUOUS PRN
Status: DISCONTINUED | OUTPATIENT
Start: 2019-10-01 | End: 2019-10-01

## 2019-10-01 RX ORDER — METOCLOPRAMIDE HYDROCHLORIDE 5 MG/ML
10 INJECTION INTRAMUSCULAR; INTRAVENOUS EVERY 6 HOURS PRN
Status: DISCONTINUED | OUTPATIENT
Start: 2019-10-01 | End: 2019-10-01

## 2019-10-01 RX ORDER — AMOXICILLIN 250 MG
2 CAPSULE ORAL DAILY PRN
Status: DISCONTINUED | OUTPATIENT
Start: 2019-10-01 | End: 2019-10-11 | Stop reason: HOSPADM

## 2019-10-01 RX ORDER — DIPHENHYDRAMINE HCL 25 MG
25 CAPSULE ORAL EVERY 6 HOURS PRN
Status: DISCONTINUED | OUTPATIENT
Start: 2019-10-01 | End: 2019-10-01

## 2019-10-01 RX ORDER — ACETAMINOPHEN 325 MG/1
975 TABLET ORAL EVERY 8 HOURS
Status: DISCONTINUED | OUTPATIENT
Start: 2019-10-01 | End: 2019-10-02

## 2019-10-01 RX ORDER — ACETAMINOPHEN 325 MG/1
975 TABLET ORAL ONCE
Status: DISCONTINUED | OUTPATIENT
Start: 2019-10-01 | End: 2019-10-01 | Stop reason: HOSPADM

## 2019-10-01 RX ORDER — METOCLOPRAMIDE 10 MG/1
10 TABLET ORAL EVERY 6 HOURS PRN
Status: DISCONTINUED | OUTPATIENT
Start: 2019-10-01 | End: 2019-10-11 | Stop reason: HOSPADM

## 2019-10-01 RX ORDER — ESTRADIOL 0.1 MG/G
2 CREAM VAGINAL AT BEDTIME
Status: DISCONTINUED | OUTPATIENT
Start: 2019-10-01 | End: 2019-10-11 | Stop reason: HOSPADM

## 2019-10-01 RX ORDER — METHYLPREDNISOLONE ACETATE 80 MG/ML
INJECTION, SUSPENSION INTRA-ARTICULAR; INTRALESIONAL; INTRAMUSCULAR; SOFT TISSUE PRN
Status: DISCONTINUED | OUTPATIENT
Start: 2019-10-01 | End: 2019-10-01 | Stop reason: HOSPADM

## 2019-10-01 RX ORDER — DIPHENHYDRAMINE HYDROCHLORIDE 50 MG/ML
25 INJECTION INTRAMUSCULAR; INTRAVENOUS EVERY 6 HOURS PRN
Status: DISCONTINUED | OUTPATIENT
Start: 2019-10-01 | End: 2019-10-01

## 2019-10-01 RX ORDER — ONDANSETRON 4 MG/1
4 TABLET, ORALLY DISINTEGRATING ORAL EVERY 30 MIN PRN
Status: DISCONTINUED | OUTPATIENT
Start: 2019-10-01 | End: 2019-10-01 | Stop reason: HOSPADM

## 2019-10-01 RX ORDER — OXYCODONE HYDROCHLORIDE 5 MG/1
5-10 TABLET ORAL
Status: DISCONTINUED | OUTPATIENT
Start: 2019-10-01 | End: 2019-10-07

## 2019-10-01 RX ORDER — METOPROLOL TARTRATE 1 MG/ML
1-2 INJECTION, SOLUTION INTRAVENOUS EVERY 5 MIN PRN
Status: DISCONTINUED | OUTPATIENT
Start: 2019-10-01 | End: 2019-10-01 | Stop reason: HOSPADM

## 2019-10-01 RX ORDER — POTASSIUM CHLORIDE 750 MG/1
20-40 TABLET, EXTENDED RELEASE ORAL
Status: DISCONTINUED | OUTPATIENT
Start: 2019-10-01 | End: 2019-10-11 | Stop reason: HOSPADM

## 2019-10-01 RX ORDER — POTASSIUM CHLORIDE 29.8 MG/ML
20 INJECTION INTRAVENOUS
Status: DISCONTINUED | OUTPATIENT
Start: 2019-10-01 | End: 2019-10-11 | Stop reason: HOSPADM

## 2019-10-01 RX ORDER — METOCLOPRAMIDE HYDROCHLORIDE 5 MG/ML
10 INJECTION INTRAMUSCULAR; INTRAVENOUS EVERY 6 HOURS PRN
Status: DISCONTINUED | OUTPATIENT
Start: 2019-10-01 | End: 2019-10-11 | Stop reason: HOSPADM

## 2019-10-01 RX ORDER — MONTELUKAST SODIUM 10 MG/1
10 TABLET ORAL AT BEDTIME
Status: DISCONTINUED | OUTPATIENT
Start: 2019-10-01 | End: 2019-10-11 | Stop reason: HOSPADM

## 2019-10-01 RX ORDER — POLYETHYLENE GLYCOL 3350 17 G/17G
17 POWDER, FOR SOLUTION ORAL 2 TIMES DAILY PRN
Status: DISCONTINUED | OUTPATIENT
Start: 2019-10-01 | End: 2019-10-02

## 2019-10-01 RX ORDER — BISACODYL 10 MG
10 SUPPOSITORY, RECTAL RECTAL DAILY PRN
Status: DISCONTINUED | OUTPATIENT
Start: 2019-10-01 | End: 2019-10-11 | Stop reason: HOSPADM

## 2019-10-01 RX ORDER — NALOXONE HYDROCHLORIDE 0.4 MG/ML
.1-.4 INJECTION, SOLUTION INTRAMUSCULAR; INTRAVENOUS; SUBCUTANEOUS
Status: DISCONTINUED | OUTPATIENT
Start: 2019-10-01 | End: 2019-10-01

## 2019-10-01 RX ORDER — DOCUSATE SODIUM 100 MG/1
100 CAPSULE, LIQUID FILLED ORAL 2 TIMES DAILY
Status: DISCONTINUED | OUTPATIENT
Start: 2019-10-01 | End: 2019-10-02

## 2019-10-01 RX ADMIN — FENTANYL CITRATE 25 MCG: 50 INJECTION INTRAMUSCULAR; INTRAVENOUS at 18:46

## 2019-10-01 RX ADMIN — FENTANYL CITRATE 100 MCG: 50 INJECTION, SOLUTION INTRAMUSCULAR; INTRAVENOUS at 08:32

## 2019-10-01 RX ADMIN — PHENYLEPHRINE HYDROCHLORIDE 0.2 MCG/KG/MIN: 10 INJECTION INTRAVENOUS at 08:38

## 2019-10-01 RX ADMIN — SODIUM CHLORIDE, POTASSIUM CHLORIDE, SODIUM LACTATE AND CALCIUM CHLORIDE: 600; 310; 30; 20 INJECTION, SOLUTION INTRAVENOUS at 10:00

## 2019-10-01 RX ADMIN — Medication 100 MG: at 08:35

## 2019-10-01 RX ADMIN — HYDROMORPHONE HYDROCHLORIDE: 10 INJECTION, SOLUTION INTRAMUSCULAR; INTRAVENOUS; SUBCUTANEOUS at 20:58

## 2019-10-01 RX ADMIN — Medication 2 G: at 09:53

## 2019-10-01 RX ADMIN — SODIUM CHLORIDE 10 MG/KG/HR: 0.9 INJECTION, SOLUTION INTRAVENOUS at 09:53

## 2019-10-01 RX ADMIN — MIDAZOLAM 2 MG: 1 INJECTION INTRAMUSCULAR; INTRAVENOUS at 08:27

## 2019-10-01 RX ADMIN — FAMOTIDINE 20 MG: 20 INJECTION, SOLUTION INTRAVENOUS at 20:22

## 2019-10-01 RX ADMIN — LIDOCAINE HYDROCHLORIDE 1 MG/KG/HR: 20 INJECTION, SOLUTION INTRAVENOUS at 19:47

## 2019-10-01 RX ADMIN — PROPOFOL 100 MCG/KG/MIN: 10 INJECTION, EMULSION INTRAVENOUS at 08:38

## 2019-10-01 RX ADMIN — DIAZEPAM 2.5 MG: 5 INJECTION, SOLUTION INTRAMUSCULAR; INTRAVENOUS at 20:23

## 2019-10-01 RX ADMIN — CEFAZOLIN SODIUM 1 G: 2 INJECTION, SOLUTION INTRAVENOUS at 13:45

## 2019-10-01 RX ADMIN — KETAMINE HYDROCHLORIDE 10 MG/HR: 100 INJECTION, SOLUTION, CONCENTRATE INTRAMUSCULAR; INTRAVENOUS at 08:38

## 2019-10-01 RX ADMIN — PROPOFOL 60 MG: 10 INJECTION, EMULSION INTRAVENOUS at 09:26

## 2019-10-01 RX ADMIN — CEFAZOLIN SODIUM 2 G: 2 INJECTION, SOLUTION INTRAVENOUS at 09:45

## 2019-10-01 RX ADMIN — PROPOFOL 100 MG: 10 INJECTION, EMULSION INTRAVENOUS at 08:33

## 2019-10-01 RX ADMIN — LIDOCAINE HYDROCHLORIDE 1 MG/KG/HR: 20 INJECTION, SOLUTION INTRAVENOUS at 08:47

## 2019-10-01 RX ADMIN — PHENYLEPHRINE HYDROCHLORIDE: 10 INJECTION INTRAVENOUS at 12:32

## 2019-10-01 RX ADMIN — CEFAZOLIN SODIUM 1 G: 2 INJECTION, SOLUTION INTRAVENOUS at 15:45

## 2019-10-01 RX ADMIN — PHENYLEPHRINE HYDROCHLORIDE 100 MCG: 10 INJECTION INTRAVENOUS at 13:28

## 2019-10-01 RX ADMIN — PHENYLEPHRINE HYDROCHLORIDE: 10 INJECTION INTRAVENOUS at 17:09

## 2019-10-01 RX ADMIN — SODIUM CHLORIDE, POTASSIUM CHLORIDE, SODIUM LACTATE AND CALCIUM CHLORIDE: 600; 310; 30; 20 INJECTION, SOLUTION INTRAVENOUS at 08:27

## 2019-10-01 RX ADMIN — LIDOCAINE HYDROCHLORIDE 1 MG/KG/HR: 20 INJECTION, SOLUTION INTRAVENOUS at 21:13

## 2019-10-01 RX ADMIN — LIDOCAINE HYDROCHLORIDE 1 MG/KG/HR: 20 INJECTION, SOLUTION INTRAVENOUS at 19:45

## 2019-10-01 RX ADMIN — KETAMINE HYDROCHLORIDE 10 MG/HR: 50 INJECTION, SOLUTION INTRAMUSCULAR; INTRAVENOUS at 19:28

## 2019-10-01 RX ADMIN — SODIUM CHLORIDE, POTASSIUM CHLORIDE, SODIUM LACTATE AND CALCIUM CHLORIDE: 600; 310; 30; 20 INJECTION, SOLUTION INTRAVENOUS at 13:02

## 2019-10-01 RX ADMIN — ALBUMIN HUMAN: 0.05 INJECTION, SOLUTION INTRAVENOUS at 11:39

## 2019-10-01 RX ADMIN — DEXAMETHASONE SODIUM PHOSPHATE 4 MG: 4 INJECTION, SOLUTION INTRAMUSCULAR; INTRAVENOUS at 15:26

## 2019-10-01 RX ADMIN — ROCURONIUM BROMIDE 10 MG: 10 INJECTION INTRAVENOUS at 09:59

## 2019-10-01 RX ADMIN — CEFAZOLIN 1 G: 1 INJECTION, POWDER, FOR SOLUTION INTRAMUSCULAR; INTRAVENOUS at 23:32

## 2019-10-01 RX ADMIN — LIDOCAINE HYDROCHLORIDE 60 MG: 20 INJECTION, SOLUTION INFILTRATION; PERINEURAL at 08:32

## 2019-10-01 RX ADMIN — CYCLOBENZAPRINE HYDROCHLORIDE 10 MG: 5 TABLET, FILM COATED ORAL at 22:25

## 2019-10-01 RX ADMIN — SUFENTANIL CITRATE 0.2 MCG/KG/HR: 50 INJECTION EPIDURAL; INTRAVENOUS at 08:38

## 2019-10-01 RX ADMIN — SODIUM CHLORIDE, POTASSIUM CHLORIDE, SODIUM LACTATE AND CALCIUM CHLORIDE: 600; 310; 30; 20 INJECTION, SOLUTION INTRAVENOUS at 08:40

## 2019-10-01 RX ADMIN — SODIUM CHLORIDE, POTASSIUM CHLORIDE, SODIUM LACTATE AND CALCIUM CHLORIDE: 600; 310; 30; 20 INJECTION, SOLUTION INTRAVENOUS at 14:50

## 2019-10-01 RX ADMIN — HYDROMORPHONE HYDROCHLORIDE 0.5 MG: 1 INJECTION, SOLUTION INTRAMUSCULAR; INTRAVENOUS; SUBCUTANEOUS at 20:06

## 2019-10-01 RX ADMIN — ALBUMIN HUMAN: 0.05 INJECTION, SOLUTION INTRAVENOUS at 13:27

## 2019-10-01 RX ADMIN — LIDOCAINE HYDROCHLORIDE 1 MG/KG/HR: 20 INJECTION, SOLUTION INTRAVENOUS at 14:18

## 2019-10-01 RX ADMIN — PROPOFOL: 10 INJECTION, EMULSION INTRAVENOUS at 14:40

## 2019-10-01 RX ADMIN — CELECOXIB 200 MG: 200 CAPSULE ORAL at 07:38

## 2019-10-01 RX ADMIN — SODIUM CHLORIDE 2120 MG: 9 INJECTION, SOLUTION INTRAVENOUS at 08:40

## 2019-10-01 RX ADMIN — FENTANYL CITRATE 25 MCG: 50 INJECTION INTRAMUSCULAR; INTRAVENOUS at 18:26

## 2019-10-01 RX ADMIN — POTASSIUM CHLORIDE, DEXTROSE MONOHYDRATE AND SODIUM CHLORIDE: 150; 5; 450 INJECTION, SOLUTION INTRAVENOUS at 19:41

## 2019-10-01 RX ADMIN — PHENYLEPHRINE HYDROCHLORIDE: 10 INJECTION INTRAVENOUS at 14:43

## 2019-10-01 RX ADMIN — ONDANSETRON 4 MG: 2 INJECTION INTRAMUSCULAR; INTRAVENOUS at 15:49

## 2019-10-01 ASSESSMENT — ACTIVITIES OF DAILY LIVING (ADL)
COGNITION: 0 - NO COGNITION ISSUES REPORTED
FALL_HISTORY_WITHIN_LAST_SIX_MONTHS: YES
TOILETING: 0-->INDEPENDENT
AMBULATION: 0-->INDEPENDENT
NUMBER_OF_TIMES_PATIENT_HAS_FALLEN_WITHIN_LAST_SIX_MONTHS: 1
SWALLOWING: 0-->SWALLOWS FOODS/LIQUIDS WITHOUT DIFFICULTY
TRANSFERRING: 0-->INDEPENDENT
DRESS: 0-->INDEPENDENT
RETIRED_COMMUNICATION: 0-->UNDERSTANDS/COMMUNICATES WITHOUT DIFFICULTY
RETIRED_EATING: 0-->INDEPENDENT
BATHING: 0-->INDEPENDENT

## 2019-10-01 ASSESSMENT — PAIN DESCRIPTION - DESCRIPTORS
DESCRIPTORS: SPASM
DESCRIPTORS: SPASM

## 2019-10-01 ASSESSMENT — MIFFLIN-ST. JEOR: SCORE: 1223.25

## 2019-10-01 NOTE — OR NURSING
PACU to Inpatient Nursing Handoff    Patient Aleena Ontiveros is a 59 year old female who speaks English.   Procedure Procedure(s):  Transforaminal Lumbar Interbody Fusion Three Column Osteotomy L5-S1, Posterior Spinal Fusion L2-Pelvis. Use of BMP bone graft   Surgeon(s) Primary: Evangelist Vasquez MD  Assisting: Halima Mike MD; Johnnie Mcdonald PA-C; Lam Terry MD     Allergies   Allergen Reactions     Adhesive Tape      Erythromycin Nausea and Vomiting     Pills cause vomiting,        Isolation  [unfilled]     Past Medical History   has a past medical history of Arthritis, Asthma, Hypertension, PONV (postoperative nausea and vomiting), Thyroid disease, and Uncomplicated asthma. She also has no past medical history of Difficult intubation, Malignant hyperthermia, or Spinal headache.    Anesthesia General   Dermatome Level     Preop Meds celecoxib (Celebrex) - time given: 0738   Nerve block Not applicable   Intraop Meds dexamethasone (Decadron)  fentanyl (Sublimaze): 100 mcg total  ketamine (Ketalar): 70.5 mg given  ondansetron (Zofran): last given at 1548  Sufentastopped at 1548, mg sulfate 2g at 0953, lidocain drip, tranexamic acid, phenylephrine   Local Meds Yes   Antibiotics cefazolin (Ancef) - last given at 1545     Pain Patient Currently in Pain: sleeping: patient not able to self report   PACU meds  ketamine, lidocaine continous   PCA / epidural No   Capnography Respiratory Monitoring (EtCO2): 0 mmHg   Telemetry ECG Rhythm: Sinus rhythm   Inpatient Telemetry Monitor Ordered? Yes        Labs Glucose Lab Results   Component Value Date     10/01/2019    GLC 99 10/01/2019       Hgb Lab Results   Component Value Date    HGB 10.1 10/01/2019    HGB 10.0 10/01/2019       INR Lab Results   Component Value Date    INR 1.11 10/01/2019      PACU Imaging Not applicable     Wound/Incision Incision/Surgical Site 08/10/17 Right Knee (Active)   Number of days: 782        Incision/Surgical Site 12/13/17 Left Knee (Active)   Number of days: 657       Incision/Surgical Site 10/01/19 Posterior;Midline Back (Active)   Incision Assessment [facility] 10/1/2019  4:28 PM   Closure Liquid bandage 10/1/2019  4:28 PM   Dressing Intervention Clean, dry, intact;New dressing applied 10/1/2019  4:28 PM   Number of days: 0      CMS        Equipment ice pack   Other LDA       IV Access Peripheral IV 10/01/19 Right Hand (Active)   Site Assessment [facility] 10/1/2019  5:00 PM   Line Status Saline locked 10/1/2019  5:00 PM   Phlebitis Scale 0-->no symptoms 10/1/2019  5:00 PM   Infiltration Scale 0 10/1/2019  5:00 PM   Infiltration Site Treatment Method  None 10/1/2019  5:00 PM   Extravasation? No 10/1/2019  5:00 PM   Dressing Intervention New dressing  10/1/2019  7:58 AM   Number of days: 0       Peripheral IV 10/01/19 Right Hand (Active)   Site Assessment [facility] 10/1/2019  5:00 PM   Line Status Saline locked 10/1/2019  5:00 PM   Phlebitis Scale 0-->no symptoms 10/1/2019  5:00 PM   Infiltration Scale 0 10/1/2019  5:00 PM   Infiltration Site Treatment Method  None 10/1/2019  5:00 PM   Extravasation? No 10/1/2019  5:00 PM   Number of days: 0       Arterial Line 10/01/19 Radial (Active)   Site Assessment [facility] 10/1/2019  5:00 PM   Line Status Positional 10/1/2019  5:00 PM   Art Line Waveform Appropriate 10/1/2019  5:00 PM   Color/Movement/Sensation Capillary refill less than 3 sec 10/1/2019  5:00 PM   Dressing Type Transparent 10/1/2019  5:00 PM   Dressing Status Clean, dry, intact 10/1/2019  5:00 PM   Number of days: 0      Blood Products Cell saver  Albumin  mL   Intake/Output Date 10/01/19 0700 - 10/02/19 0659   Shift 4350-1488 4882-7190 8365-2653 24 Hour Total   INTAKE   I.V. 3000 1350  4350   Other 130 165  295   Colloid 500   500   Shift Total(mL/kg) 3630(51.49) 1515(21.49)  5145(72.98)   OUTPUT   Urine 1350 250  1600   Blood 350 350  700   Shift Total(mL/kg) 1700(24.11) 600(8.51)  2300(32.62)   Weight (kg)  70.5 70.5 70.5 70.5      Drains / [place] Closed/Suction Drain 1 Right Back Accordion 10 Micronesian (Active)   Drainage Appearance Serosanguenous 10/1/2019  5:00 PM   Status Other (Comment) 10/1/2019  5:00 PM   Number of days: 0       Urethral Catheter Latex 16 fr (Active)   Collection Container Standard 10/1/2019  5:30 PM   Securement Method Securing device (Describe) 10/1/2019  5:30 PM   Rationale for Continued Use Anesthesia 10/1/2019  5:30 PM   Number of days: 0      Time of void PreOp Void Prior to Procedure: 0810 (10/01/19 0822)    PostOp      Diapered? No   Bladder Scan     PO    x     Vitals    B/P: 112/77  T: 97.1  F (36.2  C)    Temp src: Axillary  P:  Pulse: 90 (10/01/19 1745)    Heart Rate: 82 (10/01/19 1745)     R: 23  O2:  SpO2: 100 %    O2 Device: Simple face mask (10/01/19 1745)    Oxygen Delivery: 10 LPM (10/01/19 1745)         Family/support present significant other   Patient belongings     Patient transported on bed   DC meds/scripts (obs/outpt) Not applicable   Inpatient Pain Meds Released? Yes       Special needs/considerations casandra positional   Tasks needing completion None       Darcy Yancey RN  [facility]COM [id number]

## 2019-10-01 NOTE — ANESTHESIA PREPROCEDURE EVALUATION
Anesthesia Pre-Procedure Evaluation    Patient: Aleena Ontiveros   MRN:     [id number] Gender:   female   Age:    59 year old :      1960        Preoperative Diagnosis: Spondylolisthesis; Sagittal Malalignment   Procedure(s):  Transforaminal Lumbar Interbody Fusion-Romero Castillo Osteotomy Lumbar 4-5,Lumbar 5-Sacral 1; Reduction Of High-Grade Spondylolisthesis Lumbar 5-Sacral 1; Distal Extension Of Thoracic 4-Lumbar 4 Fusion And Fixation Down To Pelvis     Past Medical History:   Diagnosis Date     Arthritis     osteoarthritis of both knee     Asthma      Hypertension      PONV (postoperative nausea and vomiting)      Thyroid disease      Uncomplicated asthma       Past Surgical History:   Procedure Laterality Date     ABDOMEN SURGERY  ,         ARTHROPLASTY KNEE Right 8/10/2017    Procedure: ARTHROPLASTY KNEE;  RIGHT TOTAL KNEE ARTHROPLASTY ;  Surgeon: Grady Alvarez MD;  Location:  OR     ARTHROPLASTY KNEE Left 2017    Procedure: ARTHROPLASTY KNEE;  LEFT TOTAL KNEE ARTHROPLASTY;  Surgeon: Grady Alvarez MD;  Location:  OR     BACK SURGERY      spinal fusion     ENT SURGERY      tonsilectomy     GYN SURGERY  13    hysterectomy     ORTHOPEDIC SURGERY      sinal fusion,hand     thumb mass resection       THYROIDECTOMY  2014    Procedure: THYROIDECTOMY;  Surgeon: Krzysztof Marquez MD;  Location: [facility]          Anesthesia Evaluation     .             ROS/MED HX    ENT/Pulmonary:     (+)Mild Persistent asthma Treatment: Inhaler daily,  , . .    Neurologic:       Cardiovascular:     (+) hypertension----. : . . . :. .       METS/Exercise Tolerance:     Hematologic:  - neg hematologic  ROS       Musculoskeletal: Comment: Thoracolumbar and kyphosis with scoliosis and spondylolisthesis  (+) arthritis,  -       GI/Hepatic:  - neg GI/hepatic ROS       Renal/Genitourinary:  - ROS Renal section negative       Endo:     (+) thyroid problem .      Psychiatric:  - neg  "psychiatric ROS       Infectious Disease:  - neg infectious disease ROS       Malignancy:         Other:                         PHYSICAL EXAM:   Mental Status/Neuro: A/A/O   Airway: Facies: Challenging (no neck extension beyond neutral)  Mallampati: II  Mouth/Opening: Full  TM distance: > 6 cm  Neck ROM: Immobile   Respiratory: Auscultation: CTAB     Resp. Rate: Normal     Resp. Effort: Normal      CV: Rhythm: Regular  Rate: Age appropriate  Heart: Normal Sounds  Edema: None   Comments:      Dental: Normal Dentition                LABS:  CBC:   Lab Results   Component Value Date    WBC 5.6 09/17/2019    HGB 14.7 09/17/2019    HGB 11.3 (L) 12/21/2017    HCT 44.5 09/17/2019     09/17/2019     12/16/2017     BMP:   Lab Results   Component Value Date     09/17/2019     12/21/2017    POTASSIUM 4.0 09/17/2019    POTASSIUM 4.0 12/21/2017    CHLORIDE 100 09/17/2019    CHLORIDE 104 12/21/2017    CO2 30 09/17/2019    CO2 24 12/21/2017    BUN 11 09/17/2019    BUN 11 12/21/2017    CR 0.63 09/17/2019    CR 0.56 12/21/2017    GLC 88 09/17/2019     (H) 12/21/2017     COAGS: No results found for: PTT, INR, FIBR  POC:   Lab Results   Component Value Date     (H) 12/14/2017    HCG Negative 11/17/2009     OTHER:   Lab Results   Component Value Date    SIXTO 9.3 09/17/2019        Preop Vitals    BP Readings from Last 3 Encounters:   10/01/19 (!) 157/97   09/17/19 133/87   09/09/19 (!) 145/97    Pulse Readings from Last 3 Encounters:   09/17/19 83   09/09/19 88   08/27/19 79      Resp Readings from Last 3 Encounters:   10/01/19 12   09/17/19 16   09/09/19 16    SpO2 Readings from Last 3 Encounters:   10/01/19 99%   09/17/19 98%   09/09/19 96%      Temp Readings from Last 1 Encounters:   10/01/19 36.7  C (98.1  F) (Oral)    Ht Readings from Last 1 Encounters:   10/01/19 1.575 m (5' 2\")      Wt Readings from Last 1 Encounters:   10/01/19 70.5 kg (155 lb 6.8 oz)    Estimated body mass index is 28.43 " "kg/m  as calculated from the following:    Height as of this encounter: 1.575 m (5' 2\").    Weight as of this encounter: 70.5 kg (155 lb 6.8 oz).     LDA:        Assessment:   ASA SCORE: 2    H&P: History and physical reviewed and following examination; no interval change.   Smoking Status:  Non-Smoker/Unknown   NPO Status: NPO Appropriate     Plan:   Anes. Type:  General   Pre-Medication: Gabapentin; Acetaminophen; NSAID   Induction:  IV (Standard)   Airway: ETT; Oral   Access/Monitoring: PIV; 2nd PIV; A-Line; FloTrac; CVL   Maintenance: Balanced     Drips/Meds: Ketamine; Sufentanil; Phenylephrine     Postop Plan:   Postop Pain: Opioids; NSAID; Ketamine  Postop Sedation/Airway: Not planned  Disposition: Inpatient/Admit     PONV Management:   Adult Risk Factors: Female, H/o PONV or Motion Sickness, Non-Smoker, Postop Opioids   Prevention: Ondansetron, Dexamethasone     CONSENT: Direct conversation   Plan and risks discussed with: Patient; Spouse; Other   Blood Products: Consented (ALL Blood Products)       Comments for Plan/Consent:  Discussed risk of POVL.                 Chyna Delacruz MD  "

## 2019-10-01 NOTE — LETTER
Transition Communication Hand-off for Care Transitions to Next Level of Care Provider    Name: Aleena Ontiveros  : 1960  MRN #: [id number]  Primary Care Provider: Arabella Conner     Primary Clinic: [facility] [address]  [place] [address]     Reason for Hospitalization:  Spondylolisthesis; Sagittal Malalignment  Postoperative pain after spinal surgery  Admit Date/Time: 10/1/2019  6:23 AM  Discharge Date:10/11/19  Payor Source: Payor: MEDICA / Plan: MEDICA VANTAGEPLUS FULLY INSURED / Product Type: HMO /            Reason for Communication Hand-off Referral: Other discharge plan    Discharge Plan:  [facility] on [facility] [phone] or [phone] Will be followed by Anisha Dunbar NP     Concern for non-adherence with plan of care:   Y/N N  Discharge Needs Assessment:  Needs      Most Recent Value   Anticipated Changes Related to Illness  inability to care for self   Equipment Currently Used at Home  -- [uses walking stick outside of house]          Follow-up plan:    Future Appointments   Date Time Provider Department Center   10/11/2019  1:30 PM Dari Mack, PT [facility] [place]   10/12/2019  6:30 AM UR PT OVERFLOW [facility] [place]   10/12/2019  9:45 AM Amira Cook, OT [facility] [place]   10/12/2019  6:30 PM UR PT OVERFLOW [facility] [place]   2019  9:45 AM Evangelist Vasquez MD [facility]   2019 10:45 AM Evangelist Vasquez MD [facility]       Any outstanding tests or procedures:        Referrals     Future Labs/Procedures    Occupational Therapy Adult Consult     Comments:    Evaluate and treat as clinically indicated.    Reason:  Spinal fusion    Physical Therapy Adult Consult     Comments:    Evaluate and treat as clinically indicated.    Reason:  Spinal fusion          ADAMARIS Newberry  8a/10a ortho/med/surg and Adult [facility]    [phone]  [email or web address]    AVS/Discharge Summary is the source of truth; this is a helpful guide for improved  communication of patient story

## 2019-10-01 NOTE — PROGRESS NOTES
I met with the patient prior to surgery today, and also had called her by phone last night to introduce myself and answer her questions.    I explained my role assisting Dr. Vasquez in surgery.     The patient had previously seen my partner Dr. Abbe Cameron who was scheduled to assist with her surgery, but the patient had worsening pain and wanted her procedure to be done sooner, which Dr. Cameron was not able to do, therefore Dr. Vasquez asked me to assist him. Due to the patient's increasing pain and wish to have surgery sooner, I accommodated this request to do surgery today, however I was previously scheduled to be out of office starting tomorrow.    I explained to the patient last evening that I am scheduled to be out of office starting tomorrow and that I will not be available to see her postoperatively, but that Dr. Vasquez will be caring for her as usual with all of our partners available to assist if any assistance is needed.    The patient stated understanding of this. I explained risks inherent to this particular surgery, including nerve root injury, permanent pain weakness or numbness, vascular injury potentially requiring intervention, and cerebrospinal fluid leakage with potential need for further intervention or procedures if recurrent. The patient asked a number of questions last evening, and I gave her another opportunity this morning to ask any further questions. She stated that I had explained the questions to her satisfaction and understanding.      Hlaima Mike MD    [facility] Department of Neurosurgery  Office: [phone]    10/1/2019  9:09 AM

## 2019-10-01 NOTE — PROGRESS NOTES
Attestation:     I, Mariah Arenas, [facility] staff , have reviewed and edited the following  student s note on 10/1/2019 at 5:38 PM.           SPIRITUAL HEALTH SERVICES  SPIRITUAL ASSESSMENT Progress Note  [facility] ([facility]) OR Pool Room/ OR Bed  ON-CALL VISIT    REFERRAL SOURCE: Pt request during admission     During my on call today, I attempted to visit but was unable to connect with patient as she was already in the OR.    PLAN: Refer to on-call or unit  for follow-up tomorrow.    Kathryn Early  Chaplain Resident  Pager [phone]

## 2019-10-01 NOTE — OP NOTE
Date of Service:  10/1/2019       Surgeon:  Evangelist Vasquez MD   Co-Surgeons:    1.  Halima Mike MD, Neurosurgery - for entire case.  Co-surgery was justified and warranted given case complexity and anticipated difficult surgery.    2.  Lam Terry Jr., MD, Orthopedic Spine - for the osteotomy and deformity correction/reduction.  Dr. Terry's expertise in advanced deformity surgery was necessary for this very complex deformity case.  Assistant:  Johnnie Mcdonald PA-C.  No resident available.  Mr. Mcdonald assisted in all parts of the procedure, including positioning, exposure, performing the surgical procedure, and closure.      Preoperative Diagnosis:     1.  Severe spinal sagittal malalignment, with PI-LL mismatch = 64; lower lumbar hypolordosis (L4-S1 = 16, ideal 51); severe compensatory pelvic retroversion (PT = 57); SVA = +10.4cm.  2.  High grade (Gr 4) isthmic spondylolisthesis L5-S1, with lumbosacral kyphosis.  3.  Subjacent level degenerative spondylolisthesis with stenosis L4-5.  4.  Severe foraminal and subarticular stenosis L5-S1.  5.  Posterior pseudarthrosis L3-4.  6.  Chronic radiculopathies bilateral L5/S1 (shown on EMG 5/30/19).  7.  43 yrs s/p PISF T4-L4 using for AIS (Dr. Rose, [facility]), with solid arthrodesis and retained Carpio rods x 2.      Postoperative Diagnosis:     Same      Procedures:   1.  3-column osteotomy L5-S1, with sacral dome osteotomy.  2.  Transforaminal lumbar interbody fusion (TLIF) via bilateral facetectomies and diskectomies L5-S1.  3.  Use of local autograft for interbody fusion L5-S1.  4.  Cunningham laminectomy with bilateral complete facetectomies and foraminotomies L5-S1, with decompression of bilateral exiting L5 and traversing S1 nerve roots.  5.  Bilateral posterior segmental pedicle screw fixation L2-sacrum.  6.  Bilateral pelvic fixation using bilateral stacked (double) S2AI screws.  7.  Posterior spinal fusion (bilateral interlaminar) L2-S1, using  Infuse BMP (Large kit) and allograft (pseudarthrosis repair at L3-4; fusion augmentation at L2-3; de toshia fusion at L4-S1).  8.  Placement and subsequent removal of Rogers-Bronx cranial tongs for operative positioning.  9. Computer navigation using O-arm and Stealth.     10.  Intraoperative neuro-monitoring using SSEPs, tcMEPs, bilateral LE free-running EMG's, and direct EMG nerve root stimulation ([employer]).      A -22 modifier is justified for use in this case given severe case complexity due to previous fusion with altered anatomy; high grade lumbosacral spondylolisthesis Grade 4 with abundant osteophyte formation and altered anatomy; very small lumbar pedicles; and very rigid complex deformity.  All these factors necessitated significant extra time and effort > 75% usual in performing the entire procedure.  Anesthesia:  General endotracheal.   Local anesthetic:  0.25% marcaine + epinephrine = 60 mL.  EBL:  700 mL (cellsaver return 295 mL).  Complications:  None apparent.   Table position change prior to gavino placement:  N/A.  Implants / Equipment used:   1.  Medtronic Solera screw system: L2 =5.5 x 50 mm right, 5.5 x 50 mm dual headed screw left; L3 =5.5 x 55 mm right, 5.5 x 50 mm left; L4 =6.5 x 50 mm reduction screws bilateral; L5 non-; S1 =7.5 x 30 mm right, 7.5 x 35 mm left (both screws subsequently removed); S2 AI =8.5 x 18 mm upper right, 9.5 x 90 mm lower right, 8.5 x 80 mm upper left, 9.5 x 100 mm lower left.  3 5.5 mm cobalt chrome rods: all rods spanned from L2-pelvis.  2.  Infuse BMP Large kit.  3.  Crushed cancellous allograft 60 mL.  4.  TXA 30 mg/kg LD then 10 mg/kg/hr.  5.  Vancomycin powder 2 gm deep.  6.  Aquamantys with 6 mm handpiece.      Indications:  59 year old female with previous remote fusion from T4-L4 using Carpio rods in her teens for adolescent idiopathic scoliosis.  She has since developed high-grade lumbosacral spondylolisthesis with lumbosacral kyphosis.  She has had on and  off back pain for many years, but generally was able to tolerate her symptoms until recent years.  She presented with primary complaint of low back pain radiating to her right buttock, hip and leg.  Imaging revealed high-grade (grade 4) spondylolisthesis L5-S1 with significant bridging and marginal osteophytes, thus making the slippage very rigid.  She has both lumbar and thoracic flat back, owing to previous fusion.  Also with multilevel advanced cervical spondylosis, stenosis and kyphosis.  However she did not present with cervical symptoms of neck pain, radiculopathy nor myelopathy.  Tried nonoperative treatment, continues to have significant disabling symptoms.  I thus offered surgery in form of extension of previous fusion down to the pelvis with 3 column osteotomy and decompression L5-S1 possible L4-5.  Consented after thorough discussion of the rationale, risks, benefits and alternatives.  Discussed bone graft options; agreed to use of infuse BMP, local autograft and allograft.      Details:  Properly identified in preop area, site marked, informed consent signed.  Wheeled to OR.  Brief earlier performed.  General anesthesia administered.  Covington inserted.  Antibiotic given: Ancef 2 gm IV.  TXA started.  Rogers-Wells cranial tongs placed.  Positioned prone on Trios table.  In-line traction weight applied 10 pounds.  Lumbar region squared off, prepped with chlorhexedine, alcohol, ChloraPrep and draped in sterile fashion. Timeout performed.  Dr. Mike and I were both present for the timeout; Dr. Terry joined later in the case.    Revision midline skin incision made, extended down to pelvis; bilateral subperiosteal exposure out to transverse processes.  Hemostasis using Aquamantys bipolar sealant.  Also exposed distal portion of Carpio instrumentation to the right of midline, partially covered with bony overgrowth.  Presence of previous instrumentation at known levels used for localization, verified by two  attending surgeons (Dr. Mike and myself); thus, radiologist confirmation not necessary.  Spinous process clamp placed at L2.  3D scan obtained L2-pelvis.    Pedicle screws placed bilateral L2-S1.  Lori used to create  hole; starting point identified using landmarks and denia probe.  Track created using denia drill with 40 mm stop.  Stealth used to select screw size.  Undertapped by 1 size using denia tap.  Screw inserted using denia .  All pedicles noted to be small.  Bony purchase not very good owing to both the small pedicle size, as well as chronic off-loading of the fused segments.  At bilateral L5, noted very difficult screw placement because of severe trajectory angle and tiny pedicles; we drilled and tapped our tracks but did not place screws yet.  (We ended up not placing L5 screws, because there was deemed no space for the screw heads).  At bilateral S1, screw placement was also challenging, as we anticipated need for S1 dome osteotomy and tried placing the screws on the lower half of the S1 pedicles; however, the S1 anatomy was also abnormal and very challenging.  We placed short wide-diameter screws into bilateral S1.  (These were later removed when placing rods, as both screws tended to plow out of the S1 pedicle/body and getting into contact with the exiting L5 nerve roots).     We placed bilateral stacked (double) S2AI screws.  Starting point and initial track created using denia awl.  Track deepened past SI joint using denia drill with 90 mm stop.  Used power to tap down to depth of screw, 6.5 mm and 9.5/8.5 mm.  Screw placed using power denia .  It was during this portion that Dr. Terry joined us in the case.  We were having some difficulty placing the right upper S2AI screw as it was starting to hit the previously placed lower screws.  We redirected the upper right S2AI screw more superiorly.  After all screws in place, check scan showed acceptable placement of all screws.  The left L5 screw  track (no screw) was noted to have skived off the superior endplate and heading into the disc space.  The right upper S2AI screw was noted to have partial purchase, with the distal third of the screw medial to the very thin ilium.  This was deemed, however, to be acceptable, as the distal part of the screw is buried in iliacus muscle away from neurovascular structures, and the screw was still giving some purchase.     L5 Cunningham laminectomy performed .  Identified bilateral L5 pars defects.  Mobile posterior elements (spinous process and lamina) removed; bone saved for grafting.  Bilateral complete facetectomies performed L5-S1.  Noted severe foraminal stenosis bilaterally.  Identified bilateral exiting L5 nerve roots; complete unroofed the foramen using kerrison, taking care to protect the nerve roots.  Direct EMG stimulation of nerves performed.  Bilateral rectangular annulotomies and diskectomies performed.  Osteotomes used to find and develop the disc space.  Noted the space to be very stiff likely from surrounding bridging osteophytes; we continued to incrementally perform intradiscal release using blunt paddle distractors; performed bilaterally.  Medial and lateral posterior annulus further released using Aileen curettes.  Sacral dome osteotomy performed using osteotomes.  This widened our access into the lumbosacral disc, and allowed more efficient takedown of the osteophytes using osteotomes and blunt paddle distractors.  Used posterior vertebral body wall impactor to complete our osteotomy; bone saved for grafting.  We continued with gradual releases until we noted motion across the lumbosacral junction.  This completed our 3-column osteotomy.  Palpated canal using Ji probe to ensure adequate decompression.      At this point, we noted epidural scar tissue around the exposed dura L5-S1 level that would likely cause dural impingement once we obtain correction.  We carefully and gradually took down this  scar tissue, teasing it off the dura, and decompressing the neural elements.  Partial laminectomy of L4 performed.  Confirmed adequate decompression using Sierra probe.    Rods measured, cut from single long 5.5 mm CoCr gavino, and contoured manually using Sudanese patton.  Left main gavino seated, spanning from L2 to the upper S2AI screw.  Used L4 reduction screw to achieve some translational reduction.  Obtained O-arm lateral image, which showed modest lordosis correction.  The slippage reduced a little, but not enough to have bony endplate for cages to rest on.  We decided not to place L5-S1 interbody cages.  We packed the disc space with approx 15 mL of morcellized local autograft.      Right gavino seated; set screws applied.  Again used L4 reduction screw to achieve some translational reduction.  At this point we noted that the right side had translated more than the left, such that there is some torsional effect on the spine.  We removed the left gavino, re-contoured the gavino, and seated it again; placed set screws.  This gave additional translational correction, and spine now looks more symmetric.  Final AP-lat stitched images showed good alignment and implant placement.  Third gavino placed on the left side, spanning the L2 dual head down to the lower S2AI screw.    Irrigation.  Posterior fusion performed L2-S1.  Lamina decorticated using saeed.  Infuse BMP placed over decorticated laminae.  A large kit was earlier prepared; this contained 6 sponges.  Three sponges placed across L5-S1; one each across L3-4 and L4-5 on the right side.  Crushed cancellous allograft placed over the laminar bed.    Deep medium Hemovac drain placed.  Vanco powder 2 gm applied deep.  Layered closure: #1 vicryl popoffs for deep fascia, running 0 vicryl for deep subq, 2-0 Vicryl for subq and 3-0 Monocryl.  Exofin and sterile dressings applied.  Injected anesthetic.  Turned supine, taken off general anesthetic, Ken-Nilo tongs removed,  transferred to PACU in satisfactory condition.       Postop:  PACU stabilization unit overnight for observation then transfer to surgical floor.  IV lidocaine 1 mg/kg/hr while in Stabilization unit.  Antibiotics x 24 hrs.  Mechanical DVT prophylaxis.  Orthotics consult for Kingston brace, to be worn when out of bed x 3 mos.  Hospitalist medical co-management.  PT and OT consult.  ADAT.  No lifting more than 10 pounds, no excessive bending or twisting.  Full AP-lat standing x-rays (incl femoral heads) prior to discharge.  Anticipate discharge in 4-5 days.  RTC 6 weeks with EOS full-spine AP-lat x-rays.

## 2019-10-01 NOTE — ANESTHESIA PROCEDURE NOTES
Central Line Procedure Note  Staff:     Anesthesiologist:  Chyna Delacruz MD  Location: In OR after induction  Procedure Start/Stop Times:     patient identified, IV checked, site marked, risks and benefits discussed, informed consent, monitors and equipment checked, pre-op evaluation and at physician/surgeon's request    Line Placement:     Procedure:  Central Line    Insertion laterality:  Right    Insertion site:  Internal Jugular    Position:  Trendelenburg      Maximal Sterile Barriers: All elements of maximal sterile barrier technique followed      (Maximal sterile barriers include:   Sterile gown, Sterile Gloves, Mask, Cap, Whole body draped, hand hygiene and acceptable skin prep).Skin Prep: Chloraprep         Injection Technique:  Ultrasound guided and Seldinger Technique    Sterile Ultrasound Technique:  Sterile probe cover and Sterile gel    Vein evaluated via U/S for patency/adequacy of catheter insertion and is adequate.  Using realtime U/S imaging the vein was punctured, and needle was observed entering vein on U/S      Permanent Image entered into patient's record      Local skin infiltration:  None    Catheter size:  7 Fr, 3 lumen, 20 cm    Cath secured with: suture      Dressing:  Biopatch and Tegaderm    Complications:  None obvious    Blood aspirated all lumens: Yes      All Lumens Flushed: Yes      Verification method:  Placement to be verified post-op

## 2019-10-01 NOTE — H&P
[facility]  HISTORY AND PHYSICAL   Chief Complaint       History of Present Illness       Aleena Ontiveros is 59 year old year old female with hx of hypertension, asthma, hypothyroidism and adolescent idiopathic scoliosis s/p  PISF T4-L4 with solid arthrodesis and retained Carpio rods x2 (at age 14) is admitted to stabilization unit s/p Transforaminal Lumbar Interbody Fusion Three Column Osteotomy L5-S1, Posterior Spinal Fusion L2-Pelvis, use of  BMP bone graft on 10/01.    ml, 295 ml returned via cell saver.     Patient just came out of the OR. She is moving all extremities and having pain on the left side.            Past Medical History     Past Medical History:   Diagnosis Date     Arthritis     osteoarthritis of both knee     Asthma      Hypertension      PONV (postoperative nausea and vomiting)      Thyroid disease      Uncomplicated asthma          Past Surgical History     Past Surgical History:   Procedure Laterality Date     ABDOMEN SURGERY  ,         ARTHROPLASTY KNEE Right 8/10/2017    Procedure: ARTHROPLASTY KNEE;  RIGHT TOTAL KNEE ARTHROPLASTY ;  Surgeon: Grady Alvarez MD;  Location:  OR     ARTHROPLASTY KNEE Left 2017    Procedure: ARTHROPLASTY KNEE;  LEFT TOTAL KNEE ARTHROPLASTY;  Surgeon: Grady Alvarez MD;  Location:  OR     BACK SURGERY      spinal fusion     ENT SURGERY      tonsilectomy     GYN SURGERY  11    hysterectomy     ORTHOPEDIC SURGERY      sinal fusion,hand     thumb mass resection       THYROIDECTOMY  2014    Procedure: THYROIDECTOMY;  Surgeon: Krzysztof Marquez MD;  Location: [facility]        Social History     Social History     Socioeconomic History     Marital status:      Spouse name: Not on file     Number of children: Not on file     Years of education: Not on file     Highest education level: Not on file   Occupational History     Not on file   Social Needs     Financial resource  strain: Not on file     Food insecurity:     Worry: Not on file     Inability: Not on file     Transportation needs:     Medical: Not on file     Non-medical: Not on file   Tobacco Use     Smoking status: Never Smoker     Smokeless tobacco: Never Used   Substance and Sexual Activity     Alcohol use: Yes     Binge frequency: Patient refused     Comment: 1-2 per month     Drug use: No     Sexual activity: Not on file   Lifestyle     Physical activity:     Days per week: Not on file     Minutes per session: Not on file     Stress: Not on file   Relationships     Social connections:     Talks on phone: Not on file     Gets together: Not on file     Attends [employer] service: Not on file     Active member of club or organization: Not on file     Attends meetings of clubs or organizations: Not on file     Relationship status: Not on file     Intimate partner violence:     Fear of current or ex partner: Not on file     Emotionally abused: Not on file     Physically abused: Not on file     Forced sexual activity: Not on file   Other Topics Concern     Parent/sibling w/ CABG, MI or angioplasty before 65F 55M? Not Asked   Social History Narrative     Not on file      Reviewed no pertinent addition      Family History     Family History   Problem Relation Age of Onset     Breast Cancer Mother      Cancer - colorectal Father      C.A.D. Father      Cerebrovascular Disease Father      Thyroid Disease Brother      Thyroid Disease Sister      Reviewed no pertinent addition    Medications     Reviewed and medication reconciliation done.  Current Facility-Administered Medications   Medication     [START ON 10/4/2019] acetaminophen (TYLENOL) tablet 650 mg     acetaminophen (TYLENOL) tablet 975 mg     bupivacaine 0.25 % - EPINEPHrine 1:200,000 injection     diphenhydrAMINE (BENADRYL) capsule 25 mg    Or     diphenhydrAMINE (BENADRYL) injection 25 mg     fentaNYL (PF) (SUBLIMAZE) injection 25-50 mcg     hemostatic matrix with thrombin  "(SURGIFLO) kit     HYDROmorphone (PF) (DILAUDID) injection 0.3-0.5 mg     ketamine (KETALAR) 100 mg in sodium chloride 0.9 % 50 mL infusion     ketamine (KETALAR) 2 mg/mL in sodium chloride 0.9 % 50 mL ANALGESIA infusion     ketamine (KETALAR) 2 mg/mL in sodium chloride 0.9 % 50 mL ANALGESIA infusion     lactated ringers infusion     lidocaine 400 mg in D5W 50 ml (ADULT STD) mixture - for ANALGESIA     LORazepam (ATIVAN) injection 0.5-1 mg     LORazepam (ATIVAN) tablet 0.5 mg     methylPREDNISolone (DEPO-MEDROL) injection     metoclopramide (REGLAN) tablet 10 mg    Or     metoclopramide (REGLAN) injection 10 mg     metoprolol (LOPRESSOR) injection 1-2 mg     naloxone (NARCAN) injection 0.1-0.4 mg     naloxone (NARCAN) injection 0.1-0.4 mg     ondansetron (ZOFRAN-ODT) ODT tab 4 mg    Or     ondansetron (ZOFRAN) injection 4 mg     oxyCODONE (ROXICODONE) tablet 5-10 mg     oxyCODONE IR (ROXICODONE) tablet 10 mg     phenylephrine (KATIA-SYNEPHRINE) 0.2 mg/mL in sodium chloride 0.9 % 50 mL infusion     PLASMA-LYTE A (electrolyte A) solution     prochlorperazine (COMPAZINE) injection 10 mg    Or     prochlorperazine (COMPAZINE) tablet 10 mg     sodium chloride 0.9% (bottle) irrigation     SUFentanil (SUFENTA) 100 mcg in sodium chloride 0.9 % 50 mL infusion     tranexamic acid (CYKLOKAPRON) 5 g in sodium chloride 0.9 % 250 mL infusion     vancomycin (VANCOCIN) topical powder          Review of Systems     10 point  review of systems negative, except for what is mentioned above      Physical Exam     General:   Vital signs:    Blood pressure 103/69, pulse 83, temperature 97.1  F (36.2  C), temperature source Axillary, resp. rate 14, height 1.575 m (5' 2\"), weight 70.5 kg (155 lb 6.8 oz), SpO2 100 %.  Estimated body mass index is 28.43 kg/m  as calculated from the following:    Height as of this encounter: 1.575 m (5' 2\").    Weight as of this encounter: 70.5 kg (155 lb 6.8 oz).      Intake/Output Summary (Last 24 hours) at " 10/1/2019 1848  Last data filed at 10/1/2019 1830  Gross per 24 hour   Intake 5295 ml   Output 2680 ml   Net 2615 ml      HEENT: No icterus, no pallor. Right triple lumen catheter  Cardiovascular: S1, S2 normal  Respiratory: B/L CTA  Abdomen: Soft, NT, BS+  Neurology: Alert, awake, and moving all extremities   Extremities: No pretibial edema. PIV lines              Laboratory/Imaging Studies     I have reviewed  laboratory and imaging studies in the Epic. Pertinent findings are as below    CMP  Recent Labs   Lab 10/01/19  1454 10/01/19  1430 10/01/19  1153 10/01/19  0956     134 135 135 135   POTASSIUM 3.5  3.4 3.5 3.2* 3.0*   CHLORIDE 105  --   --   --    CO2 23  --   --   --    ANIONGAP 11  --   --   --    GLC 99  102* 96 116* 106*   BUN 5*  --   --   --    CR 0.53  --   --   --    GFRESTIMATED >90  --   --   --    GFRESTBLACK >90  --   --   --    SIXTO 7.3*  --   --   --    PROTTOTAL 4.9*  --   --   --    ALBUMIN 3.0*  --   --   --    BILITOTAL 0.2  --   --   --    ALKPHOS 36*  --   --   --    AST 24  --   --   --    ALT 31  --   --   --      CBC  Recent Labs   Lab 10/01/19  1454 10/01/19  1430 10/01/19  1153 10/01/19  0956   WBC 14.1*  --  5.9  --    RBC 3.50*  --  4.01  --    HGB 10.1*  10.0* 10.3* 11.6*  11.4* 12.1   HCT 29.4*  --  33.7*  --    MCV 84  --  84  --    MCH 28.6  --  28.4  --    MCHC 34.0  --  33.8  --    RDW 12.7  --  12.5  --      --  305  --      INR  Recent Labs   Lab 10/01/19  1454 10/01/19  1153   INR 1.11 1.05         Impression/Plan       59 year old year old female with hx of hypertension, asthma, hypothyroidism and adolescent idiopathic scoliosis s/p  PISF T4-L4 with solid arthrodesis and retained Carpio rods x2 (at age 14) is admitted to stabilization unit s/p Transforaminal Lumbar Interbody Fusion Three Column Osteotomy L5-S1, Posterior Spinal Fusion L2-Pelvis, use of  BMP bone graft on 10/01    # s/p spine surgery as above :  Management primarily per Ortho team. On  lidocaine drip, and Ketamine drip for pain.   - Wound cares, Dressings, Surgical pain management, DVT Prophylaxis  per primary team.   - Monitor anemia, hemodynamics, Input/Output, Capnography (for 24 hours)  - Encourage Incentive spirometry  - Laxatives for constipation prophylaxis     # Anemia: Most likely due to  blood loss, hemodilution and post surgical inflammation.  Hg 10. 1 gm/dl.   - CBC in AM   - Transfuse if Hg < 7 gm/dl    # Hx of HTN: PTA on Lisinopril  Hemodynamics are stable.   - Hold tonight  - BMP in AM  - Reassess in AM    # Hx of Asthma: PTA on  Advair daily and Albuterol prn, Montelukast  No recent exacerbation  Currently on 6 liters oxygen, most likely d/t respiratory suppression, hypoventilation from anesthetic agents  - Continue Advair, and Albuterol PRN  - Continue Montelukast  - Continue to wean oxygen as tolerated    # Hx of Hypothyroidism: PTA on levothyroxine  - Continue     # Hx of Allergies: PTA on Fluticasone, Fexofenadine    # Hx of Urinary retention: occurred with prior knee surgeries. Patient expressed, she would like the catheter left in place post operatively if at all possible.    # Hx of Hypotensive episode with prior surgery: Per patient, may have been related to her narcotic pain medication for pain control.  - Minimize narcotics  - Monitor hemodynamics     # FEN: IV fluids   # Lines &Tubes: Right internal jugular TLC, 2 PIV, and Arterial line. Covington's catheter, and drain in place.   # Activity & Physical condition:   # Prophylaxis: Defer to surgery  # Code status: Full code    Discussed with Dr. Vasquez, and Dr. Riley

## 2019-10-01 NOTE — ANESTHESIA PROCEDURE NOTES
Arterial Line Procedure Note  Staff:     Anesthesiologist:  Chyna Delacruz MD  Location: In OR After Induction  Procedure Start/Stop Times:     patient identified, IV checked, site marked, risks and benefits discussed, informed consent, monitors and equipment checked, pre-op evaluation and at physician/surgeon's request    Line Placement:     Procedure:  Arterial Line    Insertion Site:  Radial    Insertion laterality:  Left    Skin Prep: Chloraprep      Patient Prep: patient draped, mask, sterile gloves, hat and hand hygiene      Local skin infiltration:  None    Ultrasound Guided?: No      Catheter size:  20 gauge, Quick cath    Cath secured with: suture      Dressing:  Tegaderm    Complications:  None obvious    Arterial waveform: Yes      IBP within 10% of NIBP: Yes

## 2019-10-01 NOTE — ANESTHESIA CARE TRANSFER NOTE
Patient: Aleena Ontiveros    Procedure(s):  Transforaminal Lumbar Interbody Fusion Three Column Osteotomy L5-S1, Posterior Spinal Fusion L2-Pelvis. Use of BMP bone graft    Diagnosis: Spondylolisthesis; Sagittal Malalignment  Diagnosis Additional Information: No value filed.    Anesthesia Type:   General     Note:  Airway :Face Mask  Patient transferred to:PACU  Handoff Report: Identifed the Patient, Identified the Reponsible Provider, Reviewed the pertinent medical history, Discussed the surgical course, Reviewed Intra-OP anesthesia mangement and issues during anesthesia, Set expectations for post-procedure period and Allowed opportunity for questions and acknowledgement of understanding      Vitals: (Last set prior to Anesthesia Care Transfer)    CRNA VITALS  10/1/2019 1624 - 10/1/2019 1717      10/1/2019             Resp Rate (observed):  10                Electronically Signed By: Liya Mack CRNA, APRN CRNA  October 1, 2019  5:17 PM

## 2019-10-01 NOTE — BRIEF OP NOTE
[facility] acute [facility], [place]    Brief Operative Note    Pre-operative diagnosis: Spondylolisthesis; Sagittal Malalignment  Post-operative diagnosis Same  Procedure: Procedure(s):  Transforaminal Lumbar Interbody Fusion Three Column Osteotomy L5-S1, Posterior Spinal Fusion L2-Pelvis. Use of BMP bone graft  Surgeon: Surgeon(s) and Role:     * Evangelist Vasquez MD - Primary     * Halima Mkie MD - Assisting     * Johnnie Mcdonald PA-LORENA - Assisting     * Lam Terry MD - Assisting  Anesthesia: General   Estimated blood loss: 700 ml out, 295 ml returned via cell saver  Drains:  Hemovac  Specimens: None sent  Findings:  Spondylolisthesis; Sagittal Malalignment   Complications: See operative note  Implants:    Implant Name Type Inv. Item Serial No.  Lot No. LRB No. Used   IMP SCR SET MEDT SOLERA BREAK OFF 5.5MM TI [id number] Metallic Hardware/Turlock IMP SCR SET MEDT SOLERA BREAK OFF 5.5MM TI [id number]  [employer]  N/A 9   IMP YUMIKO MEDT SOLERA LINED [id number] CHR [id number] Metallic Hardware/Turlock IMP YUMIKO MEDT SOLERA LINED [id number] CHR [id number]  [employer]  N/A 2   IMP SCR MEDT 5.5/6.0MM SOLERA 5.5X50MM MA [id number] Metallic Hardware/Turlock IMP SCR MEDT 5.5/6.0MM SOLERA 5.5X50MM MA [id number]  [employer]  N/A 2   IMP SCR MEDT 5.5/6.0MM SOLERA 5.5X55MM MA [id number] Metallic Hardware/Turlock IMP SCR MEDT 5.5/6.0MM SOLERA 5.5X55MM MA [phone]  [employer]  N/A 1   IMP SCR MEDT 5.5/6.0MM SOLERA 7.5X25MM MA [id number] Metallic Hardware/Turlock IMP SCR MEDT 5.5/6.0MM SOLERA 7.5X25MM MA [id number]  [employer]  N/A 1   IMP SCR MEDT 5.5/6.0MM SOLERA 7.5X30MM MA [id number] Metallic Hardware/Turlock IMP SCR MEDT 5.5/6.0MM SOLERA 7.5X30MM MA [id number]  [employer]  N/A 1   IMP SCR MEDT 5.5/6.0MM SOLERA 6.5X50MM MARS [id number] Metallic Hardware/Turlock IMP SCR MEDT 5.5/6.0MM SOLERA 6.5X50MM Helper [id number]  [employer]  N/A 2   8.5 x 80  Screw    [employer]  N/A 2   9.5 x 90 Screw    [employer]  N/A 1   9.5 x 100 screw    [employer]  N/A 1   Dual Zac Multi-Axial Screw For 5.5/6.0mm RODS    [employer] [id number] N/A 1   IMP SCR MEDT 5.5/6.0MM SOLERA 5.5X50MM MA [phone] Metallic Hardware/[place] IMP SCR MEDT 5.5/6.0MM SOLERA 5.5X50MM MA [id number]  [employer]  N/A 1   Reduction Set screw    [employer]  N/A 2   IMP SCR MEDT 5.5/6.0MM SOLERA 7.5X35MM MA [id number] Metallic Hardware/[place] IMP SCR MEDT 5.5/6.0MM SOLERA 7.5X35MM MA [id number]  [employer]  N/A 1   GRAFT BONE INFUSE BMP LG [id number]  GRAFT BONE INFUSE BMP LG [id number]  [employer] INC-DANEK [id number] N/A 1   Variable Angle Pottery Addition 5.5/6.0mm     [employer] [id number] N/A 1   Dual Zac Multi-Axial Screw For 5.5/6.0mm RODS    [employer] [id number] N/A 1   IMP SCR MEDT 5.5/6.0MM SOLERA 7.5X40MM MA [phone] Metallic Hardware/[place] IMP SCR MEDT 5.5/6.0MM SOLERA 7.5X40MM MA [phone]  [employer]  N/A 1   GRAFT BONE CRUSH CANC 30ML [id number] Bone/Tissue/Biologic GRAFT BONE CRUSH CANC 30ML 296113 [id number] MUSCULOSKELETAL DUGGAN  N/A 1   GRAFT BONE CRUSH CANC 30ML 539859 Bone/Tissue/Biologic GRAFT BONE CRUSH CANC 30ML 354011 [id number] MUSCULOSKELETAL DUGGAN  N/A 1        Assessment and Plan: Aleena Ontiveros is a 59 year old female with PMH including thyroidectomy, S/P TKA, HTN, Slow transit constipation, deconditioning, asthma, OA now s/p  Transforaminal Lumbar Interbody Fusion Three Column Osteotomy L5-S1, Posterior Spinal Fusion L2-Pelvis. Use of BMP bone graft on 10/1/19 with Dr. Vasquez, Mara Mike.     Christina Primary  Activity: Up with assist until independent. No excessive bending or twisting. No lifting >10 lbs x 6 weeks. No Ashish lift for transfers.   Weight bearing status: WBAT.  Pain management: Transition from IV to PO as tolerated. No NSAIDs   Antibiotics: Ancef 1 gm q 8 hours x 3.  Diet: Begin with clear fluids and progress diet as tolerated.   DVT prophylaxis: SCDs only. No  chemical DVT ppx needed.  Imaging: XR Upright  PTDC - ordered. Lumbar CT ordered  Labs: Hgb POD#1.  Bracing/Splinting: Kingston brace to be worn when OOB..  Dressings: Keep Aquacel c/d/i x 7 days, Dermabond.  Drains: Document output per shift, will be discontinued at Orthopedic Surgery discretion.  Covington catheter: Remove POD#1.   Physical Therapy/Occupational Therapy: Eval and treat.  Cultures: None.    Consults: Hospitalist.  Follow-up: Clinic with Dr. Vasquez in 6 weeks with repeat x-rays.   Disposition: Pending progress with therapies, pain control on orals, and medical stability, anticipate discharge to home on POD #4.

## 2019-10-01 NOTE — ANESTHESIA POSTPROCEDURE EVALUATION
Anesthesia POST Procedure Evaluation    Patient: Aleena Ontiveros   MRN:     [id number] Gender:   female   Age:    59 year old :      1960        Preoperative Diagnosis: Spondylolisthesis; Sagittal Malalignment   Procedure(s):  Transforaminal Lumbar Interbody Fusion Three Column Osteotomy L5-S1, Posterior Spinal Fusion L2-Pelvis. Use of BMP bone graft   Postop Comments: No value filed.       Anesthesia Type:  Not documented  General    Reportable Event: NO     PAIN: Uncomplicated   Sign Out status: Comfortable, Well controlled pain     PONV: No PONV   Sign Out status:  No Nausea or Vomiting     Neuro/Psych: Uneventful perioperative course   Sign Out Status: Preoperative baseline; Age appropriate mentation     Airway/Resp.: Uneventful perioperative course   Sign Out Status: Non labored breathing, age appropriate RR; Resp. Status within EXPECTED Parameters     CV: Uneventful perioperative course   Sign Out status: Appropriate BP and perfusion indices; Appropriate HR/Rhythm     Disposition:   Sign Out in:  ICU  Disposition:  ICU  Recovery Course: Recovery in ICU  Follow-Up: Not required           Last Anesthesia Record Vitals:  CRNA VITALS  10/1/2019 1624 - 10/1/2019 1724      10/1/2019             Resp Rate (observed):  10          Last PACU Vitals:  Vitals Value Taken Time   /77 10/1/2019  5:45 PM   Temp 36.4  C (97.5  F) 10/1/2019  5:00 PM   Pulse 90 10/1/2019  5:45 PM   Resp 24 10/1/2019  5:50 PM   SpO2 100 % 10/1/2019  5:50 PM   Temp src     NIBP     Pulse     SpO2     Resp     Temp     Ht Rate     Temp 2     Vitals shown include unvalidated device data.      Electronically Signed By: Chyna Delacruz MD, 2019, 5:51 PM

## 2019-10-02 ENCOUNTER — APPOINTMENT (OUTPATIENT)
Dept: CT IMAGING | Facility: CLINIC | Age: 59
DRG: 460 | End: 2019-10-02
Attending: ORTHOPAEDIC SURGERY
Payer: COMMERCIAL

## 2019-10-02 ENCOUNTER — APPOINTMENT (OUTPATIENT)
Dept: PHYSICAL THERAPY | Facility: CLINIC | Age: 59
DRG: 460 | End: 2019-10-02
Attending: ORTHOPAEDIC SURGERY
Payer: COMMERCIAL

## 2019-10-02 ENCOUNTER — APPOINTMENT (OUTPATIENT)
Dept: PHYSICAL THERAPY | Facility: CLINIC | Age: 59
DRG: 460 | End: 2019-10-02
Attending: PHYSICIAN ASSISTANT
Payer: COMMERCIAL

## 2019-10-02 LAB
ANION GAP SERPL CALCULATED.3IONS-SCNC: 5 MMOL/L (ref 3–14)
BASOPHILS # BLD AUTO: 0 10E9/L (ref 0–0.2)
BASOPHILS NFR BLD AUTO: 0.4 %
BUN SERPL-MCNC: 6 MG/DL (ref 7–30)
CALCIUM SERPL-MCNC: 7 MG/DL (ref 8.5–10.1)
CHLORIDE SERPL-SCNC: 107 MMOL/L (ref 94–109)
CO2 SERPL-SCNC: 28 MMOL/L (ref 20–32)
CREAT SERPL-MCNC: 0.46 MG/DL (ref 0.52–1.04)
DIFFERENTIAL METHOD BLD: ABNORMAL
EOSINOPHIL # BLD AUTO: 0 10E9/L (ref 0–0.7)
EOSINOPHIL NFR BLD AUTO: 0.1 %
ERYTHROCYTE [DISTWIDTH] IN BLOOD BY AUTOMATED COUNT: 12.7 % (ref 10–15)
GFR SERPL CREATININE-BSD FRML MDRD: >90 ML/MIN/{1.73_M2}
GLUCOSE SERPL-MCNC: 141 MG/DL (ref 70–99)
HCT VFR BLD AUTO: 30.2 % (ref 35–47)
HGB BLD-MCNC: 10 G/DL (ref 11.7–15.7)
IMM GRANULOCYTES # BLD: 0.1 10E9/L (ref 0–0.4)
IMM GRANULOCYTES NFR BLD: 0.7 %
LYMPHOCYTES # BLD AUTO: 0.6 10E9/L (ref 0.8–5.3)
LYMPHOCYTES NFR BLD AUTO: 7.6 %
MAGNESIUM SERPL-MCNC: 2.3 MG/DL (ref 1.6–2.3)
MCH RBC QN AUTO: 28.9 PG (ref 26.5–33)
MCHC RBC AUTO-ENTMCNC: 33.1 G/DL (ref 31.5–36.5)
MCV RBC AUTO: 87 FL (ref 78–100)
MONOCYTES # BLD AUTO: 0.5 10E9/L (ref 0–1.3)
MONOCYTES NFR BLD AUTO: 6.1 %
NEUTROPHILS # BLD AUTO: 6.5 10E9/L (ref 1.6–8.3)
NEUTROPHILS NFR BLD AUTO: 85.1 %
NRBC # BLD AUTO: 0 10*3/UL
NRBC BLD AUTO-RTO: 0 /100
PHOSPHATE SERPL-MCNC: 3.1 MG/DL (ref 2.5–4.5)
PLATELET # BLD AUTO: 160 10E9/L (ref 150–450)
POTASSIUM SERPL-SCNC: 4.2 MMOL/L (ref 3.4–5.3)
RBC # BLD AUTO: 3.46 10E12/L (ref 3.8–5.2)
SODIUM SERPL-SCNC: 140 MMOL/L (ref 133–144)
WBC # BLD AUTO: 7.7 10E9/L (ref 4–11)

## 2019-10-02 PROCEDURE — 72131 CT LUMBAR SPINE W/O DYE: CPT

## 2019-10-02 PROCEDURE — 25000132 ZZH RX MED GY IP 250 OP 250 PS 637: Performed by: PHYSICIAN ASSISTANT

## 2019-10-02 PROCEDURE — 25000128 H RX IP 250 OP 636: Performed by: INTERNAL MEDICINE

## 2019-10-02 PROCEDURE — 99233 SBSQ HOSP IP/OBS HIGH 50: CPT | Performed by: PHYSICIAN ASSISTANT

## 2019-10-02 PROCEDURE — 25000128 H RX IP 250 OP 636: Performed by: PHYSICIAN ASSISTANT

## 2019-10-02 PROCEDURE — 97530 THERAPEUTIC ACTIVITIES: CPT | Mod: GP

## 2019-10-02 PROCEDURE — 12000001 ZZH R&B MED SURG/OB UMMC

## 2019-10-02 PROCEDURE — 83735 ASSAY OF MAGNESIUM: CPT | Performed by: ORTHOPAEDIC SURGERY

## 2019-10-02 PROCEDURE — 97161 PT EVAL LOW COMPLEX 20 MIN: CPT | Mod: GP

## 2019-10-02 PROCEDURE — 85025 COMPLETE CBC W/AUTO DIFF WBC: CPT | Performed by: ORTHOPAEDIC SURGERY

## 2019-10-02 PROCEDURE — 25000125 ZZHC RX 250: Performed by: ANESTHESIOLOGY

## 2019-10-02 PROCEDURE — 25000128 H RX IP 250 OP 636: Performed by: ORTHOPAEDIC SURGERY

## 2019-10-02 PROCEDURE — 99222 1ST HOSP IP/OBS MODERATE 55: CPT | Performed by: PHYSICIAN ASSISTANT

## 2019-10-02 PROCEDURE — 84100 ASSAY OF PHOSPHORUS: CPT | Performed by: ORTHOPAEDIC SURGERY

## 2019-10-02 PROCEDURE — 25000132 ZZH RX MED GY IP 250 OP 250 PS 637: Performed by: ORTHOPAEDIC SURGERY

## 2019-10-02 PROCEDURE — 25800030 ZZH RX IP 258 OP 636: Performed by: ANESTHESIOLOGY

## 2019-10-02 PROCEDURE — 80048 BASIC METABOLIC PNL TOTAL CA: CPT | Performed by: ORTHOPAEDIC SURGERY

## 2019-10-02 PROCEDURE — 25000132 ZZH RX MED GY IP 250 OP 250 PS 637: Performed by: NURSE PRACTITIONER

## 2019-10-02 PROCEDURE — 99207 ZZC CDG-CODE CATEGORY CHANGED: CPT | Performed by: PHYSICIAN ASSISTANT

## 2019-10-02 RX ORDER — LIDOCAINE 4 G/G
1-3 PATCH TOPICAL
Status: DISCONTINUED | OUTPATIENT
Start: 2019-10-03 | End: 2019-10-11 | Stop reason: HOSPADM

## 2019-10-02 RX ORDER — ACETAMINOPHEN 325 MG/1
975 TABLET ORAL EVERY 8 HOURS
Status: DISCONTINUED | OUTPATIENT
Start: 2019-10-02 | End: 2019-10-11 | Stop reason: HOSPADM

## 2019-10-02 RX ORDER — BISACODYL 10 MG
10 SUPPOSITORY, RECTAL RECTAL ONCE
Status: DISCONTINUED | OUTPATIENT
Start: 2019-10-03 | End: 2019-10-04

## 2019-10-02 RX ORDER — DIAZEPAM 10 MG/2ML
5 INJECTION, SOLUTION INTRAMUSCULAR; INTRAVENOUS EVERY 4 HOURS PRN
Status: DISCONTINUED | OUTPATIENT
Start: 2019-10-02 | End: 2019-10-02

## 2019-10-02 RX ORDER — DIAZEPAM 2 MG
2 TABLET ORAL EVERY 6 HOURS PRN
Status: DISCONTINUED | OUTPATIENT
Start: 2019-10-02 | End: 2019-10-03

## 2019-10-02 RX ORDER — METHOCARBAMOL 500 MG/1
1000 TABLET, FILM COATED ORAL EVERY 6 HOURS
Status: DISCONTINUED | OUTPATIENT
Start: 2019-10-02 | End: 2019-10-03

## 2019-10-02 RX ORDER — POLYETHYLENE GLYCOL 3350 17 G/17G
17 POWDER, FOR SOLUTION ORAL DAILY
Status: DISCONTINUED | OUTPATIENT
Start: 2019-10-02 | End: 2019-10-04

## 2019-10-02 RX ORDER — AMOXICILLIN 250 MG
2 CAPSULE ORAL 2 TIMES DAILY
Status: DISCONTINUED | OUTPATIENT
Start: 2019-10-02 | End: 2019-10-11 | Stop reason: HOSPADM

## 2019-10-02 RX ORDER — LIDOCAINE HYDROCHLORIDE ANHYDROUS AND DEXTROSE MONOHYDRATE .8; 5 G/100ML; G/100ML
1 INJECTION, SOLUTION INTRAVENOUS CONTINUOUS
Status: DISCONTINUED | OUTPATIENT
Start: 2019-10-02 | End: 2019-10-02

## 2019-10-02 RX ADMIN — RANITIDINE 150 MG: 150 TABLET ORAL at 20:01

## 2019-10-02 RX ADMIN — ACETAMINOPHEN 975 MG: 325 TABLET, FILM COATED ORAL at 03:17

## 2019-10-02 RX ADMIN — FLUTICASONE FUROATE AND VILANTEROL TRIFENATATE 1 PUFF: 200; 25 POWDER RESPIRATORY (INHALATION) at 08:29

## 2019-10-02 RX ADMIN — ACETAMINOPHEN 975 MG: 325 TABLET, FILM COATED ORAL at 18:09

## 2019-10-02 RX ADMIN — METHOCARBAMOL 1000 MG: 500 TABLET, FILM COATED ORAL at 08:28

## 2019-10-02 RX ADMIN — DIAZEPAM 2.5 MG: 5 INJECTION, SOLUTION INTRAMUSCULAR; INTRAVENOUS at 03:18

## 2019-10-02 RX ADMIN — GABAPENTIN 900 MG: 300 CAPSULE ORAL at 22:04

## 2019-10-02 RX ADMIN — GABAPENTIN 900 MG: 300 CAPSULE ORAL at 17:07

## 2019-10-02 RX ADMIN — GABAPENTIN 900 MG: 300 CAPSULE ORAL at 08:29

## 2019-10-02 RX ADMIN — SENNOSIDES AND DOCUSATE SODIUM 2 TABLET: 8.6; 5 TABLET ORAL at 08:29

## 2019-10-02 RX ADMIN — SENNOSIDES AND DOCUSATE SODIUM 2 TABLET: 8.6; 5 TABLET ORAL at 20:00

## 2019-10-02 RX ADMIN — POLYETHYLENE GLYCOL 3350 17 G: 17 POWDER, FOR SOLUTION ORAL at 08:30

## 2019-10-02 RX ADMIN — LIDOCAINE HYDROCHLORIDE 1 MG/KG/HR: 8 INJECTION, SOLUTION INTRAVENOUS at 03:59

## 2019-10-02 RX ADMIN — KETAMINE HYDROCHLORIDE 5 MG/HR: 50 INJECTION, SOLUTION INTRAMUSCULAR; INTRAVENOUS at 04:02

## 2019-10-02 RX ADMIN — FLUTICASONE PROPIONATE 1 SPRAY: 50 SPRAY, METERED NASAL at 20:01

## 2019-10-02 RX ADMIN — FEXOFENADINE HYDROCHLORIDE 180 MG: 180 TABLET, FILM COATED ORAL at 08:28

## 2019-10-02 RX ADMIN — RANITIDINE 150 MG: 150 TABLET ORAL at 08:29

## 2019-10-02 RX ADMIN — FLUTICASONE PROPIONATE 1 SPRAY: 50 SPRAY, METERED NASAL at 08:29

## 2019-10-02 RX ADMIN — CEFAZOLIN 1 G: 1 INJECTION, POWDER, FOR SOLUTION INTRAMUSCULAR; INTRAVENOUS at 07:59

## 2019-10-02 RX ADMIN — ACETAMINOPHEN 975 MG: 325 TABLET, FILM COATED ORAL at 10:03

## 2019-10-02 RX ADMIN — CEFAZOLIN 1 G: 1 INJECTION, POWDER, FOR SOLUTION INTRAMUSCULAR; INTRAVENOUS at 16:23

## 2019-10-02 RX ADMIN — METHOCARBAMOL 1000 MG: 500 TABLET, FILM COATED ORAL at 13:18

## 2019-10-02 RX ADMIN — LEVOTHYROXINE SODIUM 125 MCG: 25 TABLET ORAL at 08:29

## 2019-10-02 RX ADMIN — GABAPENTIN 900 MG: 300 CAPSULE ORAL at 03:30

## 2019-10-02 RX ADMIN — METHOCARBAMOL 1000 MG: 500 TABLET, FILM COATED ORAL at 20:00

## 2019-10-02 RX ADMIN — MONTELUKAST 10 MG: 10 TABLET, FILM COATED ORAL at 22:04

## 2019-10-02 RX ADMIN — HYDROMORPHONE HYDROCHLORIDE: 10 INJECTION, SOLUTION INTRAMUSCULAR; INTRAVENOUS; SUBCUTANEOUS at 06:59

## 2019-10-02 ASSESSMENT — PAIN DESCRIPTION - DESCRIPTORS
DESCRIPTORS: SPASM;CRAMPING
DESCRIPTORS: SPASM

## 2019-10-02 ASSESSMENT — ACTIVITIES OF DAILY LIVING (ADL)
ADLS_ACUITY_SCORE: 12
ADLS_ACUITY_SCORE: 12

## 2019-10-02 NOTE — PROGRESS NOTES
"CLINICAL NUTRITION SERVICES - ASSESSMENT NOTE     Nutrition Prescription    RECOMMENDATIONS FOR MDs/PROVIDERS TO ORDER:  None today    Malnutrition Status:    Patient does not meet two of the criteria necessary for diagnosing malnutrition     Recommendations already ordered by Registered Dietitian (RD):  Boost Plus PRN    Future/Additional Recommendations:  If PO intakes do not improve, schedule supplements      REASON FOR ASSESSMENT  Aleena Ontiveros is a/an 59 year old female assessed by the dietitian for Provider Order - Dietitian to Assess and Order per Nutrition Protocol. Provider is responsible for nutrition oversight.    NUTRITION HISTORY  Pt reports she was eating well PTA. Since surgery, she has had minimal PO intakes.     CURRENT NUTRITION ORDERS  Diet: Regular    Intake/Tolerance: pt reports she has been consuming mostly fluids. Was currently eating grapes and chicken noodle soup.     LABS  Labs reviewed    MEDICATIONS  Medications reviewed    ANTHROPOMETRICS  Ht Readings from Last 1 Encounters:   10/01/19 1.575 m (5' 2\")   Most Recent Weight: 70.5 kg (155 lb 6.8 oz)  IBW: 50 kg (141% IBW)  BMI: Overweight BMI 25-29.9  Weight History: wt stable over the last month.   Wt Readings from Last 10 Encounters:   10/01/19 70.5 kg (155 lb 6.8 oz)   09/17/19 67.1 kg (148 lb)   09/09/19 69.4 kg (153 lb)   08/27/19 70.8 kg (156 lb)   07/25/19 70.8 kg (156 lb)   06/04/19 70.8 kg (156 lb)   05/02/19 70.4 kg (155 lb 3.2 oz)   12/22/17 65.6 kg (144 lb 9.6 oz)   12/18/17 66.8 kg (147 lb 3.2 oz)   12/13/17 62.6 kg (138 lb)     Dosing Weight: 55 kg - adjusted from admit wt    ASSESSED NUTRITION NEEDS  Estimated Energy Needs: 7646-9318 kcals/day (25 - 30 kcals/kg)  Justification: Maintenance  Estimated Protein Needs: 55-66 grams protein/day (1 - 1.2 grams of pro/kg)  Justification: Post-op  Estimated Fluid Needs: 1 mL/kcal  Justification: Per provider pending fluid status    PHYSICAL FINDINGS  See malnutrition section " below.    MALNUTRITION  % Intake: Decreased intake does not meet criteria  % Weight Loss: None noted  Subcutaneous Fat Loss: None observed  Muscle Loss: None observed  Fluid Accumulation/Edema: None noted  Malnutrition Diagnosis: Patient does not meet two of the above criteria necessary for diagnosing malnutrition    NUTRITION DIAGNOSIS  Inadequate protein-energy intake related to poor appetite as evidenced by eating <50% of meals x2 days      INTERVENTIONS  Implementation  Discussed increased protein needs post op and provided Tips for Adding Protein and Protein Content of Room Service handouts and explained contents. Pt was able to list several high protein foods they enjoyed and feel they will be able to eat enough protein as her appetite improves. Encouraged to eat at least 1 high protein food with each meal and occasional snacks.     Goals  Patient to consume % of nutritionally adequate meal trays TID, or the equivalent with supplements/snacks.     Monitoring/Evaluation  Progress toward goals will be monitored and evaluated per protocol.      Deanna Bull RD, LD  Unit Pager: [phone]

## 2019-10-02 NOTE — PROGRESS NOTES
"Orthopaedic Surgery Progress Note:  10/02/2019     Subjective:   POD #1.    Difficult pain control overnight.  Stayed in the stabilization until.  Left radicular pain into the ankle.  Slept some.  Thinks left leg is cooler than right.  C[place] in place.  Passing gas, no BM.  Denies chest pain, shortness of breath, nausea, vomiting.     Objective:   /60   Pulse 76   Temp 97.8  F (36.6  C) (Axillary)   Resp 11   Ht 1.575 m (5' 2\")   Wt 70.5 kg (155 lb 6.8 oz)   SpO2 98%   BMI 28.43 kg/m    I/O this shift:  In: 348.8 [P.O.:240; I.V.:108.8]  Out: 800 [Urine:800]  Gen: NAD. Resting comfortably in bed  Resp: Breathing comfortably on RA  Drain:   Drain output of 270/150cc last 2 shifts.  Musculoskeletal: Dressing is C/D/I.        Lower extremities:  Motor Strength Right Left   Hip flexion: L1, L2, L3 NT/5 NT/5   Hip adduction: L2, L3 NT/5 NT/5   Knee flexion: S1 5/5 5/5   Knee extension: L3, L4 5/5 5/5   Ankle dosiflexion: L4, L5 5/5 4/5   EHL: L5 4/5 0/5   Ankle plantarflexion: S1 5/5 5/5     Sensation from L1-S2 is preserved.    Labs:  Lab Results   Component Value Date    WBC 7.7 10/02/2019    HGB 10.0 (L) 10/02/2019     10/02/2019    INR 1.11 10/01/2019        Assessment & Plan:   Aleena Ontiveros is a 59 year old female now s/p revision L2-pelvis PSF on 10/1 with Dr. Vasquez.     Goals for today  Lumbar CT scan  Start Medrol dose pack after CT scan  Discontinue Lidocaine  Transfer to floor    Orthopedics Primary  Activity: Up with assist until independent. No excessive bending or twisting. No lifting >10 lbs x 6 weeks. No Ashish lift for transfers.   Weight bearing status: WBAT.  Pain management: Transition from IV to PO as tolerated. No NSAIDs   Antibiotics: Ancef x24 hours  Diet: Begin with clear fluids and progress diet as tolerated.   DVT prophylaxis: SCDs only. No chemical DVT ppx needed.  Imaging: XR Upright PTDC - ordered.  CT scan lumbar spine today  Labs: Labs PRN  Bracing/Splinting: " None.  Dressings: Keep Aquacel c/d/i x 7 days.  Drains: Document output per shift, will be discontinued at Orthopedic Surgery discretion.  C[place]ton catheter: Remove POD#1.   Physical Therapy/Occupational Therapy: Eval and treat.  Cultures: None    Consults: Hospitalist, Pain Service.  Follow-up: Clinic with Dr. Vasquez in 6 weeks with repeat x-rays.   Disposition: Pending progress with therapies, pain control on orals, and medical stability, anticipate discharge to home vs rehab on POD #4-6.    Orthopaedics surgery staff for this patient is Dr. Vasquez.    ------------------------------------------------------------------------------------------    [   ] Discontinue PCA  [   ] Drains removed.  [   ] Post xrays done.  [   ] Discharge orders done.  [   ] Discharge summary started.      Ashish Cline MD  Orthopedic Surgery, PGY-4  Pager: [phone]        Please contact me directly regarding this patient during normal business hours Mon-Fri before 5pm @ [phone].  IF it is Night, a Weekend, or there is No Response, then please page the Orthopaedic Resident On Call in [facility]. Thank you!        FOLLOWUP:    Future Appointments   Date Time Provider Department Center   10/2/2019  2:15 PM Janna Krishnan, PT [facility]   10/3/2019  6:30 AM UR PT REHAB TECH [facility]   10/3/2019  8:00 AM Amira Cook, OT [facility]   10/3/2019  6:30 PM UR PT REHAB TECH [facility]   11/12/2019  9:45 AM Evangelist Vasquez MD [facility]   12/31/2019 10:45 AM Evangelist Vasquez MD [facility]

## 2019-10-02 NOTE — PROVIDER NOTIFICATION
Dr. Meraz, [facility], notified of continued severe left leg spasms, despite PRN IV valium, sensation on left < sensation on right, and flexion on left weaker than flexion on right, left foot is warm, toes are cool. Per MD could increase valium dosage, will be by to assess pt, further need for imagining per ortho team. Will continue to monitor closely.     Chelo Bar RN on 10/2/2019 at 4:17 AM

## 2019-10-02 NOTE — OP NOTE
Procedure Date: 10/01/2019      PREOPERATIVE DIAGNOSES:   1.  High-grade spondylolisthesis.   2.  History of prior spinal fusion.   3.  Flat back syndrome.   4.  Spinal stenosis.      POSTOPERATIVE DIAGNOSES:   1.  High-grade spondylolisthesis.   2.  History of prior spinal fusion.   3.  Flat back syndrome.   4.  Spinal stenosis.      PROCEDURES:   1.  Revision L2 to pelvis posterior spinal fusion.   2.  Segmental spinal instrumentation, 22 modifier.   3.  S1 pedicle subtraction osteotomy, 22 modifier.   4.  Bilateral L5 nerve root decompressions.   5.  Pelvic fixation.   6.  Image-guided surgery.      SURGEON:  Dr. Vasquez.        Co-SURGEONS:  Dr. Mike and Dr. Terry, because of the complexity and severity of  the operation.      INDICATION FOR OPERATION:  The patient is an adult female with a history of multiple previous spinal fusions for scoliosis.  She had a high-grade spondylolisthesis below the scoliosis and had significant flat back syndrome.  My partner, Dr. Vasquez, had reviewed this case with me and originally scheduled it with Dr. Mike.  I was available to assist today, and because of their concern about the anterior osteophytic bridging at the high-grade spondylolisthesis, they asked me to assist in the pedicle subtraction portion and reduction of the deformity portion of the operation.  Of note, this is an extremely unusual case with significant technical challenges.      Dr. Vasquez and Dr. Mike had performed exposure and segmental spinal instrumentation.  I then entered the room and assisted with the pedicle subtraction osteotomy portion of the case.  Dr. Mike and I exposed the L5 nerve roots bilaterally, and then we took down the pseudoarthrosis at the pars defect from the isthmic spondylolisthesis.  Of note, because of the kyphosis, the prior fusion and the high-grade spondylolisthesis, this merits a 22 modifier.      Once the L5 nerve roots were fully exposed by carefully dissecting off the  ligamentum flavum and the pseudoarthrosis material, after performing a Cunningham laminectomy, attention was addressed to finding the L5 to S1 disc space.  The cauda equina was retracted medially along with the S1 nerve root, and an angled osteotome was used to find the disc space.  This was confirmed under fluoroscopy.      Next, after having advanced the osteotome under fluoroscopy, I progressively took down the disc material on the left side.  We then, in a similar fashion, found the disc space on the right side and progressively resected disc material.  Then, it became obvious that this was going to be limited in our ability to obtain motion, and so we then did an upper-half S1 pedicle subtraction osteotomy.  I did this by placing an insert and rotate distractor in the level of the disc space and then resecting approximately 2 to 2.5 cm of the cephalad portion of S1 just above the pedicle screws.  This was done with an osteotome angling to the insert and rotate distractors and then the bone removed in a piecemeal fashion.  Insert and rotate distractors were then used to lever back and forth, and we were able to obtain motion, and then I utilized a Kerrison to create anterior freeing of the bony osteophytic spur.  Once the motion was obtained, we then extended the hips and knees on the patient, who had been flexed in a 90/90 position, and this gave significant improvement in the overall alignment.  We then cut and contoured a gavino for the left side, and then utilizing the spondy reduction screw at L4, we were able to get additional improvement.  Intraoperative 2D imaging was obtained, and it appeared at this point that we had reasonable realignment.  We had a debate about whether or not to perform a transforaminal lumbar interbody fusion with a structural device, but given the sacral osteotomy and the misshapen sacrum, there was not a good place to obtain structural support for the structural interbody device, and so  instead copious bone graft was placed into the disc space.  At this point, I left the operating room, and the procedure was completed by Dr. Vasquez and Dr. Mike.         AC MOREIRA JR, MD             D: 10/01/2019   T: 10/02/2019   MT: DYLAN      Name:     LOREN GAYLE   MRN:      [id number]        Account:        [id number]   :      1960           Procedure Date: 10/01/2019      Document: [id number]       cc: Copy for Patient        Evangelist Conner MD

## 2019-10-02 NOTE — PROGRESS NOTES
Admitted/transferred from: PACU  Reason for admission/transfer: Pain control  Patient status upon admission/transfer: Stable  Interventions: Lidocaine and Ketamine gtt's infusing for pain control, MD notified of severe left leg spasms, order received for IV Valium and [place]  Plan: Monitor pain closely overnight, and provide appropriate interventions  2 RN skin assessment: completed by Chelo DE LA O and Kaleigh VALDEZ  Result of skin assessment and interventions/actions: surgical incision covered with aquacell, Hemovac dressing CDI  Height, weight, drug calc weight: done  Patient belongings: at bedside    Chelo Bar RN on 10/1/2019 at 7:56 PM

## 2019-10-02 NOTE — PROGRESS NOTES
SURGICAL ICU PROGRESS NOTE  10/02/2019        Date of Service (when I saw the patient): 10/02/2019    ASSESSMENT:  Aleena Ontiveros is 59 year old year old female with hx of hypertension, asthma, hypothyroidism and adolescent idiopathic scoliosis s/p  PISF T4-L4 with solid arthrodesis and retained Carpio rods x2 (at age 14) s/p Transforaminal Lumbar Interbody Fusion Three Column Osteotomy L5-S1, Posterior Spinal Fusion L2-Pelvis, use of  BMP bone graft on 10/01.      ml, 295 ml returned via cell saver. admitted to [facility] unit for lidocaine infusion and HD monitoring      CHANGES and MAJOR THINGS TODAY:   Pain consult    - Scheduled tylenol, robaxin, gabapentin.    - Dilaudid PCA increase range to 0.2-0.4mg    - Ketamine infusion    - Stop lidocaine at 8am.  Aggressive bowel regimen   Keep torres for now   Handoff given to Dr Whitten       PLAN:    Neurological:  #  Acute pain, no hx of chronic pain per chart   - Monitor neurological status. Delirium prevention and precautions.   - Pain: Pain consult    - Scheduled tylenol, robaxin, gabapentin.    - Dilaudid PCA increase range to 0.2-0.4mg    - Ketamine infusion    - Stop lidocaine at 8am.    Pulmonary:  #  Asthma   - resume home inhalers   - Supplemental oxygen to keep saturation above 92 %.  - Incentive spirometer while awake     Cardiovascular:    # hx of hypertension,    PTA lisinopril on hold at this time. Monitor     GI/Nutrition:    # hx of constipation   -  Aggressive bowel regimen.     Fluids/Electrolytes:  # no issues   - electrolyte replacement protocol  In place.     Renal:   # no issues     Endocrine:  # hypothyroidism   - PTA medications: Synthroid 125 mcg daily      Infectious disease:   # leukocytosis, resolved, no indications for additional abx     Hematology:     # Acute blood loss anemia due to operative blood loss   - Hemoglobin 10.0. Monitor and trend. Threshold for transfusion if hgb <7.0 or signs/symptoms of  hypoperfusion.       Musculoskeletal:  # s/p revision of L2- pelvis fusion 10/1/19  # Weakness and deconditioning of critical illness   - Physical and occupational therapy consults.  - wound cares, will defer WB, activity and restrictions to orthopedics     General Cares/Prophylaxis:    DVT Prophylaxis: Defer to primary service  GI Prophylaxis: PPI  Restraints: Restraints for medical healing needed: NO    Lines/ tubes/ drains:  -  internal jugular   - arterial line--> remove   - PIV's   - [place]     Disposition:  -- floor UR 10A     Patient seen, findings and plan discussed with surgical ICU staff, Dr. Devine     Time spent on this Encounter   Billing:  I spent 35 minutes bedside and on the inpatient unit today managing the care of Aleena Ontiveros in relation to the issues listed in this note.    Truman Torres PA-C   ====================================  INTERVAL HISTORY:   Course reviewed. No acute events overnight. Some delirium reported after IV valium and Ketamine at 10 mg/hr, resolved this am. Patient awake, alert and interactive. Follows commands correctly and consistently. Reports intermittent sharp pain in left leg, worse around ankle per patient.  Denies chest pain, shortness of breath, fever, or chills. Denies visual or auditory hallucinations.     OBJECTIVE:   1. VITAL SIGNS:   Temp:  [97  F (36.1  C)-98.8  F (37.1  C)] 97.8  F (36.6  C)  Pulse:  [76-97] 76  Heart Rate:  [] 85  Resp:  [7-35] 8  BP: ()/() 108/56  MAP:  [64 mmHg-162 mmHg] 73 mmHg  Arterial Line BP: ()/() 98/59  SpO2:  [95 %-100 %] 97 %  Resp: 8    2. INTAKE/ OUTPUT:   I/O last 3 completed shifts:  In: 6664.83 [I.V.:5869.83; Other:295]  Out: 5230 [Urine:4110; Drains:420; Blood:700]    3. PHYSICAL EXAMINATION:  General: laying in bed, NAD. Awake, alert and interactive   HEENT: pupils 3mm and equal. OP clear.   Neuro: A&Ox3, NAD. AMATO.   Pulm/Resp: Clear breath sounds bilaterally without rhonchi, crackles or  wheeze, breathing non-labored. BBS.   CV: RRR, S1, S2.   Abdomen: Soft, non-distended, non-tender, no rebound tenderness or guarding, no masses  : (+) torres catheter in place, urine yellow and clear  Incisions/Skin: incision dressed.   MSK/Extremities: no peripheral edema, moving all extremities, peripheral pulses intact, calves soft and compressible.  Extremities well perfused, PP2+.     4. INVESTIGATIONS:   Arterial Blood Gases   Recent Labs   Lab 10/01/19  1454 10/01/19  1430 10/01/19  1153 10/01/19  0956   PH 7.44 7.45 7.37 7.47*   PCO2 36 35 41 32*   PO2 155* 156* 149* 253*   HCO3 24 24 24 23     Complete Blood Count   Recent Labs   Lab 10/02/19  0330 10/01/19  1454 10/01/19  1430 10/01/19  1153   WBC 7.7 14.1*  --  5.9   HGB 10.0* 10.1*  10.0* 10.3* 11.6*  11.4*    243  --  305     Basic Metabolic Panel  Recent Labs   Lab 10/02/19  0330 10/01/19  2000 10/01/19  1454 10/01/19  1430    139 139  134 135   POTASSIUM 4.2 3.9 3.5  3.4 3.5   CHLORIDE 107 105 105  --    CO2 28 26 23  --    BUN 6* 6* 5*  --    CR 0.46* 0.52 0.53  --    * 148* 99  102* 96     Liver Function Tests  Recent Labs   Lab 10/01/19  1454 10/01/19  1153   AST 24  --    ALT 31  --    ALKPHOS 36*  --    BILITOTAL 0.2  --    ALBUMIN 3.0*  --    INR 1.11 1.05     Pancreatic Enzymes  No lab results found in last 7 days.  Coagulation Profile  Recent Labs   Lab 10/01/19  1454 10/01/19  1153   INR 1.11 1.05   PTT 31 33         5. RADIOLOGY:   Recent Results (from the past 24 hour(s))   XR Surgery NICOLE L/T 5 Min Fluoro    Narrative    This exam was marked as non-reportable because it will not be read by a   radiologist or a [place] non-radiologist provider.                 =========================================

## 2019-10-02 NOTE — PLAN OF CARE
ICU End of Shift Summary. See flowsheets for vital signs and detailed assessment.    Changes this shift: Lidocaine gtt, ketamine gtt, and dilaudid PCA for pain control. Continued spasming/pain of LLE; flexaril;, gabapentin, and valium given. Moving all extremities spontaneously. LLE is slightly cooler to touch and has slightly muted sensation. MD was notified about CMS changes. Patient is very anxious. Occasionally startled awake with pain. Weaned to 3L NC. Afebrile. Adequate UOP.     Plan:  Manage pain. Monitor respiratory status. Transfer to floor.     Problem: Pain Chronic (Persistent)  Goal: Acceptable Pain Control and Functional Ability  Outcome: No Change

## 2019-10-02 NOTE — PROGRESS NOTES
"   10/02/19 1115   Quick Adds   Type of Visit Initial PT Evaluation       Present no   Language English   Living Environment   Lives With spouse   Living Arrangements house   Home Accessibility stairs to enter home;stairs within home   Number of Stairs, Main Entrance 3   Stair Railings, Main Entrance none   Number of Stairs, Within Home, Primary other (see comments)  (\"flight\")   Stair Railings, Within Home, Primary railing on right side (ascending)   Transportation Anticipated family or friend will provide   Self-Care   Usual Activity Tolerance good   Current Activity Tolerance fair   Regular Exercise Yes   Activity/Exercise Type strength training;walking   Exercise Amount/Frequency daily   Equipment Currently Used at Home none   Functional Level Prior   Ambulation 0-->independent   Transferring 0-->independent   Toileting 0-->independent   Bathing 0-->independent   Communication 0-->understands/communicates without difficulty   Swallowing 0-->swallows foods/liquids without difficulty   Cognition 0 - no cognition issues reported   Fall history within last six months yes   Number of times patient has fallen within last six months 1   Which of the above functional risks had a recent onset or change? ambulation;transferring   General Information   Onset of Illness/Injury or Date of Surgery - Date 10/01/19   Referring Physician Johnnie Mcdonald PA-C   Patient/Family Goals Statement Did not endorse   Pertinent History of Current Problem (include personal factors and/or comorbidities that impact the POC) 59 year old female now s/p revision L2-pelvis PSF on 10/1 with Dr. Vasquez.    Precautions/Limitations fall precautions;spinal precautions  ([place] brace when OOB)   Weight-Bearing Status - LUE full weight-bearing   Weight-Bearing Status - RUE full weight-bearing   Weight-Bearing Status - LLE weight-bearing as tolerated   Weight-Bearing Status - RLE weight-bearing as tolerated   General " Observations Supine in bed upon arrival, agreeable to PT, lethargic   General Info Comments IV, central, torres, hemovac, ketamine, capno   Cognitive Status Examination   Orientation orientation to person, place and time   Level of Consciousness alert   Follows Commands and Answers Questions 100% of the time;able to follow multistep instructions   Personal Safety and Judgment intact   Memory intact   Pain Assessment   Patient Currently in Pain Yes, see Vital Sign flowsheet   Integumentary/Edema   Integumentary/Edema no deficits were identifed   Posture    Posture Forward head position;Protracted shoulders;Kyphosis   Posture Comments Mild sitting EOB and standing   Range of Motion (ROM)   ROM Comment Did not formally assess, demonstrates functional ROM with mobility   Strength   Strength Comments Did not formally assess, demonstrates functional strength with mobility   Bed Mobility   Bed Mobility Comments Completes supine<>sit transfer with use of log roll technique and modA   Transfer Skills   Transfer Comments Did not assess   Gait   Gait Comments Did not assess   Balance   Balance Comments Patient CGA to Walter for sitting balance, did not assess standing   Sensory Examination   Sensory Perception Comments bilateral LE numbness   General Therapy Interventions   Planned Therapy Interventions balance training;bed mobility training;gait training;ROM;strengthening;transfer training;risk factor education;home program guidelines;progressive activity/exercise   Clinical Impression   Criteria for Skilled Therapeutic Intervention yes, treatment indicated   PT Diagnosis impaired functional mobility   Influenced by the following impairments increased post-op pain, precautions, decreased activity tolerance   Functional limitations due to impairments impaired bed mobility, transfers and ambulation   Clinical Presentation Stable/Uncomplicated   Clinical Presentation Rationale 59 year old female now s/p revision L2-pelvis PSF on  "10/1 with Dr. Vasquez.    Clinical Decision Making (Complexity) Low complexity   Therapy Frequency 2x/day   Predicted Duration of Therapy Intervention (days/wks) 5 days   Anticipated Equipment Needs at Discharge front wheeled walker   Anticipated Discharge Disposition Home with Assist  (Will assess need for further therapy when mobile)   Risk & Benefits of therapy have been explained Yes   Patient, Family & other staff in agreement with plan of care Yes   [facility] TM \"6 Clicks\"   2016, Trustees of [facility], under license to [employer].  All rights reserved.   6 Clicks Short Forms Basic Mobility Inpatient Short Form   [facility]  \"6 Clicks\" V.2 Basic Mobility Inpatient Short Form   1. Turning from your back to your side while in a flat bed without using bedrails? 3 - A Little   2. Moving from lying on your back to sitting on the side of a flat bed without using bedrails? 2 - A Lot   3. Moving to and from a bed to a chair (including a wheelchair)? 2 - A Lot   4. Standing up from a chair using your arms (e.g., wheelchair, or bedside chair)? 2 - A Lot   5. To walk in hospital room? 2 - A Lot   6. Climbing 3-5 steps with a railing? 2 - A Lot   Basic Mobility Raw Score (Score out of 24.Lower scores equate to lower levels of function) 13   Total Evaluation Time   Total Evaluation Time (Minutes) 9     "

## 2019-10-02 NOTE — PLAN OF CARE
Discharge Planner PT   Patient plan for discharge: Rehab  Current status: PT evaluation complete and treatment initiated. Educated on spine precautions but patient quite lethargic. Completes rolling with Walter. Completes supine<>sit transfer with modA overall. Declined attempting standing but agreeable this afternoon. Patient appears very motivated just currently limited by significant L LE pain. Ended in bed with needs in reach.  Barriers to return to prior living situation: safety, independence, pain, precautions, decreased activity tolerance, stairs.  Recommendations for discharge: Rehab  Rationale for recommendations: To progress strength and activity tolerance and return to PLOF. Progress safety with mobility and stairs       Entered by: Janna Krishnan 10/02/2019 11:27 AM

## 2019-10-02 NOTE — OP NOTE
Procedure Date: 10/01/2019      PREOPERATIVE DIAGNOSES:   1.  Flat back deformity.   2.  High-grade L5 to S1 spondylolisthesis.   3.  Lumbar radiculopathy.      POSTOPERATIVE DIAGNOSES:   1.  Flat back deformity.   2.  High-grade L5 to S1 spondylolisthesis.   2.  Lumbar radiculopathy.      PROCEDURE PERFORMED:   1.  O-arm Stealth-guided L2 to pelvis posterior segmental fusion with local harvest autograft and allograft.   2.  Pelvic fixation.   3.  Three-column osteotomy at S1 for reduction of high-grade L5 to S1 spondylolisthesis.   4.  Modifier 22 for entire procedure due to difficult anatomy, with the procedure taking twice the normal length of time and difficulty.   5. L5 montez laminectomy for neural element decompression  6. L5-S1 transforaminal interbody fusion with local harvest autograft     SURGEON:  Evangelist Vasquez MD, Orthopedic Surgery.      CO-SURGEONS:  Halima Mike MD of Neurosurgery and Lam Terry MD of Orthopedic Surgery.      ASSISTANT:  Johnnie Mcdonald PA-C.  There was no qualified resident available to assist in surgery.      INDICATIONS FOR SURGERY:  Aleena Ontiveros is a very pleasant 59-year-old woman who, as an adolescent, had Carpio rods for fusion of adolescent idiopathic scoliosis.  She has severe straightening of her spine as a result and developed a high-grade spondylolisthesis at L5 to S1 with severe lumbar radiculopathy and debilitating back and leg pain.  After a full discussion of risks versus benefits, she elected for operative intervention.      OPERATIVE COURSE AND INTRAOPERATIVE FINDINGS:  The patient was identified, and informed consent was verified.  She was taken to operating room #17, where general endotracheal anesthesia was induced.  A Covington catheter was placed after the induction of anesthesia, and perioperative antibiotics were administered within 1 hour of skin incision.  A tranexamic acid bolus and infusion was begun prior to skin incision.  Neuromonitoring leads  were attached, and the patient was positioned prone on the ProAxis table with all extremity points appropriately padded.  The [employer] tongs were attached to the facial pressure, due to the severe flattening of her spine and the need for positioning in the prone position for a prolonged time.  Her knees were flexed on a sling so that the hips and knees were flexed 90 degrees in order to facilitate operative exposure of the high-grade spondylolisthesis.  An intraoperative timeout was performed after the patient was prepped and draped in standard sterile fashion.  The patient's preexisting caudal midline lumbar incision was extended in a more caudal manner, and monopolar electrocautery was used to dissect the subcutaneous tissues down to the fascia, which was incised in the midline.  Careful subperiosteal dissection was carried out from L1 through the sacrum to expose the posterior elements of L2 through S2.  Care was taken due to the midline bony defects at the sacrum due to the patient's sacral anatomy.  Self-retaining retractors were placed, and the spinous process tracker for O-arm Stealth was brought into the field, and a neuronavigation CT spin was obtained to register for placement of pedicle screws.  The [employer] 5.5, 6.0 Solera pedicle screw system was utilized at all levels except for left L2, where a dual-head screw was utilized and at the pelvic fixation, where ballast screw systems were utilized.  Screw sizes were as follows:  Left L2 was 5.5 x 50 mm, right L2 was 5.5 x 50 mm, left L3 was 5.5 x 50 mm, right L3 was 5.5 x 55 mm, bilateral L4 was 6.5 x 50 mm with reduction screw heads.  L5 was un-instrumented due to severely sclerotic pedicles.  S1 initially had screws placed, but the screws lost purchase during reduction of the sacral deformity, and therefore the S1 screws were later removed.  Pelvic fixation was 2 screws bilaterally with 8.5 x 80 screws bilaterally and 9.5 x 100 on the left and 9.5 x  90 on the right.  Instrumentation placement was verified with a check CT spin.  One of the more cephalad of the sacroiliac screws was traversing at its tip into the retroperitoneum and the iliacus muscle, but due to the need for large pelvic fixation, we deemed this to be within an acceptable corridor and did not revise it.      We then turned our attention towards deformity correction.  The atrophic posterior elements at L5 were identified as well as the pars defect.  The L5 lamina was removed with a Cunningham type laminectomy and saved for local harvest autograft.  There was a severe amount of ligamentous hypertrophy that was actually quite adherent to the dura.  We very carefully peeled off the ligament from the dura in order to expose the thecal sac, and then performed a lateral recess decompression to identify the exiting L5 nerve roots and performed complete bilateral L5 foraminotomies and foramenectomies to completely unroof the L5 nerve roots as far lateral as there was bone, including out to the sacral ala, in order to ensure the L5 nerve roots did not become entrapped with sacral reduction.  We then used direct nerve stimulation to obtain baseline signals for the L5 nerve roots and identified the right L5 nerve root to baseline about 1.4 and the left L5 nerve root at a baseline of about 4.  We performed a dome osteotomies at S1 using fluoroscopic navigation and used insert and rotate distractors to antevert the pelvis to correct some of the patient's pelvic retroversion.  Dr. Terry assisted with this part of the procedure because of the patient's severely aberrant anatomy and the need for 3-column osteotomy at the level of the sacrum. We placed local harvest autograft into the disc space to complete an L5-S1 transforaminal interbody fusion.       Rods were then placed, and we took long stitched x-rays.  We attempted further sacral reduction but were unable to do this without losing further screw purchase.  We  deemed the sagittal and coronal alignment to be acceptable.      We then irrigated the incision with antibiotic-impregnated saline and verified hemostasis.  We performed posterolateral element decortication from L2 through the sacrum and performed a posterior fusion with local harvest autograft, allograft, and bone morphogenic protein from L2 through the sacrum.  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      Vancomycin powder was placed in the incision as well as a subfascial Hemovac drain, which was tunneled to exit through a separate skin incision.  The incision was closed with 0 Vicryl sutures interrupted in the fascia, followed by 1-0 Vicryl running suture in the fascia, followed by 2-0 Vicryl inverted interrupted sutures in the subcutaneous layer, followed by 2-0 Vicryl running suture in the subcutaneous layer, followed by 4-0 Monocryl stitch on the skin, skin glue, and a sterile dressing.      All sponge and needle counts were correct prior to proceeding with closure and again at the conclusion of closure.      There were no intraoperative complications.      The final nerve stimulation dorsals were unchanged from the right L5 nerve and were slightly increased but still obtainable from the left L5 nerve root at 7.  The patient was then positioned supine on the transfer gurney and taken to the PACU in stable condition.      A dual-surgeon approach was necessitated for this deformity and also necessitated the expertise of the third surgeon due to the severely aberrant anatomy and need for 3-column osteotomy at the level of the sacrum.         DULCE GIBBS MD             D: 10/01/2019   T: 10/02/2019   MT: DYLAN      Name:     LOREN GAYLE   MRN:      [id number]        Account:        [id number]   :      1960           Procedure Date: 10/01/2019      Document: [id number]

## 2019-10-02 NOTE — CONSULTS
Inpatient Pain Management Service: Consultation      DATE OF CONSULT: October 2, 2019      REASON FOR PAIN CONSULTATION:  Aleena Ontiveros is a 59 year old female I am seeing in consultation at the request of Her-Truman Castaneda PA-C for evaluation and recommendations for her acute post operative lower back s/p Transforaminal Lumbar Interbody Fusion Three Column Osteotomy L5-S1, Posterior Spinal Fusion L2-Pelvis, use of  BMP bone graft on 10/1/19 with Dr. Terry, Dr. Vasquez, Dr. Mike.    .       CHIEF PAIN COMPLAINT:  Left lateral leg      ASSESSMENT:   1. Acute post operative pain in the left lateral leg s/pTransforaminal Lumbar Interbody Fusion Three Column Osteotomy L5-S1, Posterior Spinal Fusion L2-Pelvis, use of  BMP bone graft on 10/1/19 with Dr. Terry, Dr. Vasquez, Dr. Mike. Patient has h/o adolescent idiopathic scoliosis and previously underwent T4-L4 fusion with Carpio rods during her teenage years.  2. H/o hypertension  3. H/o hypothyroidism s/p thyroidectomy  4. H/o asthma  5. H/o OA s/p TKA    -- Outpatient opioid requirements prior to admission: OME = none.   reviewed.    9/26/19 gabapentin 600mg tabs #150 for 33days  8/27/19 gabapentin 600mg tabs #150 for 33days    Primary Care Provider: Arabella Conner  Chronic Pain Provider: [facility] since July 2019, titrating on gabapentin    TREATMENT RECOMMENDATIONS/PLAN:   1. May continue with PCA Dilaudid 0.2-0.3mg IV Q 10 minutes for 10/2/19.    When patient is ready to transition off PCA Dilaudid to oral opioid regimen,    would recommend oxycodone 5-10mg PO Q 3 hours PRN  with  Dilaudid 0.2-0.4mg IV Q 2 hours PRN for breakthrough pain.    2. Start lidocaine patch, 1-3 patches TD Q 24 hours, 12 hours on and 12 hours off.  3. Start menthol patch 5%, 1 patch TD Q 8 hours while lidocaine patch is off.  4. Continue PTA dose of gabapentin 900mg PO TID.  5. Continue Robaxin 1000mg PO Q 6 hours. (helpful for  "spasms)  6. Continue acetaminophen 975mg PO Q 8 hours.  7. Bowel regimen to prevent opioid induced constipation. Last BM on 9/30/19.    8. Of note, she states that PTA, a TENS unit has been very helpful for her spine pain.  If needed and okay by primary team, consider using TENS unit for additional pain relief.       Pain Service will Continue to follow at this time.     Recommendations were discussed with ortho team and pain team  Plan was reviewed by the Pain Service consisting of Héctor Matta MD    Thank you for consulting the Inpatient Pain Management Service.   The above recommendations are to be acted upon at the primary team s discretion.    To reach us:  Mon - Friday 8 AM - 3 PM: Pager [phone] (Text Page)  After hours, weekends and holidays: Primary service should call [phone] for the on-call pain specialist    HISTORY OF PRESENT ILLNESS: Aleena Ontiveros is a 59 year old female with history of hypertension, asthma, OA, hypothyroidism, adolescent idiopathic scoliosis s/p T4-S1 fusion with Carpio rods during teenage years who was admitted on 10/1/19 forTransforaminal Lumbar Interbody Fusion Three Column Osteotomy L5-S1, Posterior Spinal Fusion L2-Pelvis, use of  BMP bone graft on 10/1/19 with Dr. Terry, Dr. Vasquez, Dr. Mike.     Ms. Ontiveros [place] [facility] [place]  She is tired, sleepy but easily roused.  She is conversant and answers questions appropriately.  She states that her lower back incisional pain is \"okay.\"  She is mainly complaining of post operative left lateral leg pain that is new since the surgery which the primary team is aware.  She rates the pain 4/10 on VAS and is tolerable.  PTA, she states pain was \"terrible\" about 8/10 on VAS but can reduce to 4/10 on VAS with TENS unit use.  She is opioid naive.    Today, we reviewed the current medication regimen that she has available to use of pain.  She would like to continue to use the ketamine " infusion and  PCA Dilaudid.  They both seem to help with her pain management. She states that she sometimes forgets to use the PCA but is able to demonstrate proper use.  Discussed that possibility of transitioning off PCA Dilaudid to oral opioid regimen with IV back up but she prefers the PCA for today.  Indication, risks and benefits of medications especially the opioids were reviewed with her.    PAIN HISTORY:   Location: left lateral leg  Duration: constant  Pain intensity: 4/10  Quality of the pain: pain  Aggravating factors: movements  Relieving factors : rest, pain medication    CAPA (Clinically Aligned Pain Assessment)  Comfort (How is your pain?): Tolerable with discomfort  Change in Pain (Since your last medication/intervention?): About the same  Pain Control (How are your pain treatments working?): effective pain control  Functioning (Are you able to do activities to get better?) : Pain keeps me from doing most of what I need to do  Sleep (Does your pain management allow you to sleep or rest?): Awake with pain due to spasms      FUNCTIONAL STATUS:  Change:      unchanged  Oral intake:     Regular  Bowel function:    Normal. Last BM on 9/30/19  Activity level:     Bedrest  Sleep:      Did not sleep well due to pain  Mood:      Tired, sleepy, very polite and cooperative    REVIEW OF 10 BODY SYSTEMS: 10 point ROS of systems including Constitutional, Eyes, Respiratory, Cardiovascular, Gastroenterology, Genitourinary, Integumentary, Musculoskeletal, Psychiatric were all negative except for pertinent positives noted in my HPI.       INPATIENT MEDICATIONS PERTINENT TO PAIN CONSULT:   Medications related to Pain Management (From now, onward)    Start     Dose/Rate Route Frequency Ordered Stop    10/04/19 0000  acetaminophen (TYLENOL) tablet 650 mg      650 mg Oral EVERY 4 HOURS PRN 10/01/19 1732      10/03/19 0900  bisacodyl (DULCOLAX) Suppository 10 mg      10 mg Rectal ONCE 10/02/19 0706      10/02/19 0800   methocarbamol (ROBAXIN) tablet 1,000 mg      1,000 mg Oral EVERY 6 HOURS 10/02/19 0647      10/02/19 0800  senna-docusate (SENOKOT-S/PERICOLACE) 8.6-50 MG per tablet 2 tablet      2 tablet Oral 2 TIMES DAILY 10/02/19 0706      10/02/19 0800  polyethylene glycol (MIRALAX/GLYCOLAX) Packet 17 g      17 g Oral DAILY 10/02/19 0706      10/02/19 0700  HYDROmorphone (DILAUDID) PCA 0.2 mg/mL OPIOID TOLERANT       Intravenous CONTINUOUS 10/02/19 0649      10/02/19 0647  diazepam (VALIUM) tablet 2 mg      2 mg Oral EVERY 6 HOURS PRN 10/02/19 0647      10/01/19 2000  gabapentin (NEURONTIN) capsule 900 mg      900 mg Oral 3 TIMES DAILY 10/01/19 1941      10/01/19 1941  senna-docusate (SENOKOT-S/PERICOLACE) 8.6-50 MG per tablet 2 tablet      2 tablet Oral DAILY PRN 10/01/19 1941      10/01/19 1941  bisacodyl (DULCOLAX) Suppository 10 mg      10 mg Rectal DAILY PRN 10/01/19 1941      10/01/19 1927  HYDROmorphone (PF) (DILAUDID) injection 0.3-0.5 mg      0.3-0.5 mg Intravenous EVERY 3 HOURS PRN 10/01/19 1927      10/01/19 1905  diphenhydrAMINE (BENADRYL) capsule 25 mg      25 mg Oral EVERY 6 HOURS PRN 10/01/19 1905      10/01/19 1905  diphenhydrAMINE (BENADRYL) injection 25 mg      25 mg Intravenous EVERY 6 HOURS PRN 10/01/19 1905      10/01/19 1905  lidocaine 1 % 0.1-1 mL      0.1-1 mL Other EVERY 1 HOUR PRN 10/01/19 1905      10/01/19 1905  lidocaine (LMX4) cream       Topical EVERY 1 HOUR PRN 10/01/19 1905      10/01/19 1745  acetaminophen (TYLENOL) tablet 975 mg      975 mg Oral EVERY 8 HOURS 10/01/19 1732 10/04/19 1744    10/01/19 1732  oxyCODONE (ROXICODONE) tablet 5-10 mg      5-10 mg Oral EVERY 3 HOURS PRN 10/01/19 1732      10/01/19 1630  ketamine (KETALAR) 2 mg/mL in sodium chloride 0.9 % 50 mL ANALGESIA infusion      5-10 mg/hr  2.5-5 mL/hr  Intravenous CONTINUOUS 10/01/19 1615              CURRENT MEDICATIONS:   Current Facility-Administered Medications Ordered in Epic   Medication Dose Route Frequency Provider Last  Rate Last Dose     [START ON 10/4/2019] acetaminophen (TYLENOL) tablet 650 mg  650 mg Oral Q4H PRN Johnnie Mcdonald PA-C         acetaminophen (TYLENOL) tablet 975 mg  975 mg Oral Q8H Johnnie Mcdonald PA-C   975 mg at 10/02/19 1003     albuterol (PROAIR HFA/PROVENTIL HFA/VENTOLIN HFA) 108 (90 Base) MCG/ACT inhaler 2 puff  2 puff Inhalation Q6H PRN Johnnie Mcdonald PA-C         benzocaine-menthol (CEPACOL) 15-3.6 MG lozenge 1 lozenge  1 lozenge Buccal Q1H PRN Johnnie Mcdonald PA-C         [START ON 10/3/2019] bisacodyl (DULCOLAX) Suppository 10 mg  10 mg Rectal Once Truman Torres PA-C         bisacodyl (DULCOLAX) Suppository 10 mg  10 mg Rectal Daily PRN Johnnie Mcdonald PA-C         ceFAZolin (ANCEF) 1 g vial to attach to  ml bag for ADULT or 50 ml bag for PEDS  1 g Intravenous Q8H Johnnie Mcdonald PA-C   1 g at 10/02/19 0759     dextrose 5% and 0.45% NaCl + KCl 20 mEq/L infusion   Intravenous Continuous Johnnie Mcdonald PA-C   Stopped at 10/02/19 0738     diazepam (VALIUM) tablet 2 mg  2 mg Oral Q6H PRN Truman Torres PA-C         diphenhydrAMINE (BENADRYL) capsule 25 mg  25 mg Oral Q6H PRN Johnnie Mcdonald PA-C        Or     diphenhydrAMINE (BENADRYL) injection 25 mg  25 mg Intravenous Q6H PRN Johnnie Mcdonald PA-C         estradiol (ESTRACE) cream 2 g  2 g Vaginal At Bedtime Johnnie Mcdonald PA-C         ranitidine (ZANTAC) tablet 150 mg  150 mg Oral Q12H Johnnie Mcdonald PA-C   150 mg at 10/02/19 0829    Or     famotidine (PEPCID) infusion 20 mg  20 mg Intravenous Q12H Johnnie Mcdonald PA-C 200 mL/hr at 10/01/19 2022 20 mg at 10/01/19 2022     fexofenadine (ALLEGRA) tablet 180 mg  180 mg Oral QAM Johnnie Mcdonald PA-C   180 mg at 10/02/19 0828     fluticasone (FLONASE) 50 MCG/ACT spray 1 spray  1 spray Both Nostrils BID Johnnie Mcdonald PA-C   1 spray at 10/02/19 0829      fluticasone-vilanterol (BREO ELLIPTA) 200-25 MCG/INH inhaler 1 puff  1 puff Inhalation Daily Evangelist Vasquez MD   1 puff at 10/02/19 0829     gabapentin (NEURONTIN) capsule 900 mg  900 mg Oral TID Johnnie Mcdonald PA-C   900 mg at 10/02/19 0829     HYDROmorphone (DILAUDID) PCA 0.2 mg/mL OPIOID TOLERANT   Intravenous Continuous Truman Torres PA-C         HYDROmorphone (PF) (DILAUDID) injection 0.3-0.5 mg  0.3-0.5 mg Intravenous Q3H PRN Gildardo Vera MD   0.5 mg at 10/01/19 2006     ketamine (KETALAR) 2 mg/mL in sodium chloride 0.9 % 50 mL ANALGESIA infusion  5-10 mg/hr Intravenous Continuous Lev Silvestre MD   Stopped at 10/02/19 1148     levothyroxine (SYNTHROID/LEVOTHROID) tablet 125 mcg  125 mcg Oral QAM Johnnie Mcdonald PA-C   125 mcg at 10/02/19 0829     lidocaine (LMX4) cream   Topical Q1H PRN Johnnie Mcdonald PA-C         lidocaine 1 % 0.1-1 mL  0.1-1 mL Other Q1H PRN Johnnie Mcdonald PA-C         magnesium sulfate 4 g in 100 mL sterile water (premade)  4 g Intravenous Q4H PRN Gildardo Vera MD         methocarbamol (ROBAXIN) tablet 1,000 mg  1,000 mg Oral Q6H Truman Torres PA-C   1,000 mg at 10/02/19 1318     metoclopramide (REGLAN) tablet 10 mg  10 mg Oral Q6H PRN Johnnie Mcdonald PA-C        Or     metoclopramide (REGLAN) injection 10 mg  10 mg Intravenous Q6H PRN Johnnie Mcdonald PA-C         montelukast (SINGULAIR) tablet 10 mg  10 mg Oral At Bedtime Johnnie Mcdonald PA-C         naloxone (NARCAN) injection 0.1-0.4 mg  0.1-0.4 mg Intravenous Q2 Min PRN PamcarmelJohnnie chandra PA-C         ondansetron (ZOFRAN-ODT) ODT tab 4 mg  4 mg Oral Q6H PRN Johnnie Mcdonald PA-C        Or     ondansetron (ZOFRAN) injection 4 mg  4 mg Intravenous Q6H PRN PamJohnnie adams PA-C         oxyCODONE (ROXICODONE) tablet 5-10 mg  5-10 mg Oral Q3H PRN Johnnie Mcdonald PA-C         polyethylene glycol  (MIRALAX/GLYCOLAX) Packet 17 g  17 g Oral Daily Truman TorresNASEEM   17 g at 10/02/19 0830     potassium chloride (KLOR-CON) Packet 20-40 mEq  20-40 mEq Oral or Feeding Tube Q2H PRN Gildardo Vera MD         potassium chloride 10 mEq in 100 mL intermittent infusion with 10 mg lidocaine  10 mEq Intravenous Q1H PRN Gildardo Vera MD         potassium chloride 10 mEq in 100 mL sterile water intermittent infusion (premix)  10 mEq Intravenous Q1H PRN Gildardo Vera MD         potassium chloride 20 mEq in 50 mL intermittent infusion  20 mEq Intravenous Q1H PRN Gildardo Vera MD         potassium chloride ER (K-DUR/KLOR-CON M) CR tablet 20-40 mEq  20-40 mEq Oral Q2H PRN Gildardo Vera MD         prochlorperazine (COMPAZINE) injection 10 mg  10 mg Intravenous Q6H PRN PamJohnnie adams PA-C        Or     prochlorperazine (COMPAZINE) tablet 10 mg  10 mg Oral Q6H PRN PamJohnnie adams PA-C        Or     prochlorperazine (COMPAZINE) Suppository 25 mg  25 mg Rectal Q12H PRN Johnnie Mcdonald PA-C         senna-docusate (SENOKOT-S/PERICOLACE) 8.6-50 MG per tablet 2 tablet  2 tablet Oral BID Misael TorresNASEEM garcia   2 tablet at 10/02/19 0829     senna-docusate (SENOKOT-S/PERICOLACE) 8.6-50 MG per tablet 2 tablet  2 tablet Oral Daily PRN PamJohnnie adams PA-C         sodium chloride (PF) 0.9% PF flush 3 mL  3 mL Intracatheter q1 min prn Johnnie Mcdonald PA-C         sodium chloride (PF) 0.9% PF flush 3 mL  3 mL Intracatheter Q8H Johnnie Mcdonald PA-C   3 mL at 10/02/19 1149     No current Epic-ordered outpatient medications on file.           HOME/PREVIOUS MEDICATIONS:   Prior to Admission medications    Medication Sig Start Date End Date Taking? Authorizing Provider   acetaminophen (TYLENOL) 500 MG tablet Take 1,000 mg by mouth every 6 hours as needed for mild pain   Yes Reported, Patient   albuterol (PROAIR HFA/PROVENTIL HFA/VENTOLIN HFA) 108 (90 BASE) MCG/ACT Inhaler Inhale 2  puffs into the lungs every 6 hours as needed for shortness of breath / dyspnea or wheezing   Yes Reported, Patient   celecoxib (CELEBREX) 400 MG capsule Take 200 mg by mouth every morning    Yes Reported, Patient   cyclobenzaprine (FLEXERIL) 10 MG tablet Take 1 tablet (10 mg) by mouth 3 times daily as needed for muscle spasms 8/15/19  Yes Evangelist Vasquez MD   estradiol (ESTRACE) 0.1 MG/GM cream Place 2 g vaginally At Bedtime    Yes Reported, Patient   Fexofenadine HCl (ALLEGRA PO) Take 180 mg by mouth every morning    Yes Reported, Patient   fluticasone (FLONASE) 50 MCG/ACT spray Spray 1 spray into both nostrils 2 times daily    Yes Reported, Patient   fluticasone-salmeterol (ADVAIR) 500-50 MCG/DOSE diskus inhaler Inhale 1 puff into the lungs 2 times daily   Yes Reported, Patient   gabapentin (NEURONTIN) 600 MG tablet Take 900mg (1.5 tablets) three times per day 8/27/19  Yes Ana Luna MD   Levothyroxine Sodium (SYNTHROID PO) Take 125 mcg by mouth every morning    Yes Reported, Patient   lisinopril (PRINIVIL/ZESTRIL) 40 MG tablet Take 1 tablet (40 mg) by mouth daily  Patient taking differently: Take 40 mg by mouth every morning  12/16/17  Yes Arabella Jara MD   Montelukast Sodium (SINGULAIR PO) Take 10 mg by mouth At Bedtime    Yes Reported, Patient   Multiple Vitamins-Minerals (DAILY DENIZ MAXIMUM MULTIVITAMIN PO) Take 1 packet by mouth 3 times daily DOTERRA   Yes Reported, Patient   Omega-3 Fatty Acids (FISH OIL PO) Take 1 capsule by mouth every morning    Yes Reported, Patient   bisacodyl (DULCOLAX) 10 MG Suppository Place 1 suppository (10 mg) rectally daily as needed for constipation 12/16/17   Arabella Jara MD   order for DME Equipment being ordered: TENS.    TENS UNIT Ordered- Please call your insurance to Verify coverage and locations to  the device.     Please Dispense Device, leads, pads, wires, and all other necessary equipment that is needed for use.     Length of need:  36 months. 19   Edy Huerta MD   polyethylene glycol (MIRALAX/GLYCOLAX) powder Take 17 g by mouth 2 times daily as needed     Reported, Patient   senna-docusate (SENOKOT-S;PERICOLACE) 8.6-50 MG per tablet Take 2 tablets by mouth daily as needed     Reported, Patient                 ALLERGIES:    Allergies   Allergen Reactions     Adhesive Tape      Erythromycin Nausea and Vomiting     Pills cause vomiting,             PAST MEDICAL AND PSYCHIATRIC HISTORY:    Past Medical History:   Diagnosis Date     Arthritis     osteoarthritis of both knee     Asthma      Hypertension      PONV (postoperative nausea and vomiting)      Thyroid disease      Uncomplicated asthma            PAST SURGICAL HISTORY:   Past Surgical History:   Procedure Laterality Date     ABDOMEN SURGERY  ,         ARTHROPLASTY KNEE Right 8/10/2017    Procedure: ARTHROPLASTY KNEE;  RIGHT TOTAL KNEE ARTHROPLASTY ;  Surgeon: Grady Alvarez MD;  Location:  OR     ARTHROPLASTY KNEE Left 2017    Procedure: ARTHROPLASTY KNEE;  LEFT TOTAL KNEE ARTHROPLASTY;  Surgeon: Grady Alvarez MD;  Location:  OR     BACK SURGERY      spinal fusion     ENT SURGERY      tonsilectomy     GYN SURGERY  11    hysterectomy     OPTICAL TRACKING SYSTEM FUSION POSTERIOR SPINE THORACIC THREE+ LEVELS N/A 10/1/2019    Procedure: Transforaminal Lumbar Interbody Fusion Three Column Osteotomy L5-S1, Posterior Spinal Fusion L2-Pelvis. Use of BMP bone graft;  Surgeon: Evangelist Vasquez MD;  Location:  OR     ORTHOPEDIC SURGERY      sinal fusion,hand     thumb mass resection       THYROIDECTOMY  2014    Procedure: THYROIDECTOMY;  Surgeon: Krzysztof Marquez MD;  [facility]           FAMILY HISTORY: family history includes Breast Cancer in her mother; C.A.D. in her father; Cancer - colorectal in her father; Cerebrovascular Disease in her father; Thyroid Disease in her brother and sister.      HEALTH & LIFESTYLE  PRACTICES:   Tobacco:  reports that she has never smoked. She has never used smokeless tobacco.  Alcohol:  reports current alcohol use.  Illicit drugs:  reports no history of drug use.      SOCIAL HISTORY:   Social History     Socioeconomic History     Marital status:      Spouse name: Not on file     Number of children: Not on file     Years of education: Not on file     Highest education level: Not on file   Occupational History     Not on file   Social Needs     Financial resource strain: Not on file     Food insecurity:     Worry: Not on file     Inability: Not on file     Transportation needs:     Medical: Not on file     Non-medical: Not on file   Tobacco Use     Smoking status: Never Smoker     Smokeless tobacco: Never Used   Substance and Sexual Activity     Alcohol use: Yes     Binge frequency: Patient refused     Comment: 1-2 per month     Drug use: No     Sexual activity: Not on file   Lifestyle     Physical activity:     Days per week: Not on file     Minutes per session: Not on file     Stress: Not on file   Relationships     Social connections:     Talks on phone: Not on file     Gets together: Not on file     Attends [employer] service: Not on file     Active member of club or organization: Not on file     Attends meetings of clubs or organizations: Not on file     Relationship status: Not on file     Intimate partner violence:     Fear of current or ex partner: Not on file     Emotionally abused: Not on file     Physically abused: Not on file     Forced sexual activity: Not on file   Other Topics Concern     Parent/sibling w/ CABG, MI or angioplasty before 65F 55M? Not Asked   Social History Narrative     Not on file       LABORATORY VALUES:   Last Basic Metabolic Panel:  Lab Results   Component Value Date     10/02/2019      Lab Results   Component Value Date    POTASSIUM 4.2 10/02/2019     Lab Results   Component Value Date    CHLORIDE 107 10/02/2019     Lab Results   Component Value  Date    SIXTO 7.0 10/02/2019     Lab Results   Component Value Date    CO2 28 10/02/2019     Lab Results   Component Value Date    BUN 6 10/02/2019     Lab Results   Component Value Date    CR 0.46 10/02/2019     Lab Results   Component Value Date     10/02/2019       CBC results:  Lab Results   Component Value Date    WBC 7.7 10/02/2019     Lab Results   Component Value Date    HGB 10.0 10/02/2019     Lab Results   Component Value Date    HCT 30.2 10/02/2019     Lab Results   Component Value Date     10/02/2019       DIAGNOSTIC TESTS:       Labs above reviewed as well as additional relevant diagnostic studies from the EPIC record.       PHYSICAL EXAMINATION:  VITAL SIGNS:  B/P: 91/50, T: 98.2, P: 76, R: 16       CONSTITUTIONAL/GENERAL APPEARANCE: AAOx3. Conversant.  EYES: EOMI, sclerae clear  ENT/NECK: neck is supple  RESPIRATORY: CTA, non labored breathing  CARDIOVASCULAR: S1 and S2 appreciated  GI:soft, nontender, bowel sounds present  MUSCULOSKELETAL/BACK/SPINE/EXTREMITIES: Moving UE and LE spontaneously. Dorsiflexion and plantarflexion 5/5 bilaterally.     NEURO:  sensation to light touch is less on the LLE than the RLE (primary team aware)  SKIN/VASCULAR EXAM:  Dry and warm.  PSYCHIATRIC/BEHAVIORAL/OBSERVATIONS:  No objective signs of pain observed during our interview.   Judgment/Insight -fair   Orientation - x3   Memory -good   Mood and affect - calm, pleasant, cooperative            Total face to face time spent was 40 minutes, and more than 50% of face to face time was spent in counseling and/or coordination of care regarding principles of multidisciplinary care which included discussions on self care, rehabilitation, psychological aspects of pain, medication management and interventions.    Kenna Jones PA-C  October 2, 2019, 1:46 PM  Inpatient Pain Management Service

## 2019-10-02 NOTE — PROGRESS NOTES
S: Patient seen today at [facility] for an eval for a [place] Hyperextension brace as ordered by Johnnie Mcdonald PA-C    O/G: reduce spinal flexion    A: The patient was fit with a thoracolumbosacral anterior hyperextension orthosis Thorp size Large short.  Multiple adjustments made to customize the fit of the brace on the patient. The pt was instructed on donning/doffing and proper care of the orthisis was explained.  The fitting of the orthosis was fit per manufactures recommendations.  Upon completion of initial fitting, pt had no complaints and the fitting appears to be satisfactory at this time.    P: I have instructed pt/staff to contact our facility with any questions and/or concerns.   JUANY Olmedo.

## 2019-10-02 NOTE — PROGRESS NOTES
"SPIRITUAL HEALTH SERVICES  [facility] ([facility] [place]) PACU  ON-CALL VISIT     REFERRAL SOURCE: I did try to visit patient Aleena again as I did this morning per follow up visit.      Pt has done her surgery and she was in PACU waiting to transfer to the other unit. Pt was in deep sleep when I visit her but pt daughter and  was with the pt. Pt daughter appreciated the  both visit attempts. Pt daughter said, \"when my mom wake up from her sleep, I will let her know about your visit attempts. She is in good condition now but keep her in your prayer.\" I promised her that I do remember her in my prayer.     PLAN: The unit  will follow up the pt as needed.     Isrrael Chadwick M.Div. ([place]), M.Th., D.Min., [facility]  Staff   Pager [phone]    "

## 2019-10-03 ENCOUNTER — APPOINTMENT (OUTPATIENT)
Dept: PHYSICAL THERAPY | Facility: CLINIC | Age: 59
DRG: 460 | End: 2019-10-03
Attending: ORTHOPAEDIC SURGERY
Payer: COMMERCIAL

## 2019-10-03 ENCOUNTER — APPOINTMENT (OUTPATIENT)
Dept: OCCUPATIONAL THERAPY | Facility: CLINIC | Age: 59
DRG: 460 | End: 2019-10-03
Attending: ORTHOPAEDIC SURGERY
Payer: COMMERCIAL

## 2019-10-03 LAB
ANION GAP SERPL CALCULATED.3IONS-SCNC: 5 MMOL/L (ref 3–14)
BUN SERPL-MCNC: 5 MG/DL (ref 7–30)
CALCIUM SERPL-MCNC: 7.7 MG/DL (ref 8.5–10.1)
CHLORIDE SERPL-SCNC: 104 MMOL/L (ref 94–109)
CO2 SERPL-SCNC: 31 MMOL/L (ref 20–32)
CREAT SERPL-MCNC: 0.55 MG/DL (ref 0.52–1.04)
ERYTHROCYTE [DISTWIDTH] IN BLOOD BY AUTOMATED COUNT: 13.2 % (ref 10–15)
GFR SERPL CREATININE-BSD FRML MDRD: >90 ML/MIN/{1.73_M2}
GLUCOSE BLDC GLUCOMTR-MCNC: 130 MG/DL (ref 70–99)
GLUCOSE SERPL-MCNC: 102 MG/DL (ref 70–99)
HCT VFR BLD AUTO: 27.4 % (ref 35–47)
HGB BLD-MCNC: 8.8 G/DL (ref 11.7–15.7)
MCH RBC QN AUTO: 28.4 PG (ref 26.5–33)
MCHC RBC AUTO-ENTMCNC: 32.1 G/DL (ref 31.5–36.5)
MCV RBC AUTO: 88 FL (ref 78–100)
PLATELET # BLD AUTO: 131 10E9/L (ref 150–450)
POTASSIUM SERPL-SCNC: 4.1 MMOL/L (ref 3.4–5.3)
RBC # BLD AUTO: 3.1 10E12/L (ref 3.8–5.2)
SODIUM SERPL-SCNC: 140 MMOL/L (ref 133–144)
WBC # BLD AUTO: 8 10E9/L (ref 4–11)

## 2019-10-03 PROCEDURE — 36415 COLL VENOUS BLD VENIPUNCTURE: CPT | Performed by: PHYSICIAN ASSISTANT

## 2019-10-03 PROCEDURE — 12000001 ZZH R&B MED SURG/OB UMMC

## 2019-10-03 PROCEDURE — 25000128 H RX IP 250 OP 636: Performed by: NURSE PRACTITIONER

## 2019-10-03 PROCEDURE — 80048 BASIC METABOLIC PNL TOTAL CA: CPT | Performed by: PHYSICIAN ASSISTANT

## 2019-10-03 PROCEDURE — 25800030 ZZH RX IP 258 OP 636: Performed by: INTERNAL MEDICINE

## 2019-10-03 PROCEDURE — 97535 SELF CARE MNGMENT TRAINING: CPT | Mod: GO | Performed by: OCCUPATIONAL THERAPIST

## 2019-10-03 PROCEDURE — 25000132 ZZH RX MED GY IP 250 OP 250 PS 637: Performed by: PHYSICIAN ASSISTANT

## 2019-10-03 PROCEDURE — 97530 THERAPEUTIC ACTIVITIES: CPT | Mod: GP

## 2019-10-03 PROCEDURE — 25000132 ZZH RX MED GY IP 250 OP 250 PS 637: Performed by: INTERNAL MEDICINE

## 2019-10-03 PROCEDURE — 85027 COMPLETE CBC AUTOMATED: CPT | Performed by: PHYSICIAN ASSISTANT

## 2019-10-03 PROCEDURE — 25000131 ZZH RX MED GY IP 250 OP 636 PS 637: Performed by: STUDENT IN AN ORGANIZED HEALTH CARE EDUCATION/TRAINING PROGRAM

## 2019-10-03 PROCEDURE — 99233 SBSQ HOSP IP/OBS HIGH 50: CPT | Performed by: INTERNAL MEDICINE

## 2019-10-03 PROCEDURE — 99207 ZZC NO CHARGE FIRST FOLLOW UP PS: CPT

## 2019-10-03 PROCEDURE — 97165 OT EVAL LOW COMPLEX 30 MIN: CPT | Mod: GO | Performed by: OCCUPATIONAL THERAPIST

## 2019-10-03 PROCEDURE — 00000146 ZZHCL STATISTIC GLUCOSE BY METER IP

## 2019-10-03 PROCEDURE — 25000132 ZZH RX MED GY IP 250 OP 250 PS 637: Performed by: NURSE PRACTITIONER

## 2019-10-03 PROCEDURE — 25000132 ZZH RX MED GY IP 250 OP 250 PS 637: Performed by: ORTHOPAEDIC SURGERY

## 2019-10-03 RX ORDER — METHOCARBAMOL 500 MG/1
500 TABLET, FILM COATED ORAL 4 TIMES DAILY PRN
Status: DISCONTINUED | OUTPATIENT
Start: 2019-10-03 | End: 2019-10-04

## 2019-10-03 RX ORDER — POLYETHYLENE GLYCOL 3350 17 G/17G
17 POWDER, FOR SOLUTION ORAL DAILY
Qty: 72 PACKET | Refills: 0 | Status: SHIPPED | OUTPATIENT
Start: 2019-10-04 | End: 2019-10-11

## 2019-10-03 RX ORDER — HYDROMORPHONE HCL/0.9% NACL/PF 0.2MG/0.2
.2-.4 SYRINGE (ML) INTRAVENOUS
Status: DISCONTINUED | OUTPATIENT
Start: 2019-10-03 | End: 2019-10-07

## 2019-10-03 RX ORDER — METHYLPREDNISOLONE 4 MG/1
4 TABLET ORAL ONCE
Status: COMPLETED | OUTPATIENT
Start: 2019-10-03 | End: 2019-10-03

## 2019-10-03 RX ORDER — METHYLPREDNISOLONE 4 MG/1
4 TABLET ORAL
Status: COMPLETED | OUTPATIENT
Start: 2019-10-04 | End: 2019-10-08

## 2019-10-03 RX ORDER — METHYLPREDNISOLONE 4 MG/1
4 TABLET ORAL AT BEDTIME
Status: COMPLETED | OUTPATIENT
Start: 2019-10-05 | End: 2019-10-07

## 2019-10-03 RX ORDER — ALBUTEROL SULFATE 90 UG/1
2 AEROSOL, METERED RESPIRATORY (INHALATION) EVERY 4 HOURS PRN
Status: DISCONTINUED | OUTPATIENT
Start: 2019-10-03 | End: 2019-10-11 | Stop reason: HOSPADM

## 2019-10-03 RX ORDER — OXYCODONE HYDROCHLORIDE 5 MG/1
5-10 TABLET ORAL
Qty: 50 TABLET | Refills: 0 | Status: SHIPPED | OUTPATIENT
Start: 2019-10-03 | End: 2019-10-11

## 2019-10-03 RX ORDER — METHOCARBAMOL 500 MG/1
500 TABLET, FILM COATED ORAL 4 TIMES DAILY PRN
Qty: 60 TABLET | Refills: 0 | Status: SHIPPED | OUTPATIENT
Start: 2019-10-03 | End: 2019-10-11

## 2019-10-03 RX ORDER — SODIUM CHLORIDE, SODIUM LACTATE, POTASSIUM CHLORIDE, CALCIUM CHLORIDE 600; 310; 30; 20 MG/100ML; MG/100ML; MG/100ML; MG/100ML
INJECTION, SOLUTION INTRAVENOUS CONTINUOUS
Status: DISCONTINUED | OUTPATIENT
Start: 2019-10-03 | End: 2019-10-05

## 2019-10-03 RX ORDER — METHYLPREDNISOLONE 4 MG/1
4 TABLET ORAL
Status: COMPLETED | OUTPATIENT
Start: 2019-10-04 | End: 2019-10-05

## 2019-10-03 RX ORDER — METHYLPREDNISOLONE 8 MG/1
8 TABLET ORAL ONCE
Status: COMPLETED | OUTPATIENT
Start: 2019-10-03 | End: 2019-10-03

## 2019-10-03 RX ORDER — KETOROLAC TROMETHAMINE 15 MG/ML
15 INJECTION, SOLUTION INTRAMUSCULAR; INTRAVENOUS EVERY 6 HOURS
Status: DISCONTINUED | OUTPATIENT
Start: 2019-10-03 | End: 2019-10-04

## 2019-10-03 RX ORDER — ALBUTEROL SULFATE 90 UG/1
2 AEROSOL, METERED RESPIRATORY (INHALATION) 4 TIMES DAILY PRN
Status: DISCONTINUED | OUTPATIENT
Start: 2019-10-03 | End: 2019-10-03

## 2019-10-03 RX ORDER — AMOXICILLIN 250 MG
2 CAPSULE ORAL 2 TIMES DAILY
Qty: 200 TABLET | Refills: 0 | Status: SHIPPED | OUTPATIENT
Start: 2019-10-04 | End: 2019-10-11

## 2019-10-03 RX ORDER — ALBUTEROL SULFATE 90 UG/1
2 AEROSOL, METERED RESPIRATORY (INHALATION) 4 TIMES DAILY
Status: DISCONTINUED | OUTPATIENT
Start: 2019-10-03 | End: 2019-10-11 | Stop reason: HOSPADM

## 2019-10-03 RX ORDER — METHYLPREDNISOLONE 8 MG/1
8 TABLET ORAL AT BEDTIME
Status: COMPLETED | OUTPATIENT
Start: 2019-10-03 | End: 2019-10-04

## 2019-10-03 RX ORDER — METHYLPREDNISOLONE 4 MG/1
4 TABLET ORAL
Status: COMPLETED | OUTPATIENT
Start: 2019-10-04 | End: 2019-10-06

## 2019-10-03 RX ADMIN — FLUTICASONE FUROATE AND VILANTEROL TRIFENATATE 1 PUFF: 200; 25 POWDER RESPIRATORY (INHALATION) at 08:42

## 2019-10-03 RX ADMIN — POLYETHYLENE GLYCOL 3350 17 G: 17 POWDER, FOR SOLUTION ORAL at 08:42

## 2019-10-03 RX ADMIN — METHYLPREDNISOLONE 4 MG: 4 TABLET ORAL at 08:45

## 2019-10-03 RX ADMIN — ACETAMINOPHEN 975 MG: 325 TABLET, FILM COATED ORAL at 00:54

## 2019-10-03 RX ADMIN — SENNOSIDES AND DOCUSATE SODIUM 2 TABLET: 8.6; 5 TABLET ORAL at 08:41

## 2019-10-03 RX ADMIN — GABAPENTIN 900 MG: 300 CAPSULE ORAL at 19:39

## 2019-10-03 RX ADMIN — SODIUM CHLORIDE, POTASSIUM CHLORIDE, SODIUM LACTATE AND CALCIUM CHLORIDE: 600; 310; 30; 20 INJECTION, SOLUTION INTRAVENOUS at 19:39

## 2019-10-03 RX ADMIN — LEVOTHYROXINE SODIUM 125 MCG: 25 TABLET ORAL at 08:41

## 2019-10-03 RX ADMIN — OXYCODONE HYDROCHLORIDE 5 MG: 5 TABLET ORAL at 19:39

## 2019-10-03 RX ADMIN — ACETAMINOPHEN 975 MG: 325 TABLET, FILM COATED ORAL at 18:39

## 2019-10-03 RX ADMIN — METHYLPREDNISOLONE 4 MG: 4 TABLET ORAL at 08:46

## 2019-10-03 RX ADMIN — METHYLPREDNISOLONE 8 MG: 8 TABLET ORAL at 08:42

## 2019-10-03 RX ADMIN — FLUTICASONE PROPIONATE 1 SPRAY: 50 SPRAY, METERED NASAL at 19:40

## 2019-10-03 RX ADMIN — OXYCODONE HYDROCHLORIDE 5 MG: 5 TABLET ORAL at 22:49

## 2019-10-03 RX ADMIN — OXYCODONE HYDROCHLORIDE 5 MG: 5 TABLET ORAL at 19:59

## 2019-10-03 RX ADMIN — FLUTICASONE PROPIONATE 1 SPRAY: 50 SPRAY, METERED NASAL at 08:42

## 2019-10-03 RX ADMIN — KETOROLAC TROMETHAMINE 15 MG: 15 INJECTION, SOLUTION INTRAMUSCULAR; INTRAVENOUS at 19:59

## 2019-10-03 RX ADMIN — MONTELUKAST 10 MG: 10 TABLET, FILM COATED ORAL at 21:36

## 2019-10-03 RX ADMIN — FEXOFENADINE HYDROCHLORIDE 180 MG: 180 TABLET, FILM COATED ORAL at 08:42

## 2019-10-03 RX ADMIN — SENNOSIDES AND DOCUSATE SODIUM 2 TABLET: 8.6; 5 TABLET ORAL at 19:39

## 2019-10-03 RX ADMIN — GABAPENTIN 900 MG: 300 CAPSULE ORAL at 08:41

## 2019-10-03 RX ADMIN — GABAPENTIN 900 MG: 300 CAPSULE ORAL at 13:46

## 2019-10-03 RX ADMIN — RANITIDINE 150 MG: 150 TABLET ORAL at 18:39

## 2019-10-03 RX ADMIN — Medication 0.2 MG: at 18:39

## 2019-10-03 RX ADMIN — OXYCODONE HYDROCHLORIDE 5 MG: 5 TABLET ORAL at 13:46

## 2019-10-03 RX ADMIN — RANITIDINE 150 MG: 150 TABLET ORAL at 08:41

## 2019-10-03 RX ADMIN — OXYCODONE HYDROCHLORIDE 5 MG: 5 TABLET ORAL at 16:42

## 2019-10-03 RX ADMIN — METHOCARBAMOL 1000 MG: 500 TABLET, FILM COATED ORAL at 08:41

## 2019-10-03 RX ADMIN — KETOROLAC TROMETHAMINE 15 MG: 15 INJECTION, SOLUTION INTRAMUSCULAR; INTRAVENOUS at 14:47

## 2019-10-03 RX ADMIN — ALBUTEROL SULFATE 2 PUFF: 90 AEROSOL, METERED RESPIRATORY (INHALATION) at 12:24

## 2019-10-03 RX ADMIN — DIAZEPAM 2 MG: 2 TABLET ORAL at 00:54

## 2019-10-03 RX ADMIN — METHOCARBAMOL 1000 MG: 500 TABLET, FILM COATED ORAL at 13:46

## 2019-10-03 RX ADMIN — LIDOCAINE 1 PATCH: 560 PATCH PERCUTANEOUS; TOPICAL; TRANSDERMAL at 08:43

## 2019-10-03 RX ADMIN — METHOCARBAMOL 1000 MG: 500 TABLET, FILM COATED ORAL at 02:15

## 2019-10-03 RX ADMIN — OXYCODONE HYDROCHLORIDE 5 MG: 5 TABLET ORAL at 11:49

## 2019-10-03 RX ADMIN — ACETAMINOPHEN 975 MG: 325 TABLET, FILM COATED ORAL at 08:43

## 2019-10-03 RX ADMIN — METHYLPREDNISOLONE 8 MG: 8 TABLET ORAL at 21:36

## 2019-10-03 ASSESSMENT — ACTIVITIES OF DAILY LIVING (ADL)
ADLS_ACUITY_SCORE: 15
ADLS_ACUITY_SCORE: 12
ADLS_ACUITY_SCORE: 12

## 2019-10-03 NOTE — PROGRESS NOTES
Patient: Aleena Ontiveros  Date of Service: October 3, 2019 Admission Date:10/1/2019   2 Days Post-Op     Chief Pain Endorsement: Left leg radicular pain and incisional pain    Recommendations were discussed and relayed to Frances Rodriguez CNP  Plan was reviewed by the Inpatient Pain Service and staff attending, Dr. Matta      1. Discontinue PCA  2. Agree with discontinuation of ketamine  3. Start oxycodone 5-10mg PO Q3H PRN    Start at lowest effective dose of 5mg given patient sedated on PCA   4. Start Dilaudid 0.2-0.4mg IV Q3H PRN moderate-severe pain    Use oral medications first. Reserve IV for activation (PT/OT) and breakthrough pain.  5. Continue Lidocaine 4% patches, 1-3 patch(es) transdermal every 24 hours (12 hours on and 12 hours off)  6. Continue Menthol 5% patches, 1 patch(es) transdermal every 8 hours as needed when Lidocaine 4% patches are off   2. Continue Robaxin 1000mg PO Q6H  3. Continue acetaminophen 975mg PO Q8H  4. Continue PTA dose of gabapentin 900mg PO TID  7. Bowel regimen per primary team to prevent opioid induced constipation    Pain Service will Continue to follow at this time.     Thank you for consulting the Inpatient Pain Management Service. The above recommendations are to be acted upon at the primary team s discretion.     To reach us:  Mon - Friday 8 AM - 3 PM: Pager [phone] (Text Page)  After hours, weekends and holidays: Primary service should call [phone] for the on-call pain specialist    PAIN MEDICATION SAFE USE:   Prior to discharge instruct patient on the following in addition to the medication fact sheet:    Caution: these medications can cause sedation    Take prescription medicine only if it has been prescribed by your doctor    Do not take more medicine or take it more often than instructed     Call your doctor if pain gets worse    Never mix pain medicine with alcohol, sleeping pills, or any illicit drugs    Do not operate heavy machinery, including vehicles,  when initiation opioid therapy or increasing dosage    Store prescription opioids in a locked container, whenever possible     Dispose of unused opioids appropriately     Do not stop abruptly once at higher doses.  These medications must be tapered off.    Opioid pain medications do carry the risk for physical dependence and addiction and patients should be counseled about this.         1. Acute post operative pain in the left lateral leg s/pTransforaminal Lumbar Interbody Fusion Three Column Osteotomy L5-S1, Posterior Spinal Fusion L2-Pelvis, use of  BMP bone graft on 10/1/19 with Dr. Terry, Dr. Vasquez, Dr. Mike. Patient has h/o adolescent idiopathic scoliosis and previously underwent T4-L4 fusion with Carpio rods during her teenage years.  2. H/o hypertension  3. H/o hypothyroidism s/p thyroidectomy  4. H/o asthma  5. H/o OA s/p TKA    -- Outpatient opioid requirements prior to admission:   -- None    Primary Care Provider: Arabella Conner  Chronic Pain Provider: [facility] since July 2019, titrating gabapentin    Interval History:  Aleena Ontiveros was seen today (October 3, 2019) and she reports that the primary pain is radicular in her left leg with incisional pain as secondary. She describes the radicular pain as burning/spasming and rates it as an intolerable 9/10 during episodes and 5/10 at rest. She describes the incisional pain as throbbing at times but it is tolerable at a 4/10. Patient was somnolent during interview and falling asleep at times but easy to arouse. She endorses the medications are making her feel sleepy. Her last bowel movement was Monday and confirms that PTA she has a daily BM. She endorses passing gas. She was educated on her current pain regimen and encouraged to use the oral opioid medications and adjuvants for the radicular pain.    CAPA (Clinically Aligned Pain Assessment):    Comfort (How is your pain?): Tolerable with discomfort  Change in  Pain (Since your last medication/intervention?): Getting better  Pain Control (How are your pain treatments working?):  Partially effective control  Functioning (Are you able to do activities to get better?) : Can do most things, but pain gets in the way of some   Sleep (Does your pain management allow you to sleep or rest?): Awake with occasional pain      FUNCTIONAL STATUS:  Change:      Improving   Oral intake:     Regular  Activity level:     Bedrest  Mood:      Tired, poor energy     -- Inpatient Medications Related to Pain Management:   Medications related to Pain Management (From now, onward)    Start     Dose/Rate Route Frequency Ordered Stop    10/04/19 0000  acetaminophen (TYLENOL) tablet 650 mg      650 mg Oral EVERY 4 HOURS PRN 10/01/19 1732      10/03/19 0955  HYDROmorphone (DILAUDID) injection 0.2-0.4 mg      0.2-0.4 mg Intravenous EVERY 3 HOURS PRN 10/03/19 0956      10/03/19 0900  bisacodyl (DULCOLAX) Suppository 10 mg      10 mg Rectal ONCE 10/02/19 0706      10/03/19 0800  Lidocaine (LIDOCARE) 4 % Patch 1-3 patch      1-3 patch  over 12 Hours Transdermal EVERY 24 HOURS 0800 10/02/19 1442      10/02/19 2000  lidocaine patch in PLACE       Transdermal EVERY 8 HOURS 10/02/19 1442      10/02/19 1745  acetaminophen (TYLENOL) tablet 975 mg      975 mg Oral EVERY 8 HOURS 10/02/19 1444      10/02/19 0800  methocarbamol (ROBAXIN) tablet 1,000 mg      1,000 mg Oral EVERY 6 HOURS 10/02/19 0647      10/02/19 0800  senna-docusate (SENOKOT-S/PERICOLACE) 8.6-50 MG per tablet 2 tablet      2 tablet Oral 2 TIMES DAILY 10/02/19 0706      10/02/19 0800  polyethylene glycol (MIRALAX/GLYCOLAX) Packet 17 g      17 g Oral DAILY 10/02/19 0706      10/01/19 2000  gabapentin (NEURONTIN) capsule 900 mg      900 mg Oral 3 TIMES DAILY 10/01/19 1941      10/01/19 1941  senna-docusate (SENOKOT-S/PERICOLACE) 8.6-50 MG per tablet 2 tablet      2 tablet Oral DAILY PRN 10/01/19 1941      10/01/19 1941  bisacodyl (DULCOLAX)  Suppository 10 mg      10 mg Rectal DAILY PRN 10/01/19 1941      10/01/19 1905  diphenhydrAMINE (BENADRYL) capsule 25 mg      25 mg Oral EVERY 6 HOURS PRN 10/01/19 1905      10/01/19 1905  diphenhydrAMINE (BENADRYL) injection 25 mg      25 mg Intravenous EVERY 6 HOURS PRN 10/01/19 1905      10/01/19 1905  lidocaine 1 % 0.1-1 mL      0.1-1 mL Other EVERY 1 HOUR PRN 10/01/19 1905      10/01/19 1905  lidocaine (LMX4) cream       Topical EVERY 1 HOUR PRN 10/01/19 1905      10/01/19 1732  oxyCODONE (ROXICODONE) tablet 5-10 mg      5-10 mg Oral EVERY 3 HOURS PRN 10/01/19 1732            LAB DATA:  Recent Labs   Lab 10/03/19  0644 10/02/19  0330 10/01/19  2000 10/01/19  1454   CR 0.55 0.46* 0.52 0.53   WBC 8.0 7.7  --  14.1*   HGB 8.8* 10.0*  --  10.1*  10.0*   AST  --   --   --  24   ALT  --   --   --  31         ----------------------------------------------------------------------------------  Tuesday Therese Sky Student  Inpatient Pain Service     To reach us:  Mon - Friday 8 AM - 3 PM: Pager [phone] (Text Page)  After hours, weekends and holidays: Primary service should call [phone] for the on-call pain specialist    Helpful Resources:  Getting Rid of Unwanted Medications (printable PDF for patients)   Opioid Overdose Prevention Toolkit (printable PDF for patients)   Prescription Opioids: What You Need To Know (printable PDF for patients)

## 2019-10-03 NOTE — PROGRESS NOTES
"   10/03/19 0785   Quick Adds   Type of Visit Initial Occupational Therapy Evaluation   Living Environment   Lives With spouse   Living Arrangements house   Home Accessibility stairs to enter home;stairs within home   Number of Stairs, Main Entrance 4   Stair Railings, Main Entrance   (\"i dont know I have never noticed\")   Number of Stairs, Within Home, Primary   (\"a flight upstairs and a flight downstairs\")   Stair Railings, Within Home, Primary railing on right side (ascending)   Transportation Anticipated family or friend will provide   Living Environment Comment bedroom on main level, bathroom on main level has low toilet with no grab bars, tub shower (has a shower chair from previous surgeries) with no grab bars   Self-Care   Usual Activity Tolerance good   Current Activity Tolerance fair   Regular Exercise Yes   Activity/Exercise Type walking  (going to physical therapy)   Exercise Amount/Frequency daily  (PT 1x-2x/wk)   Equipment Currently Used at Home   (uses walking stick outside of house)   Functional Level   Ambulation 1-->assistive equipment  (walking stick outside of house)   Transferring 0-->independent   Toileting 0-->independent   Bathing 0-->independent   Dressing 2-->assistive person   Eating 0-->independent   Communication 0-->understands/communicates without difficulty   Swallowing 0-->swallows foods/liquids without difficulty   Cognition 0 - no cognition issues reported   Fall history within last six months no   Which of the above functional risks had a recent onset or change? ambulation;transferring;toileting;bathing;dressing   General Information   Onset of Illness/Injury or Date of Surgery - Date 10/01/19   Referring Physician Dr. Vasquez   Patient/Family Goals Statement to be able to be up and walking, doing stairs, without pain. walk normally   Additional Occupational Profile Info/Pertinent History of Current Problem POD #2 s/p: Transforaminal Lumbar Interbody Fusion Three Column Osteotomy " L5-S1, Posterior Spinal Fusion L2-Pelvis. Use of BMP bone graft   Precautions/Limitations spinal precautions;other (see comments)  (brace when OOB)   Weight-Bearing Status - LUE   (no lifting > 10lbs)   Weight-Bearing Status - RUE   (no lifting > 10lbs)   Weight-Bearing Status - LLE weight-bearing as tolerated   Weight-Bearing Status - RLE weight-bearing as tolerated   General Observations IV, hemovac, catheter, O2   Cognitive Status Examination   Orientation orientation to person, place and time   Cognitive Comment no concerns   Visual Perception   Visual Perception Wears glasses  (or contacts)   Sensory Examination   Sensory Comments N/T in L LE leg and ankle   Pain Assessment   Patient Currently in Pain Yes, see Vital Sign flowsheet   Range of Motion (ROM)   ROM Comment B UEs WFL   Strength   Strength Comments WFL, currently no lifting > 10lbs per precautions   Hand Strength   Hand Strength Comments WFL   Muscle Tone Assessment   Muscle Tone Comments WFL   Coordination   Coordination Comments WFL   Transfer Skill: Bed to Chair/Chair to Bed   Level of [place]: Bed to Chair unable to perform   Transfer Skill: Sit to Stand   Level of [place]: Sit/Stand unable to perform   Transfer Skill: Toilet Transfer   Level of [place]: Toilet unable to perform   Bathing   Level of [place] unable to perform   Upper Body Dressing   Level of [place]: Dress Upper Body unable to perform   Lower Body Dressing   Level of [place]: Dress Lower Body unable to perform   Toileting   Level of [place]: Toilet unable to perform   Grooming   Level of [place]: Grooming unable to perform   Eating/Self Feeding   Level of [place]: Eating independent   Activities of Daily Living Analysis   Impairments Contributing to Impaired Activities of Daily Living flexibility decreased;pain;post surgical precautions;ROM decreased;strength decreased   General Therapy Interventions   Planned Therapy Interventions ADL  "retraining;orthotic fitting/training   Clinical Impression   Criteria for Skilled Therapeutic Interventions Met yes, treatment indicated   OT Diagnosis impaired IND in ADLs and functional mobility   Influenced by the following impairments pain, post op precautions   Assessment of Occupational Performance 3-5 Performance Deficits   Identified Performance Deficits bathing, dressing, toileting, functional mobility   Clinical Decision Making (Complexity) Low complexity   Therapy Frequency Daily   Predicted Duration of Therapy Intervention (days/wks) 2-3 days   Anticipated Equipment Needs at Discharge   (tbd)   Anticipated Discharge Disposition Home with Assist;Transitional Care Facility   Risks and Benefits of Treatment have been explained. Yes   Patient, Family & other staff in agreement with plan of care Yes   [facility] TM \"6 Clicks\"   2016, Trustees of [facility], under license to [employer].  All rights reserved.   6 Clicks Short Forms Daily Activity Inpatient Short Form   [facility]  \"6 Clicks\" Daily Activity Inpatient Short Form   1. Putting on and taking off regular lower body clothing? 2 - A Lot   2. Bathing (including washing, rinsing, drying)? 2 - A Lot   3. Toileting, which includes using toilet, bedpan or urinal? 3 - A Little   4. Putting on and taking off regular upper body clothing? 2 - A Lot  (including brace)   5. Taking care of personal grooming such as brushing teeth? 4 - None   6. Eating meals? 4 - None   Daily Activity Raw Score (Score out of 24.Lower scores equate to lower levels of function) 17   Total Evaluation Time   Total Evaluation Time (Minutes) 8     "

## 2019-10-03 NOTE — PLAN OF CARE
"  VS:   /58 (BP Location: Left arm)   Pulse 76   Temp 96.7  F (35.9  C) (Oral)   Resp 16   Ht 1.575 m (5' 2\")   Wt 70.5 kg (155 lb 6.8 oz)   SpO2 99%   BMI 28.43 kg/m    LS clear, equal bilaterally. O2 sats stable on 1L nc.    Output:   [place] patent, draining clear yellow urine. BS hypoactive. Small amounts of flatus.   Activity:   Pt repositioning in bed with assist of 1. Pt sat at edge of bed for small amount of time overnight.   Skin: Intact ex incision   Pain:   Pain tolerable with dilaudid PCA. Scheduled tylenol and robaxin given. PRN valium given with relief. Pt's daughter requesting transition to oral meds.   Neuro/CMS:   Pt lethargic. Speech slow. Oriented x4. Pt endorsed numbness in bilat feet.   Dressing(s):   CDI   Diet:   Regular   LDA:   Triple lumen internal jugular. Brown lumen infusing dilaudid PCA and carrier fluid.  PIVs in bilat hands SL.   Equipment:   PCA, PCDs   Plan:   Monitor pain control. Continue plan of care.     "

## 2019-10-03 NOTE — CONSULTS
Social Work: Assessment with Discharge Plan    Patient Name:  Aleena Ontiveros  :  1960  Age:  59 year old  MRN:  [id number]  Risk/Complexity Score:     Completed assessment with:  Patient and daughter Martin     Presenting Information   Reason for Referral:  Discharge plan  Date of Intake:  October 3, 2019  Referral Source:  PT  Decision Maker:  pt  Alternate Decision Maker:  Per Policy, pt's NOK: spouse Lam or Adult Daughter Martin  Health Care Directive:  Declined completing  Living Situation:  House  Previous Functional Status:  Independent  Patient and family understanding of hospitalization:  Seeking spine surgery  Cultural/Language/Spiritual Considerations:  , independent prior to spine surgery  Adjustment to Illness:   None noted    Physical Health  Reason for Admission:    1. PONV (postoperative nausea and vomiting)      Services Needed/Recommended:  TCU    Mental Health/Chemical Dependency  Diagnosis:  None noted  Support/Services in Place:  NA  Services Needed/Recommended:  NA    Support System  Significant relationship at present time:  Daughter Martin at bedside  Family of origin is available for support:  yes  Other support available:  friends  Gaps in support system:  None noted  Patient is caregiver to:  Spouse / Significant Other:  Per Pt, spouse Lam is disabeled     Provider Information   Primary Care Physician:  Arabella Conner   [phone]   Clinic:  [place] [address] / [facility]      :  None noted    Financial   Income Source:  Employment  Financial Concerns:  None noted  Insurance:    Payor/Plan Subscriber Name Rel Member # Group #   MEDICA - MEDICA [place]ATHALIE* ALEENA ONTIVEROS [facility] [id number]       [id number]       Discharge Plan   Patient and family discharge goal:  TCU at [facility]. Pt has had previous stay  Provided education on discharge plan:  YES  Patient agreeable to discharge plan:  YES  A list of Medicare Certified Facilities  was provided to the patient and/or family to encourage patient choice. Patient's choices for facility are:  [facility]. Pt would like private room [phone]  Will NH provide Skilled rehabilitation or complex medical:  YES  General information regarding anticipated insurance coverage and possible out of pocket cost was discussed. Patient and patient's family are aware patient may incur the cost of transportation to the facility, pending insurance payment: YES  Barriers to discharge:  Medical stability    Discharge Recommendations   Anticipated Disposition:  Facility:  [facility] [phone]  Transportation Needs:  Other:  to be determined      Additional comments   SW introduced role/reason for visit. Pt's daughter Martin was at bedside and was involved in discharge planning.    Pt has had previous TCU stay at [facility] and pt is anticipating having a similar experience as her previous discharge plan. She is requesting a private room and is aware this will be an additional fee.     MICHAELLE provided support, reassurance and validation. Referral initiated and sent to [facility] via Epic. Await assessment response. MICHAELLE con't to follow

## 2019-10-03 NOTE — PLAN OF CARE
Discharge Planner OT   Patient plan for discharge: Did not discuss  Current status: Pt supine in bed, educated on role of OT and post op precautions. Pt IND supine <> sidelying, Max A x2 to boost up in bed, able to complete supine <> sit SBA although cannot tolerate coming to sitting position due to severe pain in L LE/upper thigh  Barriers to return to prior living situation: pain, post op precautions  Recommendations for discharge: home with assist from  pending pain improvement in order to observe IND with ADLs  Rationale for recommendations: Pt appears to move well and has support at home from , although pain severely interfering with IND in ADLs and occupational performance.      Entered by: Aldo Garcia 10/03/2019 8:41 AM

## 2019-10-03 NOTE — PLAN OF CARE
"      VS:   /58 (BP Location: Left arm)   Pulse 76   Temp 96.7  F (35.9  C) (Oral)   Resp 16   Ht 1.575 m (5' 2\")   Wt 70.5 kg (155 lb 6.8 oz)   SpO2 99%   BMI 28.43 kg/m       Output:   [place] putting out adequate amount of urine, LBM 9/30  Hemo put out 180 since 6am this day.   Lungs CTA   Activity:   Did not get up this shift, lethargic   Skin: Intact ex incision   Pain:   Tolerable with discomfort.   Neuro/CMS:   A&Ox3, numbness in Left and R feet   Dressing(s):   CDI   Diet:   Regular   LDA:   internal jugular with triple lumen, brown infusing. 2PIV SL  Hemo   Equipment:   IV POLE, PCA, PCD's   Plan:   Continue to monitor and follow plan of care. Able to make needs known and call light within reach.   Additional Info:          "

## 2019-10-03 NOTE — PROGRESS NOTES
"[facility] Internal Medicine Progress Note            Interval History:   Record reviewed.  Discussed with RN and spine.  S/p revision L2-pelvis PSF on 10/1 with Dr. Vasquez.  Pain control tolerable while sedentary.  Increase compromise with inability to sit at bedside.  No postural dizziness.  No CP.  Mild SOB.  Not using IS.   Indicates prior benefit from MDI.  No cough, nausea, reflux, abd pain or distention.  Passing  flatus.  Last BM 3 days ago.  Has torres.            Medications:   All medications reviewed today          Physical Exam:   Blood pressure (!) 142/71, pulse 76, temperature 96.9  F (36.1  C), temperature source Oral, resp. rate 15, height 1.575 m (5' 2\"), weight 70.5 kg (155 lb 6.8 oz), SpO2 97 %.    Intake/Output Summary (Last 24 hours) at 10/3/2019 1351  Last data filed at 10/3/2019 0819  Gross per 24 hour   Intake --   Output 3125 ml   Net -3125 ml       General:  Sedated.  Appropriate verbal response.  No distress.   O2 1-2 liters NC.  .     Heent:      Neck:    Skin:    Chest:  clear    Cardiac:  Reg without gallop, murmur.  No JVD.     Abdomen:  Non distended, soft, non-tender.  BS normal.     Extremities:  Perfused.  No edema, calf, posterior thigh tenderness to suggest DVT.     Neuro:            Data:     Results for orders placed or performed during the hospital encounter of 10/01/19 (from the past 24 hour(s))   CT Lumbar Spine w/o Contrast    Narrative    CT LUMBAR SPINE W/O CONTRAST 10/2/2019 5:36 PM    History: L/S-spine fusion, follow up; Left L5 radiculopathy and  weakness.    Comparison: Thoracolumbar spine CT on 9/12/2018.    Technique: Using multidetector thin collimation helical acquisition  technique, axial, coronal and sagittal CT images through the lumbar  spine were obtained without intravenous contrast.     Findings: Postoperative changes of osteotomy on L5-S1 and sacral dome  osteotomy, transforaminal lumbar interbody fusion, laminectomy and  facetectomy with of " L5-S1 and attempted lumbar sacral fusion. Hardware  appears intact. Grade 2-3 anterolisthesis of L5-S1, loss of disc  space, endplate erosion and syndesmophyte formation. Solid posterior  fusion to the level of L3 with incomplete fusion at L3-4 and no solid  fusion at L5-S1. No definite lesions are noted within the vertebra.  Findings on a level by level basis are as follows:    L1-L2: No spinal canal or neuroforaminal stenosis.    L2-L3:  No spinal canal or neuroforaminal stenosis.    L3-L4: No spinal canal or neuroforaminal stenosis.    L4-L5: No spinal canal or neuroforaminal stenosis.    L5-S1: No spinal canal or neuroforaminal stenosis.    The visualized adjacent paraspinous tissues are grossly within normal  limits.      Impression    Impression:  1.  Extensive postoperative changes of osteotomy on L5-S1 and sacral  dome osteotomy, transforaminal lumbar interbody fusion, and attempted  lumbar sacral fusion. Hardware appears intact. Solid posterior fusion  to the level of L3 with no solid fusion at L3-4 and no solid fusion at  L5-S1.  2.  Grade 2-3 anterolisthesis of L5-S1, loss of disc space, endplate  erosion and anterior syndesmophyte formation, unchanged.     I have personally reviewed the examination and initial interpretation  and I agree with the findings.    NASIM BHAT MD   CBC (AM Draw)   Result Value Ref Range    WBC 8.0 4.0 - 11.0 10e9/L    RBC Count 3.10 (L) 3.8 - 5.2 10e12/L    Hemoglobin 8.8 (L) 11.7 - 15.7 g/dL    Hematocrit 27.4 (L) 35.0 - 47.0 %    MCV 88 78 - 100 fl    MCH 28.4 26.5 - 33.0 pg    MCHC 32.1 31.5 - 36.5 g/dL    RDW 13.2 10.0 - 15.0 %    Platelet Count 131 (L) 150 - 450 10e9/L   Basic metabolic panel   Result Value Ref Range    Sodium 140 133 - 144 mmol/L    Potassium 4.1 3.4 - 5.3 mmol/L    Chloride 104 94 - 109 mmol/L    Carbon Dioxide 31 20 - 32 mmol/L    Anion Gap 5 3 - 14 mmol/L    Glucose 102 (H) 70 - 99 mg/dL    Urea Nitrogen 5 (L) 7 - 30 mg/dL    Creatinine 0.55 0.52 -  1.04 mg/dL    GFR Estimate >90 >60 mL/min/[1.73_m2]    GFR Estimate If Black >90 >60 mL/min/[1.73_m2]    Calcium 7.7 (L) 8.5 - 10.1 mg/dL              Assessment and Plan:   1)  S/p revision L2-pelvis PSF on 10/1 with Dr. Vasquez.   Pain control limited by sedation.  2)  Acute blood loss anemia, adequate.  No symptomatic compromise while sedentary.   3)  Chronic pain - no opiates listed pre op.    4)  Asthma, clinically appears compensated.    5)  Hypoxemia, sense of dyspnea likely related to opiate associated sedation with hypoventilation, aetelectasis.   6)  HTN, adequate for now off PTA lisinopril.   7)  Hypothyroidism on synthroid.        PLAN:  1)  Spine plan to discontinue dilaudid PCA, ketamine and valium.  Transition to oxycodone, prn IV dilaudid, robaxin, lidoderm and menthyl patches, tylenol, gabapentin.   On medrol dosepak.  Pain service involved.  Monitor for sedation.  2)  Adjust IV.  3)  Encourage IS, bowel meds, mechanical DVT prophylaxis, mobilize per spine.  4)  Empiric scheduled and prn albuterol MDI.  5)  Monitor BG's.  Replace Mg.  Trend labs.  Monitor clinically.   Disposition Plan   Expected discharge in approx 3 days to prior living arrangement vs TCU once mobilizing, tolerating effective opiate regimen, .     Entered: Krzysztof Whitten 10/03/2019, 1:51 PM              Attestation:  I have reviewed today's vital signs, notes, medications, labs and imaging.     Krzysztof Whitten MD

## 2019-10-03 NOTE — PLAN OF CARE
Pt has not been able to ambulate d/t pain. PT unable to get her up this AM d/t shooting nerve pain down L leg. CT done yesterday. Steroids started this morning. Pt sedated throughout shift. PCA discontinued and transitioned to PO. Medications adjusted by NP d/t sedation. Covington in place and can stay in until tomorrow per ortho. Pt declined suppository d/t not being able to ambulate to bathroom. PIV infusing. Pt able to make needs known. Family at bedside.

## 2019-10-03 NOTE — PLAN OF CARE
Discharge Planner PT   Patient plan for discharge: Rehab  Current status: Focus on IND with mobility, pt very limited by L LE pain.  Pt needing min-modA for bed mobility and Walter for sit<>stand with walker, not wanting to lean onto L hip through out.  Pt able to stand with most weight through R LE and able to place some weight through L LE intermittently.  Continue to encourage being out of bed and in sitting.   Barriers to return to prior living situation: Need for increased assist with bed mobility, transfers and ambulation  Recommendations for discharge: TCU  Rationale for recommendations: To progress strength and safety with mobility       Entered by: Alana Grider 10/03/2019 4:00 PM        no

## 2019-10-04 ENCOUNTER — TELEPHONE (OUTPATIENT)
Dept: ORTHOPEDICS | Facility: CLINIC | Age: 59
End: 2019-10-04

## 2019-10-04 ENCOUNTER — APPOINTMENT (OUTPATIENT)
Dept: PHYSICAL THERAPY | Facility: CLINIC | Age: 59
DRG: 460 | End: 2019-10-04
Attending: ORTHOPAEDIC SURGERY
Payer: COMMERCIAL

## 2019-10-04 ENCOUNTER — APPOINTMENT (OUTPATIENT)
Dept: OCCUPATIONAL THERAPY | Facility: CLINIC | Age: 59
DRG: 460 | End: 2019-10-04
Attending: ORTHOPAEDIC SURGERY
Payer: COMMERCIAL

## 2019-10-04 LAB
ANION GAP SERPL CALCULATED.3IONS-SCNC: 5 MMOL/L (ref 3–14)
BUN SERPL-MCNC: 7 MG/DL (ref 7–30)
CALCIUM SERPL-MCNC: 7.7 MG/DL (ref 8.5–10.1)
CHLORIDE SERPL-SCNC: 105 MMOL/L (ref 94–109)
CO2 SERPL-SCNC: 31 MMOL/L (ref 20–32)
CREAT SERPL-MCNC: 0.48 MG/DL (ref 0.52–1.04)
ERYTHROCYTE [DISTWIDTH] IN BLOOD BY AUTOMATED COUNT: 12.8 % (ref 10–15)
GFR SERPL CREATININE-BSD FRML MDRD: >90 ML/MIN/{1.73_M2}
GLUCOSE BLDC GLUCOMTR-MCNC: 132 MG/DL (ref 70–99)
GLUCOSE BLDC GLUCOMTR-MCNC: 136 MG/DL (ref 70–99)
GLUCOSE BLDC GLUCOMTR-MCNC: 159 MG/DL (ref 70–99)
GLUCOSE SERPL-MCNC: 127 MG/DL (ref 70–99)
HCT VFR BLD AUTO: 27.6 % (ref 35–47)
HGB BLD-MCNC: 8.9 G/DL (ref 11.7–15.7)
MAGNESIUM SERPL-MCNC: 2.5 MG/DL (ref 1.6–2.3)
MCH RBC QN AUTO: 28.3 PG (ref 26.5–33)
MCHC RBC AUTO-ENTMCNC: 32.2 G/DL (ref 31.5–36.5)
MCV RBC AUTO: 88 FL (ref 78–100)
PLATELET # BLD AUTO: 170 10E9/L (ref 150–450)
POTASSIUM SERPL-SCNC: 3.9 MMOL/L (ref 3.4–5.3)
RBC # BLD AUTO: 3.14 10E12/L (ref 3.8–5.2)
SODIUM SERPL-SCNC: 141 MMOL/L (ref 133–144)
WBC # BLD AUTO: 9.7 10E9/L (ref 4–11)

## 2019-10-04 PROCEDURE — 25000132 ZZH RX MED GY IP 250 OP 250 PS 637: Performed by: PHYSICIAN ASSISTANT

## 2019-10-04 PROCEDURE — 80048 BASIC METABOLIC PNL TOTAL CA: CPT | Performed by: INTERNAL MEDICINE

## 2019-10-04 PROCEDURE — 99232 SBSQ HOSP IP/OBS MODERATE 35: CPT | Performed by: INTERNAL MEDICINE

## 2019-10-04 PROCEDURE — 97535 SELF CARE MNGMENT TRAINING: CPT | Mod: GO | Performed by: OCCUPATIONAL THERAPIST

## 2019-10-04 PROCEDURE — 36592 COLLECT BLOOD FROM PICC: CPT | Performed by: INTERNAL MEDICINE

## 2019-10-04 PROCEDURE — 00000146 ZZHCL STATISTIC GLUCOSE BY METER IP

## 2019-10-04 PROCEDURE — 97530 THERAPEUTIC ACTIVITIES: CPT | Mod: GP | Performed by: PHYSICAL THERAPIST

## 2019-10-04 PROCEDURE — 25000128 H RX IP 250 OP 636: Performed by: PHYSICIAN ASSISTANT

## 2019-10-04 PROCEDURE — 97530 THERAPEUTIC ACTIVITIES: CPT | Mod: GO | Performed by: OCCUPATIONAL THERAPIST

## 2019-10-04 PROCEDURE — 97110 THERAPEUTIC EXERCISES: CPT | Mod: GP | Performed by: PHYSICAL THERAPIST

## 2019-10-04 PROCEDURE — 12000001 ZZH R&B MED SURG/OB UMMC

## 2019-10-04 PROCEDURE — 83735 ASSAY OF MAGNESIUM: CPT | Performed by: INTERNAL MEDICINE

## 2019-10-04 PROCEDURE — 25000132 ZZH RX MED GY IP 250 OP 250 PS 637: Performed by: NURSE PRACTITIONER

## 2019-10-04 PROCEDURE — 85027 COMPLETE CBC AUTOMATED: CPT | Performed by: INTERNAL MEDICINE

## 2019-10-04 PROCEDURE — 25000128 H RX IP 250 OP 636: Performed by: NURSE PRACTITIONER

## 2019-10-04 PROCEDURE — 25800030 ZZH RX IP 258 OP 636: Performed by: INTERNAL MEDICINE

## 2019-10-04 PROCEDURE — 99207 ZZC NO CHARGE FOLLOW UP PS: CPT

## 2019-10-04 PROCEDURE — 25000132 ZZH RX MED GY IP 250 OP 250 PS 637: Performed by: INTERNAL MEDICINE

## 2019-10-04 PROCEDURE — 25000131 ZZH RX MED GY IP 250 OP 636 PS 637: Performed by: STUDENT IN AN ORGANIZED HEALTH CARE EDUCATION/TRAINING PROGRAM

## 2019-10-04 RX ORDER — GABAPENTIN 300 MG/1
900 CAPSULE ORAL 2 TIMES DAILY
Status: DISCONTINUED | OUTPATIENT
Start: 2019-10-04 | End: 2019-10-05

## 2019-10-04 RX ORDER — GABAPENTIN 600 MG/1
1200 TABLET ORAL DAILY
Status: DISCONTINUED | OUTPATIENT
Start: 2019-10-04 | End: 2019-10-05

## 2019-10-04 RX ORDER — CYCLOBENZAPRINE HCL 5 MG
5-10 TABLET ORAL 3 TIMES DAILY PRN
Status: DISCONTINUED | OUTPATIENT
Start: 2019-10-04 | End: 2019-10-11 | Stop reason: HOSPADM

## 2019-10-04 RX ORDER — KETOROLAC TROMETHAMINE 15 MG/ML
15 INJECTION, SOLUTION INTRAMUSCULAR; INTRAVENOUS EVERY 6 HOURS
Status: COMPLETED | OUTPATIENT
Start: 2019-10-04 | End: 2019-10-04

## 2019-10-04 RX ORDER — BISACODYL 10 MG
10 SUPPOSITORY, RECTAL RECTAL ONCE
Status: COMPLETED | OUTPATIENT
Start: 2019-10-04 | End: 2019-10-04

## 2019-10-04 RX ORDER — POLYETHYLENE GLYCOL 3350 17 G/17G
17 POWDER, FOR SOLUTION ORAL 2 TIMES DAILY
Status: DISCONTINUED | OUTPATIENT
Start: 2019-10-04 | End: 2019-10-11 | Stop reason: HOSPADM

## 2019-10-04 RX ORDER — METHOCARBAMOL 750 MG/1
750 TABLET, FILM COATED ORAL EVERY 6 HOURS
Status: DISCONTINUED | OUTPATIENT
Start: 2019-10-04 | End: 2019-10-04

## 2019-10-04 RX ADMIN — METHYLPREDNISOLONE 4 MG: 4 TABLET ORAL at 12:10

## 2019-10-04 RX ADMIN — ACETAMINOPHEN 975 MG: 325 TABLET, FILM COATED ORAL at 01:38

## 2019-10-04 RX ADMIN — GABAPENTIN 900 MG: 300 CAPSULE ORAL at 09:02

## 2019-10-04 RX ADMIN — LEVOTHYROXINE SODIUM 125 MCG: 25 TABLET ORAL at 09:03

## 2019-10-04 RX ADMIN — OXYCODONE HYDROCHLORIDE 5 MG: 5 TABLET ORAL at 05:48

## 2019-10-04 RX ADMIN — METHOCARBAMOL 750 MG: 750 TABLET, FILM COATED ORAL at 14:53

## 2019-10-04 RX ADMIN — KETOROLAC TROMETHAMINE 15 MG: 15 INJECTION, SOLUTION INTRAMUSCULAR; INTRAVENOUS at 14:54

## 2019-10-04 RX ADMIN — MENTHOL 1 PATCH: 205.5 PATCH TOPICAL at 09:14

## 2019-10-04 RX ADMIN — SODIUM CHLORIDE, POTASSIUM CHLORIDE, SODIUM LACTATE AND CALCIUM CHLORIDE: 600; 310; 30; 20 INJECTION, SOLUTION INTRAVENOUS at 22:47

## 2019-10-04 RX ADMIN — ALBUTEROL SULFATE 2 PUFF: 90 AEROSOL, METERED RESPIRATORY (INHALATION) at 17:45

## 2019-10-04 RX ADMIN — ONDANSETRON 4 MG: 2 INJECTION INTRAMUSCULAR; INTRAVENOUS at 23:54

## 2019-10-04 RX ADMIN — OXYCODONE HYDROCHLORIDE 10 MG: 5 TABLET ORAL at 14:53

## 2019-10-04 RX ADMIN — RANITIDINE 150 MG: 150 TABLET ORAL at 20:20

## 2019-10-04 RX ADMIN — METHYLPREDNISOLONE 8 MG: 8 TABLET ORAL at 21:46

## 2019-10-04 RX ADMIN — METHOCARBAMOL 750 MG: 750 TABLET, FILM COATED ORAL at 09:14

## 2019-10-04 RX ADMIN — ACETAMINOPHEN 975 MG: 325 TABLET, FILM COATED ORAL at 17:13

## 2019-10-04 RX ADMIN — POLYETHYLENE GLYCOL 3350 17 G: 17 POWDER, FOR SOLUTION ORAL at 20:20

## 2019-10-04 RX ADMIN — GABAPENTIN 1200 MG: 600 TABLET, FILM COATED ORAL at 14:53

## 2019-10-04 RX ADMIN — FLUTICASONE PROPIONATE 1 SPRAY: 50 SPRAY, METERED NASAL at 20:32

## 2019-10-04 RX ADMIN — BISACODYL 10 MG: 10 SUPPOSITORY RECTAL at 17:14

## 2019-10-04 RX ADMIN — POLYETHYLENE GLYCOL 3350 17 G: 17 POWDER, FOR SOLUTION ORAL at 09:02

## 2019-10-04 RX ADMIN — OXYCODONE HYDROCHLORIDE 10 MG: 5 TABLET ORAL at 12:10

## 2019-10-04 RX ADMIN — METHYLPREDNISOLONE 4 MG: 4 TABLET ORAL at 09:02

## 2019-10-04 RX ADMIN — FLUTICASONE FUROATE AND VILANTEROL TRIFENATATE 1 PUFF: 200; 25 POWDER RESPIRATORY (INHALATION) at 09:03

## 2019-10-04 RX ADMIN — RANITIDINE 150 MG: 150 TABLET ORAL at 09:02

## 2019-10-04 RX ADMIN — SENNOSIDES AND DOCUSATE SODIUM 2 TABLET: 8.6; 5 TABLET ORAL at 20:20

## 2019-10-04 RX ADMIN — ESTRADIOL 2 G: 0.1 CREAM VAGINAL at 21:49

## 2019-10-04 RX ADMIN — ALBUTEROL SULFATE 2 PUFF: 90 AEROSOL, METERED RESPIRATORY (INHALATION) at 20:32

## 2019-10-04 RX ADMIN — METHYLPREDNISOLONE 4 MG: 4 TABLET ORAL at 17:13

## 2019-10-04 RX ADMIN — FEXOFENADINE HYDROCHLORIDE 180 MG: 180 TABLET, FILM COATED ORAL at 09:03

## 2019-10-04 RX ADMIN — GABAPENTIN 900 MG: 300 CAPSULE ORAL at 20:20

## 2019-10-04 RX ADMIN — SODIUM CHLORIDE, POTASSIUM CHLORIDE, SODIUM LACTATE AND CALCIUM CHLORIDE: 600; 310; 30; 20 INJECTION, SOLUTION INTRAVENOUS at 09:00

## 2019-10-04 RX ADMIN — OXYCODONE HYDROCHLORIDE 10 MG: 5 TABLET ORAL at 09:02

## 2019-10-04 RX ADMIN — FLUTICASONE PROPIONATE 1 SPRAY: 50 SPRAY, METERED NASAL at 09:03

## 2019-10-04 RX ADMIN — SENNOSIDES AND DOCUSATE SODIUM 2 TABLET: 8.6; 5 TABLET ORAL at 09:02

## 2019-10-04 RX ADMIN — KETOROLAC TROMETHAMINE 15 MG: 15 INJECTION, SOLUTION INTRAMUSCULAR; INTRAVENOUS at 09:03

## 2019-10-04 RX ADMIN — KETOROLAC TROMETHAMINE 15 MG: 15 INJECTION, SOLUTION INTRAMUSCULAR; INTRAVENOUS at 02:47

## 2019-10-04 RX ADMIN — ACETAMINOPHEN 975 MG: 325 TABLET, FILM COATED ORAL at 09:02

## 2019-10-04 RX ADMIN — MONTELUKAST 10 MG: 10 TABLET, FILM COATED ORAL at 21:46

## 2019-10-04 RX ADMIN — ALBUTEROL SULFATE 2 PUFF: 90 AEROSOL, METERED RESPIRATORY (INHALATION) at 09:06

## 2019-10-04 RX ADMIN — KETOROLAC TROMETHAMINE 15 MG: 15 INJECTION, SOLUTION INTRAMUSCULAR; INTRAVENOUS at 20:20

## 2019-10-04 RX ADMIN — OXYCODONE HYDROCHLORIDE 10 MG: 5 TABLET ORAL at 20:20

## 2019-10-04 RX ADMIN — ALBUTEROL SULFATE 2 PUFF: 90 AEROSOL, METERED RESPIRATORY (INHALATION) at 12:10

## 2019-10-04 ASSESSMENT — ACTIVITIES OF DAILY LIVING (ADL)
ADLS_ACUITY_SCORE: 15
ADLS_ACUITY_SCORE: 14

## 2019-10-04 NOTE — PLAN OF CARE
Pt continues to be unable to sit up d/t pain in LLE. Ortho aware. Pain team met with pt and made some minor adjustments. CMS unchanged. Pain meds given as available. Tolerating regular diet. [place] removed. Patient DTV. PIV infusing. Tolerating regular diet. Pt able to make needs known.      Pt unable to void. Straight cathed for 1050ml. Per Dr. Whitten, if pt has to be straight cathed again, leave catheter in place.

## 2019-10-04 NOTE — DISCHARGE SUMMARY
ORTHOPAEDIC DISCHARGE SUMMARY     Date of Admission: 10/1/2019  Date of Discharge: 10/11/2019  Disposition: TCU  Staff Physician: Evangelist Vasquez*  Primary Care Provider: Arabella Conner    DISCHARGE DIAGNOSIS:  Spondylolisthesis; Sagittal Malalignment    PROCEDURES: Procedure(s):  Transforaminal Lumbar Interbody Fusion Three Column Osteotomy L5-S1, Posterior Spinal Fusion L2-Pelvis. Use of BMP bone graft on 10/1/2019    BRIEF HISTORY:  59 year old female with previous remote fusion from T4-L4 using Carpio rods in her teens for adolescent idiopathic scoliosis.  She has since developed high-grade lumbosacral spondylolisthesis with lumbosacral kyphosis.  She has had on and off back pain for many years, but generally was able to tolerate her symptoms until recent years.  She presented with primary complaint of low back pain radiating to her right buttock, hip and leg.  Imaging revealed high-grade (grade 4) spondylolisthesis L5-S1 with significant bridging and marginal osteophytes, thus making the slippage very rigid.  She has both lumbar and thoracic flat back, owing to previous fusion.  Also with multilevel advanced cervical spondylosis, stenosis and kyphosis.  However she did not present with cervical symptoms of neck pain, radiculopathy nor myelopathy.  Tried nonoperative treatment, continues to have significant disabling symptoms.  Offered surgery in form of extension of previous fusion down to the pelvis with 3 column osteotomy and decompression L5-S1 possible L4-5.  Consented after thorough discussion of the rationale, risks, benefits and alternatives.     HOSPITAL COURSE:    Surgery was uncomplicated. Aleena Ontiveros has done well post-operatively. However, pt has struggled with neuropathic pain , IT band pain. She received a medrol dose pack which improved pain and later a1.  Left greater trochanter bursa steroid injection. Over all pain has improved, but patient is extremely anxious about pain  and thus making small steps toward improvement.      Medicine was consulted post operatively to aid in management of medical comorbidities. See final recommendations below. The patient received routine nursing cares and is medically stable. Vital signs are stable. The patient is tolerating a regular diet without GI distress/nausea or vomiting. Voiding spontaneously.   Pain is now controlled on oral medications which will be available on discharge. Stool softeners have been used while taking pain medications to help prevent constipation. Aleena Ontiveros is deemed medically safe to discharge.     Antibiotics:  Ancef given periop and 24 hours postop.  DVT prophylaxis:  Mechanical  PT Progress: Has met PT/OT goals for safe mobility.    Pain Meds:  Weaned off all IV pain meds by discharge.  Inpatient Events: No significant events or complications.     PHYSICAL EXAM:  General: NAD. Resting comfortably in bed.  Respiratory: Breathing comfortably on RA.  Drain: Removed 10/5.  Musculoskeletal: Dressing is C/D/I.       Lower extremities:  Motor Strength Right Left   Hip flexion: L1, L2, L3 NT/5 NT/5   Hip adduction: L2, L3 NT/5 NT/5   Knee flexion: S1 5/5 5/5   Knee extension: L3, L4 5/5 5/5   Ankle dosiflexion: L4, L5 5/5 4/5   EHL: L5 4/5 0/5   Ankle plantarflexion: S1 5/5 5/5   0/5 left ankle eversion  0/5 left lesser toe extension     Sensation from L1-S2 is preserved.       Discharge orders and instructions as below.    FOLLOWUP:    Follow up with Dr. Vasquez at 6 weeks postoperatively.  Future Appointments   Date Time Provider Department Center   10/4/2019  6:30 AM UR PT REHAB TECH [facility]   10/4/2019  9:00 AM Amira Cook OT [facility]   10/4/2019  6:30 PM UR PT REHAB TECH [facility]   11/12/2019  9:45 AM Evangelist Vasquez MD [facility]   12/31/2019 10:45 AM Evangelist Vasquez MD [facility]       Appointments on [facility]. Call [phone] if  you haven't heard regarding these appointments within 7 days of discharge.    PLANNED DISCHARGE ORDERS:           Current Discharge Medication List      START taking these medications    Details   enoxaparin (LOVENOX) 40 MG/0.4ML syringe Inject 0.4 mLs (40 mg) Subcutaneous every 24 hours for 28 days Continue for 28 days or until pt is more mobile, whichever comes first.    Associated Diagnoses: Postoperative pain after spinal surgery      !! gabapentin (NEURONTIN) 400 MG capsule Take 2 capsules (800 mg) by mouth 3 times daily    Comments: Give at 6AM, 12noon, 6 PM  Associated Diagnoses: Postoperative pain after spinal surgery      !! gabapentin (NEURONTIN) 400 MG capsule Take 3 capsules (1,200 mg) by mouth At Bedtime Give at 11PM    Associated Diagnoses: Postoperative pain after spinal surgery      oxyCODONE (ROXICODONE) 5 MG tablet Take 1-2 tablets (5-10 mg) by mouth every 3 hours as needed for moderate to severe pain (Take 1 tab for pain rated 2-5, take 2 tabs for pain rated 6-10)  Qty: 50 tablet, Refills: 0    Associated Diagnoses: Postoperative pain after spinal surgery      polyethylene glycol (MIRALAX/GLYCOLAX) packet Take 17 g by mouth 2 times daily  Qty: 72 packet, Refills: 0    Associated Diagnoses: Postoperative pain after spinal surgery       !! - Potential duplicate medications found. Please discuss with provider.      CONTINUE these medications which have CHANGED    Details   albuterol (PROAIR HFA/PROVENTIL HFA/VENTOLIN HFA) 108 (90 Base) MCG/ACT inhaler Inhale 2 puffs into the lungs every 4 hours as needed for shortness of breath / dyspnea or wheezing    Comments: Pharmacy may dispense brand covered by insurance (Proair, or proventil or ventolin or generic albuterol inhaler)  Associated Diagnoses: Intermittent asthma without complication, unspecified asthma severity      bisacodyl (DULCOLAX) 10 MG suppository Place 1 suppository (10 mg) rectally daily as needed for constipation give tonight or 10/12 at  Alejandra if no BM  Qty: 30 suppository    Associated Diagnoses: Drug-induced constipation      levothyroxine (SYNTHROID) 125 MCG tablet Take 1 tablet (125 mcg) by mouth every morning    Comments: Give 1/2 hour before breakfast.  Associated Diagnoses: Hypothyroidism, unspecified type      senna-docusate (SENOKOT-S/PERICOLACE) 8.6-50 MG tablet Take 2 tablets by mouth 2 times daily  Qty: 200 tablet, Refills: 0    Associated Diagnoses: Postoperative pain after spinal surgery         CONTINUE these medications which have NOT CHANGED    Details   acetaminophen (TYLENOL) 500 MG tablet Take 1,000 mg by mouth every 6 hours as needed for mild pain      cyclobenzaprine (FLEXERIL) 10 MG tablet Take 1 tablet (10 mg) by mouth 3 times daily as needed for muscle spasms  Qty: 90 tablet, Refills: 1    Associated Diagnoses: Spondylolisthesis of lumbosacral region      estradiol (ESTRACE) 0.1 MG/GM cream Place 2 g vaginally At Bedtime       Fexofenadine HCl (ALLEGRA PO) Take 180 mg by mouth every morning       fluticasone (FLONASE) 50 MCG/ACT spray Spray 1 spray into both nostrils 2 times daily       lisinopril (PRINIVIL/ZESTRIL) 40 MG tablet Take 1 tablet (40 mg) by mouth daily  Qty: 30 tablet    Associated Diagnoses: Benign essential hypertension      Montelukast Sodium (SINGULAIR PO) Take 10 mg by mouth At Bedtime       order for DME Equipment being ordered: TENS.    TENS UNIT Ordered- Please call your insurance to Verify coverage and locations to  the device.     Please Dispense Device, leads, pads, wires, and all other necessary equipment that is needed for use.     Length of need: 36 months.  Qty: 1 Device, Refills: 0    Associated Diagnoses: Acquired spondylolisthesis of lumbosacral region         STOP taking these medications       celecoxib (CELEBREX) 400 MG capsule Comments:   Reason for Stopping:         fluticasone-salmeterol (ADVAIR) 500-50 MCG/DOSE diskus inhaler Comments:   Reason for Stopping:         gabapentin  (NEURONTIN) 600 MG tablet Comments:   Reason for Stopping:         Multiple Vitamins-Minerals (DAILY DENIZ MAXIMUM MULTIVITAMIN PO) Comments:   Reason for Stopping:         Omega-3 Fatty Acids (FISH OIL PO) Comments:   Reason for Stopping:         polyethylene glycol (MIRALAX/GLYCOLAX) powder Comments:   Reason for Stopping:                 Discharge Procedure Orders   General info for SNF   Order Comments: Length of Stay Estimate: Short Term Care: Estimated # of Days <30  Condition at Discharge: Improving  Level of care:skilled   Rehabilitation Potential: Excellent  Admission H&P remains valid and up-to-date: Yes  Recent Chemotherapy: N/A  Use Nursing Home Standing Orders: Yes     Mantoux instructions   Order Comments: Give two-step Mantoux (PPD) Per Facility Policy Yes     Reason for your hospital stay   Order Comments: Spinal fusion     Additional Discharge Instructions   Order Comments: Wound Care    You may leave the dry dressing in place over your incision for 2-3 days after discharge. After this, the dressing can be removed and the wound can be left open to air.    If you had a lumbar procedure and your belt line contacts, the incision, then place a dry dressing over the incision daily in order to keep it from being rubbed by your pants. Similarly, if you are wearing a cervical collar and your incision is on your neck, you can keep a dry dressing over the incision to keep it from being rubbed. However you should change the dressing every 1-2 days.     If the dressing becomes wet for any reason, such as with sweat or water, it should be changed.     5 days after discharge, you may shower with the dressing removed and allow water to gently fall over the wound. After the shower, simply pat the wound dry. Dol not apply and creams or lotions to you wound.     You wound should remain free of discharge or significant surrounding redness. If you notice any drainage or discharge, or it it develops significant  Redness,  please contact us the clinic immediately at [phone]. Ask for Dr Vasquez's nurse or nurse triage. After hours or weekends, you may go to the Orthopedic  walk in clinic on [address] [place] [address] from 7 am to 7pm daily.       Activity    We encourage you to be up and out of bed regularly. Please try to walk 30 minutes per day. If you have been prescribed a brace, please wear the brace when up and out of bed. Avoid lifting over 10 pounds, avoid lifting anything overhead, and avoid repetitive bending or twisting.  This means no sporting activities. (such as running, tennis, bowling, golf, bicycling, etc.) until you are seen in clinic     Activity - Up ad hernán     Order Specific Question Answer Comments   Is discharge order? Yes      Follow Up ([facility])   Order Comments: 6 weeks with Dr. Vasquez    Appointments on [facility] and/or [facility] (with [facility] or [facility] provider or service). Call [phone] if you haven't heard regarding these appointments within 7 days of discharge.     Weight bearing status   Order Comments: Weight bearing as tolerated     Additional Discharge Instructions   Order Comments: Up as tolerated. No bending, twisting, lifting greater than 10 lbs.     Full Code     Order Specific Question Answer Comments   Code status determined by: Discussion with patient/legal decision maker      Physical Therapy Adult Consult   Order Comments: Evaluate and treat as clinically indicated.    Reason:  Spinal fusion     Occupational Therapy Adult Consult   Order Comments: Evaluate and treat as clinically indicated.    Reason:  Spinal fusion     Advance Diet as Tolerated   Order Comments: Follow this diet upon discharge: Orders Placed This Encounter      Snacks/Supplements Adult: Boost Plus; With Meals      Advance Diet as Tolerated: Regular Diet Adult     Order Specific Question Answer Comments   Is discharge order? Yes      Advance Diet as Tolerated   Order Comments: Follow this diet upon discharge:  regular adult diet     Order Specific Question Answer Comments   Is discharge order? Yes        Ashish Cline MD   Orthopedic Surgery Resident, PGY-4  [phone]    Discharge summary updated by me to reflect changes in plan/medicaitons.

## 2019-10-04 NOTE — PROGRESS NOTES
Patient: Aleena Ontiveros  Date of Service: October 4, 2019 Admission Date: 10/1/2019   3 Days Post-Op     Chief Pain Endorsement: acute post-operative pain, LLE radicular pain    Recommendations were discussed and relayed to Frances Rodriguez NP, Orthopedics  Plan was reviewed by the Inpatient Pain Service and staff attending, Dr. Jesus Matta      1. Increase afternoon dose of gabapentin to 1200 mg. Continue morning and evening doses at 900 mg. Total daily dosing would be gabapentin [phone] mg TID.  2. Discontinue methocarbamol. Initiate cyclobenzaprine 5-10 mg PO TID PRN muscle spasms.  3. Continue oxycodone 5-10 mg PO q3h PRN.  4. Continue hydromorphone 0.2-0.4 mg IV q3h PRN.  5. Continue ketorolac 15 mg IV q6h scheduled. Transition to oral NSAID such as ibuprofen 400-600 mg q6h PRN as able.  6. Continue Lidocaine 4% patches, 1-3 patches transdermal every 24 hours (12 hours on and 12 hours off).  7. Continue Menthol 5% patches, 1 patches transdermal every 8 hours as needed when Lidocaine 4% patches are off. The number of patches could be increased to 2 or 3.  8. Could consider topical gabapentin-ketamine-lidocaine cream if lidocaine and menthol patches are inadequate.  9. Bowel regimen per primary team to prevent opioid induced constipation.    Pain Service will Continue to follow at this time.     Thank you for consulting the Inpatient Pain Management Service. The above recommendations are to be acted upon at the primary team s discretion.     To reach us:  Mon - Friday 8 AM - 3 PM: Pager [phone] (Text Page)  After hours, weekends and holidays: Primary service should call [phone] for the on-call pain specialist    PAIN MEDICATION SAFE USE:   Prior to discharge instruct patient on the following in addition to the medication fact sheet:    Caution: these medications can cause sedation    Take prescription medicine only if it has been prescribed by your doctor    Do not take more medicine or take it  "more often than instructed     Call your doctor if pain gets worse    Never mix pain medicine with alcohol, sleeping pills, or any illicit drugs    Do not operate heavy machinery, including vehicles, when initiation opioid therapy or increasing dosage    Store prescription opioids in a locked container, whenever possible     Dispose of unused opioids appropriately     Do not stop abruptly once at higher doses.  These medications must be tapered off.    Opioid pain medications do carry the risk for physical dependence and addiction and patients should be counseled about this.         1. Acute post operative pain in the left lateral leg s/pTransforaminal Lumbar Interbody Fusion Three Column Osteotomy L5-S1, Posterior Spinal Fusion L2-Pelvis, use of  BMP bone graft on 10/1/19 with Dr. Terry, Dr. Vasquez, Dr. Mike. Patient has h/o adolescent idiopathic scoliosis and previously underwent T4-L4 fusion with Carpio rods during her teenage years. Today 10/4 pt continues to endorse moderate-severe neuropathic/radicular pain of LLE, beginning at L hip and down through L foot. Pain is worst at the hip and ankle. Pain is described as a \"zip\" and \"electrical\" and is triggered by sitting up/at the end of the bed. To date, Aleena has not been able to get up, out of bed, or work with PT due this debilitating pain. The pain is tolerable when she is resting in bed, but she wants to stand up and walk. Given the nature of her pain is predominantly radicular, it is reasonable to focus on non-opioid agents like gabapentin. The past several days, methocarbamol doses of 1000 mg were too sedating for Aleena, so it may be reasonable to try a different agent, such as cyclobenzaprine. Aleena was not specifically endorsing muscle spasms, but a muscle relaxant is a helpful non-opioid adjuvant.  2. H/o hypertension  3. H/o hypothyroidism s/p thyroidectomy  4. H/o asthma  5. H/o OA s/p TKA     -- Outpatient opioid requirements prior to admission:   -- " None     Primary Care Provider: Arabella Conner  Chronic Pain Provider: Dr Cheryl VALDEZ [facility] since July 2019, titrating gabapentin    Interval History:  Aleena Ontiveros was seen today (October 4, 2019), see Assessment section above for details.     Her last bowel movement was prior to surgery.     CAPA (Clinically Aligned Pain Assessment):    Comfort (How is your pain?): Intolerable  Change in Pain (Since your last medication/intervention?): About the same  Pain Control (How are your pain treatments working?):  Inadequate pain control  Functioning (Are you able to do activities to get better?) : Pain keeps me from doing most of what I need to do  Sleep (Does your pain management allow you to sleep or rest?): Awake with occasional pain      FUNCTIONAL STATUS:  Change:      staying the same  Oral intake:     Regular  Activity level:     Limited bed mobility  Mood:      adjusting to situation     -- Inpatient Medications Related to Pain Management:   Medications related to Pain Management (From now, onward)    Start     Dose/Rate Route Frequency Ordered Stop    10/04/19 2000  gabapentin (NEURONTIN) capsule 900 mg      900 mg Oral 2 TIMES DAILY 10/04/19 1042      10/04/19 2000  polyethylene glycol (MIRALAX/GLYCOLAX) Packet 17 g      17 g Oral 2 TIMES DAILY 10/04/19 1043      10/04/19 1400  gabapentin (NEURONTIN) tablet 1,200 mg      1,200 mg Oral DAILY 10/04/19 1042      10/04/19 0915  methocarbamol (ROBAXIN) tablet 750 mg      750 mg Oral EVERY 6 HOURS 10/04/19 0901      10/04/19 0000  acetaminophen (TYLENOL) tablet 650 mg      650 mg Oral EVERY 4 HOURS PRN 10/01/19 1732      10/04/19 0000  polyethylene glycol (MIRALAX/GLYCOLAX) packet      17 g Oral DAILY 10/03/19 2101      10/04/19 0000  senna-docusate (SENOKOT-S/PERICOLACE) 8.6-50 MG tablet      2 tablet Oral 2 TIMES DAILY 10/03/19 2101      10/03/19 1445  ketorolac (TORADOL) injection 15 mg      15 mg Intravenous EVERY 6 HOURS 10/03/19 1430 10/05/19  1429    10/03/19 0955  HYDROmorphone (DILAUDID) injection 0.2-0.4 mg      0.2-0.4 mg Intravenous EVERY 3 HOURS PRN 10/03/19 0956      10/03/19 0800  Lidocaine (LIDOCARE) 4 % Patch 1-3 patch      1-3 patch  over 12 Hours Transdermal EVERY 24 HOURS 0800 10/02/19 1442      10/03/19 0000  methocarbamol (ROBAXIN) 500 MG tablet      500 mg Oral 4 TIMES DAILY PRN 10/03/19 2101      10/03/19 0000  oxyCODONE (ROXICODONE) 5 MG tablet      5-10 mg Oral EVERY 3 HOURS PRN 10/03/19 2101      10/02/19 2000  lidocaine patch in PLACE       Transdermal EVERY 8 HOURS 10/02/19 1442      10/02/19 1745  acetaminophen (TYLENOL) tablet 975 mg      975 mg Oral EVERY 8 HOURS 10/02/19 1444      10/02/19 0800  senna-docusate (SENOKOT-S/PERICOLACE) 8.6-50 MG per tablet 2 tablet      2 tablet Oral 2 TIMES DAILY 10/02/19 0706      10/01/19 1941  senna-docusate (SENOKOT-S/PERICOLACE) 8.6-50 MG per tablet 2 tablet      2 tablet Oral DAILY PRN 10/01/19 1941      10/01/19 1941  bisacodyl (DULCOLAX) Suppository 10 mg      10 mg Rectal DAILY PRN 10/01/19 1941      10/01/19 1905  diphenhydrAMINE (BENADRYL) capsule 25 mg      25 mg Oral EVERY 6 HOURS PRN 10/01/19 1905      10/01/19 1905  diphenhydrAMINE (BENADRYL) injection 25 mg      25 mg Intravenous EVERY 6 HOURS PRN 10/01/19 1905      10/01/19 1905  lidocaine 1 % 0.1-1 mL      0.1-1 mL Other EVERY 1 HOUR PRN 10/01/19 1905      10/01/19 1905  lidocaine (LMX4) cream       Topical EVERY 1 HOUR PRN 10/01/19 1905      10/01/19 1732  oxyCODONE (ROXICODONE) tablet 5-10 mg      5-10 mg Oral EVERY 3 HOURS PRN 10/01/19 1732            LAB DATA:  Recent Labs   Lab 10/04/19  0650 10/03/19  0644 10/02/19  0330  10/01/19  1454   CR 0.48* 0.55 0.46*   < > 0.53   WBC 9.7 8.0 7.7  --  14.1*   HGB 8.9* 8.8* 10.0*  --  10.1*  10.0*   AST  --   --   --   --  24   ALT  --   --   --   --  31    < > = values in this interval not displayed.          ----------------------------------------------------------------------------------  Shay Hernandez PharmD, [facility]   Inpatient Pain Service     To reach us:  Mon - Friday 8 AM - 3 PM: Pager [phone] (Text Page)  After hours, weekends and holidays: Primary service should call [phone] for the on-call pain specialist    Helpful Resources:  Getting Rid of Unwanted Medications (printable PDF for patients)   Opioid Overdose Prevention Toolkit (printable PDF for patients)   Prescription Opioids: What You Need To Know (printable PDF for patients)

## 2019-10-04 NOTE — PLAN OF CARE
"      VS:   /76 (BP Location: Left arm)   Pulse 76   Temp 98.5  F (36.9  C) (Oral)   Resp 15   Ht 1.575 m (5' 2\")   Wt 70.5 kg (155 lb 6.8 oz)   SpO2 96%   BMI 28.43 kg/m       Output:   [place] putting out enough urine 2525ml. Passing gas, No BM.   Lungs CTA   Activity:   Pt tried multiple times to get up out of bed, but pain shooting down L leg was too painful.   Skin: Intact ex incision   Pain:   Tolerable with discomfort with PRN meds while in bed. UNABLE TO GET UP DUE TO PAIN SHOOTING DOWN LEG.    Neuro/CMS:   A&Ox3, numbness in feet   Dressing(s):   CDI   Diet:   Regular-tolerating   LDA:   internal jugular x3. Brown port infusing. 2 PIV SL   Equipment:   IV pole, PCD's   Plan:   Continue to monitor and follow plan of care. Able to make needs known and call light within reach.   Additional Info:          "

## 2019-10-04 NOTE — PROGRESS NOTES
Social Work Services Progress Note    Hospital Day: 4    Collaborated with:  Alejandra ZACARIAS [facility] [phone]    Data:  Discharge plan    Intervention:  Per medical team, pt will be ready for discharge 10/8 or 10/9.    MICHAELLE left voicemail message w/Admissions at [facility] to update anticipated date of discharge for pt, and to determine if they will be able to accept pt for discharge. Await call back     Assessment:  pt having pain control issues, not medically stable for discharge    Plan:    Anticipated Disposition:  [facility]    Barriers to d/c plan:  Medical stability    Follow Up:  MICHAELLE con't to follow

## 2019-10-04 NOTE — TELEPHONE ENCOUNTER
Health Call Center    Phone Message    May a detailed message be left on voicemail: yes    Reason for Call: Symptoms or Concerns     If patient has red-flag symptoms, warm transfer to triage line    Current symptom or concern: Left leg pain    Symptoms have been present for:  4 day(s) - since surgery    Has patient previously been seen for this? No    Are there any new or worsening symptoms? Yes: Pt has been experiencing pain in her left leg since surgery that is above the level of pain she would expect to experience. Pt's daughter said that pt has described her leg as feeling like a rubber band being stretched out. The pain has gotten so bad that she hasn't been able to sit up at all or do any of her physical therapy. Pt is still in the hospital, so it would be easiest to reach her daughter, Martin, at [phone].      Action Taken: Message routed to:  [facility] ([facility] Orthopedics

## 2019-10-04 NOTE — PROGRESS NOTES
"Orthopaedic Surgery Progress Note:  10/04/2019     Subjective:   POD #3.    No acute events overnight.  Continues to have severe left radicular pain with movement and sitting up.  Covington in place.  Passing gas, no BM.  Denies chest pain, shortness of breath, nausea, vomiting.     Objective:   BP (!) 145/77 (BP Location: Left arm)   Pulse 92   Temp 95.8  F (35.4  C) (Oral)   Resp 14   Ht 1.575 m (5' 2\")   Wt 70.5 kg (155 lb 6.8 oz)   SpO2 94%   BMI 28.43 kg/m    No intake/output data recorded.  Gen: NAD. Resting comfortably in bed  Resp: Breathing comfortably on RA  Drain:   Drain output of 20/100/30cc last 3 shifts.  Musculoskeletal: Dressing is C/D/I.        Lower extremities:  Motor Strength Right Left   Hip flexion: L1, L2, L3 NT/5 NT/5   Hip adduction: L2, L3 NT/5 NT/5   Knee flexion: S1 5/5 5/5   Knee extension: L3, L4 5/5 5/5   Ankle dosiflexion: L4, L5 5/5 4/5   EHL: L5 4/5 0/5   Ankle plantarflexion: S1 5/5 5/5   0/5 left ankle eversion  0/5 left lesser toe extension    Sensation from L1-S2 is preserved.    Labs:  Lab Results   Component Value Date    WBC 9.7 10/04/2019    HGB 8.9 (L) 10/04/2019     10/04/2019    INR 1.11 10/01/2019        Assessment & Plan:   Aleena Ontiveros is a 59 year old female now s/p revision L2-pelvis PSF on 10/1 with Dr. Vasquez.     Goals for today  Covington out today  Pain control, possible selective nerve root block today if persistent symptoms    Orthopedics Primary  Activity: Up with assist until independent. No excessive bending or twisting. No lifting >10 lbs x 6 weeks. No Ashish lift for transfers.   Weight bearing status: WBAT.  Pain management: Transition from IV to PO as tolerated. No NSAIDs   Antibiotics: Ancef x24 hours  Diet: Begin with clear fluids and progress diet as tolerated.   DVT prophylaxis: SCDs only. No chemical DVT ppx needed.  Imaging: XR Upright PTDC - ordered.  Labs: Labs PRN  Bracing/Splinting: None.  Dressings: Keep Aquacel c/d/i x 7 " days.  Drains: Document output per shift, will be discontinued at Orthopedic Surgery discretion.  Covington catheter: Remove POD#1.   Physical Therapy/Occupational Therapy: Eval and treat.  Cultures: None    Consults: Hospitalist, Pain Service.  Follow-up: Clinic with Dr. Vasquez in 6 weeks with repeat x-rays.   Disposition: Pending progress with therapies, pain control on orals, and medical stability, anticipate discharge to rehab on POD #5-8.    Orthopaedics surgery staff for this patient is Dr. Vasquez.    ------------------------------------------------------------------------------------------    [x] Discontinue PCA  [   ] Drains removed.  [   ] Post xrays done.  [x] Discharge orders done.  [x] Discharge summary started.      Ashish Cline MD  Orthopedic Surgery, PGY-4  Pager: [phone]        Please contact me directly regarding this patient during normal business hours Mon-Fri before 5pm @ [phone].  IF it is Night, a Weekend, or there is No Response, then please page the Orthopaedic Resident On Call in [facility]. Thank you!        FOLLOWUP:    Future Appointments   Date Time Provider Department Center   10/2/2019  2:15 PM Janna Krishnan, PT [facility] [place]   10/3/2019  6:30 AM UR PT REHAB TECH [facility] [place]   10/3/2019  8:00 AM Amira Cook, OT [facility] [place]   10/3/2019  6:30 PM UR PT REHAB TECH [facility] [place]   11/12/2019  9:45 AM Evangelist Vasquez MD [facility]   12/31/2019 10:45 AM Evangelist Vasquez MD [facility]

## 2019-10-04 NOTE — PLAN OF CARE
"VS:   BP (!) 145/77 (BP Location: Left arm)   Pulse 92   Temp 95.8  F (35.4  C) (Oral)   Resp 14   Ht 1.575 m (5' 2\")   Wt 70.5 kg (155 lb 6.8 oz)   SpO2 95%   BMI 28.43 kg/m    VSS on 1 L nc. LS clear, equal bilaterally.   Output:   Covington patent, draining good amount of clear yellow urine.   Activity:   Pt remained in bed this shift. Positioning self in bed with minimal assist.   Skin: Intact ex posterior incision.   Pain:   Pain controlled with scheduled tylenol and toradol. PRN oxycodone given x1. Pt endorsed shooting pain radiating through leg when sitting up to take meds.   Neuro/CMS:   CMS intact ex numbness in bilat feet. A&Ox4.   Dressing(s):   Posterior dressing CDI   Diet:   Regular   LDA:   Triple lumen internal jugular infusing through brown port. PIVs in bilat hands removed.   Equipment:   IV pole, PCDs   Plan:   Encourage mobility. Continue plan of care.     "

## 2019-10-04 NOTE — TELEPHONE ENCOUNTER
Called pt's daughter back to let her know that there is nothing the clinic will be able to do while her mother is admitted to the hospital. The hospital should be able to manage her pain and have Dr. Vasquez check on her while she is in admitted.

## 2019-10-04 NOTE — PLAN OF CARE
"Discharge Planner OT   Patient plan for discharge: Did not discuss, patient concerned regarding level of pain with any mobility  Current status: Patient alert and oriented, trialed all suggestions by therapy but unable to tolerate any weight into L LE including HOB elevated or sitting.  Patient was able to roll side to side but when attempting sitting up patient reports a severe pain with \"rubberband\" sensation.  Barriers to return to prior living situation: Pain, decreased ROM  Recommendations for discharge: Anticipate patient to be able to discharge home once pain managed; will need to continue to assess  Rationale for recommendations: Continued daily OT for maximum independence in ADLs/mobility       Entered by: Amira Cook 10/04/2019 9:06 AM       "

## 2019-10-05 ENCOUNTER — APPOINTMENT (OUTPATIENT)
Dept: PHYSICAL THERAPY | Facility: CLINIC | Age: 59
DRG: 460 | End: 2019-10-05
Attending: ORTHOPAEDIC SURGERY
Payer: COMMERCIAL

## 2019-10-05 LAB
GLUCOSE BLDC GLUCOMTR-MCNC: 212 MG/DL (ref 70–99)
HBA1C MFR BLD: 5.2 % (ref 0–5.6)

## 2019-10-05 PROCEDURE — 99233 SBSQ HOSP IP/OBS HIGH 50: CPT | Performed by: INTERNAL MEDICINE

## 2019-10-05 PROCEDURE — 83036 HEMOGLOBIN GLYCOSYLATED A1C: CPT | Performed by: INTERNAL MEDICINE

## 2019-10-05 PROCEDURE — 00000146 ZZHCL STATISTIC GLUCOSE BY METER IP

## 2019-10-05 PROCEDURE — 12000001 ZZH R&B MED SURG/OB UMMC

## 2019-10-05 PROCEDURE — 25000128 H RX IP 250 OP 636: Performed by: NURSE PRACTITIONER

## 2019-10-05 PROCEDURE — 25000132 ZZH RX MED GY IP 250 OP 250 PS 637: Performed by: NURSE PRACTITIONER

## 2019-10-05 PROCEDURE — 36415 COLL VENOUS BLD VENIPUNCTURE: CPT | Performed by: INTERNAL MEDICINE

## 2019-10-05 PROCEDURE — 25000132 ZZH RX MED GY IP 250 OP 250 PS 637: Performed by: PHYSICIAN ASSISTANT

## 2019-10-05 PROCEDURE — 97530 THERAPEUTIC ACTIVITIES: CPT | Mod: GP | Performed by: REHABILITATION PRACTITIONER

## 2019-10-05 PROCEDURE — 25000131 ZZH RX MED GY IP 250 OP 636 PS 637: Performed by: STUDENT IN AN ORGANIZED HEALTH CARE EDUCATION/TRAINING PROGRAM

## 2019-10-05 PROCEDURE — 25000131 ZZH RX MED GY IP 250 OP 636 PS 637: Performed by: INTERNAL MEDICINE

## 2019-10-05 PROCEDURE — 25000132 ZZH RX MED GY IP 250 OP 250 PS 637: Performed by: INTERNAL MEDICINE

## 2019-10-05 RX ORDER — DEXTROSE MONOHYDRATE 25 G/50ML
25-50 INJECTION, SOLUTION INTRAVENOUS
Status: DISCONTINUED | OUTPATIENT
Start: 2019-10-05 | End: 2019-10-11 | Stop reason: HOSPADM

## 2019-10-05 RX ORDER — GABAPENTIN 400 MG/1
800 CAPSULE ORAL
Status: DISCONTINUED | OUTPATIENT
Start: 2019-10-05 | End: 2019-10-08

## 2019-10-05 RX ORDER — NICOTINE POLACRILEX 4 MG
15-30 LOZENGE BUCCAL
Status: DISCONTINUED | OUTPATIENT
Start: 2019-10-05 | End: 2019-10-11 | Stop reason: HOSPADM

## 2019-10-05 RX ORDER — LISINOPRIL 10 MG/1
10 TABLET ORAL DAILY
Status: DISCONTINUED | OUTPATIENT
Start: 2019-10-05 | End: 2019-10-06

## 2019-10-05 RX ADMIN — LISINOPRIL 10 MG: 10 TABLET ORAL at 13:35

## 2019-10-05 RX ADMIN — CYCLOBENZAPRINE 10 MG: 5 TABLET, FILM COATED ORAL at 12:33

## 2019-10-05 RX ADMIN — RANITIDINE 150 MG: 150 TABLET ORAL at 19:11

## 2019-10-05 RX ADMIN — INSULIN ASPART 1 UNITS: 100 INJECTION, SOLUTION INTRAVENOUS; SUBCUTANEOUS at 22:49

## 2019-10-05 RX ADMIN — OXYCODONE HYDROCHLORIDE 10 MG: 5 TABLET ORAL at 18:01

## 2019-10-05 RX ADMIN — MENTHOL 2 PATCH: 205.5 PATCH TOPICAL at 13:38

## 2019-10-05 RX ADMIN — SENNOSIDES AND DOCUSATE SODIUM 2 TABLET: 8.6; 5 TABLET ORAL at 08:27

## 2019-10-05 RX ADMIN — ESTRADIOL 2 G: 0.1 CREAM VAGINAL at 22:50

## 2019-10-05 RX ADMIN — METHYLPREDNISOLONE 4 MG: 4 TABLET ORAL at 16:41

## 2019-10-05 RX ADMIN — ALBUTEROL SULFATE 2 PUFF: 90 AEROSOL, METERED RESPIRATORY (INHALATION) at 20:47

## 2019-10-05 RX ADMIN — POLYETHYLENE GLYCOL 3350 17 G: 17 POWDER, FOR SOLUTION ORAL at 08:35

## 2019-10-05 RX ADMIN — METHYLPREDNISOLONE 4 MG: 4 TABLET ORAL at 12:33

## 2019-10-05 RX ADMIN — ALBUTEROL SULFATE 2 PUFF: 90 AEROSOL, METERED RESPIRATORY (INHALATION) at 08:34

## 2019-10-05 RX ADMIN — ACETAMINOPHEN 975 MG: 325 TABLET, FILM COATED ORAL at 10:48

## 2019-10-05 RX ADMIN — GABAPENTIN 900 MG: 300 CAPSULE ORAL at 08:27

## 2019-10-05 RX ADMIN — FLUTICASONE PROPIONATE 1 SPRAY: 50 SPRAY, METERED NASAL at 08:30

## 2019-10-05 RX ADMIN — FLUTICASONE FUROATE AND VILANTEROL TRIFENATATE 1 PUFF: 200; 25 POWDER RESPIRATORY (INHALATION) at 08:30

## 2019-10-05 RX ADMIN — GABAPENTIN 800 MG: 400 CAPSULE ORAL at 13:35

## 2019-10-05 RX ADMIN — ACETAMINOPHEN 975 MG: 325 TABLET, FILM COATED ORAL at 17:26

## 2019-10-05 RX ADMIN — ALBUTEROL SULFATE 2 PUFF: 90 AEROSOL, METERED RESPIRATORY (INHALATION) at 13:37

## 2019-10-05 RX ADMIN — ACETAMINOPHEN 975 MG: 325 TABLET, FILM COATED ORAL at 01:38

## 2019-10-05 RX ADMIN — METHYLPREDNISOLONE 4 MG: 4 TABLET ORAL at 22:47

## 2019-10-05 RX ADMIN — ALBUTEROL SULFATE 2 PUFF: 90 AEROSOL, METERED RESPIRATORY (INHALATION) at 16:47

## 2019-10-05 RX ADMIN — GABAPENTIN 800 MG: 400 CAPSULE ORAL at 19:11

## 2019-10-05 RX ADMIN — RANITIDINE 150 MG: 150 TABLET ORAL at 08:29

## 2019-10-05 RX ADMIN — ACETAMINOPHEN 650 MG: 325 TABLET, FILM COATED ORAL at 19:17

## 2019-10-05 RX ADMIN — OXYCODONE HYDROCHLORIDE 10 MG: 5 TABLET ORAL at 05:12

## 2019-10-05 RX ADMIN — FEXOFENADINE HYDROCHLORIDE 180 MG: 180 TABLET, FILM COATED ORAL at 08:27

## 2019-10-05 RX ADMIN — METHYLPREDNISOLONE 4 MG: 4 TABLET ORAL at 08:28

## 2019-10-05 RX ADMIN — LEVOTHYROXINE SODIUM 125 MCG: 25 TABLET ORAL at 08:27

## 2019-10-05 RX ADMIN — MONTELUKAST 10 MG: 10 TABLET, FILM COATED ORAL at 22:47

## 2019-10-05 RX ADMIN — FLUTICASONE PROPIONATE 1 SPRAY: 50 SPRAY, METERED NASAL at 20:47

## 2019-10-05 RX ADMIN — Medication 0.2 MG: at 11:04

## 2019-10-05 RX ADMIN — OXYCODONE HYDROCHLORIDE 10 MG: 5 TABLET ORAL at 15:04

## 2019-10-05 ASSESSMENT — ACTIVITIES OF DAILY LIVING (ADL)
ADLS_ACUITY_SCORE: 14

## 2019-10-05 NOTE — PROGRESS NOTES
[facility] Internal Medicine Progress Note            Interval History:   Record reviewed.  Seen with RN.  S/p revision L2-pelvis PSF on 10/1 with Dr. Vasquez.  Less sedated.  Pain control tolerable while sedentary.  Inability to mobilize due to LLE radicular pain.  Spine service considering nerve block.  No CP, lingering SOB (on scheduled MDI).   Not using IS regulalry.    No cough, nausea, reflux, abd pain.  Mild bloating.    Passing  flatus.  Last BM 4 days ago.  Has torres.            Medications:   All medications reviewed today          Physical Exam:   /85  Rate 90-100s.  T normal.  Sat down 86% 1/2 liter.                     General: More alert.  Appropriate.    No distress.   O2 1 liters NC.  .     Heent:      Neck:    Skin:    Chest:  clear    Cardiac:  Reg without gallop, murmur.  No JVD.     Abdomen:  Mildly distended, soft, non-tender.  BS normal.     Extremities:  Perfused.  No edema, calf, posterior thigh tenderness to suggest DVT.     Neuro:            Data:     Results for orders placed or performed during the hospital encounter of 10/01/19 (from the past 24 hour(s))   Glucose by meter   Result Value Ref Range    Glucose 159 (H) 70 - 99 mg/dL              Assessment and Plan:   1)  S/p revision L2-pelvis PSF on 10/1 with Dr. Vasquez.  Mobilization limited by severe LLE radicular pain.    2)  Acute blood loss anemia, adequate.  No symptomatic compromise while sedentary.   3)  Chronic pain - no opiates listed pre op.    4)  Asthma, clinically appears compensated.    5)  Hypoxemia,  likely related to opiate associated sedation with hypoventilation, aetelectasis.   6)  HTN, adequate for now off PTA lisinopril.   7)  Hypothyroidism on synthroid.        PLAN:  1)  Pain regimen per pain service.  Addition of flexeril, increase gabapentin.   On medrol dosepak. 2)  Adjust IV.  3)  Encourage IS, bowel meds/ supp, mechanical DVT prophylaxis, mobilize per spine.  4)  Empiric scheduled and prn  albuterol MDI.  5)  Monitor BG's.  Trend labs.  Monitor clinically.   Disposition Plan   Expected discharge in approx 3 days to prior living arrangement vs TCU once mobilizing, tolerating effective opiate regimen, .     Entered: Krzysztof Whitten 10/05/2019, 2:10 PM     ADD:  Documentation of visit 10/4.          Attestation:  I have reviewed today's vital signs, notes, medications, labs and imaging.     Krzysztof Whitten MD

## 2019-10-05 NOTE — PLAN OF CARE
"      VS:   BP (!) 173/93 (BP Location: Right arm)   Pulse 96   Temp 97  F (36.1  C) (Axillary)   Resp 16   Ht 1.575 m (5' 2\")   Wt 70.5 kg (155 lb 6.8 oz)   SpO2 94%   BMI 28.43 kg/m    Denies chest pain and SOB. LS clear   Output:   Torres output: 1050 (torres in due to retention)  Passing gas, BS active, small BM this morning   Activity:   Ax1 with repositioning. Stood at EOB with therapy today.     Skin: Intact ex for incision   Pain:   Pain tolerable with Flexeril, Oxycodone, ice packs, and scheduled gabapentin. Pain is mostly nerve pain in L leg per pt report, refused Oxycodone  Throughout the morning until 1500. MD adjust pt's gabapentin doses.    Neuro/CMS:   Baseline numbness in feet. A&Ox4   Dressing(s):   C/d/i   Diet:   Regular, poor appetite. Had a few bites for breakfast. MD ordered boosts shakes for pt to try.    LDA:   Triple lumen .IJ SL   Torres    Equipment:   Jewitt brace   Plan:   Continue to monitor. Pt is able to make needs known, call light is within reach.    Additional Info:          "

## 2019-10-05 NOTE — PLAN OF CARE
"VS:   BP (!) 161/94 (BP Location: Left arm)   Pulse 96   Temp 97.3  F (36.3  C) (Oral)   Resp 16   Ht 1.575 m (5' 2\")   Wt 70.5 kg (155 lb 6.8 oz)   SpO2 97%   BMI 28.43 kg/m    LS clear, equal bilaterally. O2 sats stable on 1L nc.   Output:   [place] patent, draining good amount of clear yellow urine. Suppository given. Pt tried to use bedpan with no results.   Activity:   Pt remained in bed for majority of shift. Active ROM performed. Pt dangled at edge of bed for a few minutes this evening. Pt experienced pain in L leg when touching L foot to floor, otherwise tolerated sitting position.    Skin: Intact ex incision, drain site.   Pain:   Pain well controlled at rest. Oxycodone given x1 this evening before activity. Steroids given as scheduled, pt reports decrease in pain.   Neuro/CMS:   CMS intact ex numbness in bilat feet. A&Ox4.   Dressing(s):   CDI   Diet:   Regular. Tolerated well.   LDA:   Triple lumen internal jugular infusing through brown port.   Equipment:   IV pole, PCDs   Plan:   Continue plan of care.     "

## 2019-10-05 NOTE — PLAN OF CARE
"OT:  Patient declined OT due to being sleepy, feeling \"whoozy\" in supine.  Spouse present and asking about patient trying to stand again later, encouraged patient to work with nursing when patient feeling better to trial standing again.  "

## 2019-10-05 NOTE — PROGRESS NOTES
"[facility] Internal Medicine Progress Note            Interval History:   Record reviewed.  Seen with RN in presence of family.   S/p revision L2-pelvis PSF on 10/1 with Dr. Vasquez.  Interval increase gabapentin, addition of prn flexeril by pain service.   Pain control tolerable while sedentary.  Continued LLE radicular pain compromising mobilization.  Improved ability to stand momentarily at bedside then with need to lie down.    No postural dizziness.  No CP, dyspnea, cough.  Still on low flow O2.  Not using IS regularly.  Nausea with emesis last night resolving with zofran.  No reflux.  Mild  Abdominal bloating. Passing  flatus.  Small loose BM this AM.   Has torres.  Remains on medrol dosepak.  Desires gabapentin dose at approx 2AM as believes more uniform dosing beneficial.           Medications:   All medications reviewed today          Physical Exam:   Blood pressure (!) 148/85, pulse 96, temperature 97.6  F (36.4  C), temperature source Oral, resp. rate 15, height 1.575 m (5' 2\"), weight 70.5 kg (155 lb 6.8 oz), SpO2 98 %.    Intake/Output Summary (Last 24 hours) at 10/3/2019 1351  Last data filed at 10/3/2019 0819  Gross per 24 hour   Intake --   Output 3125 ml   Net -3125 ml       General:  Alert.   Appropriate.  No distress.   O2 1/2-1 liters NC.  .     Heent:      Neck:    Skin:    Chest:  clear    Cardiac:  Reg without gallop, murmur.  No JVD.     Abdomen:  Non distended, soft, non-tender.  BS normal.     Extremities:  Perfused.  No edema, calf, posterior thigh tenderness to suggest DVT.     Neuro:            Data:     Results for orders placed or performed during the hospital encounter of 10/01/19 (from the past 24 hour(s))   Glucose by meter   Result Value Ref Range    Glucose 159 (H) 70 - 99 mg/dL     Last Comprehensive Metabolic Panel:  Sodium   Date Value Ref Range Status   10/04/2019 141 133 - 144 mmol/L Final     Potassium   Date Value Ref Range Status   10/04/2019 3.9 3.4 - 5.3 mmol/L " Final     Chloride   Date Value Ref Range Status   10/04/2019 105 94 - 109 mmol/L Final     Carbon Dioxide   Date Value Ref Range Status   10/04/2019 31 20 - 32 mmol/L Final     Anion Gap   Date Value Ref Range Status   10/04/2019 5 3 - 14 mmol/L Final     Glucose   Date Value Ref Range Status   10/04/2019 127 (H) 70 - 99 mg/dL Final     Urea Nitrogen   Date Value Ref Range Status   10/04/2019 7 7 - 30 mg/dL Final     Creatinine   Date Value Ref Range Status   10/04/2019 0.48 (L) 0.52 - 1.04 mg/dL Final     GFR Estimate   Date Value Ref Range Status   10/04/2019 >90 >60 mL/min/[1.73_m2] Final     Comment:     Non  GFR Calc  Starting 12/18/2018, serum creatinine based estimated GFR (eGFR) will be   calculated using the Chronic Kidney Disease Epidemiology Collaboration   (CKD-EPI) equation.       Calcium   Date Value Ref Range Status   10/04/2019 7.7 (L) 8.5 - 10.1 mg/dL Final     Hemoglobin   Date Value Ref Range Status   10/04/2019 8.9 (L) 11.7 - 15.7 g/dL Final   10/03/2019 8.8 (L) 11.7 - 15.7 g/dL Final                Assessment and Plan:   1)  S/p revision L2-pelvis PSF on 10/1 with Dr. Vasquez.   Mobilization improved potentially related to steroid effect however remains compromised by LLE radicular pain.  Spine not planning to do nerve block.   2)  Acute blood loss anemia, adequate.  No symptomatic compromise while sedentary.   3)  Chronic pain - no opiates listed pre op.    4)  Asthma, clinically appears compensated.    5)  Hypoxemia, sense of dyspnea likely related to opiate associated sedation with hypoventilation, aetelectasis.    6)  HTN, with more persistent elevation off PTA lisinopril.   7)  Hypothyroidism on synthroid.        PLAN:  1)   Change neurontin to 800 mg q6h.  Complete medrol dosepak.  Other meds per pain service.  Monitor for sedation.  2)  SL IV.   3)  Encourage IS/ wean O2, bowel meds, mechanical DVT prophylaxis, mobilize as able.  4)  Add Boost shakes with limited po.  5)   Resume lisinopril 10 mg daily with parameters.  6)  Empiric scheduled and prn albuterol MDI.  7)  Monitor BG's.  Trend labs.  Monitor clinically.   Disposition Plan   Expected discharge in approx 3 days to prior living arrangement vs TCU once mobilizing, tolerating effective opiate regimen, .     Entered: Krzysztof Whitten 10/05/2019, 1:54 PM              Attestation:  I have reviewed today's vital signs, notes, medications, labs and imaging.     Krzysztof Whitten MD

## 2019-10-05 NOTE — PLAN OF CARE
"Vitals: BP (!) 155/84 (BP Location: Left arm)   Pulse 96   Temp 97.3  F (36.3  C) (Oral)   Resp 16   Ht 1.575 m (5' 2\")   Wt 70.5 kg (155 lb 6.8 oz)   SpO2 97%   BMI 28.43 kg/m    Lung sounds clear. Denies CP, SOB.   Output: Voiding good amounts of yellow urine into torres.  Last BM: 10/5   Activity: Independently positioning in bed. Didn't get OOB this shift.   Skin: Intact ex incision   Pain: Pain at incisional site radiating down L leg - PRN oxycodone given.   CMS/Neuro: Intact ex baseline numbness in feet. A&O x 4.   Dressing: Posterior incisional dressing CDI  Internal Jugular dressing changed.   Diet: Regular   LDA: Triple lumen internal jugular   Torres - patent   Equipment: Jewitt brace when OOB   Plan/Add'l info: Nausea with emesis x1 present at beginning of shift. IV zofran given with relief.     Able to make needs known. Call light within reach. Continue with POC.         "

## 2019-10-05 NOTE — PLAN OF CARE
Discharge Planner PT   Patient plan for discharge: not stated today .   Current status: pt only able to tolerate 2 stands up to 5 sec each with only sec in sitting. Pt needing to return to supine very quickly due to increased pain. Pt needing up to 10 min between trail due to pain. Pt cont to ed on tech for safety for all bed mob, and STS.   Barriers to return to prior living situation: pain.   Recommendations for discharge: PT - per plan established by the Physical Therapist, according to functional mobility the  discharge recommendation is TCU vs home depending on pt progress and pain.   Rationale for recommendations: pt limited by pain may needing cont skilled PT to progress functional mobility.        Entered by: Jaiden Connelly 10/05/2019 12:27 PM

## 2019-10-05 NOTE — PROGRESS NOTES
"Orthopaedic Surgery Progress Note:  10/05/2019     Subjective:   POD #4.    No acute events overnight.  Continues to have severe left radicular pain with movement and sitting up. On steroids.  Covington in place.  Passing gas, endorses small liquid BM this am  Denies chest pain, shortness of breath, nausea, vomiting.     Objective:   BP (!) 155/84 (BP Location: Left arm)   Pulse 96   Temp 97.3  F (36.3  C) (Oral)   Resp 16   Ht 1.575 m (5' 2\")   Wt 70.5 kg (155 lb 6.8 oz)   SpO2 97%   BMI 28.43 kg/m    No intake/output data recorded.  Gen: NAD. Resting comfortably in bed  Resp: Breathing comfortably on RA  Drain:   Drain output of 30/8cc last 2 shifts. Removed today.   Musculoskeletal: Dressing is C/D/I.        Lower extremities:  Motor Strength Right Left   Hip flexion: L1, L2, L3 NT/5 NT/5   Hip adduction: L2, L3 NT/5 NT/5   Knee flexion: S1 5/5 5/5   Knee extension: L3, L4 5/5 5/5   Ankle dosiflexion: L4, L5 5/5 4/5   EHL: L5 4/5 0/5   Ankle plantarflexion: S1 5/5 5/5   0/5 left ankle eversion  0/5 left lesser toe extension    Sensation from L1-S2 is preserved.    Labs:  Lab Results   Component Value Date    WBC 9.7 10/04/2019    HGB 8.9 (L) 10/04/2019     10/04/2019    INR 1.11 10/01/2019        Assessment & Plan:   Aleena Ontiveros is a 59 year old female now s/p revision L2-pelvis PSF on 10/1 with Dr. Vasquez.     Goals for today  Covington out today  Pain control     Orthopedics Primary  Activity: Up with assist until independent. No excessive bending or twisting. No lifting >10 lbs x 6 weeks. No Ashish lift for transfers.   Weight bearing status: WBAT.  Pain management: Transition from IV to PO as tolerated. No NSAIDs   Antibiotics: Ancef x24 hours  Diet: Begin with clear fluids and progress diet as tolerated.   DVT prophylaxis: SCDs only. No chemical DVT ppx needed.  Imaging: XR Upright PTDC - ordered.  Labs: Labs PRN  Bracing/Splinting: None.  Dressings: Keep Aquacel c/d/i x 7 days.  Drains: Removed " 10/05/19   Covington catheter: Remove POD#1.   Physical Therapy/Occupational Therapy: Eval and treat.  Cultures: None    Consults: Hospitalist, Pain Service.  Follow-up: Clinic with Dr. Vasquez in 6 weeks with repeat x-rays.   Disposition: Pending progress with therapies, pain control on orals, and medical stability, anticipate discharge to rehab on POD #5-8.    Orthopaedics surgery staff for this patient is Dr. Vasquez.    ------------------------------------------------------------------------------------------    [x] Discontinue PCA  [x] Drains removed.  [   ] Post xrays done.  [x] Discharge orders done.  [x] Discharge summary started.    Etelvina Valencia MD  Orthopedic Surgery PGY-4  [phone]      Please contact me directly regarding this patient during normal business hours Mon-Fri before 5pm @ [phone].  IF it is Night, a Weekend, or there is No Response, then please page the Orthopaedic Resident On Call in [facility]. Thank you!        FOLLOWUP:    Future Appointments   Date Time Provider Department Center   10/2/2019  2:15 PM Janna Krishnan, PT [facility] [place]   10/3/2019  6:30 AM UR PT REHAB TECH [facility] [place]   10/3/2019  8:00 AM Amira Cook, OT [facility] [place]   10/3/2019  6:30 PM UR PT REHAB TECH [facility] [place]   11/12/2019  9:45 AM Evangelist Vasquez MD [facility]   12/31/2019 10:45 AM Evangelist Vasquez MD [facility]

## 2019-10-06 ENCOUNTER — APPOINTMENT (OUTPATIENT)
Dept: PHYSICAL THERAPY | Facility: CLINIC | Age: 59
DRG: 460 | End: 2019-10-06
Attending: ORTHOPAEDIC SURGERY
Payer: COMMERCIAL

## 2019-10-06 LAB
ANION GAP SERPL CALCULATED.3IONS-SCNC: 5 MMOL/L (ref 3–14)
BUN SERPL-MCNC: 9 MG/DL (ref 7–30)
CALCIUM SERPL-MCNC: 8 MG/DL (ref 8.5–10.1)
CHLORIDE SERPL-SCNC: 104 MMOL/L (ref 94–109)
CO2 SERPL-SCNC: 30 MMOL/L (ref 20–32)
CREAT SERPL-MCNC: 0.47 MG/DL (ref 0.52–1.04)
GFR SERPL CREATININE-BSD FRML MDRD: >90 ML/MIN/{1.73_M2}
GLUCOSE BLDC GLUCOMTR-MCNC: 117 MG/DL (ref 70–99)
GLUCOSE BLDC GLUCOMTR-MCNC: 120 MG/DL (ref 70–99)
GLUCOSE BLDC GLUCOMTR-MCNC: 125 MG/DL (ref 70–99)
GLUCOSE BLDC GLUCOMTR-MCNC: 151 MG/DL (ref 70–99)
GLUCOSE SERPL-MCNC: 119 MG/DL (ref 70–99)
HGB BLD-MCNC: 9.1 G/DL (ref 11.7–15.7)
MAGNESIUM SERPL-MCNC: 2.2 MG/DL (ref 1.6–2.3)
POTASSIUM SERPL-SCNC: 3.7 MMOL/L (ref 3.4–5.3)
SODIUM SERPL-SCNC: 139 MMOL/L (ref 133–144)

## 2019-10-06 PROCEDURE — 80048 BASIC METABOLIC PNL TOTAL CA: CPT | Performed by: INTERNAL MEDICINE

## 2019-10-06 PROCEDURE — 00000146 ZZHCL STATISTIC GLUCOSE BY METER IP

## 2019-10-06 PROCEDURE — 25000131 ZZH RX MED GY IP 250 OP 636 PS 637: Performed by: INTERNAL MEDICINE

## 2019-10-06 PROCEDURE — 99233 SBSQ HOSP IP/OBS HIGH 50: CPT | Performed by: INTERNAL MEDICINE

## 2019-10-06 PROCEDURE — 25000128 H RX IP 250 OP 636: Performed by: STUDENT IN AN ORGANIZED HEALTH CARE EDUCATION/TRAINING PROGRAM

## 2019-10-06 PROCEDURE — 25000131 ZZH RX MED GY IP 250 OP 636 PS 637: Performed by: STUDENT IN AN ORGANIZED HEALTH CARE EDUCATION/TRAINING PROGRAM

## 2019-10-06 PROCEDURE — 12000001 ZZH R&B MED SURG/OB UMMC

## 2019-10-06 PROCEDURE — 85018 HEMOGLOBIN: CPT | Performed by: INTERNAL MEDICINE

## 2019-10-06 PROCEDURE — 25000132 ZZH RX MED GY IP 250 OP 250 PS 637: Performed by: PHYSICIAN ASSISTANT

## 2019-10-06 PROCEDURE — 36415 COLL VENOUS BLD VENIPUNCTURE: CPT | Performed by: INTERNAL MEDICINE

## 2019-10-06 PROCEDURE — 25000128 H RX IP 250 OP 636: Performed by: NURSE PRACTITIONER

## 2019-10-06 PROCEDURE — 83735 ASSAY OF MAGNESIUM: CPT | Performed by: INTERNAL MEDICINE

## 2019-10-06 PROCEDURE — 25000132 ZZH RX MED GY IP 250 OP 250 PS 637: Performed by: NURSE PRACTITIONER

## 2019-10-06 PROCEDURE — 97530 THERAPEUTIC ACTIVITIES: CPT | Mod: GP | Performed by: REHABILITATION PRACTITIONER

## 2019-10-06 PROCEDURE — 25000132 ZZH RX MED GY IP 250 OP 250 PS 637: Performed by: INTERNAL MEDICINE

## 2019-10-06 RX ORDER — LISINOPRIL 10 MG/1
20 TABLET ORAL DAILY
Status: DISCONTINUED | OUTPATIENT
Start: 2019-10-06 | End: 2019-10-07

## 2019-10-06 RX ADMIN — MENTHOL 2 PATCH: 205.5 PATCH TOPICAL at 13:22

## 2019-10-06 RX ADMIN — METHYLPREDNISOLONE 4 MG: 4 TABLET ORAL at 08:08

## 2019-10-06 RX ADMIN — ACETAMINOPHEN 975 MG: 325 TABLET, FILM COATED ORAL at 10:22

## 2019-10-06 RX ADMIN — Medication 0.4 MG: at 17:11

## 2019-10-06 RX ADMIN — METHYLPREDNISOLONE 4 MG: 4 TABLET ORAL at 22:03

## 2019-10-06 RX ADMIN — LEVOTHYROXINE SODIUM 125 MCG: 25 TABLET ORAL at 08:07

## 2019-10-06 RX ADMIN — ACETAMINOPHEN 975 MG: 325 TABLET, FILM COATED ORAL at 02:09

## 2019-10-06 RX ADMIN — ALBUTEROL SULFATE 2 PUFF: 90 AEROSOL, METERED RESPIRATORY (INHALATION) at 08:16

## 2019-10-06 RX ADMIN — METHYLPREDNISOLONE 4 MG: 4 TABLET ORAL at 12:05

## 2019-10-06 RX ADMIN — OXYCODONE HYDROCHLORIDE 10 MG: 5 TABLET ORAL at 15:09

## 2019-10-06 RX ADMIN — RANITIDINE 150 MG: 150 TABLET ORAL at 18:19

## 2019-10-06 RX ADMIN — GABAPENTIN 800 MG: 400 CAPSULE ORAL at 19:42

## 2019-10-06 RX ADMIN — GABAPENTIN 800 MG: 400 CAPSULE ORAL at 02:09

## 2019-10-06 RX ADMIN — SENNOSIDES AND DOCUSATE SODIUM 2 TABLET: 8.6; 5 TABLET ORAL at 19:42

## 2019-10-06 RX ADMIN — Medication 0.4 MG: at 06:08

## 2019-10-06 RX ADMIN — OXYCODONE HYDROCHLORIDE 10 MG: 5 TABLET ORAL at 02:09

## 2019-10-06 RX ADMIN — LISINOPRIL 20 MG: 10 TABLET ORAL at 08:12

## 2019-10-06 RX ADMIN — ACETAMINOPHEN 975 MG: 325 TABLET, FILM COATED ORAL at 18:19

## 2019-10-06 RX ADMIN — OXYCODONE HYDROCHLORIDE 10 MG: 5 TABLET ORAL at 08:06

## 2019-10-06 RX ADMIN — SENNOSIDES AND DOCUSATE SODIUM 2 TABLET: 8.6; 5 TABLET ORAL at 08:06

## 2019-10-06 RX ADMIN — Medication 0.2 MG: at 03:04

## 2019-10-06 RX ADMIN — POLYETHYLENE GLYCOL 3350 17 G: 17 POWDER, FOR SOLUTION ORAL at 19:42

## 2019-10-06 RX ADMIN — OXYCODONE HYDROCHLORIDE 10 MG: 5 TABLET ORAL at 04:56

## 2019-10-06 RX ADMIN — FEXOFENADINE HYDROCHLORIDE 180 MG: 180 TABLET, FILM COATED ORAL at 08:07

## 2019-10-06 RX ADMIN — ALBUTEROL SULFATE 2 PUFF: 90 AEROSOL, METERED RESPIRATORY (INHALATION) at 19:44

## 2019-10-06 RX ADMIN — POLYETHYLENE GLYCOL 3350 17 G: 17 POWDER, FOR SOLUTION ORAL at 08:18

## 2019-10-06 RX ADMIN — MONTELUKAST 10 MG: 10 TABLET, FILM COATED ORAL at 22:03

## 2019-10-06 RX ADMIN — OXYCODONE HYDROCHLORIDE 10 MG: 5 TABLET ORAL at 12:05

## 2019-10-06 RX ADMIN — Medication 0.4 MG: at 08:51

## 2019-10-06 RX ADMIN — Medication 0.4 MG: at 20:22

## 2019-10-06 RX ADMIN — ENOXAPARIN SODIUM 40 MG: 40 INJECTION SUBCUTANEOUS at 14:07

## 2019-10-06 RX ADMIN — RANITIDINE 150 MG: 150 TABLET ORAL at 08:06

## 2019-10-06 RX ADMIN — OXYCODONE HYDROCHLORIDE 10 MG: 5 TABLET ORAL at 18:20

## 2019-10-06 RX ADMIN — FLUTICASONE PROPIONATE 1 SPRAY: 50 SPRAY, METERED NASAL at 08:17

## 2019-10-06 RX ADMIN — INSULIN ASPART 1 UNITS: 100 INJECTION, SOLUTION INTRAVENOUS; SUBCUTANEOUS at 13:23

## 2019-10-06 RX ADMIN — CYCLOBENZAPRINE 10 MG: 5 TABLET, FILM COATED ORAL at 08:51

## 2019-10-06 RX ADMIN — FLUTICASONE FUROATE AND VILANTEROL TRIFENATATE 1 PUFF: 200; 25 POWDER RESPIRATORY (INHALATION) at 08:16

## 2019-10-06 RX ADMIN — OXYCODONE HYDROCHLORIDE 10 MG: 5 TABLET ORAL at 22:03

## 2019-10-06 RX ADMIN — CYCLOBENZAPRINE 10 MG: 5 TABLET, FILM COATED ORAL at 17:11

## 2019-10-06 RX ADMIN — ALBUTEROL SULFATE 2 PUFF: 90 AEROSOL, METERED RESPIRATORY (INHALATION) at 16:33

## 2019-10-06 RX ADMIN — FLUTICASONE PROPIONATE 1 SPRAY: 50 SPRAY, METERED NASAL at 19:43

## 2019-10-06 RX ADMIN — GABAPENTIN 800 MG: 400 CAPSULE ORAL at 08:06

## 2019-10-06 RX ADMIN — GABAPENTIN 800 MG: 400 CAPSULE ORAL at 13:13

## 2019-10-06 RX ADMIN — Medication 0.4 MG: at 14:07

## 2019-10-06 ASSESSMENT — ACTIVITIES OF DAILY LIVING (ADL)
ADLS_ACUITY_SCORE: 14

## 2019-10-06 NOTE — PLAN OF CARE
Discharge Planner PT   Patient plan for discharge: rehab   Current status: pt needing assist x 2 for supine to sitting and trials for STS. Pt limited greatly by pain. When pain comes on pt needing to return to supine very quickly ( ie sec ) pt needing V.c for relaxed breathing, and log roll tech. Pt able to tolerated 2 sits from 15 to 30 sec each which is great improvement from yesterday. Pt has only been able to tolerated 1-2 sec in sitting due to pain.  Pt unable to tolerate standing today.   Barriers to return to prior living situation: pain   Recommendations for discharge: PT - per plan established by the Physical Therapist, according to functional mobility the  discharge recommendation is rehab.   Rationale for recommendations: pt is well below baseline, pt benefit for cont skilled PT to progress functional mobility.        Entered by: Jaiden Connelly 10/06/2019 10:49 AM

## 2019-10-06 NOTE — PLAN OF CARE
"VS:    /73 (BP Location: Left arm)   Pulse 81   Temp 96.6  F (35.9  C) (Oral)   Resp 16   Ht 1.575 m (5' 2\")   Wt 70.5 kg (155 lb 6.8 oz)   SpO2 100%   BMI 28.43 kg/m      Denies chest pain and SOB. LS clear   Output:    Covington patent and draining-good output  Passing gas, BS active, small BM this morning   Activity:    Ax1 with repositioning. Stood at EOB with therapy today.     Skin: Intact ex for incision   Pain:    Pain well controlled today with Flexeril, Oxycodone, PRN IV dilaudid, ice packs, and scheduled gabapentin. Pain is mostly nerve pain in L leg per pt report.   Neuro/CMS:    Baseline numbness in feet. A&Ox4   Dressing(s):    C/d/i   Diet:    Regular, fair appetite today.   LDA:    Triple lumen .IJ SL   Covington    Equipment:    Jewitt brace   Plan:    Continue to monitor. Pt is able to make needs known, call light is within reach.    Additional Info:                "

## 2019-10-06 NOTE — PLAN OF CARE
OT:  POC changed; patient unable to tolerate sitting/standing.  PT to follow until mobility improves; OT to hold as not to duplicate services at this time.

## 2019-10-06 NOTE — PLAN OF CARE
"VS:   BP (!) 173/93 (BP Location: Right arm)   Pulse 96   Temp 97  F (36.1  C) (Axillary)   Resp 16   Ht 1.575 m (5' 2\")   Wt 70.5 kg (155 lb 6.8 oz)   SpO2 94%   BMI 28.43 kg/m    VSS ex high BP. LS clear, equal bilaterally. O2 sats stable on RA.   Output:   Covington patent, draining clear yellow urine. Pt had several loose stools (4+) this evening. Brief given.   Activity:   Pt remained in bed this shift. Pt refused PCDs, but consistently bent/extended knees and ankles to avoid spasms. Independently positioning self in bed.   Skin: Intact ex incision.   Pain:   Pt experienced spasms in L leg, and aching pain in bilat legs. Oxycodone and tylenol given with relief.   Neuro/CMS:   CMS intact ex numbness in bilat feet. A&Ox4.    Dressing(s):   CDI. Aquacel rolling up in back, reinforced with tape.   Diet:   Regular diet, fair appetite.   LDA:   Triple lumen internal jugular saline locked.   Equipment:   PCDs   Plan:   Continue plan of care.   Additional Info:    tonight. Insulin protocol started.     "

## 2019-10-06 NOTE — PLAN OF CARE
"Vitals: BP (!) 167/100 (BP Location: Right arm)   Pulse 92   Temp 96.8  F (36  C) (Oral)   Resp 16   Ht 1.575 m (5' 2\")   Wt 70.5 kg (155 lb 6.8 oz)   SpO2 99%   BMI 28.43 kg/m    Lung sounds clear. Denies CP, SOB.   Output: Voiding good amounts of clear, yellow urine in torres.  Last BM: 10/5   Activity: Independently positioning in bed. Did not get OOB this shift.   Skin: Intact ex incision   Pain: Incisional radiating to legs - pt getting PRN oxycodone and PRN IV dilaudid and scheduled tylenol and gabepentin. Pt and spouse feel pain is poorly controlled and getting worse each day. Pt started taking IV dilaudid overnight as it was available.   CMS/Neuro: Intact ex baseline numbness in feet. A&O x 4.   Dressing: Aquacel CDI  Drain dressing present with drainage. Monitoring.   Diet: Regular - denies n/v   LDA: Triple lumen internal jugular - SL   Equipment:    Plan/Add'l info: Able to make needs known. Call light within reach. Continue with POC.         "

## 2019-10-06 NOTE — PROGRESS NOTES
"Orthopaedic Surgery Progress Note:  10/06/2019     Subjective:   POD #5.    No acute events overnight.  Continues to have severe left radicular pain with movement and sitting up. Thinks she has slightly improved with the gabapentin but there is concern that she is \"just sliding backwards.\"  On a medrol dosepak. C[place] currently in place. Apparently patient had it removed but then replaced due to inability to void without straight catheterization.  Small BM yesterday.  Denies chest pain, shortness of breath, nausea. Patient hypertensive overnight.     Objective:   BP (!) 164/96 (BP Location: Left arm)   Pulse 85   Temp 95.5  F (35.3  C) (Axillary)   Resp 14   Ht 1.575 m (5' 2\")   Wt 70.5 kg (155 lb 6.8 oz)   SpO2 100%   BMI 28.43 kg/m    No intake/output data recorded.  Gen: NAD. Resting comfortably in bed  Resp: Breathing comfortably on RA  Drain:   Removed 10/5  Musculoskeletal: Dressing is C/D/I.        Lower extremities:  Motor Strength Right Left   Hip flexion: L1, L2, L3 NT/5 NT/5   Hip adduction: L2, L3 NT/5 NT/5   Knee flexion: S1 5/5 5/5   Knee extension: L3, L4 5/5 5/5   Ankle dosiflexion: L4, L5 5/5 4/5   EHL: L5 4/5 0/5   Ankle plantarflexion: S1 5/5 5/5   0/5 left ankle eversion  0/5 left lesser toe extension    Sensation from L1-S2 is preserved.    Labs:  Lab Results   Component Value Date    WBC 9.7 10/04/2019    HGB 9.1 (L) 10/06/2019     10/04/2019    INR 1.11 10/01/2019        Assessment & Plan:   Aleena Ontiveros is a 59 year old female now s/p revision L2-pelvis PSF on 10/1 with Dr. Vasquez.     Goals for today  Covington out today  Pain control     Orthopedics Primary  Activity: Up with assist until independent. No excessive bending or twisting. No lifting >10 lbs x 6 weeks. No Ashish lift for transfers.   Weight bearing status: WBAT.  Pain management: Transition from IV to PO as tolerated. No NSAIDs   Antibiotics: Ancef x24 hours, complete  Diet: Begin with clear fluids and progress " diet as tolerated.   DVT prophylaxis: SCDs only. No chemical DVT ppx needed.  Imaging: XR Upright PTDC - ordered.  Labs: Labs PRN  Bracing/Splinting: None.  Dressings: Keep Aquacel c/d/i x 7 days.  Drains: Removed 10/05/19   Covington catheter: in place after inability to void  Physical Therapy/Occupational Therapy: Eval and treat.  Cultures: None    Consults: Hospitalist, Pain Service.  Follow-up: Clinic with Dr. Vasquez in 6 weeks with repeat x-rays.   Disposition: Pending progress with therapies, pain control on orals, and medical stability, anticipate discharge to rehab on POD #5-8.    Orthopaedics surgery staff for this patient is Dr. Vasquez.    ------------------------------------------------------------------------------------------    [x] Discontinue PCA  [x] Drains removed.  [   ] Post xrays done.  [x] Discharge orders done.  [x] Discharge summary started.    Etelvina Valencia MD  Orthopedic Surgery PGY-4  [phone]        FOLLOWUP:    Future Appointments   Date Time Provider Department Center   10/2/2019  2:15 PM Janna Krishnan, PT [facility]   10/3/2019  6:30 AM UR PT REHAB TECH [facility]   10/3/2019  8:00 AM Amira Cook, OT [facility]   10/3/2019  6:30 PM [facility] PT REHAB TECH [facility]   11/12/2019  9:45 AM Evangelist Vasquez MD [facility]   12/31/2019 10:45 AM Evangelist Vasquez MD [facility]

## 2019-10-06 NOTE — PROGRESS NOTES
"[facility] Internal Medicine Progress Note            Interval History:   Record reviewed.  Seen with RN in presence of daughter.    S/p revision L2-pelvis PSF on 10/1 with Dr. Vasquez.  Interval adjustment in gabapentin to 800 mg q6h with dose during night which patient believes to be beneficial.  Flexeril X1 with unclear benefit. Continued oxycodone 10 mg and IV dilaudid for breakthrough.   Continued bilateral LE radicular pain L>R compromising ability to mobilize.  Not up yet today.  Improved ability to stand momentarily at bedside then with need to lie down 10/5.    No postural dizziness.  No CP, dyspnea, cough.  Still on low flow O2 over night (daughter indicates periods of apnea?).  No recorded hypoxemia. More regular use of IS.  Interval decrease nausea.  No reflux, abd pain.  BM 10/5.   Has torres.  Remains on medrol dosepak.  Lisinopril 10 mg resumed 10/5.           Medications:   All medications reviewed today          Physical Exam:   Blood pressure (!) 164/96, pulse 85, temperature 95.5  F (35.3  C), temperature source Axillary, resp. rate 14, height 1.575 m (5' 2\"), weight 70.5 kg (155 lb 6.8 oz), SpO2 100 %.    Intake/Output Summary (Last 24 hours) at 10/3/2019 1351  Last data filed at 10/3/2019 0819  Gross per 24 hour   Intake --   Output 3125 ml   Net -3125 ml       General:  Alert.   Appropriate.  No distress.   O2 1 liters NC.  .     Heent:      Neck:    Skin:    Chest:  clear    Cardiac:  Reg without gallop, murmur.  No JVD.     Abdomen:  Non distended, soft, non-tender.  BS normal.     Extremities:  Perfused.  No edema, calf, posterior thigh tenderness to suggest DVT.     Neuro:            Data:     Results for orders placed or performed during the hospital encounter of 10/01/19 (from the past 24 hour(s))   Glucose by meter   Result Value Ref Range    Glucose 212 (H) 70 - 99 mg/dL   Hemoglobin A1c   Result Value Ref Range    Hemoglobin A1C 5.2 0 - 5.6 %   Glucose by meter   Result Value " Ref Range    Glucose 120 (H) 70 - 99 mg/dL   Basic metabolic panel   Result Value Ref Range    Sodium 139 133 - 144 mmol/L    Potassium 3.7 3.4 - 5.3 mmol/L    Chloride 104 94 - 109 mmol/L    Carbon Dioxide 30 20 - 32 mmol/L    Anion Gap 5 3 - 14 mmol/L    Glucose 119 (H) 70 - 99 mg/dL    Urea Nitrogen 9 7 - 30 mg/dL    Creatinine 0.47 (L) 0.52 - 1.04 mg/dL    GFR Estimate >90 >60 mL/min/[1.73_m2]    GFR Estimate If Black >90 >60 mL/min/[1.73_m2]    Calcium 8.0 (L) 8.5 - 10.1 mg/dL   Magnesium   Result Value Ref Range    Magnesium 2.2 1.6 - 2.3 mg/dL   Hemoglobin   Result Value Ref Range    Hemoglobin 9.1 (L) 11.7 - 15.7 g/dL     Last Comprehensive Metabolic Panel:  Sodium   Date Value Ref Range Status   10/06/2019 139 133 - 144 mmol/L Final     Potassium   Date Value Ref Range Status   10/06/2019 3.7 3.4 - 5.3 mmol/L Final     Chloride   Date Value Ref Range Status   10/06/2019 104 94 - 109 mmol/L Final     Carbon Dioxide   Date Value Ref Range Status   10/06/2019 30 20 - 32 mmol/L Final     Anion Gap   Date Value Ref Range Status   10/06/2019 5 3 - 14 mmol/L Final     Glucose   Date Value Ref Range Status   10/06/2019 119 (H) 70 - 99 mg/dL Final     Urea Nitrogen   Date Value Ref Range Status   10/06/2019 9 7 - 30 mg/dL Final     Creatinine   Date Value Ref Range Status   10/06/2019 0.47 (L) 0.52 - 1.04 mg/dL Final     GFR Estimate   Date Value Ref Range Status   10/06/2019 >90 >60 mL/min/[1.73_m2] Final     Comment:     Non  GFR Calc  Starting 12/18/2018, serum creatinine based estimated GFR (eGFR) will be   calculated using the Chronic Kidney Disease Epidemiology Collaboration   (CKD-EPI) equation.       Calcium   Date Value Ref Range Status   10/06/2019 8.0 (L) 8.5 - 10.1 mg/dL Final     Hemoglobin   Date Value Ref Range Status   10/06/2019 9.1 (L) 11.7 - 15.7 g/dL Final   10/04/2019 8.9 (L) 11.7 - 15.7 g/dL Final                Assessment and Plan:   1)  S/p revision L2-pelvis PSF on 10/1 with  Dr. Vasquez.   Persistent inability to effectively mobilize due to radicular complaints.  Administered meds impacted by sedation, hypoventilation.  Hopefully will improve with medrol dosepak.  Spine not planning to do nerve block.   2)  Acute blood loss anemia, adequate.  No symptomatic compromise while sedentary.   3)  Chronic pain - no opiates listed pre op.    4)  Asthma, clinically appears compensated.    5)  Hypoxemia, sense of dyspnea likely related to opiate associated sedation with hypoventilation, aetelectasis.  Main issue over night.   6)  HTN, with  persistent elevation on resumed lower dose  lisinopril.   7)  Hypothyroidism on synthroid.   8)  Steroid induced hyperglycemia, mild.        PLAN:  1)  Continue neurontin to 800 mg q6h.  Complete medrol dosepak.  More regular use of flexeril (reviewed with RN). Other meds per pain service.  Monitor for sedation.  2) Check with spine regarding chemical DVT prophylaxis now that 5 days post op and not mobilizing.   3)  Encourage IS/ wean O2, bowel meds, mechanical DVT prophylaxis for now, mobilize as able.  4)  Boost shakes with limited po.  5)  Increase lisinopril 20 mg daily with parameters.  6)  Empiric scheduled and prn albuterol MDI.  7)  Monitor BG's.  Trend labs.  Monitor clinically.   Disposition Plan   Expected discharge in approx 3 days to prior living arrangement vs TCU once mobilizing, tolerating effective opiate regimen, .     Entered: Krzysztof Whitten 10/06/2019, 9:44 AM              Attestation:  I have reviewed today's vital signs, notes, medications, labs and imaging.     Krzysztof Whitten MD

## 2019-10-07 ENCOUNTER — APPOINTMENT (OUTPATIENT)
Dept: PHYSICAL THERAPY | Facility: CLINIC | Age: 59
DRG: 460 | End: 2019-10-07
Attending: ORTHOPAEDIC SURGERY
Payer: COMMERCIAL

## 2019-10-07 LAB
GLUCOSE BLDC GLUCOMTR-MCNC: 100 MG/DL (ref 70–99)
GLUCOSE BLDC GLUCOMTR-MCNC: 104 MG/DL (ref 70–99)
GLUCOSE BLDC GLUCOMTR-MCNC: 105 MG/DL (ref 70–99)
GLUCOSE BLDC GLUCOMTR-MCNC: 111 MG/DL (ref 70–99)
GLUCOSE BLDC GLUCOMTR-MCNC: 115 MG/DL (ref 70–99)

## 2019-10-07 PROCEDURE — 25000132 ZZH RX MED GY IP 250 OP 250 PS 637: Performed by: PHYSICIAN ASSISTANT

## 2019-10-07 PROCEDURE — 25000128 H RX IP 250 OP 636: Performed by: NURSE PRACTITIONER

## 2019-10-07 PROCEDURE — 00000146 ZZHCL STATISTIC GLUCOSE BY METER IP

## 2019-10-07 PROCEDURE — 99207 ZZC NO CHARGE FOLLOW UP PS: CPT

## 2019-10-07 PROCEDURE — 25000131 ZZH RX MED GY IP 250 OP 636 PS 637: Performed by: STUDENT IN AN ORGANIZED HEALTH CARE EDUCATION/TRAINING PROGRAM

## 2019-10-07 PROCEDURE — 25000132 ZZH RX MED GY IP 250 OP 250 PS 637: Performed by: INTERNAL MEDICINE

## 2019-10-07 PROCEDURE — 25000132 ZZH RX MED GY IP 250 OP 250 PS 637: Performed by: NURSE PRACTITIONER

## 2019-10-07 PROCEDURE — 97530 THERAPEUTIC ACTIVITIES: CPT | Mod: GP

## 2019-10-07 PROCEDURE — 99231 SBSQ HOSP IP/OBS SF/LOW 25: CPT | Performed by: INTERNAL MEDICINE

## 2019-10-07 PROCEDURE — 97530 THERAPEUTIC ACTIVITIES: CPT | Mod: GP | Performed by: PHYSICAL THERAPIST

## 2019-10-07 PROCEDURE — 12000001 ZZH R&B MED SURG/OB UMMC

## 2019-10-07 PROCEDURE — 25000128 H RX IP 250 OP 636: Performed by: STUDENT IN AN ORGANIZED HEALTH CARE EDUCATION/TRAINING PROGRAM

## 2019-10-07 RX ORDER — LANOLIN ALCOHOL/MO/W.PET/CERES
3 CREAM (GRAM) TOPICAL
Status: DISCONTINUED | OUTPATIENT
Start: 2019-10-07 | End: 2019-10-11 | Stop reason: HOSPADM

## 2019-10-07 RX ORDER — OXYCODONE HYDROCHLORIDE 5 MG/1
15 TABLET ORAL EVERY 4 HOURS PRN
Status: DISCONTINUED | OUTPATIENT
Start: 2019-10-07 | End: 2019-10-11 | Stop reason: HOSPADM

## 2019-10-07 RX ORDER — HYDROMORPHONE HCL/0.9% NACL/PF 0.2MG/0.2
.2-.4 SYRINGE (ML) INTRAVENOUS 4 TIMES DAILY PRN
Status: DISCONTINUED | OUTPATIENT
Start: 2019-10-07 | End: 2019-10-08

## 2019-10-07 RX ORDER — OXYCODONE HYDROCHLORIDE 5 MG/1
10 TABLET ORAL EVERY 4 HOURS PRN
Status: DISCONTINUED | OUTPATIENT
Start: 2019-10-07 | End: 2019-10-11 | Stop reason: HOSPADM

## 2019-10-07 RX ORDER — LISINOPRIL 10 MG/1
40 TABLET ORAL DAILY
Status: DISCONTINUED | OUTPATIENT
Start: 2019-10-08 | End: 2019-10-11 | Stop reason: HOSPADM

## 2019-10-07 RX ORDER — LISINOPRIL 10 MG/1
20 TABLET ORAL ONCE
Status: COMPLETED | OUTPATIENT
Start: 2019-10-07 | End: 2019-10-07

## 2019-10-07 RX ADMIN — METHYLPREDNISOLONE 4 MG: 4 TABLET ORAL at 07:16

## 2019-10-07 RX ADMIN — Medication 0.4 MG: at 03:23

## 2019-10-07 RX ADMIN — POLYETHYLENE GLYCOL 3350 17 G: 17 POWDER, FOR SOLUTION ORAL at 07:54

## 2019-10-07 RX ADMIN — OXYCODONE HYDROCHLORIDE 15 MG: 5 TABLET ORAL at 16:54

## 2019-10-07 RX ADMIN — GABAPENTIN 800 MG: 400 CAPSULE ORAL at 07:52

## 2019-10-07 RX ADMIN — METHYLPREDNISOLONE 4 MG: 4 TABLET ORAL at 22:09

## 2019-10-07 RX ADMIN — POLYETHYLENE GLYCOL 3350 17 G: 17 POWDER, FOR SOLUTION ORAL at 20:31

## 2019-10-07 RX ADMIN — OXYCODONE HYDROCHLORIDE 10 MG: 5 TABLET ORAL at 01:01

## 2019-10-07 RX ADMIN — FLUTICASONE FUROATE AND VILANTEROL TRIFENATATE 1 PUFF: 200; 25 POWDER RESPIRATORY (INHALATION) at 07:55

## 2019-10-07 RX ADMIN — LEVOTHYROXINE SODIUM 125 MCG: 25 TABLET ORAL at 07:53

## 2019-10-07 RX ADMIN — Medication 0.4 MG: at 00:15

## 2019-10-07 RX ADMIN — Medication 0.4 MG: at 12:27

## 2019-10-07 RX ADMIN — ACETAMINOPHEN 975 MG: 325 TABLET, FILM COATED ORAL at 09:29

## 2019-10-07 RX ADMIN — GABAPENTIN 800 MG: 400 CAPSULE ORAL at 01:02

## 2019-10-07 RX ADMIN — RANITIDINE 150 MG: 150 TABLET ORAL at 19:08

## 2019-10-07 RX ADMIN — OXYCODONE HYDROCHLORIDE 10 MG: 5 TABLET ORAL at 04:13

## 2019-10-07 RX ADMIN — Medication 0.4 MG: at 16:00

## 2019-10-07 RX ADMIN — SENNOSIDES AND DOCUSATE SODIUM 2 TABLET: 8.6; 5 TABLET ORAL at 20:31

## 2019-10-07 RX ADMIN — CYCLOBENZAPRINE 10 MG: 5 TABLET, FILM COATED ORAL at 03:04

## 2019-10-07 RX ADMIN — OXYCODONE HYDROCHLORIDE 15 MG: 5 TABLET ORAL at 20:47

## 2019-10-07 RX ADMIN — OXYCODONE HYDROCHLORIDE 10 MG: 5 TABLET ORAL at 07:16

## 2019-10-07 RX ADMIN — FLUTICASONE PROPIONATE 1 SPRAY: 50 SPRAY, METERED NASAL at 07:56

## 2019-10-07 RX ADMIN — ALBUTEROL SULFATE 2 PUFF: 90 AEROSOL, METERED RESPIRATORY (INHALATION) at 19:08

## 2019-10-07 RX ADMIN — GABAPENTIN 800 MG: 400 CAPSULE ORAL at 14:18

## 2019-10-07 RX ADMIN — RANITIDINE 150 MG: 150 TABLET ORAL at 07:16

## 2019-10-07 RX ADMIN — OXYCODONE HYDROCHLORIDE 10 MG: 5 TABLET ORAL at 13:55

## 2019-10-07 RX ADMIN — LISINOPRIL 20 MG: 10 TABLET ORAL at 09:28

## 2019-10-07 RX ADMIN — ENOXAPARIN SODIUM 40 MG: 40 INJECTION SUBCUTANEOUS at 14:18

## 2019-10-07 RX ADMIN — ACETAMINOPHEN 975 MG: 325 TABLET, FILM COATED ORAL at 19:08

## 2019-10-07 RX ADMIN — MELATONIN TAB 3 MG 3 MG: 3 TAB at 22:39

## 2019-10-07 RX ADMIN — FEXOFENADINE HYDROCHLORIDE 180 MG: 180 TABLET, FILM COATED ORAL at 07:52

## 2019-10-07 RX ADMIN — CYCLOBENZAPRINE 10 MG: 5 TABLET, FILM COATED ORAL at 15:01

## 2019-10-07 RX ADMIN — LISINOPRIL 20 MG: 10 TABLET ORAL at 07:52

## 2019-10-07 RX ADMIN — SENNOSIDES AND DOCUSATE SODIUM 2 TABLET: 8.6; 5 TABLET ORAL at 07:52

## 2019-10-07 RX ADMIN — MONTELUKAST 10 MG: 10 TABLET, FILM COATED ORAL at 22:09

## 2019-10-07 RX ADMIN — OXYCODONE HYDROCHLORIDE 10 MG: 5 TABLET ORAL at 10:49

## 2019-10-07 RX ADMIN — GABAPENTIN 800 MG: 400 CAPSULE ORAL at 20:31

## 2019-10-07 RX ADMIN — ACETAMINOPHEN 975 MG: 325 TABLET, FILM COATED ORAL at 01:01

## 2019-10-07 ASSESSMENT — ACTIVITIES OF DAILY LIVING (ADL)
ADLS_ACUITY_SCORE: 14

## 2019-10-07 NOTE — PROGRESS NOTES
Patient: Aleena Ontiveros  Date of Service: October 7, 2019 Admission Date: 10/1/2019   6 Days Post-Op     Chief Pain Endorsement: acute post-operative back pain    Recommendations were discussed and relayed to Frances Rodriguez NP, Orthopedics  Plan was reviewed by the Inpatient Pain Service and staff attending, Dr. Riley Reyna      1. Increase oxycodone to 10-15 mg PO q4h PRN. Nursing staff and Physical Therapy staff to coordinate to optimize timing of oxycodone and IV hydromorphone to provide adequate pain control in order to maximize participation in physical rehabilitation. Ideally oxycodone is on board for 30-60 minutes prior to PT, and ideally there is about 60 minutes between oxycodone and IV hydromorphone doses.  2. Continue hydromorphone 0.2-0.4 mg IV q3h PRN breakthrough pain or prior to activity/therapy.  3. Continue gabapentin 800 mg PO q6h scheduled.  4. Continue acetaminophen 975 mg PO q8h scheduled.  5. Continue cyclobenzaprine 5-10 mg PO TID PRN muscle spasms.  6. Continue Lidocaine 4% patches, 1-3 patches transdermal every 24 hours (12 hours on and 12 hours off).  7. Continue Menthol 5% patches, 2 patches transdermal every 8 hours as needed when Lidocaine 4% patches are off.   8. Bowel regimen per primary team to prevent opioid induced constipation.    Pain Service will Continue to follow at this time.     Thank you for consulting the Inpatient Pain Management Service. The above recommendations are to be acted upon at the primary team s discretion.     To reach us:  Mon - Friday 8 AM - 3 PM: Pager [phone] (Text Page)  After hours, weekends and holidays: Primary service should call [phone] for the on-call pain specialist    PAIN MEDICATION SAFE USE:   Prior to discharge instruct patient on the following in addition to the medication fact sheet:    Caution: these medications can cause sedation    Take prescription medicine only if it has been prescribed by your doctor    Do not take more  "medicine or take it more often than instructed     Call your doctor if pain gets worse    Never mix pain medicine with alcohol, sleeping pills, or any illicit drugs    Do not operate heavy machinery, including vehicles, when initiation opioid therapy or increasing dosage    Store prescription opioids in a locked container, whenever possible     Dispose of unused opioids appropriately     Do not stop abruptly once at higher doses.  These medications must be tapered off.    Opioid pain medications do carry the risk for physical dependence and addiction and patients should be counseled about this.         1. Acute post operative pain in the left lateral leg s/pTransforaminal Lumbar Interbody Fusion Three Column Osteotomy L5-S1, Posterior Spinal Fusion L2-Pelvis, use of  BMP bone graft on 10/1/19 with Dr. Terry, Dr. Vasquez, Dr. Mike. Patient has h/o adolescent idiopathic scoliosis and previously underwent T4-L4 fusion with Carpio rods during her teenage years. Today 10/7 Aleena endorses acute post-operative pain associated with her spinal surgery incision, which she describes as \"constant pressure.\" She describes that the LLE radicular pain as \"subsided into the background.\" This could be due to healing and improvement in the radicular pain or therapeutic efficacy of gabapentin (which was increased over the weekend from [phone] mg TID to 800 mg q6h). Over the weekend, she and her nurses and physical therapists were able to effectively time her oxycodone and IV hydromorphone doses to adequately control her pain, which allowed her to sit at the edge of the bed, and stand for brief periods of time. Unfortunately, Aleena did not sleep \"at all\" last night, and the timing of her medications today did not align with the PT schedule, so she was not able to participate. We discussed the possibility of increasing her oxycodone, but ultimately decided to try to catch up on sleep today with a long nap, and work together with " nursing and PT staff to optimize her medications similar to the weekend in order to participate in therapy this afternoon. Tomorrow 10/5 we will reassess for increase of opioid dosing. Weekend documentation mentions new spasms in KIRKE, but Aleena did not endorse these today. Last week we switched from Robaxin to Flexeril, as Aleena had used this in the past. Today she described how it makes her feel somewhat slow mentally, and therefore it may be worth exploring another alternative muscle relaxant to minimize any CNS toxicity.  2. H/o hypertension  3. H/o hypothyroidism s/p thyroidectomy  4. H/o asthma  5. H/o OA s/p TKA of both knees ~2017. Today 10/7 Aleena described using oxycodone following her knee replacements (4 months apart), and how she was able to successfully wean off the opioids post-operatively.     -- Outpatient opioid requirements prior to admission:   -- None     Primary Care Provider: Arabella Conner  Chronic Pain Provider: Dr Cheryl VALDEZ [facility] since July 2019, titrating gabapentin    Interval History:  Aleena Ontiveros was seen today (October 7, 2019), see Assessment section above for details.     Her last bowel movement was 10/6.     CAPA (Clinically Aligned Pain Assessment):    Comfort (How is your pain?): tolerable at rest, intolerable with activation  Change in Pain (Since your last medication/intervention?): About the same  Pain Control (How are your pain treatments working?):  Inadequate pain control  Functioning (Are you able to do activities to get better?) : Pain keeps me from doing most of what I need to do  Sleep (Does your pain management allow you to sleep or rest?): Awake with pain most of night      FUNCTIONAL STATUS:  Change:      unchanged  Oral intake:     Regular  Activity level:     Limited bed mobility  Mood:      adjusting to situation     -- Inpatient Medications Related to Pain Management:   Medications related to Pain Management (From now, onward)    Start     Dose/Rate  Route Frequency Ordered Stop    10/05/19 1400  gabapentin (NEURONTIN) capsule 800 mg      800 mg Oral 4 times per day 10/05/19 1330      10/04/19 2000  polyethylene glycol (MIRALAX/GLYCOLAX) Packet 17 g      17 g Oral 2 TIMES DAILY 10/04/19 1043      10/04/19 1555  cyclobenzaprine (FLEXERIL) tablet 5-10 mg      5-10 mg Oral 3 TIMES DAILY PRN 10/04/19 1555      10/04/19 0000  acetaminophen (TYLENOL) tablet 650 mg      650 mg Oral EVERY 4 HOURS PRN 10/01/19 1732      10/04/19 0000  polyethylene glycol (MIRALAX/GLYCOLAX) packet      17 g Oral DAILY 10/03/19 2101      10/04/19 0000  senna-docusate (SENOKOT-S/PERICOLACE) 8.6-50 MG tablet      2 tablet Oral 2 TIMES DAILY 10/03/19 2101      10/03/19 0955  HYDROmorphone (DILAUDID) injection 0.2-0.4 mg      0.2-0.4 mg Intravenous EVERY 3 HOURS PRN 10/03/19 0956      10/03/19 0800  Lidocaine (LIDOCARE) 4 % Patch 1-3 patch      1-3 patch  over 12 Hours Transdermal EVERY 24 HOURS 0800 10/02/19 1442      10/03/19 0000  methocarbamol (ROBAXIN) 500 MG tablet      500 mg Oral 4 TIMES DAILY PRN 10/03/19 2101      10/03/19 0000  oxyCODONE (ROXICODONE) 5 MG tablet      5-10 mg Oral EVERY 3 HOURS PRN 10/03/19 2101      10/02/19 2000  lidocaine patch in PLACE       Transdermal EVERY 8 HOURS 10/02/19 1442      10/02/19 1745  acetaminophen (TYLENOL) tablet 975 mg      975 mg Oral EVERY 8 HOURS 10/02/19 1444      10/02/19 0800  senna-docusate (SENOKOT-S/PERICOLACE) 8.6-50 MG per tablet 2 tablet      2 tablet Oral 2 TIMES DAILY 10/02/19 0706      10/01/19 1941  senna-docusate (SENOKOT-S/PERICOLACE) 8.6-50 MG per tablet 2 tablet      2 tablet Oral DAILY PRN 10/01/19 1941      10/01/19 1941  bisacodyl (DULCOLAX) Suppository 10 mg      10 mg Rectal DAILY PRN 10/01/19 1941      10/01/19 1905  diphenhydrAMINE (BENADRYL) capsule 25 mg      25 mg Oral EVERY 6 HOURS PRN 10/01/19 1905      10/01/19 1905  diphenhydrAMINE (BENADRYL) injection 25 mg      25 mg Intravenous EVERY 6 HOURS PRN 10/01/19  1905      10/01/19 1905  lidocaine 1 % 0.1-1 mL      0.1-1 mL Other EVERY 1 HOUR PRN 10/01/19 1905      10/01/19 1905  lidocaine (LMX4) cream       Topical EVERY 1 HOUR PRN 10/01/19 1905      10/01/19 1732  oxyCODONE (ROXICODONE) tablet 5-10 mg      5-10 mg Oral EVERY 3 HOURS PRN 10/01/19 1732            LAB DATA:  Recent Labs   Lab 10/06/19  0658 10/04/19  0650 10/03/19  0644 10/02/19  0330  10/01/19  1454   CR 0.47* 0.48* 0.55 0.46*   < > 0.53   WBC  --  9.7 8.0 7.7  --  14.1*   HGB 9.1* 8.9* 8.8* 10.0*  --  10.1*  10.0*   AST  --   --   --   --   --  24   ALT  --   --   --   --   --  31    < > = values in this interval not displayed.         ----------------------------------------------------------------------------------  Shay Hernandez PharmD, [facility]   Inpatient Pain Service     To reach us:  Mon - Friday 8 AM - 3 PM: Pager [phone] (Text Page)  After hours, weekends and holidays: Primary service should call [phone] for the on-call pain specialist    Helpful Resources:  Getting Rid of Unwanted Medications (printable PDF for patients)   Opioid Overdose Prevention Toolkit (printable PDF for patients)   Prescription Opioids: What You Need To Know (printable PDF for patients)

## 2019-10-07 NOTE — PROGRESS NOTES
Social Work Services Progress Note    Hospital Day: 7    Collaborated with:  Alejandra [facility]    Data:  Discharge plan    Intervention:   Pt still not medically stable for discharge. MICHAELLE spoke w/Betsy in Admissions at [facility]. She has initiated insurance authorization and will try to keep a bed available for pt. MICHAELLE provided information that pt may not be ready for discharge until end of week at earliest.     Assessment:  pt not ready for discharge, has pain control issues    Plan:    Anticipated Disposition:  [facility] [phone]    Barriers to d/c plan:  Pain control    Follow Up:  MICHAELLE con't to follow and will update pt when she has been better pain control

## 2019-10-07 NOTE — PLAN OF CARE
Pt A&Ox4, VSS, on 1 L02.  BS active- pt tolerating a regular diet, pt had a small BM 10/6. Pt reports numbness/tingling in BLE. Pt reports L leg pain has slightly improved, also C/O of incisional pain. Pt unable to participate with PT this morning due to pain, will medicate prior to therapy session this afternoon. Dorsi/plantar flexion strong. Pt has a triple lumen picc, SL- attempted to have PIV placed by RN jose miguel and anesthesia- both unable to place PIV. Will continue to use internal jugular line.  Posterior dressing CDI. Pt taking oxycodone and IV dilaudid for pain. Covington patent and draining. Pt is able to make needs known, call light within reach, continue with POC.

## 2019-10-07 NOTE — PROGRESS NOTES
Patient seen, case reviewed with nursing staff and with Dr. Whitten.    Patient continues to complain of bilateral leg pain though much more severe involve the left leg.  She has not been able to get out of bed.  She denies nausea, emesis, abd pain  Covington in place secondary to urinary retention.  Proph lovenox added yesterday    Afebrile  BP 140s-170s/  BG     Alert, lying in bed, appears mod uncomfortable  Lungs clear  CV rrr  Abd soft  No LE edema  LE neuro exam per spine team      Assessment    1)  S/p revision L2-pelvis PSF on 10/1 with Dr. Vasquez.   Persistent inability to effectively mobilize due to radicular complaints.  Administered meds impacted by sedation, hypoventilation. Is completing medrol dosepak  2)  Acute blood loss anemia, adequate.   3)  Chronic pain - on no opiates post op  4)  Asthma, stable    63  HTN, with  persistent elevation on resumed lower dose  lisinopril.   7)  Hypothyroidism on synthroid.   8)  Steroid induced hyperglycemia, mild.     Plan  Mobilize as possible  Increase lisinopril  DVT proph with lovenox  Pain service following  Continue scheduled and prn alb inhaler

## 2019-10-07 NOTE — PROGRESS NOTES
"Orthopaedic Surgery Progress Note:  10/07/2019     Subjective:   POD #6.    No acute events overnight.  Continues to have severe left radicular pain with movement and sitting up. Was able to sit at EOB yesterday and stood for very brief time.  Added prophylactic lovenox due to limited mobility. Covington still in place due to retention.  +BM.  Denies chest pain, shortness of breath, nausea.     Objective:   BP (!) 147/80 (BP Location: Left arm)   Pulse 84   Temp 97.5  F (36.4  C) (Oral)   Resp 16   Ht 1.575 m (5' 2\")   Wt 70.5 kg (155 lb 6.8 oz)   SpO2 100%   BMI 28.43 kg/m    No intake/output data recorded.  Gen: NAD. Resting comfortably in bed  Resp: Breathing comfortably on RA  Drain:   Removed 10/5  Musculoskeletal: Dressing is C/D/I.        Lower extremities:  Motor Strength Right Left   Hip flexion: L1, L2, L3 NT/5 NT/5   Hip adduction: L2, L3 NT/5 NT/5   Knee flexion: S1 5/5 5/5   Knee extension: L3, L4 5/5 5/5   Ankle dosiflexion: L4, L5 5/5 4/5   EHL: L5 4/5 0/5   Ankle plantarflexion: S1 5/5 5/5   0/5 left ankle eversion  0/5 left lesser toe extension    Sensation from L1-S2 is preserved.    Labs:  Lab Results   Component Value Date    WBC 9.7 10/04/2019    HGB 9.1 (L) 10/06/2019     10/04/2019    INR 1.11 10/01/2019        Assessment & Plan:   Aleena Ontiveros is a 59 year old female now s/p revision L2-pelvis PSF on 10/1 with Dr. Vasquez.     Goals for today  Pain control   PT/OT    Orthopedics Primary  Activity: Up with assist until independent. No excessive bending or twisting. No lifting >10 lbs x 6 weeks. No Ashish lift for transfers.   Weight bearing status: WBAT.  Pain management: Transition from IV to PO as tolerated. No NSAIDs   Antibiotics: Ancef x24 hours, complete  Diet: Begin with clear fluids and progress diet as tolerated.   DVT prophylaxis: SCDs only. ADDED LOVENOX ON 10/06/19   Imaging: XR Upright PTDC - ordered.  Labs: Labs PRN  Bracing/Splinting: None.  Dressings: Keep Aquacel " c/d/i x 7 days.  Drains: Removed 10/05/19   Covington catheter: in place after inability to void  Physical Therapy/Occupational Therapy: Eval and treat.  Cultures: None    Consults: Hospitalist, Pain Service.  Follow-up: Clinic with Dr. Vasquez in 6 weeks with repeat x-rays.   Disposition: Pending progress with therapies, pain control on orals, and medical stability, anticipate discharge to rehab when pain well controlled     Orthopaedics surgery staff for this patient is Dr. Vasquez.    ------------------------------------------------------------------------------------------    [x] Discontinue PCA  [x] Drains removed.  [   ] Post xrays done.  [x] Discharge orders done.  [x] Discharge summary started.    Etelvina Valencia MD  Orthopedic Surgery PGY-4  [phone]        FOLLOWUP:    Future Appointments   Date Time Provider Department Center   10/2/2019  2:15 PM Janna Krishnan, PT [facility]   10/3/2019  6:30 AM UR PT REHAB TECH [facility]   10/3/2019  8:00 AM Amira Cook, OT [facility]   10/3/2019  6:30 PM UR PT REHAB TECH [facility]   11/12/2019  9:45 AM Evangelist Vasquez MD [facility]   12/31/2019 10:45 AM Evangelist Vasquez MD [facility]

## 2019-10-07 NOTE — PLAN OF CARE
Discharge Planner PT   Patient plan for discharge: TCU  Current status: SBA/CGA plus verbal instruction for supine to/from right sidelying to/from sitting; poor tolerance for sitting & pt immediately moves back into supine (not always w/spine precautions maintained); pt attempted to transition from sitting to standing with WW as sitting has always been most painful position; min assist to stand with WW & poor tolerance (immediately returns to supine)  Barriers to return to prior living situation: medical status; assist needed for mobility; poor activity tolerance  Recommendations for discharge: TCU  Rationale for recommendations: will benefit from continued skilled PT intervention to address mobility deficits, improve strength & activity tolerance       Entered by: Reina Albarado 10/07/2019 10:34 AM

## 2019-10-07 NOTE — PLAN OF CARE
Patient is alert/oriented x4, is able to communicate care needs appropriately.  Patient has ongoing pain to lower back and incisional that she states is still getting in the way when trying to do activity but is slightly more managed today- increased to 15mg oxy PO and can still get IV Dilaudid PRN through the jugular.  PIV was not placed because flyer RN or by anesthesia after multiple attempts.  Page was sent to notify the NP but also to the primary MD.  MD was also notified via previous nurse that pt would like to try Melatonin as sleep aide and order was placed.  Patient has torres that is intact and draining, was able to participate with therapy after getting PRN IV dilaudid, given PO oxy 15mg afterwards, continue to monitor pain.  No additional care concerns at this time, continue with POC.

## 2019-10-07 NOTE — PLAN OF CARE
VS:    Temp: 97.5  F (36.4  C) Temp src: Oral BP: (!) 147/80 Pulse: 84 Heart Rate: 98 Resp: 16 SpO2: 100 % O2 Device: Nasal cannula 1 LPM  Lung sounds are clear   Output:    torres patent and draining good amounts of urine. Last BM 10/6.   Activity:    Assist of 1 with repositioning - no OOB activity overnight.    Skin: Incision - otherwise intact.    Pain:    Pain tolerable with PRN IV dilaudid, flexeril, oxycodone, ice packs, and scheduled gabapentin.Throughout the morning until 1500. MD adjust pt's gabapentin doses.    Neuro/CMS:    Baseline numbness in feet otherwise intact. Alert and oriented X4.    Dressing(s):    Dressing to back is clean/dry/intact.    Diet:    Regular   LDA:    Triple lumen IJ is saline locked.  Torres patent.    Equipment:    [place]itt brace   Plan:    Discharge plan to be determined.    Additional Info:     Able to make needs known. Will continue with plan of care.

## 2019-10-07 NOTE — PLAN OF CARE
"  VS:   /73 (BP Location: Left arm)   Pulse 81   Temp 96.6  F (35.9  C) (Oral)   Resp 16   Ht 1.575 m (5' 2\")   Wt 70.5 kg (155 lb 6.8 oz)   SpO2 100%   BMI 28.43 kg/m    LS clear, equal bilaterally. O2 sats stable on RA when awake, 1L nc when asleep.   Output:   Covington patent, draining good amounts of clear yellow urine. No BM this evening. Transferred to [facility] to attempt BM, no results.   Activity:   Independently positioning self in bed. Sat on edge of bed x2 this evening. Stood at edge of bed with assist of 1 for short amount of time. Transferred to [facility] with assist of 2 and gait belt. Pt refused PCDs at times due to discomfort in legs. Pt performing ROM independently.   Skin: Intact ex incision, bruise on LUE.    Pain:   Controlled with tylenol, flexeril, oxycodone, and dilaudid. Premedicated for activity.   Neuro/CMS:   CMS intact ex numbness in feet, \"numb spots\" in BLE.   Dressing(s):   CDI   Diet:   Regular diet. Good appetite.  at dinnertime.   LDA:   Triple lumen internal jugular saline locked.   Equipment:   Commode, gait belt, PCDs.   Plan:   Encourage activity. Continue plan of care.     "

## 2019-10-08 ENCOUNTER — APPOINTMENT (OUTPATIENT)
Dept: PHYSICAL THERAPY | Facility: CLINIC | Age: 59
DRG: 460 | End: 2019-10-08
Attending: ORTHOPAEDIC SURGERY
Payer: COMMERCIAL

## 2019-10-08 LAB
GLUCOSE BLDC GLUCOMTR-MCNC: 108 MG/DL (ref 70–99)
GLUCOSE BLDC GLUCOMTR-MCNC: 111 MG/DL (ref 70–99)
GLUCOSE BLDC GLUCOMTR-MCNC: 124 MG/DL (ref 70–99)
PLATELET # BLD AUTO: 311 10E9/L (ref 150–450)

## 2019-10-08 PROCEDURE — 85049 AUTOMATED PLATELET COUNT: CPT | Performed by: ORTHOPAEDIC SURGERY

## 2019-10-08 PROCEDURE — 97032 APPL MODALITY 1+ESTIM EA 15: CPT | Mod: GP

## 2019-10-08 PROCEDURE — 25000132 ZZH RX MED GY IP 250 OP 250 PS 637: Performed by: PHYSICIAN ASSISTANT

## 2019-10-08 PROCEDURE — 36415 COLL VENOUS BLD VENIPUNCTURE: CPT | Performed by: ORTHOPAEDIC SURGERY

## 2019-10-08 PROCEDURE — 99207 ZZC CDG-CODE CATEGORY CHANGED: CPT | Performed by: ANESTHESIOLOGY

## 2019-10-08 PROCEDURE — 25000128 H RX IP 250 OP 636: Performed by: STUDENT IN AN ORGANIZED HEALTH CARE EDUCATION/TRAINING PROGRAM

## 2019-10-08 PROCEDURE — 97530 THERAPEUTIC ACTIVITIES: CPT | Mod: GP

## 2019-10-08 PROCEDURE — 25000132 ZZH RX MED GY IP 250 OP 250 PS 637: Performed by: INTERNAL MEDICINE

## 2019-10-08 PROCEDURE — 99231 SBSQ HOSP IP/OBS SF/LOW 25: CPT | Performed by: INTERNAL MEDICINE

## 2019-10-08 PROCEDURE — 25000128 H RX IP 250 OP 636: Performed by: NURSE PRACTITIONER

## 2019-10-08 PROCEDURE — 25000131 ZZH RX MED GY IP 250 OP 636 PS 637: Performed by: STUDENT IN AN ORGANIZED HEALTH CARE EDUCATION/TRAINING PROGRAM

## 2019-10-08 PROCEDURE — 25000132 ZZH RX MED GY IP 250 OP 250 PS 637: Performed by: NURSE PRACTITIONER

## 2019-10-08 PROCEDURE — 00000146 ZZHCL STATISTIC GLUCOSE BY METER IP

## 2019-10-08 PROCEDURE — 12000001 ZZH R&B MED SURG/OB UMMC

## 2019-10-08 PROCEDURE — 99222 1ST HOSP IP/OBS MODERATE 55: CPT | Performed by: ANESTHESIOLOGY

## 2019-10-08 RX ORDER — LIDOCAINE HYDROCHLORIDE 20 MG/ML
JELLY TOPICAL
Status: DISPENSED
Start: 2019-10-08 | End: 2019-10-09

## 2019-10-08 RX ORDER — GABAPENTIN 400 MG/1
800 CAPSULE ORAL 3 TIMES DAILY
Status: DISCONTINUED | OUTPATIENT
Start: 2019-10-08 | End: 2019-10-11 | Stop reason: HOSPADM

## 2019-10-08 RX ORDER — GABAPENTIN 400 MG/1
1200 CAPSULE ORAL AT BEDTIME
Status: DISCONTINUED | OUTPATIENT
Start: 2019-10-08 | End: 2019-10-08

## 2019-10-08 RX ORDER — GABAPENTIN 400 MG/1
1200 CAPSULE ORAL
Status: DISCONTINUED | OUTPATIENT
Start: 2019-10-09 | End: 2019-10-11 | Stop reason: HOSPADM

## 2019-10-08 RX ADMIN — SENNOSIDES AND DOCUSATE SODIUM 2 TABLET: 8.6; 5 TABLET ORAL at 08:52

## 2019-10-08 RX ADMIN — FEXOFENADINE HYDROCHLORIDE 180 MG: 180 TABLET, FILM COATED ORAL at 08:50

## 2019-10-08 RX ADMIN — CYCLOBENZAPRINE 10 MG: 5 TABLET, FILM COATED ORAL at 00:42

## 2019-10-08 RX ADMIN — GABAPENTIN 800 MG: 400 CAPSULE ORAL at 20:16

## 2019-10-08 RX ADMIN — MONTELUKAST 10 MG: 10 TABLET, FILM COATED ORAL at 22:16

## 2019-10-08 RX ADMIN — GABAPENTIN 800 MG: 400 CAPSULE ORAL at 01:55

## 2019-10-08 RX ADMIN — Medication 0.4 MG: at 01:55

## 2019-10-08 RX ADMIN — ALBUTEROL SULFATE 2 PUFF: 90 AEROSOL, METERED RESPIRATORY (INHALATION) at 13:01

## 2019-10-08 RX ADMIN — OXYCODONE HYDROCHLORIDE 15 MG: 5 TABLET ORAL at 00:48

## 2019-10-08 RX ADMIN — OXYCODONE HYDROCHLORIDE 15 MG: 5 TABLET ORAL at 09:03

## 2019-10-08 RX ADMIN — FLUTICASONE PROPIONATE 1 SPRAY: 50 SPRAY, METERED NASAL at 08:50

## 2019-10-08 RX ADMIN — OXYCODONE HYDROCHLORIDE 10 MG: 5 TABLET ORAL at 05:14

## 2019-10-08 RX ADMIN — POLYETHYLENE GLYCOL 3350 17 G: 17 POWDER, FOR SOLUTION ORAL at 20:17

## 2019-10-08 RX ADMIN — ACETAMINOPHEN 975 MG: 325 TABLET, FILM COATED ORAL at 01:55

## 2019-10-08 RX ADMIN — FLUTICASONE PROPIONATE 1 SPRAY: 50 SPRAY, METERED NASAL at 00:47

## 2019-10-08 RX ADMIN — ALBUTEROL SULFATE 2 PUFF: 90 AEROSOL, METERED RESPIRATORY (INHALATION) at 17:23

## 2019-10-08 RX ADMIN — ALBUTEROL SULFATE 2 PUFF: 90 AEROSOL, METERED RESPIRATORY (INHALATION) at 20:18

## 2019-10-08 RX ADMIN — LISINOPRIL 40 MG: 10 TABLET ORAL at 08:50

## 2019-10-08 RX ADMIN — POLYETHYLENE GLYCOL 3350 17 G: 17 POWDER, FOR SOLUTION ORAL at 08:49

## 2019-10-08 RX ADMIN — ENOXAPARIN SODIUM 40 MG: 40 INJECTION SUBCUTANEOUS at 14:51

## 2019-10-08 RX ADMIN — MENTHOL 2 PATCH: 205.5 PATCH TOPICAL at 08:56

## 2019-10-08 RX ADMIN — ESTRADIOL 2 G: 0.1 CREAM VAGINAL at 00:44

## 2019-10-08 RX ADMIN — Medication 0.4 MG: at 11:19

## 2019-10-08 RX ADMIN — ACETAMINOPHEN 975 MG: 325 TABLET, FILM COATED ORAL at 08:52

## 2019-10-08 RX ADMIN — FLUTICASONE PROPIONATE 1 SPRAY: 50 SPRAY, METERED NASAL at 20:16

## 2019-10-08 RX ADMIN — RANITIDINE 150 MG: 150 TABLET ORAL at 09:04

## 2019-10-08 RX ADMIN — GABAPENTIN 800 MG: 400 CAPSULE ORAL at 08:52

## 2019-10-08 RX ADMIN — METHYLPREDNISOLONE 4 MG: 4 TABLET ORAL at 08:59

## 2019-10-08 RX ADMIN — ACETAMINOPHEN 975 MG: 325 TABLET, FILM COATED ORAL at 17:23

## 2019-10-08 RX ADMIN — ALBUTEROL SULFATE 2 PUFF: 90 AEROSOL, METERED RESPIRATORY (INHALATION) at 08:53

## 2019-10-08 RX ADMIN — LEVOTHYROXINE SODIUM 125 MCG: 25 TABLET ORAL at 08:51

## 2019-10-08 RX ADMIN — SENNOSIDES AND DOCUSATE SODIUM 2 TABLET: 8.6; 5 TABLET ORAL at 20:16

## 2019-10-08 RX ADMIN — OXYCODONE HYDROCHLORIDE 15 MG: 5 TABLET ORAL at 13:02

## 2019-10-08 RX ADMIN — RANITIDINE 150 MG: 150 TABLET ORAL at 18:51

## 2019-10-08 RX ADMIN — OXYCODONE HYDROCHLORIDE 15 MG: 5 TABLET ORAL at 17:23

## 2019-10-08 RX ADMIN — CYCLOBENZAPRINE 10 MG: 5 TABLET, FILM COATED ORAL at 17:30

## 2019-10-08 RX ADMIN — FLUTICASONE FUROATE AND VILANTEROL TRIFENATATE 1 PUFF: 200; 25 POWDER RESPIRATORY (INHALATION) at 08:50

## 2019-10-08 RX ADMIN — GABAPENTIN 800 MG: 400 CAPSULE ORAL at 14:50

## 2019-10-08 ASSESSMENT — ACTIVITIES OF DAILY LIVING (ADL)
ADLS_ACUITY_SCORE: 14

## 2019-10-08 NOTE — PLAN OF CARE
Discharge Planner PT   Patient plan for discharge: TCU  Current status: Patient continues to be SBA/Jose with rolling and bed mobility. Trialed TENs unit during PT, patient has her own unit at home and will have family bring it in today; TENs appeared to help. Patient stood x3 today for 30-60 seconds, which is a large improvement, working on increasing LLE weight bearing. Patient making good improvements.  Barriers to return to prior living situation: Medical, decreased activity tolerance, pain, decreased strength, safety with functional mobility.  Recommendations for discharge: TCU  Rationale for recommendations: Patient would greatly benefit from continued skilled PT services in a TCU setting to progress functional mobility and maximize independence.       Entered by: Yari Rosas 10/08/2019 12:44 PM

## 2019-10-08 NOTE — PROGRESS NOTES
CLINICAL NUTRITION SERVICES - REASSESSMENT NOTE     Nutrition Prescription    RECOMMENDATIONS FOR MDs/PROVIDERS TO ORDER:  None today    Malnutrition Status:    Patient does not meet two of the criteria necessary for diagnosing malnutrition    Recommendations already ordered by Registered Dietitian (RD):  Continue Boost Plus Shakes TID with meals (vanilla)    Future/Additional Recommendations:  Adjust supplements pending pt preference/PO intakes      EVALUATION OF THE PROGRESS TOWARD GOALS   Diet: Regular, Boost Plus Shake TID with meals (vanilla)    Intake: pt reports eating most of her meals and her appetite is improving. Finds it difficult to eat when she is in pain but when shes not in pain she tends to eat well. She also doesn't mind the Boost Shakes and reports drinking over half of them.       NEW FINDINGS   - No updated wt since admission. Previously, wt was stable.     MALNUTRITION  % Intake: Decreased intake does not meet criteria  % Weight Loss: None noted  Subcutaneous Fat Loss: None observed  Muscle Loss: None observed  Fluid Accumulation/Edema: None noted  Malnutrition Diagnosis: Patient does not meet two of the above criteria necessary for diagnosing malnutrition    Previous Goals   Patient to consume % of nutritionally adequate meal trays TID, or the equivalent with supplements/snacks.  Evaluation: Likely met    Previous Nutrition Diagnosis  Inadequate protein-energy intake related to poor appetite as evidenced by eating <50% of meals x2 days    Evaluation: Improving    CURRENT NUTRITION DIAGNOSIS  Predicted inadequate protein-energy intake related to poor appetite as evidenced by eating >50% of meals     INTERVENTIONS  Implementation  Medical food supplement therapy - continue as ordered    Goals  Patient to consume % of nutritionally adequate meal trays TID, or the equivalent with supplements/snacks.    Monitoring/Evaluation  Progress toward goals will be monitored and evaluated per  protocol.    Deanna Bull RD, LD  Unit Pager: [phone]

## 2019-10-08 NOTE — PROGRESS NOTES
Social Work Services Progress Note    Hospital Day: 8    Collaborated with:  Admissions [facility], pt, pt's daughter Martin, 10A IDT    Data:  Discharge plan    Intervention:  Per medical team, pt will be ready for discharge Wed/Thurs of this week. Pt and family were planning on going to [place] at [facility] but then had discussions about [facility]. SW provided information about [facility] and supported their decision making process. SW offered to make referral to [facility] if pt and family had preference. At end of discussion, pt states preferences for returning to [facility] for TCU.    Pt and family discussed transportation. Pt has limited sitting tolerance and there is concern for possible needing Stretcher ride. SW discussed possible private pay costs for Stretcher ride should pt's insurance not pay for ride: $750 to  and roughly $75-$100/mile. Pt states plan to work with PT this afternoon to work on sitting tolerance.     Assessment:  pt con't to have support of her family for discharge planning and healing.     Plan:    Anticipated Disposition:  Facility:  [facility]    Barriers to d/c plan:  Medical stability, anticipate discharge 10/9/19    Follow Up:  SW con't to follow

## 2019-10-08 NOTE — PROGRESS NOTES
Pt is alert and orientated. Verbalizes pain is still upsetting her. Voiced that she is upset with herself and feels she should be further along in her pain control. Administered medication and ice without relief.  Covington was removed without problems. Internal jugular IV was taken out tip intact. Plan to focus on PT for standing and accessing the commode.

## 2019-10-08 NOTE — PROGRESS NOTES
"Orthopaedic Surgery Progress Note:  10/08/2019     Subjective:   POD #7.    No acute events overnight.  Continues to have severe back pain and \"some radicular\" pain.   Was able to sit at EOB yesterday and stood for very brief time.  Inpatient pain team following and made adjustments yesterday.  Torres still in place due to retention.  +BM.  Denies chest pain, shortness of breath, nausea.     Objective:   BP (!) 175/99 (BP Location: Left arm)   Pulse 92   Temp 97.2  F (36.2  C) (Oral)   Resp 12   Ht 1.575 m (5' 2\")   Wt 70.5 kg (155 lb 6.8 oz)   SpO2 98%   BMI 28.43 kg/m    No intake/output data recorded.  Gen: NAD. Resting comfortably in bed  Resp: Breathing comfortably on RA  Drain:   Removed 10/5  Musculoskeletal: Dressing is C/D/I.        Lower extremities:  Motor Strength Right Left   Hip flexion: L1, L2, L3 NT/5 NT/5   Hip adduction: L2, L3 NT/5 NT/5   Knee flexion: S1 5/5 5/5   Knee extension: L3, L4 5/5 5/5   Ankle dosiflexion: L4, L5 5/5 4/5   EHL: L5 4/5 0/5   Ankle plantarflexion: S1 5/5 5/5   0/5 left ankle eversion  0/5 left lesser toe extension    Sensation from L1-S2 is preserved.    Labs:  Lab Results   Component Value Date    WBC 9.7 10/04/2019    HGB 9.1 (L) 10/06/2019     10/04/2019    INR 1.11 10/01/2019        Assessment & Plan:   Aleena Ontiveros is a 59 year old female now s/p revision L2-pelvis PSF on 10/1 with Dr. Vasquez.     Goals for today  Pain control   PT/OT  Remove torres today     Orthopedics Primary  Activity: Up with assist until independent. No excessive bending or twisting. No lifting >10 lbs x 6 weeks. No Ashish lift for transfers.   Weight bearing status: WBAT.  Pain management: Transition from IV to PO as tolerated. No NSAIDs   Antibiotics: Ancef x24 hours, complete  Diet: Begin with clear fluids and progress diet as tolerated.   DVT prophylaxis: SCDs only. ADDED LOVENOX ON 10/06/19   Imaging: XR Upright PTDC - ordered.  Labs: Labs PRN  Bracing/Splinting: " None.  Dressings: Keep Aquacel c/d/i x 7 days.  Drains: Removed 10/05/19   Covington catheter: in place after inability to void. Remove today.   Physical Therapy/Occupational Therapy: Eval and treat.  Cultures: None    Consults: Hospitalist, Pain Service.  Follow-up: Clinic with Dr. Vasquez in 6 weeks with repeat x-rays.   Disposition: Pending progress with therapies, pain control on orals, and medical stability, anticipate discharge to rehab when pain well controlled     Orthopaedics surgery staff for this patient is Dr. Vasquez.    ------------------------------------------------------------------------------------------    [x] Discontinue PCA  [x] Drains removed.  [   ] Post xrays done.  [x] Discharge orders done.  [x] Discharge summary started.    Etelvina Valencia MD  Orthopedic Surgery PGY-4  [phone]        FOLLOWUP:    Future Appointments   Date Time Provider Department Center   10/2/2019  2:15 PM Janna Krishnan, PT [facility]   10/3/2019  6:30 AM UR PT REHAB TECH [facility]   10/3/2019  8:00 AM Amira Cook, OT [facility]   10/3/2019  6:30 PM UR PT REHAB TECH [facility]   11/12/2019  9:45 AM Evangelist Vasquez MD [facility]   12/31/2019 10:45 AM Evangelist Vasquez MD [facility]

## 2019-10-08 NOTE — ADDENDUM NOTE
Addendum  created 10/08/19 6810 by Yue Reyna MD    Charge Capture section accepted, Clinical Note Signed

## 2019-10-08 NOTE — PLAN OF CARE
"VS:   BP (!) 175/99 (BP Location: Left arm)   Pulse 92   Temp 97.2  F (36.2  C) (Oral)   Resp 12   Ht 1.575 m (5' 2\")   Wt 70.5 kg (155 lb 6.8 oz)   SpO2 98%   BMI 28.43 kg/m    LS clear, equal bilaterally. O2 sats stable on 1L nc overnight.   Output:   Covington patent, draining good amounts of yellow urine. No BM overnight. Passing gas.   Activity:   Repositioning self in bed. Did not get up overnight.   Skin: Intact ex incision, bruising on bilat arms   Pain:   Pain controlled with PRN oxycodone, IV dilaudid   Neuro/CMS:   Numbness and tingling in bilat feet. CMS intact otherwise. A&Ox4.   Dressing(s):   CDI   Diet:   Regular diet   LDA:   Triple lumen internal jugular saline locked   Equipment:   PCDs, IV pole, [place] brace   Plan:   Encourage activity. Continue plan of care.   Additional Info:   Chair provided for patient to try sitting up for meals.     "

## 2019-10-08 NOTE — PLAN OF CARE
A/O x 4, oxycodone 15 mg given for pain, pt still having pain and IVF dilaudid given for breakthrough pain. Aquacell dressing was completely rolled up along incision, replaced this dressing with a [place] dressing. Incision was intact. Discussed with pt the possibility of getting up for breakfast in the morning, will locate an appropriate chair to facilitate this idea.  Reports shins feeling like they have shin splints. Ice packs applied to spine and lower legs. Call light is in reach cont to assess.

## 2019-10-08 NOTE — PLAN OF CARE
OT:  OT to continue to hold until mobility/pain improves and able to tolerate and participate in more than 1 discipline.

## 2019-10-08 NOTE — PROGRESS NOTES
Pt was seen, case reviewed with team    Pt notes some improvement in L LE pain this am, but had a difficult night last night secondary to pain    No chest pain, cough or SOB  02 sats upper 90s 1 L  Covington in place.  Last BM 10/5/19  BG low 100s    Alert, in NAD  Lungs clear  CV rrr  Abd soft, non-tender, non-distended  No LE edema      Assessment    1)  S/p revision L2-pelvis PSF on 10/1 with Dr. Vasquez. Pain appears improved this am. Pt has not mobilized  2)  Acute blood loss anemia, stable   3)  Chronic pain - on no opiates post op  4)  Asthma, stable resp status  5)  HTN, fair control on lisinopril  7)  Hypothyroidism on synthroid.   8)  Steroid induced hyperglycemia, mild.     Plan  discontinue sliding scale insulin coverage, monitor BG  Monitor BP on lisinopril  DVT proph with lovenox  Bowel regimen

## 2019-10-08 NOTE — PROGRESS NOTES
Inpatient Pain Management Service: Consultation      DATE OF CONSULT: 10/8/2019      REASON FOR PAIN CONSULTATION:  Aleena Ontiveros is a 59 year old female I am seeing in consultation at the request of Frances Rodriguez NP orthopedics for evaluation and recommendations for her back and leg pain.       CHIEF PAIN COMPLAINT: leg pain      ASSESSMENT: The patient seems to continue to struggle with moderately severe pain on POD #7 and is participating in physical therapy. She is not able to participate as much as she likes. The patient could possibly have a component of ilio tibial band tightness along with the low back pain and acute post surgical pain. I have discussed our recommendations as follow with Frances Rodriguez. Our team also discuss the recommendation with the physical therapist caring for the patient.   1. The patient seems to be exquisitely tender in the area of the lower ilio tibial band. We have requested physical therapy to focus in this area as well. The patient can receive hydromorphone bolus especially prior to physical therapy sessions.   2. We will continue oxycodone 10-15 mg po q4hrly prn pain along with intravenous hydromorphone 0.2-0.4 mg q3hrly for break through pain control especially for physical therapy and movement. The patient does not report improvement in pain with the last increase in opioid doses and therefore we are holding off on further opioid escalations today.   3. The patient is on gabapentin 900 mg tid and I have recommended to Frances to increase the night dose to 1200 mg. The patient reports improvement in pain with gabapentin and I chose to increase the night dose as the patient found the night time to be the most difficult.   The primary team may consider adding 0.2 mg klonipin at night if the patient is anxious and continues to struggle with pain and anxiety tonight.     4. We recommend continuing acetaminophen 975 mg po q8hrly, cyclobenzaprine 10 mg po tid prn muscle spasm, lidocaine  patches 4% 12 hours on and 12 hours off.       Primary Care Provider: Arabella Conner  Chronic Pain Provider: Dr. Bowles    TREATMENT RECOMMENDATIONS/PLAN:   1. The patient seems to be exquisitely tender in the area of the lower ilio tibial band. We have requested physical therapy to focus in this area as well. The patient can receive hydromorphone bolus especially prior to physical therapy sessions.   2. We will continue oxycodone 10-15 mg po q4hrly prn pain along with intravenous hydromorphone 0.2-0.4 mg q3hrly for break through pain control especially for physical therapy and movement. The patient does not report improvement in pain with the last increase in opioid doses and therefore we are holding off on further opioid escalations today.   3. The patient is on gabapentin 900 mg tid and I have recommended to Frances to increase the night dose to 1200 mg. The patient reports improvement in pain with gabapentin and I chose to increase the night dose as the patient found the night time to be the most difficult.   The primary team may consider adding 0.2 mg klonipin at night if the patient is anxious and continues to struggle with pain and anxiety tonight.     4. We recommend continuing acetaminophen 975 mg po q8hrly, cyclobenzaprine 10 mg po tid prn muscle spasm, lidocaine patches 4% 12 hours on and 12 hours off.     Pain Service will Continue to follow at this time.     Recommendations were discussed with Frances Rodriguez    Thank you for consulting the Inpatient Pain Management Service.   The above recommendations are to be acted upon at the primary team s discretion.    To reach us:  Mon - Friday 8 AM - 3 PM: Pager [phone] (Text Page)  After hours, weekends and holidays: Primary service should call [phone] for the on-call pain specialist    HISTORY OF PRESENT ILLNESS: Aleena Ontiveros is a 59 year old female with acute post operative pain in the low back and bilateral legs s/p transforaminal lumbar  interbody fusion 3 column osteotomy L5-S1, posterior spinal fusion L2-Pelvis, use of  BMP bone graft on 10/1/19 with Dr. Terry, Dr. Vasquez, Dr. Mike. Patient has h/o adolescent idiopathic scoliosis and previously underwent T4-L4 fusion with Carpio rods during her teenage years. The patient continues to struggle with moderately severe pain in the low back and bilateral legs area. She points to the lateral aspects of the thigh area as the most painful areas. She states that the gabapentin and muscle relaxants help some what with the pain. She has not noticed any improvement in pain experience with the increase in oxycodone. She is frustrated because she finds it difficult to participate in physical therapy because of severe pain. Yesterday as per notes the patient was complaining of pain in the left leg. Today the pain is both legs but worse in the left leg.      PAIN HISTORY: See above    CAPA (Clinically Aligned Pain Assessment)  Comfort (How is your pain?): Tolerable with discomfort  Change in Pain (Since your last medication/intervention?): About the same  Pain Control (How are your pain treatments working?): Partially effective pain control  Functioning (Are you able to do activities to get better?) : Pain keeps me from doing most of what I need to do  Sleep (Does your pain management allow you to sleep or rest?): Awake with pain most of the night       FUNCTIONAL STATUS:  Change:      improving  Oral intake:     Regular  Bowel function:    Normal  Activity level:     Up with assistance ambulating in room  Sleep:      No concerns, sleeps well through night and frequent waking  Mood:      anxiety      REVIEW OF 10 BODY SYSTEMS: 10 point ROS of systems including Constitutional, Eyes, Respiratory, Cardiovascular, Gastroenterology, Genitourinary, Integumentary, Musculoskeletal, Psychiatric were all negative except for pertinent positives noted in my HPI.       INPATIENT MEDICATIONS PERTINENT TO PAIN CONSULT:    Medications related to Pain Management (From now, onward)    Start     Dose/Rate Route Frequency Ordered Stop    10/09/19 0000  gabapentin (NEURONTIN) capsule 1,200 mg      1,200 mg Oral Daily 10/08/19 1133      10/08/19 1400  gabapentin (NEURONTIN) capsule 800 mg      800 mg Oral 3 TIMES DAILY 10/08/19 1128      10/07/19 1449  oxyCODONE (ROXICODONE) tablet 10 mg      10 mg Oral EVERY 4 HOURS PRN 10/07/19 1449      10/07/19 1449  oxyCODONE (ROXICODONE) tablet 15 mg      15 mg Oral EVERY 4 HOURS PRN 10/07/19 1449      10/04/19 2000  polyethylene glycol (MIRALAX/GLYCOLAX) Packet 17 g      17 g Oral 2 TIMES DAILY 10/04/19 1043      10/04/19 1555  cyclobenzaprine (FLEXERIL) tablet 5-10 mg      5-10 mg Oral 3 TIMES DAILY PRN 10/04/19 1555      10/04/19 0000  acetaminophen (TYLENOL) tablet 650 mg      650 mg Oral EVERY 4 HOURS PRN 10/01/19 1732      10/04/19 0000  polyethylene glycol (MIRALAX/GLYCOLAX) packet      17 g Oral DAILY 10/03/19 2101      10/04/19 0000  senna-docusate (SENOKOT-S/PERICOLACE) 8.6-50 MG tablet      2 tablet Oral 2 TIMES DAILY 10/03/19 2101      10/03/19 0800  Lidocaine (LIDOCARE) 4 % Patch 1-3 patch      1-3 patch  over 12 Hours Transdermal EVERY 24 HOURS 0800 10/02/19 1442      10/03/19 0000  methocarbamol (ROBAXIN) 500 MG tablet      500 mg Oral 4 TIMES DAILY PRN 10/03/19 2101      10/03/19 0000  oxyCODONE (ROXICODONE) 5 MG tablet      5-10 mg Oral EVERY 3 HOURS PRN 10/03/19 2101      10/02/19 2000  lidocaine patch in PLACE       Transdermal EVERY 8 HOURS 10/02/19 1442      10/02/19 1745  acetaminophen (TYLENOL) tablet 975 mg      975 mg Oral EVERY 8 HOURS 10/02/19 1444      10/02/19 0800  senna-docusate (SENOKOT-S/PERICOLACE) 8.6-50 MG per tablet 2 tablet      2 tablet Oral 2 TIMES DAILY 10/02/19 0706      10/01/19 1941  senna-docusate (SENOKOT-S/PERICOLACE) 8.6-50 MG per tablet 2 tablet      2 tablet Oral DAILY PRN 10/01/19 1941      10/01/19 1941  bisacodyl (DULCOLAX) Suppository 10 mg       10 mg Rectal DAILY PRN 10/01/19 1941      10/01/19 1905  diphenhydrAMINE (BENADRYL) capsule 25 mg      25 mg Oral EVERY 6 HOURS PRN 10/01/19 1905      10/01/19 1905  diphenhydrAMINE (BENADRYL) injection 25 mg      25 mg Intravenous EVERY 6 HOURS PRN 10/01/19 1905      10/01/19 1905  lidocaine 1 % 0.1-1 mL      0.1-1 mL Other EVERY 1 HOUR PRN 10/01/19 1905      10/01/19 1905  lidocaine (LMX4) cream       Topical EVERY 1 HOUR PRN 10/01/19 1905              CURRENT MEDICATIONS:   Current Facility-Administered Medications Ordered in Epic   Medication Dose Route Frequency Provider Last Rate Last Dose     acetaminophen (TYLENOL) tablet 650 mg  650 mg Oral Q4H PRN Johnnie Mcdonald PA-C   650 mg at 10/05/19 1917     acetaminophen (TYLENOL) tablet 975 mg  975 mg Oral Q8H Frances Rodriguez APRN CNP   975 mg at 10/08/19 0852     albuterol (PROAIR HFA/PROVENTIL HFA/VENTOLIN HFA) 108 (90 Base) MCG/ACT inhaler 2 puff  2 puff Inhalation Q4H PRN Krzysztof Whitten MD         albuterol (PROAIR HFA/PROVENTIL HFA/VENTOLIN HFA) 108 (90 Base) MCG/ACT inhaler 2 puff  2 puff Inhalation 4x Daily Krzysztof Whitten MD   2 puff at 10/08/19 1301     benzocaine-menthol (CEPACOL) 15-3.6 MG lozenge 1 lozenge  1 lozenge Buccal Q1H PRN Johnnie Mcdonald PA-C         bisacodyl (DULCOLAX) Suppository 10 mg  10 mg Rectal Daily PRN Johnnie Mcdonald PA-C         cyclobenzaprine (FLEXERIL) tablet 5-10 mg  5-10 mg Oral TID PRN Frances Rodriguez APRN CNP   10 mg at 10/08/19 0042     glucose gel 15-30 g  15-30 g Oral Q15 Min PRN Gildardo Vera MD        Or     dextrose 50 % injection 25-50 mL  25-50 mL Intravenous Q15 Min PRN Gildardo Vera MD        Or     glucagon injection 1 mg  1 mg Subcutaneous Q15 Min PRN Gildardo Vera MD         diphenhydrAMINE (BENADRYL) capsule 25 mg  25 mg Oral Q6H PRN Johnnie Mcdonald PA-C        Or     diphenhydrAMINE (BENADRYL) injection 25 mg  25 mg Intravenous Q6H PRN Pampprateek,  Johnnie Spaulding PA-C         enoxaparin (LOVENOX) injection 40 mg  40 mg Subcutaneous Q24H Etelvina Valencia MD   40 mg at 10/07/19 1418     estradiol (ESTRACE) cream 2 g  2 g Vaginal At Bedtime Johnnie Mcdonald PA-C   2 g at 10/08/19 0044     fexofenadine (ALLEGRA) tablet 180 mg  180 mg Oral QAM Johnnie Mcdonald PA-C   180 mg at 10/08/19 0850     fluticasone (FLONASE) 50 MCG/ACT spray 1 spray  1 spray Both Nostrils BID Johnnie Mcdonald PA-C   1 spray at 10/08/19 0850     fluticasone-vilanterol (BREO ELLIPTA) 200-25 MCG/INH inhaler 1 puff  1 puff Inhalation Daily Evangelist Vasquez MD   1 puff at 10/08/19 0850     [START ON 10/9/2019] gabapentin (NEURONTIN) capsule 1,200 mg  1,200 mg Oral Daily Frances Rodriguez APRN CNP         gabapentin (NEURONTIN) capsule 800 mg  800 mg Oral TID Frances Rodriguez APRN CNP         levothyroxine (SYNTHROID/LEVOTHROID) tablet 125 mcg  125 mcg Oral QAM Johnnie Mcdonald PA-C   125 mcg at 10/08/19 0851     Lidocaine (LIDOCARE) 4 % Patch 1-3 patch  1-3 patch Transdermal Q24H Frances Rodriguez APRN CNP   1 patch at 10/03/19 0843     lidocaine (LMX4) cream   Topical Q1H PRN Johnnie Mcdonald PA-C         lidocaine 1 % 0.1-1 mL  0.1-1 mL Other Q1H PRN Johnnie Mcdonald PA-C         lidocaine patch in PLACE   Transdermal Q8H Frances Rodriguez APRN CNP         lidocaine patch REMOVAL   Transdermal Q24h Frances Rodriguez APRN CNP         lisinopril (PRINIVIL/ZESTRIL) tablet 40 mg  40 mg Oral Daily Wilton Eastman MD   40 mg at 10/08/19 0850     magnesium sulfate 4 g in 100 mL sterile water (premade)  4 g Intravenous Q4H PRN Gildardo Vera MD         melatonin tablet 3 mg  3 mg Oral At Bedtime PRN Wilton Eastman MD   3 mg at 10/07/19 3669     menthol (ICY HOT) 5 % patch 2 patch  2 patch Topical Q8H PRN Frances Rodriguez APRN CNP   2 patch at 10/08/19 0856    And     menthol (ICY HOT) Patch in Place    Transdermal Q8H Frances Rodriguez APRN CNP        And     menthol (ICY HOT) patch REMOVAL   Transdermal Q8H PRN Frances Rodriguez APRN CNP         metoclopramide (REGLAN) tablet 10 mg  10 mg Oral Q6H PRN Johnnie Mcdonald PA-C        Or     metoclopramide (REGLAN) injection 10 mg  10 mg Intravenous Q6H PRN Johnnie Mcdonald PA-C         montelukast (SINGULAIR) tablet 10 mg  10 mg Oral At Bedtime Johnnie Mcdonald PA-C   10 mg at 10/07/19 2209     naloxone (NARCAN) injection 0.1-0.4 mg  0.1-0.4 mg Intravenous Q2 Min PRN Johnnie Mcdonald PA-C         ondansetron (ZOFRAN-ODT) ODT tab 4 mg  4 mg Oral Q6H PRN Johnnie Mcdonald PA-C        Or     ondansetron (ZOFRAN) injection 4 mg  4 mg Intravenous Q6H PRN Johnnie Mcdonald PA-C   4 mg at 10/04/19 1834     oxyCODONE (ROXICODONE) tablet 10 mg  10 mg Oral Q4H PRN Frances Rodriguez APRN CNP   10 mg at 10/08/19 0514    Or     oxyCODONE (ROXICODONE) tablet 15 mg  15 mg Oral Q4H PRN Frances Rodriguez APRN CNP   15 mg at 10/08/19 1302     polyethylene glycol (MIRALAX/GLYCOLAX) Packet 17 g  17 g Oral BID Frances Rodriguez APRN CNP   17 g at 10/08/19 0849     potassium chloride (KLOR-CON) Packet 20-40 mEq  20-40 mEq Oral or Feeding Tube Q2H PRN Gildardo Vera MD         potassium chloride 10 mEq in 100 mL intermittent infusion with 10 mg lidocaine  10 mEq Intravenous Q1H PRN Gildardo Vera MD         potassium chloride 10 mEq in 100 mL sterile water intermittent infusion (premix)  10 mEq Intravenous Q1H PRN Gildardo Vera MD         potassium chloride 20 mEq in 50 mL intermittent infusion  20 mEq Intravenous Q1H PRN Gildardo Vera MD         potassium chloride ER (K-DUR/KLOR-CON M) CR tablet 20-40 mEq  20-40 mEq Oral Q2H PRN Gildardo Vera MD         prochlorperazine (COMPAZINE) injection 10 mg  10 mg Intravenous Q6H PRN Johnnie Mcdonald PA-C        Or     prochlorperazine (COMPAZINE) tablet 10 mg  10 mg Oral Q6H  PRN Johnnie Mcdonald PA-C        Or     prochlorperazine (COMPAZINE) Suppository 25 mg  25 mg Rectal Q12H PRN Johnnie Mcdonald PA-C         ranitidine (ZANTAC) tablet 150 mg  150 mg Oral Q12H Johnnie Mcdonald PA-C   150 mg at 10/08/19 0904     senna-docusate (SENOKOT-S/PERICOLACE) 8.6-50 MG per tablet 2 tablet  2 tablet Oral BID -LeonelTruman PA-C   2 tablet at 10/08/19 0852     senna-docusate (SENOKOT-S/PERICOLACE) 8.6-50 MG per tablet 2 tablet  2 tablet Oral Daily PRN Johnnie Mcdonald PA-C         sodium chloride (PF) 0.9% PF flush 3 mL  3 mL Intracatheter q1 min prn Johnnie Mcdonald PA-C   20 mL at 10/06/19 0652     sodium chloride (PF) 0.9% PF flush 3 mL  3 mL Intracatheter Q8H Johnnie Mcdonald PA-C   3 mL at 10/08/19 0515     Current Outpatient Medications Ordered in Epic   Medication Sig Dispense Refill     methocarbamol (ROBAXIN) 500 MG tablet Take 1 tablet (500 mg) by mouth 4 times daily as needed for muscle spasms 60 tablet 0     oxyCODONE (ROXICODONE) 5 MG tablet Take 1-2 tablets (5-10 mg) by mouth every 3 hours as needed for moderate to severe pain (Take 1 tab for pain rated 2-5, take 2 tabs for pain rated 6-10) 50 tablet 0     polyethylene glycol (MIRALAX/GLYCOLAX) packet Take 17 g by mouth daily 72 packet 0     senna-docusate (SENOKOT-S/PERICOLACE) 8.6-50 MG tablet Take 2 tablets by mouth 2 times daily 200 tablet 0           HOME/PREVIOUS MEDICATIONS:   Prior to Admission medications    Medication Sig Start Date End Date Taking? Authorizing Provider   acetaminophen (TYLENOL) 500 MG tablet Take 1,000 mg by mouth every 6 hours as needed for mild pain   Yes Reported, Patient   albuterol (PROAIR HFA/PROVENTIL HFA/VENTOLIN HFA) 108 (90 BASE) MCG/ACT Inhaler Inhale 2 puffs into the lungs every 6 hours as needed for shortness of breath / dyspnea or wheezing   Yes Reported, Patient   cyclobenzaprine (FLEXERIL) 10 MG tablet Take 1 tablet (10 mg) by mouth 3  times daily as needed for muscle spasms 8/15/19  Yes Evangelist Vasquez MD   estradiol (ESTRACE) 0.1 MG/GM cream Place 2 g vaginally At Bedtime    Yes Reported, Patient   Fexofenadine HCl (ALLEGRA PO) Take 180 mg by mouth every morning    Yes Reported, Patient   fluticasone (FLONASE) 50 MCG/ACT spray Spray 1 spray into both nostrils 2 times daily    Yes Reported, Patient   fluticasone-salmeterol (ADVAIR) 500-50 MCG/DOSE diskus inhaler Inhale 1 puff into the lungs 2 times daily   Yes Reported, Patient   gabapentin (NEURONTIN) 600 MG tablet Take 900mg (1.5 tablets) three times per day 8/27/19  Yes Ana Luna MD   Levothyroxine Sodium (SYNTHROID PO) Take 125 mcg by mouth every morning    Yes Reported, Patient   lisinopril (PRINIVIL/ZESTRIL) 40 MG tablet Take 1 tablet (40 mg) by mouth daily  Patient taking differently: Take 40 mg by mouth every morning  12/16/17  Yes Arabella Jara MD   methocarbamol (ROBAXIN) 500 MG tablet Take 1 tablet (500 mg) by mouth 4 times daily as needed for muscle spasms 10/3/19  Yes Ashish Cline MD   Montelukast Sodium (SINGULAIR PO) Take 10 mg by mouth At Bedtime    Yes Reported, Patient   Multiple Vitamins-Minerals (DAILY DENIZ MAXIMUM MULTIVITAMIN PO) Take 1 packet by mouth 3 times daily DOTERRA   Yes Reported, Patient   Omega-3 Fatty Acids (FISH OIL PO) Take 1 capsule by mouth every morning    Yes Reported, Patient   oxyCODONE (ROXICODONE) 5 MG tablet Take 1-2 tablets (5-10 mg) by mouth every 3 hours as needed for moderate to severe pain (Take 1 tab for pain rated 2-5, take 2 tabs for pain rated 6-10) 10/3/19  Yes Ashish Cline MD   polyethylene glycol (MIRALAX/GLYCOLAX) packet Take 17 g by mouth daily 10/4/19  Yes Ashish Cline MD   senna-docusate (SENOKOT-S/PERICOLACE) 8.6-50 MG tablet Take 2 tablets by mouth 2 times daily 10/4/19  Yes Ashish Cline MD   bisacodyl (DULCOLAX) 10 MG Suppository Place 1 suppository (10 mg) rectally daily as  needed for constipation 17   Arabella Jara MD   order for DME Equipment being ordered: TENS.    TENS UNIT Ordered- Please call your insurance to Verify coverage and locations to  the device.     Please Dispense Device, leads, pads, wires, and all other necessary equipment that is needed for use.     Length of need: 36 months. 19   Edy Huerta MD           PAST PAIN TREATMENT:         ALLERGIES:    Allergies   Allergen Reactions     Adhesive Tape      Erythromycin Nausea and Vomiting     Pills cause vomiting,             PAST MEDICAL AND PSYCHIATRIC HISTORY:    Past Medical History:   Diagnosis Date     Arthritis     osteoarthritis of both knee     Asthma      Hypertension      PONV (postoperative nausea and vomiting)      Thyroid disease      Uncomplicated asthma            PAST SURGICAL HISTORY:   Past Surgical History:   Procedure Laterality Date     ABDOMEN SURGERY  ,         ARTHROPLASTY KNEE Right 8/10/2017    Procedure: ARTHROPLASTY KNEE;  RIGHT TOTAL KNEE ARTHROPLASTY ;  Surgeon: Grady Alvarez MD;  Location:  OR     ARTHROPLASTY KNEE Left 2017    Procedure: ARTHROPLASTY KNEE;  LEFT TOTAL KNEE ARTHROPLASTY;  Surgeon: Grady Alvarez MD;  Location:  OR     BACK SURGERY      spinal fusion     ENT SURGERY      tonsilectomy     GYN SURGERY  11    hysterectomy     OPTICAL TRACKING SYSTEM FUSION POSTERIOR SPINE THORACIC THREE+ LEVELS N/A 10/1/2019    Procedure: Transforaminal Lumbar Interbody Fusion Three Column Osteotomy L5-S1, Posterior Spinal Fusion L2-Pelvis. Use of BMP bone graft;  Surgeon: Evangelist Vasquez MD;  Location:  OR     ORTHOPEDIC SURGERY      sinal fusion,hand     thumb mass resection       THYROIDECTOMY  2014    Procedure: THYROIDECTOMY;  Surgeon: Krzysztof Marquez MD;  Location: [facility]           FAMILY HISTORY: family history includes Breast Cancer in her mother; C.A.D. in her father; Cancer - colorectal in  her father; Cerebrovascular Disease in her father; Thyroid Disease in her brother and sister.      HEALTH & LIFESTYLE PRACTICES:   Tobacco:  reports that she has never smoked. She has never used smokeless tobacco.  Alcohol:  reports current alcohol use.  Illicit drugs:  reports no history of drug use.      SOCIAL HISTORY:       LABORATORY VALUES:   Last Basic Metabolic Panel:  Lab Results   Component Value Date     10/06/2019      Lab Results   Component Value Date    POTASSIUM 3.7 10/06/2019     Lab Results   Component Value Date    CHLORIDE 104 10/06/2019     Lab Results   Component Value Date    SIXTO 8.0 10/06/2019     Lab Results   Component Value Date    CO2 30 10/06/2019     Lab Results   Component Value Date    BUN 9 10/06/2019     Lab Results   Component Value Date    CR 0.47 10/06/2019     Lab Results   Component Value Date     10/06/2019       CBC results:  Lab Results   Component Value Date    WBC 9.7 10/04/2019     Lab Results   Component Value Date    HGB 9.1 10/06/2019     Lab Results   Component Value Date    HCT 27.6 10/04/2019     Lab Results   Component Value Date     10/08/2019       DIAGNOSTIC TESTS:       Labs above reviewed as well as additional relevant diagnostic studies from the EPIC record.       PHYSICAL EXAMINATION:  VITAL SIGNS:  B/P: 137/86, T: 97.5, P: 92, R: 12    CONSTITUTIONAL/GENERAL APPEARANCE: lying in bed, anxious, uncomfortable  EYES: congruent  ENT/NECK: supple  RESPIRATORY: normal respiratory effort  CARDIOVASCULAR: RRR  MUSCULOSKELETAL/BACK/SPINE/EXTREMITIES: moves all extremities against gravity, severe ttp+ left> right ITB area   GAIT: unable to assess  NEURO:  Grossly intact  SKIN/VASCULAR EXAM:  wnl  PSYCHIATRIC/BEHAVIORAL/OBSERVATIONS:  No objective signs of pain observed during our interview.   Judgment/Insight - some   Orientation - x3   Memory - reasonable   Mood and affect - anxious     LYMPHATIC/HEM/IMMUNE: no palpable cervical LN        Total  face to face time spent was 45 minutes, and more than 50% of face to face time was spent in counseling and/or coordination of care regarding principles of multidisciplinary care which included discussions on self care, rehabilitation, psychological aspects of pain, medication management and interventions.    Yue Reyna MD  October 8, 2019, 1:33 PM  Inpatient Pain Management Service

## 2019-10-08 NOTE — PROGRESS NOTES
"Orthopaedic Surgery Progress Note:  10/09/2019     Subjective:   POD #8.    No acute events overnight.  Continues to have severe back pain and \"some radicular\" pain.   Able to stand with PT yesterday.  Inpatient pain team following and made adjustments ongoing.  Covington and internal jugular removed yesterday. Urinating on a chux in bed. +BM.  Denies chest pain, shortness of breath, nausea.     Objective:   /86 (BP Location: Right arm)   Pulse 92   Temp 97.5  F (36.4  C) (Oral)   Resp 12   Ht 1.575 m (5' 2\")   Wt 70.5 kg (155 lb 6.8 oz)   SpO2 98%   BMI 28.43 kg/m    No intake/output data recorded.  Gen: NAD. Resting comfortably in bed  Resp: Breathing comfortably on RA  Drain:   Removed 10/5  Musculoskeletal: Dressing is C/D/I.        Lower extremities:  Motor Strength Right Left   Hip flexion: L1, L2, L3 NT/5 NT/5   Hip adduction: L2, L3 NT/5 NT/5   Knee flexion: S1 5/5 5/5   Knee extension: L3, L4 5/5 5/5   Ankle dosiflexion: L4, L5 5/5 4/5   EHL: L5 4/5 0/5   Ankle plantarflexion: S1 5/5 5/5   0/5 left ankle eversion  0/5 left lesser toe extension    Sensation from L1-S2 is preserved.    Labs:  Lab Results   Component Value Date    WBC 9.7 10/04/2019    HGB 9.1 (L) 10/06/2019     10/08/2019    INR 1.11 10/01/2019        Assessment & Plan:   Aleena Ontiveros is a 59 year old female now s/p revision L2-pelvis PSF on 10/1 with Dr. Vasquez.     Goals for today  Pain control   PT/OT  Standing XR when able     Orthopedics Primary  Activity: Up with assist until independent. No excessive bending or twisting. No lifting >10 lbs x 6 weeks. No Ashish lift for transfers.   Weight bearing status: WBAT.  Pain management: Transition from IV to PO as tolerated. No NSAIDs   Antibiotics: Ancef x24 hours, complete  Diet: Begin with clear fluids and progress diet as tolerated.   DVT prophylaxis: SCDs only. ADDED LOVENOX ON 10/06/19   Imaging: XR Upright PTDC - ordered.  Labs: Labs PRN  Bracing/Splinting: " None.  Dressings: Keep Aquacel c/d/i x 7 days.  Drains: Removed 10/05/19   Covington catheter: in place after inability to void. Remove today.   Physical Therapy/Occupational Therapy: Eval and treat.  Cultures: None    Consults: Hospitalist, Pain Service.  Follow-up: Clinic with Dr. Vasquez in 6 weeks with repeat x-rays.   Disposition: Pending progress with therapies, pain control on orals, and medical stability, anticipate discharge to rehab when pain well controlled     Orthopaedics surgery staff for this patient is Dr. Vasquez.    ------------------------------------------------------------------------------------------    [x] Discontinue PCA  [x] Drains removed.  [   ] Post xrays done.  [x] Discharge orders done.  [x] Discharge summary started.    Etelvina Valencia MD  Orthopedic Surgery PGY-4  [phone]        FOLLOWUP:    Future Appointments   Date Time Provider Department Center   10/2/2019  2:15 PM Janna Krishnan, PT [facility] [place]   10/3/2019  6:30 AM UR PT REHAB TECH [facility] [place]   10/3/2019  8:00 AM Amira Cook, OT [facility] [place]   10/3/2019  6:30 PM UR PT REHAB TECH [facility] [place]   11/12/2019  9:45 AM Evangelist Vasquez MD [facility]   12/31/2019 10:45 AM Evangelist Vasquez MD [facility]

## 2019-10-09 ENCOUNTER — APPOINTMENT (OUTPATIENT)
Dept: PHYSICAL THERAPY | Facility: CLINIC | Age: 59
DRG: 460 | End: 2019-10-09
Attending: ORTHOPAEDIC SURGERY
Payer: COMMERCIAL

## 2019-10-09 PROCEDURE — 25000128 H RX IP 250 OP 636: Performed by: STUDENT IN AN ORGANIZED HEALTH CARE EDUCATION/TRAINING PROGRAM

## 2019-10-09 PROCEDURE — 25000132 ZZH RX MED GY IP 250 OP 250 PS 637: Performed by: STUDENT IN AN ORGANIZED HEALTH CARE EDUCATION/TRAINING PROGRAM

## 2019-10-09 PROCEDURE — 97110 THERAPEUTIC EXERCISES: CPT | Mod: GP | Performed by: PHYSICAL THERAPIST

## 2019-10-09 PROCEDURE — 99207 ZZC NO CHARGE FIRST FOLLOW UP PS: CPT

## 2019-10-09 PROCEDURE — 25000132 ZZH RX MED GY IP 250 OP 250 PS 637: Performed by: INTERNAL MEDICINE

## 2019-10-09 PROCEDURE — 25000132 ZZH RX MED GY IP 250 OP 250 PS 637: Performed by: PHYSICIAN ASSISTANT

## 2019-10-09 PROCEDURE — 99232 SBSQ HOSP IP/OBS MODERATE 35: CPT | Performed by: ANESTHESIOLOGY

## 2019-10-09 PROCEDURE — 97112 NEUROMUSCULAR REEDUCATION: CPT | Mod: GP | Performed by: PHYSICAL THERAPIST

## 2019-10-09 PROCEDURE — 12000001 ZZH R&B MED SURG/OB UMMC

## 2019-10-09 PROCEDURE — 97140 MANUAL THERAPY 1/> REGIONS: CPT | Mod: GP | Performed by: PHYSICAL THERAPIST

## 2019-10-09 PROCEDURE — 99231 SBSQ HOSP IP/OBS SF/LOW 25: CPT | Performed by: INTERNAL MEDICINE

## 2019-10-09 PROCEDURE — 25000132 ZZH RX MED GY IP 250 OP 250 PS 637: Performed by: NURSE PRACTITIONER

## 2019-10-09 RX ORDER — DIAZEPAM 2 MG
2 TABLET ORAL ONCE
Status: COMPLETED | OUTPATIENT
Start: 2019-10-09 | End: 2019-10-09

## 2019-10-09 RX ADMIN — RANITIDINE 150 MG: 150 TABLET ORAL at 08:34

## 2019-10-09 RX ADMIN — OXYCODONE HYDROCHLORIDE 15 MG: 5 TABLET ORAL at 09:13

## 2019-10-09 RX ADMIN — CYCLOBENZAPRINE 10 MG: 5 TABLET, FILM COATED ORAL at 16:31

## 2019-10-09 RX ADMIN — ENOXAPARIN SODIUM 40 MG: 40 INJECTION SUBCUTANEOUS at 14:23

## 2019-10-09 RX ADMIN — CYCLOBENZAPRINE 10 MG: 5 TABLET, FILM COATED ORAL at 00:13

## 2019-10-09 RX ADMIN — OXYCODONE HYDROCHLORIDE 15 MG: 5 TABLET ORAL at 13:14

## 2019-10-09 RX ADMIN — DIAZEPAM 2 MG: 2 TABLET ORAL at 03:19

## 2019-10-09 RX ADMIN — ACETAMINOPHEN 975 MG: 325 TABLET, FILM COATED ORAL at 08:34

## 2019-10-09 RX ADMIN — POLYETHYLENE GLYCOL 3350 17 G: 17 POWDER, FOR SOLUTION ORAL at 19:57

## 2019-10-09 RX ADMIN — GABAPENTIN 800 MG: 400 CAPSULE ORAL at 08:34

## 2019-10-09 RX ADMIN — OXYCODONE HYDROCHLORIDE 15 MG: 5 TABLET ORAL at 17:24

## 2019-10-09 RX ADMIN — GABAPENTIN 1200 MG: 400 CAPSULE ORAL at 00:04

## 2019-10-09 RX ADMIN — LISINOPRIL 40 MG: 10 TABLET ORAL at 08:34

## 2019-10-09 RX ADMIN — MELATONIN TAB 3 MG 3 MG: 3 TAB at 00:13

## 2019-10-09 RX ADMIN — OXYCODONE HYDROCHLORIDE 10 MG: 5 TABLET ORAL at 01:02

## 2019-10-09 RX ADMIN — FEXOFENADINE HYDROCHLORIDE 180 MG: 180 TABLET, FILM COATED ORAL at 08:34

## 2019-10-09 RX ADMIN — GABAPENTIN 800 MG: 400 CAPSULE ORAL at 19:57

## 2019-10-09 RX ADMIN — FLUTICASONE PROPIONATE 1 SPRAY: 50 SPRAY, METERED NASAL at 08:35

## 2019-10-09 RX ADMIN — ACETAMINOPHEN 975 MG: 325 TABLET, FILM COATED ORAL at 17:25

## 2019-10-09 RX ADMIN — FLUTICASONE PROPIONATE 1 SPRAY: 50 SPRAY, METERED NASAL at 19:57

## 2019-10-09 RX ADMIN — MONTELUKAST 10 MG: 10 TABLET, FILM COATED ORAL at 22:44

## 2019-10-09 RX ADMIN — OXYCODONE HYDROCHLORIDE 15 MG: 5 TABLET ORAL at 22:44

## 2019-10-09 RX ADMIN — ALBUTEROL SULFATE 2 PUFF: 90 AEROSOL, METERED RESPIRATORY (INHALATION) at 08:35

## 2019-10-09 RX ADMIN — SENNOSIDES AND DOCUSATE SODIUM 2 TABLET: 8.6; 5 TABLET ORAL at 19:57

## 2019-10-09 RX ADMIN — RANITIDINE 150 MG: 150 TABLET ORAL at 19:57

## 2019-10-09 RX ADMIN — GABAPENTIN 800 MG: 400 CAPSULE ORAL at 14:23

## 2019-10-09 RX ADMIN — ACETAMINOPHEN 975 MG: 325 TABLET, FILM COATED ORAL at 03:19

## 2019-10-09 RX ADMIN — FLUTICASONE FUROATE AND VILANTEROL TRIFENATATE 1 PUFF: 200; 25 POWDER RESPIRATORY (INHALATION) at 08:33

## 2019-10-09 RX ADMIN — LEVOTHYROXINE SODIUM 125 MCG: 25 TABLET ORAL at 08:34

## 2019-10-09 RX ADMIN — OXYCODONE HYDROCHLORIDE 10 MG: 5 TABLET ORAL at 05:12

## 2019-10-09 ASSESSMENT — ACTIVITIES OF DAILY LIVING (ADL)
ADLS_ACUITY_SCORE: 14
ADLS_ACUITY_SCORE: 14
ADLS_ACUITY_SCORE: 15

## 2019-10-09 NOTE — PROGRESS NOTES
Patient: Aleena Ontiveros  Date of Service: October 9, 2019 Admission Date:10/1/2019   8 Days Post-Op     Chief Pain Endorsement: BLE Pain    Recommendations were discussed and relayed to Frances Rodriguez NP (orthopedics)  Plan was reviewed by the Inpatient Pain Service and staff attending, Dr. Reyna      1. Discussed case with PT Tech, recommend they work with patient on IT band stretching. Staff MD will evaluate patient for potential bedside injections tomorrow if not progressing.   2. Ice, pacing, encouragement in activity.   3. Agree with discontinue hydromorphone IV  4. Continue oxycodone 10-15 mg PO q4hr PRN moderate-severe pain.  5. Continue cyclobenzaprine 5-10 mg PO TID PRN spasms  6. Continue gabapentin 800 [id number] mg   7. TENS unit - pt to bring in own to use.   8. Continue acetaminophen 975 mg PO q8hr   9. Continue lidocaine and menthol patch   10. Bowel regimen per primary team to prevent opioid induced constipation.    Thank you for consulting the Inpatient Pain Management Service. The above recommendations are to be acted upon at the primary team s discretion.     To reach us:  Mon - Friday 8 AM - 3 PM: Pager [phone] (Text Page)  After hours, weekends and holidays: Primary service should call [phone] for the on-call pain specialist    PAIN MEDICATION SAFE USE:   Prior to discharge instruct patient on the following in addition to the medication fact sheet:    Caution: these medications can cause sedation    Take prescription medicine only if it has been prescribed by your doctor    Do not take more medicine or take it more often than instructed     Call your doctor if pain gets worse    Never mix pain medicine with alcohol, sleeping pills, or any illicit drugs    Do not operate heavy machinery, including vehicles, when initiation opioid therapy or increasing dosage    Store prescription opioids in a locked container, whenever possible     Dispose of unused opioids appropriately      Do not stop abruptly once at higher doses.  These medications must be tapered off.    Opioid pain medications do carry the risk for physical dependence and addiction and patients should be counseled about this.         1. Acute post operative pain in lateral aspect of BLE (L>R)  s/pTransforaminal Lumbar Interbody Fusion Three Column Osteotomy L5-S1, Posterior Spinal Fusion L2-Pelvis, use of  BMP bone graft on 10/1/19 with Dr. Terry, Dr. Vasquez, Dr. Mike. Patient has h/o adolescent idiopathic scoliosis and previously underwent T4-L4 fusion with Carpio rods during her teenage years. Aleena reports pain has improved in past 24 hr. Feels gabapentin and cyclobenzaprine are most helpful medication interventions.  She was able to stand w/ PT yesterday, doesn't recall any stretching work to her IT band.   2. H/o hypertension  3. H/o hypothyroidism s/p thyroidectomy  4. H/o asthma  5. H/o OA s/p TKA of both knees ~2017.     -- Outpatient opioid requirements prior to admission:   -- None     Primary Care Provider: Arabella Conner  Chronic Pain Provider: Dr Luna - [facility] since July 2019, titrating gabapentin    Interval History:  Aleena Ontiveros was seen today (October 9, 2019) and she reports that her pain has improved from yesterday.  Still not able to do as much OOB activity as desired, but eager to work on this.  She was able to sleep for a couple hours lastnight and did feel the gabapentin dose increase was helpful.  She reported a 20% pain reduction after the increased dose of gabapentin lastnight.  Today she reports pain in her low back near SI joint as well as lateral aspect of her bilateral thighs (L>R).  States oxycodone helps but flexeril and gabapentin help the most.     CAPA (Clinically Aligned Pain Assessment):    Comfort (How is your pain?): Tolerable with discomfort  Change in Pain (Since your last medication/intervention?): About the same  Pain Control (How are your pain treatments  working?):  Partially effective control  Functioning (Are you able to do activities to get better?) : Pain keeps me from doing most of what I need to do  Sleep (Does your pain management allow you to sleep or rest?): Awake with occasional pain      FUNCTIONAL STATUS:  Change:      Slight improvement  Oral intake:     Regular  Activity level:     Limited bed mobility, able to stand at EOB with PT yesterday which was an improvement  Mood:      Stable.      -- Inpatient Medications Related to Pain Management:   Medications related to Pain Management (From now, onward)    Start     Dose/Rate Route Frequency Ordered Stop    10/09/19 0000  gabapentin (NEURONTIN) capsule 1,200 mg      1,200 mg Oral Daily 10/08/19 1133      10/08/19 1400  gabapentin (NEURONTIN) capsule 800 mg      800 mg Oral 3 TIMES DAILY 10/08/19 1128      10/07/19 1449  oxyCODONE (ROXICODONE) tablet 10 mg      10 mg Oral EVERY 4 HOURS PRN 10/07/19 1449      10/07/19 1449  oxyCODONE (ROXICODONE) tablet 15 mg      15 mg Oral EVERY 4 HOURS PRN 10/07/19 1449      10/04/19 2000  polyethylene glycol (MIRALAX/GLYCOLAX) Packet 17 g      17 g Oral 2 TIMES DAILY 10/04/19 1043      10/04/19 1555  cyclobenzaprine (FLEXERIL) tablet 5-10 mg      5-10 mg Oral 3 TIMES DAILY PRN 10/04/19 1555      10/04/19 0000  acetaminophen (TYLENOL) tablet 650 mg      650 mg Oral EVERY 4 HOURS PRN 10/01/19 1732      10/04/19 0000  polyethylene glycol (MIRALAX/GLYCOLAX) packet      17 g Oral DAILY 10/03/19 2101      10/04/19 0000  senna-docusate (SENOKOT-S/PERICOLACE) 8.6-50 MG tablet      2 tablet Oral 2 TIMES DAILY 10/03/19 2101      10/03/19 0800  Lidocaine (LIDOCARE) 4 % Patch 1-3 patch      1-3 patch  over 12 Hours Transdermal EVERY 24 HOURS 0800 10/02/19 1442      10/03/19 0000  methocarbamol (ROBAXIN) 500 MG tablet      500 mg Oral 4 TIMES DAILY PRN 10/03/19 2101      10/03/19 0000  oxyCODONE (ROXICODONE) 5 MG tablet      5-10 mg Oral EVERY 3 HOURS PRN 10/03/19 2295       10/02/19 2000  lidocaine patch in PLACE       Transdermal EVERY 8 HOURS 10/02/19 1442      10/02/19 1745  acetaminophen (TYLENOL) tablet 975 mg      975 mg Oral EVERY 8 HOURS 10/02/19 1444      10/02/19 0800  senna-docusate (SENOKOT-S/PERICOLACE) 8.6-50 MG per tablet 2 tablet      2 tablet Oral 2 TIMES DAILY 10/02/19 0706      10/01/19 1941  senna-docusate (SENOKOT-S/PERICOLACE) 8.6-50 MG per tablet 2 tablet      2 tablet Oral DAILY PRN 10/01/19 1941      10/01/19 1941  bisacodyl (DULCOLAX) Suppository 10 mg      10 mg Rectal DAILY PRN 10/01/19 1941      10/01/19 1905  diphenhydrAMINE (BENADRYL) capsule 25 mg      25 mg Oral EVERY 6 HOURS PRN 10/01/19 1905      10/01/19 1905  diphenhydrAMINE (BENADRYL) injection 25 mg      25 mg Intravenous EVERY 6 HOURS PRN 10/01/19 1905      10/01/19 1905  lidocaine 1 % 0.1-1 mL      0.1-1 mL Other EVERY 1 HOUR PRN 10/01/19 1905      10/01/19 1905  lidocaine (LMX4) cream       Topical EVERY 1 HOUR PRN 10/01/19 1905            LAB DATA:  Recent Labs   Lab 10/06/19  0658 10/04/19  0650 10/03/19  0644   CR 0.47* 0.48* 0.55   WBC  --  9.7 8.0   HGB 9.1* 8.9* 8.8*         ----------------------------------------------------------------------------------  Grady Glass, [facility]  Inpatient Pain Service     To reach us:  Mon - Friday 8 AM - 3 PM: Pager [phone] (Text Page)  After hours, weekends and holidays: Primary service should call [phone] for the on-call pain specialist    Helpful Resources:  Getting Rid of Unwanted Medications (printable PDF for patients)   Opioid Overdose Prevention Toolkit (printable PDF for patients)   Prescription Opioids: What You Need To Know (printable PDF for patients)

## 2019-10-09 NOTE — PLAN OF CARE
"Pt in bed all shift. Pain meds given as available. Pt voiding using bed pain liner bag. Per night shift, pt was unable to tolerate sitting on bedpan or commode. Pt AMATO without difficulty while laying in bed - able to bend legs and hips. CMS unchanged. Continues to report shooting \"shin splint\" pain in BLE. Tolerating regular diet. Pt able to make needs known.   "

## 2019-10-09 NOTE — PROGRESS NOTES
Inpatient Pain Management Service: Follow Up Note    The inpatient pain service is continuing to follow Aleena Ontiveros for her back and lower extremity pain at the request of her hospital team.    ASSESSMENT:     59 F with PMH of HTN, hypothyroidism, idiopathic scoliosis who was admitted for lumbar interbody fusion L5-S1 now seen for pain consultation for low back and lower extremity pain.  Low back pain appears to be primarily located around the surgical incision and below that at the SI joints.  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       RECOMMENDATIONS/PLAN:     1. Continue to work with physical therapy for IT band stretching.  She should be given a program so that she can continue to work on this on her own and after discharge. She can apply ice to the area twice a day as needed.     2. She can follow up in the pain clinic as outpatient if her low back pain is persisting for further workup and treatment of sacroiliac joint dysfunction and greater trochanteric pain syndrome. At that time, would consider an SI joint injection vs greater trochanteric bursa injection with steroids.     3. Continue with oxycodone 10-15mg PO q4 PRN, flexeril 5-10mg TID PRN muscle spasms, gabapentin 3600mg daily, tylenol 975 every 8 hours, lidocaine patch    Pain Service will sign off at this time.     Recommendations were discussed with PT, primary team  Plan was reviewed by the Pain team and Dr. Reyna    Thank you for consulting the Inpatient Pain Management Service.   The above recommendations are to be acted upon at the primary team s discretion.     To reach us:  Mon - Friday 8 AM - 3 PM: Pager [phone]   After hours, weekends and holidays: Primary service should call [phone] for the on-call pain specialist    CHIEF PAIN COMPLAINT: low back, leg pain    CAPA (Clinically Aligned Pain Assessment):    Comfort (How is your pain?): Tolerable with discomfort  Change  in Pain (Since your last medication/intervention?): About the same  Pain Control (How are your pain treatments working?):  Partially effective control  Functioning (Are you able to do activities to get better?) : Pain keeps me from doing most of what I need to do  Sleep (Does your pain management allow you to sleep or rest?): Awake with occasional pain         INTERVAL HISTORY:     Patient was seen at bedside this morning. Her friend was present as well.  Patient was fully cooperative during encounter but did fall asleep several times. She notes that the pain keeps her up at night at times but she did get more sleep than she did previous nights. Patient states that she is doing better functionally, noting that she was able to stand with physical therapy.  She has not worked on her IT band yet with physical therapy. Her last PT session focused primarily on standing and weight bearing. From a medication standpoint, she states that she gets the most relief with gabapentin and flexeril.  She remains on oxycodone as needed for pain.  Today, she has taken a total of 50mg of oxycodone. She reports no side effects with this.  The pain is improved but still keeps her up at night. No new complaints at this time.     Patient does not endorse any headaches, changes in vision, skin rash, chest pain, shortness of breath, or abdominal pain    FUNCTIONAL STATUS:  Change:      improving  Oral intake:     Regular  Bowel function:    Normal  Activity level:     Up with assistance ambulating in room  Sleep:      Difficulty with sleep due to pain   Mood:      adjusting to situation    Medications related to Pain Management (From now, onward)    Start     Dose/Rate Route Frequency Ordered Stop    10/09/19 0000  gabapentin (NEURONTIN) capsule 1,200 mg      1,200 mg Oral Daily 10/08/19 1133      10/08/19 1400  gabapentin (NEURONTIN) capsule 800 mg      800 mg Oral 3 TIMES DAILY 10/08/19 1128      10/07/19 1449  oxyCODONE (ROXICODONE)  tablet 10 mg      10 mg Oral EVERY 4 HOURS PRN 10/07/19 1449      10/07/19 1449  oxyCODONE (ROXICODONE) tablet 15 mg      15 mg Oral EVERY 4 HOURS PRN 10/07/19 1449      10/04/19 2000  polyethylene glycol (MIRALAX/GLYCOLAX) Packet 17 g      17 g Oral 2 TIMES DAILY 10/04/19 1043      10/04/19 1555  cyclobenzaprine (FLEXERIL) tablet 5-10 mg      5-10 mg Oral 3 TIMES DAILY PRN 10/04/19 1555      10/04/19 0000  acetaminophen (TYLENOL) tablet 650 mg      650 mg Oral EVERY 4 HOURS PRN 10/01/19 1732      10/04/19 0000  polyethylene glycol (MIRALAX/GLYCOLAX) packet      17 g Oral DAILY 10/03/19 2101      10/04/19 0000  senna-docusate (SENOKOT-S/PERICOLACE) 8.6-50 MG tablet      2 tablet Oral 2 TIMES DAILY 10/03/19 2101      10/03/19 0800  Lidocaine (LIDOCARE) 4 % Patch 1-3 patch      1-3 patch  over 12 Hours Transdermal EVERY 24 HOURS 0800 10/02/19 1442      10/03/19 0000  methocarbamol (ROBAXIN) 500 MG tablet      500 mg Oral 4 TIMES DAILY PRN 10/03/19 2101      10/03/19 0000  oxyCODONE (ROXICODONE) 5 MG tablet      5-10 mg Oral EVERY 3 HOURS PRN 10/03/19 2101      10/02/19 2000  lidocaine patch in PLACE       Transdermal EVERY 8 HOURS 10/02/19 1442      10/02/19 1745  acetaminophen (TYLENOL) tablet 975 mg      975 mg Oral EVERY 8 HOURS 10/02/19 1444      10/02/19 0800  senna-docusate (SENOKOT-S/PERICOLACE) 8.6-50 MG per tablet 2 tablet      2 tablet Oral 2 TIMES DAILY 10/02/19 0706      10/01/19 1941  senna-docusate (SENOKOT-S/PERICOLACE) 8.6-50 MG per tablet 2 tablet      2 tablet Oral DAILY PRN 10/01/19 1941      10/01/19 1941  bisacodyl (DULCOLAX) Suppository 10 mg      10 mg Rectal DAILY PRN 10/01/19 1941      10/01/19 1905  diphenhydrAMINE (BENADRYL) capsule 25 mg      25 mg Oral EVERY 6 HOURS PRN 10/01/19 1905      10/01/19 1905  diphenhydrAMINE (BENADRYL) injection 25 mg      25 mg Intravenous EVERY 6 HOURS PRN 10/01/19 1905      10/01/19 1905  lidocaine 1 % 0.1-1 mL      0.1-1 mL Other EVERY 1 HOUR PRN 10/01/19  1905      10/01/19 1905  lidocaine (LMX4) cream       Topical EVERY 1 HOUR PRN 10/01/19 1905              CURRENT MEDICATIONS:   Current Facility-Administered Medications Ordered in Epic   Medication Dose Route Frequency Provider Last Rate Last Dose     acetaminophen (TYLENOL) tablet 650 mg  650 mg Oral Q4H PRN Johnnie Mcdonald PA-C   650 mg at 10/05/19 1917     acetaminophen (TYLENOL) tablet 975 mg  975 mg Oral Q8H Frances Rodriguez APRN CNP   975 mg at 10/09/19 0834     albuterol (PROAIR HFA/PROVENTIL HFA/VENTOLIN HFA) 108 (90 Base) MCG/ACT inhaler 2 puff  2 puff Inhalation Q4H PRN Krzysztof Whitten MD         albuterol (PROAIR HFA/PROVENTIL HFA/VENTOLIN HFA) 108 (90 Base) MCG/ACT inhaler 2 puff  2 puff Inhalation 4x Daily Krzysztof Whitten MD   2 puff at 10/09/19 0835     benzocaine-menthol (CEPACOL) 15-3.6 MG lozenge 1 lozenge  1 lozenge Buccal Q1H PRN Johnnie Mcdonald PA-C         bisacodyl (DULCOLAX) Suppository 10 mg  10 mg Rectal Daily PRN Johnnie Mcdonald PA-C         cyclobenzaprine (FLEXERIL) tablet 5-10 mg  5-10 mg Oral TID PRN Frances Rodriguez APRN CNP   10 mg at 10/09/19 0013     glucose gel 15-30 g  15-30 g Oral Q15 Min PRN Gildardo Vera MD        Or     dextrose 50 % injection 25-50 mL  25-50 mL Intravenous Q15 Min PRN Gildardo Vera MD        Or     glucagon injection 1 mg  1 mg Subcutaneous Q15 Min PRN Gildardo Vera MD         diphenhydrAMINE (BENADRYL) capsule 25 mg  25 mg Oral Q6H PRN Johnnie Mcdonald PA-C        Or     diphenhydrAMINE (BENADRYL) injection 25 mg  25 mg Intravenous Q6H PRN Johnnie Mcdonald PA-C         enoxaparin (LOVENOX) injection 40 mg  40 mg Subcutaneous Q24H Etelvina Valencia MD   40 mg at 10/08/19 1451     estradiol (ESTRACE) cream 2 g  2 g Vaginal At Bedtime Johnnie Mcdonald PA-C   2 g at 10/08/19 0044     fexofenadine (ALLEGRA) tablet 180 mg  180 mg Oral QAM Johnnie Mcdonald PA-C   180 mg  at 10/09/19 0834     fluticasone (FLONASE) 50 MCG/ACT spray 1 spray  1 spray Both Nostrils BID Johnnie Mcdonald PA-C   1 spray at 10/09/19 0835     fluticasone-vilanterol (BREO ELLIPTA) 200-25 MCG/INH inhaler 1 puff  1 puff Inhalation Daily Evangelist Vasquez MD   1 puff at 10/09/19 0833     gabapentin (NEURONTIN) capsule 1,200 mg  1,200 mg Oral Daily Frances Rodriguez APRN CNP   1,200 mg at 10/09/19 0004     gabapentin (NEURONTIN) capsule 800 mg  800 mg Oral TID Frances Rodriguez APRN CNP   800 mg at 10/09/19 0834     [START ON 10/10/2019] influenza recomb quadrivalent PF (FLUBLOK) injection 0.5 mL  0.5 mL Intramuscular Prior to discharge Krzysztof Whitten MD         levothyroxine (SYNTHROID/LEVOTHROID) tablet 125 mcg  125 mcg Oral QAM Johnnie Mcdonald PA-C   125 mcg at 10/09/19 0834     Lidocaine (LIDOCARE) 4 % Patch 1-3 patch  1-3 patch Transdermal Q24H Frances Rodriguez APRN CNP   1 patch at 10/03/19 0843     lidocaine (LMX4) cream   Topical Q1H PRN Johnnie Mcdonald PA-C         lidocaine 1 % 0.1-1 mL  0.1-1 mL Other Q1H PRN Johnnie Mcdonald PA-C         lidocaine patch in PLACE   Transdermal Q8H Frances Rodriguez APRN CNP         lidocaine patch REMOVAL   Transdermal Q24h Frances Rodriguez APRN CNP         lisinopril (PRINIVIL/ZESTRIL) tablet 40 mg  40 mg Oral Daily Wilton Eastman MD   40 mg at 10/09/19 0834     magnesium sulfate 4 g in 100 mL sterile water (premade)  4 g Intravenous Q4H PRN Gildardo Vera MD         melatonin tablet 3 mg  3 mg Oral At Bedtime PRN Wilton Eastman MD   3 mg at 10/09/19 0013     menthol (ICY HOT) 5 % patch 2 patch  2 patch Topical Q8H PRN Frances Rodriguez APRN CNP   2 patch at 10/08/19 0856    And     menthol (ICY HOT) Patch in Place   Transdermal Q8H Frances Rodriguez APRN CNP        And     menthol (ICY HOT) patch REMOVAL   Transdermal Q8H PRN Frances Rodriguez APRN CNP         metoclopramide (REGLAN) tablet  10 mg  10 mg Oral Q6H PRN Johnnie Mcdonald PA-C        Or     metoclopramide (REGLAN) injection 10 mg  10 mg Intravenous Q6H PRN Johnnie Mcdonald PA-C         montelukast (SINGULAIR) tablet 10 mg  10 mg Oral At Bedtime Johnnie Mcdonald PA-C   10 mg at 10/08/19 2216     naloxone (NARCAN) injection 0.1-0.4 mg  0.1-0.4 mg Intravenous Q2 Min PRN Johnnie Mcdonald PA-C         ondansetron (ZOFRAN-ODT) ODT tab 4 mg  4 mg Oral Q6H PRN Johnnie Mcdonald PA-C        Or     ondansetron (ZOFRAN) injection 4 mg  4 mg Intravenous Q6H PRN Johnnie Mcdonald PA-C   4 mg at 10/04/19 8744     oxyCODONE (ROXICODONE) tablet 10 mg  10 mg Oral Q4H PRN Frances Rodriguez APRN CNP   10 mg at 10/09/19 0512    Or     oxyCODONE (ROXICODONE) tablet 15 mg  15 mg Oral Q4H PRN Frances Rodriguez APRN CNP   15 mg at 10/09/19 1314     polyethylene glycol (MIRALAX/GLYCOLAX) Packet 17 g  17 g Oral BID Frances Rodriguez APRN CNP   17 g at 10/08/19 2017     potassium chloride (KLOR-CON) Packet 20-40 mEq  20-40 mEq Oral or Feeding Tube Q2H PRN Gildardo Vera MD         potassium chloride 10 mEq in 100 mL intermittent infusion with 10 mg lidocaine  10 mEq Intravenous Q1H PRN Gildardo Vera MD         potassium chloride 10 mEq in 100 mL sterile water intermittent infusion (premix)  10 mEq Intravenous Q1H PRN Gildardo Vera MD         potassium chloride 20 mEq in 50 mL intermittent infusion  20 mEq Intravenous Q1H PRN Gildardo Vera MD         potassium chloride ER (K-DUR/KLOR-CON M) CR tablet 20-40 mEq  20-40 mEq Oral Q2H PRN Gildardo Vera MD         prochlorperazine (COMPAZINE) injection 10 mg  10 mg Intravenous Q6H PRN Pamcarmelusch, Johnnie Jasson, PA-C        Or     prochlorperazine (COMPAZINE) tablet 10 mg  10 mg Oral Q6H PRN Johnnie Mcdonald PA-C        Or     prochlorperazine (COMPAZINE) Suppository 25 mg  25 mg Rectal Q12H PRN Johnnie Mcdonald PA-C         ranitidine (ZANTAC)  tablet 150 mg  150 mg Oral Q12H Johnnie Mcdonald PA-C   150 mg at 10/09/19 0834     senna-docusate (SENOKOT-S/PERICOLACE) 8.6-50 MG per tablet 2 tablet  2 tablet Oral BID -Truman CastanedaNASEEM   2 tablet at 10/08/19 2016     senna-docusate (SENOKOT-S/PERICOLACE) 8.6-50 MG per tablet 2 tablet  2 tablet Oral Daily PRN Johnnie Mcdonald PA-C         sodium chloride (PF) 0.9% PF flush 3 mL  3 mL Intracatheter q1 min prn Johnnie Mcdonald PA-C   20 mL at 10/06/19 0652     Current Outpatient Medications Ordered in Epic   Medication Sig Dispense Refill     methocarbamol (ROBAXIN) 500 MG tablet Take 1 tablet (500 mg) by mouth 4 times daily as needed for muscle spasms 60 tablet 0     oxyCODONE (ROXICODONE) 5 MG tablet Take 1-2 tablets (5-10 mg) by mouth every 3 hours as needed for moderate to severe pain (Take 1 tab for pain rated 2-5, take 2 tabs for pain rated 6-10) 50 tablet 0     polyethylene glycol (MIRALAX/GLYCOLAX) packet Take 17 g by mouth daily 72 packet 0     senna-docusate (SENOKOT-S/PERICOLACE) 8.6-50 MG tablet Take 2 tablets by mouth 2 times daily 200 tablet 0       REVIEW OF 10 BODY SYSTEMS: ROS: 10 point ROS neg other than the symptoms noted above in the interval history.     PHYSICAL EXAM:   Vitals:   Temp:  [98.4  F (36.9  C)] 98.4  F (36.9  C)  Heart Rate:  [98] 98  Resp:  [18] 18  BP: (151)/(92) 151/92  SpO2:  [96 %] 96 %  Patient Vitals for the past 8 hrs:   BP Temp Temp src Heart Rate Resp SpO2   10/09/19 0803 (!) 151/92 98.4  F (36.9  C) Oral 98 18 96 %       EXAM:    Constitutional: healthy, no distress and appeared sleepy during examination.    Respiratory: No respiratory distress  Psychiatric: Normal mood and affect  Head: Normocephalic, atraumatic  Neck: Neck is supple, able to range her neck in all directions  NEURO: AAOx3. Strength is 5/5 in bilateral lower extremities. Sensation is equal and intact throughout bilateral lower extremities.   MSK: Tenderness to palpation along  bilateral sacroiliac joints. Tenderness to palpation along left > right trochanteric bursa.  Tenderness along left > right IT band which is primarily proximal.   SKIN: Lumbar incision with dressing intact with no significant drainage noted  LYMPH: No significant lower extremity edema is noted  JOINT/EXTREMITIES: extremities normal- no gross deformities noted and peripheral pulses normal      Arnoldo Adkins MD  October 9, 2019 2:00 PM    PAIN MEDICINE ATTENDING ATTESTATION  I saw the patient with the pain medicine team at the bedside. I was actively involved with the evaluation including history, physical examination and development of plan of care. The patient has acute low back pain secondary to surgery. She has this new left lateral thigh area pain which is severely limiting her. I spoke to the primary team about this. I have also discussed her care personally with physical therapy to consider IT band stretches. The pain team will continue to follow and may consider a greater trochanter bursa injection if her pain improvement is still limited. I have briefly discussed this option with her to pursue on an outpatient basis. However if she continues to struggle I will reevaluate a bursa injection tomorrow in the morning. The patients pain continues to be complex for POD 9. We will continue to follow.

## 2019-10-09 NOTE — PLAN OF CARE
"Pt resting in bed all shift, tried to get OOB once with Ax1 + walker to BSC and immediately wanted to lay down again, pt turns independently in bed, moaning on/off while in bed, anxious, and defiant at times, MD notified for one time dose of valium, administered with minimal results, denies nausea, dizziness, SOB or CP, no swallowing difficulty noted, pt has difficulty with voiding, prefers to use chux or briefs instead of bedpan or BSC, tried to explain the importance of getting up and using commode but pt was not receptive to it, last PVR 327cc, no BM this shift, dressing to back-CDI, daughter visited for short period during the night, pt able to make needs known, call light in reach length, will continue to monitor and assist.     Update: @ 0500 offered pt to use bedpan/chux/brief to void, pt attempted to use brief- fell asleep for some time- when asked if pt did urinate in brief pt said \"I fell asleep\", writer offered a few more minutes for pt to try to void and pt responded \" it hurts take it out\" writer removed brief, will continue to monitor and assist.   "

## 2019-10-09 NOTE — PROGRESS NOTES
"Pt was encouraged to use BSC and/or bedpan, pt tried to get up to BSC and immediately decided that \"I can't do it\" and when writer tried to encourage again pt yelled \"NO!\", pt was agreeable to use bedpan but once the bedpan was in place pt immediately tried to remove it \"It hurts too much\", writer decided to wait for 2 hours until trying again, @ 4845 writer again approached pt and offered bedpan or BSC, pt then replied\" Can I try the chux without the bedpan\", writer discouraged the idea but pt insisted, pt voided on chux good amount and PVR is 327cc.   "

## 2019-10-09 NOTE — PLAN OF CARE
"Vitals: /86 (BP Location: Right arm)   Pulse 92   Temp 97.5  F (36.4  C) (Oral)   Resp 12   Ht 1.575 m (5' 2\")   Wt 70.5 kg (155 lb 6.8 oz)   SpO2 98%   BMI 28.43 kg/m    Lung sounds clear. Denies CP, SOB.   Output: Torres pulled at 1300. Pt had retention problems throughout shift but kept insisting that she keep trying on her own. After 3 failed straight cath attempts, pt tried voiding again with a greater amount.  Last BM: 10/8   Activity: Independently positioning in bed. Ax2 with walker when OOB, but pt tolerates standing very poorly.   Skin: Intact ex incision   Pain: Incisional with radiation down L leg - managed with PRN oxycodone and flexeril and scheduled gabapentin   CMS/Neuro: Intact ex baseline numbness in feet. A&O x 4.   Dressing: CDI   Diet: Regular   LDA: None   Equipment: [place] brace when OOB.   Plan/Add'l info: Encourage frequent voiding - may need torres replaced.    Able to make needs known. Call light within reach. Continue with POC.  Discharge plan: TCU         "

## 2019-10-09 NOTE — PROGRESS NOTES
Social Work Services Progress Note    Hospital Day: 9    Collaborated with:  Pt's , Frances Rodriguez, NP, Alejandra Reynoso    Data:  Discharge plan    Intervention:  SW received call from pt's spouse, who expressed frustration with pt's pain control and lack of clarity about a plan to treat her pain.    SW offered support, reassurance and validation for their experience. Per Frances, Pain team is communicating with Dr Vasquez about performing steroid injection Thursday, 10/10/10.  Pt's spouse is hopeful this will occur, and will communicate with pt and his family.    MICHAELLE discussed potential discharge date as Being Friday, 10/11/19. MICHAELLE called and updated Betsy in Admissions. She states they obtained insurance auth for TCU placement when pt is medically stable.    Assessment:  Pt con't to have support of her family    Plan:    Anticipated Disposition:  Alejandra Reynoso    Barriers to d/c plan:  Pain control    Follow Up:  MICHAELLE con't to follow

## 2019-10-09 NOTE — PROGRESS NOTES
Patient was seen, course reviewed with nursing staff and orthopedics.  Patient states left leg pain is gradually improving though she is not yet been out of bed.  Denies cough, chest pain, shortness of breath, abdominal pain.  She was incontinent of urine last evening.  PVR was 325 cc.  Last bowel movement was 10/8/2019    Afebrile  BP intimately elevated  Room air sats upper 90s    Alert, lying in bed, pale appearing, no distress  Lungs clear  CV regular rate and rhythm   Abdomen soft, nontender, nondistended  No lower extremity edema.  Lower extremity neuro exam deferred to surgery team.           Assessment     1)  S/p revision L2-pelvis PSF on 10/1 with Dr. Vasquez. very slow progress in therapy secondary to left leg pain.     2)  Acute blood loss anemia, stable   3) asthma stable  4) hypertension, controlled    Plan  Continue therapies.  Pain management per pain service.  Continue DVT prophylaxis with Lovenox.  Monitor bowel bladder function.

## 2019-10-10 ENCOUNTER — APPOINTMENT (OUTPATIENT)
Dept: PHYSICAL THERAPY | Facility: CLINIC | Age: 59
DRG: 460 | End: 2019-10-10
Attending: ORTHOPAEDIC SURGERY
Payer: COMMERCIAL

## 2019-10-10 ENCOUNTER — DOCUMENTATION ONLY (OUTPATIENT)
Dept: CARE COORDINATION | Facility: CLINIC | Age: 59
End: 2019-10-10

## 2019-10-10 PROCEDURE — 99207 ZZC NO CHARGE SIGN-OFF PS: CPT

## 2019-10-10 PROCEDURE — 99233 SBSQ HOSP IP/OBS HIGH 50: CPT | Performed by: INTERNAL MEDICINE

## 2019-10-10 PROCEDURE — 25000128 H RX IP 250 OP 636: Performed by: STUDENT IN AN ORGANIZED HEALTH CARE EDUCATION/TRAINING PROGRAM

## 2019-10-10 PROCEDURE — 25000128 H RX IP 250 OP 636: Performed by: ANESTHESIOLOGY

## 2019-10-10 PROCEDURE — 25000132 ZZH RX MED GY IP 250 OP 250 PS 637: Performed by: INTERNAL MEDICINE

## 2019-10-10 PROCEDURE — 25000132 ZZH RX MED GY IP 250 OP 250 PS 637: Performed by: PHYSICIAN ASSISTANT

## 2019-10-10 PROCEDURE — 12000001 ZZH R&B MED SURG/OB UMMC

## 2019-10-10 PROCEDURE — 25000131 ZZH RX MED GY IP 250 OP 636 PS 637: Performed by: PHYSICIAN ASSISTANT

## 2019-10-10 PROCEDURE — 97110 THERAPEUTIC EXERCISES: CPT | Mod: GP

## 2019-10-10 PROCEDURE — 25000132 ZZH RX MED GY IP 250 OP 250 PS 637: Performed by: NURSE PRACTITIONER

## 2019-10-10 PROCEDURE — 3E0U33Z INTRODUCTION OF ANTI-INFLAMMATORY INTO JOINTS, PERCUTANEOUS APPROACH: ICD-10-PCS | Performed by: ANESTHESIOLOGY

## 2019-10-10 RX ORDER — METHYLPREDNISOLONE ACETATE 80 MG/ML
80 INJECTION, SUSPENSION INTRA-ARTICULAR; INTRALESIONAL; INTRAMUSCULAR; SOFT TISSUE ONCE
Status: COMPLETED | OUTPATIENT
Start: 2019-10-10 | End: 2019-10-10

## 2019-10-10 RX ADMIN — RANITIDINE 150 MG: 150 TABLET ORAL at 20:22

## 2019-10-10 RX ADMIN — SENNOSIDES AND DOCUSATE SODIUM 2 TABLET: 8.6; 5 TABLET ORAL at 20:22

## 2019-10-10 RX ADMIN — OXYCODONE HYDROCHLORIDE 15 MG: 5 TABLET ORAL at 02:32

## 2019-10-10 RX ADMIN — LEVOTHYROXINE SODIUM 125 MCG: 25 TABLET ORAL at 08:21

## 2019-10-10 RX ADMIN — FLUTICASONE PROPIONATE 1 SPRAY: 50 SPRAY, METERED NASAL at 20:41

## 2019-10-10 RX ADMIN — ACETAMINOPHEN 975 MG: 325 TABLET, FILM COATED ORAL at 09:56

## 2019-10-10 RX ADMIN — METHYLPREDNISOLONE ACETATE 80 MG: 80 INJECTION, SUSPENSION INTRA-ARTICULAR; INTRALESIONAL; INTRAMUSCULAR; SOFT TISSUE at 10:55

## 2019-10-10 RX ADMIN — OXYCODONE HYDROCHLORIDE 15 MG: 5 TABLET ORAL at 06:40

## 2019-10-10 RX ADMIN — SENNOSIDES AND DOCUSATE SODIUM 2 TABLET: 8.6; 5 TABLET ORAL at 08:19

## 2019-10-10 RX ADMIN — FLUTICASONE PROPIONATE 1 SPRAY: 50 SPRAY, METERED NASAL at 08:18

## 2019-10-10 RX ADMIN — GABAPENTIN 800 MG: 400 CAPSULE ORAL at 20:22

## 2019-10-10 RX ADMIN — OXYCODONE HYDROCHLORIDE 10 MG: 5 TABLET ORAL at 20:38

## 2019-10-10 RX ADMIN — OXYCODONE HYDROCHLORIDE 10 MG: 5 TABLET ORAL at 11:03

## 2019-10-10 RX ADMIN — GABAPENTIN 800 MG: 400 CAPSULE ORAL at 08:18

## 2019-10-10 RX ADMIN — ONDANSETRON 4 MG: 4 TABLET, ORALLY DISINTEGRATING ORAL at 08:21

## 2019-10-10 RX ADMIN — ENOXAPARIN SODIUM 40 MG: 40 INJECTION SUBCUTANEOUS at 13:59

## 2019-10-10 RX ADMIN — RANITIDINE 150 MG: 150 TABLET ORAL at 06:40

## 2019-10-10 RX ADMIN — SENNOSIDES AND DOCUSATE SODIUM 2 TABLET: 8.6; 5 TABLET ORAL at 20:36

## 2019-10-10 RX ADMIN — MONTELUKAST 10 MG: 10 TABLET, FILM COATED ORAL at 22:21

## 2019-10-10 RX ADMIN — FEXOFENADINE HYDROCHLORIDE 180 MG: 180 TABLET, FILM COATED ORAL at 09:57

## 2019-10-10 RX ADMIN — GABAPENTIN 800 MG: 400 CAPSULE ORAL at 13:59

## 2019-10-10 RX ADMIN — CYCLOBENZAPRINE 5 MG: 5 TABLET, FILM COATED ORAL at 13:05

## 2019-10-10 RX ADMIN — ACETAMINOPHEN 975 MG: 325 TABLET, FILM COATED ORAL at 02:32

## 2019-10-10 RX ADMIN — FLUTICASONE FUROATE AND VILANTEROL TRIFENATATE 1 PUFF: 200; 25 POWDER RESPIRATORY (INHALATION) at 09:22

## 2019-10-10 RX ADMIN — LISINOPRIL 40 MG: 10 TABLET ORAL at 08:19

## 2019-10-10 RX ADMIN — ACETAMINOPHEN 975 MG: 325 TABLET, FILM COATED ORAL at 17:45

## 2019-10-10 RX ADMIN — POLYETHYLENE GLYCOL 3350 17 G: 17 POWDER, FOR SOLUTION ORAL at 08:19

## 2019-10-10 RX ADMIN — POLYETHYLENE GLYCOL 3350 17 G: 17 POWDER, FOR SOLUTION ORAL at 20:22

## 2019-10-10 RX ADMIN — OXYCODONE HYDROCHLORIDE 10 MG: 5 TABLET ORAL at 15:05

## 2019-10-10 ASSESSMENT — ACTIVITIES OF DAILY LIVING (ADL)
ADLS_ACUITY_SCORE: 15

## 2019-10-10 ASSESSMENT — PAIN DESCRIPTION - DESCRIPTORS
DESCRIPTORS: ACHING

## 2019-10-10 NOTE — PLAN OF CARE
Issued pt hip strengthening exercise in supine with red theraband to promote strength and increase blood flow for functional mobility. [place] - with theraband around knees - duration: to fatigue without increased pain.

## 2019-10-10 NOTE — PLAN OF CARE
Pt able to move extremities in bed without difficulty. Pt c/o annemarie shin pain L>R. Injection done at bedside in left hip/thigh. Pt unsure if injection helped pain. Pt encouraged to get out of bed. Pt only able to sit at side of bed with stand by assist of 1. Pt very anxious that if she got up she would buckle.Gait belt offered but patient declined. Pt encouraged to use commode. Pt requesting nursing staff to show her what is expected of her in transfer and body mechanics. Pt encouraged to use commode and not bedpan. Pt transferred to commode but patient found it difficult to sit up on commode due to pain in shins. Pain seemed to intensify when she  sat on commode. Pt able to pass flatus but unable to urinated or have bowel movement. Pt returned to bed but patient encouraged to try to sit at side of bed and to try to ambulate later this afternoon. Standing X-rays need to be done. Patient medicated for pain with oxycodone 10 mg. Used  at side of bed.  attentive to patients care. Continuous oxymetry on. SATS 94% on RA.

## 2019-10-10 NOTE — PLAN OF CARE
Discharge Planner PT   Patient plan for discharge: TCU  Current status: Pt was educated on ITB and hip pain / activity. Manual soft tissue to the Posterior gluteus medius and TFL complex to reduce referred pain on both hip with added exercise to promote blood flow and muscle length. No functional mobility was performed at this time.   Barriers to return to prior living situation: pain, functional WB tolerance, endurance  Recommendations for discharge: TCU with continued therapy focusing on strengthening and functional mobility and stretching within spine precautions  Rationale for recommendations: Pt challenged with exercise and bed mobility during manual therapy session. She is considerable deconditioned with pain limited activity.        Entered by: Fish Jernigan 10/10/2019 12:24 PM

## 2019-10-10 NOTE — PLAN OF CARE
VSS, afebrile, somnolent and lethargic at beginning of shift, alert and oriented when awake, surgical pain is tolerable with prn oxycodone and pt has TENS unit in place which pt seems to get good relief from, repositions self in bed,  voided via bedpan 250cc felicia, odorous urine, encouraged to continue to drink more fluids, CMS- baseline numbness in annemarie LE- unchanged, has not been OOB this shift, sleeping most of the time, able to make needs known, call light in reach length, will continue to monitor and assist.

## 2019-10-10 NOTE — PLAN OF CARE
"      VS:   /57 (BP Location: Left arm)   Pulse 92   Temp 96.6  F (35.9  C) (Oral)   Resp 16   Ht 1.575 m (5' 2\")   Wt 70.5 kg (155 lb 6.8 oz)   SpO2 96%   BMI 28.43 kg/m    LS clear denies SOB/CP.   Output:   Voids on bedpan or on Chux. Pt reluctant and refusing to get up to commode, due to pt too much pain. Last BM 10/8   Activity:   Pt does not get OOB due to pain, can roll side to side and bend and move legs   Skin: Intact ex incision, and small bruise on right hip   Pain:   Tolerable with PRN oxycodone and flexeril   Neuro/CMS:   Intact still complains of left leg pain from hip to foot, with \"shin splint\" type feeling BLEs. A&Ox4.    Dressing(s):   CDI   Diet:   Reg   LDA:   none   Equipment:   Bed pan, call light within reach   Plan:   Possible injection tomorrow per MD note   Additional Info:   Pt reluctant to get OOB due to pain, pt wants to see if she can use tense unit to help with pain to get OOB with therapy. Pt was able to life BLEs simultaneously without difficulty        "

## 2019-10-10 NOTE — PLAN OF CARE
Discharge Planner PT   Patient plan for discharge: Rehab  Current status: Focus on tolerance with mobility and LE stretches/strengthening.  Pt completed B clamshells for hip abduction strength as well as B knee to chest.  Pt with improved tolerance to sitting after some stretches.   Barriers to return to prior living situation: Need for increased assist with ambulation, activity tolerance and LE weakness  Recommendations for discharge: TCU  Rationale for recommendations: To progress strength and IND with mobility       Entered by: Alana Grider 10/10/2019 4:08 PM

## 2019-10-10 NOTE — PROCEDURES
Procedure note    Chief complaint  Left hip and thigh area pain    Pre-operative diagnosis  #1 Left greater trochanter bursitis  #2 Left hip pain  #3 Left iliotibial band syndrome    Post-operative diagnosis  #1 Left greater trochanter bursitis  #2 Left hip pain  #3 Left iliotibial band syndrome    Procedure  1.  Left greater trochanter bursa steroid injection  2.  Ultrasound for needle guidance    Anesthesia  Local anesthesia lidocaine 1%    Indications/preoperative plan  Ms. Ontiveros is a 59-year-old female recovering after lumbar spine surgery and has been suffering with severe left lateral thigh pain which has not been improving over the last 4 days.  This type pain is extremely limiting for her and is impeding her ability to participate in physical therapy.  The patient has tried icing the area, some stretches and has tried her best to participate in physical therapy however continues to struggle with severe pain in the left hip and thigh area.    I myself examined the patient prior to the procedure.  Examination of the heart, lungs and mental status were all within acceptable limits.  The patient has been assessed, examined and cleared for the planned procedure at the bedside today.  I personally spoke to the patient in detail about the procedure.  I have discussed with her the risk benefits and alternatives of the procedure.  The patient consented for the procedure and her nurse was also present at the bedside.    Description of procedure  An additional intraoperative timeout, specifically to confirm accurate localization was conducted by the attending physician.  The patient remained awake and alert throughout the procedure.  The patient was placed in the right lateral decubitus position.  The skin was prepped with chlorhexidine and sterile drapes were applied.  The skin and soft tissues were anesthetized with lidocaine 1%.  Using ultrasound 10 Hz probe the area over the left greater trochanter was scanned.   Through the skin revealed a 21 gauge 4 inch ultrasound needle was inserted percutaneously advanced into the vicinity of the greater trochanter bursa.  Next aspiration was negative.  A total of 4 mL of 0.25% bupivacaine mixed with 80 mg of methylprednisolone 80 mg/mL was injected in the area.  The needle was removed after flushing with lidocaine 1%.  Pressure was applied to the area and there was no undue bleeding noticed.  The patient did not experience any hemodynamic or neurological sequelae.  The patient was transferred to supine position.  Postoperative instructions were explained to the patient.    Postoperative plan   I will personally follow-up with the patient tomorrow morning.  I have discussed with the patient that she can participate in physical therapy as tolerated.  The patient was given the number to the pain management clinic at the [facility] to schedule further follow-up after discharge from the hospital.    Note: Ultrasound pictures were saved to the chart.

## 2019-10-10 NOTE — PROGRESS NOTES
"Record reviewed.  Discussed with Dr. Eastman.  Seen with RN.  Subsequent to her last visit 10/6/2019 patient notes clinical improvement.  Interval addition of a TENS unit.  Neurontin dose held last evening due to sedation.  Continued overnight O2 requirement.  No documented hypoxemia noted on record review.  Able to sit and stand at the bedside for brief periods.  Limited by left greater than right radicular pain as well as generalized weakness.  Believes that TENS unit has been helpful.  No chest discomfort shortness of breath or cough.  No nausea reflux or abdominal pain.  Last bowel movement 2 days ago.  Voiding okay.     /53 (BP Location: Left arm)   Pulse 103   Temp 96.9  F (36.1  C) (Oral)   Resp 16   Ht 1.575 m (5' 2\")   Wt 70.5 kg (155 lb 6.8 oz)   SpO2 100%   BMI 28.43 kg/m       Alert.  No acute distress.   Chest clear  Cardiac exam regular without gallop or murmur.  No jugular venous distention.   Abdomen nondistended soft nontender without palpable organomegaly or mass bowel sounds are present.   Extremities well perfused.  No cyanosis posterior calf or thigh tenderness to suggest DVT.   Neurologic per spine service.     Last Comprehensive Metabolic Panel:  Sodium   Date Value Ref Range Status   10/06/2019 139 133 - 144 mmol/L Final     Potassium   Date Value Ref Range Status   10/06/2019 3.7 3.4 - 5.3 mmol/L Final     Chloride   Date Value Ref Range Status   10/06/2019 104 94 - 109 mmol/L Final     Carbon Dioxide   Date Value Ref Range Status   10/06/2019 30 20 - 32 mmol/L Final     Anion Gap   Date Value Ref Range Status   10/06/2019 5 3 - 14 mmol/L Final     Glucose   Date Value Ref Range Status   10/06/2019 119 (H) 70 - 99 mg/dL Final     Urea Nitrogen   Date Value Ref Range Status   10/06/2019 9 7 - 30 mg/dL Final     Creatinine   Date Value Ref Range Status   10/06/2019 0.47 (L) 0.52 - 1.04 mg/dL Final     GFR Estimate   Date Value Ref Range Status   10/06/2019 >90 >60 mL/min/[1.73_m2] " Final     Comment:     Non  GFR Calc  Starting 12/18/2018, serum creatinine based estimated GFR (eGFR) will be   calculated using the Chronic Kidney Disease Epidemiology Collaboration   (CKD-EPI) equation.       Calcium   Date Value Ref Range Status   10/06/2019 8.0 (L) 8.5 - 10.1 mg/dL Final     Hemoglobin   Date Value Ref Range Status   10/06/2019 9.1 (L) 11.7 - 15.7 g/dL Final   10/04/2019 8.9 (L) 11.7 - 15.7 g/dL Final     Assessment:    1)  S/p revision L2-pelvis PSF on 10/1 with Dr. Vasquez.   Persistent inability to effectively mobilize due to radicular complaints and generalized weakness.  Pain service planning IT band injection today.  TENS unit beneficial.  2)  Acute blood loss anemia, adequate.  No symptomatic compromise while sedentary.   3)  Chronic pain - no opiates listed pre op.    4)  Asthma, clinically appears compensated.    5)  Hypoxemia, sense of dyspnea likely related to opiate associated sedation with hypoventilation, aetelectasis.  Main issue over night.   6)  HTN,  on resumed on resume prior to admission 40 mg lisinopril.  Adequate.  7)  Hypothyroidism on synthroid.   8)  Steroid induced hyperglycemia, mild.  Resolved.    Plan:  Review meds, orders.  Continue pain regimen as per pain service.  IT band injection by pain service later today.    Incentive spirometry, bowel meds, Lovenox, mobilize as tolerated.  No O2 unless sustained sat less than 88% room air.  Trend labs, monitor clinically.    Anticipated discharge unclear.  Pending ability to mobilize with tolerable level of the pain control.

## 2019-10-11 ENCOUNTER — APPOINTMENT (OUTPATIENT)
Dept: GENERAL RADIOLOGY | Facility: CLINIC | Age: 59
DRG: 460 | End: 2019-10-11
Attending: ORTHOPAEDIC SURGERY
Payer: COMMERCIAL

## 2019-10-11 ENCOUNTER — APPOINTMENT (OUTPATIENT)
Dept: PHYSICAL THERAPY | Facility: CLINIC | Age: 59
DRG: 460 | End: 2019-10-11
Attending: ORTHOPAEDIC SURGERY
Payer: COMMERCIAL

## 2019-10-11 VITALS
WEIGHT: 155.42 LBS | SYSTOLIC BLOOD PRESSURE: 117 MMHG | RESPIRATION RATE: 15 BRPM | HEIGHT: 62 IN | HEART RATE: 97 BPM | DIASTOLIC BLOOD PRESSURE: 59 MMHG | TEMPERATURE: 97.2 F | OXYGEN SATURATION: 97 % | BODY MASS INDEX: 28.6 KG/M2

## 2019-10-11 LAB
ANION GAP SERPL CALCULATED.3IONS-SCNC: 6 MMOL/L (ref 3–14)
BUN SERPL-MCNC: 12 MG/DL (ref 7–30)
CALCIUM SERPL-MCNC: 8.8 MG/DL (ref 8.5–10.1)
CHLORIDE SERPL-SCNC: 98 MMOL/L (ref 94–109)
CO2 SERPL-SCNC: 31 MMOL/L (ref 20–32)
CREAT SERPL-MCNC: 0.51 MG/DL (ref 0.52–1.04)
GFR SERPL CREATININE-BSD FRML MDRD: >90 ML/MIN/{1.73_M2}
GLUCOSE BLDC GLUCOMTR-MCNC: 101 MG/DL (ref 70–99)
GLUCOSE BLDC GLUCOMTR-MCNC: 96 MG/DL (ref 70–99)
GLUCOSE SERPL-MCNC: 114 MG/DL (ref 70–99)
HGB BLD-MCNC: 11 G/DL (ref 11.7–15.7)
MAGNESIUM SERPL-MCNC: 2.4 MG/DL (ref 1.6–2.3)
PLATELET # BLD AUTO: 450 10E9/L (ref 150–450)
POTASSIUM SERPL-SCNC: 4.6 MMOL/L (ref 3.4–5.3)
SODIUM SERPL-SCNC: 135 MMOL/L (ref 133–144)

## 2019-10-11 PROCEDURE — 00000146 ZZHCL STATISTIC GLUCOSE BY METER IP

## 2019-10-11 PROCEDURE — 85049 AUTOMATED PLATELET COUNT: CPT | Performed by: ORTHOPAEDIC SURGERY

## 2019-10-11 PROCEDURE — 85018 HEMOGLOBIN: CPT | Performed by: ORTHOPAEDIC SURGERY

## 2019-10-11 PROCEDURE — 36415 COLL VENOUS BLD VENIPUNCTURE: CPT | Performed by: ORTHOPAEDIC SURGERY

## 2019-10-11 PROCEDURE — 25000128 H RX IP 250 OP 636: Performed by: INTERNAL MEDICINE

## 2019-10-11 PROCEDURE — 83735 ASSAY OF MAGNESIUM: CPT | Performed by: ORTHOPAEDIC SURGERY

## 2019-10-11 PROCEDURE — 25000132 ZZH RX MED GY IP 250 OP 250 PS 637: Performed by: INTERNAL MEDICINE

## 2019-10-11 PROCEDURE — 25000132 ZZH RX MED GY IP 250 OP 250 PS 637: Performed by: PHYSICIAN ASSISTANT

## 2019-10-11 PROCEDURE — 97530 THERAPEUTIC ACTIVITIES: CPT | Mod: GP

## 2019-10-11 PROCEDURE — 80048 BASIC METABOLIC PNL TOTAL CA: CPT | Performed by: ORTHOPAEDIC SURGERY

## 2019-10-11 PROCEDURE — 25000128 H RX IP 250 OP 636: Performed by: STUDENT IN AN ORGANIZED HEALTH CARE EDUCATION/TRAINING PROGRAM

## 2019-10-11 PROCEDURE — 72100 X-RAY EXAM L-S SPINE 2/3 VWS: CPT

## 2019-10-11 PROCEDURE — 99232 SBSQ HOSP IP/OBS MODERATE 35: CPT | Performed by: INTERNAL MEDICINE

## 2019-10-11 PROCEDURE — 25000132 ZZH RX MED GY IP 250 OP 250 PS 637: Performed by: NURSE PRACTITIONER

## 2019-10-11 PROCEDURE — 90682 RIV4 VACC RECOMBINANT DNA IM: CPT | Performed by: INTERNAL MEDICINE

## 2019-10-11 RX ORDER — POLYETHYLENE GLYCOL 3350 17 G/17G
17 POWDER, FOR SOLUTION ORAL 2 TIMES DAILY
DISCHARGE
Start: 2019-10-11 | End: 2019-10-11

## 2019-10-11 RX ORDER — POLYETHYLENE GLYCOL 3350 17 G/17G
17 POWDER, FOR SOLUTION ORAL 2 TIMES DAILY
Qty: 72 PACKET | Refills: 0 | Status: CANCELLED | DISCHARGE
Start: 2019-10-11

## 2019-10-11 RX ORDER — GABAPENTIN 400 MG/1
800 CAPSULE ORAL 3 TIMES DAILY
DISCHARGE
Start: 2019-10-11 | End: 2019-11-26

## 2019-10-11 RX ORDER — LEVOTHYROXINE SODIUM 125 UG/1
125 TABLET ORAL EVERY MORNING
DISCHARGE
Start: 2019-10-11

## 2019-10-11 RX ORDER — GABAPENTIN 400 MG/1
1200 CAPSULE ORAL AT BEDTIME
DISCHARGE
Start: 2019-10-11 | End: 2020-02-10

## 2019-10-11 RX ORDER — ALBUTEROL SULFATE 90 UG/1
2 AEROSOL, METERED RESPIRATORY (INHALATION) EVERY 4 HOURS PRN
DISCHARGE
Start: 2019-10-11

## 2019-10-11 RX ORDER — AMOXICILLIN 250 MG
2 CAPSULE ORAL 2 TIMES DAILY
Qty: 200 TABLET | Refills: 0 | Status: ON HOLD | DISCHARGE
Start: 2019-10-11 | End: 2019-10-16

## 2019-10-11 RX ORDER — POLYETHYLENE GLYCOL 3350 17 G/17G
17 POWDER, FOR SOLUTION ORAL 2 TIMES DAILY
Qty: 72 PACKET | Refills: 0 | Status: ON HOLD | DISCHARGE
Start: 2019-10-11 | End: 2019-10-16

## 2019-10-11 RX ORDER — GABAPENTIN 400 MG/1
800 CAPSULE ORAL 3 TIMES DAILY
DISCHARGE
Start: 2019-10-11 | End: 2019-10-11

## 2019-10-11 RX ORDER — BISACODYL 10 MG
10 SUPPOSITORY, RECTAL RECTAL DAILY PRN
Qty: 30 SUPPOSITORY | Status: ON HOLD | DISCHARGE
Start: 2019-10-11 | End: 2019-10-16

## 2019-10-11 RX ORDER — OXYCODONE HYDROCHLORIDE 5 MG/1
5-10 TABLET ORAL
Qty: 50 TABLET | Refills: 0 | Status: SHIPPED | DISCHARGE
Start: 2019-10-11 | End: 2019-10-14

## 2019-10-11 RX ORDER — METHOCARBAMOL 500 MG/1
500 TABLET, FILM COATED ORAL 4 TIMES DAILY PRN
Qty: 60 TABLET | Refills: 0 | Status: CANCELLED | DISCHARGE
Start: 2019-10-11

## 2019-10-11 RX ORDER — GABAPENTIN 400 MG/1
1200 CAPSULE ORAL AT BEDTIME
DISCHARGE
Start: 2019-10-11 | End: 2019-10-11

## 2019-10-11 RX ADMIN — ALBUTEROL SULFATE 2 PUFF: 90 AEROSOL, METERED RESPIRATORY (INHALATION) at 08:29

## 2019-10-11 RX ADMIN — CYCLOBENZAPRINE 10 MG: 5 TABLET, FILM COATED ORAL at 16:15

## 2019-10-11 RX ADMIN — ACETAMINOPHEN 975 MG: 325 TABLET, FILM COATED ORAL at 09:55

## 2019-10-11 RX ADMIN — GABAPENTIN 800 MG: 400 CAPSULE ORAL at 08:28

## 2019-10-11 RX ADMIN — RANITIDINE 150 MG: 150 TABLET ORAL at 08:28

## 2019-10-11 RX ADMIN — OXYCODONE HYDROCHLORIDE 10 MG: 5 TABLET ORAL at 09:55

## 2019-10-11 RX ADMIN — INFLUENZA A VIRUS A/BRISBANE/02/2018 (H1N1) RECOMBINANT HEMAGGLUTININ ANTIGEN, INFLUENZA A VIRUS A/KANSAS/14/2017 (H3N2) RECOMBINANT HEMAGGLUTININ ANTIGEN, INFLUENZA B VIRUS B/PHUKET/3073/2013 RECOMBINANT HEMAGGLUTININ ANTIGEN, AND INFLUENZA B VIRUS B/MARYLAND/15/2016 RECOMBINANT HEMAGGLUTININ ANTIGEN 0.5 ML: 45; 45; 45; 45 INJECTION INTRAMUSCULAR at 14:07

## 2019-10-11 RX ADMIN — ENOXAPARIN SODIUM 40 MG: 40 INJECTION SUBCUTANEOUS at 14:05

## 2019-10-11 RX ADMIN — FEXOFENADINE HYDROCHLORIDE 180 MG: 180 TABLET, FILM COATED ORAL at 08:27

## 2019-10-11 RX ADMIN — FLUTICASONE FUROATE AND VILANTEROL TRIFENATATE 1 PUFF: 200; 25 POWDER RESPIRATORY (INHALATION) at 08:26

## 2019-10-11 RX ADMIN — ACETAMINOPHEN 975 MG: 325 TABLET, FILM COATED ORAL at 16:16

## 2019-10-11 RX ADMIN — LEVOTHYROXINE SODIUM 125 MCG: 25 TABLET ORAL at 08:27

## 2019-10-11 RX ADMIN — CYCLOBENZAPRINE 10 MG: 5 TABLET, FILM COATED ORAL at 00:37

## 2019-10-11 RX ADMIN — OXYCODONE HYDROCHLORIDE 10 MG: 5 TABLET ORAL at 14:05

## 2019-10-11 RX ADMIN — LISINOPRIL 40 MG: 10 TABLET ORAL at 08:28

## 2019-10-11 RX ADMIN — POLYETHYLENE GLYCOL 3350 17 G: 17 POWDER, FOR SOLUTION ORAL at 08:29

## 2019-10-11 RX ADMIN — GABAPENTIN 800 MG: 400 CAPSULE ORAL at 14:05

## 2019-10-11 RX ADMIN — OXYCODONE HYDROCHLORIDE 10 MG: 5 TABLET ORAL at 05:53

## 2019-10-11 RX ADMIN — GABAPENTIN 1200 MG: 400 CAPSULE ORAL at 00:37

## 2019-10-11 RX ADMIN — LIDOCAINE 1 PATCH: 560 PATCH PERCUTANEOUS; TOPICAL; TRANSDERMAL at 08:26

## 2019-10-11 RX ADMIN — SENNOSIDES AND DOCUSATE SODIUM 2 TABLET: 8.6; 5 TABLET ORAL at 08:28

## 2019-10-11 RX ADMIN — FLUTICASONE PROPIONATE 1 SPRAY: 50 SPRAY, METERED NASAL at 08:25

## 2019-10-11 RX ADMIN — ACETAMINOPHEN 975 MG: 325 TABLET, FILM COATED ORAL at 01:41

## 2019-10-11 ASSESSMENT — ACTIVITIES OF DAILY LIVING (ADL)
ADLS_ACUITY_SCORE: 15

## 2019-10-11 NOTE — PLAN OF CARE
Discharge Planner PT   Patient plan for discharge: rehab  Current status: Pt is SBA for supine<>sit w/ good log roll to both sides, sit<>stand CGA to FWW, able to stand at FWW w/ CGA for ~30 sec x3 attempts w/ good stability prior to endorsing need to sit. Pt w/ little c/o L hip pain this session but continues to be limited in functional progression d/t little tolerance for upright sitting/standing.   Barriers to return to prior living situation: medical, mobility, pain  Recommendations for discharge: TCU  Rationale for recommendations: Pt is below baseline, unable to sit upright for more than ~30 sec without immediate need to lie supine, will need stretcher for transport at this time. Recommend rehab stay to progress tolerance and IND w/ mobility prior to discharge home.     Physical Therapy Discharge Summary    Reason for therapy discharge:    Discharged to transitional care facility.    Progress towards therapy goal(s). See goals on Care Plan in [facility] electronic health record for goal details.  Goals partially met.  Barriers to achieving goals:   discharge from facility.    Therapy recommendation(s):    Continued therapy is recommended.  Rationale/Recommendations:  Continue to progress functional mobility in TCU setting.           Entered by: Drai Mack 10/11/2019 2:19 PM

## 2019-10-11 NOTE — PLAN OF CARE
D: Pt  A/O x4. VSS . Lungs- CL, no c/o chest pain/ SOB. sats=99  % RA. IS up to  1500  . Bowel+, passing gas.. PP- +. No edema. CMS- intact.(Some BL numbness/tingling- resolving.) Pt taking PO REG food/ fluids well. Voiding Good amounts on BSC. Up with SBA and walker.. Pain/CAPA - tolerable on PO pain meds. BACK  Dressing -C/D/I.  Pt to DCd to NH at 4;30 . NURSE TO NURSE report called to IRINA  at [facility].  A: con't to monitor. Call light in reach. Pt able to make needs known.

## 2019-10-11 NOTE — PROGRESS NOTES
"Orthopedic Surgery Progress Note:  10/11/2019     Subjective:   POD10.    No acute events overnight. Received injection yesterday for pain. Patient able to better tolerate sitting and use the commode. Inpatient pain team following and made adjustments ongoing. Voiding spontaneously. Moving bowels. Denies chest pain, shortness of breath, and nausea.     Objective:   /78 (BP Location: Left arm)   Pulse 91   Temp 97.9  F (36.6  C) (Oral)   Resp 16   Ht 1.575 m (5' 2\")   Wt 70.5 kg (155 lb 6.8 oz)   SpO2 98%   BMI 28.43 kg/m    No intake/output data recorded.  General: NAD. Resting comfortably in bed.  Respiratory: Breathing comfortably on RA.  Drain: Removed 10/5.  Musculoskeletal: Dressing is C/D/I.      Lower extremities:  Motor Strength Right Left   Hip flexion: L1, L2, L3 NT/5 NT/5   Hip adduction: L2, L3 NT/5 NT/5   Knee flexion: S1 5/5 5/5   Knee extension: L3, L4 5/5 5/5   Ankle dosiflexion: L4, L5 5/5 4/5   EHL: L5 4/5 0/5   Ankle plantarflexion: S1 5/5 5/5   0/5 left ankle eversion  0/5 left lesser toe extension    Sensation from L1-S2 is preserved.    Labs:  Lab Results   Component Value Date    WBC 9.7 10/04/2019    HGB 11.0 (L) 10/11/2019     10/11/2019    INR 1.11 10/01/2019      Assessment & Plan:   Aleena Ontiveros is a 59 year old female now s/p revision L2-pelvis PSF on 10/1 with Dr. Vasquez.     Goals for today  - Pain control  - PT/OT  - XR supine OK  - Discharge to TCU    Orthopedics Primary  Activity: Up with assist until independent. No excessive bending or twisting. No lifting >10 lbs x 6 weeks. No Ashish lift for transfers.   Weight bearing status: WBAT.  Pain management: Transition from IV to PO as tolerated. No NSAIDs.  Antibiotics: Ancef x24 hours; complete.  Diet: Begin with clear fluids and progress diet as tolerated.   DVT prophylaxis: SCDs only. ADDED LOVENOX ON 10/06/19   Imaging: XR supine PTDC - ordered.  Labs: Labs PRN.  Bracing/Splinting: None.  Dressings: Keep " Aquacel C/D/I x 7 days.  Drains: Removed 10/05.  [place] catheter: Removed.  Physical Therapy/Occupational Therapy: Eval and treat.  Cultures: None    Consults: Hospitalist, Pain Service.  Follow-up: Clinic with Dr. Vasquez in 6 weeks with repeat x-rays.   Disposition: Pending progress with therapies, pain control on orals, and medical stability, anticipate discharge to rehab when pain well controlled     Orthopedic surgery staff for this patient is Dr. Vasquez.    ------------------------------------------------------------------------------------------    [x] Discontinue PCA  [x] Drains removed.  [   ] Post xrays done.  [x] Discharge orders done.  [x] Discharge summary started.    Jerald Go MD  Orthopedic Surgery PGY1  [phone]    FOLLOWUP:    Future Appointments   Date Time Provider Department Center   10/2/2019  2:15 PM Janna Krishnan, PT [facility] [place]   10/3/2019  6:30 AM UR PT REHAB TECH [facility] [place]   10/3/2019  8:00 AM Amira Cook, OT [facility] [place]   10/3/2019  6:30 PM UR PT REHAB TECH [facility] [place]   11/12/2019  9:45 AM Evangelist Vasquez MD [facility]   12/31/2019 10:45 AM Evangelist Vasquez MD [facility]

## 2019-10-11 NOTE — PLAN OF CARE
A/O x 4. VSS. Neuro's at baseline with n/t annemarie LE. Pt moving in bed with minimal assist. States she feels improvement since injection. Medicated with tylenol and flexeril prior to disharge. TLSO placed for transport. Pt has been discharge to TCU. Transported via [employer] @ [id number]. Family with pt and will meet her at facility.

## 2019-10-11 NOTE — PLAN OF CARE
Makes her needs known well using call light.Pain controlled with scheduled neurontin and tylenol,PRN flexeril and oxycodone.Primapore drsg to back,CDI.Baseline numbness on BLE per pt.Independent with bed mobilities.Prefers commode adjuscent to bed and does self transfer well.Still reports having hard  time standing due to pain on back and legs/shin thus refuse to walk even atleast to bathroom.Voiding concentrated urine.Denies urgency nor pain on urination.Reinforced to increase oral fluids.Passing gas,lst BM  2 days ago.All scheduled BM meds given.LS clear,using IS as reinforced.On continuous pulse oximetry,satting well at room air.

## 2019-10-11 NOTE — PROGRESS NOTES
"Record reviewed.  Seen with RN.  Left IT band injection 10/10/2019.  Interval reduction in left lower extremity radicular pain.  Up to edge of bed.  Able to \"scoot\" over to the commode.  Unable to stand at bedside.  Limited by pain and weakness.  No chest comfort dyspnea cough nausea.  The minimal reflux.  No abdominal pain.  Last bowel movement 4 days ago.  Voiding okay.  Plan for TCU at [facility].  /76 (BP Location: Left arm)   Pulse 91   Temp 96.7  F (35.9  C) (Oral)   Resp 15   Ht 1.575 m (5' 2\")   Wt 70.5 kg (155 lb 6.8 oz)   SpO2 99%   BMI 28.43 kg/m    Alert.  No acute distress.   Chest clear  Cardiac exam regular without gallop or murmur.  No jugular venous distention.   Abdomen nondistended soft nontender without palpable organomegaly or mass bowel sounds are present.   Extremities well perfused.  No cyanosis posterior calf or thigh tenderness to suggest DVT.   Neurologic per spine service.     Last Comprehensive Metabolic Panel:  Sodium   Date Value Ref Range Status   10/11/2019 135 133 - 144 mmol/L Final     Potassium   Date Value Ref Range Status   10/11/2019 4.6 3.4 - 5.3 mmol/L Final     Chloride   Date Value Ref Range Status   10/11/2019 98 94 - 109 mmol/L Final     Carbon Dioxide   Date Value Ref Range Status   10/11/2019 31 20 - 32 mmol/L Final     Anion Gap   Date Value Ref Range Status   10/11/2019 6 3 - 14 mmol/L Final     Glucose   Date Value Ref Range Status   10/11/2019 114 (H) 70 - 99 mg/dL Final     Urea Nitrogen   Date Value Ref Range Status   10/11/2019 12 7 - 30 mg/dL Final     Creatinine   Date Value Ref Range Status   10/11/2019 0.51 (L) 0.52 - 1.04 mg/dL Final     GFR Estimate   Date Value Ref Range Status   10/11/2019 >90 >60 mL/min/[1.73_m2] Final     Comment:     Non  GFR Calc  Starting 12/18/2018, serum creatinine based estimated GFR (eGFR) will be   calculated using the Chronic Kidney Disease Epidemiology Collaboration   (CKD-EPI) equation.   "     Calcium   Date Value Ref Range Status   10/11/2019 8.8 8.5 - 10.1 mg/dL Final     Bilirubin Total   Date Value Ref Range Status   10/01/2019 0.2 0.2 - 1.3 mg/dL Final     Alkaline Phosphatase   Date Value Ref Range Status   10/01/2019 36 (L) 40 - 150 U/L Final     ALT   Date Value Ref Range Status   10/01/2019 31 0 - 50 U/L Final     AST   Date Value Ref Range Status   10/01/2019 24 0 - 45 U/L Final     Hemoglobin   Date Value Ref Range Status   10/11/2019 11.0 (L) 11.7 - 15.7 g/dL Final   10/06/2019 9.1 (L) 11.7 - 15.7 g/dL Final       Assessment:    1)  S/p revision L2-pelvis PSF on 10/1 with Dr. Vasquez.   Persistent inability to effectively mobilize due to pain and generalized weakness.  Overall improved since the last weekend.  The decreased left leg radicular discomfort following IT band injection.  2)  Acute blood loss anemia, adequate.  No symptomatic compromise while sedentary.   3)  Chronic pain - no opiates listed pre op.    4)  Asthma, clinically appears compensated.    5)  Hypoxemia, sense of dyspnea likely related to opiate associated sedation with hypoventilation, aetelectasis.  Resolved.  6)  HTN,  on resumed on resume prior to admission 40 mg lisinopril.  Adequate.  7)  Hypothyroidism on synthroid.   8)  Steroid induced hyperglycemia, mild.  Resolved.    Plan:  Clinically stable for transfer to [facility] when acceptable per spine surgery.    Meds, orders reviewed.  Continue opiates per spine surgery.  Incentive spirometry, bowel meds, Lovenox until mobilized.  Suppository later today or tomorrow if no bowel movement.    Anticipated discharge to [facility] today.        160.02

## 2019-10-11 NOTE — PROGRESS NOTES
Social Work Services Discharge Note      Patient Name:  Aleena Ontiveros     Anticipated Discharge Date:  10/11/19  Discharge Disposition:   TCU:  [facility] [phone] or [phone]    Following MD:  CASSIE Dunbar     Pre-Admission Screening (PAS) online form has been completed.  The Level of Care (LOC) is:  Determined  Confirmation Code is:  [id number]  Patient/caregiver informed of referral to [facility] Line for Pre-Admission Screening for skilled nursing facility (SNF) placement and to expect a phone call post discharge from SNF.     Additional Services/Equipment Arranged:  [employer].  at 1630.Pt and family provided with private pay estimates, but pt believes her insurance will cover.    Private Pay Estimates : $750 base pay, approximately $75/mile     Patient / Family response to discharge plan:  agreeable     Persons notified of above discharge plan:  Pt, pt's family (Spouse and daughter Martin), Frances Rodriguez NP, Betsy Marie in Admissions at [facility],     Staff Discharge Instructions:  Please fax discharge orders and signed hard scripts for any controlled substances.  Please print a packet and send with patient.     CTS Handoff completed:  YES    Medicare Notice of Rights provided to the patient/family:  NO-pt does not have Medicare Insurance.

## 2019-10-12 NOTE — PLAN OF CARE
Occupational Therapy Discharge Summary    Reason for therapy discharge:    Discharged to transitional care facility.    Progress towards therapy goal(s). See goals on Care Plan in [facility] electronic health record for goal details.  Goals partially met.  Barriers to achieving goals:   discharge from facility.    Therapy recommendation(s):    Continued therapy is recommended.  Rationale/Recommendations:  limited self-cares/mobility.

## 2019-10-13 ENCOUNTER — NURSING HOME VISIT (OUTPATIENT)
Dept: GERIATRICS | Facility: CLINIC | Age: 59
End: 2019-10-13
Payer: COMMERCIAL

## 2019-10-13 VITALS
OXYGEN SATURATION: 96 % | BODY MASS INDEX: 28.35 KG/M2 | SYSTOLIC BLOOD PRESSURE: 124 MMHG | RESPIRATION RATE: 18 BRPM | WEIGHT: 155 LBS | DIASTOLIC BLOOD PRESSURE: 83 MMHG | HEART RATE: 83 BPM | TEMPERATURE: 97.6 F

## 2019-10-13 DIAGNOSIS — M15.0 PRIMARY OSTEOARTHRITIS INVOLVING MULTIPLE JOINTS: ICD-10-CM

## 2019-10-13 DIAGNOSIS — M40.15 OTHER SECONDARY KYPHOSIS, THORACOLUMBAR REGION: ICD-10-CM

## 2019-10-13 DIAGNOSIS — G89.18 POSTOPERATIVE PAIN AFTER SPINAL SURGERY: ICD-10-CM

## 2019-10-13 DIAGNOSIS — F41.9 ANXIETY: ICD-10-CM

## 2019-10-13 DIAGNOSIS — J45.20 MILD INTERMITTENT ASTHMA WITHOUT COMPLICATION: ICD-10-CM

## 2019-10-13 DIAGNOSIS — F51.02 ADJUSTMENT INSOMNIA: ICD-10-CM

## 2019-10-13 DIAGNOSIS — K59.01 SLOW TRANSIT CONSTIPATION: ICD-10-CM

## 2019-10-13 DIAGNOSIS — M43.17 ACQUIRED SPONDYLOLISTHESIS OF LUMBOSACRAL REGION: Primary | ICD-10-CM

## 2019-10-13 PROCEDURE — 99310 SBSQ NF CARE HIGH MDM 45: CPT | Performed by: NURSE PRACTITIONER

## 2019-10-13 NOTE — PROGRESS NOTES
[facility]  PRIMARY CARE PROVIDER AND CLINIC:  Arabella Conner MD, [facility] [address] / [place]*  Chief Complaint   Patient presents with     Hospital F/U     [place] [facility] Record Number:  [id number]  [facility] of Service where encounter took place:  [facility] [[id number]]    Aleena Ontiveros  is a 59 year old  (1960), admitted to the above facility from  [facility]. Hospital stay 10/1/19 through 10/11/19..  Admitted to this facility for  rehab, medical management and nursing care.    HPI:    HPI information obtained from: facility chart records, facility staff, patient report and [facility] chart review.   Brief Summary of Hospital Course: Mrs. Ontiveros underwent Lumbar fusion L5-S1and decompression L4-5-S1 for Spondylolisthesis.  She had Scoliosis as a young girl with surgical intervention and gavino placement at age 13.  Over the past year, the Lumbarsacral spine shifted causing severe burning pain down her right leg, she has had long standing numbness in her left leg.  She did have left bursa hip pain while in the hospital and did have a steroid injection.  She has an active medical history including Asthma, Hypertension, Hypothyroid (thyroidectomy), Anxiety.  She has had bilateral TKA due to primary Osteoarthritis.  Updates on Status Since Skilled nursing Admission: states the burning right leg pain has resolved, however she now feels like both of her thighs have a deep pain like someone hit her, she is also reporting shin splint type pain.  The pain sx were discussed with her surgeon and has ordered Oxycodone with Tylenol to be taken.  States Tylenol also helps so she doesn't need so much Oxycodone.  She is on a high dose of Gabapentin for the chronic nerve pain she has.  Thus far, her kidney function is doing well. Since her arrival here, she has not had the Advair inhaler - was not ordered from hospital.   She does keep her Albuterol rescue inhaler with her at all times.  Constipation is an issue as well, she does have Miralax and Senna ordered.  She has not been sleeping well, they tried Melatonin in the hospital, it was not ordered here, so will order it.  She is hoping something can help and really doesn't want a sleeping pill if she can avoid it.      CODE STATUS/ADVANCE DIRECTIVES DISCUSSION:   CPR/Full code   Patient's living condition: lives with spouse  ALLERGIES: Adhesive tape and Erythromycin  PAST MEDICAL HISTORY:  has a past medical history of Anxiety, Arthritis, Asthma, Chronic osteoarthritis, Hypertension, PONV (postoperative nausea and vomiting), Thyroid disease, and Uncomplicated asthma. She also has no past medical history of Difficult intubation, Malignant hyperthermia, or Spinal headache.  PAST SURGICAL HISTORY:   has a past surgical history that includes Abdomen surgery (1988,1999); orthopedic surgery; Thyroidectomy (5/13/2014); ENT surgery; back surgery; GYN surgery (5-13-13); thumb mass resection; Arthroplasty knee (Right, 8/10/2017); Arthroplasty knee (Left, 12/13/2017); and Optical tracking system fusion posterior spine thoracic three+ levels (N/A, 10/1/2019).  FAMILY HISTORY: family history includes Breast Cancer in her mother; C.A.D. in her father; Cancer - colorectal in her father; Cerebrovascular Disease in her father; Thyroid Disease in her brother and sister.  SOCIAL HISTORY:   reports that she has never smoked. She has never used smokeless tobacco. She reports current alcohol use. She reports that she does not use drugs.    Post Discharge Medication Reconciliation Status: discharge medications reconciled and changed, per note/orders (see AVS)    Current Outpatient Medications   Medication Sig Dispense Refill     acetaminophen (TYLENOL) 500 MG tablet Take 1,000 mg by mouth every 6 hours as needed for mild pain       albuterol (PROAIR HFA/PROVENTIL HFA/VENTOLIN HFA) 108 (90 Base) MCG/ACT  inhaler Inhale 2 puffs into the lungs every 4 hours as needed for shortness of breath / dyspnea or wheezing       bisacodyl (DULCOLAX) 10 MG suppository Place 1 suppository (10 mg) rectally daily as needed for constipation give [facility] or 10/12 at [place] if no BM 30 suppository      cyclobenzaprine (FLEXERIL) 10 MG tablet Take 1 tablet (10 mg) by mouth 3 times daily as needed for muscle spasms 90 tablet 1     enoxaparin (LOVENOX) 40 MG/0.4ML syringe Inject 0.4 mLs (40 mg) Subcutaneous every 24 hours for 28 days Continue for 28 days or until pt is more mobile, whichever comes first.       estradiol (ESTRACE) 0.1 MG/GM cream Place 2 g vaginally At Bedtime        Fexofenadine HCl (ALLEGRA PO) Take 180 mg by mouth every morning        fluticasone (FLONASE) 50 MCG/ACT spray Spray 1 spray into both nostrils 2 times daily        fluticasone-salmeterol (ADVAIR) 500-50 MCG/DOSE inhaler Inhale 1 puff into the lungs every 12 hours       gabapentin (NEURONTIN) 400 MG capsule Take 2 capsules (800 mg) by mouth 3 times daily       gabapentin (NEURONTIN) 400 MG capsule Take 3 capsules (1,200 mg) by mouth At Bedtime Give at 11PM       levothyroxine (SYNTHROID) 125 MCG tablet Take 1 tablet (125 mcg) by mouth every morning       lisinopril (PRINIVIL/ZESTRIL) 40 MG tablet Take 1 tablet (40 mg) by mouth daily (Patient taking differently: Take 40 mg by mouth every morning ) 30 tablet      melatonin 5 MG tablet Take 1 tablet (5 mg) by mouth At Bedtime       Montelukast Sodium (SINGULAIR PO) Take 10 mg by mouth At Bedtime        order for DME Equipment being ordered: TENS.    TENS UNIT Ordered- Please call your insurance to Verify coverage and locations to  the device.     Please Dispense Device, leads, pads, wires, and all other necessary equipment that is needed for use.     Length of need: 36 months. 1 Device 0     oxyCODONE (ROXICODONE) 5 MG tablet Take 1-2 tablets (5-10 mg) by mouth every 3 hours as needed for moderate to  severe pain (Take 1 tab for pain rated 2-5, take 2 tabs for pain rated 6-10) 50 tablet 0     polyethylene glycol (MIRALAX/GLYCOLAX) packet Take 17 g by mouth 2 times daily 72 packet 0     senna-docusate (SENOKOT-S/PERICOLACE) 8.6-50 MG tablet Take 2 tablets by mouth 2 times daily 200 tablet 0       ROS:  10 point ROS of systems including Constitutional, Eyes, Respiratory, Cardiovascular, Gastroenterology, Genitourinary, Integumentary, Musculoskeletal, Psychiatric were all negative except for pertinent positives noted in my HPI.    Vitals:  /83   Pulse 83   Temp 97.6  F (36.4  C)   Resp 18   Wt 70.3 kg (155 lb)   SpO2 96%   BMI 28.35 kg/m    Exam:  GENERAL APPEARANCE:  Alert  ENT:  Mouth and posterior oropharynx normal, moist mucous membranes, normal hearing acuity  EYES:  EOM, conjunctivae, lids, pupils and irises normal  NECK:  No adenopathy,masses or thyromegaly  RESP:  respiratory effort and palpation of chest normal, lungs clear to auscultation , no respiratory distress  CV:  Palpation and auscultation of heart done , regular rate and rhythm, no murmur, rub, or gallop, no edema  ABDOMEN:  normal bowel sounds, soft, nontender, no hepatosplenomegaly or other masses, no guarding or rebound  M/S:   Gait and station abnormal requires TLSO when up, requires walker and SBA currently  Digits and nails normal  SKIN:  Inspection of skin and subcutaneous tissue baseline, Palpation of skin and subcutaneous tissue baseline, incision CDI  NEURO:   Cranial nerves 2-12 are normal tested and grossly at patient's baseline, Examination of sensation by touch is diminished on left L/E  PSYCH:  oriented X 3, normal insight, judgement and memory    Lab/Diagnostic data:  Recent labs in [facility] reviewed by me today.     ASSESSMENT/PLAN:  (M43.17) Acquired spondylolisthesis of lumbosacral region  (primary encounter diagnosis)  (M40.15) Other secondary kyphosis, thoracolumbar region (gavino placement at age 13)  (G89.18)  Postoperative pain after spinal surgery  Comment: acute on chronic, severe  Plan: Mobilize, TLSO when up walking, PT/OT  Will continue Flexeril for spasms  Oxycodone IR 5-10 mg to every 3 hours PRN pain  Gabapentin continue current dosages, will get BMP next lab day to monitor renal function due to high dose  CBC monitor post op HGB  BMP monitor post op electrolyte changes  Ice and or heat  Bowel protocol while on narcotic pain medication    (M15.0) Primary osteoarthritis involving multiple joints  Comment: history of bilateral knee replacements  Plan: Mobilize, see above pain management     (J45.20) Mild intermittent asthma without complication  Comment: chronic  Plan: fluticasone-salmeterol (ADVAIR) 500-50 MCG/DOSE        inhaler        Monitor for respiratory changes    (K59.01) Slow transit constipation  Comment: acute severe  Plan: add Miralax 17 g bid  Senna S 2 tabs BID  Hold above for loose stools    (F51.02) Adjustment insomnia  (F41.9) Anxiety  Comment: acute problem - severe  Plan: melatonin 5 MG tablet every bed time        Aromatherapy  Sleep hygiene/routine      Electronically signed by:  JAZMIN Galeana CNP

## 2019-10-13 NOTE — LETTER
10/13/2019        RE: Aleena Ontiveros  [address]  [place] [address]        [facility]  PRIMARY CARE PROVIDER AND CLINIC:  Arabella Conner MD, [facility] [address] / [place]*  Chief Complaint   Patient presents with     Hospital F/U     [facility] Record Number:  [id number]  [facility] where encounter took place[facility] [[id number]]    Aleena Ontiveros  is a 59 year old  (1960), admitted to the above facility from  [facility]. Hospital stay 10/1/19 through 10/11/19..  Admitted to this facility for  rehab, medical management and nursing care.    HPI:    HPI information obtained from: facility chart records, facility staff, patient report and [facility] chart review.   Brief Summary of Hospital Course: Mrs. Ontiveros underwent Lumbar fusion L5-S1and decompression L4-5-S1 for Spondylolisthesis.  She had Scoliosis as a young girl with surgical intervention and gavino placement at age 13.  Over the past year, the Lumbarsacral spine shifted causing severe burning pain down her right leg, she has had long standing numbness in her left leg.  She did have left bursa hip pain while in the hospital and did have a steroid injection.  She has an active medical history including Asthma, Hypertension, Hypothyroid (thyroidectomy), Anxiety.  She has had bilateral TKA due to primary Osteoarthritis.  Updates on Status Since Skilled nursing Admission: states the burning right leg pain has resolved, however she now feels like both of her thighs have a deep pain like someone hit her, she is also reporting shin splint type pain.  The pain sx were discussed with her surgeon and has ordered Oxycodone with Tylenol to be taken.  States Tylenol also helps so she doesn't need so much Oxycodone.  She is on a high dose of Gabapentin for the chronic nerve pain she has.  Thus far, her kidney function is doing well.  Since her arrival here, she has not had the Advair inhaler - was not ordered from hospital.  She does keep her Albuterol rescue inhaler with her at all times.  Constipation is an issue as well, she does have Miralax and Senna ordered.  She has not been sleeping well, they tried Melatonin in the hospital, it was not ordered here, so will order it.  She is hoping something can help and really doesn't want a sleeping pill if she can avoid it.      CODE STATUS/ADVANCE DIRECTIVES DISCUSSION:   CPR/Full code   Patient's living condition: lives with spouse  ALLERGIES: Adhesive tape and Erythromycin  PAST MEDICAL HISTORY:  has a past medical history of Anxiety, Arthritis, Asthma, Chronic osteoarthritis, Hypertension, PONV (postoperative nausea and vomiting), Thyroid disease, and Uncomplicated asthma. She also has no past medical history of Difficult intubation, Malignant hyperthermia, or Spinal headache.  PAST SURGICAL HISTORY:   has a past surgical history that includes Abdomen surgery (1988,1999); orthopedic surgery; Thyroidectomy (5/13/2014); ENT surgery; back surgery; GYN surgery (5-13-13); thumb mass resection; Arthroplasty knee (Right, 8/10/2017); Arthroplasty knee (Left, 12/13/2017); and Optical tracking system fusion posterior spine thoracic three+ levels (N/A, 10/1/2019).  FAMILY HISTORY: family history includes Breast Cancer in her mother; C.A.D. in her father; Cancer - colorectal in her father; Cerebrovascular Disease in her father; Thyroid Disease in her brother and sister.  SOCIAL HISTORY:   reports that she has never smoked. She has never used smokeless tobacco. She reports current alcohol use. She reports that she does not use drugs.    Post Discharge Medication Reconciliation Status: discharge medications reconciled and changed, per note/orders (see AVS)    Current Outpatient Medications   Medication Sig Dispense Refill     acetaminophen (TYLENOL) 500 MG tablet Take 1,000 mg by mouth every 6 hours as needed  for mild pain       albuterol (PROAIR HFA/PROVENTIL HFA/VENTOLIN HFA) 108 (90 Base) MCG/ACT inhaler Inhale 2 puffs into the lungs every 4 hours as needed for shortness of breath / dyspnea or wheezing       bisacodyl (DULCOLAX) 10 MG suppository Place 1 suppository (10 mg) rectally daily as needed for constipation give [facility] or 10/12 at [place] if no BM 30 suppository      cyclobenzaprine (FLEXERIL) 10 MG tablet Take 1 tablet (10 mg) by mouth 3 times daily as needed for muscle spasms 90 tablet 1     enoxaparin (LOVENOX) 40 MG/0.4ML syringe Inject 0.4 mLs (40 mg) Subcutaneous every 24 hours for 28 days Continue for 28 days or until pt is more mobile, whichever comes first.       estradiol (ESTRACE) 0.1 MG/GM cream Place 2 g vaginally At Bedtime        Fexofenadine HCl (ALLEGRA PO) Take 180 mg by mouth every morning        fluticasone (FLONASE) 50 MCG/ACT spray Spray 1 spray into both nostrils 2 times daily        fluticasone-salmeterol (ADVAIR) 500-50 MCG/DOSE inhaler Inhale 1 puff into the lungs every 12 hours       gabapentin (NEURONTIN) 400 MG capsule Take 2 capsules (800 mg) by mouth 3 times daily       gabapentin (NEURONTIN) 400 MG capsule Take 3 capsules (1,200 mg) by mouth At Bedtime Give at 11PM       levothyroxine (SYNTHROID) 125 MCG tablet Take 1 tablet (125 mcg) by mouth every morning       lisinopril (PRINIVIL/ZESTRIL) 40 MG tablet Take 1 tablet (40 mg) by mouth daily (Patient taking differently: Take 40 mg by mouth every morning ) 30 tablet      melatonin 5 MG tablet Take 1 tablet (5 mg) by mouth At Bedtime       Montelukast Sodium (SINGULAIR PO) Take 10 mg by mouth At Bedtime        order for DME Equipment being ordered: TENS.    TENS UNIT Ordered- Please call your insurance to Verify coverage and locations to  the device.     Please Dispense Device, leads, pads, wires, and all other necessary equipment that is needed for use.     Length of need: 36 months. 1 Device 0     oxyCODONE (ROXICODONE) 5  MG tablet Take 1-2 tablets (5-10 mg) by mouth every 3 hours as needed for moderate to severe pain (Take 1 tab for pain rated 2-5, take 2 tabs for pain rated 6-10) 50 tablet 0     polyethylene glycol (MIRALAX/GLYCOLAX) packet Take 17 g by mouth 2 times daily 72 packet 0     senna-docusate (SENOKOT-S/PERICOLACE) 8.6-50 MG tablet Take 2 tablets by mouth 2 times daily 200 tablet 0       ROS:  10 point ROS of systems including Constitutional, Eyes, Respiratory, Cardiovascular, Gastroenterology, Genitourinary, Integumentary, Musculoskeletal, Psychiatric were all negative except for pertinent positives noted in my HPI.    Vitals:  /83   Pulse 83   Temp 97.6  F (36.4  C)   Resp 18   Wt 70.3 kg (155 lb)   SpO2 96%   BMI 28.35 kg/m     Exam:  GENERAL APPEARANCE:  Alert  ENT:  Mouth and posterior oropharynx normal, moist mucous membranes, normal hearing acuity  EYES:  EOM, conjunctivae, lids, pupils and irises normal  NECK:  No adenopathy,masses or thyromegaly  RESP:  respiratory effort and palpation of chest normal, lungs clear to auscultation , no respiratory distress  CV:  Palpation and auscultation of heart done , regular rate and rhythm, no murmur, rub, or gallop, no edema  ABDOMEN:  normal bowel sounds, soft, nontender, no hepatosplenomegaly or other masses, no guarding or rebound  M/S:   Gait and station abnormal requires TLSO when up, requires walker and SBA currently  Digits and nails normal  SKIN:  Inspection of skin and subcutaneous tissue baseline, Palpation of skin and subcutaneous tissue baseline, incision CDI  NEURO:   Cranial nerves 2-12 are normal tested and grossly at patient's baseline, Examination of sensation by touch is diminished on left L/E  PSYCH:  oriented X 3, normal insight, judgement and memory    Lab/Diagnostic data:  Recent labs in [facility] reviewed by me today.     ASSESSMENT/PLAN:  (M43.17) Acquired spondylolisthesis of lumbosacral region  (primary encounter diagnosis)  (M40.15) Other  secondary kyphosis, thoracolumbar region (gavino placement at age 13)  (G89.18) Postoperative pain after spinal surgery  Comment: acute on chronic, severe  Plan: Mobilize, TLSO when up walking, PT/OT  Will continue Flexeril for spasms  Oxycodone IR 5-10 mg to every 3 hours PRN pain  Gabapentin continue current dosages, will get BMP next lab day to monitor renal function due to high dose  CBC monitor post op HGB  BMP monitor post op electrolyte changes  Ice and or heat  Bowel protocol while on narcotic pain medication    (M15.0) Primary osteoarthritis involving multiple joints  Comment: history of bilateral knee replacements  Plan: Mobilize, see above pain management     (J45.20) Mild intermittent asthma without complication  Comment: chronic  Plan: fluticasone-salmeterol (ADVAIR) 500-50 MCG/DOSE        inhaler        Monitor for respiratory changes    (K59.01) Slow transit constipation  Comment: acute severe  Plan: add Miralax 17 g bid  Senna S 2 tabs BID  Hold above for loose stools    (F51.02) Adjustment insomnia  (F41.9) Anxiety  Comment: acute problem - severe  Plan: melatonin 5 MG tablet every bed time        Aromatherapy  Sleep hygiene/routine      Electronically signed by:  JAZMIN Galeana CNP                         Sincerely,        JAZMIN Galeana CNP

## 2019-10-14 ENCOUNTER — PATIENT OUTREACH (OUTPATIENT)
Dept: CARE COORDINATION | Facility: CLINIC | Age: 59
End: 2019-10-14

## 2019-10-14 DIAGNOSIS — G89.18 POSTOPERATIVE PAIN AFTER SPINAL SURGERY: ICD-10-CM

## 2019-10-14 RX ORDER — OXYCODONE HYDROCHLORIDE 5 MG/1
5-10 TABLET ORAL
Qty: 60 TABLET | Refills: 0 | Status: ON HOLD | OUTPATIENT
Start: 2019-10-14 | End: 2019-10-16

## 2019-10-14 NOTE — LETTER
[facility]       To:   Alejandra GOODE            Please give to facility      From:   [facility]  Care Coordinator   [facility]   P: [phone]  [email or web address]        Patient Name:    Aleena Ontiveros   YOB: 1960   Admit date:   10-        Information Needed:  Please contact me when the patient will discharge (or if they will move to long term care)- include the discharge date, disposition, and main diagnosis       - If the patient is discharged with home care services, please provide the name of the agency    Also- Please inform me if a care conference is being held.     Phone or Email with information                              Thank you

## 2019-10-14 NOTE — PROGRESS NOTES
Contact   Chart Review     Situation: Patient chart reviewed by .    Background: CTS: Reason for Hospitalization:  Spondylolisthesis; Sagittal Malalignment  Postoperative pain after spinal surgery  Admit Date/Time: 10/1/2019  6:23 AM  Discharge Date:10/11/19  Payor Source: Payor: MEDICA / Plan: MEDICA VANTAGEPLUS FULLY INSURED / Product Type: HMO /                           Reason for Communication Hand-off Referral: Other discharge plan     Discharge Plan:  [facility]ell[facility] [phone] or [phone] Will be followed by Anisha Dunbar NP    Assessment: Sent fax to Alejandra with CC contact information to discuss discharge planning.     Plan/Recommendations: Will do chart review weekly and will assist with discharge plan.      [facility],   [facility]  [phone]

## 2019-10-14 NOTE — PROGRESS NOTES
[place] [facility]  INITIAL VISIT NOTE  October 15, 2019    PRIMARY CARE PROVIDER AND CLINIC:  [facility] [address] [address] / [place]    Chief Complaint   Patient presents with     Hospital F/U       HPI:    Aleena Ontiveros is a 59 year old  (1960) female who was seen at [facility] in [place] on October 15, 2019 for an initial visit. Medical history is notable for hypertension, asthma, hypothyroidism, osteoarthritis, anxiety, idiopathic scoliosis, prior spinal fusion, and high-grade spondylolisthesis.  She was hospitalized at [facility] from October 1 through October 11, 2019 for spondylolisthesis and sagittal malalignment.  She underwent revision L2 to pelvis posterior spinal fusion, S1 pedicle subtraction osteotomy, bilateral L5 nerve root decompressions, and pelvic fixation on October 1, 2019.  Hospital course was complicated by development of acute blood loss anemia.  She did also have left bursa hip pain while in the hospital and received Medrol Dosepak as well as a steroid injection.    Patient is admitted to this facility for medical management, nursing care, and rehab.     Patient was seen today in her room, while lying in bed.  She feels somewhat weak and run down today.  She rates her surgical back pain as 5 out of 10.  She reports numbness paresthesiae in her both legs.  She believes she had low-grade fever last night.  She denies chills, cough, sputum, chest pain, palpitation, nausea, vomiting, abdominal pain, or diarrhea.    CODE STATUS:   CPR/Full code     PAST MEDICAL HISTORY:   Hypertension  Asthma, mild intermittent  Hypothyroidism (history of thyroidectomy approximately 10 years ago for benign thyroid nodules)  Allergic rhinitis  Primary osteoarthritis of knees  Adolescent idiopathic scoliosis  High-grade spondylolisthesis  Flat back syndrome  Spinal stenosis  Anxiety    PAST SURGICAL HISTORY:   Past Surgical History:    Procedure Laterality Date     ABDOMEN SURGERY           ARTHROPLASTY KNEE Right 8/10/2017    Procedure: ARTHROPLASTY KNEE;  RIGHT TOTAL KNEE ARTHROPLASTY ;  Surgeon: Grady Alvarez MD;  Location:  OR     ARTHROPLASTY KNEE Left 2017    Procedure: ARTHROPLASTY KNEE;  LEFT TOTAL KNEE ARTHROPLASTY;  Surgeon: Grady Alvarez MD;  Location:  OR     BACK SURGERY      spinal fusion     ENT SURGERY      tonsilectomy     GYN SURGERY  13    hysterectomy     OPTICAL TRACKING SYSTEM FUSION POSTERIOR SPINE THORACIC THREE+ LEVELS N/A 10/1/2019    Procedure: Transforaminal Lumbar Interbody Fusion Three Column Osteotomy L5-S1, Posterior Spinal Fusion L2-Pelvis. Use of BMP bone graft;  Surgeon: Evangelist Vasquez MD;  Location:  OR     ORTHOPEDIC SURGERY      sinal fusion,hand     thumb mass resection       THYROIDECTOMY  2014    Procedure: THYROIDECTOMY;  Surgeon: Krzysztof Marquez MD;  Location: [facility]       FAMILY HISTORY:   Family History   Problem Relation Age of Onset     Breast Cancer Mother      Cancer - colorectal Father      C.A.D. Father      Cerebrovascular Disease Father      Thyroid Disease Brother      Thyroid Disease Sister        SOCIAL HISTORY:  Patient is  and lives with her .  For the past month she has used a walking stick for ambulation.    Social History     Tobacco Use     Smoking status: Never Smoker     Smokeless tobacco: Never Used   Substance Use Topics     Alcohol use: Yes     Binge frequency: Patient refused     Comment: 1-2 per month       MEDICATIONS:  Current Outpatient Medications   Medication Sig Dispense Refill     acetaminophen (TYLENOL) 500 MG tablet Take 1,000 mg by mouth every 6 hours as needed for mild pain       albuterol (PROAIR HFA/PROVENTIL HFA/VENTOLIN HFA) 108 (90 Base) MCG/ACT inhaler Inhale 2 puffs into the lungs every 4 hours as needed for shortness of breath / dyspnea or wheezing       bisacodyl (DULCOLAX) 10  MG suppository Place 1 suppository (10 mg) rectally daily as needed for constipation give [facility] or 10/12 at [place] if no BM 30 suppository      cyclobenzaprine (FLEXERIL) 10 MG tablet Take 1 tablet (10 mg) by mouth 3 times daily as needed for muscle spasms 90 tablet 1     enoxaparin (LOVENOX) 40 MG/0.4ML syringe Inject 0.4 mLs (40 mg) Subcutaneous every 24 hours for 28 days Continue for 28 days or until pt is more mobile, whichever comes first.       estradiol (ESTRACE) 0.1 MG/GM cream Place 2 g vaginally At Bedtime        Fexofenadine HCl (ALLEGRA PO) Take 180 mg by mouth every morning        fluticasone (FLONASE) 50 MCG/ACT spray Spray 1 spray into both nostrils 2 times daily        fluticasone-salmeterol (ADVAIR) 500-50 MCG/DOSE inhaler Inhale 1 puff into the lungs every 12 hours       gabapentin (NEURONTIN) 400 MG capsule Take 2 capsules (800 mg) by mouth 3 times daily       gabapentin (NEURONTIN) 400 MG capsule Take 3 capsules (1,200 mg) by mouth At Bedtime Give at 11PM       levothyroxine (SYNTHROID) 125 MCG tablet Take 1 tablet (125 mcg) by mouth every morning       lisinopril (PRINIVIL/ZESTRIL) 40 MG tablet Take 1 tablet (40 mg) by mouth daily (Patient taking differently: Take 40 mg by mouth every morning ) 30 tablet      melatonin 5 MG tablet Take 1 tablet (5 mg) by mouth At Bedtime       Montelukast Sodium (SINGULAIR PO) Take 10 mg by mouth At Bedtime        order for DME Equipment being ordered: TENS.    TENS UNIT Ordered- Please call your insurance to Verify coverage and locations to  the device.     Please Dispense Device, leads, pads, wires, and all other necessary equipment that is needed for use.     Length of need: 36 months. 1 Device 0     oxyCODONE (ROXICODONE) 5 MG tablet Take 1-2 tablets (5-10 mg) by mouth every 3 hours as needed for moderate to severe pain 60 tablet 0     polyethylene glycol (MIRALAX/GLYCOLAX) packet Take 17 g by mouth 2 times daily 72 packet 0     senna-docusate  (SENOKOT-S/PERICOLACE) 8.6-50 MG tablet Take 2 tablets by mouth 2 times daily 200 tablet 0       ALLERGIES:  Allergies   Allergen Reactions     Adhesive Tape      Erythromycin Nausea and Vomiting     Pills cause vomiting,        ROS:  10 point ROS neg other than the symptoms noted above in the HPI.    PHYSICAL EXAM:  Vitals: Blood pressure 129/85, heart rate 109, respiratory rate 16, temperature 98.3  F, oxygen saturation 97%  Gen: Weak appearing, but in no acute distress  HEENT: Normocephalic; oropharynx clear, mild conjunctival pallor  Card: Normal S1, S2, regular rhythm but slightly tachycardic  Resp: Lungs clear to auscultation bilaterally  GI: Abdomen soft, not-tender, non-distended, +BS  Ext: No significant LE edema  Neuro: CX II-XII grossly intact; ROM in all four extremities grossly in tact  Psych: Alert and oriented x3; normal affect  Skin: No acute rash.  Surgical back wound is dressed and appears clean    LABORATORY/IMAGING DATA:  Lab Results   Component Value Date    WBC 9.7 10/04/2019     Lab Results   Component Value Date    RBC 3.14 10/04/2019     Lab Results   Component Value Date    HGB 11.0 10/11/2019     Lab Results   Component Value Date    HCT 27.6 10/04/2019     Lab Results   Component Value Date    MCV 88 10/04/2019     Lab Results   Component Value Date    MCH 28.3 10/04/2019     Lab Results   Component Value Date    MCHC 32.2 10/04/2019     Lab Results   Component Value Date    RDW 12.8 10/04/2019     Lab Results   Component Value Date     10/11/2019     Last Comprehensive Metabolic Panel:  Sodium   Date Value Ref Range Status   10/11/2019 135 133 - 144 mmol/L Final     Potassium   Date Value Ref Range Status   10/11/2019 4.6 3.4 - 5.3 mmol/L Final     Chloride   Date Value Ref Range Status   10/11/2019 98 94 - 109 mmol/L Final     Carbon Dioxide   Date Value Ref Range Status   10/11/2019 31 20 - 32 mmol/L Final     Anion Gap   Date Value Ref Range Status   10/11/2019 6 3 - 14 mmol/L  Final     Glucose   Date Value Ref Range Status   10/11/2019 114 (H) 70 - 99 mg/dL Final     Urea Nitrogen   Date Value Ref Range Status   10/11/2019 12 7 - 30 mg/dL Final     Creatinine   Date Value Ref Range Status   10/11/2019 0.51 (L) 0.52 - 1.04 mg/dL Final     GFR Estimate   Date Value Ref Range Status   10/11/2019 >90 >60 mL/min/[1.73_m2] Final     Comment:     Non  GFR Calc  Starting 12/18/2018, serum creatinine based estimated GFR (eGFR) will be   calculated using the Chronic Kidney Disease Epidemiology Collaboration   (CKD-EPI) equation.       Calcium   Date Value Ref Range Status   10/11/2019 8.8 8.5 - 10.1 mg/dL Final       ASSESSMENT/PLAN:  High-grade spondylolisthesis, s/p spinal surgery on 10/01/19,  Spinal stenosis,  Flat back syndrome,  Physical deconditioning.  Patient underwent revision L2 to pelvis posterior spinal fusion, S1 pedicle subtraction osteotomy, bilateral L5 nerve root decompressions, and pelvic fixation on October 1, 2019.  Plan:  TLSO brace when walking  Pain management with scheduled acetaminophen, gabapentin, and PRN oxycodone  Muscle relaxant (Flexeril) is available PRN  DVT prophylaxis with enoxaparin 40 mg subcu daily through November 3, 2019  PT/OT evaluation therapy  Follow-up with spine surgery as scheduled    Acute blood loss anemia.   Due to above surgery.  Hemoglobin at time of discharge from hospital was 9.1.  Plan:  CBC today, Oct 15    Acquired hypothyroidism.  History of thyroidectomy approximately 10 years ago for benign thyroid nodules.  Plan:  Continue prior to admission levothyroxine 125 mcg p.po daily    Benign essential hypertension.  Blood pressures are currently controlled.  Plan:  Continue prior to admission lisinopril 40 mg daily  Continue to monitor blood pressure    Allergic rhinitis,  Mild intermittent asthma.  Stable.  Plan:  Continue prior to admission Singulair 10 mg p.o. at bedtime, fexofenadine 180 mg p.o. every morning, Advair 500-50,  1 puff twice daily, Flonase 50 mcg 1 spray into nostril twice daily, and PRN albuterol inhaler    Slow transit constipation.  Plan:  Continue bowel regimen    Insomnia.  Plan:  Continue melatonin at bedtime      Electronically signed by:  Monroe Larkin MD

## 2019-10-14 NOTE — PROGRESS NOTES
Place of Service where encounter took place:  [facility] ([facility] [[id number]]    Patient discharged to care facility   No post discharge call was made

## 2019-10-15 ENCOUNTER — NURSING HOME VISIT (OUTPATIENT)
Dept: GERIATRICS | Facility: CLINIC | Age: 59
End: 2019-10-15
Payer: COMMERCIAL

## 2019-10-15 ENCOUNTER — APPOINTMENT (OUTPATIENT)
Dept: GENERAL RADIOLOGY | Facility: CLINIC | Age: 59
End: 2019-10-15
Attending: EMERGENCY MEDICINE
Payer: COMMERCIAL

## 2019-10-15 ENCOUNTER — HOSPITAL LABORATORY (OUTPATIENT)
Dept: OTHER | Facility: CLINIC | Age: 59
End: 2019-10-15

## 2019-10-15 ENCOUNTER — HOSPITAL ENCOUNTER (OUTPATIENT)
Facility: CLINIC | Age: 59
Setting detail: OBSERVATION
Discharge: NURSING, REHAB OR LONG-TERM CARE | End: 2019-10-16
Attending: EMERGENCY MEDICINE | Admitting: INTERNAL MEDICINE
Payer: COMMERCIAL

## 2019-10-15 DIAGNOSIS — I10 BENIGN ESSENTIAL HYPERTENSION: ICD-10-CM

## 2019-10-15 DIAGNOSIS — N39.0 URINARY TRACT INFECTION WITHOUT HEMATURIA, SITE UNSPECIFIED: ICD-10-CM

## 2019-10-15 DIAGNOSIS — K59.01 SLOW TRANSIT CONSTIPATION: ICD-10-CM

## 2019-10-15 DIAGNOSIS — G89.18 POSTOPERATIVE PAIN AFTER SPINAL SURGERY: ICD-10-CM

## 2019-10-15 DIAGNOSIS — G47.00 INSOMNIA, UNSPECIFIED TYPE: ICD-10-CM

## 2019-10-15 DIAGNOSIS — E03.9 ACQUIRED HYPOTHYROIDISM: ICD-10-CM

## 2019-10-15 DIAGNOSIS — A41.9 SEPSIS, DUE TO UNSPECIFIED ORGANISM, UNSPECIFIED WHETHER ACUTE ORGAN DYSFUNCTION PRESENT (H): ICD-10-CM

## 2019-10-15 DIAGNOSIS — Z98.890 S/P SPINAL SURGERY: ICD-10-CM

## 2019-10-15 DIAGNOSIS — R53.81 PHYSICAL DECONDITIONING: ICD-10-CM

## 2019-10-15 DIAGNOSIS — M40.30 FLAT BACK SYNDROME: ICD-10-CM

## 2019-10-15 DIAGNOSIS — M48.07 SPINAL STENOSIS OF LUMBOSACRAL REGION: ICD-10-CM

## 2019-10-15 DIAGNOSIS — M43.17 ACQUIRED SPONDYLOLISTHESIS OF LUMBOSACRAL REGION: Primary | ICD-10-CM

## 2019-10-15 DIAGNOSIS — J45.20 MILD INTERMITTENT ASTHMA WITHOUT COMPLICATION: ICD-10-CM

## 2019-10-15 DIAGNOSIS — D62 ANEMIA DUE TO BLOOD LOSS, ACUTE: ICD-10-CM

## 2019-10-15 DIAGNOSIS — J30.2 SEASONAL ALLERGIC RHINITIS, UNSPECIFIED TRIGGER: ICD-10-CM

## 2019-10-15 DIAGNOSIS — I10 BENIGN ESSENTIAL HYPERTENSION: Primary | ICD-10-CM

## 2019-10-15 LAB
ALBUMIN SERPL-MCNC: 2.4 G/DL (ref 3.4–5)
ALP SERPL-CCNC: 121 U/L (ref 40–150)
ALT SERPL W P-5'-P-CCNC: 43 U/L (ref 0–50)
ANION GAP SERPL CALCULATED.3IONS-SCNC: 4 MMOL/L (ref 3–14)
ANION GAP SERPL CALCULATED.3IONS-SCNC: 6 MMOL/L (ref 3–14)
AST SERPL W P-5'-P-CCNC: 24 U/L (ref 0–45)
BASOPHILS # BLD AUTO: 0.1 10E9/L (ref 0–0.2)
BASOPHILS NFR BLD AUTO: 0.4 %
BILIRUB SERPL-MCNC: 0.4 MG/DL (ref 0.2–1.3)
BUN SERPL-MCNC: 13 MG/DL (ref 7–30)
BUN SERPL-MCNC: 22 MG/DL (ref 7–30)
CALCIUM SERPL-MCNC: 8.1 MG/DL (ref 8.5–10.1)
CALCIUM SERPL-MCNC: 8.9 MG/DL (ref 8.5–10.1)
CHLORIDE SERPL-SCNC: 99 MMOL/L (ref 94–109)
CHLORIDE SERPL-SCNC: 99 MMOL/L (ref 94–109)
CO2 BLDCOV-SCNC: 24 MMOL/L (ref 21–28)
CO2 SERPL-SCNC: 26 MMOL/L (ref 20–32)
CO2 SERPL-SCNC: 28 MMOL/L (ref 20–32)
CREAT SERPL-MCNC: 0.61 MG/DL (ref 0.52–1.04)
CREAT SERPL-MCNC: 1.16 MG/DL (ref 0.52–1.04)
DIFFERENTIAL METHOD BLD: ABNORMAL
EOSINOPHIL # BLD AUTO: 0.3 10E9/L (ref 0–0.7)
EOSINOPHIL NFR BLD AUTO: 2 %
ERYTHROCYTE [DISTWIDTH] IN BLOOD BY AUTOMATED COUNT: 13.8 % (ref 10–15)
ERYTHROCYTE [DISTWIDTH] IN BLOOD BY AUTOMATED COUNT: 14 % (ref 10–15)
GFR SERPL CREATININE-BSD FRML MDRD: 51 ML/MIN/{1.73_M2}
GFR SERPL CREATININE-BSD FRML MDRD: >90 ML/MIN/{1.73_M2}
GLUCOSE SERPL-MCNC: 100 MG/DL (ref 70–99)
GLUCOSE SERPL-MCNC: 130 MG/DL (ref 70–99)
HCT VFR BLD AUTO: 29.3 % (ref 35–47)
HCT VFR BLD AUTO: 32.9 % (ref 35–47)
HGB BLD-MCNC: 11 G/DL (ref 11.7–15.7)
HGB BLD-MCNC: 9.8 G/DL (ref 11.7–15.7)
IMM GRANULOCYTES # BLD: 0.3 10E9/L (ref 0–0.4)
IMM GRANULOCYTES NFR BLD: 1.5 %
LACTATE BLD-SCNC: 1.4 MMOL/L (ref 0.7–2.1)
LYMPHOCYTES # BLD AUTO: 1.6 10E9/L (ref 0.8–5.3)
LYMPHOCYTES NFR BLD AUTO: 10 %
MCH RBC QN AUTO: 28.9 PG (ref 26.5–33)
MCH RBC QN AUTO: 28.9 PG (ref 26.5–33)
MCHC RBC AUTO-ENTMCNC: 33.4 G/DL (ref 31.5–36.5)
MCHC RBC AUTO-ENTMCNC: 33.4 G/DL (ref 31.5–36.5)
MCV RBC AUTO: 86 FL (ref 78–100)
MCV RBC AUTO: 86 FL (ref 78–100)
MONOCYTES # BLD AUTO: 1.6 10E9/L (ref 0–1.3)
MONOCYTES NFR BLD AUTO: 9.8 %
NEUTROPHILS # BLD AUTO: 12.4 10E9/L (ref 1.6–8.3)
NEUTROPHILS NFR BLD AUTO: 76.3 %
NRBC # BLD AUTO: 0 10*3/UL
NRBC BLD AUTO-RTO: 0 /100
PCO2 BLDV: 37 MM HG (ref 40–50)
PH BLDV: 7.42 PH (ref 7.32–7.43)
PLATELET # BLD AUTO: 422 10E9/L (ref 150–450)
PLATELET # BLD AUTO: 425 10E9/L (ref 150–450)
PO2 BLDV: 24 MM HG (ref 25–47)
POTASSIUM SERPL-SCNC: 3.8 MMOL/L (ref 3.4–5.3)
POTASSIUM SERPL-SCNC: 3.9 MMOL/L (ref 3.4–5.3)
PROT SERPL-MCNC: 5.9 G/DL (ref 6.8–8.8)
RBC # BLD AUTO: 3.39 10E12/L (ref 3.8–5.2)
RBC # BLD AUTO: 3.81 10E12/L (ref 3.8–5.2)
SAO2 % BLDV FROM PO2: 45 %
SODIUM SERPL-SCNC: 131 MMOL/L (ref 133–144)
SODIUM SERPL-SCNC: 131 MMOL/L (ref 133–144)
WBC # BLD AUTO: 16.3 10E9/L (ref 4–11)
WBC # BLD AUTO: 17.4 10E9/L (ref 4–11)

## 2019-10-15 PROCEDURE — 83605 ASSAY OF LACTIC ACID: CPT

## 2019-10-15 PROCEDURE — 80053 COMPREHEN METABOLIC PANEL: CPT | Performed by: EMERGENCY MEDICINE

## 2019-10-15 PROCEDURE — 25000132 ZZH RX MED GY IP 250 OP 250 PS 637: Performed by: EMERGENCY MEDICINE

## 2019-10-15 PROCEDURE — 96361 HYDRATE IV INFUSION ADD-ON: CPT

## 2019-10-15 PROCEDURE — 99285 EMERGENCY DEPT VISIT HI MDM: CPT | Mod: 25

## 2019-10-15 PROCEDURE — 71046 X-RAY EXAM CHEST 2 VIEWS: CPT

## 2019-10-15 PROCEDURE — 25800030 ZZH RX IP 258 OP 636: Performed by: EMERGENCY MEDICINE

## 2019-10-15 PROCEDURE — 87186 SC STD MICRODIL/AGAR DIL: CPT | Performed by: INTERNAL MEDICINE

## 2019-10-15 PROCEDURE — 85025 COMPLETE CBC W/AUTO DIFF WBC: CPT | Performed by: EMERGENCY MEDICINE

## 2019-10-15 PROCEDURE — 87088 URINE BACTERIA CULTURE: CPT | Performed by: INTERNAL MEDICINE

## 2019-10-15 PROCEDURE — 96365 THER/PROPH/DIAG IV INF INIT: CPT

## 2019-10-15 PROCEDURE — 82803 BLOOD GASES ANY COMBINATION: CPT

## 2019-10-15 PROCEDURE — 99305 1ST NF CARE MODERATE MDM 35: CPT | Performed by: INTERNAL MEDICINE

## 2019-10-15 PROCEDURE — 87040 BLOOD CULTURE FOR BACTERIA: CPT | Performed by: EMERGENCY MEDICINE

## 2019-10-15 PROCEDURE — 87086 URINE CULTURE/COLONY COUNT: CPT | Performed by: INTERNAL MEDICINE

## 2019-10-15 PROCEDURE — 81001 URINALYSIS AUTO W/SCOPE: CPT | Performed by: EMERGENCY MEDICINE

## 2019-10-15 PROCEDURE — 93005 ELECTROCARDIOGRAM TRACING: CPT

## 2019-10-15 PROCEDURE — 25000128 H RX IP 250 OP 636: Performed by: EMERGENCY MEDICINE

## 2019-10-15 RX ORDER — GABAPENTIN 300 MG/1
1200 CAPSULE ORAL ONCE
Status: COMPLETED | OUTPATIENT
Start: 2019-10-15 | End: 2019-10-15

## 2019-10-15 RX ORDER — PIPERACILLIN SODIUM, TAZOBACTAM SODIUM 4; .5 G/20ML; G/20ML
4.5 INJECTION, POWDER, LYOPHILIZED, FOR SOLUTION INTRAVENOUS ONCE
Status: COMPLETED | OUTPATIENT
Start: 2019-10-15 | End: 2019-10-16

## 2019-10-15 RX ADMIN — PIPERACILLIN SODIUM AND TAZOBACTAM SODIUM 4.5 G: 4; .5 INJECTION, POWDER, LYOPHILIZED, FOR SOLUTION INTRAVENOUS at 23:48

## 2019-10-15 RX ADMIN — SODIUM CHLORIDE, POTASSIUM CHLORIDE, SODIUM LACTATE AND CALCIUM CHLORIDE 2109 ML: 600; 310; 30; 20 INJECTION, SOLUTION INTRAVENOUS at 23:06

## 2019-10-15 RX ADMIN — GABAPENTIN 1200 MG: 300 CAPSULE ORAL at 23:30

## 2019-10-15 NOTE — LETTER
10/15/2019        RE: Aleena Ontiveros  [address] [facility] [id number]        [place] [facility]  INITIAL VISIT NOTE  October 15, 2019    PRIMARY CARE PROVIDER AND CLINIC:  [facility] [address] [facility] [address] / [place]    Chief Complaint   Patient presents with     Hospital F/U       HPI:    Aleena Ontiveros is a 59 year old  (1960) female who [facility], [facility] for an initial visit. Medical history is notable for hypertension, asthma, hypothyroidism, osteoarthritis, anxiety, idiopathic scoliosis, prior spinal fusion, and high-grade spondylolisthesis.  She was hospitalized at [facility] from October 1 through October 11, 2019 for spondylolisthesis and sagittal malalignment.  She underwent revision L2 to pelvis posterior spinal fusion, S1 pedicle subtraction osteotomy, bilateral L5 nerve root decompressions, and pelvic fixation on October 1, 2019.  Hospital course was complicated by development of acute blood loss anemia.  She did also have left bursa hip pain while in the hospital and received Medrol Dosepak as well as a steroid injection.    Patient is admitted to this facility for medical management, nursing care, and rehab.     Patient was seen today in her room, while lying in bed.  She feels somewhat weak and run down today.  She rates her surgical back pain as 5 out of 10.  She reports numbness paresthesiae in her both legs.  She believes she had low-grade fever last night.  She denies chills, cough, sputum, chest pain, palpitation, nausea, vomiting, abdominal pain, or diarrhea.    CODE STATUS:   CPR/Full code     PAST MEDICAL HISTORY:   Hypertension  Asthma, mild intermittent  Hypothyroidism (history of thyroidectomy approximately 10 years ago for benign thyroid nodules)  Allergic rhinitis  Primary osteoarthritis of knees  Adolescent idiopathic scoliosis  High-grade  spondylolisthesis  Flat back syndrome  Spinal stenosis  Anxiety    PAST SURGICAL HISTORY:   Past Surgical History:   Procedure Laterality Date     ABDOMEN SURGERY  ,         ARTHROPLASTY KNEE Right 8/10/2017    Procedure: ARTHROPLASTY KNEE;  RIGHT TOTAL KNEE ARTHROPLASTY ;  Surgeon: Grady Alvarez MD;  Location: [facility] OR     ARTHROPLASTY KNEE Left 2017    Procedure: ARTHROPLASTY KNEE;  LEFT TOTAL KNEE ARTHROPLASTY;  Surgeon: Grady Alvarez MD;  Location:  OR     BACK SURGERY      spinal fusion     ENT SURGERY      tonsilectomy     GYN SURGERY  11    hysterectomy     OPTICAL TRACKING SYSTEM FUSION POSTERIOR SPINE THORACIC THREE+ LEVELS N/A 10/1/2019    Procedure: Transforaminal Lumbar Interbody Fusion Three Column Osteotomy L5-S1, Posterior Spinal Fusion L2-Pelvis. Use of BMP bone graft;  Surgeon: Evangelist Vasquez MD;  Location:  OR     ORTHOPEDIC SURGERY      sinal fusion,hand     thumb mass resection       THYROIDECTOMY  2014    Procedure: THYROIDECTOMY;  Surgeon: Krzysztof Marquez MD;  Location: [facility]       FAMILY HISTORY:   Family History   Problem Relation Age of Onset     Breast Cancer Mother      Cancer - colorectal Father      C.A.D. Father      Cerebrovascular Disease Father      Thyroid Disease Brother      Thyroid Disease Sister        SOCIAL HISTORY:  Patient is  and lives with her .  For the past month she has used a walking stick for ambulation.    Social History     Tobacco Use     Smoking status: Never Smoker     Smokeless tobacco: Never Used   Substance Use Topics     Alcohol use: Yes     Binge frequency: Patient refused     Comment: 1-2 per month       MEDICATIONS:  Current Outpatient Medications   Medication Sig Dispense Refill     acetaminophen (TYLENOL) 500 MG tablet Take 1,000 mg by mouth every 6 hours as needed for mild pain       albuterol (PROAIR HFA/PROVENTIL HFA/VENTOLIN HFA) 108 (90 Base) MCG/ACT inhaler Inhale 2 puffs  into the lungs every 4 hours as needed for shortness of breath / dyspnea or wheezing       bisacodyl (DULCOLAX) 10 MG suppository Place 1 suppository (10 mg) rectally daily as needed for constipation give [facility] or 10/12 at [place] if no BM 30 suppository      cyclobenzaprine (FLEXERIL) 10 MG tablet Take 1 tablet (10 mg) by mouth 3 times daily as needed for muscle spasms 90 tablet 1     enoxaparin (LOVENOX) 40 MG/0.4ML syringe Inject 0.4 mLs (40 mg) Subcutaneous every 24 hours for 28 days Continue for 28 days or until pt is more mobile, whichever comes first.       estradiol (ESTRACE) 0.1 MG/GM cream Place 2 g vaginally At Bedtime        Fexofenadine HCl (ALLEGRA PO) Take 180 mg by mouth every morning        fluticasone (FLONASE) 50 MCG/ACT spray Spray 1 spray into both nostrils 2 times daily        fluticasone-salmeterol (ADVAIR) 500-50 MCG/DOSE inhaler Inhale 1 puff into the lungs every 12 hours       gabapentin (NEURONTIN) 400 MG capsule Take 2 capsules (800 mg) by mouth 3 times daily       gabapentin (NEURONTIN) 400 MG capsule Take 3 capsules (1,200 mg) by mouth At Bedtime Give at 11PM       levothyroxine (SYNTHROID) 125 MCG tablet Take 1 tablet (125 mcg) by mouth every morning       lisinopril (PRINIVIL/ZESTRIL) 40 MG tablet Take 1 tablet (40 mg) by mouth daily (Patient taking differently: Take 40 mg by mouth every morning ) 30 tablet      melatonin 5 MG tablet Take 1 tablet (5 mg) by mouth At Bedtime       Montelukast Sodium (SINGULAIR PO) Take 10 mg by mouth At Bedtime        order for DME Equipment being ordered: TENS.    TENS UNIT Ordered- Please call your insurance to Verify coverage and locations to  the device.     Please Dispense Device, leads, pads, wires, and all other necessary equipment that is needed for use.     Length of need: 36 months. 1 Device 0     oxyCODONE (ROXICODONE) 5 MG tablet Take 1-2 tablets (5-10 mg) by mouth every 3 hours as needed for moderate to severe pain 60 tablet 0      polyethylene glycol (MIRALAX/GLYCOLAX) packet Take 17 g by mouth 2 times daily 72 packet 0     senna-docusate (SENOKOT-S/PERICOLACE) 8.6-50 MG tablet Take 2 tablets by mouth 2 times daily 200 tablet 0       ALLERGIES:  Allergies   Allergen Reactions     Adhesive Tape      Erythromycin Nausea and Vomiting     Pills cause vomiting,        ROS:  10 point ROS neg other than the symptoms noted above in the HPI.    PHYSICAL EXAM:  Vitals: Blood pressure 129/85, heart rate 109, respiratory rate 16, temperature 98.3  F, oxygen saturation 97%  Gen: Weak appearing, but in no acute distress  HEENT: Normocephalic; oropharynx clear, mild conjunctival pallor  Card: Normal S1, S2, regular rhythm but slightly tachycardic  Resp: Lungs clear to auscultation bilaterally  GI: Abdomen soft, not-tender, non-distended, +BS  Ext: No significant LE edema  Neuro: CX II-XII grossly intact; ROM in all four extremities grossly in tact  Psych: Alert and oriented x3; normal affect  Skin: No acute rash.  Surgical back wound is dressed and appears clean    LABORATORY/IMAGING DATA:  Lab Results   Component Value Date    WBC 9.7 10/04/2019     Lab Results   Component Value Date    RBC 3.14 10/04/2019     Lab Results   Component Value Date    HGB 11.0 10/11/2019     Lab Results   Component Value Date    HCT 27.6 10/04/2019     Lab Results   Component Value Date    MCV 88 10/04/2019     Lab Results   Component Value Date    MCH 28.3 10/04/2019     Lab Results   Component Value Date    MCHC 32.2 10/04/2019     Lab Results   Component Value Date    RDW 12.8 10/04/2019     Lab Results   Component Value Date     10/11/2019     Last Comprehensive Metabolic Panel:  Sodium   Date Value Ref Range Status   10/11/2019 135 133 - 144 mmol/L Final     Potassium   Date Value Ref Range Status   10/11/2019 4.6 3.4 - 5.3 mmol/L Final     Chloride   Date Value Ref Range Status   10/11/2019 98 94 - 109 mmol/L Final     Carbon Dioxide   Date Value Ref Range Status    10/11/2019 31 20 - 32 mmol/L Final     Anion Gap   Date Value Ref Range Status   10/11/2019 6 3 - 14 mmol/L Final     Glucose   Date Value Ref Range Status   10/11/2019 114 (H) 70 - 99 mg/dL Final     Urea Nitrogen   Date Value Ref Range Status   10/11/2019 12 7 - 30 mg/dL Final     Creatinine   Date Value Ref Range Status   10/11/2019 0.51 (L) 0.52 - 1.04 mg/dL Final     GFR Estimate   Date Value Ref Range Status   10/11/2019 >90 >60 mL/min/[1.73_m2] Final     Comment:     Non  GFR Calc  Starting 12/18/2018, serum creatinine based estimated GFR (eGFR) will be   calculated using the Chronic Kidney Disease Epidemiology Collaboration   (CKD-EPI) equation.       Calcium   Date Value Ref Range Status   10/11/2019 8.8 8.5 - 10.1 mg/dL Final       ASSESSMENT/PLAN:  High-grade spondylolisthesis, s/p spinal surgery on 10/01/19,  Spinal stenosis,  Flat back syndrome,  Physical deconditioning.  Patient underwent revision L2 to pelvis posterior spinal fusion, S1 pedicle subtraction osteotomy, bilateral L5 nerve root decompressions, and pelvic fixation on October 1, 2019.  Plan:  TLSO brace when walking  Pain management with scheduled acetaminophen, gabapentin, and PRN oxycodone  Muscle relaxant (Flexeril) is available PRN  DVT prophylaxis with enoxaparin 40 mg subcu daily through November 3, 2019  PT/OT evaluation therapy  Follow-up with spine surgery as scheduled    Acute blood loss anemia.   Due to above surgery.  Hemoglobin at time of discharge from hospital was 9.1.  Plan:  CBC today, Oct 15    Acquired hypothyroidism.  History of thyroidectomy approximately 10 years ago for benign thyroid nodules.  Plan:  Continue prior to admission levothyroxine 125 mcg p.po daily    Benign essential hypertension.  Blood pressures are currently controlled.  Plan:  Continue prior to admission lisinopril 40 mg daily  Continue to monitor blood pressure    Allergic rhinitis,  Mild intermittent  asthma.  Stable.  Plan:  Continue prior to admission Singulair 10 mg p.o. at bedtime, fexofenadine 180 mg p.o. every morning, Advair 500-50, 1 puff twice daily, Flonase 50 mcg 1 spray into nostril twice daily, and PRN albuterol inhaler    Slow transit constipation.  Plan:  Continue bowel regimen    Insomnia.  Plan:  Continue melatonin at bedtime      Electronically signed by:  Monroe Larkin MD                      Sincerely,        Monroe Larkin MD

## 2019-10-16 VITALS
SYSTOLIC BLOOD PRESSURE: 114 MMHG | OXYGEN SATURATION: 98 % | TEMPERATURE: 96.9 F | RESPIRATION RATE: 16 BRPM | DIASTOLIC BLOOD PRESSURE: 64 MMHG | HEART RATE: 87 BPM

## 2019-10-16 PROBLEM — N39.0 UTI (URINARY TRACT INFECTION): Status: ACTIVE | Noted: 2019-10-16

## 2019-10-16 LAB
ALBUMIN UR-MCNC: 30 MG/DL
ANION GAP SERPL CALCULATED.3IONS-SCNC: 4 MMOL/L (ref 3–14)
APPEARANCE UR: ABNORMAL
BACTERIA #/AREA URNS HPF: ABNORMAL /HPF
BACTERIA SPEC CULT: NORMAL
BILIRUB UR QL STRIP: NEGATIVE
BUN SERPL-MCNC: 15 MG/DL (ref 7–30)
CALCIUM SERPL-MCNC: 7.6 MG/DL (ref 8.5–10.1)
CHLORIDE SERPL-SCNC: 106 MMOL/L (ref 94–109)
CO2 SERPL-SCNC: 27 MMOL/L (ref 20–32)
COLOR UR AUTO: YELLOW
CREAT SERPL-MCNC: 0.63 MG/DL (ref 0.52–1.04)
ERYTHROCYTE [DISTWIDTH] IN BLOOD BY AUTOMATED COUNT: 13.9 % (ref 10–15)
GFR SERPL CREATININE-BSD FRML MDRD: >90 ML/MIN/{1.73_M2}
GLUCOSE SERPL-MCNC: 105 MG/DL (ref 70–99)
GLUCOSE UR STRIP-MCNC: NEGATIVE MG/DL
HCT VFR BLD AUTO: 25.6 % (ref 35–47)
HGB BLD-MCNC: 8.5 G/DL (ref 11.7–15.7)
HGB UR QL STRIP: NEGATIVE
INTERPRETATION ECG - MUSE: NORMAL
KETONES UR STRIP-MCNC: NEGATIVE MG/DL
LEUKOCYTE ESTERASE UR QL STRIP: ABNORMAL
Lab: NORMAL
MCH RBC QN AUTO: 28.4 PG (ref 26.5–33)
MCHC RBC AUTO-ENTMCNC: 33.2 G/DL (ref 31.5–36.5)
MCV RBC AUTO: 86 FL (ref 78–100)
MUCOUS THREADS #/AREA URNS LPF: PRESENT /LPF
NITRATE UR QL: NEGATIVE
PH UR STRIP: 5.5 PH (ref 5–7)
PLATELET # BLD AUTO: 298 10E9/L (ref 150–450)
POTASSIUM SERPL-SCNC: 3.7 MMOL/L (ref 3.4–5.3)
RBC # BLD AUTO: 2.99 10E12/L (ref 3.8–5.2)
RBC #/AREA URNS AUTO: 3 /HPF (ref 0–2)
SODIUM SERPL-SCNC: 137 MMOL/L (ref 133–144)
SOURCE: ABNORMAL
SP GR UR STRIP: 1.01 (ref 1–1.03)
SPECIMEN SOURCE: NORMAL
UROBILINOGEN UR STRIP-MCNC: NORMAL MG/DL (ref 0–2)
WBC # BLD AUTO: 12 10E9/L (ref 4–11)
WBC #/AREA URNS AUTO: 31 /HPF (ref 0–5)

## 2019-10-16 PROCEDURE — 25800030 ZZH RX IP 258 OP 636: Performed by: INTERNAL MEDICINE

## 2019-10-16 PROCEDURE — 99236 HOSP IP/OBS SAME DATE HI 85: CPT | Performed by: INTERNAL MEDICINE

## 2019-10-16 PROCEDURE — G0378 HOSPITAL OBSERVATION PER HR: HCPCS

## 2019-10-16 PROCEDURE — 96375 TX/PRO/DX INJ NEW DRUG ADDON: CPT

## 2019-10-16 PROCEDURE — 36415 COLL VENOUS BLD VENIPUNCTURE: CPT | Performed by: INTERNAL MEDICINE

## 2019-10-16 PROCEDURE — 25000132 ZZH RX MED GY IP 250 OP 250 PS 637: Performed by: INTERNAL MEDICINE

## 2019-10-16 PROCEDURE — 99207 ZZC CDG-MDM COMPONENT: MEETS MODERATE - UP CODED: CPT | Performed by: INTERNAL MEDICINE

## 2019-10-16 PROCEDURE — 25000132 ZZH RX MED GY IP 250 OP 250 PS 637: Performed by: EMERGENCY MEDICINE

## 2019-10-16 PROCEDURE — 80048 BASIC METABOLIC PNL TOTAL CA: CPT | Performed by: INTERNAL MEDICINE

## 2019-10-16 PROCEDURE — 25000128 H RX IP 250 OP 636: Performed by: INTERNAL MEDICINE

## 2019-10-16 PROCEDURE — 85027 COMPLETE CBC AUTOMATED: CPT | Performed by: INTERNAL MEDICINE

## 2019-10-16 RX ORDER — LISINOPRIL 40 MG/1
40 TABLET ORAL DAILY
Status: ON HOLD | COMMUNITY
End: 2019-10-16

## 2019-10-16 RX ORDER — OXYCODONE HYDROCHLORIDE 5 MG/1
5-10 TABLET ORAL
Qty: 10 TABLET | Refills: 0 | Status: SHIPPED | OUTPATIENT
Start: 2019-10-16 | End: 2019-10-17

## 2019-10-16 RX ORDER — SODIUM CHLORIDE 9 MG/ML
INJECTION, SOLUTION INTRAVENOUS CONTINUOUS
Status: ACTIVE | OUTPATIENT
Start: 2019-10-16 | End: 2019-10-16

## 2019-10-16 RX ORDER — CYCLOBENZAPRINE HCL 10 MG
10 TABLET ORAL 3 TIMES DAILY PRN
Status: DISCONTINUED | OUTPATIENT
Start: 2019-10-16 | End: 2019-10-16 | Stop reason: HOSPADM

## 2019-10-16 RX ORDER — ONDANSETRON 2 MG/ML
4 INJECTION INTRAMUSCULAR; INTRAVENOUS EVERY 6 HOURS PRN
Status: DISCONTINUED | OUTPATIENT
Start: 2019-10-16 | End: 2019-10-16 | Stop reason: HOSPADM

## 2019-10-16 RX ORDER — AMOXICILLIN 250 MG
2 CAPSULE ORAL 2 TIMES DAILY
Status: DISCONTINUED | OUTPATIENT
Start: 2019-10-16 | End: 2019-10-16 | Stop reason: HOSPADM

## 2019-10-16 RX ORDER — ACETAMINOPHEN 500 MG
1000 TABLET ORAL EVERY 6 HOURS PRN
Status: DISCONTINUED | OUTPATIENT
Start: 2019-10-16 | End: 2019-10-16 | Stop reason: HOSPADM

## 2019-10-16 RX ORDER — GABAPENTIN 400 MG/1
1200 CAPSULE ORAL AT BEDTIME
Status: DISCONTINUED | OUTPATIENT
Start: 2019-10-16 | End: 2019-10-16 | Stop reason: HOSPADM

## 2019-10-16 RX ORDER — MAGNESIUM CARB/ALUMINUM HYDROX 105-160MG
148 TABLET,CHEWABLE ORAL
Status: DISCONTINUED | OUTPATIENT
Start: 2019-10-16 | End: 2019-10-16 | Stop reason: HOSPADM

## 2019-10-16 RX ORDER — GABAPENTIN 400 MG/1
800 CAPSULE ORAL 3 TIMES DAILY
Status: DISCONTINUED | OUTPATIENT
Start: 2019-10-16 | End: 2019-10-16 | Stop reason: HOSPADM

## 2019-10-16 RX ORDER — SULFAMETHOXAZOLE/TRIMETHOPRIM 800-160 MG
1 TABLET ORAL 2 TIMES DAILY
DISCHARGE
Start: 2019-10-16 | End: 2019-10-23

## 2019-10-16 RX ORDER — ONDANSETRON 4 MG/1
4 TABLET, ORALLY DISINTEGRATING ORAL EVERY 6 HOURS PRN
Status: DISCONTINUED | OUTPATIENT
Start: 2019-10-16 | End: 2019-10-16 | Stop reason: HOSPADM

## 2019-10-16 RX ORDER — FLUTICASONE PROPIONATE 50 MCG
1 SPRAY, SUSPENSION (ML) NASAL DAILY
Status: DISCONTINUED | OUTPATIENT
Start: 2019-10-16 | End: 2019-10-16 | Stop reason: HOSPADM

## 2019-10-16 RX ORDER — OXYCODONE HYDROCHLORIDE 5 MG/1
10 TABLET ORAL ONCE
Status: COMPLETED | OUTPATIENT
Start: 2019-10-16 | End: 2019-10-16

## 2019-10-16 RX ORDER — OXYCODONE HYDROCHLORIDE 5 MG/1
5-10 TABLET ORAL
Status: DISCONTINUED | OUTPATIENT
Start: 2019-10-16 | End: 2019-10-16 | Stop reason: HOSPADM

## 2019-10-16 RX ORDER — POLYETHYLENE GLYCOL 3350 17 G/17G
17 POWDER, FOR SOLUTION ORAL 2 TIMES DAILY
Status: DISCONTINUED | OUTPATIENT
Start: 2019-10-16 | End: 2019-10-16 | Stop reason: HOSPADM

## 2019-10-16 RX ORDER — ALBUTEROL SULFATE 90 UG/1
2 AEROSOL, METERED RESPIRATORY (INHALATION) EVERY 4 HOURS PRN
Status: DISCONTINUED | OUTPATIENT
Start: 2019-10-16 | End: 2019-10-16 | Stop reason: HOSPADM

## 2019-10-16 RX ORDER — AMOXICILLIN 250 MG
1 CAPSULE ORAL 2 TIMES DAILY PRN
Status: DISCONTINUED | OUTPATIENT
Start: 2019-10-16 | End: 2019-10-16 | Stop reason: HOSPADM

## 2019-10-16 RX ORDER — AMOXICILLIN 250 MG
2 CAPSULE ORAL 2 TIMES DAILY PRN
Status: DISCONTINUED | OUTPATIENT
Start: 2019-10-16 | End: 2019-10-16 | Stop reason: HOSPADM

## 2019-10-16 RX ORDER — LISINOPRIL 40 MG/1
40 TABLET ORAL DAILY
DISCHARGE
Start: 2019-10-16 | End: 2020-12-16

## 2019-10-16 RX ORDER — NALOXONE HYDROCHLORIDE 0.4 MG/ML
.1-.4 INJECTION, SOLUTION INTRAMUSCULAR; INTRAVENOUS; SUBCUTANEOUS
Status: DISCONTINUED | OUTPATIENT
Start: 2019-10-16 | End: 2019-10-16 | Stop reason: HOSPADM

## 2019-10-16 RX ORDER — CEFTRIAXONE 1 G/1
1 INJECTION, POWDER, FOR SOLUTION INTRAMUSCULAR; INTRAVENOUS EVERY 24 HOURS
Status: DISCONTINUED | OUTPATIENT
Start: 2019-10-16 | End: 2019-10-16 | Stop reason: HOSPADM

## 2019-10-16 RX ORDER — AMOXICILLIN 250 MG
2 CAPSULE ORAL 2 TIMES DAILY PRN
COMMUNITY
End: 2020-11-17

## 2019-10-16 RX ORDER — POLYETHYLENE GLYCOL 3350 17 G/17G
1 POWDER, FOR SOLUTION ORAL DAILY PRN
COMMUNITY
End: 2020-11-17

## 2019-10-16 RX ADMIN — Medication 1 SPRAY: at 07:58

## 2019-10-16 RX ADMIN — CYCLOBENZAPRINE HYDROCHLORIDE 10 MG: 10 TABLET, FILM COATED ORAL at 03:53

## 2019-10-16 RX ADMIN — GABAPENTIN 800 MG: 400 CAPSULE ORAL at 11:53

## 2019-10-16 RX ADMIN — OXYCODONE HYDROCHLORIDE 10 MG: 5 TABLET ORAL at 00:17

## 2019-10-16 RX ADMIN — ENOXAPARIN SODIUM 40 MG: 40 INJECTION SUBCUTANEOUS at 09:56

## 2019-10-16 RX ADMIN — SENNOSIDES AND DOCUSATE SODIUM 2 TABLET: 8.6; 5 TABLET ORAL at 07:57

## 2019-10-16 RX ADMIN — SODIUM CHLORIDE: 9 INJECTION, SOLUTION INTRAVENOUS at 02:27

## 2019-10-16 RX ADMIN — ACETAMINOPHEN 1000 MG: 500 TABLET, FILM COATED ORAL at 10:07

## 2019-10-16 RX ADMIN — LEVOTHYROXINE SODIUM 125 MCG: 75 TABLET ORAL at 07:57

## 2019-10-16 RX ADMIN — GABAPENTIN 800 MG: 400 CAPSULE ORAL at 07:57

## 2019-10-16 RX ADMIN — CEFTRIAXONE SODIUM 1 G: 1 INJECTION, POWDER, FOR SOLUTION INTRAMUSCULAR; INTRAVENOUS at 02:47

## 2019-10-16 RX ADMIN — POLYETHYLENE GLYCOL 3350 17 G: 17 POWDER, FOR SOLUTION ORAL at 07:57

## 2019-10-16 RX ADMIN — OXYCODONE HYDROCHLORIDE 10 MG: 5 TABLET ORAL at 10:52

## 2019-10-16 RX ADMIN — ACETAMINOPHEN 1000 MG: 500 TABLET, FILM COATED ORAL at 03:53

## 2019-10-16 ASSESSMENT — ENCOUNTER SYMPTOMS
FEVER: 1
LIGHT-HEADEDNESS: 0
COUGH: 1
NUMBNESS: 0
FATIGUE: 1
SHORTNESS OF BREATH: 0
ROS GI COMMENTS: NO MELENA
BLOOD IN STOOL: 0
WEAKNESS: 0
COLOR CHANGE: 0

## 2019-10-16 NOTE — ED PROVIDER NOTES
History     Chief Complaint:  Hypotension    The history is provided by the patient and a relative.      Aleena Ontiveros is a 59 year old female S/P spinal fusion on 10/01 who presents to the emergency department today for evaluation of hypotension. The patient reports she had been recovering well from her spinal fusion at the [facility], where she has been since 10/10, until today when she felt more tired than normal and was unable to do her normal therapies. She was noted to have low blood pressure and a fever of 101 for which she took Tylenol. She also endorses a cough and some difficulty urinating today. The patient reports she has not felt lightheaded, lost consciousness, or had any known exposure to illnesses. She adds her incision site seems to be healing well without and redness or pain to the area. She denies any chest pain, shortness of breath, nausea, vomiting, diarrhea or abdominal pain and has had no weakness or numbness in her lower extremities. The patient is not anticoagulated and has had no black or blood stools. Her daughter adds the patient usually doesn't sleep well, so her being tired to day is quite abnormal.     Allergies:  Adhesive Tape  Erythromycin    Medications:    Albuterol inhaler  Dulcolax  Flexeril  Lovenox  Estradiol  Advair  Gabapentin  Levothyroxine  Lisinopril  Oxycodone  Senna-docusate    Past Medical History:    Anxiety  Osteoarthritis  Chronic bilateral low back pain with bilateral sciatica  Asthma  Hypertension  Thyroid disease    Past Surgical History:     section  Arthroplasty, bilateral knees  Spinal fusion, x2  Tonsillectomy  Hysterectomy  Thyroidectomy  Thumb mass resection    Family History:    Cancer- breast, colorectal  Thyroid disease  Coronary artery disease- Father  Cerebrovascular disease    Social History:  The patient was accompanied to the ED by her  and daughter.  Smoking Status: Never Smoker  Smokeless Tobacco: Never  Used  Alcohol Use: Positive  Drug Use: Negative    Marital Status:    The patient works in theater.     Review of Systems   Constitutional: Positive for fatigue and fever.   Respiratory: Positive for cough. Negative for shortness of breath.    Cardiovascular: Negative for chest pain.        Hypotension   Gastrointestinal: Negative for blood in stool.        No melena    Genitourinary: Positive for decreased urine volume.   Skin: Negative for color change and rash.   Neurological: Negative for syncope, weakness, light-headedness and numbness.   All other systems reviewed and are negative.      Physical Exam     Patient Vitals for the past 24 hrs:   BP Temp Temp src Pulse Heart Rate Resp SpO2   10/16/19 0015 98/67 -- -- 76 74 16 --   10/16/19 0000 113/63 -- -- 75 75 14 --   10/15/19 2326 93/60 -- -- 79 78 15 99 %   10/15/19 2301 (!) 70/48 97.4  F (36.3  C) Oral 82 78 18 97 %     Physical Exam  General: Well-nourished, fatigued  Eyes: PERRL, conjunctivae pink no scleral icterus or conjunctival injection  ENT:  Moist mucus membranes, posterior oropharynx clear without erythema or exudates  Respiratory:  Lungs clear to auscultation bilaterally, no crackles/rubs/wheezes.  Good air movement  CV: Normal rate and rhythm, no murmurs/rubs/gallops  GI:  Abdomen soft and non-distended.  Normoactive BS.  No tenderness, guarding or rebound  Skin: Warm, dry.  No rashes or petechiae.  Dressings on back without any strikethrough.  No surrounding cellulitis.  Musculoskeletal: No peripheral edema or calf tenderness.  No significant tenderness over the back.    Neuro: Alert and oriented to person/place/time. Normal saddle sensation.  Normal strength in bilateral lower extremities.  Psychiatric: Normal affect      Emergency Department Course     ECG:  ECG taken at 0027, ECG read at 0043  Normal sinus rhythm  Possible left atrial enlargement  Nonspecific T wave abnormality  Prolonged QT  Abnormal ECG   Rate 76 bpm. MT interval 152.  QRS duration 92. QT/QTc 438/492. P-R-T axes 50 4 38.  No significant change compared to 10/14/2017.     Imaging:  Radiology findings were communicated with the patient and family who voiced understanding of the findings.  XR, Chest, 2 Views  No convincing evidence of active cardiopulmonary disease.  Reading per radiology      Laboratory:  Laboratory findings were communicated with the patient and family who voiced understanding of the findings.  CBC: WNL (WBC 16.3, HGB 9.8, )  CMP: Sodium 131 (L), Glucose 130 (H), GFR estimate 51 (L), Calcium 8.1 (L), Albumin 2.4 (L), Protein total 5.9 (L). O/w WNL (Creatinine 1.16)  ISTAT gases lactate margot POCT: pH: 7.42, PCO2: 37 (L), PO2: 24 (L), Bicarbonate: 24, O2 Sat: 45, Lactic acid: 1.4   UA with Microscopic: Slightly cloudy yellow urine with protein albumin of 30, large Leukocyte esterase, WBC of 31 (H), RBC of 3 (H), many bacteria, and mucous present.   Blood Cultures x2: Pending  Urine Culture: Pending    Interventions:  2306 Lactated Ringer 2, 109 mL IV  2330 Gabapentin 1,200 mg PO  2348 Zosyn 4.5 g IV  0017 Oxycodone 10 mg PO    Emergency Department Course:  2246 Nursing notes and vitals reviewed.  2250 I performed an exam of the patient as documented above.   2306 IV was inserted and blood was drawn for laboratory testing, results above.  2321 The patient was sent for a chest x-ray while in the emergency department, results above.    0025 Patient was rechecked and updated with laboratory results thus far as well as plan for admission.  0027 An ECG was performed, results above.   0043 I spoke with Dr. Sanabria of the hospitalist service from [place] regarding patient's presentation, findings, and plan of care.      Findings and plan explained to the Patient and spouse and daughter who consents to admission. Discussed the patient with Dr. Sanabria, who will admit the patient to an observed bed for further monitoring, evaluation, and treatment.     I personally  reviewed the laboratory and imaging results with the Patient and spouse and daughter and answered all related questions prior to admission.     Impression & Plan      The patient has signs of Severe Sepsis  as evidenced by:    1. 2 SIRS criteria, AND  2. Suspected infection, AND   3. Organ dysfunction:  SBP <90, MAP < 65, or SBP decrease of >40 from baseline due to infection    Time severe sepsis diagnosis confirmed:11:44p  10/16/19  as this was the time when SBP <90 or MAP <65 and Lab results revealing acute organ dysfunction due to infection (Cr, Bili, Plt)    3 Hour Severe Sepsis Bundle Completion:  1. Initial Lactic Acid Result:   Recent Labs   Lab Test 10/15/19  2305 10/01/19  1454 10/01/19  1430   LACT 1.4 2.6* 2.3*     2. Blood Cultures before Antibiotics: Yes  3. Broad Spectrum Antibiotics Administered:  yes       Anti-infectives (From admission through now)    Start     Dose/Rate Route Frequency Ordered Stop    10/15/19 2300  piperacillin-tazobactam (ZOSYN) 4.5 g vial to attach to  mL bag      4.5 g  over 30 Minutes Intravenous ONCE 10/15/19 2259 10/16/19 0101          4. Volume of IV Fluid administered in ED: 30 ml/kg     REMINDER: Please use septic shock SmartPhrase for Lactate > 4 or a patient  requiring vasopressors after initial fluid bolus (meaning persistent hypotension)      If one the following conditions is present, a 30cc/kg bolus is recommended as part of the 6 hour bundle (IBW can be used for BMI >30, or document refusal/contraindication)    1.   initial hypotension  defined as 2 bps < 90 or map < 65 in the 6hrs before or 6hrs  after time zero.    2.  Lactate >4.                 Severe Sepsis reassessment:  1. Repeat Lactic Acid Level: First normal  2. MAP>65 after initial IVF bolus, will continue to monitor fluid status and vital signs    I attest to having performed a repeat sepsis exam and assessment of perfusion at 2300 and the results demonstrate improved perfusion.    Medical  Decision Making:  Aleena Ontiveros is a 59 year old female who recently had spinal surgery and was discharged to care facility several days ago who presents with hypotension and fatigue along with a fever.  She was indeed hypotensive on arrival.  Fortunately her lactic is normal and her blood pressure improved with IV fluids.  She did not require any pressors.  Her symptoms are consistent with severe sepsis.  She has no focal symptoms on examination however her urinalysis does appear to show a urinary tract infection.  A urine culture is pending along with blood cultures.  Chest x-ray showed no evidence of pneumonia.  I do not see any evidence of an infection in her surgical site.  She has no signs of cauda equina on examination I do not believe there is concern for epidural abscess.  She was treated with broad-spectrum antibiotics expeditiously.  She continued to do well and at this point I feel she safe for admission.  I spoke with Dr. Sanabria who graciously agreed to admit her.  She will go to the observation unit for ongoing treatment and monitoring.  She and her family were in agreement with the plan.    Diagnosis:    ICD-10-CM    1. Urinary tract infection without hematuria, site unspecified N39.0    2. Sepsis, due to unspecified organism, unspecified whether acute organ dysfunction present (H) A41.9        Disposition:  The patient is admitted into the care of Dr. Sanabria.     Scribe Disclosure:  RAHUL, Светлана Welch, am serving as a scribe at 10:51 PM on 10/15/2019 to document services personally performed by Laury Yi MD based on my observations and the provider's statements to me.    10/15/2019    EMERGENCY DEPARTMENT       Laury Yi MD  10/16/19 0507

## 2019-10-16 NOTE — DISCHARGE SUMMARY
Admit Date:     10/15/2019   Discharge Date:      10/16/2019     PRIMARY CARE PHYSICIAN:  Arabella Conner MD      DISCHARGE DIAGNOSES:   1.  Hypotension.   2.  Urinary tract infection.   3.  Acute kidney injury.   4.  Hyponatremia.   5.  Recent spinal fusion performed 10/01/2019.   6.  Acute blood loss anemia.   7.  Essential hypertension.   8.  Hypothyroidism.   9.  Mild intermittent asthma.      DISCHARGE MEDICATIONS:       Review of your medicines      START taking      Dose / Directions   sulfamethoxazole-trimethoprim 800-160 MG tablet  Commonly known as:  BACTRIM DS/SEPTRA DS  Used for:  Urinary tract infection without hematuria, site unspecified      Dose:  1 tablet  Take 1 tablet by mouth 2 times daily for 4 days  Refills:  0        CONTINUE these medicines which may have CHANGED, or have new prescriptions. If we are uncertain of the size of tablets/capsules you have at home, strength may be listed as something that might have changed.      Dose / Directions   lisinopril 40 MG tablet  Commonly known as:  PRINIVIL/ZESTRIL  This may have changed:  additional instructions  Used for:  Benign essential hypertension      Dose:  40 mg  Take 1 tablet (40 mg) by mouth daily .  Hold for systolic blood pressure less than 110.  Refills:  0        CONTINUE these medicines which have NOT CHANGED      Dose / Directions   acetaminophen 500 MG tablet  Commonly known as:  TYLENOL      Dose:  1,000 mg  Take 1,000 mg by mouth every 6 hours as needed for mild pain  Refills:  0     albuterol 108 (90 Base) MCG/ACT inhaler  Commonly known as:  PROAIR HFA/PROVENTIL HFA/VENTOLIN HFA  Used for:  Intermittent asthma without complication, unspecified asthma severity      Dose:  2 puff  Inhale 2 puffs into the lungs every 4 hours as needed for shortness of breath / dyspnea or wheezing  Refills:  0     ALLEGRA PO      Dose:  180 mg  Take 180 mg by mouth every morning  Refills:  0     cyclobenzaprine 10 MG tablet  Commonly known as:   FLEXERIL  Used for:  Spondylolisthesis of lumbosacral region      Dose:  10 mg  Take 1 tablet (10 mg) by mouth 3 times daily as needed for muscle spasms  Quantity:  90 tablet  Refills:  1     enoxaparin 40 MG/0.4ML syringe  Commonly known as:  LOVENOX  Used for:  Postoperative pain after spinal surgery      Dose:  40 mg  Inject 0.4 mLs (40 mg) Subcutaneous every 24 hours for 28 days Continue for 28 days or until pt is more mobile, whichever comes first.  Refills:  0     estradiol 0.1 MG/GM vaginal cream  Commonly known as:  ESTRACE      Dose:  2 g  Place 2 g vaginally At Bedtime  Refills:  0     fluticasone 50 MCG/ACT nasal spray  Commonly known as:  FLONASE      Dose:  1 spray  Spray 1 spray into both nostrils 2 times daily  Refills:  0     fluticasone-salmeterol 500-50 MCG/DOSE inhaler  Commonly known as:  ADVAIR  Used for:  Mild intermittent asthma without complication      Dose:  1 puff  Inhale 1 puff into the lungs every 12 hours  Refills:  0     * gabapentin 400 MG capsule  Commonly known as:  NEURONTIN  Used for:  Postoperative pain after spinal surgery      Dose:  800 mg  Take 2 capsules (800 mg) by mouth 3 times daily  Refills:  0     * gabapentin 400 MG capsule  Commonly known as:  NEURONTIN  Used for:  Postoperative pain after spinal surgery      Dose:  1,200 mg  Take 3 capsules (1,200 mg) by mouth At Bedtime Give at 11PM  Refills:  0     levothyroxine 125 MCG tablet  Commonly known as:  SYNTHROID  Used for:  Hypothyroidism, unspecified type      Dose:  125 mcg  Take 1 tablet (125 mcg) by mouth every morning  Refills:  0     melatonin 5 MG tablet  Used for:  Adjustment insomnia      Dose:  5 mg  Take 1 tablet (5 mg) by mouth At Bedtime  Refills:  0     order for DME  Used for:  Acquired spondylolisthesis of lumbosacral region      Equipment being ordered: TENS.    TENS UNIT Ordered- Please call your insurance to Verify coverage and locations to  the device.     Please Dispense Device, leads, pads,  wires, and all other necessary equipment that is needed for use.     Length of need: 36 months.  Quantity:  1 Device  Refills:  0     oxyCODONE 5 MG tablet  Commonly known as:  ROXICODONE  Used for:  Postoperative pain after spinal surgery      Dose:  5-10 mg  Take 1-2 tablets (5-10 mg) by mouth every 3 hours as needed for moderate to severe pain  Quantity:  10 tablet  Refills:  0     polyethylene glycol packet  Commonly known as:  MIRALAX/GLYCOLAX      Dose:  1 packet  Take 1 packet by mouth daily as needed for constipation  Refills:  0     senna-docusate 8.6-50 MG tablet  Commonly known as:  SENOKOT-S/PERICOLACE      Dose:  2 tablet  Take 2 tablets by mouth 2 times daily as needed for constipation  Refills:  0     SINGULAIR PO      Dose:  10 mg  Take 10 mg by mouth At Bedtime  Refills:  0         * This list has 2 medication(s) that are the same as other medications prescribed for you. Read the directions carefully, and ask your doctor or other care provider to review them with you.               Where to get your medicines      Some of these will need a paper prescription and others can be bought over the counter. Ask your nurse if you have questions.    Bring a paper prescription for each of these medications    oxyCODONE 5 MG tablet            ALLERGIES:    Allergies   Allergen Reactions     Adhesive Tape      Erythromycin Nausea and Vomiting     Pills cause vomiting,           DISPOSITION:  TCU.      FOLLOWUP WITH RECOMMENDATIONS:  The patient should follow up with primary care provider based off of nursing home protocol.  Recommend up on urine culture and repeat basic metabolic panel and CBC within a week.      AFTERCARE INSTRUCTIONS:   1.  Activity is up as tolerated with nursing assistance.  No bending, twisting or lifting greater than 10 pounds.   2.  Diet is a regular diet.   3.  Monitoring of daily weights and I's and O's.   4.  Encourage oral fluid intake.   5.  Monitored on fall precautions.   6.   Weightbearing status as tolerated.      CONSULTS:  None.      LABORATORY AND PROCEDURES:   1.  Routine laboratory studies that included venous blood gas, blood cultures x2, comprehensive metabolic panel, CBC with platelet differential, urinalysis with urine culture, repeat CBC with platelets, basic metabolic panel.   2.  Two-view chest x-ray.   3.  EKG.      PENDING RESULTS:     1.  Blood cultures are currently in process.     2.  Urine culture is currently in process.      PHYSICAL EXAMINATION ON DAY OF DISCHARGE:   VITAL SIGNS:  Temperature is 96.9 degrees Fahrenheit with a blood pressure of 114/64, heart rate of 84 beats per minute, respiratory rate of 16, O2 saturation 98% on room air.  The patient was denying any pain, but previously was noted to have 6/10 pain and received oral pain medication.   GENERAL:  The patient is awake, alert, cooperative, in no apparent distress, alert and oriented x3.  The patient is stating she is feeling much better.   HEENT:  Normocephalic, atraumatic.  Moist mucous membranes present.  No exudates noted in the posterior pharynx.  Uvula is midline.   NECK:  Supple, normal range of motion, no tracheal deviation.  No cervical lymphadenopathy present.   CARDIOVASCULAR:  Regular rate and rhythm.  No rubs, murmurs or gallops appreciated.   PULMONARY:  Lungs are clear to auscultation bilaterally.  No wheezes, rhonchus or rales appreciated.   GASTROINTESTINAL:  Bowel sounds are present in all 4 quadrants, soft, nontender, nondistended.   NEUROLOGIC:  Cranial nerves II-XII appear grossly intact.  The patient is seen moving all 4 extremities without any difficulty.   EXTREMITIES:  No lower extremity edema noted bilaterally.  Calves are nontender to palpation.      BRIEF HISTORY OF PRESENT ILLNESS:  Aleena Ontiveros is a 59-year-old female with past medical history significant for hypertension, hypothyroidism, mild intermittent asthma, adolescent idiopathic scoliosis status post lumbar fusion of  T4 to L4 with solid arthrodesis and retained Carpio rods x2 performed at the age of 14 and recent transforaminal lumbar interbody fusion, 3 column osteotomy from L5 to S1 and posterior spinal fusion from L2 to the pelvis performed on 10/01, who was registered to observation due to hypotension, weakness and found to have a urinary tract infection.      HOSPITAL COURSE:   1.  Hypotension:  The patient was found to have systolic blood pressures as low as the 70s.  This improved with IV fluids and thought to be secondary to urinary tract infection as well as dehydration.  At time of discharge, patient's blood pressure has improved with IV fluids and prior to admit lisinopril had been held during this stay and will be resumed at TCU, but hold parameters have been placed to not administer this medication if systolic blood pressures are less than 110.   2.  Urinary tract infection:  The patient had an abnormal UA, but was noted to be febrile, but did have an elevated white count of 16.3, which improved with IV fluids and IV antibiotics to 12.  The patient notably received 1 dose of IV Zosyn and a dose of IV ceftriaxone.  Urine cultures are currently pending.  Lactic acid was normal.  Chest x-ray was normal.  The patient will be discharged on a 4-day course of double strength Bactrim 2 times daily.  I have advised nursing home provider to follow up on urine culture results.   3.  Acute kidney injury, thought to be secondary to dehydration:  The patient's creatinine was noted to be 1.16 when previously was 0.61.  The patient's blood pressure was also found to be quite low in the 70s systolically.  Following IV fluid administration, creatinine has normalized.  The patient's prior to admit lisinopril was held due to hypotension as well as acute kidney injury.  Both will be resumed at time of discharge with hold parameters.  The patient has been encouraged to increase oral fluid intake at the TCU.   4.  Hyponatremia:  The  patient's sodium level is noted to be low at 131.  Again, this is thought to be secondary to dehydration.  Following IV fluid administration, this improved and resolved to 137.  No further interventions were performed.   5.  Recent spinal fusion:  This occurred on 10/01 in the setting of adolescent idiopathic scoliosis and previous surgeries.  The patient is currently on subcu Lovenox 40 mg daily, which is to be continued through 11/03.  This was continued during this stay and will be resumed at time of discharge.  Pain management was unchanged throughout this stay.  Her prior to admit acetaminophen, gabapentin and as-needed oxycodone were made available as well as-needed Flexeril, all of which will be continued at time of discharge.  The patient will continue working with PT and OT upon discharge as well.   6.  Acute blood loss anemia:  This is likely secondary to recent surgery.  Discharge hemoglobin was noted to be 9.1.  This has shown improvement to 11.  No active bleeding was noted during this stay and this was just monitored.   7.  Essential hypertension in the setting of hypotension:  The patient's hypotension resolved with IV fluids.  Prior to admit lisinopril 40 mg daily was held during this stay, but will be resumed at time of discharge, but with hold parameters in place.   8.  Hypothyroidism:  The patient's prior to admit levothyroxine 125 mcg daily was continued during this stay and will be resumed at time of discharge.  Notably, the patient is status post thyroidectomy performed 10 years ago due to benign thyroid nodules.   9.  Mild intermittent asthma:  The patient's prior to admit albuterol and Advair were continued during this stay, while her Singulair, fexofenadine and Flonase were held.  All home medications will be resumed at time of discharge.      CODE STATUS:  Full code.      The patient was discussed with Dr. Diane Sullivan, who agrees with discharge at this time.  Dr. Sullivan will evaluate the  patient independently.      TOTAL DISCHARGE TIME:  Less than 30 minutes.         ANNIE OLMEDO MD       As dictated by MARIELOS CALHOUN PA-C            D: 10/16/2019   T: 10/16/2019   MT: MIGUEL ANGEL      Name:     LOREN GAYLE   MRN:      [id number]        Account:        [id number]   :      1960           Admit Date:     10/15/2019                                  Discharge Date:       Document: [id number]       cc: Arabella Conner MD

## 2019-10-16 NOTE — PLAN OF CARE
GOALS:   -diagnostic tests and consults completed and resulted: yes  -vital signs normal or at patient baseline: yes  -tolerating oral antibiotics or has plans for home infusion setup: yes  -infection is improving: yes  -returns to baseline functional status: yes  -safe disposition plan has been identified: back to [facility]     Patient discharging back to [facility] per [facility] at 1300.

## 2019-10-16 NOTE — ED NOTES
Bed: ED01  Expected date: 10/15/19  Expected time: 10:33 PM  Means of arrival: Ambulance  Comments:  Sharyn M2 59F post op HTN

## 2019-10-16 NOTE — PLAN OF CARE
Patient is A/O x4. SBA to commode, stand and pivot. VSS, RA. PRN Oxy and Tylenol for back pain from recent surgery. Regular diet, tolerates well. Incision to back covered with dressing, CDI. N/T in LLE reported and numbness in RLE from surgery. Plans to go back to [facility] today and continue PO abx for UTI there. Patient stable at time of discharge and picked up by HE on stretcher.

## 2019-10-16 NOTE — CONSULTS
Care Transition Initial Assessment -      Met with: Patient    Active Problems:    UTI (urinary tract infection)       DATA  Lives With: spouse      Quality of Family Relationships: supportive, involved  Description of Support System: Supportive, Involved  Who is your support system?: , Children  Support Assessment: Adequate family and caregiver support.   Identified issues/concerns regarding health management: Discharge planning[facility]     Quality of Family Relationships: supportive, involved  Transportation Anticipated: HE STRETCHER     ASSESSMENT  Cognitive Status:  awake, alert and oriented  Concerns to be addressed: Per  consult for discharge planning, pt admitted 10/15/19 from [facility] for UTI (urinary tract infection). Discharge back to [facility] pending medical clearance. Pt has been at [facility] since 10/11/19. At baseline pt lives with spouse Lam in a home, spouse has disabilities. Daughter Martin also involved and supportive. Referral sent to [facility] via [facility] who has accepted pt back to [facility]. Pt informs that she requires a stretcher for transport, she is unable to stand and unable sit for longer than 3-5 minutes due to nerve damage and uncontrolled pain from spinal surgery. HE stretcher set for 1300, PCS completed. Orders faxed to TCU.      PLAN  Financial costs for the patient includes: N/A  Patient given options and choices for discharge: Return to U   Patient/family is agreeable to the plan?  Yes  Transportation/person available to transport on day of discharge is HE stretcher @ 1300 and have they been notified/set up.  Patient Goals and Preferences:  Return to TCU   Patient anticipates discharging to:   Return to TCU         RAOUL Maurice   [phone]  [facility]

## 2019-10-16 NOTE — PLAN OF CARE
GOALS:   -diagnostic tests and consults completed and resulted: yes  -vital signs normal or at patient baseline: yes  -tolerating oral antibiotics or has plans for home infusion setup: yes  -infection is improving: yes  -returns to baseline functional status: yes  -safe disposition plan has been identified: back to [facility]    MD aware, patient should discharge back to TCU today.

## 2019-10-16 NOTE — PLAN OF CARE
A&Ox4.  VSS ex soft BPs.  Room air.  Reports of abd pain/discomfort, PRN tylenol, oxy and flexeril available.  R PIV infusing NS @ 100 ml.  Dressing to lower back, pt declines for removal and assessment of site.  No drainage noted.  Assist of one, gb and pivot to BSC - voiding.  Continent of bladder.  LS dim and clear.  Numbness to BLE since surgery.  Daughter at the bedside.  SW consulted.

## 2019-10-16 NOTE — ED TRIAGE NOTES
Patient was recovering from a spinal fusion at [place] when they noticed today her blood pressure was low.  Systolic BP in the 70s   done

## 2019-10-16 NOTE — PROGRESS NOTES
Observation goals PRIOR TO DISCHARGE     Comments: -diagnostic tests and consults completed and resulted - Not Met  -vital signs normal or at patient baseline - Not Met  -tolerating oral antibiotics or has plans for home infusion setup - Not Met  -infection is improving - Not Met  -returns to baseline functional status - Not Met  -safe disposition plan has been identified - Not Met  Nurse to notify provider when observation goals have been met and patient is ready for discharge.

## 2019-10-16 NOTE — PHARMACY-ADMISSION MEDICATION HISTORY
Admission medication history interview status for the 10/15/2019  admission is complete. See EPIC admission navigator for prior to admission medications     Medication history source reliability:Good    Actions taken by pharmacist (provider contacted, etc):None     Additional medication history information not noted on PTA med list :None    Medication reconciliation/reorder completed by provider prior to medication history? No    Time spent in this activity: 15min    Prior to Admission medications    Medication Sig Last Dose Taking? Auth Provider   acetaminophen (TYLENOL) 500 MG tablet Take 1,000 mg by mouth every 6 hours as needed for mild pain prn Yes Reported, Patient   albuterol (PROAIR HFA/PROVENTIL HFA/VENTOLIN HFA) 108 (90 Base) MCG/ACT inhaler Inhale 2 puffs into the lungs every 4 hours as needed for shortness of breath / dyspnea or wheezing prn Yes Krzysztof Whitten MD   cyclobenzaprine (FLEXERIL) 10 MG tablet Take 1 tablet (10 mg) by mouth 3 times daily as needed for muscle spasms prn Yes Evangelist Vasquez MD   enoxaparin (LOVENOX) 40 MG/0.4ML syringe Inject 0.4 mLs (40 mg) Subcutaneous every 24 hours for 28 days Continue for 28 days or until pt is more mobile, whichever comes first. 10/15/2019 at Unknown time Yes Frances Rodriguez APRN CNP   estradiol (ESTRACE) 0.1 MG/GM cream Place 2 g vaginally At Bedtime  10/15/2019 at Unknown time Yes Reported, Patient   Fexofenadine HCl (ALLEGRA PO) Take 180 mg by mouth every morning  10/15/2019 at Unknown time Yes Reported, Patient   fluticasone (FLONASE) 50 MCG/ACT spray Spray 1 spray into both nostrils 2 times daily  10/15/2019 at Unknown time Yes Reported, Patient   fluticasone-salmeterol (ADVAIR) 500-50 MCG/DOSE inhaler Inhale 1 puff into the lungs every 12 hours 10/15/2019 at Unknown time Yes Emily Bryan APRN CNP   gabapentin (NEURONTIN) 400 MG capsule Take 2 capsules (800 mg) by mouth 3 times daily 10/15/2019 at Unknown time Yes Fish  Krzysztof WARNER MD   gabapentin (NEURONTIN) 400 MG capsule Take 3 capsules (1,200 mg) by mouth At Bedtime Give at 11PM 10/16/2019 at 0000 Yes Krzysztof Whitten MD   levothyroxine (SYNTHROID) 125 MCG tablet Take 1 tablet (125 mcg) by mouth every morning 10/15/2019 at Unknown time Yes Krzysztof Whitten MD   lisinopril (PRINIVIL/ZESTRIL) 40 MG tablet Take 40 mg by mouth daily 10/15/2019 at Unknown time Yes Unknown, Entered By History   Montelukast Sodium (SINGULAIR PO) Take 10 mg by mouth At Bedtime  10/15/2019 at Unknown time Yes Reported, Patient   oxyCODONE (ROXICODONE) 5 MG tablet Take 1-2 tablets (5-10 mg) by mouth every 3 hours as needed for moderate to severe pain prn Yes Rhianna Steinberg APRN CNP   polyethylene glycol (MIRALAX/GLYCOLAX) packet Take 1 packet by mouth daily as needed for constipation prn Yes Unknown, Entered By History   senna-docusate (SENOKOT-S/PERICOLACE) 8.6-50 MG tablet Take 2 tablets by mouth 2 times daily as needed for constipation prn Yes Unknown, Entered By History   melatonin 5 MG tablet Take 1 tablet (5 mg) by mouth At Bedtime   Emily Bryan APRN CNP   order for DME Equipment being ordered: TENS.    TENS UNIT Ordered- Please call your insurance to Verify coverage and locations to  the device.     Please Dispense Device, leads, pads, wires, and all other necessary equipment that is needed for use.     Length of need: 36 months.   Edy Huerta MD

## 2019-10-16 NOTE — PROGRESS NOTES
.RECEIVING UNIT ED HANDOFF REVIEW    ED Nurse Handoff Report was reviewed by: Navarro Carcamo RN on October 16, 2019 at 12:56 AM

## 2019-10-16 NOTE — PLAN OF CARE
PT:  Discharge Planner PT   Patient plan for discharge: Return to TCU  Current status: Pt was admitted under observation status with a [facility]. Pt had a spinal fusion on 10/1 and has been unable to ambulate as of yet. Pt requires assist for bed mobility and transfers and would benefit from skilled PT in order to improve her weakness, dec balance, activity tolerance and pain.  Barriers to return to prior living situation: Unable to ambulate, spouse is disabled and unable to assist pt physically.  Recommendations for discharge: Return to TCU  Rationale for recommendations: Pt would benefit from continued PT to improve strength, balance, mobility to increase independence, reduce falls risk and increase safety before returning home.Pt is not safe to return home at this time.       Entered by: Betsy Ramirez 10/16/2019 12:49 PM

## 2019-10-16 NOTE — H&P
[facility]    History and Physical  Hospitalist       Date of Admission:  10/15/2019  Date of Service (when I saw the patient): 10/16/19    Assessment & Plan   Aleena Ontiveros is a 59 year old female who presents with weakness    Hypotension  UTI  Day of presentation with general malaise. Hypotensive to 70's at TCU. Afebrile, stable vitals now. Exam with tenderness over bladder. CXR normal. Lactate normal. UA with 31 WBC's. Na at 131, creatinine at 1.16. WBC at 16.3. UTI with concern for sepsis.   - blood cultures pending  - urine culture pending  - ceftriaxone 1g IV daily  - prn acetaminophen    NASREEN  hyponatremia  Noted creatinine am 10/15 at 0.61, repeat evening at 1.16. Na also running low, previously at 135 10/11, repeat 10/15 131. Likely 2/2 hypotension/ dehydration. On lisinopril prior to admission, no other nephrotoxins  - hold lisinopril  - avoid nephrotoxins  - IV fluids overnight  - repeat labs in the am    Recent spinal fusion 10/1  With h/o adolescent idiopathic scoliosis. S/p surgery as described in HPI.   - dvt ppx with enoxaparin 40 mg subcutaneous daily through 11/3  - pain management with scheduled acetaminophen, gabapentin and prn oxycodone 5-10 mg q3 hours prn  - prn flexeril  - resume PT at discharge (states would not be able to walk yet)    Acute blood loss anemia  After surgery. Discharge hgb at ~9.1. hgb on 10/15 am at 11.0, repeat 9.8 on 10/15. No blood loss noted.   - monitor for now    HTN  PTA on lisinopril 40 mg daily.   - hold lisinopril with NASREEN and hypotension  - monitor pressures    Hypothyroidism  PTA on levothyroxine 125 mcg daily. S/p thyroidectomy 10 yrs ago for benign thyroid nodules  - continue PTA levothyroxine    Asthma  PTA on singulair 10 mg at HS, fexofenadine 180 mg in the am, Advair 500-50 1 puff BID, Flonase BID, prn albuterol  - continue pta albuterol, Advair (ok for home inhaler)  - hold other meds while in obs    DVT Prophylaxis: Pneumatic  "Compression Devices  Code Status: Full Code    Disposition: Expected discharge in 1-2 days    Shaggy Sanabria MD  [phone] (P)  Text Page     Primary Care Physician   Arabella Conner    Chief Complaint   Weakness    History is obtained from the patient and medical records     History of Present Illness   Aleena Ontiveros is a 59 year old female who presents with weakness. She was hospitalized at [facility] from October 1 through October 11, 2019 for spondylolisthesis and sagittal malalignment.  She underwent revision L2 to pelvis posterior spinal fusion, S1 pedicle subtraction osteotomy, bilateral L5 nerve root decompressions, and pelvic fixation on October 1, 2019.  Hospital course was complicated by development of acute blood loss anemia.  She did also have left bursa hip pain while in the hospital and received Medrol Dosepak as well as a steroid injection.  His other past medical history remarkable for both thyroidism, asthma and hypertension.  It appears as if her surgery well but she still working with ambulation and weakness in her lower extremities.  At the care facility today she had severe fatigue.  She states she has not slept well for months but today she slept for many hours.  Is also noted that she had a fever to 102.  She noted chills in the emergency room.  She denied any sore throat.  Denies cough.  Denies chest pain.  No shortness of breath.  No nausea or vomiting.  She did not have any urine complaints but she does have a \"achy \"sensation in her lower belly.  She has no edema.    Past Medical History    I have reviewed this patient's medical history and updated it with pertinent information if needed.   Past Medical History:   Diagnosis Date     Anxiety      Arthritis     osteoarthritis of both knee     Asthma      Chronic osteoarthritis      Hypertension      PONV (postoperative nausea and vomiting)      Thyroid disease      Uncomplicated asthma        Past " Surgical History   I have reviewed this patient's surgical history and updated it with pertinent information if needed.  Past Surgical History:   Procedure Laterality Date     ABDOMEN SURGERY  ,         ARTHROPLASTY KNEE Right 8/10/2017    Procedure: ARTHROPLASTY KNEE;  RIGHT TOTAL KNEE ARTHROPLASTY ;  Surgeon: Grady Alvarez MD;  Location: [facility] OR     ARTHROPLASTY KNEE Left 2017    Procedure: ARTHROPLASTY KNEE;  LEFT TOTAL KNEE ARTHROPLASTY;  Surgeon: Grady Alvarez MD;  Location:  OR     BACK SURGERY      spinal fusion     ENT SURGERY      tonsilectomy     GYN SURGERY  11    hysterectomy     OPTICAL TRACKING SYSTEM FUSION POSTERIOR SPINE THORACIC THREE+ LEVELS N/A 10/1/2019    Procedure: Transforaminal Lumbar Interbody Fusion Three Column Osteotomy L5-S1, Posterior Spinal Fusion L2-Pelvis. Use of BMP bone graft;  Surgeon: Evangelist Vasquez MD;  Location:  OR     ORTHOPEDIC SURGERY      sinal fusion,hand     thumb mass resection       THYROIDECTOMY  2014    Procedure: THYROIDECTOMY;  Surgeon: Krzysztof Marquez MD;  [facility]       Prior to Admission Medications   Prior to Admission Medications   Prescriptions Last Dose Informant Patient Reported? Taking?   Fexofenadine HCl (ALLEGRA PO)  Self Yes No   Sig: Take 180 mg by mouth every morning    Montelukast Sodium (SINGULAIR PO)  Self Yes No   Sig: Take 10 mg by mouth At Bedtime    acetaminophen (TYLENOL) 500 MG tablet   Yes No   Sig: Take 1,000 mg by mouth every 6 hours as needed for mild pain   albuterol (PROAIR HFA/PROVENTIL HFA/VENTOLIN HFA) 108 (90 Base) MCG/ACT inhaler   No No   Sig: Inhale 2 puffs into the lungs every 4 hours as needed for shortness of breath / dyspnea or wheezing   bisacodyl (DULCOLAX) 10 MG suppository   No No   Sig: Place 1 suppository (10 mg) rectally daily as [facility] for [facility] [place] if no BM   cyclobenzaprine (FLEXERIL) 10 MG tablet   No No    Sig: Take 1 tablet (10 mg) by mouth 3 times daily as needed for muscle spasms   enoxaparin (LOVENOX) 40 MG/0.4ML syringe   No No   Sig: Inject 0.4 mLs (40 mg) Subcutaneous every 24 hours for 28 days Continue for 28 days or until pt is more mobile, whichever comes first.   estradiol (ESTRACE) 0.1 MG/GM cream  Self Yes No   Sig: Place 2 g vaginally At Bedtime    fluticasone (FLONASE) 50 MCG/ACT spray  Self Yes No   Sig: Spray 1 spray into both nostrils 2 times daily    fluticasone-salmeterol (ADVAIR) 500-50 MCG/DOSE inhaler   No No   Sig: Inhale 1 puff into the lungs every 12 hours   gabapentin (NEURONTIN) 400 MG capsule   No No   Sig: Take 2 capsules (800 mg) by mouth 3 times daily   gabapentin (NEURONTIN) 400 MG capsule   No No   Sig: Take 3 capsules (1,200 mg) by mouth At Bedtime Give at 11PM   levothyroxine (SYNTHROID) 125 MCG tablet   No No   Sig: Take 1 tablet (125 mcg) by mouth every morning   lisinopril (PRINIVIL/ZESTRIL) 40 MG tablet   No No   Sig: Take 1 tablet (40 mg) by mouth daily   Patient taking differently: Take 40 mg by mouth every morning    melatonin 5 MG tablet   No No   Sig: Take 1 tablet (5 mg) by mouth At Bedtime   order for DME   No No   Sig: Equipment being ordered: TENS.    TENS UNIT Ordered- Please call your insurance to Verify coverage and locations to  the device.     Please Dispense Device, leads, pads, wires, and all other necessary equipment that is needed for use.     Length of need: 36 months.   oxyCODONE (ROXICODONE) 5 MG tablet   No No   Sig: Take 1-2 tablets (5-10 mg) by mouth every 3 hours as needed for moderate to severe pain   polyethylene glycol (MIRALAX/GLYCOLAX) packet   No No   Sig: Take 17 g by mouth 2 times daily   senna-docusate (SENOKOT-S/PERICOLACE) 8.6-50 MG tablet   No No   Sig: Take 2 tablets by mouth 2 times daily      Facility-Administered Medications: None     Allergies   Allergies   Allergen Reactions     Adhesive Tape      Erythromycin Nausea and  Vomiting     Pills cause vomiting,        Social History   I have reviewed this patient's social history and updated it with pertinent information if needed. Aleena Ontiveros  reports that she has never smoked. She has never used smokeless tobacco. She reports current alcohol use. She reports that she does not use drugs.    Family History   I have reviewed this patient's family history and updated it with pertinent information if needed.   Family History   Problem Relation Age of Onset     Breast Cancer Mother      Cancer - colorectal Father      C.A.D. Father      Cerebrovascular Disease Father      Thyroid Disease Brother      Thyroid Disease Sister        Review of Systems   The 10 point Review of Systems is negative other than noted in the HPI or here.     Physical Exam   Temp: 97.4  F (36.3  C) Temp src: Oral BP: 98/67 Pulse: 76 Heart Rate: 74 Resp: 16 SpO2: 99 % O2 Device: None (Room air)    Vital Signs with Ranges  0 lbs 0 oz    Constitutional: alert, oriented and in no acute distress  Eyes: EOMI, PERRL  HEENT: OP clear  Respiratory: CTA B without w/c  Cardiovascular: RRR without m/r/g  GI: soft, nontender, nondistended, no HSM  Lymph/Hematologic: no cervical LAD  Genitourinary: deferred  Skin: no rashes or lesions grossly. Area around bandage on back c/d/i  Musculoskeletal: no deformities or arthritis  Neurologic: CN II-XII, LE strength not examined  Psychiatric: mood and affect wnl    Data   Data reviewed today:  I personally reviewed the EKG tracing showing no ischemic changes.  Recent Labs   Lab 10/15/19  2305 10/15/19  0735 10/11/19  0602   WBC 16.3* 17.4*  --    HGB 9.8* 11.0* 11.0*   MCV 86 86  --     422 450   * 131* 135   POTASSIUM 3.8 3.9 4.6   CHLORIDE 99 99 98   CO2 28 26 31   BUN 22 13 12   CR 1.16* 0.61 0.51*   ANIONGAP 4 6 6   SIXTO 8.1* 8.9 8.8   * 100* 114*   ALBUMIN 2.4*  --   --    PROTTOTAL 5.9*  --   --    BILITOTAL 0.4  --   --    ALKPHOS 121  --   --    ALT 43  --   --     AST 24  --   --        Recent Results (from the past 24 hour(s))   XR Chest 2 Views    Narrative    CHEST 2 VIEWS   10/15/2019 11:21 PM     HISTORY: Fever. Hypotension.    COMPARISON: None.    FINDINGS: A few linear opacities in the left lung base most likely  represent scarring or atelectasis. The lungs are otherwise clear.  Normal-sized cardiac silhouette. Atherosclerotic calcification in the  thoracic aorta. Mild elevation of the right hemidiaphragm. Partial  visualization of fusion hardware in the thoracic spine.      Impression    IMPRESSION: No convincing evidence of active cardiopulmonary disease.    KARL KYLE MD

## 2019-10-16 NOTE — ED NOTES
"[facility]  ED Nurse Handoff Report    ED Chief complaint: Hypotension      ED Diagnosis:   Final diagnoses:   Urinary tract infection without hematuria, site unspecified   Sepsis, due to unspecified organism, unspecified whether acute organ dysfunction present (H)       Code Status: Full Code    Allergies:   Allergies   Allergen Reactions     Adhesive Tape      Erythromycin Nausea and Vomiting     Pills cause vomiting,        Activity level - Baseline/Home:  Stand with Assist of 2  Activity Level - Current:   Stand with Assist of 2    Patient's Preferred language: english   Needed?: No    Isolation: No  Infection: Not Applicable  Bariatric?: No    Vital Signs:   Vitals:    10/15/19 2326 10/16/19 0000 10/16/19 0015 10/16/19 0030   BP: 93/60 113/63 98/67 104/67   Pulse: 79 75 76 75   Resp: 15 14 16 20   Temp:       TempSrc:       SpO2: 99%   95%       Cardiac Rhythm: ,        Pain level: 0-10 Pain Scale: 7    Is this patient confused?: No   Does this patient have a guardian?  No         If yes, is there guardianship documents in the Epic \"Code/ACP\" activity?  N/A         Guardian Notified?  N/A  [place] - Suicide Severity Rating Scale Completed?  Yes  If yes, what color did the patient score?  White    Patient Report: Initial Complaint: Patient was recovering from a spinal fusion at [place] when they noticed today her blood pressure was low.  Systolic BP in the 70s  Focused Assessment: low blood pressure  Tests Performed: lab, ua, cxr  Abnormal Results:   Results for orders placed or performed during the hospital encounter of 10/15/19   XR Chest 2 Views    Narrative    CHEST 2 VIEWS   10/15/2019 11:21 PM     HISTORY: Fever. Hypotension.    COMPARISON: None.    FINDINGS: A few linear opacities in the left lung base most likely  represent scarring or atelectasis. The lungs are otherwise clear.  Normal-sized cardiac silhouette. Atherosclerotic calcification in the  thoracic aorta. Mild " elevation of the right hemidiaphragm. Partial  visualization of fusion hardware in the thoracic spine.      Impression    IMPRESSION: No convincing evidence of active cardiopulmonary disease.    KARL KYLE MD   CBC with platelets differential   Result Value Ref Range    WBC 16.3 (H) 4.0 - 11.0 10e9/L    RBC Count 3.39 (L) 3.8 - 5.2 10e12/L    Hemoglobin 9.8 (L) 11.7 - 15.7 g/dL    Hematocrit 29.3 (L) 35.0 - 47.0 %    MCV 86 78 - 100 fl    MCH 28.9 26.5 - 33.0 pg    MCHC 33.4 31.5 - 36.5 g/dL    RDW 14.0 10.0 - 15.0 %    Platelet Count 425 150 - 450 10e9/L    Diff Method Automated Method     % Neutrophils 76.3 %    % Lymphocytes 10.0 %    % Monocytes 9.8 %    % Eosinophils 2.0 %    % Basophils 0.4 %    % Immature Granulocytes 1.5 %    Nucleated RBCs 0 0 /100    Absolute Neutrophil 12.4 (H) 1.6 - 8.3 10e9/L    Absolute Lymphocytes 1.6 0.8 - 5.3 10e9/L    Absolute Monocytes 1.6 (H) 0.0 - 1.3 10e9/L    Absolute Eosinophils 0.3 0.0 - 0.7 10e9/L    Absolute Basophils 0.1 0.0 - 0.2 10e9/L    Abs Immature Granulocytes 0.3 0 - 0.4 10e9/L    Absolute Nucleated RBC 0.0    Comprehensive metabolic panel   Result Value Ref Range    Sodium 131 (L) 133 - 144 mmol/L    Potassium 3.8 3.4 - 5.3 mmol/L    Chloride 99 94 - 109 mmol/L    Carbon Dioxide 28 20 - 32 mmol/L    Anion Gap 4 3 - 14 mmol/L    Glucose 130 (H) 70 - 99 mg/dL    Urea Nitrogen 22 7 - 30 mg/dL    Creatinine 1.16 (H) 0.52 - 1.04 mg/dL    GFR Estimate 51 (L) >60 mL/min/[1.73_m2]    GFR Estimate If Black 59 (L) >60 mL/min/[1.73_m2]    Calcium 8.1 (L) 8.5 - 10.1 mg/dL    Bilirubin Total 0.4 0.2 - 1.3 mg/dL    Albumin 2.4 (L) 3.4 - 5.0 g/dL    Protein Total 5.9 (L) 6.8 - 8.8 g/dL    Alkaline Phosphatase 121 40 - 150 U/L    ALT 43 0 - 50 U/L    AST 24 0 - 45 U/L   UA with Microscopic reflex to Culture   Result Value Ref Range    Color Urine Yellow     Appearance Urine Slightly Cloudy     Glucose Urine Negative NEG^Negative mg/dL    Bilirubin Urine Negative NEG^Negative     Ketones Urine Negative NEG^Negative mg/dL    Specific Gravity Urine 1.010 1.003 - 1.035    Blood Urine Negative NEG^Negative    pH Urine 5.5 5.0 - 7.0 pH    Protein Albumin Urine 30 (A) NEG^Negative mg/dL    Urobilinogen mg/dL Normal 0.0 - 2.0 mg/dL    Nitrite Urine Negative NEG^Negative    Leukocyte Esterase Urine Large (A) NEG^Negative    Source Catheterized Urine     WBC Urine 31 (H) 0 - 5 /HPF    RBC Urine 3 (H) 0 - 2 /HPF    Bacteria Urine Many (A) NEG^Negative /HPF    Mucous Urine Present (A) NEG^Negative /LPF   ISTAT gases lactate margot POCT   Result Value Ref Range    Ph Venous 7.42 7.32 - 7.43 pH    PCO2 Venous 37 (L) 40 - 50 mm Hg    PO2 Venous 24 (L) 25 - 47 mm Hg    Bicarbonate Venous 24 21 - 28 mmol/L    O2 Sat Venous 45 %    Lactic Acid 1.4 0.7 - 2.1 mmol/L     Treatments provided: zosyn, oxycodone 10mg po,  Gabapentin,    Family Comments:     OBS brochure/video discussed/provided to patient/family: Yes              Name of person given brochure if not patient:               Relationship to patient:     ED Medications:   Medications   lactated ringers BOLUS 2,109 mL (2,109 mLs Intravenous New Bag 10/15/19 2306)   piperacillin-tazobactam (ZOSYN) 4.5 g vial to attach to  mL bag (4.5 g Intravenous New Bag 10/15/19 2348)   gabapentin (NEURONTIN) capsule 1,200 mg (1,200 mg Oral Given 10/15/19 2330)   oxyCODONE (ROXICODONE) tablet 10 mg (10 mg Oral Given 10/16/19 0017)       Drips infusing?:  No    For the majority of the shift this patient was Green.   Interventions performed were .    Severe Sepsis OR Septic Shock Diagnosis Present: No    To be done/followed up on inpatient unit:      ED NURSE PHONE NUMBER: [phone]

## 2019-10-17 ENCOUNTER — NURSING HOME VISIT (OUTPATIENT)
Dept: GERIATRICS | Facility: CLINIC | Age: 59
End: 2019-10-17
Payer: COMMERCIAL

## 2019-10-17 VITALS
BODY MASS INDEX: 28.52 KG/M2 | WEIGHT: 155 LBS | TEMPERATURE: 99 F | RESPIRATION RATE: 16 BRPM | SYSTOLIC BLOOD PRESSURE: 116 MMHG | HEART RATE: 93 BPM | OXYGEN SATURATION: 97 % | DIASTOLIC BLOOD PRESSURE: 83 MMHG | HEIGHT: 62 IN

## 2019-10-17 DIAGNOSIS — I10 ESSENTIAL HYPERTENSION: ICD-10-CM

## 2019-10-17 DIAGNOSIS — G89.18 POSTOPERATIVE PAIN AFTER SPINAL SURGERY: ICD-10-CM

## 2019-10-17 DIAGNOSIS — J45.20 MILD INTERMITTENT ASTHMA, UNSPECIFIED WHETHER COMPLICATED: ICD-10-CM

## 2019-10-17 DIAGNOSIS — Z98.890 S/P SPINAL SURGERY: ICD-10-CM

## 2019-10-17 DIAGNOSIS — N39.0 URINARY TRACT INFECTION WITHOUT HEMATURIA, SITE UNSPECIFIED: ICD-10-CM

## 2019-10-17 DIAGNOSIS — I95.9 HYPOTENSION, UNSPECIFIED HYPOTENSION TYPE: Primary | ICD-10-CM

## 2019-10-17 DIAGNOSIS — E03.9 HYPOTHYROIDISM, UNSPECIFIED TYPE: ICD-10-CM

## 2019-10-17 DIAGNOSIS — E87.1 HYPONATREMIA: ICD-10-CM

## 2019-10-17 DIAGNOSIS — N17.9 ACUTE KIDNEY INJURY (H): ICD-10-CM

## 2019-10-17 DIAGNOSIS — D62 ANEMIA DUE TO BLOOD LOSS, ACUTE: ICD-10-CM

## 2019-10-17 LAB
BACTERIA SPEC CULT: ABNORMAL
Lab: ABNORMAL
SPECIMEN SOURCE: ABNORMAL

## 2019-10-17 PROCEDURE — 99310 SBSQ NF CARE HIGH MDM 45: CPT | Performed by: NURSE PRACTITIONER

## 2019-10-17 RX ORDER — OXYCODONE HYDROCHLORIDE 5 MG/1
5-10 TABLET ORAL
Qty: 60 TABLET | Refills: 0 | Status: SHIPPED | OUTPATIENT
Start: 2019-10-17 | End: 2019-10-22

## 2019-10-17 ASSESSMENT — MIFFLIN-ST. JEOR: SCORE: 1221.33

## 2019-10-17 NOTE — PROGRESS NOTES
[facility]  PRIMARY CARE PROVIDER AND CLINIC:  Arabella Conner MD, [facility] [address] / [place]*  Chief Complaint   Patient presents with     Hospital F/U     [facility] Medical Record Number:  [id number]  Place of Service where encounter took place:  [facility] [[id number]]    Aleena Ontiveros  is a 59 year old  (1960), returned to the above facility from  [facility]. Hospital stay 10/15/19 - 10/16/19. .  Admitted to this facility for  rehab, medical management and nursing care.    HPI:    HPI information obtained from: facility chart records, facility staff, patient report and [facility] chart review.   Brief Summary of Hospital Course:   59-year-old female h/o HTN, hypothyroidism, mild intermittent asthma, adolescent idiopathic scoliosis status post lumbar fusion of T4 to L4 with solid arthrodesis and retained Carpio rods x2 performed at the age of 14 and recent transforaminal lumbar interbody fusion, 3 column osteotomy from L5 to S1 and posterior spinal fusion from L2 to the pelvis performed on 10/01 to hospital for observation with Hypotension and UTI. BPs improved with IV fluids, lisinopril held then restarted with parameters. Treated for UTI with IV zosyn then ceftriaxone - discharged on four day course of bactrim, final results pending. NASREEN cr 1.16 from previous level of 0.61, resolved with fluids. Na down 131, improved to 137 at discharge. Pain managed with tylenol, gabapentin, oxycodone, PRN flexeril. Post op anemia Hgb down to 9.1, improved to 11.  S/P thyroidectomy on synthroid 125 mcg. Asthma managed with Advair and Singulair, allegra and Flonase. Now back to TCU for therapies.     Updates on Status Since Skilled nursing Admission:   Patient seen for initial TCU visit today. She reports she is depressed and tearful about her worsened mobility and slow progress. Agreeable to ACP consult in house. She has had  a low grade temp today and is still on Bactrim. UC final results not yet available from hospital. She denies headaches, dizziness, chest pain, bowel symptoms. Reports unable to sit up unassisted - has been doing exercises in bed. Feels leg are heavy, painful and tingly since surgery. She is upset about missing important work deadlines while out recovering. On EMR review note SBP range 110-134, HR 80-90s, sats 97% on room air.     CODE STATUS/ADVANCE DIRECTIVES DISCUSSION:   CPR/Full code   Patient's living condition: lives with spouse  ALLERGIES: Adhesive tape and Erythromycin  PAST MEDICAL HISTORY:  has a past medical history of Anxiety, Arthritis, Asthma, Chronic osteoarthritis, Hypertension, PONV (postoperative nausea and vomiting), Thyroid disease, and Uncomplicated asthma. She also has no past medical history of Difficult intubation, Malignant hyperthermia, or Spinal headache.  PAST SURGICAL HISTORY:   has a past surgical history that includes Abdomen surgery (1988,1999); orthopedic surgery; Thyroidectomy (5/13/2014); ENT surgery; back surgery; GYN surgery (5-13-13); thumb mass resection; Arthroplasty knee (Right, 8/10/2017); Arthroplasty knee (Left, 12/13/2017); and Optical tracking system fusion posterior spine thoracic three+ levels (N/A, 10/1/2019).  FAMILY HISTORY: family history includes Breast Cancer in her mother; C.A.D. in her father; Cancer - colorectal in her father; Cerebrovascular Disease in her father; Thyroid Disease in her brother and sister.  SOCIAL HISTORY:   reports that she has never smoked. She has never used smokeless tobacco. She reports current alcohol use. She reports that she does not use drugs.    Post Discharge Medication Reconciliation Status: discharge medications reconciled, continue medications without change    Current Outpatient Medications   Medication Sig Dispense Refill     acetaminophen (TYLENOL) 500 MG tablet Take 1,000 mg by mouth every 6 hours as needed for mild pain        albuterol (PROAIR HFA/PROVENTIL HFA/VENTOLIN HFA) 108 (90 Base) MCG/ACT inhaler Inhale 2 puffs into the lungs every 4 hours as needed for shortness of breath / dyspnea or wheezing       cyclobenzaprine (FLEXERIL) 10 MG tablet Take 1 tablet (10 mg) by mouth 3 times daily as needed for muscle spasms 90 tablet 1     enoxaparin (LOVENOX) 40 MG/0.4ML syringe Inject 0.4 mLs (40 mg) Subcutaneous every 24 hours for 28 days Continue for 28 days or until pt is more mobile, whichever comes first.       estradiol (ESTRACE) 0.1 MG/GM cream Place 2 g vaginally At Bedtime        Fexofenadine HCl (ALLEGRA PO) Take 180 mg by mouth every morning        fluticasone (FLONASE) 50 MCG/ACT spray Spray 1 spray into both nostrils 2 times daily        fluticasone-salmeterol (ADVAIR) 500-50 MCG/DOSE inhaler Inhale 1 puff into the lungs every 12 hours       gabapentin (NEURONTIN) 400 MG capsule Take 2 capsules (800 mg) by mouth 3 times daily       gabapentin (NEURONTIN) 400 MG capsule Take 3 capsules (1,200 mg) by mouth At Bedtime Give at 11PM       levothyroxine (SYNTHROID) 125 MCG tablet Take 1 tablet (125 mcg) by mouth every morning       lisinopril (PRINIVIL/ZESTRIL) 40 MG tablet Take 1 tablet (40 mg) by mouth daily .  Hold for systolic blood pressure less than 110.       melatonin 5 MG tablet Take 1 tablet (5 mg) by mouth At Bedtime       Montelukast Sodium (SINGULAIR PO) Take 10 mg by mouth At Bedtime        order for DME Equipment being ordered: TENS.    TENS UNIT Ordered- Please call your insurance to Verify coverage and locations to  the device.     Please Dispense Device, leads, pads, wires, and all other necessary equipment that is needed for use.     Length of need: 36 months. 1 Device 0     oxyCODONE (ROXICODONE) 5 MG tablet Take 1-2 tablets (5-10 mg) by mouth every 3 hours as needed for moderate to severe pain 10 tablet 0     polyethylene glycol (MIRALAX/GLYCOLAX) packet Take 1 packet by mouth daily as needed for  "constipation       senna-docusate (SENOKOT-S/PERICOLACE) 8.6-50 MG tablet Take 2 tablets by mouth 2 times daily as needed for constipation       sulfamethoxazole-trimethoprim (BACTRIM DS/SEPTRA DS) 800-160 MG tablet Take 1 tablet by mouth 2 times daily for 4 days         ROS:  10 point ROS of systems including Constitutional, Eyes, Respiratory, Cardiovascular, Gastroenterology, Genitourinary, Integumentary, Musculoskeletal, Psychiatric were all negative except for pertinent positives noted in my HPI.    Vitals:  /83   Pulse 93   Temp 99  F (37.2  C)   Resp 16   Ht 1.575 m (5' 2\")   Wt 70.3 kg (155 lb)   SpO2 97%   BMI 28.35 kg/m    Exam:  GENERAL APPEARANCE:  Alert, in no distress, pleasant, cooperative, oriented x 4  EYES:  EOM, lids, pupils and irises normal, sclera clear and conjunctiva normal, no discharge or mattering on lids or lashes noted  ENT:  Mouth normal, moist mucous membranes, nose normal without drainage or crusting, external ears without lesions, hearing acuity intact  NECK: symmetrical, trachea midline  RESP:  respiratory effort normal, no chest wall tenderness, no respiratory distress, Lung sounds clear, patient is on room air  CV:  Auscultation of heart done, rate and rhythm controlled and regular, no murmur, no rub or gallop. Edema none bilateral lower extremities. VASCULAR: no open areas lower legs  ABDOMEN:  normal bowel sounds, soft, nontender, no palpable masses.  M/S:   Gait and station bedbound and wheelchair bound, no tenderness or swelling of the joints; generalized LE weakness, digits normal  SKIN:  Inspection and palpation of skin and subcutaneous tissue: skin on extremities warm, dry and intact without rashes  NEURO: cranial nerves 2-12 grossly intact, no facial asymmetry, no speech deficits and able to follow directions, reports bilateral LE paresthesia   PSYCH:  insight and judgement and memory at baseline, affect and mood sad and tearful    Lab/Diagnostic data:  Most " Recent 3 CBC's:  Recent Labs   Lab Test 10/16/19  0607 10/15/19  2305 10/15/19  0735   WBC 12.0* 16.3* 17.4*   HGB 8.5* 9.8* 11.0*   MCV 86 86 86    425 422     Most Recent 3 BMP's:  Recent Labs   Lab Test 10/16/19  0607 10/15/19  2305 10/15/19  0735    131* 131*   POTASSIUM 3.7 3.8 3.9   CHLORIDE 106 99 99   CO2 27 28 26   BUN 15 22 13   CR 0.63 1.16* 0.61   ANIONGAP 4 4 6   SIXTO 7.6* 8.1* 8.9   * 130* 100*       ASSESSMENT/PLAN:  Hypotension, unspecified hypotension type  Essential hypertension  Acute low BP with hx HTN, now with IV fluids has resolved, meds as above. Monitor vs, wt. Hold BP meds per parameters. F/U next week     Urinary tract infection  New onset. F/U with UC results tomorrow to see if final culture/sensitivities available. CBC with diff on 10/18    Acute kidney injury (H)  Hyponatremia  Acute. Improved with fluids. BMP on 10/18 and avoid nephrotoxic meds.     S/P spinal surgery  Ongoing issues - pain meds as above. Therapies - f/u with status next week. F/U Dr Vasquez as planned 6 weeks post op.     Anemia due to blood loss, acute  Acute, decreased Hgb last check. Repeat CBC tomorrow and follow up with result.     Hypothyroidism  Chronic, synthroid as PTA.     Mild intermittent asthma  By history, no symptoms. Monitor,      Orders written by provider at facility  1. Incentive spirometry 10 reps QID diagnosis fever  2. CBC with diff, BMP on 10/18 diagnosis UTI, hypotension  3. Please check if UA/UC final results are back and whether sensitive to Bactrim or needs another antibiotic  4. ACP consult diagnosis depressed    Total time spent with patient visit at the [facility] nursing facility was 36 min including patient visit, review of past records and conversation with staff to coordinate care. Greater than 50% of total time spent with counseling and coordinating care due to counseling the patient regarding UC results, treatment and needed follow up (labs, final UC review), review  of current functional ability and therapy goals, review of BPs and plan to monitor/hold meds as needed, offered in house psych consult due to changes in mood, communicated with staff regarding needed follow up labs and monitoring as well as treatments for fever..     Electronically signed by:  JAZMIN Britton CNP

## 2019-10-18 ENCOUNTER — HOSPITAL LABORATORY (OUTPATIENT)
Dept: OTHER | Facility: CLINIC | Age: 59
End: 2019-10-18

## 2019-10-18 LAB
ANION GAP SERPL CALCULATED.3IONS-SCNC: 6 MMOL/L (ref 3–14)
BUN SERPL-MCNC: 8 MG/DL (ref 7–30)
CALCIUM SERPL-MCNC: 8.8 MG/DL (ref 8.5–10.1)
CHLORIDE SERPL-SCNC: 101 MMOL/L (ref 94–109)
CO2 SERPL-SCNC: 28 MMOL/L (ref 20–32)
CREAT SERPL-MCNC: 0.72 MG/DL (ref 0.52–1.04)
ERYTHROCYTE [DISTWIDTH] IN BLOOD BY AUTOMATED COUNT: 13.9 % (ref 10–15)
GFR SERPL CREATININE-BSD FRML MDRD: 90 ML/MIN/{1.73_M2}
GLUCOSE SERPL-MCNC: 117 MG/DL (ref 70–99)
HCT VFR BLD AUTO: 28.5 % (ref 35–47)
HGB BLD-MCNC: 9.4 G/DL (ref 11.7–15.7)
MCH RBC QN AUTO: 28.8 PG (ref 26.5–33)
MCHC RBC AUTO-ENTMCNC: 33 G/DL (ref 31.5–36.5)
MCV RBC AUTO: 87 FL (ref 78–100)
PLATELET # BLD AUTO: 325 10E9/L (ref 150–450)
POTASSIUM SERPL-SCNC: 3.9 MMOL/L (ref 3.4–5.3)
RBC # BLD AUTO: 3.26 10E12/L (ref 3.8–5.2)
SODIUM SERPL-SCNC: 135 MMOL/L (ref 133–144)
WBC # BLD AUTO: 5.1 10E9/L (ref 4–11)

## 2019-10-21 NOTE — PROGRESS NOTES
Contact   Chart Review     Situation: Patient chart reviewed by .    Background: patient remains in TCU.  SW following to assist with discharge.     Assessment: No outreach needed at this time.  No indications of discharge.     Plan/Recommendations: Do chart review in 10 business days.      Rere Belle,   [facility]  [phone]

## 2019-10-22 ENCOUNTER — NURSING HOME VISIT (OUTPATIENT)
Dept: GERIATRICS | Facility: CLINIC | Age: 59
End: 2019-10-22
Payer: COMMERCIAL

## 2019-10-22 VITALS
HEART RATE: 86 BPM | WEIGHT: 142.8 LBS | HEIGHT: 62 IN | RESPIRATION RATE: 16 BRPM | TEMPERATURE: 98.2 F | BODY MASS INDEX: 26.28 KG/M2 | OXYGEN SATURATION: 97 % | SYSTOLIC BLOOD PRESSURE: 108 MMHG | DIASTOLIC BLOOD PRESSURE: 78 MMHG

## 2019-10-22 DIAGNOSIS — N17.9 ACUTE KIDNEY INJURY (H): ICD-10-CM

## 2019-10-22 DIAGNOSIS — I95.9 HYPOTENSION, UNSPECIFIED HYPOTENSION TYPE: Primary | ICD-10-CM

## 2019-10-22 DIAGNOSIS — N39.0 URINARY TRACT INFECTION WITHOUT HEMATURIA, SITE UNSPECIFIED: ICD-10-CM

## 2019-10-22 DIAGNOSIS — J45.20 MILD INTERMITTENT ASTHMA, UNSPECIFIED WHETHER COMPLICATED: ICD-10-CM

## 2019-10-22 DIAGNOSIS — E03.9 HYPOTHYROIDISM, UNSPECIFIED TYPE: ICD-10-CM

## 2019-10-22 DIAGNOSIS — Z98.890 S/P SPINAL SURGERY: ICD-10-CM

## 2019-10-22 DIAGNOSIS — D62 ANEMIA DUE TO BLOOD LOSS, ACUTE: ICD-10-CM

## 2019-10-22 DIAGNOSIS — G89.18 POSTOPERATIVE PAIN AFTER SPINAL SURGERY: ICD-10-CM

## 2019-10-22 DIAGNOSIS — I10 ESSENTIAL HYPERTENSION: ICD-10-CM

## 2019-10-22 LAB
BACTERIA SPEC CULT: NO GROWTH
BACTERIA SPEC CULT: NO GROWTH
Lab: NORMAL
Lab: NORMAL
SPECIMEN SOURCE: NORMAL
SPECIMEN SOURCE: NORMAL

## 2019-10-22 PROCEDURE — 99309 SBSQ NF CARE MODERATE MDM 30: CPT | Performed by: NURSE PRACTITIONER

## 2019-10-22 ASSESSMENT — MIFFLIN-ST. JEOR: SCORE: 1165.99

## 2019-10-22 NOTE — LETTER
10/22/2019        RE: Aleena Ontiveros  [address] [address]        [facility]  [facility] Record Number:  [id number]  Place of Service where encounter took place:  [facility] [[id number]]  Chief Complaint   Patient presents with     RECHECK       HPI:    Aleena Ontiveros  is a 59 year old (1960), who is being seen today for an episodic care visit.  HPI information obtained from: facility chart records, facility staff, patient report and [facility] chart review. Today's concern is:  Brief Summary of Hospital Course: Mrs. Ontiveros underwent Lumbar fusion L5-S1and decompression L4-5-S1 for Spondylolisthesis on 10/1. 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 Hospital course was notable for left side radicular  pain. She was fitted for [place] brace. She did have left  hip pain while in the hospital and did have a steroid injection. She also had issues with urinary retention and hypertension as well as ABLA.    Since in TCU recovery has been slow. She is taking oxycodone and acetaminophen.   She did return to hospital on 10/15 due to hypotension.  She was treated for UTI, NASREEN, hyponatremia and sent back to TCU. She reports she is not walking.    Past Medical and Surgical History reviewed in Epic today.    MEDICATIONS:  Current Outpatient Medications   Medication Sig Dispense Refill     acetaminophen (TYLENOL) 500 MG tablet Take 1,000 mg by mouth 4 times daily        albuterol (PROAIR HFA/PROVENTIL HFA/VENTOLIN HFA) 108 (90 Base) MCG/ACT inhaler Inhale 2 puffs into the lungs every 4 hours as needed for shortness of breath / dyspnea or wheezing       cyclobenzaprine (FLEXERIL) 10 MG tablet Take 1 tablet (10 mg) by mouth 3 times daily as needed for muscle spasms 90 tablet 1     enoxaparin (LOVENOX) 40 MG/0.4ML syringe Inject 0.4 mLs (40 mg) Subcutaneous every 24 hours for 28  "days Continue for 28 days or until pt is more mobile, whichever comes first.       estradiol (ESTRACE) 0.1 MG/GM cream Place 2 g vaginally At Bedtime        Fexofenadine HCl (ALLEGRA PO) Take 180 mg by mouth every morning        fluticasone (FLONASE) 50 MCG/ACT spray Spray 1 spray into both nostrils 2 times daily        fluticasone-salmeterol (ADVAIR) 500-50 MCG/DOSE inhaler Inhale 1 puff into the lungs every 12 hours       gabapentin (NEURONTIN) 400 MG capsule Take 2 capsules (800 mg) by mouth 3 times daily       gabapentin (NEURONTIN) 400 MG capsule Take 3 capsules (1,200 mg) by mouth At Bedtime Give at 11PM       levothyroxine (SYNTHROID) 125 MCG tablet Take 1 tablet (125 mcg) by mouth every morning       lisinopril (PRINIVIL/ZESTRIL) 40 MG tablet Take 1 tablet (40 mg) by mouth daily .  Hold for systolic blood pressure less than 110.       melatonin 5 MG tablet Take 1 tablet (5 mg) by mouth At Bedtime       Montelukast Sodium (SINGULAIR PO) Take 10 mg by mouth At Bedtime        order for DME Equipment being ordered: TENS.    TENS UNIT Ordered- Please call your insurance to Verify coverage and locations to  the device.     Please Dispense Device, leads, pads, wires, and all other necessary equipment that is needed for use.     Length of need: 36 months. 1 Device 0     oxyCODONE (ROXICODONE) 5 MG tablet Take 1-2 tablets (5-10 mg) by mouth every 3 hours as needed for moderate to severe pain 60 tablet 0     polyethylene glycol (MIRALAX/GLYCOLAX) packet Take 1 packet by mouth daily as needed for constipation       senna-docusate (SENOKOT-S/PERICOLACE) 8.6-50 MG tablet Take 2 tablets by mouth 2 times daily as needed for constipation           REVIEW OF SYSTEMS:  4 point ROS including Respiratory, CV, GI and , other than that noted in the HPI,  is negative    Objective:  /78   Pulse 86   Temp 98.2  F (36.8  C)   Resp 16   Ht 1.575 m (5' 2\")   Wt 64.8 kg (142 lb 12.8 oz)   SpO2 97%   BMI 26.12 kg/m  "    Exam:  GENERAL APPEARANCE:  Alert, in no distress  ENT:  Mouth and posterior oropharynx normal, moist mucous membranes, hearing acuity adequate   EYES:  EOM, conjunctivae, lids, pupils and irises normal    RESP:  respiratory effort and palpation of chest normal, no respiratory distress, Lung sounds clear  CV:  Palpation and auscultation of heart done , rate and rhythm reg, no murmur, no rub or gallop, Edema none  ABDOMEN:  normal bowel sounds, soft, nontender, no hepatosplenomegaly or other masses  M/S:   Gait and station not observed, Digits and nails normal   SKIN:  Inspection/Palpation of skin and subcutaneous tissue no rash  NEURO: 2-12 in normal limits and at patient's baseline  PSYCH:  insight and judgement, memory intaact , affect and mood normal    Labs:   Labs done in SNF are in [place] EPIC. Please refer to them using [facility]Care Everywhere. and Recent labs in EPIC reviewed by me today.     ASSESSMENT/PLAN:  (I95.9) Hypotension, unspecified hypotension type  (primary encounter diagnosis)  (I10) Essential hypertension  Comment: patient in TCU recovering from spine surgery. She did return to obs unit due to hypotension. She was treated with IVF. BP now running 108/78, 104/72, 109/72  Plan: continue lisinopril 40mg q day- hold if SBP <110    (N39.0) Urinary tract infection without hematuria, site unspecified  Comment: UC grew out >100k E coli. She did have torres in hospital but is now voiding ok.   Plan: complete course of bactrim.     (N17.9) Acute kidney injury (H)  Comment: creat up a bit during recent trip to hospital thought due to dehydration. She was treated with IVF during hospital stay and creat now WNL  Plan: BMP later this week.     (Z98.890) S/P spinal surgery  Comment: hospitalized 10/1-10/11 for planned spinal surgery. Post op course significant for radiculopathy, urinary retention and ABLA. She is making slow progress with therapy in TCU. She states she is not walking yet. Therapy reports she  can march in place.  She continues to have pain down both legs that is new since surgery. She is taking gabapentin,  oxycodone, flexeril and APAP.   Plan: physical therapy   Pain management- wean opioids as able    (D62) Anemia due to blood loss, acute  Comment: hgb dropped from 12.1 to 8.8  She did not require blood transfusions.   Hemoglobin   Date Value Ref Range Status   10/18/2019 9.4 (L) 11.7 - 15.7 g/dL Final   10/16/2019 8.5 (L) 11.7 - 15.7 g/dL Final   hgb is coming up nicely. No need for iron supp  Plan: periodic hb    (E03.9) Hypothyroidism, unspecified type  Comment: no recent TSH found.   Plan: continue current synthroid  Check TSH this week    (J45.20) Mild intermittent asthma, unspecified whether complicated  Comment: asymptomatic. No shortness of breath or wheezing  Plan: continue current meds          Electronically signed by:  JAZMIN Zepeda CNP               Sincerely,        JAZMIN Zepeda CNP

## 2019-10-22 NOTE — PROGRESS NOTES
[facility]  [place] Medical Record Number:  [id number]  Place of Service where encounter took place:  [facility] [place] [facility] [[id number]]  Chief Complaint   Patient presents with     RECHECK       HPI:    Aleena Ontiveros  is a 59 year old (1960), who is being seen today for an episodic care visit.  HPI information obtained from: facility chart records, facility staff, patient report and [facility] chart review. Today's concern is:  Brief Summary of Hospital Course: Mrs. Ontiveros underwent Lumbar fusion L5-S1and decompression L4-5-S1 for Spondylolisthesis on 10/1. 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 Hospital course was notable for left side radicular  pain. She was fitted for [place] brace. She did have left  hip pain while in the hospital and did have a steroid injection. She also had issues with urinary retention and hypertension as well as ABLA.    Since in TCU recovery has been slow. She is taking oxycodone and acetaminophen.   She did return to hospital on 10/15 due to hypotension.  She was treated for UTI, NASREEN, hyponatremia and sent back to TCU. She reports she is not walking.    Past Medical and Surgical History reviewed in Epic today.    MEDICATIONS:  Current Outpatient Medications   Medication Sig Dispense Refill     acetaminophen (TYLENOL) 500 MG tablet Take 1,000 mg by mouth 4 times daily        albuterol (PROAIR HFA/PROVENTIL HFA/VENTOLIN HFA) 108 (90 Base) MCG/ACT inhaler Inhale 2 puffs into the lungs every 4 hours as needed for shortness of breath / dyspnea or wheezing       cyclobenzaprine (FLEXERIL) 10 MG tablet Take 1 tablet (10 mg) by mouth 3 times daily as needed for muscle spasms 90 tablet 1     enoxaparin (LOVENOX) 40 MG/0.4ML syringe Inject 0.4 mLs (40 mg) Subcutaneous every 24 hours for 28 days Continue for 28 days or until pt is more mobile, whichever comes first.       estradiol  "(ESTRACE) 0.1 MG/GM cream Place 2 g vaginally At Bedtime        Fexofenadine HCl (ALLEGRA PO) Take 180 mg by mouth every morning        fluticasone (FLONASE) 50 MCG/ACT spray Spray 1 spray into both nostrils 2 times daily        fluticasone-salmeterol (ADVAIR) 500-50 MCG/DOSE inhaler Inhale 1 puff into the lungs every 12 hours       gabapentin (NEURONTIN) 400 MG capsule Take 2 capsules (800 mg) by mouth 3 times daily       gabapentin (NEURONTIN) 400 MG capsule Take 3 capsules (1,200 mg) by mouth At Bedtime Give at 11PM       levothyroxine (SYNTHROID) 125 MCG tablet Take 1 tablet (125 mcg) by mouth every morning       lisinopril (PRINIVIL/ZESTRIL) 40 MG tablet Take 1 tablet (40 mg) by mouth daily .  Hold for systolic blood pressure less than 110.       melatonin 5 MG tablet Take 1 tablet (5 mg) by mouth At Bedtime       Montelukast Sodium (SINGULAIR PO) Take 10 mg by mouth At Bedtime        order for DME Equipment being ordered: TENS.    TENS UNIT Ordered- Please call your insurance to Verify coverage and locations to  the device.     Please Dispense Device, leads, pads, wires, and all other necessary equipment that is needed for use.     Length of need: 36 months. 1 Device 0     oxyCODONE (ROXICODONE) 5 MG tablet Take 1-2 tablets (5-10 mg) by mouth every 3 hours as needed for moderate to severe pain 60 tablet 0     polyethylene glycol (MIRALAX/GLYCOLAX) packet Take 1 packet by mouth daily as needed for constipation       senna-docusate (SENOKOT-S/PERICOLACE) 8.6-50 MG tablet Take 2 tablets by mouth 2 times daily as needed for constipation           REVIEW OF SYSTEMS:  4 point ROS including Respiratory, CV, GI and , other than that noted in the HPI,  is negative    Objective:  /78   Pulse 86   Temp 98.2  F (36.8  C)   Resp 16   Ht 1.575 m (5' 2\")   Wt 64.8 kg (142 lb 12.8 oz)   SpO2 97%   BMI 26.12 kg/m    Exam:  GENERAL APPEARANCE:  Alert, in no distress  ENT:  Mouth and posterior oropharynx " normal, moist mucous membranes, hearing acuity adequate   EYES:  EOM, conjunctivae, lids, pupils and irises normal    RESP:  respiratory effort and palpation of chest normal, no respiratory distress, Lung sounds clear  CV:  Palpation and auscultation of heart done , rate and rhythm reg, no murmur, no rub or gallop, Edema none  ABDOMEN:  normal bowel sounds, soft, nontender, no hepatosplenomegaly or other masses  M/S:   Gait and station not observed, Digits and nails normal   SKIN:  Inspection/Palpation of skin and subcutaneous tissue no rash  NEURO: 2-12 in normal limits and at patient's baseline  PSYCH:  insight and judgement, memory intaact , affect and mood normal    Labs:   Labs done in SNF are in [facility]. Please refer to them using [employer] Everywhere. and Recent labs in EPIC reviewed by me today.     ASSESSMENT/PLAN:  (I95.9) Hypotension, unspecified hypotension type  (primary encounter diagnosis)  (I10) Essential hypertension  Comment: patient in TCU recovering from spine surgery. She did return to obs unit due to hypotension. She was treated with IVF. BP now running 108/78, 104/72, 109/72  Plan: continue lisinopril 40mg q day- hold if SBP <110    (N39.0) Urinary tract infection without hematuria, site unspecified  Comment: UC grew out >100k E coli. She did have torres in hospital but is now voiding ok.   Plan: complete course of bactrim.     (N17.9) Acute kidney injury (H)  Comment: creat up a bit during recent trip to hospital thought due to dehydration. She was treated with IVF during hospital stay and creat now WNL  Plan: BMP later this week.     (Z98.890) S/P spinal surgery  Comment: hospitalized 10/1-10/11 for planned spinal surgery. Post op course significant for radiculopathy, urinary retention and ABLA. She is making slow progress with therapy in TCU. She states she is not walking yet. Therapy reports she can march in place.  She continues to have pain down both legs that is new since surgery.  She is taking gabapentin,  oxycodone, flexeril and APAP.   Plan: physical therapy   Pain management- wean opioids as able    (D62) Anemia due to blood loss, acute  Comment: hgb dropped from 12.1 to 8.8  She did not require blood transfusions.   Hemoglobin   Date Value Ref Range Status   10/18/2019 9.4 (L) 11.7 - 15.7 g/dL Final   10/16/2019 8.5 (L) 11.7 - 15.7 g/dL Final   hgb is coming up nicely. No need for iron supp  Plan: periodic hb    (E03.9) Hypothyroidism, unspecified type  Comment: no recent TSH found.   Plan: continue current synthroid  Check TSH this week    (J45.20) Mild intermittent asthma, unspecified whether complicated  Comment: asymptomatic. No shortness of breath or wheezing  Plan: continue current meds          Electronically signed by:  JAZMIN Zepeda CNP

## 2019-10-23 ENCOUNTER — MYC MEDICAL ADVICE (OUTPATIENT)
Dept: ORTHOPEDICS | Facility: CLINIC | Age: 59
End: 2019-10-23

## 2019-10-23 RX ORDER — OXYCODONE HYDROCHLORIDE 5 MG/1
5-10 TABLET ORAL
Qty: 60 TABLET | Refills: 0 | Status: SHIPPED | OUTPATIENT
Start: 2019-10-23 | End: 2019-10-24

## 2019-10-24 DIAGNOSIS — G89.18 POSTOPERATIVE PAIN AFTER SPINAL SURGERY: ICD-10-CM

## 2019-10-24 RX ORDER — OXYCODONE HYDROCHLORIDE 5 MG/1
5-10 TABLET ORAL
Qty: 60 TABLET | Refills: 0 | Status: SHIPPED | OUTPATIENT
Start: 2019-10-24 | End: 2019-11-12

## 2019-10-24 NOTE — TELEPHONE ENCOUNTER
[facility] Call Center    Phone Message    May a detailed message be left on voicemail: yes    Reason for Call: Other: Aleena is requesting a call back from the care team. She would like to know if she will be able to take her brace off on sunday for a formal wedding she is attending. She states she will be seated in her wheelchair during this time. Please reach out to Aleena to discuss.       Action Taken: Message routed to:  [facility] ([facility] Ortho

## 2019-10-25 ENCOUNTER — HOSPITAL LABORATORY (OUTPATIENT)
Dept: OTHER | Facility: CLINIC | Age: 59
End: 2019-10-25

## 2019-10-25 DIAGNOSIS — G89.18 POSTOPERATIVE PAIN AFTER SPINAL SURGERY: ICD-10-CM

## 2019-10-25 LAB
ALBUMIN UR-MCNC: NEGATIVE MG/DL
ANION GAP SERPL CALCULATED.3IONS-SCNC: 5 MMOL/L (ref 3–14)
APPEARANCE UR: CLEAR
BILIRUB UR QL STRIP: NEGATIVE
BUN SERPL-MCNC: 10 MG/DL (ref 7–30)
CALCIUM SERPL-MCNC: 8.5 MG/DL (ref 8.5–10.1)
CHLORIDE SERPL-SCNC: 106 MMOL/L (ref 94–109)
CO2 SERPL-SCNC: 26 MMOL/L (ref 20–32)
COLOR UR AUTO: NORMAL
CREAT SERPL-MCNC: 0.55 MG/DL (ref 0.52–1.04)
ERYTHROCYTE [DISTWIDTH] IN BLOOD BY AUTOMATED COUNT: 14.7 % (ref 10–15)
GFR SERPL CREATININE-BSD FRML MDRD: >90 ML/MIN/{1.73_M2}
GLUCOSE SERPL-MCNC: 85 MG/DL (ref 70–99)
GLUCOSE UR STRIP-MCNC: NEGATIVE MG/DL
HCT VFR BLD AUTO: 30.1 % (ref 35–47)
HGB BLD-MCNC: 9.7 G/DL (ref 11.7–15.7)
HGB UR QL STRIP: NEGATIVE
KETONES UR STRIP-MCNC: NEGATIVE MG/DL
LEUKOCYTE ESTERASE UR QL STRIP: NEGATIVE
MCH RBC QN AUTO: 28.6 PG (ref 26.5–33)
MCHC RBC AUTO-ENTMCNC: 32.2 G/DL (ref 31.5–36.5)
MCV RBC AUTO: 89 FL (ref 78–100)
NITRATE UR QL: NEGATIVE
PH UR STRIP: 6 PH (ref 5–7)
PLATELET # BLD AUTO: 339 10E9/L (ref 150–450)
POTASSIUM SERPL-SCNC: 3.7 MMOL/L (ref 3.4–5.3)
RBC # BLD AUTO: 3.39 10E12/L (ref 3.8–5.2)
SODIUM SERPL-SCNC: 137 MMOL/L (ref 133–144)
SOURCE: NORMAL
SP GR UR STRIP: 1.01 (ref 1–1.03)
TSH SERPL DL<=0.005 MIU/L-ACNC: 7.38 MU/L (ref 0.4–4)
UROBILINOGEN UR STRIP-MCNC: NORMAL MG/DL (ref 0–2)
WBC # BLD AUTO: 5.5 10E9/L (ref 4–11)

## 2019-10-25 RX ORDER — OXYCODONE HYDROCHLORIDE 5 MG/1
5-10 TABLET ORAL
Qty: 60 TABLET | Refills: 0 | OUTPATIENT
Start: 2019-10-25

## 2019-10-31 ENCOUNTER — DISCHARGE SUMMARY NURSING HOME (OUTPATIENT)
Dept: GERIATRICS | Facility: CLINIC | Age: 59
End: 2019-10-31
Payer: COMMERCIAL

## 2019-10-31 VITALS
BODY MASS INDEX: 26.31 KG/M2 | HEIGHT: 62 IN | OXYGEN SATURATION: 91 % | RESPIRATION RATE: 16 BRPM | SYSTOLIC BLOOD PRESSURE: 123 MMHG | HEART RATE: 65 BPM | TEMPERATURE: 98.3 F | DIASTOLIC BLOOD PRESSURE: 83 MMHG | WEIGHT: 143 LBS

## 2019-10-31 DIAGNOSIS — Z98.1 H/O SPINAL FUSION: ICD-10-CM

## 2019-10-31 DIAGNOSIS — I10 BENIGN ESSENTIAL HYPERTENSION: Primary | ICD-10-CM

## 2019-10-31 DIAGNOSIS — J45.20 MILD INTERMITTENT ASTHMA, UNSPECIFIED WHETHER COMPLICATED: ICD-10-CM

## 2019-10-31 DIAGNOSIS — D62 ANEMIA DUE TO BLOOD LOSS, ACUTE: ICD-10-CM

## 2019-10-31 DIAGNOSIS — E89.0 S/P COMPLETE THYROIDECTOMY: ICD-10-CM

## 2019-10-31 DIAGNOSIS — N39.0 URINARY TRACT INFECTION WITHOUT HEMATURIA, SITE UNSPECIFIED: ICD-10-CM

## 2019-10-31 DIAGNOSIS — I95.9 HYPOTENSION, UNSPECIFIED HYPOTENSION TYPE: ICD-10-CM

## 2019-10-31 PROCEDURE — 99316 NF DSCHRG MGMT 30 MIN+: CPT | Performed by: NURSE PRACTITIONER

## 2019-10-31 ASSESSMENT — MIFFLIN-ST. JEOR: SCORE: 1166.89

## 2019-10-31 NOTE — LETTER
10/31/2019        RE: Aleena Ontiveros  [address]  [facility] [address]        [facility] DISCHARGE SUMMARY  PATIENT'S NAME: Aleena Ontiveros  YOB: 1960  MEDICAL RECORD NUMBER:  [id number]  Place of Service where encounter took place:  [facility] [place] [facility] [[id number]]    PRIMARY CARE PROVIDER AND CLINIC RESPONSIBLE AFTER TRANSFER:   Arabella Conner MD, [facility] [address] [facility] [address] [facility] [address] / [place]    Non-[facility] Provider     Transferring providers: JAZMIN Zepeda CNP, Monroe Larkin MD  Recent Hospitalization/ED:  [facility] stay 10/01/19 to 10/11/19.  Date of SNF Admission: October / 11 / 2019  Date of SNF (anticipated) Discharge: November / 02 / 2019  Discharged to: previous independent home  Cognitive Scores: SLUMS: 27/30  Physical Function: walking 500' with walker  DME: none    CODE STATUS/ADVANCE DIRECTIVES DISCUSSION:  Full Code   ALLERGIES: Adhesive tape and Erythromycin    DISCHARGE DIAGNOSIS/NURSING FACILITY COURSE:   Brief Summary of Hospital Course: Mrs. Ontiveros underwent Lumbar fusion L5-S1and decompression L4-5-S1 for Spondylolisthesis on 10/1. 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 Hospital course was notable for left side radicular  pain. She was fitted for [place] brace. She did have left  hip pain while in the hospital and did have a steroid injection. She also had issues with urinary retention and hypertension as well as ABLA.      ASSESSMENT/PLAN:  (I95.9) Hypotension, unspecified hypotension type  (primary encounter diagnosis)  (I10) Essential hypertension  Comment:  While in TCU patient had episode of  Hypotension and required stay in observation unit. She was treated with IVF. BP now running 123/83, 131/89, 154/88  Plan: continue lisinopril 40mg q day     (N39.0) Urinary tract infection without hematuria,  site unspecified  Comment:  while in hospital UC grew out >100k E coli. She did have torres in hospital. Torres is out. She completed a course of bactrim and has no dysuria.      (N17.9) Acute kidney injury (H)  Comment: creat up a bit during recent trip to hospital thought due to dehydration. She was treated with IVF during hospital stay and creat now WNL       (Z98.890) S/P spinal surgery  Comment: hospitalized 10/1-10/11 for planned spinal surgery. Post op course significant for radiculopathy, urinary retention and ABLA. She has made slow progress with therapy in TCU but is now walking up to 600'..  She continues to have pain down both legs that is new since surgery. She is taking gabapentin,  oxycodone, flexeril and APAP.   Plan:  will send home with therapy home care  Pain management- will send home oxycodone 5mg tablets #60 with instructions to continue to wean  Will send home flexeril and gabapentin.      (D62) Anemia due to blood loss, acute  Comment: hgb dropped from 12.1 to 8.8  She did not require blood transfusions.         Hemoglobin   Date Value Ref Range Status   10/18/2019 9.4 (L) 11.7 - 15.7 g/dL Final   10/16/2019 8.5 (L) 11.7 - 15.7 g/dL Final   hgb is coming up nicely. No need for iron supp       (E03.9) Hypothyroidism, unspecified type  Comment:   Lab Results   Component Value Date    TSH 7.38 10/25/2019       Plan: continue current synthroid  Follow up with PCP     (J45.20) Mild intermittent asthma, unspecified whether complicated  Comment: asymptomatic. No shortness of breath or wheezing  Plan: continue current meds    Past Medical History:  has a past medical history of Anxiety, Arthritis, Asthma, Chronic osteoarthritis, Hypertension, PONV (postoperative nausea and vomiting), Thyroid disease, and Uncomplicated asthma. She also has no past medical history of Difficult intubation, Malignant hyperthermia, or Spinal headache.    Discharge Medications:  Current Outpatient Medications   Medication  Sig Dispense Refill     acetaminophen (TYLENOL) 500 MG tablet Take 1,000 mg by mouth 4 times daily        albuterol (PROAIR HFA/PROVENTIL HFA/VENTOLIN HFA) 108 (90 Base) MCG/ACT inhaler Inhale 2 puffs into the lungs every 4 hours as needed for shortness of breath / dyspnea or wheezing       cyclobenzaprine (FLEXERIL) 10 MG tablet Take 1 tablet (10 mg) by mouth 3 times daily as needed for muscle spasms 90 tablet 1     enoxaparin (LOVENOX) 40 MG/0.4ML syringe Inject 0.4 mLs (40 mg) Subcutaneous every 24 hours for 28 days Continue for 28 days or until pt is more mobile, whichever comes first.       estradiol (ESTRACE) 0.1 MG/GM cream Place 2 g vaginally At Bedtime        Fexofenadine HCl (ALLEGRA PO) Take 180 mg by mouth every morning        fluticasone (FLONASE) 50 MCG/ACT spray Spray 1 spray into both nostrils 2 times daily        fluticasone-salmeterol (ADVAIR) 500-50 MCG/DOSE inhaler Inhale 1 puff into the lungs every 12 hours       gabapentin (NEURONTIN) 400 MG capsule Take 2 capsules (800 mg) by mouth 3 times daily       gabapentin (NEURONTIN) 400 MG capsule Take 3 capsules (1,200 mg) by mouth At Bedtime Give at 11PM       levothyroxine (SYNTHROID) 125 MCG tablet Take 1 tablet (125 mcg) by mouth every morning       lisinopril (PRINIVIL/ZESTRIL) 40 MG tablet Take 1 tablet (40 mg) by mouth daily .  Hold for systolic blood pressure less than 110.       melatonin 5 MG tablet Take 1 tablet (5 mg) by mouth At Bedtime       Montelukast Sodium (SINGULAIR PO) Take 10 mg by mouth At Bedtime        order for DME Equipment being ordered: TENS.    TENS UNIT Ordered- Please call your insurance to Verify coverage and locations to  the device.     Please Dispense Device, leads, pads, wires, and all other necessary equipment that is needed for use.     Length of need: 36 months. 1 Device 0     oxyCODONE (ROXICODONE) 5 MG tablet Take 1-2 tablets (5-10 mg) by mouth every 3 hours as needed for moderate to severe pain 60  "tablet 0     polyethylene glycol (MIRALAX/GLYCOLAX) packet Take 1 packet by mouth daily as needed for constipation       senna-docusate (SENOKOT-S/PERICOLACE) 8.6-50 MG tablet Take 2 tablets by mouth 2 times daily as needed for constipation         Medication Changes/Rationale:     See above    Controlled medications sent with patient:   Medication: oxycodone 5mg , 60 tabs given to patient at the time of discharge to take home     ROS:   4 point ROS including Respiratory, CV, GI and , other than that noted in the HPI,  is negative    Physical Exam:   Vitals: /83   Pulse 65   Temp 98.3  F (36.8  C)   Resp 16   Ht 1.575 m (5' 2\")   Wt 64.9 kg (143 lb)   SpO2 91%   BMI 26.16 kg/m     BMI= Body mass index is 26.16 kg/m .  GENERAL APPEARANCE:  Alert, in no distress  ENT:  Mouth and posterior oropharynx normal, moist mucous membranes, hearing acuity adequate   EYES:  EOM, conjunctivae, lids, pupils and irises normal  RESP:  respiratory effort and palpation of chest normal, no respiratory distress, Lung sounds clear  CV:  Palpation and auscultation of heart done , rate and rhythm reg, no murmur, no rub or gallop, Edema none  ABDOMEN:  normal bowel sounds, soft, nontender, no hepatosplenomegaly or other masses  M/S:   Gait and station antalgic, Digits and nails normal   SKIN:  Inspection/Palpation of skin and subcutaneous tissue no rash  NEURO: 2-12 in normal limits and at patient's baseline  PSYCH:  insight and judgement, memory intact , affect and mood normal     SNF labs:  CBC RESULTS:   Recent Labs   Lab Test 10/25/19  0715 10/18/19  1005   WBC 5.5 5.1   RBC 3.39* 3.26*   HGB 9.7* 9.4*   HCT 30.1* 28.5*   MCV 89 87   MCH 28.6 28.8   MCHC 32.2 33.0   RDW 14.7 13.9    325       Last Basic Metabolic Panel:  Recent Labs   Lab Test 10/25/19  0715 10/18/19  1005    135   POTASSIUM 3.7 3.9   CHLORIDE 106 101   SIXTO 8.5 8.8   CO2 26 28   BUN 10 8   CR 0.55 0.72   GLC 85 117*       Liver Function " Studies -   Recent Labs   Lab Test 10/15/19  2305 10/01/19  1454   PROTTOTAL 5.9* 4.9*   ALBUMIN 2.4* 3.0*   BILITOTAL 0.4 0.2   ALKPHOS 121 36*   AST 24 24   ALT 43 31       TSH   Date Value Ref Range Status   10/25/2019 7.38 (H) 0.40 - 4.00 mU/L Final   ]    Lab Results   Component Value Date    A1C 5.2 10/05/2019           DISCHARGE PLAN:    Follow up labs: No labs orders/due    Medical Follow Up:      Follow up with primary care provider in 1 weeks    Discharge Services: Home Care:  Occupational Therapy, Physical Therapy, Registered Nurse, Home Health Aide,  and From:  [facility]    Discharge Instructions Verbalized to Patient at Discharge:  We encourage you to be up and out of bed regularly. Please try to walk 30 minutes per day. If you have been prescribed a brace, please wear the brace when up and out of bed. Avoid lifting over 10 pounds, avoid lifting anything overhead, and avoid repetitive bending or twisting.  This means no sporting activities. (such as running, tennis, bowling, golf, bicycling, etc.) until you are seen in clinic      TOTAL DISCHARGE TIME:   Greater than 30 minutes  Electronically signed by:  JAZMIN Zepeda CNP     Home care Face to Face documentation done in [facility] attached to Home care orders for [facility].                     Sincerely,        JAZMIN Zepeda CNP

## 2019-10-31 NOTE — PROGRESS NOTES
[place] [facility] DISCHARGE SUMMARY  PATIENT'S NAME: Aleena Ontiveros  YOB: 1960  MEDICAL RECORD NUMBER:  [id number]  Place of Service where encounter took place:  [facility] [[id number]]    PRIMARY CARE PROVIDER AND CLINIC RESPONSIBLE AFTER TRANSFER:   Arabella Conner MD, [place] [email or web address] [facility] [address] [facility] [address] / [place]    Non-[facility] Provider     Transferring providers: JAZMIN Zepeda CNP, [place] Larkin MD  Recent Hospitalization/ED:  [facility] stay 10/01/19 to 10/11/19.  Date of SNF Admission: October / 11 / 2019  Date of SNF (anticipated) Discharge: November / 02 / 2019  Discharged to: previous independent home  Cognitive Scores: SLUMS: 27/30  Physical Function: walking 500' with walker  DME: none    CODE STATUS/ADVANCE DIRECTIVES DISCUSSION:  Full Code   ALLERGIES: Adhesive tape and Erythromycin    DISCHARGE DIAGNOSIS/NURSING FACILITY COURSE:   Brief Summary of Hospital Course: Mrs. Ontiveros underwent Lumbar fusion L5-S1and decompression L4-5-S1 for Spondylolisthesis on 10/1. 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 Hospital course was notable for left side radicular  pain. She was fitted for Kingston brace. She did have left  hip pain while in the hospital and did have a steroid injection. She also had issues with urinary retention and hypertension as well as ABLA.      ASSESSMENT/PLAN:  (I95.9) Hypotension, unspecified hypotension type  (primary encounter diagnosis)  (I10) Essential hypertension  Comment: While in TCU patient had episode of  Hypotension and required stay in observation unit. She was treated with IVF. BP now running 123/83, 131/89, 154/88  Plan: continue lisinopril 40mg q day     (N39.0) Urinary tract infection without hematuria, site unspecified  Comment: while in hospital UC grew out >100k E coli. She did have torres in  hospital. Nadya is out. She completed a course of bactrim and has no dysuria.      (N17.9) Acute kidney injury (H)  Comment: creat up a bit during recent trip to hospital thought due to dehydration. She was treated with IVF during hospital stay and creat now WNL       (Z98.890) S/P spinal surgery  Comment: hospitalized 10/1-10/11 for planned spinal surgery. Post op course significant for radiculopathy, urinary retention and ABLA. She has made slow progress with therapy in TCU but is now walking up to 600'..  She continues to have pain down both legs that is new since surgery. She is taking gabapentin,  oxycodone, flexeril and APAP.   Plan: will send home with therapy home care  Pain management- will send home oxycodone 5mg tablets #60 with instructions to continue to wean  Will send home flexeril and gabapentin.      (D62) Anemia due to blood loss, acute  Comment: hgb dropped from 12.1 to 8.8  She did not require blood transfusions.         Hemoglobin   Date Value Ref Range Status   10/18/2019 9.4 (L) 11.7 - 15.7 g/dL Final   10/16/2019 8.5 (L) 11.7 - 15.7 g/dL Final   hgb is coming up nicely. No need for iron supp       (E03.9) Hypothyroidism, unspecified type  Comment:   Lab Results   Component Value Date    TSH 7.38 10/25/2019       Plan: continue current synthroid  Follow up with PCP     (J45.20) Mild intermittent asthma, unspecified whether complicated  Comment: asymptomatic. No shortness of breath or wheezing  Plan: continue current meds    Past Medical History:  has a past medical history of Anxiety, Arthritis, Asthma, Chronic osteoarthritis, Hypertension, PONV (postoperative nausea and vomiting), Thyroid disease, and Uncomplicated asthma. She also has no past medical history of Difficult intubation, Malignant hyperthermia, or Spinal headache.    Discharge Medications:  Current Outpatient Medications   Medication Sig Dispense Refill     acetaminophen (TYLENOL) 500 MG tablet Take 1,000 mg by mouth 4 times  daily        albuterol (PROAIR HFA/PROVENTIL HFA/VENTOLIN HFA) 108 (90 Base) MCG/ACT inhaler Inhale 2 puffs into the lungs every 4 hours as needed for shortness of breath / dyspnea or wheezing       cyclobenzaprine (FLEXERIL) 10 MG tablet Take 1 tablet (10 mg) by mouth 3 times daily as needed for muscle spasms 90 tablet 1     enoxaparin (LOVENOX) 40 MG/0.4ML syringe Inject 0.4 mLs (40 mg) Subcutaneous every 24 hours for 28 days Continue for 28 days or until pt is more mobile, whichever comes first.       estradiol (ESTRACE) 0.1 MG/GM cream Place 2 g vaginally At Bedtime        Fexofenadine HCl (ALLEGRA PO) Take 180 mg by mouth every morning        fluticasone (FLONASE) 50 MCG/ACT spray Spray 1 spray into both nostrils 2 times daily        fluticasone-salmeterol (ADVAIR) 500-50 MCG/DOSE inhaler Inhale 1 puff into the lungs every 12 hours       gabapentin (NEURONTIN) 400 MG capsule Take 2 capsules (800 mg) by mouth 3 times daily       gabapentin (NEURONTIN) 400 MG capsule Take 3 capsules (1,200 mg) by mouth At Bedtime Give at 11PM       levothyroxine (SYNTHROID) 125 MCG tablet Take 1 tablet (125 mcg) by mouth every morning       lisinopril (PRINIVIL/ZESTRIL) 40 MG tablet Take 1 tablet (40 mg) by mouth daily .  Hold for systolic blood pressure less than 110.       melatonin 5 MG tablet Take 1 tablet (5 mg) by mouth At Bedtime       Montelukast Sodium (SINGULAIR PO) Take 10 mg by mouth At Bedtime        order for DME Equipment being ordered: TENS.    TENS UNIT Ordered- Please call your insurance to Verify coverage and locations to  the device.     Please Dispense Device, leads, pads, wires, and all other necessary equipment that is needed for use.     Length of need: 36 months. 1 Device 0     oxyCODONE (ROXICODONE) 5 MG tablet Take 1-2 tablets (5-10 mg) by mouth every 3 hours as needed for moderate to severe pain 60 tablet 0     polyethylene glycol (MIRALAX/GLYCOLAX) packet Take 1 packet by mouth daily as needed  "for constipation       senna-docusate (SENOKOT-S/PERICOLACE) 8.6-50 MG tablet Take 2 tablets by mouth 2 times daily as needed for constipation         Medication Changes/Rationale:     See above    Controlled medications sent with patient:   Medication: oxycodone 5mg , 60 tabs given to patient at the time of discharge to take home     ROS:   4 point ROS including Respiratory, CV, GI and , other than that noted in the HPI,  is negative    Physical Exam:   Vitals: /83   Pulse 65   Temp 98.3  F (36.8  C)   Resp 16   Ht 1.575 m (5' 2\")   Wt 64.9 kg (143 lb)   SpO2 91%   BMI 26.16 kg/m    BMI= Body mass index is 26.16 kg/m .  GENERAL APPEARANCE:  Alert, in no distress  ENT:  Mouth and posterior oropharynx normal, moist mucous membranes, hearing acuity adequate   EYES:  EOM, conjunctivae, lids, pupils and irises normal  RESP:  respiratory effort and palpation of chest normal, no respiratory distress, Lung sounds clear  CV:  Palpation and auscultation of heart done , rate and rhythm reg, no murmur, no rub or gallop, Edema none  ABDOMEN:  normal bowel sounds, soft, nontender, no hepatosplenomegaly or other masses  M/S:   Gait and station antalgic, Digits and nails normal   SKIN:  Inspection/Palpation of skin and subcutaneous tissue no rash  NEURO: 2-12 in normal limits and at patient's baseline  PSYCH:  insight and judgement, memory intact , affect and mood normal     SNF labs:  CBC RESULTS:   Recent Labs   Lab Test 10/25/19  0715 10/18/19  1005   WBC 5.5 5.1   RBC 3.39* 3.26*   HGB 9.7* 9.4*   HCT 30.1* 28.5*   MCV 89 87   MCH 28.6 28.8   MCHC 32.2 33.0   RDW 14.7 13.9    325       Last Basic Metabolic Panel:  Recent Labs   Lab Test 10/25/19  0715 10/18/19  1005    135   POTASSIUM 3.7 3.9   CHLORIDE 106 101   SIXTO 8.5 8.8   CO2 26 28   BUN 10 8   CR 0.55 0.72   GLC 85 117*       Liver Function Studies -   Recent Labs   Lab Test 10/15/19  2305 10/01/19  1454   PROTTOTAL 5.9* 4.9*   ALBUMIN 2.4* " 3.0*   BILITOTAL 0.4 0.2   ALKPHOS 121 36*   AST 24 24   ALT 43 31       TSH   Date Value Ref Range Status   10/25/2019 7.38 (H) 0.40 - 4.00 mU/L Final   ]    Lab Results   Component Value Date    A1C 5.2 10/05/2019           DISCHARGE PLAN:    Follow up labs: No labs orders/due    Medical Follow Up:      Follow up with primary care provider in 1 weeks    Discharge Services: Home Care:  Occupational Therapy, Physical Therapy, Registered Nurse, Home Health Aide,  and From:  [facility]    Discharge Instructions Verbalized to Patient at Discharge:  We encourage you to be up and out of bed regularly. Please try to walk 30 minutes per day. If you have been prescribed a brace, please wear the brace when up and out of bed. Avoid lifting over 10 pounds, avoid lifting anything overhead, and avoid repetitive bending or twisting.  This means no sporting activities. (such as running, tennis, bowling, golf, bicycling, etc.) until you are seen in clinic      TOTAL DISCHARGE TIME:   Greater than 30 minutes  Electronically signed by:  JAZMIN Zepeda CNP     Home care Face to Face documentation done in [facility] attached to Home care orders for [facility].

## 2019-11-04 DIAGNOSIS — Z98.1 H/O SPINAL FUSION: Primary | ICD-10-CM

## 2019-11-06 ENCOUNTER — PATIENT OUTREACH (OUTPATIENT)
Dept: CARE COORDINATION | Facility: CLINIC | Age: 59
End: 2019-11-06

## 2019-11-06 ASSESSMENT — ACTIVITIES OF DAILY LIVING (ADL): DEPENDENT_IADLS:: INDEPENDENT

## 2019-11-06 NOTE — PROGRESS NOTES
Clinic Care Coordination Contact    Clinic Care Coordination Contact  OUTREACH    Referral Information:  Referral Source: IP Handoff    Primary Diagnosis: Orthopedic    Chief Complaint   Patient presents with     Clinic Care Coordination - Initial        Universal Utilization: Appropriate utilization.  Clinic Utilization  Difficulty keeping appointments:: No  Compliance Concerns: No  No-Show Concerns: No  No PCP office visit in Past Year: No  Utilization    Last refreshed: 11/6/2019  9:47 AM:  Hospital Admissions 2           Last refreshed: 11/6/2019  9:47 AM:  ED Visits 0           Last refreshed: 11/6/2019  9:47 AM:  No Show Count (past year) 0              Current as of: 11/6/2019  9:47 AM              Clinical Concerns:  Current Medical Concerns: Recovering from back surgery.   Has more pain with activity.  Is sleeping 4 hours at a time which is an improvement from before surgery.  Is starting home care next week per her request.  Her legs feel like she has shin splints though she is not running. Her legs and ankles hurt this morning.  She does not have an appointment with PCP and CC encouraged her to set one up.  She would like to review her labs and to make sure she does not have UTI. She reviewed her labs through My Chart.  She will set up appointment.   Current Behavioral Concerns: Mentioned that her family is struggling and that she would like to see a counselor and doesn't know how to find one. She would like assistance from CC    Education Provided to patient: Discussed role of CC and how to find a counselor.   Pain  Pain (GOAL):: No  Health Maintenance Reviewed: Up to date  Clinical Pathway: None    Medication Management:  No concerns.      Functional Status:  Dependent ADLs:: Independent  Dependent IADLs:: Independent  Bed or wheelchair confined:: No  Mobility Status: Independent  Fallen 2 or more times in the past year?: No  Any fall with injury in the past year?: No    Living Situation:  Current  living arrangement:: I live in a private home with spouse  Type of residence:: Private home - stairs    Diet/Exercise/Sleep:  Diet:: Regular  Inadequate nutrition (GOAL):: No  Food Insecurity: No  Tube Feeding: No  Exercise:: Currently not exercising  Inadequate activity/exercise (GOAL):: No  Significant changes in sleep pattern (GOAL): No    Transportation:  Transportation concerns (GOAL):: No  Transportation means:: Regular car     Psychosocial:  Mental health DX:: Yes  Mental health DX how managed:: Medication  Mental health management concern (GOAL):: Yes  Informal Support system:: Family     Financial/Insurance:   Financial/Insurance concerns (GOAL):: No  Has [employer] insurance.  No financial concerns.      Resources and Interventions:  Current Resources:   List of home care services:: Skilled Nursing;   Community Resources: Home Care([place])  Supplies used at home:: None  Equipment Currently Used at Home: none    Advance Care Plan/Directive  Advanced Care Plans/Directives on file:: No  Advanced Care Plan/Directive Status: Declined Further Information    Referrals Placed: None     Goals:   Goals        General    Mental Health Management (pt-stated)     Notes - Note created  11/6/2019  9:51 AM by Rere Belle LSW    Goal Statement: I will learn what options are available for mental health support.  Measure of Success: I will have a list of clinics that are in network  Supportive Steps to Achieve: I will all [employer] to learn what clinics are in network and will discuss with CC.  Barriers: Recovering from surgery.    Strengths: Has insurance. Would like to get some support with family problems.   Date to Achieve By: 1-1-2020  Patient expressed understanding of goal: yes                Patient/Caregiver understanding: Patient agreed to work with SW to find counselor.  She will set up PCP appointment.      Outreach Frequency: 2 weeks  Future Appointments              In 6 days [facility] X[facility],  [facility]    In 6 days Evangelist Vasquez MD [facility], [facility]    In 1 month Evangelist Vasquez MD [facility], [facility]          Plan: SW to call in one week to assess needs.  Rere Belle,   [facility]  [phone]

## 2019-11-11 NOTE — PROGRESS NOTES
Spine Surgical Hx:  1976 - PISF T4-L4 with Carpio gavino instrumentation x 2 (Dr. Bandar Rose, [place]) for AIS.  10/01/2019 - 3-column osteotomy L5-S1 with sacral dome osteotomy; TLIF with Cunningham laminectomy L5-S1; bilateral PISF L2-pelvis using bilateral double S2AI screws; use of Infuse BMP Large kit (Christina/Rashid/Mara) for Gr 4 isthmic spondylolisthesis L5-S1 with severe sagittal malalignment.  [Implants:  Medtronic Solera and Ballast screw systems, with dual headed screws, reductions screws; 5.5 mm CoCr rods x 3].      Reason for Visit:  Chief Complaint   Patient presents with     Consult     DOS 10/1/19 S/P Transforaminal Lumbar Interbody Fusion Three Column Osteotomy L5-S1, Posterior Spinal Fusion L2-Pelvis. Use of BMP bone graft       S>  59/,f 6 wks postop, 1st postop visit.    Accompanied by .  Compared to preoperative, says that the burning sensation in her right thigh and leg is now resolved.  However, this seems to have been replaced by a sensation of numbness.  She is also still having some discomfort in that right leg, mainly below the knees.  No weakness associated.    Was at [facility] postop; was discharged to home on a Satuday 1.5 wks ago.  At TCU, was doing PT daily.  Yesterday, homecare PT showed up at her home.  (Home health RN, PT, OT).    Oxycodone 5mg, ~ 2 tabs/day.  Uses walker.  Wears Hyndman brace when OOB.  She is wondering if she could be given a shorter Kingston brace.  She says that when she sits down the front lower part of the brace digs into her groin.  When she tries to move the brace up then it starts hitting her on the base of her neck.      Oswestry (EPI) Questionnaire    OSWESTRY DISABILITY INDEX 11/12/2019   Count 10   Sum 36   Oswestry Score (%) 72   Some recent data might be hidden      Preop EPI  70%.  6 wks  72%      O>   Alert, oriented x 3, cooperative.  Not in CP distress.  There were no vitals taken for this visit.  Surgical incision well-healed, no sign of  infection.  Ambulates independently.     Wearing [place] brace.  She uses a walker to ambulate.  However, able to walk inside the room without walker.  Surgical incision posterior midline is well-healed, no sign of infection.  She reports some discomfort in the left paraspinal sacral region.  Neurologic exam reveals 5/5 strength for all muscle groups in both lower extremities.  Of note, she was wearing shoes when I examined her, and thus I do not think I was able to examine EHL strength accurately.  However, all other muscle groups have good strength symmetrically.  Reports mild decreased sensation to light touch over lateral aspect of right thigh and calf.    Imaging:   EOS full spine AP lateral standing x-rays taken today show stable no segmental posterior instrumentation L2 to pelvis, with double bilateral S2 AI screws.  No evidence of fixation loosening or failure.  Also still with pre-existing Carpio rods x2 in her thoracolumbar spine.    A>  6 weeks postoperative, doing reasonably well.    P>   I had a good discussion with patient and .  I reassured her regarding my clinical and radiographic findings.  It is likely that there is still significant inflammation in the surgical area, and the nerves are still somewhat inflamed, swollen and irritated.  I expect this to continue to improve as time goes by.  At present, I believe her x-rays show stable fixation.  I told her that now would be a good time to start outpatient physical therapy for strengthening.    Continue wearing a brace for 3 months postoperative.  We will send her to orthotics for possible procurement of a shorter or lower profile brace that would be more comfortable for her to wear.  She only needs to wear this when out of bed.    I advised her to continue weaning down the opioid medication.  Currently she is only taking 2 tablets of oxycodone per day.  I gave her a refill today for 30 more tablets.    - Outpatient PT order ([facility])  placed.    RTC 6 wks (3 mos postop) with rpt EOS full spine ap-lat standing, out of brace.      Evangelist Vasquez MD    Orthopaedic Spine Surgery  Dept Orthopaedic Surgery, [facility]  [phone] office, [phone] pager  [email or web address]

## 2019-11-12 ENCOUNTER — ANCILLARY PROCEDURE (OUTPATIENT)
Dept: GENERAL RADIOLOGY | Facility: CLINIC | Age: 59
End: 2019-11-12
Attending: ORTHOPAEDIC SURGERY
Payer: COMMERCIAL

## 2019-11-12 ENCOUNTER — OFFICE VISIT (OUTPATIENT)
Dept: ORTHOPEDICS | Facility: CLINIC | Age: 59
End: 2019-11-12
Payer: COMMERCIAL

## 2019-11-12 DIAGNOSIS — M43.17 SPONDYLOLISTHESIS OF LUMBOSACRAL REGION: ICD-10-CM

## 2019-11-12 DIAGNOSIS — G89.18 POSTOPERATIVE PAIN AFTER SPINAL SURGERY: ICD-10-CM

## 2019-11-12 DIAGNOSIS — Z98.1 S/P SPINAL FUSION: Primary | ICD-10-CM

## 2019-11-12 DIAGNOSIS — Z98.1 H/O SPINAL FUSION: ICD-10-CM

## 2019-11-12 RX ORDER — OXYCODONE HYDROCHLORIDE 5 MG/1
5 TABLET ORAL 2 TIMES DAILY PRN
Qty: 30 TABLET | Refills: 0 | Status: SHIPPED | OUTPATIENT
Start: 2019-11-12 | End: 2019-11-19

## 2019-11-12 NOTE — LETTER
11/12/2019       RE: Aleena Ontiveros  [address]  [place] [address]     Dear Colleague,    Thank you for referring your patient, Aleena Ontiveros, to the [facility] at [facility]. Please see a copy of my visit note below.    Spine Surgical Hx:  1976 - PISF T4-L4 with Carpio gavino instrumentation x 2 (Dr. Bandar Rose, [place]) for AIS.  10/01/2019 - 3-column osteotomy L5-S1 with sacral dome osteotomy; TLIF with Cunningham laminectomy L5-S1; bilateral PISF L2-pelvis using bilateral double S2AI screws; use of Infuse BMP Large kit (Christina/Rashid/Mara) for Gr 4 isthmic spondylolisthesis L5-S1 with severe sagittal malalignment.  [Implants:  Medtronic Solera and Ballast screw systems, with dual headed screws, reductions screws; 5.5 mm CoCr rods x 3].      Reason for Visit:  Chief Complaint   Patient presents with     Consult     DOS 10/1/19 S/P Transforaminal Lumbar Interbody Fusion Three Column Osteotomy L5-S1, Posterior Spinal Fusion L2-Pelvis. Use of BMP bone graft       S>  59/,f 6 wks postop, 1st postop visit.    Accompanied by .  Compared to preoperative, says that the burning sensation in her right thigh and leg is now resolved.  However, this seems to have been replaced by a sensation of numbness.  She is also still having some discomfort in that right leg, mainly below the knees.  No weakness associated.    Was at [facility] postop; was discharged to home on a Satuday 1.5 wks ago.  At [facility], was doing PT daily.  Yesterday, homecare PT showed up at her home.  (Home health RN, PT, OT).    Oxycodone 5mg, ~ 2 tabs/day.  Uses walker.  Wears Kingston brace when OOB.  She is wondering if she could be given a shorter Kingston brace.  She says that when she sits down the front lower part of the brace digs into her groin.  When she tries to move the brace up then it starts hitting her on the base of her neck.      Oswestry (EPI)  Questionnaire    OSWESTRY DISABILITY INDEX 11/12/2019   Count 10   Sum 36   Oswestry Score (%) 72   Some recent data might be hidden      Preop EPI  70%.  6 wks  72%      O>   Alert, oriented x 3, cooperative.  Not in CP distress.  There were no vitals taken for this visit.  Surgical incision well-healed, no sign of infection.  Ambulates independently.     Wearing Kingston brace.  She uses a walker to ambulate.  However, able to walk inside the room without walker.  Surgical incision posterior midline is well-healed, no sign of infection.  She reports some discomfort in the left paraspinal sacral region.  Neurologic exam reveals 5/5 strength for all muscle groups in both lower extremities.  Of note, she was wearing shoes when I examined her, and thus I do not think I was able to examine EHL strength accurately.  However, all other muscle groups have good strength symmetrically.  Reports mild decreased sensation to light touch over lateral aspect of right thigh and calf.    Imaging:   EOS full spine AP lateral standing x-rays taken today show stable no segmental posterior instrumentation L2 to pelvis, with double bilateral S2 AI screws.  No evidence of fixation loosening or failure.  Also still with pre-existing Carpio rods x2 in her thoracolumbar spine.    A>  6 weeks postoperative, doing reasonably well.    P>   I had a good discussion with patient and .  I reassured her regarding my clinical and radiographic findings.  It is likely that there is still significant inflammation in the surgical area, and the nerves are still somewhat inflamed, swollen and irritated.  I expect this to continue to improve as time goes by.  At present, I believe her x-rays show stable fixation.  I told her that now would be a good time to start outpatient physical therapy for strengthening.    Continue wearing a brace for 3 months postoperative.  We will send her to orthotics for possible procurement of a shorter or lower  profile brace that would be more comfortable for her to wear.  She only needs to wear this when out of bed.    I advised her to continue weaning down the opioid medication.  Currently she is only taking 2 tablets of oxycodone per day.  I gave her a refill today for 30 more tablets.    - Outpatient PT order ([facility]) placed.    RTC 6 wks (3 mos postop) with rpt EOS full spine ap-lat standing, out of brace.      Evangelist Vasquez MD    Orthopaedic Spine Surgery  Dept Orthopaedic Surgery, [facility]  [phone] office, [phone] pager  [email or web address]

## 2019-11-12 NOTE — NURSING NOTE
Reason For Visit:   Chief Complaint   Patient presents with     Consult     DOS 10/1/19 S/P Transforaminal Lumbar Interbody Fusion Three Column Osteotomy L5-S1, Posterior Spinal Fusion L2-Pelvis. Use of BMP bone graft     Primary MD: Arabella Conner  Ref. MD: Jasson Aguilar     ?  No  Occupation  .  Currently working? Yes.  Work status?  Full time.       Pain Assessment  Patient Currently in Pain: Yes  0-10 Pain Scale: 8  Primary Pain Location: Back  Pain Descriptors: Discomfort    Oswestry (EPI) Questionnaire    OSWESTRY DISABILITY INDEX 11/12/2019   Count 10   Sum 36   Oswestry Score (%) 72   Some recent data might be hidden        Visual Analog Pain Scale  Back Pain Scale 0-10: 7.5  Right leg pain: 8.5  Left leg pain: 6    Promis 10 Assessment    PROMIS 10 11/12/2019   In general, would you say your health is: Very good   In general, would you say your quality of life is: Good   In general, how would you rate your physical health? Good   In general, how would you rate your mental health, including your mood and your ability to think? Good   In general, how would you rate your satisfaction with your social activities and relationships? Good   In general, please rate how well you carry out your usual social activities and roles Poor   To what extent are you able to carry out your everyday physical activities such as walking, climbing stairs, carrying groceries, or moving a chair? A little   How often have you been bothered by emotional problems such as feeling anxious, depressed or irritable? Rarely   How would you rate your fatigue on average? Moderate   How would you rate your pain on average?   0 = No Pain  to  10 = Worst Imaginable Pain 7   In general, would you say your health is: 4   In general, would you say your quality of life is: 3   In general, how would you rate your physical health? 3   In general, how would you rate your mental health, including your mood and your ability to  think? 3   In general, how would you rate your satisfaction with your social activities and relationships? 3   In general, please rate how well you carry out your usual social activities and roles. (This includes activities at home, at work and in your community, and responsibilities as a parent, child, spouse, employee, friend, etc.) 1   To what extent are you able to carry out your everyday physical activities such as walking, climbing stairs, carrying groceries, or moving a chair? 2   In the past 7 days, how often have you been bothered by emotional problems such as feeling anxious, depressed, or irritable? 2   In the past 7 days, how would you rate your fatigue on average? 3   In the past 7 days, how would you rate your pain on average, where 0 means no pain, and 10 means worst imaginable pain? 7   Global Mental Health Score 13   Global Physical Health Score 10   PROMIS TOTAL - SUBSCORES 23   Some recent data might be hidden                Virgie Boyer LPN

## 2019-11-14 ENCOUNTER — MYC MEDICAL ADVICE (OUTPATIENT)
Dept: ORTHOPEDICS | Facility: CLINIC | Age: 59
End: 2019-11-14

## 2019-11-14 ENCOUNTER — PATIENT OUTREACH (OUTPATIENT)
Dept: CARE COORDINATION | Facility: CLINIC | Age: 59
End: 2019-11-14

## 2019-11-14 ENCOUNTER — TELEPHONE (OUTPATIENT)
Dept: ORTHOPEDICS | Facility: CLINIC | Age: 59
End: 2019-11-14

## 2019-11-14 DIAGNOSIS — G89.18 POSTOPERATIVE PAIN AFTER SPINAL SURGERY: ICD-10-CM

## 2019-11-14 NOTE — TELEPHONE ENCOUNTER
See My Chart message.  I called pharmacy who stated  didn't want to wait for Prior Auth process so he picked up #14 tabs today 11-14-19 because Insurance only OK'd 7 day supply BID.  Pharmacist said Prior Auth needs to  be done before next weeks refill so hopefully next week we can go back to refilling #30.  I called our medication Prior Auth. Dept [phone] & they will start Prior Auth. With Insurance for postop pain control.  I notified pt. & provided her with PA dept. Ph# so she can F/U with them about approval & she understands she will need to call us back early next week for refill.  Call back prn.  Chyna Bonner RN.

## 2019-11-14 NOTE — TELEPHONE ENCOUNTER
RN called pharmacy to clarify some meds per Marycruz Rodriguez RN  After being on hold for half hour, they told me they got the clarification from the patient.    Renato Palencia RN

## 2019-11-14 NOTE — PROGRESS NOTES
Clinic Care Coordination Contact    Follow Up Progress Note      Assessment: Call to patient. She met with surgeon two days ago and he is pleased with her progress. She is more impatient with having to use the walker to ambulate.  She would like to return to work after Thanksgiving if she is able. She met with home care RN yesterday and will also be starting OT and PT, and will meet with  to complete the health care directive.  She is not driving yet as she is still taking strong pain medications.      She found a list of counselors that she received from [facility] that she requested before her surgery. She has not called any yet and didn't know how to start. Reviewed how to select a counselor.  She is most interested in discussing her caregiving role. 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  She is feeling fuzzy yet and forgot to make a counseling appointment.  Would like SW to call in 2 weeks to help her keep track of her making an appointment.  She is likely not able to start counseling until December.     Goals addressed this encounter:   Goals Addressed                 This Visit's Progress       Patient Stated      Mental Health Management (pt-stated)   30%     Goal Statement: I will learn what options are available for mental health support.  Measure of Success: I will have a list of clinics that are in network  Supportive Steps to Achieve: I will all [employer] to learn what clinics are in network and will discuss with CC.  Barriers: Recovering from surgery.    Strengths: Has insurance. Would like to get some support with family problems.   Date to Achieve By: 1-1-2020  Patient expressed understanding of goal: yes               Intervention/Education provided during outreach:   Discussed how to select a counselor.  Educated her on caregiver support available outside of counseling. She would like to pursue counseling.        Outreach Frequency: 2 weeks    Plan:   Call in 8-10 business days.  Patient to call to set up counseling appointment.  She will work with home care to regain her strength and mobility.   Care Coordinator will follow up in 8-10 business days.     Rere Belle,   [facility]  [phone]

## 2019-11-14 NOTE — TELEPHONE ENCOUNTER
Nurse requested a prior authorization for quantity limit on oxyCODONE (ROXICODONE) 5 MG tablet. Insurance allows a maximum of 7 day supply and patient already picked it up. Would need a pa for the remainder quantity. Will send new rx to pharmacy after pa is approved.

## 2019-11-15 ENCOUNTER — DOCUMENTATION ONLY (OUTPATIENT)
Dept: CARE COORDINATION | Facility: CLINIC | Age: 59
End: 2019-11-15

## 2019-11-15 NOTE — TELEPHONE ENCOUNTER
Central Prior Authorization Team   Phone: [phone]    Prior Authorization Not Needed per Insurance    Medication: oxyCODONE (ROXICODONE) 5 MG tablet  Insurance Company: [employer] - Phone [phone] Fax [phone]  Expected CoPay:      Pharmacy Filling the Rx: [id number] [address] AT [facility] [address] [facility]  Pharmacy Notified: No  Patient Notified: No    Patient had to fill initial 7 day supply of medication. Next fill will go through insurance with no problem on 11/20/19.    11-19-19:: see above Prior Auth. Med. Dept note that they did PA so refill of Oxycodone 5mg should go through Insurance with normal quantity not just 7 day supply.  Informed pt.   Refilled Oxycodone 5mg,  Call back prn. Pt agreed.   S.O./Chyna Bonner RN.

## 2019-11-15 NOTE — PROGRESS NOTES
[facility] now requests orders and shares plan of care/discharge summaries for some patients through [facility].  Please REPLY TO THIS MESSAGE OR ROUTE BACK TO THE AUTHOR in order to give authorization for orders when needed.  This is considered a verbal order, you will still receive a faxed copy of orders for signature.  Thank you for your assistance in improving collaboration for our patients.    ORDER: Home PT 2w1, 1w1 for strength, balance, and ambulation following spinal fusion with plan to transition to OP PT in Dec 2019.     Deanna Kaufman, PT, DPT  [email or web address]  [phone]

## 2019-11-19 ENCOUNTER — TELEPHONE (OUTPATIENT)
Dept: ORTHOPEDICS | Facility: CLINIC | Age: 59
End: 2019-11-19

## 2019-11-19 DIAGNOSIS — G89.18 POSTOPERATIVE PAIN AFTER SPINAL SURGERY: Primary | ICD-10-CM

## 2019-11-19 RX ORDER — OXYCODONE HYDROCHLORIDE 5 MG/1
TABLET ORAL
Qty: 60 TABLET | Refills: 0 | Status: SHIPPED | OUTPATIENT
Start: 2019-11-19 | End: 2019-11-26

## 2019-11-19 NOTE — TELEPHONE ENCOUNTER
[facility] Call Center    Phone Message    May a detailed message be left on voicemail: yes    Reason for Call: Other: pt is calling again, stating her back is in so much pain, and its radiating down to her leg, please call pt asap- thanks     Action Taken: Message routed to:  [facility] ortho.    See phone messages.  I leo d pt &  Lam enamorado.  See dictation from last weeks clinic appt. Postop all doing well except still had Right leg numbness & pain postop & states swelling & irritation continues.  Today C/O increased numbness & pain back of hip top of thigh Right leg sharp started Sun. 11-17-19 PM.  No fall or known injury & No increased activity.   Hard to walk.  Not relieved by Oxycodone, Flexeril TID, Tylenol & Gabapentin 800mg TID & 1200mg HS.  Reviewed with AURORA Barba who ordered Medrol dose vern to hopefully help settle down the swelling & irritation postop.  I told pt. &  If not better on MOn. 11-25-19 , then call me back for reeval. & poss. Appt. Tuishaan & they agreed.  Call back prn.  I will inform .   T.O.RAnt SIMON//Chyna Bonner RN.

## 2019-11-19 NOTE — TELEPHONE ENCOUNTER
[facility] Call [facility]    Phone Message    May a detailed message be left on voicemail: no    Reason for Call: Symptoms or Concerns     If patient has red-flag symptoms, warm transfer to triage line    Current symptom or concern: Severe Back Pain, Wants an appointment with Dr. Vasquez ASA    Symptoms have been present for:  2 day(s)    Has patient previously been seen for this? Yes    By     Date: 11/19/2019    Are there any new or worsening symptoms? Yes:      Action Taken: Message routed to:  [facility] Ortho

## 2019-11-20 RX ORDER — METHYLPREDNISOLONE 4 MG
TABLET, DOSE PACK ORAL
Qty: 21 TABLET | Refills: 0
Start: 2019-11-20 | End: 2020-11-17

## 2019-11-25 ENCOUNTER — TELEPHONE (OUTPATIENT)
Dept: ORTHOPEDICS | Facility: CLINIC | Age: 59
End: 2019-11-25

## 2019-11-25 NOTE — PROGRESS NOTES
Spine Surgical Hx:  1976 - PISF T4-L4 with Carpio gavino instrumentation x 2 (Dr. Bandar Rose, [place]) for AIS.  10/01/2019 - 3-column osteotomy L5-S1 with sacral dome osteotomy; TLIF with Cunningham laminectomy L5-S1; bilateral PISF L2-pelvis using bilateral double S2AI screws; use of Infuse BMP Large kit (Francesno/Rashid/Mara) for Gr 4 isthmic spondylolisthesis L5-S1 with severe sagittal malalignment.  [Implants:  Medtronic Solera and Ballast screw systems, with dual headed screws, reductions screws; 5.5 mm CoCr rods x 3].    PRIOR Assessment and Plan: 11/12/19  A>  6 weeks postoperative, doing reasonably well.  P>   I had a good discussion with patient and .  I reassured her regarding my clinical and radiographic findings.  It is likely that there is still significant inflammation in the surgical area, and the nerves are still somewhat inflamed, swollen and irritated.  I expect this to continue to improve as time goes by.  At present, I believe her x-rays show stable fixation.  I told her that now would be a good time to start outpatient physical therapy for strengthening.  Continue wearing a brace for 3 months postoperative.  We will send her to orthotics for possible procurement of a shorter or lower profile brace that would be more comfortable for her to wear.  She only needs to wear this when out of bed.  I advised her to continue weaning down the opioid medication.  Currently she is only taking 2 tablets of oxycodone per day.  I gave her a refill today for 30 more tablets.  - Outpatient PT order ( [facility]) placed.  RTC 6 wks (3 mos postop) with rpt EOS full spine ap-lat standing, out of brace.      Reason for Visit:  No chief complaint on file.    S> patient reports that about Sunday, 11/17/2019 she spent the day on the couch watching football games for 5 to 7 hours, then, upon arising realized her back brace had been ill adjusted with the posterior pad rotated around to the right.  The next day she  started having significantly increased right leg pain without any notable trauma.  This has continued and worsened to the point where she is unable to bear weight on her right leg and is utilizing a walker for toe-touch weightbearing purposes.  She was doing pretty well at her last visit on 11/12/2019, but has since increased oxycodone consumption from 2 tabs per day to 7-8/day to little avail with her current pain.  She continues on gabapentin 800 mg 3 times daily with 1200 mg nightly, Flexeril 3 times daily and Tylenol.  She did go in for brace adjustment and it was shortened somewhat and is slightly more tolerable at this time.  She reports the pain is worst in the morning when she first awakes she is unable to get up and ambulate immediately.  She generally takes an oxycodone tablet very early in the morning and then again around 7 or 8 AM and stays in bed until after all of her medications have begun to help.  She is able to tolerate ambulation a little bit better through the day, but is never completely relieved.  She was planning on a return to work immediately after Thanksgiving this week and has now delayed this.  She was given a Medrol Dosepak this past week and reports that her pain was not improved at all with this.    Oswestry (EPI) Questionnaire    OSWESTRY DISABILITY INDEX 11/12/2019   Count 10   Sum 36   Oswestry Score (%) 72   Some recent data might be hidden             Neck Disability Index (NDI) Questionnaire    Neck Disability Index (NDI) 6/4/2019   Neck Disability Index: Count 8   NDI: Total Score = SUM (points for all 10 findings) 4   Neck Disability in Percent = (Total Score) / 50 * 100 10 (%)   Some recent data might be hidden       PROMIS-10 Scores    O>   Alert, oriented x 3, cooperative.  In mild distress while sitting in exam room.  Increasing distress while attempting to stand and ambulate..  There were no vitals taken for this visit.  Surgical incision well-healed, no sign of  infection.  Ambulates with significant antalgic gait favoring the right utilizing a walker.   Grossly neurologically intact.  Decreased sensation to right lateral calf, foot and distal anterior thigh.   Lumbar Spine:    Appearance - No gross stepoffs or deformities    Motor -     L2-3: Hip flexion 5/5 R and 5/5 L strength          L3/4:  Knee extension R 5/5 and L 5/5 strength         L4/5:  Foot dorsiflexion R 5/5 L 5/5 and       EHL dorsiflexion R 5/5 L 5/5 strength         S1:  Plantarflexion/Peroneal Muscles  R 5/5 and L 5/5 strength    Sensation: intact to light touch L3-S1 distribution BLE  ROM: Patient unable to participate in some portions of testing due to increased radicular pain down right leg with weightbearing.  On initial standing patient leans on walker and is barely toe-touch to the right.  After a few steps is able to put right foot down to partial weightbearing.  She can forward flex about 40 degrees with most motion at the hips.  She can extend about 10 degrees also most motion of hips.  She can rotate 40 degrees with motion at the hip and upper thoracic.        Neurologic:      REFLEXES Left Right                  Patella 2+ 2+   Ankle jerk mute mute   Babinski No upgoing great toe No upgoing great toe   Clonus 0 beats 0 beats     Hip Exam:  Mild pain with hip log roll on the right to low back and right sided gluteals. Noted tenderness over the right greater trochanter.  Very tender over the course of right-sided piriformis muscle.    Alignment:  Patient stands with a neutral standing sagittal balance.      Imaging:   No new imaging obtained today.  We did go over previous imaging with the patient and her  focusing on her postoperative CT dated 10/1/19, and complete spine views dated 11/12/2019    A>  Worsening right leg radiculopathy about 8 weeks postop.  No clear evidence of nerve compression on prior studies.  Bilateral radiculopathy was present prior to surgery, noted on EMG  5/19    P>   We ordered referral to neurology consult for evaluation, possible nerve conduction study.  We discussed obtaining a lumbar CT myelogram.  The patient was quite adamant that she did not want to proceed with this at this time.  If conditions worsen she may change her mind on this.  She was given refills for oxycodone, cyclobenzaprine and her 800 mg gabapentin tablets    Jameson Tamara GUSMAN      Attestation:  I (Dr. Evangelist Vasquez - Spine Surgeon) have personally evaluated patient with NASEEM Mcdonald, and agree with findings and plan outlined in the note, which I also edited.  I discussed at length with the patient/family, explained the nature of spinal condition, and formulated workup and/or treatment plan together.  All questions were answered to the best of my ability and to patient's apparent satisfaction.      Evangelist Vasquez MD    Orthopaedic Spine Surgery  Dept Orthopaedic Surgery, [facility]  [phone] office, [phone] pager  [email or web address]

## 2019-11-25 NOTE — TELEPHONE ENCOUNTER
[facility] Call Center    Phone Message    May a detailed message be left on voicemail: yes    Reason for Call: Other: Pt called stating the pain medication she is on is not working and she is still in pain from surgery and was told by Chyna to call her and Chyna will Find a time on 11/26/2019 to fit her in . Please have Dr. Vasquez's Nurse Chyna return the Pt call right away      Action Taken: Message routed to:  [facility] Ortho.    See phone message.  See note from 11-20-19 when tried Medrol dose vern for Right leg numbness & pain & swelling & irritation postop.  Pt. called back again today Mon. 11-25-19 wanting to see  tomorrow in clinic.  I called pt. Back. She states above symptoms have not improved.  I made appt.  & informed .  Call back prn. Pt agreed.  T.O.R.B./Chyna Bonner RN.

## 2019-11-26 ENCOUNTER — OFFICE VISIT (OUTPATIENT)
Dept: ORTHOPEDICS | Facility: CLINIC | Age: 59
End: 2019-11-26
Payer: COMMERCIAL

## 2019-11-26 DIAGNOSIS — M54.16 LUMBAR BACK PAIN WITH RADICULOPATHY AFFECTING RIGHT LOWER EXTREMITY: ICD-10-CM

## 2019-11-26 DIAGNOSIS — G89.18 POSTOPERATIVE PAIN AFTER SPINAL SURGERY: Primary | ICD-10-CM

## 2019-11-26 DIAGNOSIS — M43.17 SPONDYLOLISTHESIS OF LUMBOSACRAL REGION: ICD-10-CM

## 2019-11-26 RX ORDER — OXYCODONE HYDROCHLORIDE 5 MG/1
TABLET ORAL
Qty: 60 TABLET | Refills: 0 | Status: SHIPPED | OUTPATIENT
Start: 2019-11-26 | End: 2020-11-17

## 2019-11-26 RX ORDER — GABAPENTIN 400 MG/1
800 CAPSULE ORAL 3 TIMES DAILY
Qty: 90 CAPSULE | Refills: 0 | Status: SHIPPED | OUTPATIENT
Start: 2019-11-26 | End: 2019-12-12

## 2019-11-26 RX ORDER — CYCLOBENZAPRINE HCL 10 MG
10 TABLET ORAL 3 TIMES DAILY PRN
Qty: 60 TABLET | Refills: 1 | Status: SHIPPED | OUTPATIENT
Start: 2019-11-26 | End: 2020-11-17

## 2019-11-26 NOTE — NURSING NOTE
Reason For Visit:   Chief Complaint   Patient presents with     Surgical Followup     DOS 10/1/19 S/P Transforaminal Lumbar Interbody Fusion Three Column Osteotomy L5-S1, Posterior Spinal Fusion L2-Pelvis. Use of BMP bone graft     RECHECK     Right leg numbness and pain      Primary MD: Arabella Conner  Ref. MD: Jasson Aguilar     ?  No  Occupation  .  Currently working? Yes.  Work status?  Full time.       Pain Assessment  Patient Currently in Pain: Yes  0-10 Pain Scale: 8  Primary Pain Location: Back  Pain Descriptors: Discomfort    Oswestry (EPI) Questionnaire    OSWESTRY DISABILITY INDEX 11/12/2019   Count 10   Sum 36   Oswestry Score (%) 72   Some recent data might be hidden            Visual Analog Pain Scale  Back Pain Scale 0-10: 5.5  Right leg pain: 7.5  Left leg pain: 5    Promis 10 Assessment    PROMIS 10 11/26/2019   In general, would you say your health is: Excellent   In general, would you say your quality of life is: Very good   In general, how would you rate your physical health? Very good   In general, how would you rate your mental health, including your mood and your ability to think? Very good   In general, how would you rate your satisfaction with your social activities and relationships? Very good   In general, please rate how well you carry out your usual social activities and roles Good   To what extent are you able to carry out your everyday physical activities such as walking, climbing stairs, carrying groceries, or moving a chair? Moderately   How often have you been bothered by emotional problems such as feeling anxious, depressed or irritable? Rarely   How would you rate your fatigue on average? Moderate   How would you rate your pain on average?   0 = No Pain  to  10 = Worst Imaginable Pain 8   In general, would you say your health is: 5   In general, would you say your quality of life is: 4   In general, how would you rate your physical health? 4   In  general, how would you rate your mental health, including your mood and your ability to think? 4   In general, how would you rate your satisfaction with your social activities and relationships? 4   In general, please rate how well you carry out your usual social activities and roles. (This includes activities at home, at work and in your community, and responsibilities as a parent, child, spouse, employee, friend, etc.) 3   To what extent are you able to carry out your everyday physical activities such as walking, climbing stairs, carrying groceries, or moving a chair? 3   In the past 7 days, how often have you been bothered by emotional problems such as feeling anxious, depressed, or irritable? 2   In the past 7 days, how would you rate your fatigue on average? 3   In the past 7 days, how would you rate your pain on average, where 0 means no pain, and 10 means worst imaginable pain? 8   Global Mental Health Score 16   Global Physical Health Score 12   PROMIS TOTAL - SUBSCORES 28   Some recent data might be hidden                Virgie Boyer LPN

## 2019-11-26 NOTE — LETTER
11/26/2019       RE: Aleena Ontiveros  [address]  [facility] [address]     Dear Colleague,    Thank you for referring your patient, Aleena Ontiveros, to the [facility] at [facility]. Please see a copy of my visit note below.      Spine Surgical Hx:  1974 - PISF T4-L4 with Carpio gavino instrumentation x 2 (Dr. Bandar Rose, [place]) for AIS.  10/01/2019 - 3-column osteotomy L5-S1 with sacral dome osteotomy; TLIF with Cunningham laminectomy L5-S1; bilateral PISF L2-pelvis using bilateral double S2AI screws; use of Infuse BMP Large kit (Christina/Rashid/Mara) for Gr 4 isthmic spondylolisthesis L5-S1 with severe sagittal malalignment.  [Implants:  Medtronic Solera and Ballast screw systems, with dual headed screws, reductions screws; 5.5 mm CoCr rods x 3].    PRIOR Assessment and Plan: 11/12/19  A>  6 weeks postoperative, doing reasonably well.  P>   I had a good discussion with patient and .  I reassured her regarding my clinical and radiographic findings.  It is likely that there is still significant inflammation in the surgical area, and the nerves are still somewhat inflamed, swollen and irritated.  I expect this to continue to improve as time goes by.  At present, I believe her x-rays show stable fixation.  I told her that now would be a good time to start outpatient physical therapy for strengthening.  Continue wearing a brace for 3 months postoperative.  We will send her to orthotics for possible procurement of a shorter or lower profile brace that would be more comfortable for her to wear.  She only needs to wear this when out of bed.  I advised her to continue weaning down the opioid medication.  Currently she is only taking 2 tablets of oxycodone per day.  I gave her a refill today for 30 more tablets.  - Outpatient PT order ([facility]) placed.  RTC 6 wks (3 mos postop) with rpt EOS full spine ap-lat standing, out of brace.      Reason for  Visit:  No chief complaint on file.    S> patient reports that about Sunday, 11/17/2019 she spent the day on the couch watching football games for 5 to 7 hours, then, upon arising realized her back brace had been ill adjusted with the posterior pad rotated around to the right.  The next day she started having significantly increased right leg pain without any notable trauma.  This has continued and worsened to the point where she is unable to bear weight on her right leg and is utilizing a walker for toe-touch weightbearing purposes.  She was doing pretty well at her last visit on 11/12/2019, but has since increased oxycodone consumption from 2 tabs per day to 7-8/day to little avail with her current pain.  She continues on gabapentin 800 mg 3 times daily with 1200 mg nightly, Flexeril 3 times daily and Tylenol.  She did go in for brace adjustment and it was shortened somewhat and is slightly more tolerable at this time.  She reports the pain is worst in the morning when she first awakes she is unable to get up and ambulate immediately.  She generally takes an oxycodone tablet very early in the morning and then again around 7 or 8 AM and stays in bed until after all of her medications have begun to help.  She is able to tolerate ambulation a little bit better through the day, but is never completely relieved.  She was planning on a return to work immediately after Thanksgiving this week and has now delayed this.  She was given a Medrol Dosepak this past week and reports that her pain was not improved at all with this.    Oswestry (EPI) Questionnaire    OSWESTRY DISABILITY INDEX 11/12/2019   Count 10   Sum 36   Oswestry Score (%) 72   Some recent data might be hidden             Neck Disability Index (NDI) Questionnaire    Neck Disability Index (NDI) 6/4/2019   Neck Disability Index: Count 8   NDI: Total Score = SUM (points for all 10 findings) 4   Neck Disability in Percent = (Total Score) / 50 * 100 10 (%)   Some  recent data might be hidden       PROMIS-10 Scores    O>   Alert, oriented x 3, cooperative.  In mild distress while sitting in exam room.  Increasing distress while attempting to stand and ambulate..  There were no vitals taken for this visit.  Surgical incision well-healed, no sign of infection.  Ambulates with significant antalgic gait favoring the right utilizing a walker.   Grossly neurologically intact.  Decreased sensation to right lateral calf, foot and distal anterior thigh.   Lumbar Spine:    Appearance - No gross stepoffs or deformities    Motor -     L2-3: Hip flexion 5/5 R and 5/5 L strength          L3/4:  Knee extension R 5/5 and L 5/5 strength         L4/5:  Foot dorsiflexion R 5/5 L 5/5 and       EHL dorsiflexion R 5/5 L 5/5 strength         S1:  Plantarflexion/Peroneal Muscles  R 5/5 and L 5/5 strength    Sensation: intact to light touch L3-S1 distribution BLE  ROM: Patient unable to participate in some portions of testing due to increased radicular pain down right leg with weightbearing.  On initial standing patient leans on walker and is barely toe-touch to the right.  After a few steps is able to put right foot down to partial weightbearing.  She can forward flex about 40 degrees with most motion at the hips.  She can extend about 10 degrees also most motion of hips.  She can rotate 40 degrees with motion at the hip and upper thoracic.        Neurologic:      REFLEXES Left Right                  Patella 2+ 2+   Ankle jerk mute mute   Babinski No upgoing great toe No upgoing great toe   Clonus 0 beats 0 beats     Hip Exam:  Mild pain with hip log roll on the right to low back and right sided gluteals. Noted tenderness over the right greater trochanter.  Very tender over the course of right-sided piriformis muscle.    Alignment:  Patient stands with a neutral standing sagittal balance.      Imaging:   No new imaging obtained today.  We did go over previous imaging with the patient and her   focusing on her postoperative CT dated 10/1/19, and complete spine views dated 11/12/2019    A>  Worsening right leg radiculopathy about 8 weeks postop.  No clear evidence of nerve compression on prior studies.  Bilateral radiculopathy was present prior to surgery, noted on EMG 5/19    P>   We ordered referral to neurology consult for evaluation, possible nerve conduction study.  We discussed obtaining a lumbar CT myelogram.  The patient was quite adamant that she did not want to proceed with this at this time.  If conditions worsen she may change her mind on this.  She was given refills for oxycodone, cyclobenzaprine and her 800 mg gabapentin tablets    Jameson Tamara GUSMAN      Attestation:  I (Dr. Evangelist Vasquez - Spine Surgeon) have personally evaluated patient with NASEEM Mcdonald, and agree with findings and plan outlined in the note, which I also edited.  I discussed at length with the patient/family, explained the nature of spinal condition, and formulated workup and/or treatment plan together.  All questions were answered to the best of my ability and to patient's apparent satisfaction.      Evangelist Vasquez MD    Orthopaedic Spine Surgery  Dept Orthopaedic Surgery, [facility]  [phone] office, [phone] pager  [email or web address]

## 2019-11-27 ENCOUNTER — PATIENT OUTREACH (OUTPATIENT)
Dept: CARE COORDINATION | Facility: CLINIC | Age: 59
End: 2019-11-27

## 2019-11-27 NOTE — PROGRESS NOTES
Clinic Care Coordination Contact    Follow Up Progress Note      Assessment: Call to patient who met with surgeon yesterday.  She has completed home health care and found it helpful. Is scheduled to start outpatient PT on 12-2.  Would like to find somewhere to walk as her home is not big enough to walk any distance.  She is disappointed that she had a set back in mid November. Not exactly sure what happened but after watching football for several hours, she has pain when she ambulates.  At this time, she is unable to put any weight on her leg when she gets out of bed.  During the day, she feels able to ambulate with her walker. Her surgeon ordered Emg testing and when she set it up, even though an urgent need, it was scheduled for January 24th. She was frustrated.  She filled out a comment card before leaving the building and put her name on the card and got a call back later and she was able to get appointment on 12-10th.  She wanted to return to work this week, but had to postpone until 12-16.  She has not called to schedule any counseling yet.  She is unsure of what insurance she will have next year. Her daughter will be picking up her open enrollment information today.  Agreed to see if her coverage and network will be the same before calling to set up counseling.  She would like to meet with someone.      Goals addressed this encounter:   Goals Addressed                 This Visit's Progress       Patient Stated      Mental Health Management (pt-stated)   30%     Goal Statement: I will learn what options are available for mental health support.  Measure of Success: I will have a list of clinics that are in network  Supportive Steps to Achieve: I will all [employer] to learn what clinics are in network and will discuss with CC.  Barriers: Recovering from surgery.    Strengths: Has insurance. Would like to get some support with family problems.   Date to Achieve By: 1-1-2020  Patient expressed understanding of goal:  yes               Intervention/Education provided during outreach: None provided.      Outreach Frequency: 2 weeks    Plan:   Patient will review what insurance she will have next year and will get testing done.    Care Coordinator will follow up in two weeks.   Rere Belle,   [facility]  [phone]

## 2019-12-02 ENCOUNTER — PRE VISIT (OUTPATIENT)
Dept: NEUROLOGY | Facility: CLINIC | Age: 59
End: 2019-12-02

## 2019-12-02 NOTE — TELEPHONE ENCOUNTER
FUTURE VISIT INFORMATION      FUTURE VISIT INFORMATION:    Date: 12/10/2019    Time: 9AM    [place] [facility] [place]  REFERRAL INFORMATION:    Referring provider:  Jameson Mcdonald PA-C     Referring providers clinic:  [facility]     Reason for visit/diagnosis  Lumbar back pain with Radiculopathy affecting right lower extremity     RECORDS REQUESTED FROM:       Clinic name Comments Records Status Imaging Status    * All Care Everywhere records are from 2013 and before*

## 2019-12-04 ENCOUNTER — THERAPY VISIT (OUTPATIENT)
Dept: PHYSICAL THERAPY | Facility: CLINIC | Age: 59
End: 2019-12-04
Payer: COMMERCIAL

## 2019-12-04 DIAGNOSIS — M54.42 CHRONIC BILATERAL LOW BACK PAIN WITH BILATERAL SCIATICA: ICD-10-CM

## 2019-12-04 DIAGNOSIS — Z98.1 S/P SPINAL FUSION: ICD-10-CM

## 2019-12-04 DIAGNOSIS — M54.41 CHRONIC BILATERAL LOW BACK PAIN WITH BILATERAL SCIATICA: ICD-10-CM

## 2019-12-04 DIAGNOSIS — G89.29 CHRONIC BILATERAL LOW BACK PAIN WITH BILATERAL SCIATICA: ICD-10-CM

## 2019-12-04 PROCEDURE — 97161 PT EVAL LOW COMPLEX 20 MIN: CPT | Mod: GP | Performed by: PHYSICAL THERAPIST

## 2019-12-04 PROCEDURE — 97110 THERAPEUTIC EXERCISES: CPT | Mod: GP | Performed by: PHYSICAL THERAPIST

## 2019-12-04 NOTE — PROGRESS NOTES
[facility] Initial Evaluation  Subjective:  Aleena Ontiveros is a 59 year old female.    Patient s chief complaints: LBP, .    Condition occurred due to s/p Revision spinal fusion T4-pelvis; with partial reduction of high-grade listhesis L5-S1 on 10/1/19.  Date of Onset: 10/1/19  Location of symptoms is B LBP.  R LE pain/tingling/numbness S1 pattern to lateral foot.  Also has same symptom on L LE, just not as pronounced.   .  Symptoms other than pain include: numbness/tingling   Quality of pain is dull/achey/sharp/shoot variably and frequency is constant.    Pain dependence on time of day is: worst first getting out of bed.   Pain rating is: 6/10.    Symptoms are exacerbated by: limited with walking (up to about 1,000ft with walker), limited on lifting (not doing, MD limit of 10lb).    Symptoms are relieved by:  Wearing brace for 12 wk until 12/31/19, pain medication, muscle relaxant, gabapentin.    Progression of symptoms is that symptoms are .  Imaging/Special tests include: x-rays   Previous treatments include: surgery, PT prior to surgery.   Patient reports that general health is: fair.   Pertinent medical history includes:  Asthma, high blood pressure and numbness/tingling.    Medical allergies includes: adhesive tape, erythromycin   Surgical history includes: thyroidectomy, TKA B, prior spinal fusion for scoliosis, thyroidedctomy  Current medications include: asthma medications, pain, gabapentin, lisinopril, thyroid  Current occupation is:  theatre    Work status is: currently not working due to current condition  Primary job tasks include: Prolonged sitting and computer work  Barriers include: sitting, standing, lifting  Red flags include: B numbness/tingling    Patient's expectations for therapy include: be able to walk, do normal daily activities and return to work    HPI                    Objective:  LUMBAR:    Posture: in brace  Posture Correction: not assessed  without brace  Relevant Lateral Shift: no    Neurological:    Motor Deficit:  Myotomes L R   L1-2 (hip flexion) 4 4   L3 (knee extension) 5 5   L4 (ankle DF) 5 5   L5 (g. toe ext) 5 5   S1 (ankle PF or knee flex) 4+ 4+     Sensory Deficit, Reflexes: light touch sensation diminished R lateral ankle and foot.     Dural Signs: not tested yet    AROM: (Major, Moderate, Minimal or Nil loss) not tested yet, will test ROM at 12 wk post op    Provisional Classification: other, post op spinal fusion  Principle of Management: will start with strengthening of core (in neutral spine positions) and lower/upper extremities for gait/function.  Once cleared by MD around 12 weeks, will assess lumbar ROM and advance program that way.     System    Physical Exam    General     ROS    Assessment/Plan:    Patient is a 59 year old female with lumbar complaints.    Patient has the following significant findings with corresponding treatment plan.                Diagnosis 1:  s/p Revision spinal fusion T4-pelvis; with partial reduction of high-grade listhesis L5-S1 on 10/1/19.    Pain -  hot/cold therapy, electric stimulation, manual therapy and home program  Decreased ROM/flexibility - manual therapy and therapeutic exercise  Decreased strength - therapeutic exercise and therapeutic activities  Impaired balance - neuro re-education and therapeutic activities  Decreased proprioception - neuro re-education and therapeutic activities  Impaired gait - gait training  Decreased function - therapeutic activities  Impaired posture - neuro re-education    Therapy Evaluation Codes:   1) History comprised of:   Personal factors that impact the plan of care:      None.    Comorbidity factors that impact the plan of care are:      None.     Medications impacting care: None.  2) Examination of Body Systems comprised of:   Body structures and functions that impact the plan of care:      Lumbar spine.   Activity limitations that impact the plan of care  are:      Bathing, Bending, Driving, Dressing, Lifting, Sitting, Squatting/kneeling, Stairs, Standing, Walking, Working and Sleeping.  3) Clinical presentation characteristics are:   Stable/Uncomplicated.  4) Decision-Making    Low complexity using standardized patient assessment instrument and/or measureable assessment of functional outcome.  Cumulative Therapy Evaluation is: Low complexity.    Previous and current functional limitations:  (See Goal Flow Sheet for this information)    Short term and Long term goals: (See Goal Flow Sheet for this information)     Communication ability:  Patient appears to be able to clearly communicate and understand verbal and written communication and follow directions correctly.  Treatment Explanation - The following has been discussed with the patient:   RX ordered/plan of care  Anticipated outcomes  Possible risks and side effects  This patient would benefit from PT intervention to resume normal activities.   Rehab potential is good.    Frequency:  1 X week, once daily  Duration:  for 12 weeks  Discharge Plan:  Achieve all LTG.  Independent in home treatment program.  Reach maximal therapeutic benefit.    Please refer to the daily flowsheet for treatment today, total treatment time and time spent performing 1:1 timed codes.

## 2019-12-05 PROBLEM — Z98.1 S/P SPINAL FUSION: Status: ACTIVE | Noted: 2019-12-05

## 2019-12-05 PROBLEM — M54.42 CHRONIC BILATERAL LOW BACK PAIN WITH BILATERAL SCIATICA: Status: ACTIVE | Noted: 2019-12-05

## 2019-12-05 PROBLEM — M54.41 CHRONIC BILATERAL LOW BACK PAIN WITH BILATERAL SCIATICA: Status: ACTIVE | Noted: 2019-12-05

## 2019-12-05 PROBLEM — G89.29 CHRONIC BILATERAL LOW BACK PAIN WITH BILATERAL SCIATICA: Status: ACTIVE | Noted: 2019-12-05

## 2019-12-09 ENCOUNTER — THERAPY VISIT (OUTPATIENT)
Dept: PHYSICAL THERAPY | Facility: CLINIC | Age: 59
End: 2019-12-09
Payer: COMMERCIAL

## 2019-12-09 DIAGNOSIS — M54.42 CHRONIC BILATERAL LOW BACK PAIN WITH BILATERAL SCIATICA: ICD-10-CM

## 2019-12-09 DIAGNOSIS — G89.29 CHRONIC BILATERAL LOW BACK PAIN WITH BILATERAL SCIATICA: ICD-10-CM

## 2019-12-09 DIAGNOSIS — M54.41 CHRONIC BILATERAL LOW BACK PAIN WITH BILATERAL SCIATICA: ICD-10-CM

## 2019-12-09 DIAGNOSIS — Z98.1 S/P SPINAL FUSION: ICD-10-CM

## 2019-12-09 PROCEDURE — 97110 THERAPEUTIC EXERCISES: CPT | Mod: GP | Performed by: PHYSICAL THERAPIST

## 2019-12-09 PROCEDURE — 97112 NEUROMUSCULAR REEDUCATION: CPT | Mod: GP | Performed by: PHYSICAL THERAPIST

## 2019-12-10 ENCOUNTER — OFFICE VISIT (OUTPATIENT)
Dept: NEUROLOGY | Facility: CLINIC | Age: 59
End: 2019-12-10
Payer: COMMERCIAL

## 2019-12-10 VITALS
WEIGHT: 146 LBS | DIASTOLIC BLOOD PRESSURE: 81 MMHG | SYSTOLIC BLOOD PRESSURE: 120 MMHG | BODY MASS INDEX: 26.7 KG/M2 | OXYGEN SATURATION: 95 % | HEART RATE: 79 BPM

## 2019-12-10 DIAGNOSIS — G89.29 CHRONIC BILATERAL LOW BACK PAIN WITH LEFT-SIDED SCIATICA: Primary | ICD-10-CM

## 2019-12-10 DIAGNOSIS — M54.42 CHRONIC BILATERAL LOW BACK PAIN WITH LEFT-SIDED SCIATICA: Primary | ICD-10-CM

## 2019-12-10 ASSESSMENT — ENCOUNTER SYMPTOMS
DIZZINESS: 0
TREMORS: 0
NECK PAIN: 0
ARTHRALGIAS: 0
DYSURIA: 0
HEMOPTYSIS: 0
SEIZURES: 0
DIFFICULTY URINATING: 0
COUGH DISTURBING SLEEP: 0
SHORTNESS OF BREATH: 1
BRUISES/BLEEDS EASILY: 0
MYALGIAS: 0
STIFFNESS: 1
MUSCLE WEAKNESS: 1
FLANK PAIN: 0
DYSPNEA ON EXERTION: 0
WHEEZING: 1
SPEECH CHANGE: 0
SNORES LOUDLY: 0
SPUTUM PRODUCTION: 0
JOINT SWELLING: 0
DISTURBANCES IN COORDINATION: 0
BACK PAIN: 1
HEADACHES: 0
LOSS OF CONSCIOUSNESS: 0
WEAKNESS: 1
POSTURAL DYSPNEA: 0
SWOLLEN GLANDS: 0
TINGLING: 1
MUSCLE CRAMPS: 0
PARALYSIS: 0
HEMATURIA: 0
MEMORY LOSS: 0
COUGH: 0
NUMBNESS: 1

## 2019-12-10 ASSESSMENT — PAIN SCALES - GENERAL: PAINLEVEL: MODERATE PAIN (4)

## 2019-12-10 NOTE — PROGRESS NOTES
Service Date: 12/10/2019      Arabella Conner MD   [address], [address]   [place] [address]      RE: Aleena Ontiveros   MRN: [id number]   : 1960      Dear Dr. Conner:      Ms. Aleena Ontiveros is a very pleasant 59-year-old woman who was referred to the General Neurology Clinic for assessment of her deficits and complaints related to a procedure done on her spine on 10/01 and also in relation to previous problems which she had in  which required large fusion and placement of partial rods.  She is a good historian and reports that her worst problem is that there has been increase in the numbness in her right leg as well as some numbness in the left foot following the 10/01 recent procedure.  This procedure was reported by Dr. Vasquez's notes as it being a transforaminal lumbar interbody fusion, 3 column and osteotomy L5-S1 with posterior spinal fusion L2 to pelvics and using bone graft and this was done in a 2-stage procedure lasting 8 hours.  The patient says the numbness in her leg has increased and it is fairly much on the outside of her leg and then she has developed as I say numbness in the left foot and she also has developed numbness in the perirectal region that she did not have before this procedure.  She says, however, she is overall improved with her pain, which she has had in the low back.  She had an exacerbation recently watching a football game.  She said she had 10 screws put in.  The patient has not experienced any bladder or bowel issues per se, but does have perianal numbness and the numbness in the left foot seems more on the outside and also on the outside of the left leg.  She says she was lying on the left leg for a long period of time.      She has had no problem with incontinence.  She has been impaired, is using some medication for pain, but is getting along.  She feels that the right gluteus is also weak.      PAST MEDICAL HISTORY:  Her past  medical history indicates she carries a diagnosis of chronic bilateral low back with bilateral sciatica, postoperative pain, status post spinal fusion, hypertension, urinary tract infection, kyphosis thoracolumbar region, spondylolistheses in the lumbosacral region, primary osteoarthritis of the knee, anemia due to blood loss, total thyroidectomy, status post thyroidectomy.  She has a diagnosis of anxiety, arthritis, asthma.  Her past medical history is otherwise unremarkable.  She is able to walk around with a walker, but has significant difficulties.      FAMILY HISTORY:  Noncontributory.      REVIEW OF SYSTEMS:  Nothing further.      PHYSICAL EXAMINATION:  Her gait is somewhat abnormal with wide base and also slow and fragile.  The Romberg exam is negative.  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  The sensory loss is more extensive than that on the left, going on the outside of the foot in an S1 distribution of her leg.  She does have absent ankle reflex on the left, trace on the right and the right knee reflex is slightly reduced compared to brisk left knee.  No Babinski.  Sciatic stretch test is normal.  She is wearing a brace that has been prescribed for her recently.  Upper extremity shows no focal weakness.  Cranial nerve exam, facial motion and facial sensation is normal.  Reflexes upper extremity slightly reduced in the right brachioradialis (she is said to have some cervical problems that surgery may be done in a year).      In summary, this patient shows neurological deficits with left mostly peroneal nerve type of deficit versus an L5 but with S1 sensory root involvement with the reflex absent in the ankle and decreased on the right.  There is also S1 root deficits on the right extending on the outside of the leg with negative sciatic  stretch test.  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  At this point, she is getting reasonable pain control.  She is relatively satisfied with the procedure and I foresee that she probably will gradually recover some of her nerve deficits but very slowly going over a year possibly for her neurological findings to improve.  She is a most pleasant patient.  We will be happy to see her again in the future.      Sincerely,      Joselo Clifton MD      cc:   MD [facility]LL[facility]    [facility]   [place] [address]      Lam Terry Jr, MD   [facility]    [facility]   [place] [address]         JOSELO CLIFTON MD             D: 12/10/2019   T: 12/10/2019   MT: DIANA      Name:     LOREN GAYLE   MRN:      [id number]        Account:      [id number]   :      1960           Service Date: 12/10/2019      Document: [id number]

## 2019-12-10 NOTE — LETTER
12/10/2019       RE: Aleena Ontiveros  [address] [facility] [address]     Dear Colleague,    Thank you for referring your patient, Aleena Ontiveros, to the [facility] NEUROLOGY at [facility]. Please see a copy of my visit note below.    Answers for HPI/ROS submitted by the patient on 12/10/2019   General Symptoms: No  Skin Symptoms: No  HENT Symptoms: No  EYE SYMPTOMS: No  HEART SYMPTOMS: No  LUNG SYMPTOMS: Yes  INTESTINAL SYMPTOMS: No  URINARY SYMPTOMS: Yes  GYNECOLOGIC SYMPTOMS: No  BREAST SYMPTOMS: No  SKELETAL SYMPTOMS: Yes  BLOOD SYMPTOMS: Yes  NERVOUS SYSTEM SYMPTOMS: Yes  MENTAL HEALTH SYMPTOMS: No  Cough: No  Sputum or phlegm: No  Coughing up blood: No  Difficulty breating or shortness of breath: Yes  Snoring: No  Wheezing: Yes  Difficulty breathing on exertion: No  Nighttime Cough: No  Difficulty breathing when lying flat: No  Trouble holding urine or incontinence: No  Pain or burning: No  Trouble starting or stopping: No  Increased frequency of urination: No  Blood in urine: No  Decreased frequency of urination: No  Frequent nighttime urination: No  Flank pain: No  Difficulty emptying bladder: No  Back pain: Yes  Muscle aches: No  Neck pain: No  Swollen joints: No  Joint pain: No  Bone pain: No  Muscle cramps: No  Muscle weakness: Yes  Joint stiffness: Yes  Bone fracture: No  Anemia: Yes  Swollen glands: No  Easy bleeding or bruising: No  Edema or swelling: No  Trouble with coordination: No  Dizziness or trouble with balance: No  Fainting or black-out spells: No  Memory loss: No  Headache: No  Seizures: No  Speech problems: No  Tingling: Yes  Tremor: No  Weakness: Yes  Difficulty walking: Yes  Paralysis: No  Numbness: Yes      Service Date: 12/10/2019      Arabella Conner MD   [facility] [address], [address]   [place] [address]      RE: Aleena Ontiveros   MRN: [id number]   : 1960      Dear Dr. Conner:      Ms. Aleena Ontiveros is  a very pleasant 59-year-old woman who was referred to the General Neurology Clinic for assessment of her deficits and complaints related to a procedure done on her spine on 10/01 and also in relation to previous problems which she had in 1974 which required large fusion and placement of partial rods.  She is a good historian and reports that her worst problem is that there has been increase in the numbness in her right leg as well as some numbness in the left foot following the 10/01 recent procedure.  This procedure was reported by Dr. Vasquez's notes as it being a transforaminal lumbar interbody fusion, 3 column and osteotomy L5-S1 with posterior spinal fusion L2 to pelvics and using bone graft and this was done in a 2-stage procedure lasting 8 hours.  The patient says the numbness in her leg has increased and it is fairly much on the outside of her leg and then she has developed as I say numbness in the left foot and she also has developed numbness in the perirectal region that she did not have before this procedure.  She says, however, she is overall improved with her pain, which she has had in the low back.  She had an exacerbation recently watching a football game.  She said she had 10 screws put in.  The patient has not experienced any bladder or bowel issues per se, but does have perianal numbness and the numbness in the left foot seems more on the outside and also on the outside of the left leg.  She says she was lying on the left leg for a long period of time.      She has had no problem with incontinence.  She has been impaired, is using some medication for pain, but is getting along.  She feels that the right gluteus is also weak.      PAST MEDICAL HISTORY:  Her past medical history indicates she carries a diagnosis of chronic bilateral low back with bilateral sciatica, postoperative pain, status post spinal fusion, hypertension, urinary tract infection, kyphosis thoracolumbar region, spondylolistheses  in the lumbosacral region, primary osteoarthritis of the knee, anemia due to blood loss, total thyroidectomy, status post thyroidectomy.  She has a diagnosis of anxiety, arthritis, asthma.  Her past medical history is otherwise unremarkable.  She is able to walk around with a walker, but has significant difficulties.      FAMILY HISTORY:  Noncontributory.      REVIEW OF SYSTEMS:  Nothing further.      PHYSICAL EXAMINATION:  Her gait is somewhat abnormal with wide base and also slow and fragile.  The Romberg exam is negative.  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  The sensory loss is more extensive than that on the left, going on the outside of the foot in an S1 distribution of her leg.  She does have absent ankle reflex on the left, trace on the right and the right knee reflex is slightly reduced compared to brisk left knee.  No Babinski.  Sciatic stretch test is normal.  She is wearing a brace that has been prescribed for her recently.  Upper extremity shows no focal weakness.  Cranial nerve exam, facial motion and facial sensation is normal.  Reflexes upper extremity slightly reduced in the right brachioradialis (she is said to have some cervical problems that surgery may be done in a year).      In summary, this patient shows neurological deficits with left mostly peroneal nerve type of deficit versus an L5 but with S1 sensory root involvement with the reflex absent in the ankle and decreased on the right.  There is also S1 root deficits on the right extending on the outside of the leg with negative sciatic stretch test.  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  At this point,  she is getting reasonable pain control.  She is relatively satisfied with the procedure and I foresee that she probably will gradually recover some of her nerve deficits but very slowly going over a year possibly for her neurological findings to improve.  She is a most pleasant patient.  We will be happy to see her again in the future.      Sincerely,      Joselo Clifton MD      cc:   Evangelist Vasquez MD   [id number] [facility]   [place] [address]      Lam Terry Jr, MD   [id number] [facility] [address]   [place] [address]         D: 12/10/2019   T: 12/10/2019   MT: DIANA      Name:     LOREN GAYLE   MRN:      [id number]        Account:      [id number]   :      1960           Service Date: 12/10/2019      Document: [id number]

## 2019-12-10 NOTE — NURSING NOTE
Chief Complaint   Patient presents with     Consult     [facility] NEW - LUMBAR RADICULOPATHY W/ LEG PAIN      Ana Lilia Farmer, EMT

## 2019-12-10 NOTE — PROGRESS NOTES
Answers for HPI/ROS submitted by the patient on 12/10/2019   General Symptoms: No  Skin Symptoms: No  HENT Symptoms: No  EYE SYMPTOMS: No  HEART SYMPTOMS: No  LUNG SYMPTOMS: Yes  INTESTINAL SYMPTOMS: No  URINARY SYMPTOMS: Yes  GYNECOLOGIC SYMPTOMS: No  BREAST SYMPTOMS: No  SKELETAL SYMPTOMS: Yes  BLOOD SYMPTOMS: Yes  NERVOUS SYSTEM SYMPTOMS: Yes  MENTAL HEALTH SYMPTOMS: No  Cough: No  Sputum or phlegm: No  Coughing up blood: No  Difficulty breating or shortness of breath: Yes  Snoring: No  Wheezing: Yes  Difficulty breathing on exertion: No  Nighttime Cough: No  Difficulty breathing when lying flat: No  Trouble holding urine or incontinence: No  Pain or burning: No  Trouble starting or stopping: No  Increased frequency of urination: No  Blood in urine: No  Decreased frequency of urination: No  Frequent nighttime urination: No  Flank pain: No  Difficulty emptying bladder: No  Back pain: Yes  Muscle aches: No  Neck pain: No  Swollen joints: No  Joint pain: No  Bone pain: No  Muscle cramps: No  Muscle weakness: Yes  Joint stiffness: Yes  Bone fracture: No  Anemia: Yes  Swollen glands: No  Easy bleeding or bruising: No  Edema or swelling: No  Trouble with coordination: No  Dizziness or trouble with balance: No  Fainting or black-out spells: No  Memory loss: No  Headache: No  Seizures: No  Speech problems: No  Tingling: Yes  Tremor: No  Weakness: Yes  Difficulty walking: Yes  Paralysis: No  Numbness: Yes

## 2019-12-12 ENCOUNTER — PATIENT OUTREACH (OUTPATIENT)
Dept: CARE COORDINATION | Facility: CLINIC | Age: 59
End: 2019-12-12

## 2019-12-12 DIAGNOSIS — G89.18 POSTOPERATIVE PAIN AFTER SPINAL SURGERY: ICD-10-CM

## 2019-12-12 RX ORDER — GABAPENTIN 400 MG/1
800 CAPSULE ORAL 3 TIMES DAILY
Qty: 120 CAPSULE | Refills: 0 | Status: SHIPPED | OUTPATIENT
Start: 2019-12-12 | End: 2020-02-10

## 2019-12-12 NOTE — TELEPHONE ENCOUNTER
Fax from pharmacy for refill of Gabapentin.  I called pt.  She states doing very well postop.  States Medrol helped pain.  Rarely takes Oxycodone & has some left.  Taking Flexeril & has refill left.  Is weaning off Gabapentin dosing now is 800mg AM & PM & 900mg at HS & continues t slowly wean down.  Refilled Gabapentin.  RTC POP sppt. 12-31-19.  Call back prn. Pt agreed.    S.O./Chyna Bonner RN.

## 2019-12-12 NOTE — PROGRESS NOTES
Clinic Care Coordination Contact    Follow Up Progress Note      Assessment: Talked to patient who met with neurologist recently and found the appointment very helpful.  She is not going to do the EMG per his recommendation. He explained that her nerve regenerate very slowly and that it will take time to heal.  Her two episodes of extreme pain are due to the brace not in proper place.  She did enroll in the same health insurance for next year.  She knows that [facility] is out of network, but [facility] is in network.  She will look for list that she got from her insurance with counseling options in network.      Her youngest daughter is coming home today after treatment at [facility]. She will only be home for a few hours and then is returning to [place].     Planning on returning to full time work on 12-16.  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  Feels ready to get back to paid work.     Goals addressed this encounter:   Goals Addressed                 This Visit's Progress       Patient Stated      Mental Health Management (pt-stated)   On track     Goal Statement: I will learn what options are available for mental health support.  Measure of Success: I will have a list of clinics that are in network  Supportive Steps to Achieve: I will all [employer] to learn what clinics are in network and will discuss with CC.  Barriers: Recovering from surgery.    Strengths: Has insurance. Would like to get some support with family problems.   Date to Achieve By: 1-1-2020  Patient expressed understanding of goal: yes            Plan:   Patient will look for list of options for counseling she received several months ago. Will call CC if needs arise prior to next outreach.  Care Coordinator will follow up in three weeks.     Rere Belle,   [facility]  [phone]

## 2019-12-17 ENCOUNTER — THERAPY VISIT (OUTPATIENT)
Dept: PHYSICAL THERAPY | Facility: CLINIC | Age: 59
End: 2019-12-17
Payer: COMMERCIAL

## 2019-12-17 DIAGNOSIS — G89.29 CHRONIC BILATERAL LOW BACK PAIN WITH BILATERAL SCIATICA: ICD-10-CM

## 2019-12-17 DIAGNOSIS — M54.42 CHRONIC BILATERAL LOW BACK PAIN WITH BILATERAL SCIATICA: ICD-10-CM

## 2019-12-17 DIAGNOSIS — Z98.1 S/P SPINAL FUSION: ICD-10-CM

## 2019-12-17 DIAGNOSIS — M54.41 CHRONIC BILATERAL LOW BACK PAIN WITH BILATERAL SCIATICA: ICD-10-CM

## 2019-12-17 PROCEDURE — 97112 NEUROMUSCULAR REEDUCATION: CPT | Mod: GP | Performed by: PHYSICAL THERAPIST

## 2019-12-17 PROCEDURE — 97110 THERAPEUTIC EXERCISES: CPT | Mod: GP | Performed by: PHYSICAL THERAPIST

## 2019-12-23 DIAGNOSIS — G89.18 POSTOPERATIVE PAIN AFTER SPINAL SURGERY: Primary | ICD-10-CM

## 2019-12-29 NOTE — PROGRESS NOTES
Spine Surgical Hx:  1974 - PISF T4-L4 with Carpio gavino instrumentation x 2 (Dr. Bandar Rose, [place]) for AIS.  10/01/2019 - 3-column osteotomy L5-S1 with sacral dome osteotomy; TLIF with Cunningham laminectomy L5-S1; bilateral PISF L2-pelvis using bilateral double S2AI screws; use of Infuse BMP Large kit (Christina/Rashid/Mara) for Gr 4 isthmic spondylolisthesis L5-S1 with severe sagittal malalignment.  [Implants:  Medtronic Solera and Ballast screw systems, with dual headed screws, reductions screws; 5.5 mm CoCr rods x 3].      Reason for Visit:  Chief Complaint   Patient presents with     Spine - RECHECK     RECHECK     FU after Neurology consult DOS 10/1/19 Transforaminal Lumbar Interbody Fusion Three Column Osteotomy L5-S1, Posterior Spinal Fusion L2-Pelvis. Use of BMP bone graft       S>  59/f, 3 mos postop.  Last seen at 8 wks (11/26/19), with increased R leg pain.  Ordered:  - Neurology referral.  - Lumbar CT myelogram    Accompanied by .  Did not proceed with CT myelogram.  Saw Dr. Clifton (Neurology) 12/10/19.  (see separate note).  Reviewed EMG 05/2019.    Happy with surgery.  Preop back pain much improved.  At last visit, was complaining of new R leg pain (not present preop nor early postop).  Fortunately, this has settled down on its own and resolved.  At present, no significant complaints.  Continues to do PT at [facility] in [place].    Off oxycodone since early Nov.  Off cyclobenzaprine since earlier this month.  Cut down on gabapentin from 3,200 mg/day to current 2,100 mg/day (600 in AM, 300 at noon, 300 in afternoon, 900 at hs).  Cut down on tylenol from 4,000 mg/day to 3,000 mg/day.  Preop, was taking vitamins, fish oil, ibuprofen and cyclobenzaprine, which she discontinued before surgery.  Asking if she could now resume them.    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    Still with no neck related sxs.    No clumsiness or decreased dexterity.  No arm radicular sxs.    Oswestry  (EPI) Questionnaire    OSWESTRY DISABILITY INDEX 12/30/2019   Count 9   Sum 16   Oswestry Score (%) 35.56   Some recent data might be hidden      Preop EPI 70%  6 wks  72%  3 mos  35.56%      O>   Alert, oriented x 3, cooperative.  Not in CP distress.  Wt 66.2 kg (146 lb)   BMI 26.70 kg/m    Surgical incision well-healed, no sign of infection.  Ambulates independently.   Grossly neurologically intact both LE's and both UE's.    Imaging:   EOS full spine AP lateral standing x-rays show stable posterior instrumentation T4 to pelvis.  Maintained sagittal and coronal alignment.  No sign of fixation loosening or failure.    A>   3 months postoperative, doing very well.    P>    Congratulated and reassured patient and .  Continue physical therapy.  Advised regarding taking commonsense approach regarding activity progression.  She appears to be on the right track, and is likely on her way to solid arthrodesis.  Currently without neck related symptoms.  I advised her to watch out for possible development of myelopathic or spinal cord compression symptoms.  If and when that occurs, will obtain repeat cervical MRI and consider possible cervical spine surgery.    RTC 3 mos (6 mos postop) with rpt EOS full spine ap-lat.        Evangelist Vasquez MD    Orthopaedic Spine Surgery  Dept Orthopaedic Surgery, [facility]  [phone] office, [phone] pager  [email or web address]

## 2019-12-30 ENCOUNTER — THERAPY VISIT (OUTPATIENT)
Dept: PHYSICAL THERAPY | Facility: CLINIC | Age: 59
End: 2019-12-30
Payer: COMMERCIAL

## 2019-12-30 DIAGNOSIS — G89.29 CHRONIC BILATERAL LOW BACK PAIN WITH BILATERAL SCIATICA: ICD-10-CM

## 2019-12-30 DIAGNOSIS — M54.42 CHRONIC BILATERAL LOW BACK PAIN WITH BILATERAL SCIATICA: ICD-10-CM

## 2019-12-30 DIAGNOSIS — M54.41 CHRONIC BILATERAL LOW BACK PAIN WITH BILATERAL SCIATICA: ICD-10-CM

## 2019-12-30 DIAGNOSIS — Z98.1 S/P SPINAL FUSION: ICD-10-CM

## 2019-12-30 PROCEDURE — 97110 THERAPEUTIC EXERCISES: CPT | Mod: GP | Performed by: PHYSICAL THERAPIST

## 2019-12-30 PROCEDURE — 97112 NEUROMUSCULAR REEDUCATION: CPT | Mod: GP | Performed by: PHYSICAL THERAPIST

## 2019-12-30 NOTE — LETTER
"[facility]  [address]  [facility] [address]  [phone]    2019    Re: Aleena Ontiveros   :   1960  MRN:  [id number]   REFERRING PHYSICIAN:   Evangelist Vasquez    [facility]    Date of Initial Evaluation:  19  Visits:  Rxs Used: 4  Reason for Referral:     Chronic bilateral low back pain with bilateral sciatica  S/P spinal fusion    EVALUATION SUMMARY    PROGRESS  REPORT    Progress reporting period is from 19 to 19      SUBJECTIVE  Subjective changes noted by patient:  Patient notes that she is improving.  Current symptoms include R LB and buttock pain, with pain in the proximal posterior R thigh.  There is some numbness on the \"whole R side\" still and in B feet.  She is now ambulating with a SEC in the community for the most part.  She is going without an AD at home most of the time.  She does also note some weakness or clumsiness in the L ankle.  Overall, gait, stregnth and function are gradually improving.     Current Pain level: 3/10.     Initial Pain level: 6/10.   Changes in function:  Yes (See Goal flowsheet attached for changes in current functional level)  Adverse reaction to treatment or activity: None    OBJECTIVE  Changes noted in objective findings:  Yes,   LUMBAR:    Neurological:    Motor Deficit:  Myotomes L R   L1-2 (hip flexion) 5 5   L3 (knee extension) 5 5   L4 (ankle DF) 4 5   L5 (g. toe ext) 4 5   S1 (ankle PF or knee flex) 5 5     L ankle DF and eversion are weak 3+ to 4/5 with strength testing    With gait, there is some hip drop noted (L hip drop while in R leg stance).     Strength and gait gradually improving.  Function- amb advanced from walker to SEC.  Able to reciprocate up/down 10 stairs with 1 rail and SEC.     Working on core strength, leg strength, function and balance in neutral spine positions (mostly standing and sitting) " currently.     ASSESSMENT/PLAN  Updated problem list and treatment plan: Diagnosis 1:  s/p Revision spinal fusion T4-pelvis; with partial reduction of high-grade listhesis L5-S1 on 10/1/19    Pain -  hot/cold therapy and manual therapy  Decreased ROM/flexibility - manual therapy and therapeutic exercise  Decreased strength - therapeutic exercise and therapeutic activities  Decreased proprioception - neuro re-education and therapeutic activities  Impaired gait - gait training  Decreased function - therapeutic activities  Impaired posture - neuro re-education  STG/LTGs have been met or progress has been made towards goals:  Yes (See Goal flow sheet completed today.)  Assessment of Progress: The patient's condition is improving.  Self Management Plans:  Patient has been instructed in a home treatment program.  I have re-evaluated this patient and find that the nature, scope, duration and intensity of the therapy is appropriate for the medical condition of the patient.  Aleena continues to require the following intervention to meet STG and LTG's:  PT    Recommendations:  This patient would benefit from continued therapy.     Frequency:  1 X week, once daily  Duration:  for 8 weeks        Please refer to the daily flowsheet for treatment today, total treatment time and time spent performing 1:1 timed codes.          Thank you for your referral.    INQUIRIES  Therapist: Fredi Jewell, PT   [facility] [place]  [address] [place] [address] [address] [address]  Phone: [phone]  Fax: [phone]

## 2019-12-31 ENCOUNTER — ANCILLARY PROCEDURE (OUTPATIENT)
Dept: GENERAL RADIOLOGY | Facility: CLINIC | Age: 59
End: 2019-12-31
Attending: ORTHOPAEDIC SURGERY
Payer: COMMERCIAL

## 2019-12-31 ENCOUNTER — OFFICE VISIT (OUTPATIENT)
Dept: ORTHOPEDICS | Facility: CLINIC | Age: 59
End: 2019-12-31
Payer: COMMERCIAL

## 2019-12-31 VITALS — WEIGHT: 146 LBS | BODY MASS INDEX: 26.7 KG/M2

## 2019-12-31 DIAGNOSIS — G89.18 POSTOPERATIVE PAIN AFTER SPINAL SURGERY: ICD-10-CM

## 2019-12-31 DIAGNOSIS — Z98.1 S/P SPINAL FUSION: Primary | ICD-10-CM

## 2019-12-31 NOTE — LETTER
12/31/2019       RE: Aleena Ontiveros  [address]  [facility] [address]     Dear Colleague,    Thank you for referring your patient, Aleena Ontiveros, to the [facility] ORTHOPAEDIC CLINIC at [facility]. Please see a copy of my visit note below.    Spine Surgical Hx:  1974 - PISF T4-L4 with Carpio gavino instrumentation x 2 (Dr. Bandar Rose, [place]) for AIS.  10/01/2019 - 3-column osteotomy L5-S1 with sacral dome osteotomy; TLIF with Cunningham laminectomy L5-S1; bilateral PISF L2-pelvis using bilateral double S2AI screws; use of Infuse BMP Large kit (Christina/Rashid/Mara) for Gr 4 isthmic spondylolisthesis L5-S1 with severe sagittal malalignment.  [Implants:  Medtronic Solera and Ballast screw systems, with dual headed screws, reductions screws; 5.5 mm CoCr rods x 3].      Reason for Visit:  Chief Complaint   Patient presents with     Spine - RECHECK     RECHECK     FU after Neurology consult DOS 10/1/19 Transforaminal Lumbar Interbody Fusion Three Column Osteotomy L5-S1, Posterior Spinal Fusion L2-Pelvis. Use of BMP bone graft       S>  59/f, 3 mos postop.  Last seen at 8 wks (11/26/19), with increased R leg pain.  Ordered:  - Neurology referral.  - Lumbar CT myelogram    Accompanied by .  Did not proceed with CT myelogram.  Saw Dr. Clifton (Neurology) 12/10/19.  (see separate note).  Reviewed EMG 05/2019.    Happy with surgery.  Preop back pain much improved.  At last visit, was complaining of new R leg pain (not present preop nor early postop).  Fortunately, this has settled down on its own and resolved.  At present, no significant complaints.  Continues to do PT at [facility] in [place].    Off oxycodone since early Nov.  Off cyclobenzaprine since earlier this month.  Cut down on gabapentin from 3,200 mg/day to current 2,100 mg/day (600 in AM, 300 at noon, 300 in afternoon, 900 at hs).  Cut down on tylenol from 4,000 mg/day to 3,000 mg/day.  Preop, was taking vitamins,  fish oil, ibuprofen and cyclobenzaprine, which she discontinued before surgery.  Asking if she could now resume them.    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    Still with no neck related sxs.    No clumsiness or decreased dexterity.  No arm radicular sxs.    Oswestry (EPI) Questionnaire    OSWESTRY DISABILITY INDEX 12/30/2019   Count 9   Sum 16   Oswestry Score (%) 35.56   Some recent data might be hidden      Preop EPI 70%  6 wks  72%  3 mos  35.56%      O>   Alert, oriented x 3, cooperative.  Not in CP distress.  Wt 66.2 kg (146 lb)   BMI 26.70 kg/m     Surgical incision well-healed, no sign of infection.  Ambulates independently.   Grossly neurologically intact both LE's and both UE's.    Imaging:   EOS full spine AP lateral standing x-rays show stable posterior instrumentation T4 to pelvis.  Maintained sagittal and coronal alignment.  No sign of fixation loosening or failure.    A>   3 months postoperative, doing very well.    P>    Congratulated and reassured patient and .  Continue physical therapy.  Advised regarding taking commonsense approach regarding activity progression.  She appears to be on the right track, and is likely on her way to solid arthrodesis.  Currently without neck related symptoms.  I advised her to watch out for possible development of myelopathic or spinal cord compression symptoms.  If and when that occurs, will obtain repeat cervical MRI and consider possible cervical spine surgery.    RTC 3 mos (6 mos postop) with rpt EOS full spine ap-lat.        Evangelist Vasquez MD    Orthopaedic Spine Surgery  Dept Orthopaedic Surgery, [facility]  [phone] office, [phone] pager  [email or web address]

## 2019-12-31 NOTE — NURSING NOTE
Reason For Visit:   Chief Complaint   Patient presents with     Spine - RECHECK     RECHECK     FU after Neurology consult DOS 10/1/19 Transforaminal Lumbar Interbody Fusion Three Column Osteotomy L5-S1, Posterior Spinal Fusion L2-Pelvis. Use of BMP bone graft         Primary MD: Arabella Conner  Ref. MD: Jasson Aguilar     ?  No  Occupation  .  Currently working? Yes.  Work status?  Full time.  Wt 66.2 kg (146 lb)   BMI 26.70 kg/m      Pain Assessment  Patient Currently in Pain: Yes  0-10 Pain Scale: 3  Primary Pain Location: Back  Other Pain Locations: right leg, hip and glute. Both feet  Pain Descriptors: Aching, Numbness, Tingling  Aggravating Factors: (sitting in one position for too long )    Oswestry (EPI) Questionnaire    OSWESTRY DISABILITY INDEX 12/30/2019   Count 9   Sum 16   Oswestry Score (%) 35.56   Some recent data might be hidden            Neck Disability Index (NDI) Questionnaire    Neck Disability Index (NDI) 6/4/2019   Neck Disability Index: Count 8   NDI: Total Score = SUM (points for all 10 findings) 4   Neck Disability in Percent = (Total Score) / 50 * 100 10 (%)   Some recent data might be hidden              Visual Analog Pain Scale  Back Pain Scale 0-10: 3  Right leg pain: 3  Left leg pain: 3  Neck Pain Scale 0-10: 0  Right arm pain: 0  Left arm pain: 0    Promis 10 Assessment    PROMIS 10 12/30/2019   In general, would you say your health is: Excellent   In general, would you say your quality of life is: Very good   In general, how would you rate your physical health? Excellent   In general, how would you rate your mental health, including your mood and your ability to think? Very good   In general, how would you rate your satisfaction with your social activities and relationships? Very good   In general, please rate how well you carry out your usual social activities and roles Good   To what extent are you able to carry out your everyday physical activities such  as walking, climbing stairs, carrying groceries, or moving a chair? Moderately   How often have you been bothered by emotional problems such as feeling anxious, depressed or irritable? Rarely   How would you rate your fatigue on average? Moderate   How would you rate your pain on average?   0 = No Pain  to  10 = Worst Imaginable Pain 3   In general, would you say your health is: 5   In general, would you say your quality of life is: 4   In general, how would you rate your physical health? 5   In general, how would you rate your mental health, including your mood and your ability to think? 4   In general, how would you rate your satisfaction with your social activities and relationships? 4   In general, please rate how well you carry out your usual social activities and roles. (This includes activities at home, at work and in your community, and responsibilities as a parent, child, spouse, employee, friend, etc.) 3   To what extent are you able to carry out your everyday physical activities such as walking, climbing stairs, carrying groceries, or moving a chair? 3   In the past 7 days, how often have you been bothered by emotional problems such as feeling anxious, depressed, or irritable? 2   In the past 7 days, how would you rate your fatigue on average? 3   In the past 7 days, how would you rate your pain on average, where 0 means no pain, and 10 means worst imaginable pain? 3   Global Mental Health Score 16   Global Physical Health Score 15   PROMIS TOTAL - SUBSCORES 31   Some recent data might be hidden                Pratibha Howell, ATC

## 2019-12-31 NOTE — PROGRESS NOTES
"PROGRESS  REPORT    Progress reporting period is from 12/4/19 to 12/30/19      SUBJECTIVE  Subjective changes noted by patient:  Patient notes that she is improving.  Current symptoms include R LB and buttock pain, with pain in the proximal posterior R thigh.  There is some numbness on the \"whole R side\" still and in B feet.  She is now ambulating with a SEC in the community for the most part.  She is going without an AD at home most of the time.  She does also note some weakness or clumsiness in the L ankle.  Overall, gait, stregnth and function are gradually improving.     Current Pain level: 3/10.     Initial Pain level: 6/10.   Changes in function:  Yes (See Goal flowsheet attached for changes in current functional level)  Adverse reaction to treatment or activity: None    OBJECTIVE  Changes noted in objective findings:  Yes,   LUMBAR:    Neurological:    Motor Deficit:  Myotomes L R   L1-2 (hip flexion) 5 5   L3 (knee extension) 5 5   L4 (ankle DF) 4 5   L5 (g. toe ext) 4 5   S1 (ankle PF or knee flex) 5 5     L ankle DF and eversion are weak 3+ to 4/5 with strength testing    With gait, there is some hip drop noted (L hip drop while in R leg stance).     Strength and gait gradually improving.  Function- amb advanced from walker to SEC.  Able to reciprocate up/down 10 stairs with 1 rail and SEC.     Working on core strength, leg strength, function and balance in neutral spine positions (mostly standing and sitting) currently.     ASSESSMENT/PLAN  Updated problem list and treatment plan: Diagnosis 1:  s/p Revision spinal fusion T4-pelvis; with partial reduction of high-grade listhesis L5-S1 on 10/1/19    Pain -  hot/cold therapy and manual therapy  Decreased ROM/flexibility - manual therapy and therapeutic exercise  Decreased strength - therapeutic exercise and therapeutic activities  Decreased proprioception - neuro re-education and therapeutic activities  Impaired gait - gait training  Decreased function - " therapeutic activities  Impaired posture - neuro re-education  STG/LTGs have been met or progress has been made towards goals:  Yes (See Goal flow sheet completed today.)  Assessment of Progress: The patient's condition is improving.  Self Management Plans:  Patient has been instructed in a home treatment program.  I have re-evaluated this patient and find that the nature, scope, duration and intensity of the therapy is appropriate for the medical condition of the patient.  Aleena continues to require the following intervention to meet STG and LTG's:  PT    Recommendations:  This patient would benefit from continued therapy.     Frequency:  1 X week, once daily  Duration:  for 8 weeks        Please refer to the daily flowsheet for treatment today, total treatment time and time spent performing 1:1 timed codes.

## 2020-01-02 ENCOUNTER — THERAPY VISIT (OUTPATIENT)
Dept: PHYSICAL THERAPY | Facility: CLINIC | Age: 60
End: 2020-01-02
Payer: COMMERCIAL

## 2020-01-02 DIAGNOSIS — M54.41 CHRONIC BILATERAL LOW BACK PAIN WITH BILATERAL SCIATICA: ICD-10-CM

## 2020-01-02 DIAGNOSIS — G89.29 CHRONIC BILATERAL LOW BACK PAIN WITH BILATERAL SCIATICA: ICD-10-CM

## 2020-01-02 DIAGNOSIS — Z98.1 S/P SPINAL FUSION: ICD-10-CM

## 2020-01-02 DIAGNOSIS — M54.42 CHRONIC BILATERAL LOW BACK PAIN WITH BILATERAL SCIATICA: ICD-10-CM

## 2020-01-02 PROCEDURE — 97110 THERAPEUTIC EXERCISES: CPT | Mod: GP | Performed by: PHYSICAL THERAPIST

## 2020-01-02 PROCEDURE — 97035 APP MDLTY 1+ULTRASOUND EA 15: CPT | Mod: GP | Performed by: PHYSICAL THERAPIST

## 2020-01-02 PROCEDURE — 97140 MANUAL THERAPY 1/> REGIONS: CPT | Mod: GP | Performed by: PHYSICAL THERAPIST

## 2020-01-08 ENCOUNTER — THERAPY VISIT (OUTPATIENT)
Dept: PHYSICAL THERAPY | Facility: CLINIC | Age: 60
End: 2020-01-08
Payer: COMMERCIAL

## 2020-01-08 ENCOUNTER — PATIENT OUTREACH (OUTPATIENT)
Dept: CARE COORDINATION | Facility: CLINIC | Age: 60
End: 2020-01-08

## 2020-01-08 DIAGNOSIS — M54.41 CHRONIC BILATERAL LOW BACK PAIN WITH BILATERAL SCIATICA: ICD-10-CM

## 2020-01-08 DIAGNOSIS — Z98.1 S/P SPINAL FUSION: ICD-10-CM

## 2020-01-08 DIAGNOSIS — G89.29 CHRONIC BILATERAL LOW BACK PAIN WITH BILATERAL SCIATICA: ICD-10-CM

## 2020-01-08 DIAGNOSIS — M54.42 CHRONIC BILATERAL LOW BACK PAIN WITH BILATERAL SCIATICA: ICD-10-CM

## 2020-01-08 PROCEDURE — 97110 THERAPEUTIC EXERCISES: CPT | Mod: GP | Performed by: PHYSICAL THERAPIST

## 2020-01-08 PROCEDURE — 97112 NEUROMUSCULAR REEDUCATION: CPT | Mod: GP | Performed by: PHYSICAL THERAPIST

## 2020-01-08 NOTE — PROGRESS NOTES
Clinic Care Coordination Contact    Follow Up Progress Note      Assessment: Call to patient who is doing well and is back to work. [place] 18th.  Her insurance is not in shared savings for  support in 2020.  Will close to care coordination.      Goals addressed this encounter:   Goals Addressed                 This Visit's Progress       Patient Stated      COMPLETED: Mental Health Management (pt-stated)        Goal Statement: I will learn what options are available for mental health support.  Measure of Success: I will have a list of clinics that are in network  Supportive Steps to Achieve: I will all [employer] to learn what clinics are in network and will discuss with CC.  Barriers: Recovering from surgery.    Strengths: Has insurance. Would like to get some support with family problems.   Date to Achieve By: 1-1-2020  Patient expressed understanding of goal: yes               Intervention/Education provided during outreach: None provided.     Plan: Patient will call clinic if she needs assistance.  No further outreach by .   Rere Belle,   [facility]  [phone]

## 2020-01-20 ENCOUNTER — THERAPY VISIT (OUTPATIENT)
Dept: PHYSICAL THERAPY | Facility: CLINIC | Age: 60
End: 2020-01-20
Payer: COMMERCIAL

## 2020-01-20 DIAGNOSIS — M54.42 CHRONIC BILATERAL LOW BACK PAIN WITH BILATERAL SCIATICA: ICD-10-CM

## 2020-01-20 DIAGNOSIS — G89.29 CHRONIC BILATERAL LOW BACK PAIN WITH BILATERAL SCIATICA: ICD-10-CM

## 2020-01-20 DIAGNOSIS — M54.41 CHRONIC BILATERAL LOW BACK PAIN WITH BILATERAL SCIATICA: ICD-10-CM

## 2020-01-20 DIAGNOSIS — Z98.1 S/P SPINAL FUSION: ICD-10-CM

## 2020-01-20 PROCEDURE — 97112 NEUROMUSCULAR REEDUCATION: CPT | Mod: GP | Performed by: PHYSICAL THERAPIST

## 2020-01-20 PROCEDURE — 97530 THERAPEUTIC ACTIVITIES: CPT | Mod: GP | Performed by: PHYSICAL THERAPIST

## 2020-01-20 PROCEDURE — 97110 THERAPEUTIC EXERCISES: CPT | Mod: GP | Performed by: PHYSICAL THERAPIST

## 2020-01-23 ENCOUNTER — THERAPY VISIT (OUTPATIENT)
Dept: PHYSICAL THERAPY | Facility: CLINIC | Age: 60
End: 2020-01-23
Payer: COMMERCIAL

## 2020-01-23 DIAGNOSIS — M54.41 CHRONIC BILATERAL LOW BACK PAIN WITH BILATERAL SCIATICA: ICD-10-CM

## 2020-01-23 DIAGNOSIS — G89.29 CHRONIC BILATERAL LOW BACK PAIN WITH BILATERAL SCIATICA: ICD-10-CM

## 2020-01-23 DIAGNOSIS — M54.42 CHRONIC BILATERAL LOW BACK PAIN WITH BILATERAL SCIATICA: ICD-10-CM

## 2020-01-23 DIAGNOSIS — Z98.1 S/P SPINAL FUSION: ICD-10-CM

## 2020-01-23 PROCEDURE — 97110 THERAPEUTIC EXERCISES: CPT | Mod: GP | Performed by: PHYSICAL THERAPIST

## 2020-01-23 PROCEDURE — 97530 THERAPEUTIC ACTIVITIES: CPT | Mod: GP | Performed by: PHYSICAL THERAPIST

## 2020-01-23 PROCEDURE — 97112 NEUROMUSCULAR REEDUCATION: CPT | Mod: GP | Performed by: PHYSICAL THERAPIST

## 2020-01-27 ENCOUNTER — THERAPY VISIT (OUTPATIENT)
Dept: PHYSICAL THERAPY | Facility: CLINIC | Age: 60
End: 2020-01-27
Payer: COMMERCIAL

## 2020-01-27 DIAGNOSIS — G89.29 CHRONIC BILATERAL LOW BACK PAIN WITH BILATERAL SCIATICA: ICD-10-CM

## 2020-01-27 DIAGNOSIS — M54.42 CHRONIC BILATERAL LOW BACK PAIN WITH BILATERAL SCIATICA: ICD-10-CM

## 2020-01-27 DIAGNOSIS — M54.41 CHRONIC BILATERAL LOW BACK PAIN WITH BILATERAL SCIATICA: ICD-10-CM

## 2020-01-27 DIAGNOSIS — Z98.1 S/P SPINAL FUSION: ICD-10-CM

## 2020-01-27 PROCEDURE — 97110 THERAPEUTIC EXERCISES: CPT | Mod: GP | Performed by: PHYSICAL THERAPIST

## 2020-01-27 PROCEDURE — 97140 MANUAL THERAPY 1/> REGIONS: CPT | Mod: GP | Performed by: PHYSICAL THERAPIST

## 2020-01-27 PROCEDURE — 97112 NEUROMUSCULAR REEDUCATION: CPT | Mod: GP | Performed by: PHYSICAL THERAPIST

## 2020-01-30 ENCOUNTER — THERAPY VISIT (OUTPATIENT)
Dept: PHYSICAL THERAPY | Facility: CLINIC | Age: 60
End: 2020-01-30
Payer: COMMERCIAL

## 2020-01-30 DIAGNOSIS — M54.41 CHRONIC BILATERAL LOW BACK PAIN WITH BILATERAL SCIATICA: ICD-10-CM

## 2020-01-30 DIAGNOSIS — Z98.1 S/P SPINAL FUSION: ICD-10-CM

## 2020-01-30 DIAGNOSIS — M54.42 CHRONIC BILATERAL LOW BACK PAIN WITH BILATERAL SCIATICA: ICD-10-CM

## 2020-01-30 DIAGNOSIS — G89.29 CHRONIC BILATERAL LOW BACK PAIN WITH BILATERAL SCIATICA: ICD-10-CM

## 2020-01-30 PROCEDURE — 97112 NEUROMUSCULAR REEDUCATION: CPT | Mod: GP | Performed by: PHYSICAL THERAPIST

## 2020-01-30 PROCEDURE — 97110 THERAPEUTIC EXERCISES: CPT | Mod: GP | Performed by: PHYSICAL THERAPIST

## 2020-02-06 ENCOUNTER — THERAPY VISIT (OUTPATIENT)
Dept: PHYSICAL THERAPY | Facility: CLINIC | Age: 60
End: 2020-02-06
Payer: COMMERCIAL

## 2020-02-06 DIAGNOSIS — G89.29 CHRONIC BILATERAL LOW BACK PAIN WITH BILATERAL SCIATICA: ICD-10-CM

## 2020-02-06 DIAGNOSIS — Z98.1 S/P SPINAL FUSION: ICD-10-CM

## 2020-02-06 DIAGNOSIS — M54.41 CHRONIC BILATERAL LOW BACK PAIN WITH BILATERAL SCIATICA: ICD-10-CM

## 2020-02-06 DIAGNOSIS — M54.42 CHRONIC BILATERAL LOW BACK PAIN WITH BILATERAL SCIATICA: ICD-10-CM

## 2020-02-06 PROCEDURE — 97110 THERAPEUTIC EXERCISES: CPT | Mod: GP | Performed by: PHYSICAL THERAPIST

## 2020-02-06 PROCEDURE — 97112 NEUROMUSCULAR REEDUCATION: CPT | Mod: GP | Performed by: PHYSICAL THERAPIST

## 2020-02-06 PROCEDURE — 97140 MANUAL THERAPY 1/> REGIONS: CPT | Mod: GP | Performed by: PHYSICAL THERAPIST

## 2020-02-09 DIAGNOSIS — G89.18 POSTOPERATIVE PAIN AFTER SPINAL SURGERY: ICD-10-CM

## 2020-02-10 ENCOUNTER — MYC MEDICAL ADVICE (OUTPATIENT)
Dept: ORTHOPEDICS | Facility: CLINIC | Age: 60
End: 2020-02-10

## 2020-02-10 DIAGNOSIS — G89.18 POSTOPERATIVE PAIN AFTER SPINAL SURGERY: ICD-10-CM

## 2020-02-10 RX ORDER — GABAPENTIN 300 MG/1
600 CAPSULE ORAL 3 TIMES DAILY
Qty: 180 CAPSULE | Refills: 0 | Status: SHIPPED | OUTPATIENT
Start: 2020-02-10 | End: 2020-11-17

## 2020-02-10 NOTE — TELEPHONE ENCOUNTER
See My Chart message & last dictation.  I called pt to clarify what she needs.  She states she has decreased Neurontin down from 600mg QID presently taking 600mg AM 400mg afternoon & evening & 600mg HS & is continuing to decrease so she needs both 300mg & 400mg pills.  300mg pills were refilled earlier today-see epic.  Refilled 400mg pills & pt. Understands to call us when  She needs 100mg pills to continue weaning down.   Pt. Agreed.  S.O./Chyna Bonner RN.

## 2020-02-11 RX ORDER — GABAPENTIN 400 MG/1
CAPSULE ORAL
Qty: 180 CAPSULE | Refills: 0 | Status: SHIPPED | OUTPATIENT
Start: 2020-02-11 | End: 2021-07-09

## 2020-02-19 ENCOUNTER — THERAPY VISIT (OUTPATIENT)
Dept: PHYSICAL THERAPY | Facility: CLINIC | Age: 60
End: 2020-02-19
Payer: COMMERCIAL

## 2020-02-19 DIAGNOSIS — M54.42 CHRONIC BILATERAL LOW BACK PAIN WITH BILATERAL SCIATICA: ICD-10-CM

## 2020-02-19 DIAGNOSIS — M54.41 CHRONIC BILATERAL LOW BACK PAIN WITH BILATERAL SCIATICA: ICD-10-CM

## 2020-02-19 DIAGNOSIS — G89.29 CHRONIC BILATERAL LOW BACK PAIN WITH BILATERAL SCIATICA: ICD-10-CM

## 2020-02-19 DIAGNOSIS — Z98.1 S/P SPINAL FUSION: ICD-10-CM

## 2020-02-19 PROCEDURE — 97110 THERAPEUTIC EXERCISES: CPT | Mod: GP | Performed by: PHYSICAL THERAPIST

## 2020-02-19 PROCEDURE — 97112 NEUROMUSCULAR REEDUCATION: CPT | Mod: GP | Performed by: PHYSICAL THERAPIST

## 2020-03-03 ENCOUNTER — THERAPY VISIT (OUTPATIENT)
Dept: PHYSICAL THERAPY | Facility: CLINIC | Age: 60
End: 2020-03-03
Payer: COMMERCIAL

## 2020-03-03 DIAGNOSIS — M54.41 CHRONIC BILATERAL LOW BACK PAIN WITH BILATERAL SCIATICA: ICD-10-CM

## 2020-03-03 DIAGNOSIS — M54.42 CHRONIC BILATERAL LOW BACK PAIN WITH BILATERAL SCIATICA: ICD-10-CM

## 2020-03-03 DIAGNOSIS — Z98.1 S/P SPINAL FUSION: ICD-10-CM

## 2020-03-03 DIAGNOSIS — G89.29 CHRONIC BILATERAL LOW BACK PAIN WITH BILATERAL SCIATICA: ICD-10-CM

## 2020-03-03 PROCEDURE — 97112 NEUROMUSCULAR REEDUCATION: CPT | Mod: GP | Performed by: PHYSICAL THERAPIST

## 2020-03-03 PROCEDURE — 97110 THERAPEUTIC EXERCISES: CPT | Mod: GP | Performed by: PHYSICAL THERAPIST

## 2020-03-04 DIAGNOSIS — Z98.1 S/P SPINAL FUSION: Primary | ICD-10-CM

## 2020-03-18 ENCOUNTER — MYC MEDICAL ADVICE (OUTPATIENT)
Dept: ORTHOPEDICS | Facility: CLINIC | Age: 60
End: 2020-03-18

## 2020-03-18 ENCOUNTER — TELEPHONE (OUTPATIENT)
Dept: ORTHOPEDICS | Facility: CLINIC | Age: 60
End: 2020-03-18

## 2020-03-18 DIAGNOSIS — G89.18 POSTOPERATIVE PAIN AFTER SPINAL SURGERY: Primary | ICD-10-CM

## 2020-03-18 RX ORDER — GABAPENTIN 100 MG/1
CAPSULE ORAL
Qty: 120 CAPSULE | Refills: 3
Start: 2020-03-18 | End: 2020-12-04

## 2020-03-18 NOTE — TELEPHONE ENCOUNTER
Called and talked to Aleena.    Explained that due to COVID-19, [facility] is taking steps to try to limit potential patient exposures by reducing face to face visits. A way to do this is by offering telephone visits as an alternative option for their follow-up care.    We have reviewed schedules with Dr. Vasquez and together feel that their follow-up appointment, currently scheduled on 3/24/30, could be done via telephone.    Patient proceeding with a telephone appointment.    Telephone Visit  Billing information reviewed: Yes  Time information reviewed: Yes  Contact Number Verified: Yes    All of their questions were answered at this time, they will call with any new questions that may arise.    Virige Boyer LPN

## 2020-03-21 ENCOUNTER — MYC MEDICAL ADVICE (OUTPATIENT)
Dept: ORTHOPEDICS | Facility: CLINIC | Age: 60
End: 2020-03-21

## 2020-03-24 ENCOUNTER — VIRTUAL VISIT (OUTPATIENT)
Dept: ORTHOPEDICS | Facility: CLINIC | Age: 60
End: 2020-03-24
Payer: COMMERCIAL

## 2020-03-24 DIAGNOSIS — Z98.1 S/P SPINAL FUSION: Primary | ICD-10-CM

## 2020-03-24 NOTE — PROGRESS NOTES
Spine Surgical Hx:  1974 - PISF T4-L4 with Carpio gavino instrumentation x 2 (Dr. Bandar Rose, [place]) for AIS.  10/01/2019 - 3-column osteotomy L5-S1 with sacral dome osteotomy; TLIF with Cunningham laminectomy L5-S1; bilateral PISF L2-pelvis using bilateral double S2AI screws; use of Infuse BMP Large kit (Christina/Rashid/Mara) for Gr 4 isthmic spondylolisthesis L5-S1 with severe sagittal malalignment.  [Implants:  Medtronic Solera and Ballast screw systems, with dual headed screws, reductions screws; 5.5 mm CoCr rods x 3].      TELEPHONE VISIT:  Patient consented to telephone visit.  Date:  3/24/20  Time:  956am to 1005am  Technology utilized:  Telephone  Person spoken to:  patient  Reason for call:  postop      Reason for Visit:  Chief Complaint   Patient presents with     RECHECK     DOS 10-1-19 TLIF-SPO L4-5,L5-S1; Reduction of high-grade spondylolisthesis L5-S1; Distal extension of T4-L4 fusion and fixation down to pelvis        S>    59/f, 6 mos postop.  Last seen at 3 mos, doing well.  Back pain much improved vs preop.  Postop R leg pain has also resolved.  Off opioids since early Nov.      Back continues to be much better vs preop.  However, some areas still have numbness and tingling.  If she stops gabapentin, these sxs are still flaring up.    Walking is still a little limited.  However, this past week, was able to walk around the block a couple of times.  Prolonged walking still causes pain.    Stopped PT 2 wks ago ([facility] in [place] [facility]), because of coronavirus concerns.  Has been taking walks in her yard.  Would like to go back to PT once virus restrictions are lifted.    Gabapentin 300 mg TID, then 400 mg at hs (1,300 mg/day).  Trying to gradually taper off.      Oswestry (EPI) Questionnaire    OSWESTRY DISABILITY INDEX 3/18/2020   Count 9   Sum 15   Oswestry Score (%) 33.33   Some recent data might be hidden      Preop EPI       70%  6 wks               72%  3 mos              35.56%  6  mos  33.33%       PROMIS-10 Scores  Global Mental Health Score: (P) 14  Global Physical Health Score: (P) 15  PROMIS TOTAL - SUBSCORES: (P) 29    O>   This was a telephone visit.  There is no direct contact with nor physical examination of the patient was performed.  Over the phone, patient sounded alert, oriented x3 and cooperative.  Speech was coherent.  Answered questions appropriately.  Per patient, surgical wound healing well or already fully healed.    Imaging:   No new x-rays today    A>   6 mos postop, doing well.    P>    Congratulated and reassured patient.  She is much better now compared to preoperative.  I reassured her that over time, things are still likely to continue improving gradually.  I agree with her plan of gradually tapering in terms of the gabapentin.  I encouraged her to continue doing this.  I also agree  Resuming physical therapy once the virus related lockdown is over, is likely to benefit her.  When restrictions are lifted, I told her to just give us a call, and we will put in a new internal PT order for [facility].    Continue activities as tolerated.  Return to clinic in 6 months (1 year postop) with lumbar CT and EOS full spine AP lateral standing x-rays.      Evangelist Vasquez MD    Orthopaedic Spine Surgery  Dept Orthopaedic Surgery, [facility]  [phone] office, [phone] pager  [email or web address]

## 2020-03-24 NOTE — PROGRESS NOTES
Reason For Visit:   Chief Complaint   Patient presents with     RECHECK     DOS 10-1-19 TLIF-SPO L4-5,L5-S1; Reduction of high-grade spondylolisthesis L5-S1; Distal extension of T4-L4 fusion and fixation down to pelvis        Primary MD: Arabella Conner  Ref. MD: est    ?  No  Occupation .  Currently working? No.  Work status?  furlough.    Date of injury: no  Type of injury: No.    Date of surgery: 10/1/2019  Type of surgery: O-arm Stealth-guided L2 to pelvis posterior segmental fusion with local harvest autograft and allograft.    Pelvic fixation. Three-column osteotomy at S1 for reduction of high-grade L5 to S1 spondylolisthesis.  Modifier 22 for entire procedure due to difficult anatomy, with the procedure taking twice the normal length of time and difficulty. L5 montez laminectomy for neural element decompression  L5-S1 transforaminal interbody fusion with local harvest autograft    Smoker: No  Request smoking cessation information: No    There were no vitals taken for this visit.     pain: 4.5/10    Oswestry (EPI) Questionnaire    OSWESTRY DISABILITY INDEX 3/18/2020   Count 9   Sum 15   Oswestry Score (%) 33.33   Some recent data might be hidden          Promis 10 Assessment    PROMIS 10 3/18/2020   In general, would you say your health is: Very good   In general, would you say your quality of life is: Very good   In general, how would you rate your physical health? Very good   In general, how would you rate your mental health, including your mood and your ability to think? Very good   In general, how would you rate your satisfaction with your social activities and relationships? Good   In general, please rate how well you carry out your usual social activities and roles Very good   To what extent are you able to carry out your everyday physical activities such as walking, climbing stairs, carrying groceries, or moving a chair? Mostly   How often have you been bothered by emotional  problems such as feeling anxious, depressed or irritable? Sometimes   How would you rate your fatigue on average? Moderate   How would you rate your pain on average?   0 = No Pain  to  10 = Worst Imaginable Pain 3   In general, would you say your health is: 4   In general, would you say your quality of life is: 4   In general, how would you rate your physical health? 4   In general, how would you rate your mental health, including your mood and your ability to think? 4   In general, how would you rate your satisfaction with your social activities and relationships? 3   In general, please rate how well you carry out your usual social activities and roles. (This includes activities at home, at work and in your community, and responsibilities as a parent, child, spouse, employee, friend, etc.) 4   To what extent are you able to carry out your everyday physical activities such as walking, climbing stairs, carrying groceries, or moving a chair? 4   In the past 7 days, how often have you been bothered by emotional problems such as feeling anxious, depressed, or irritable? 3   In the past 7 days, how would you rate your fatigue on average? 3   In the past 7 days, how would you rate your pain on average, where 0 means no pain, and 10 means worst imaginable pain? 3   Global Mental Health Score 14   Global Physical Health Score 15   PROMIS TOTAL - SUBSCORES 29   Some recent data might be hidden                Diane Bliss LPN

## 2020-03-25 NOTE — TELEPHONE ENCOUNTER
See My Chart message.  I notified pt. & pharmacy that our med. PA dept is already working on PA.  See PA note.  Pt. Will F/U with PA dept. & pharmacy.  Chyna Bonner RN.

## 2020-04-06 DIAGNOSIS — Z98.1 H/O SPINAL FUSION: ICD-10-CM

## 2020-04-06 DIAGNOSIS — M54.42 CHRONIC BILATERAL LOW BACK PAIN WITH BILATERAL SCIATICA: ICD-10-CM

## 2020-04-06 DIAGNOSIS — J45.20 MILD INTERMITTENT ASTHMA, UNSPECIFIED WHETHER COMPLICATED: ICD-10-CM

## 2020-04-06 DIAGNOSIS — M54.41 CHRONIC BILATERAL LOW BACK PAIN WITH BILATERAL SCIATICA: ICD-10-CM

## 2020-04-06 DIAGNOSIS — G89.18 POSTOPERATIVE PAIN AFTER SPINAL SURGERY: Primary | ICD-10-CM

## 2020-04-06 DIAGNOSIS — Z98.1 S/P SPINAL FUSION: ICD-10-CM

## 2020-04-06 DIAGNOSIS — G89.29 CHRONIC BILATERAL LOW BACK PAIN WITH BILATERAL SCIATICA: ICD-10-CM

## 2020-04-20 NOTE — PROGRESS NOTES
PROGRESS  REPORT    Progress reporting period is from 12/4/19/ to 3/3/20.       SUBJECTIVE  Subjective changes noted by patient: Patient notes that she slipped and fell on the ice about 1 week ago.  Did go in and have x-rays.  She denies any new numbness/tingling/weakness.  She does feel sore like a bruise after falling.  She has been working long hours this week as well.     Current Pain level: 4/10.     Initial Pain level: 6/10.   Changes in function:  Yes (See Goal flowsheet attached for changes in current functional level)  Adverse reaction to treatment or activity: None    OBJECTIVE  Changes noted in objective findings:  Yes,   Objective: Balance: feet together is able to advance to eyes closed.  Tandem with eyes open with some increased sway.  SLS still with hip drop (more L hip drop when in R SLS).  PF still weak R ankle, but improving gradually.       ASSESSMENT/PLAN  Updated problem list and treatment plan: Diagnosis 1:  s/p Revision spinal fusion T4-pelvis; with partial reduction of high-grade listhesis L5-S1 on 10/1/19    Pain -  hot/cold therapy, manual therapy and home program  Decreased ROM/flexibility - manual therapy and therapeutic exercise  Decreased strength - therapeutic exercise and therapeutic activities  Decreased proprioception - neuro re-education and therapeutic activities  Impaired gait - gait training  Decreased function - therapeutic activities  Impaired posture - neuro re-education  STG/LTGs have been met or progress has been made towards goals:  Yes (See Goal flow sheet completed today.)  Assessment of Progress: The patient's condition has potential to improve.  Self Management Plans:  Patient has been instructed in a home treatment program.  I have re-evaluated this patient and find that the nature, scope, duration and intensity of the therapy is appropriate for the medical condition of the patient.  Aleena continues to require the following intervention to meet STG and LTG's:   PT    Recommendations:  Will hold on PT for now due to COVID-19 guidelines and resume once in person care is more appropriate.     Please refer to the daily flowsheet for treatment today, total treatment time and time spent performing 1:1 timed codes.

## 2020-09-03 PROBLEM — M54.41 CHRONIC BILATERAL LOW BACK PAIN WITH BILATERAL SCIATICA: Status: RESOLVED | Noted: 2019-12-05 | Resolved: 2020-09-03

## 2020-09-03 PROBLEM — M54.42 CHRONIC BILATERAL LOW BACK PAIN WITH BILATERAL SCIATICA: Status: RESOLVED | Noted: 2019-12-05 | Resolved: 2020-09-03

## 2020-09-03 PROBLEM — G89.29 CHRONIC BILATERAL LOW BACK PAIN WITH BILATERAL SCIATICA: Status: RESOLVED | Noted: 2019-12-05 | Resolved: 2020-09-03

## 2020-09-03 PROBLEM — Z98.1 S/P SPINAL FUSION: Status: RESOLVED | Noted: 2019-12-05 | Resolved: 2020-09-03

## 2020-10-22 DIAGNOSIS — G89.18 POSTOPERATIVE PAIN AFTER SPINAL SURGERY: Primary | ICD-10-CM

## 2020-10-22 DIAGNOSIS — Z98.1 H/O SPINAL FUSION: ICD-10-CM

## 2020-10-29 ENCOUNTER — OFFICE VISIT (OUTPATIENT)
Dept: ORTHOPEDICS | Facility: CLINIC | Age: 60
End: 2020-10-29
Payer: COMMERCIAL

## 2020-10-29 ENCOUNTER — ANCILLARY PROCEDURE (OUTPATIENT)
Dept: GENERAL RADIOLOGY | Facility: CLINIC | Age: 60
End: 2020-10-29
Attending: ORTHOPAEDIC SURGERY
Payer: COMMERCIAL

## 2020-10-29 ENCOUNTER — HOSPITAL ENCOUNTER (OUTPATIENT)
Dept: CT IMAGING | Facility: CLINIC | Age: 60
Discharge: HOME | End: 2020-10-29
Attending: ORTHOPAEDIC SURGERY | Admitting: ORTHOPAEDIC SURGERY
Payer: COMMERCIAL

## 2020-10-29 DIAGNOSIS — M54.18 RIGHT SACRAL RADICULOPATHY: Primary | ICD-10-CM

## 2020-10-29 DIAGNOSIS — G89.18 POSTOPERATIVE PAIN AFTER SPINAL SURGERY: ICD-10-CM

## 2020-10-29 DIAGNOSIS — Z11.59 ENCOUNTER FOR SCREENING FOR OTHER VIRAL DISEASES: Primary | ICD-10-CM

## 2020-10-29 DIAGNOSIS — Z98.1 H/O SPINAL FUSION: ICD-10-CM

## 2020-10-29 PROCEDURE — 99214 OFFICE O/P EST MOD 30 MIN: CPT | Performed by: ORTHOPAEDIC SURGERY

## 2020-10-29 PROCEDURE — 72131 CT LUMBAR SPINE W/O DYE: CPT | Mod: 26 | Performed by: STUDENT IN AN ORGANIZED HEALTH CARE EDUCATION/TRAINING PROGRAM

## 2020-10-29 PROCEDURE — 72131 CT LUMBAR SPINE W/O DYE: CPT

## 2020-10-29 PROCEDURE — 72082 X-RAY EXAM ENTIRE SPI 2/3 VW: CPT | Performed by: STUDENT IN AN ORGANIZED HEALTH CARE EDUCATION/TRAINING PROGRAM

## 2020-10-29 RX ORDER — IBUPROFEN 600 MG/1
600 TABLET, FILM COATED ORAL AT BEDTIME
Status: ON HOLD | COMMUNITY
Start: 2020-08-27 | End: 2020-12-24

## 2020-10-29 NOTE — PROGRESS NOTES
Spine Surgical Hx:  1976 - PISF T4-L4 with Carpio gavino instrumentation x 2 (Dr. Bandar Rose, [place]) for AIS.  10/01/2019 - 3-column osteotomy L5-S1 with sacral dome osteotomy; TLIF with Cunningham laminectomy L5-S1; bilateral PISF L2-pelvis using bilateral double S2AI screws; use of Infuse BMP Large kit (Christina/Rashid/Mara) for Gr 4 isthmic spondylolisthesis L5-S1 with severe sagittal malalignment.  [Implants:  Medtronic Solera and Ballast screw systems, with dual headed screws, reductions screws; 5.5 mm CoCr rods x 3].      Reason for Visit:  Chief Complaint   Patient presents with     Surgical Followup     Transforaminal Lumbar Interbody Fusion Three Column Osteotomy L5-S1, Posterior Spinal Fusion L2-Pelvis. Use of BMP bone graft       S>  60/f, 1 yr postop.  Lats seen at 6 mos, doing well.  Back much better than preop, but still with numbness and tingling in some areas.  Taking gabapentin 1.3 gm/day.  P> Continue PT.  RTC at 1 yr postop.    Accompanied by .  Per patient, her back is better than preop.  Still having pain starts mainly in the R buttock (feels like somebody kicked her in the R buttock), around R groin and thigh, down lateral calf (L5), down to foot.  Right generally worse than left.  However, Left foot also has numbness, and toes seem to point in the wrong direction.    Also complains of R foot plantarflexion weakness (S1).  Of note, after surgery last year, she saw Dr. Joselo Clifton of Neurology (12/10/2019).  She had an EMG done at that time, which showed R S1 radiculopathy.      Gabapentin 400 mg PO 5x/day (2 gm/day).  Ibuprofen 600 mg PO bid.  Tramadol 50 mg PO bid.  Tylenol prn.    Oswestry (EPI) Questionnaire    OSWESTRY DISABILITY INDEX 10/29/2020   Count 9   Sum 22   Oswestry Score (%) 48.89   Some recent data might be hidden      Preop EPI       70%  6 wks               72%  3 mos              35.56%  6 mos              33.33%  1 yr   48.89%        O>   Alert, oriented x 3,  cooperative.  Not in CP distress.  There were no vitals taken for this visit.  Surgical incision well-healed, no sign of infection.  Ambulates independently.   Grossly neurologically intact.    Imaging:   EOS full spine AP lateral standing x-rays taken today show stable alignment and fixation compared to previous postoperative radiographs.  No evidence of fixation loosening or failure.    Lumbar CT obtained today shows solid interbody fusion across L5-S1, with residual spondylolisthesis, stable compared to previous images.  There is significant vacuum signal within the L4-5 disc, highly suggestive of ongoing motion and pseudoarthrosis.  There is no evidence of fixation loosening or failure.  Looking at the right side for possible nerve compression that may explain patient's residual symptoms, although there is residual foraminal stenosis at L5-S1, and the presence of solid arthrodesis, it is unlikely that this is still causing compression of the L5 nerve root.  On the other hand, because of the residual spondylolisthesis, it is possible that the right S1 nerve root is still irritated as it drapes over the posterior superior corner of the S1 vertebral body.    A>   1.  Pseudarthrosis L4-5, without evidence of fixation loosening/failure.  2.  Residual Right S1 radiculopathy.  3.  Residual R>L spinal stenosis L5-S1.  4.  1 yr s/p distal extension of T4-L4 fusion down to pelvis (10/1/2019).    P>    Had a good long discussion with patient and her .  She is now 1 year out from surgery.  Although she is improved compared to preoperative, she is still having significant right buttock and leg symptoms.  At first, I thought that this was mainly in an L5 pattern, as she describes symptoms on the lateral aspect of her calf.  However, she does have a weakness of right foot plantarflexion, suggestive of S1 radiculopathy.  Studying her CT scan, it is possible that the right S1 nerve root is still irritated or stretched as  it drapes over the posterior superior corner of the S1 vertebral body, owing to the significant residual lumbosacral spondylolisthesis.    I recommend a right S1 transforaminal ANA, mainly for diagnostic purposes.  If she has a positive response, the question is whether it would be worthwhile to go back in there to explore and decompress the nerve root.  This is not going to be an easier simple task, given the previous surgeries, significant scarring, presence of instrumentation and significant deformity.  Furthermore, there is no guarantee that even if we are able to successfully decompress the nerve, that her symptoms would improve.  On the other hand, she is really unhappy with her ongoing symptoms and would like to explore possible treatment options.    - Right S1 TFESI.  Advised to give us a call 1-2 days later to report pain response.    Virtual RTC after above, to review and discuss further options, including poss revision decompression R L5-S1, to decompress R S1 nerve root.  If so, may also consider revision fusion L4-5, either via OLIF or TLIF approach.    TT > 25 mins, > 50% CC.    Evangelist Vasquez MD    Orthopaedic Spine Surgery  Dept Orthopaedic Surgery, [facility]  [phone] office, [phone] pager  [email or web address]

## 2020-10-29 NOTE — NURSING NOTE
Reason For Visit:   Chief Complaint   Patient presents with     Surgical Followup     Transforaminal Lumbar Interbody Fusion Three Column Osteotomy L5-S1, Posterior Spinal Fusion L2-Pelvis. Use of BMP bone graft     Primary MD: Arabella Conner  Ref. MD: est     ?  No  Occupation .  Currently working? No.  Work status?  furlough.     Date of injury: no  Type of injury: No.     Date of surgery: 10/1/2019  Type of surgery: O-arm Stealth-guided L2 to pelvis posterior segmental fusion with local harvest autograft and allograft.    Pelvic fixation. Three-column osteotomy at S1 for reduction of high-grade L5 to S1 spondylolisthesis.  Modifier 22 for entire procedure due to difficult anatomy, with the procedure taking twice the normal length of time and difficulty. L5 montez laminectomy for neural element decompression  L5-S1 transforaminal interbody fusion with local harvest autograft     Smoker: No      There were no vitals taken for this visit.    Pain Assessment  Patient Currently in Pain: Yes  0-10 Pain Scale: 5  Primary Pain Location: Back  Pain Descriptors: Discomfort    Oswestry (EPI) Questionnaire    OSWESTRY DISABILITY INDEX 10/29/2020   Count 9   Sum 22   Oswestry Score (%) 48.89   Some recent data might be hidden          Visual Analog Pain Scale  Back Pain Scale 0-10: 5  Right leg pain: 4  Left leg pain: 0    Promis 10 Assessment    PROMIS 10 10/29/2020   In general, would you say your health is: Excellent   In general, would you say your quality of life is: Good   In general, how would you rate your physical health? Fair   In general, how would you rate your mental health, including your mood and your ability to think? Excellent   In general, how would you rate your satisfaction with your social activities and relationships? Excellent   In general, please rate how well you carry out your usual social activities and roles Very good   To what extent are you able to carry out your  everyday physical activities such as walking, climbing stairs, carrying groceries, or moving a chair? A little   How often have you been bothered by emotional problems such as feeling anxious, depressed or irritable? Often   How would you rate your fatigue on average? None   How would you rate your pain on average?   0 = No Pain  to  10 = Worst Imaginable Pain 5   In general, would you say your health is: 5   In general, would you say your quality of life is: 3   In general, how would you rate your physical health? 2   In general, how would you rate your mental health, including your mood and your ability to think? 5   In general, how would you rate your satisfaction with your social activities and relationships? 5   In general, please rate how well you carry out your usual social activities and roles. (This includes activities at home, at work and in your community, and responsibilities as a parent, child, spouse, employee, friend, etc.) 4   To what extent are you able to carry out your everyday physical activities such as walking, climbing stairs, carrying groceries, or moving a chair? 2   In the past 7 days, how often have you been bothered by emotional problems such as feeling anxious, depressed, or irritable? 4   In the past 7 days, how would you rate your fatigue on average? 1   In the past 7 days, how would you rate your pain on average, where 0 means no pain, and 10 means worst imaginable pain? 5   Global Mental Health Score 15   Global Physical Health Score 12   PROMIS TOTAL - SUBSCORES 27   Some recent data might be hidden                Virgie Boyer LPN

## 2020-11-06 DIAGNOSIS — Z11.59 ENCOUNTER FOR SCREENING FOR OTHER VIRAL DISEASES: ICD-10-CM

## 2020-11-06 PROCEDURE — 99000 SPECIMEN HANDLING OFFICE-LAB: CPT | Performed by: PATHOLOGY

## 2020-11-06 PROCEDURE — U0003 INFECTIOUS AGENT DETECTION BY NUCLEIC ACID (DNA OR RNA); SEVERE ACUTE RESPIRATORY SYNDROME CORONAVIRUS 2 (SARS-COV-2) (CORONAVIRUS DISEASE [COVID-19]), AMPLIFIED PROBE TECHNIQUE, MAKING USE OF HIGH THROUGHPUT TECHNOLOGIES AS DESCRIBED BY CMS-2020-01-R: HCPCS | Mod: 90 | Performed by: PATHOLOGY

## 2020-11-07 LAB
SARS-COV-2 RNA SPEC QL NAA+PROBE: NOT DETECTED
SPECIMEN SOURCE: NORMAL

## 2020-11-09 ENCOUNTER — HOSPITAL ENCOUNTER (OUTPATIENT)
Dept: GENERAL RADIOLOGY | Facility: CLINIC | Age: 60
End: 2020-11-09
Attending: ORTHOPAEDIC SURGERY
Payer: COMMERCIAL

## 2020-11-09 ENCOUNTER — HOSPITAL ENCOUNTER (OUTPATIENT)
Facility: CLINIC | Age: 60
Discharge: HOME | End: 2020-11-09
Admitting: PHYSICIAN ASSISTANT
Payer: COMMERCIAL

## 2020-11-09 VITALS
BODY MASS INDEX: 29.81 KG/M2 | RESPIRATION RATE: 16 BRPM | HEIGHT: 62 IN | WEIGHT: 162 LBS | OXYGEN SATURATION: 97 % | TEMPERATURE: 97.9 F | DIASTOLIC BLOOD PRESSURE: 78 MMHG | SYSTOLIC BLOOD PRESSURE: 133 MMHG | HEART RATE: 82 BPM

## 2020-11-09 DIAGNOSIS — M54.18 RIGHT SACRAL RADICULOPATHY: ICD-10-CM

## 2020-11-09 PROCEDURE — 250N000013 HC RX MED GY IP 250 OP 250 PS 637: Performed by: PHYSICIAN ASSISTANT

## 2020-11-09 PROCEDURE — 64483 NJX AA&/STRD TFRM EPI L/S 1: CPT

## 2020-11-09 PROCEDURE — 250N000011 HC RX IP 250 OP 636: Performed by: PHYSICIAN ASSISTANT

## 2020-11-09 PROCEDURE — 255N000002 HC RX 255 OP 636: Performed by: PHYSICIAN ASSISTANT

## 2020-11-09 PROCEDURE — 999N000154 HC STATISTIC RADIOLOGY XRAY, US, CT, MAR, NM

## 2020-11-09 PROCEDURE — 250N000009 HC RX 250: Performed by: PHYSICIAN ASSISTANT

## 2020-11-09 RX ORDER — LIDOCAINE HYDROCHLORIDE 10 MG/ML
5 INJECTION, SOLUTION EPIDURAL; INFILTRATION; INTRACAUDAL; PERINEURAL ONCE
Status: COMPLETED | OUTPATIENT
Start: 2020-11-09 | End: 2020-11-09

## 2020-11-09 RX ORDER — LIDOCAINE HYDROCHLORIDE 10 MG/ML
30 INJECTION, SOLUTION EPIDURAL; INFILTRATION; INTRACAUDAL; PERINEURAL ONCE
Status: COMPLETED | OUTPATIENT
Start: 2020-11-09 | End: 2020-11-09

## 2020-11-09 RX ORDER — NICOTINE POLACRILEX 4 MG
15-30 LOZENGE BUCCAL
Status: DISCONTINUED | OUTPATIENT
Start: 2020-11-09 | End: 2020-11-09 | Stop reason: HOSPADM

## 2020-11-09 RX ORDER — IOPAMIDOL 408 MG/ML
10 INJECTION, SOLUTION INTRATHECAL ONCE
Status: COMPLETED | OUTPATIENT
Start: 2020-11-09 | End: 2020-11-09

## 2020-11-09 RX ORDER — DEXTROSE MONOHYDRATE 25 G/50ML
25-50 INJECTION, SOLUTION INTRAVENOUS
Status: DISCONTINUED | OUTPATIENT
Start: 2020-11-09 | End: 2020-11-09 | Stop reason: HOSPADM

## 2020-11-09 RX ORDER — TRAMADOL HYDROCHLORIDE 50 MG/1
100 TABLET ORAL EVERY 6 HOURS PRN
COMMUNITY
End: 2023-08-18

## 2020-11-09 RX ORDER — DEXAMETHASONE SODIUM PHOSPHATE 10 MG/ML
20 INJECTION, SOLUTION INTRAMUSCULAR; INTRAVENOUS ONCE
Status: COMPLETED | OUTPATIENT
Start: 2020-11-09 | End: 2020-11-09

## 2020-11-09 RX ORDER — IBUPROFEN 400 MG/1
400 TABLET, FILM COATED ORAL EVERY 6 HOURS PRN
Status: COMPLETED | OUTPATIENT
Start: 2020-11-09 | End: 2020-11-09

## 2020-11-09 RX ORDER — AMLODIPINE BESYLATE 5 MG/1
5 TABLET ORAL AT BEDTIME
COMMUNITY

## 2020-11-09 RX ORDER — ACETAMINOPHEN 325 MG/1
650 TABLET ORAL EVERY 4 HOURS PRN
Status: COMPLETED | OUTPATIENT
Start: 2020-11-09 | End: 2020-11-09

## 2020-11-09 RX ADMIN — IBUPROFEN 400 MG: 200 TABLET, FILM COATED ORAL at 12:33

## 2020-11-09 RX ADMIN — LIDOCAINE HYDROCHLORIDE 2 ML: 10 INJECTION, SOLUTION EPIDURAL; INFILTRATION; INTRACAUDAL; PERINEURAL at 12:06

## 2020-11-09 RX ADMIN — LIDOCAINE HYDROCHLORIDE 7 ML: 10 INJECTION, SOLUTION EPIDURAL; INFILTRATION; INTRACAUDAL; PERINEURAL at 11:07

## 2020-11-09 RX ADMIN — ACETAMINOPHEN 650 MG: 325 TABLET, FILM COATED ORAL at 12:33

## 2020-11-09 RX ADMIN — DEXAMETHASONE SODIUM PHOSPHATE 10 MG: 10 INJECTION, SOLUTION INTRAMUSCULAR; INTRAVENOUS at 12:03

## 2020-11-09 RX ADMIN — IOPAMIDOL 2 ML: 408 INJECTION, SOLUTION INTRATHECAL at 12:04

## 2020-11-09 ASSESSMENT — MIFFLIN-ST. JEOR: SCORE: 1248.08

## 2020-11-09 NOTE — PROGRESS NOTES
Care Suites Discharge Nursing Note    Patient Information  Name: Aleena Ontiveros  Age: 60 year old    Discharge Education:  Discharge instructions reviewed: Yes  Additional education/resources provided: NA  Patient/patient representative verbalizes understanding: Yes  Patient discharging on new medications: No  Medication education completed: N/A    Discharge Plans:   Discharge location: home  Discharge ride contacted: Yes  Approximate discharge time: 1255    Discharge Criteria:  Discharge criteria met and vital signs stable: Yes    Patient Belongs:  Patient belongings returned to patient: Yes    Shantel Rodriguez RN

## 2020-11-09 NOTE — DISCHARGE INSTRUCTIONS
Steroid Injection Discharge Instructions     After you go home:      You may resume your normal diet.    Care of Puncture Site:      If you have a bandaid on your puncture site, you may remove it the next morning    You may shower tomorrow    No bath tubs, whirlpools or swimming for at least 3 days     Activity:      You may go back to normal activity in 24 hours    You should let pain be your guide as to the extent of your activities    Maintain any activity limitations as ordered by your provider    Do NOT drive a vehicle if you develop numbness in your arm or leg    Medicines:      You may resume all medications    Resume your Warfarin/Coumadin at your regular dose today. Follow up with your provider to have your INR rechecked    Resume your Platelet Inhibitors and Aspirin tomorrow at your regular dose    For minor pain, you may take Acetaminophen (Tylenol) or Ibuprofen (Advil)    Pain:       You may experience increased or different pain over the next 24-48 hours    For the next 48 hrs - you may use ice packs for discomfort     Call your primary care doctor if:      You have severe pain that does not improve with pain medication    You have chills or a fever greater than 101 F (38 C)    The site is red, swollen, hot or tender    New problems with your bowel or bladder    Any questions or concerns    Other Instructions:      New numbness down your leg post injection is temporary and may last for up to 6 hours. You may need assistance with activity until your leg has normal sensation.    If you are diabetic, monitor your blood sugar closely. Contact the provider who manages your diabetes to help you control your blood sugar if needed.    For Your Information:      A steroid was injected to help decrease swelling and may help to reduce pain. It may take up to 7-10 days to obtain full results.    Some patients will get lasting relief from a single injection. Others may require up to 3 injections to get results. If  you have more than one steroid injection, they should be given 2 weeks apart.    Side effects of your steroid injection are mild and will go away in 2-3 days  - Insomnia  - Heartburn  - Flushed face  - Water retention  - Increased appetite  - Increased blood sugar      If you have questions call:        [facility] Radiology Dept @ [phone]      The provider in charge of your procedure was Dr Chin.

## 2020-11-09 NOTE — PROCEDURES
[facility]    Procedure: Right S1 nerve root injection    Date/Time: 11/9/2020 12:57 PM  Performed by: Helen Joshi PA-C  Authorized by: Helen Joshi PA-C     UNIVERSAL PROTOCOL   Site Marked: Yes  Prior Images Obtained and Reviewed:  Yes  Required items: Required blood products, implants, devices and special equipment available    Patient identity confirmed:  Verbally with patient  NA - No sedation, light sedation, or local anesthesia  Confirmation Checklist:  Patient's identity using two indicators, relevant allergies, procedure was appropriate and matched the consent or emergent situation and correct equipment/implants were available  Time out: Immediately prior to the procedure a time out was called    Universal Protocol: the Joint Commission Universal Protocol was followed    Preparation: Patient was prepped and draped in usual sterile fashion           ANESTHESIA    Anesthesia: Local infiltration  Local Anesthetic:  Lidocaine 1% without epinephrine      SEDATION    Patient Sedated: No    See dictated procedure note for full details.  PROCEDURE   Patient Tolerance:  Patient tolerated the procedure well with no immediate complications    Length of time physician/provider present for 1:1 monitoring during sedation: 0

## 2020-11-09 NOTE — PROGRESS NOTES
Care Suites Admission Nursing Note    Patient Information  Name: Aleena Ontiveros  Age: 60 year old  Reason for admission: epidural injection  Care Suites arrival time: 0955    Visitor Information  Name: Lam  Informed of visitor restrictions: Yes  1 visitor allowed per patient   Visitor must screen negative for COVID symptoms   Visitor must wear a mask  Waiting rooms closed to visitors    Patient Admission/Assessment   Pre-procedure assessment complete: Yes  If abnormal assessment/labs, provider notified: N/A  NPO: Yes  Medications held per instructions/orders: N/A  Consent: await radiologist  If applicable, pregnancy test status: NA  Patient oriented to room: Yes  Education/questions answered: Yes - patient concerned that her expectation of procedure not the same as scheduled. OLU Aldrich here to discuss with patient and spouse.  Plan/other: Await final decision on procedure.    Discharge Planning  Discharge name/phone number: Lam - [phone]   Overnight post sedation caregiver: KATHERINE Fritz will be with her  Discharge location: home    Shantel Rodriguez RN

## 2020-11-09 NOTE — PROGRESS NOTES
PATIENT/VISITOR WELLNESS SCREENING    Step 1 Patient Screening    1. In the last month, have you been in contact with someone who was confirmed or suspected to have Coronavirus/COVID-19? No    2. Do you have the following symptoms?  Fever/Chills? No   Cough? No   Shortness of breath? No   New loss of taste or smell? No  Sore throat? No  Muscle or body aches? No  Headaches? No  Fatigue? No  Vomiting or diarrhea? No    Step 2 Visitor Screening    1. Name of Visitor (1 visitor per patient): Lam    2. In the last month, have you been in contact with someone who was confirmed or suspected to have Coronavirus/COVID-19? No    3. Do you have the following symptoms?  Fever/Chills? No   Cough? No   Shortness of breath? No   Skin rash? No   Loss of taste or smell? No  Sore throat? No  Runny or stuffy nose? No  Muscle or body aches? No  Headaches? No  Fatigue? No  Vomiting or diarrhea? No    If the visitor has positive symptoms, notify supervisor/manger  Per policy, the visitor will need to leave the facility     Step 3 Refer to logic grid below for actions    NO SYMPTOM(S)    ACTIONS:  1. Standard rooming process  2. Provider to assess per normal protocol  3. Implement precautions as needed and per guidelines     POSITIVE SYMPTOM(S)  If positive for ANY of the following symptoms: fever, cough, shortness of breath, rash    ACTION:  1. Continue to have the patient wear a mask   2. Room patient as soon as possible  3. Don appropriate PPE when entering room  4. Provider evaluation

## 2020-11-09 NOTE — PROGRESS NOTES
Care Suites Post Procedure Note    Patient Information  Name: Aleena Ontiveros  Age: 60 year old    Post Procedure  Time patient returned to Care Suites: 1200  Concerns/abnormal assessment: Band aid to low back C/D/I. (R) leg numbness felt.  If abnormal assessment, provider notified: 1215 Helen P.A. at bedside.  Plan/Other: Observe and give po pain medications as ordered.    Stephany Ulrich RN

## 2020-11-13 ENCOUNTER — MYC MEDICAL ADVICE (OUTPATIENT)
Dept: ORTHOPEDICS | Facility: CLINIC | Age: 60
End: 2020-11-13

## 2020-11-14 NOTE — TELEPHONE ENCOUNTER
SEE My Chart message. See dictation & See injection report done 11-9-20 at [facility].  I called pt. On Fri. 11-13-20 at 1730.  She states she did get good relief after injection but pain has returned & she wants to rediscuss if surgery is recommended.  She already has virtual appt. Scheduled for next week Tues 11-17-20 so I advised she keep that appt & discuss with  & she agreed.  Call back prn. Pt agreed.  Chyna Bonner RN.

## 2020-11-15 NOTE — PROGRESS NOTES
Spine Surgical Hx:  1976 - PISF T4-L4 with Carpio gavino instrumentation x 2 (Dr. Bandar Rose, [place]) for AIS.  10/01/2019 - 3-column osteotomy L5-S1 with sacral dome osteotomy; TLIF with Cunningham laminectomy L5-S1; bilateral PISF L2-pelvis using bilateral double S2AI screws; use of Infuse BMP Large kit (Christina/Rashid/Mara) for Gr 4 isthmic spondylolisthesis L5-S1 with severe sagittal malalignment.  [Implants:  Medtronic Solera and Ballast screw systems, with dual headed screws, reductions screws; 5.5 mm CoCr rods x 3].      TELEPHONE VISIT:  Patient consented to today's telephone visit.    Time:  9:11am to 9:31am  Person spoken to:  Patient    Last Visit Date: 10/29/20  Previous Impression:  1.  Pseudarthrosis L4-5, without evidence of fixation loosening/failure.  2.  Residual Right S1 radiculopathy.  3.  Residual R>L spinal stenosis L5-S1.  4.  1 yr s/p distal extension of T4-L4 fusion down to pelvis (10/1/2019).  Previous Plan:  - Right S1 TFESI.  Advised to give us a call 1-2 days later to report pain response.  Virtual RTC after above, to review and discuss further options, including poss revision decompression R L5-S1, to decompress R S1 nerve root.  If so, may also consider revision fusion L4-5, either via OLIF or TLIF approach.      S>  60/f, > 1 yr postop; here to discuss injection response.    11/9/2020 - R S1 TFESI (Helen Joshi PA-C).  Per report, back pain improved from 5/10 to 1; R leg pain improved from 5/10 to 0; L leg pain unchanged at 2/10.  Per patient, the injection was really difficult.  They had a really tough time getting the needle in.  Took 3 different providers.  When they finally got it in, patient felt some relief for ~ 1 hour, at least 50%.    Gabapentin 400 mg 5x/day (2 gm/day)  Tramadol 2x/day  Tylenol  Ibuprofen      Oswestry (EPI) Questionnaire    OSWESTRY DISABILITY INDEX 11/17/2020   Count 9   Sum 21   Oswestry Score (%) 46.67   Some recent data might be hidden      Preop  EPI       70%  6 wks               72%  3 mos              35.56%  6 mos              33.33%  1 yr                    48.89%      PROMIS-10 Scores  Global Mental Health Score: (P) 16  Global Physical Health Score: (P) 12  PROMIS TOTAL - SUBSCORES: (P) 28    O>   This was a telephone visit.  Thus, no direct contact with nor physical examination of the patient was performed.  Over the phone, patient sounded alert, oriented x3, and cooperative.  Coherent speech.  Answered questions appropriately.    Imaging:    No new x-rays obtained today.  Reviewed the images from her recent injection.    A>    1.  Right S1 radiculopathy, with positive response to R S1 TFESI (11/9/2020).  2.  Pseudarthrosis L4-5, without evidence of fixation loosening/failure.  3.  Residual R>L spinal stenosis L5-S1.  4.  1 yr s/p distal extension of T4-L4 fusion down to pelvis (10/1/2019).       P>    Had good long discussion with patient.  Her positive response to the S1 injection would suggest that her right S1 nerve root is mediating a significant portion of her pain.  Also her CT scan also shows vacuum signal within the L4-5 disc, and thus pseudoarthrosis, this is less of a priority for her.  Furthermore, the same study did not show evidence of fixation loosening.    We discussed options for her condition.  Because of very transient response to the injection, and the fact that they had a lot of technical difficulty performing the injection, I did not think that repeat injection will be a good option.  As such, her options mainly are living with her symptoms (or managing continued nonoperative treatment as best as she could), versus surgery with the goal of decompressing the right S1 nerve root.  We discussed the pros and cons of each.  Patient believes that her symptoms have been getting worse, and she is afraid that her nerve is gradually dying.  Thus, she would like to get surgery and would prefer surgery sooner rather than later.  I explained  to her that with the rising Covid numbers in this state, our hospital has again gone into partial lockdown; thus, surgery may not be scheduled as soon as she would hope.  At any rate, we will keep advocating for her and try our best to expedite scheduling as possible.    - Case request placed:  Revision laminectomy L5-S1, with decompression of R S1 nerve root; Revision PSF L4-S1.  - PAC referral.      Although phone time only 20 mins, qualifies as level 4 visit, given complexity of our discussion and patient condition, inluding surgical counseling and decision-making.      Evangelist Vasquez MD    Orthopaedic Spine Surgery  Dept Orthopaedic Surgery, [facility]  [phone] office, [phone] pager  [email or web address]

## 2020-11-17 ENCOUNTER — VIRTUAL VISIT (OUTPATIENT)
Dept: ORTHOPEDICS | Facility: CLINIC | Age: 60
End: 2020-11-17
Payer: COMMERCIAL

## 2020-11-17 DIAGNOSIS — M43.17 SPONDYLOLISTHESIS OF LUMBOSACRAL REGION: ICD-10-CM

## 2020-11-17 DIAGNOSIS — M54.18 RIGHT SACRAL RADICULOPATHY: Primary | ICD-10-CM

## 2020-11-17 DIAGNOSIS — Z98.1 S/P SPINAL FUSION: ICD-10-CM

## 2020-11-17 PROCEDURE — 99214 OFFICE O/P EST MOD 30 MIN: CPT | Mod: 95 | Performed by: ORTHOPAEDIC SURGERY

## 2020-11-17 NOTE — LETTER
11/17/2020         RE: Aleena Ontiveros  [address]  [place] [address]        Dear Colleague,    Thank you for referring your patient, Aleena Ontiveros, to the [facility] [place]. Please see a copy of my visit note below.    Spine Surgical Hx:  1974 - PISF T4-L4 with Carpio gavino instrumentation x 2 (Dr. Bandar Rose, [place]) for AIS.  10/01/2019 - 3-column osteotomy L5-S1 with sacral dome osteotomy; TLIF with Cunningham laminectomy L5-S1; bilateral PISF L2-pelvis using bilateral double S2AI screws; use of Infuse BMP Large kit (Christina/Rashid/Mara) for Gr 4 isthmic spondylolisthesis L5-S1 with severe sagittal malalignment.  [Implants:  Medtronic Solera and Ballast screw systems, with dual headed screws, reductions screws; 5.5 mm CoCr rods x 3].      TELEPHONE VISIT:  Patient consented to today's telephone visit.    Time:  9:11am to 9:31am  Person spoken to:  Patient    Last Visit Date: 10/29/20  Previous Impression:  1.  Pseudarthrosis L4-5, without evidence of fixation loosening/failure.  2.  Residual Right S1 radiculopathy.  3.  Residual R>L spinal stenosis L5-S1.  4.  1 yr s/p distal extension of T4-L4 fusion down to pelvis (10/1/2019).  Previous Plan:  - Right S1 TFESI.  Advised to give us a call 1-2 days later to report pain response.  Virtual RTC after above, to review and discuss further options, including poss revision decompression R L5-S1, to decompress R S1 nerve root.  If so, may also consider revision fusion L4-5, either via OLIF or TLIF approach.      S>  60/f, > 1 yr postop; here to discuss injection response.    11/9/2020 - R S1 TFESI (Helen Joshi PA-C).  Per report, back pain improved from 5/10 to 1; R leg pain improved from 5/10 to 0; L leg pain unchanged at 2/10.  Per patient, the injection was really difficult.  They had a really tough time getting the needle in.  Took 3 different providers.  When they finally got it in, patient felt some relief  for ~ 1 hour, at least 50%.    Gabapentin 400 mg 5x/day (2 gm/day)  Tramadol 2x/day  Tylenol  Ibuprofen      Oswestry (EPI) Questionnaire    OSWESTRY DISABILITY INDEX 11/17/2020   Count 9   Sum 21   Oswestry Score (%) 46.67   Some recent data might be hidden      Preop EPI       70%  6 wks               72%  3 mos              35.56%  6 mos              33.33%  1 yr                    48.89%      PROMIS-10 Scores  Global Mental Health Score: (P) 16  Global Physical Health Score: (P) 12  PROMIS TOTAL - SUBSCORES: (P) 28    O>   This was a telephone visit.  Thus, no direct contact with nor physical examination of the patient was performed.  Over the phone, patient sounded alert, oriented x3, and cooperative.  Coherent speech.  Answered questions appropriately.    Imaging:    No new x-rays obtained today.  Reviewed the images from her recent injection.    A>    1.  Right S1 radiculopathy, with positive response to R S1 TFESI (11/9/2020).  2.  Pseudarthrosis L4-5, without evidence of fixation loosening/failure.  3.  Residual R>L spinal stenosis L5-S1.  4.  1 yr s/p distal extension of T4-L4 fusion down to pelvis (10/1/2019).       P>    Had good long discussion with patient.  Her positive response to the S1 injection would suggest that her right S1 nerve root is mediating a significant portion of her pain.  Also her CT scan also shows vacuum signal within the L4-5 disc, and thus pseudoarthrosis, this is less of a priority for her.  Furthermore, the same study did not show evidence of fixation loosening.    We discussed options for her condition.  Because of very transient response to the injection, and the fact that they had a lot of technical difficulty performing the injection, I did not think that repeat injection will be a good option.  As such, her options mainly are living with her symptoms (or managing continued nonoperative treatment as best as she could), versus surgery with the goal of decompressing the right  S1 nerve root.  We discussed the pros and cons of each.  Patient believes that her symptoms have been getting worse, and she is afraid that her nerve is gradually dying.  Thus, she would like to get surgery and would prefer surgery sooner rather than later.  I explained to her that with the rising Covid numbers in this state, our hospital has again gone into partial lockdown; thus, surgery may not be scheduled as soon as she would hope.  At any rate, we will keep advocating for her and try our best to expedite scheduling as possible.    - Case request placed:  Revision laminectomy L5-S1, with decompression of R S1 nerve root; Revision PSF L4-S1.  - PAC referral.      Although phone time only 20 mins, qualifies as level 4 visit, given complexity of our discussion and patient condition, inluding surgical counseling and decision-making.      Evangelist Vasquez MD    Orthopaedic Spine Surgery  Dept Orthopaedic Surgery, [facility]  [phone] office, [phone] pager  [email or web address]

## 2020-11-17 NOTE — NURSING NOTE
Reason For Visit:   Chief Complaint   Patient presents with     RECHECK     F/U Injection on 11/9/20. States the injection helped a small amount.       Primary MD: Arabella Conner      There were no vitals taken for this visit.    Pain Assessment  Patient Currently in Pain: Yes  0-10 Pain Scale: 4  Primary Pain Location: Buttocks  Pain Descriptors: Discomfort    Oswestry (EPI) Questionnaire    OSWESTRY DISABILITY INDEX 11/17/2020   Count 9   Sum 21   Oswestry Score (%) 46.67   Some recent data might be hidden            Visual Analog Pain Scale  Back Pain Scale 0-10: 4  Right leg pain: 4  Left leg pain: 0    Promis 10 Assessment    PROMIS 10 11/14/2020   In general, would you say your health is: Good   In general, would you say your quality of life is: Good   In general, how would you rate your physical health? Fair   In general, how would you rate your mental health, including your mood and your ability to think? Excellent   In general, how would you rate your satisfaction with your social activities and relationships? Very good   In general, please rate how well you carry out your usual social activities and roles Good   To what extent are you able to carry out your everyday physical activities such as walking, climbing stairs, carrying groceries, or moving a chair? Moderately   How often have you been bothered by emotional problems such as feeling anxious, depressed or irritable? Rarely   How would you rate your fatigue on average? Mild   How would you rate your pain on average?   0 = No Pain  to  10 = Worst Imaginable Pain 5   In general, would you say your health is: 3   In general, would you say your quality of life is: 3   In general, how would you rate your physical health? 2   In general, how would you rate your mental health, including your mood and your ability to think? 5   In general, how would you rate your satisfaction with your social activities and relationships? 4   In general, please rate how well  you carry out your usual social activities and roles. (This includes activities at home, at work and in your community, and responsibilities as a parent, child, spouse, employee, friend, etc.) 3   To what extent are you able to carry out your everyday physical activities such as walking, climbing stairs, carrying groceries, or moving a chair? 3   In the past 7 days, how often have you been bothered by emotional problems such as feeling anxious, depressed, or irritable? 2   In the past 7 days, how would you rate your fatigue on average? 2   In the past 7 days, how would you rate your pain on average, where 0 means no pain, and 10 means worst imaginable pain? 5   Global Mental Health Score 16   Global Physical Health Score 12   PROMIS TOTAL - SUBSCORES 28   Some recent data might be hidden                Virgie Boyer LPN

## 2020-11-29 ENCOUNTER — MYC MEDICAL ADVICE (OUTPATIENT)
Dept: ORTHOPEDICS | Facility: CLINIC | Age: 60
End: 2020-11-29

## 2020-12-02 NOTE — TELEPHONE ENCOUNTER
See my Chart message.  See dictation of 11-17-20.    I called pt & let her know it has not been a month since her appt., it has been 2 weeks & surgery order was written then 11-17-20.    I told pt I confirmed with our PA Dept. Nusrat that she submitted request to Insurance 11-27-20 after Holiday & explained that it takes a while to submit to Insurance because PA dept has to gather all imaging & records,etc. To submit with request & she understood.   I also explained the Pandemic back log & surgeries have been canceled due to bed & staff situation, so if her surgery gets approved,  The surgery scheduler has to get approval to schedule her.  She understood & thanked me for our time.  Call back prn.  Pt agreed.  Chyna Bonner RN.

## 2020-12-04 ENCOUNTER — MYC REFILL (OUTPATIENT)
Dept: ORTHOPEDICS | Facility: CLINIC | Age: 60
End: 2020-12-04

## 2020-12-04 DIAGNOSIS — G89.18 POSTOPERATIVE PAIN AFTER SPINAL SURGERY: ICD-10-CM

## 2020-12-04 RX ORDER — GABAPENTIN 100 MG/1
CAPSULE ORAL
Qty: 120 CAPSULE | Refills: 3 | Status: SHIPPED | OUTPATIENT
Start: 2020-12-04 | End: 2021-03-18

## 2020-12-09 ENCOUNTER — TELEPHONE (OUTPATIENT)
Dept: ORTHOPEDICS | Facility: CLINIC | Age: 60
End: 2020-12-09

## 2020-12-09 DIAGNOSIS — Z11.59 ENCOUNTER FOR SCREENING FOR OTHER VIRAL DISEASES: Primary | ICD-10-CM

## 2020-12-09 PROBLEM — Z98.1 S/P SPINAL FUSION: Status: ACTIVE | Noted: 2020-12-09

## 2020-12-09 PROBLEM — M54.18 RIGHT SACRAL RADICULOPATHY: Status: ACTIVE | Noted: 2020-12-09

## 2020-12-09 PROBLEM — M43.17 SPONDYLOLISTHESIS OF LUMBOSACRAL REGION: Status: ACTIVE | Noted: 2020-12-09

## 2020-12-09 NOTE — TELEPHONE ENCOUNTER
Patient is scheduled for surgery with Dr. Vasquez      Spoke or left message with: Caitlin with [facility]    Date of Surgery: 12/21/20    Location: [place]    Informed patient they will need an adult  Yes    Pre-op with surgeon (if applicable): Complete    H&P: Scheduled with PAC    Additional imaging/appointments: Patient will await call from covid scheduling team to schedule a covid test 4 days prior to surgery    Surgery packet: Given in clinic     Additional comments: Patient will receive arrival time at PAC

## 2020-12-10 NOTE — TELEPHONE ENCOUNTER
FUTURE VISIT INFORMATION      SURGERY INFORMATION:    Date: 12/21/20    Location: [facility]    Surgeon:  Evangelist Vasquez MD    Anesthesia Type:  General    Procedure: Revision laminectomy lumbar 5-sacral 1 with decompression of Right Sacral 1 nerve root; revision posterior spinal fusion (pseudarthrosis repair) lumbar 4-sacral 1    Consult: virtual visit 11/17    RECORDS REQUESTED FROM:       Primary Care Provider: Rere Belle LSW- [facility]    Pertinent Medical History: Hypertension    Most recent EKG+ Tracing: 10/15/19

## 2020-12-14 NOTE — PROGRESS NOTES
"Spine Surgical Hx:  1974 - PISF T4-L4 with Carpio gavino instrumentation x 2 (Dr. Bandar Rose, [place]) for AIS.  10/01/2019 - 3-column osteotomy L5-S1 with sacral dome osteotomy; TLIF with Cunningham laminectomy L5-S1; bilateral PISF L2-pelvis using bilateral double S2AI screws; use of Infuse BMP Large kit (Christina/Rashid/Mara) for Gr 4 isthmic spondylolisthesis L5-S1 with severe sagittal malalignment.  [Implants:  Medtronic Solera and Ballast screw systems, with dual headed screws, reductions screws; 5.5 mm CoCr rods x 3].      VIDEO VISIT:  Patient is evaluated today via billable video visit.    The patient has been notified of following:   \"This video visit will be conducted via a call between you and your physician/provider. We have found that certain health care needs can be provided without the need for an in-person physical exam.  This service lets us provide the care you need with a video conversation.  If a prescription is necessary we can send it directly to your pharmacy.  If lab work is needed we can place an order for that and you can then stop by our lab to have the test done at a later time.  If during the course of the call the physician/provider feels a video visit is not appropriate, you will not be charged for this service.\"     Physician has received verbal consent for a Video Visit from the patient.  Video software/ml used:  [email or web address]  Video time:  7:34am to 7:59am  Originating Location (pt. Location): Home  Distant Location (provider location):  [facility] [place]     Chief Complaint   Patient presents with     RECHECK     Discuss surgery       Last Visit Date: 11/17/20  Previous Impression:  1.  Right S1 radiculopathy, with positive response to R S1 TFESI (11/9/2020).  2.  Pseudarthrosis L4-5, without evidence of fixation loosening/failure.  3.  Residual R>L spinal stenosis L5-S1.  4.  1 yr s/p distal extension of T4-L4 fusion down to pelvis (10/1/2019).  Previous " Plan:  - Case request placed:  Revision laminectomy L5-S1, with decompression of R S1 nerve root; Revision PSF L4-S1.  - PAC referral.        S>  60 year old female, here for final preop discussion.  Surgery scheduled next week 12/21/20.    Per patient, symptoms are getting worse.  Still R>L, but the left leg has also been hurting and getting worse.  Mainly in calves, also goes down to foot.  Getting more difficult to sleep at night.        Oswestry (EPI) Questionnaire    OSWESTRY DISABILITY INDEX 12/15/2020   Count 9   Sum 26   Oswestry Score (%) 57.78   Some recent data might be hidden      Preop EPI       70%  6 wks               72%  3 mos              35.56%  6 mos              33.33%  1 yr                    48.89%  14 mos  57.78%       Physical Examination:    This was a video visit, so very limited exam could be performed.  On video, patient appeared alert, oriented x 3, cooperative, with coherent speech, and not in cardiorespiratory distress.  Able to respond to questions appropriately and follow instructions.    Imaging:    No new imaging reviewed today.    Assessment:    1.  Right S1 radiculopathy, with positive response to R S1 TFESI (11/9/2020).  2.  Pseudarthrosis L4-5, without evidence of fixation loosening/failure.  3.  Residual R>L spinal stenosis L5-S1.  4.  1 yr s/p distal extension of T4-L4 fusion down to pelvis (10/1/2019).    Plan:    Had a good long discussion with patient and .  Her surgery is scheduled next Monday.  She had a long list of questions that she wanted to go over.  I spent a long time answering her questions to the best of my ability and to her apparent satisfaction.  I reassured her that we will definitely do our best in treating her current problem, and we are well prepared for next week's surgery.    Patient had questions:  - will there be co-surgeon? No  - will L4-5 pseudo be addressed?  - Will she be discharged from hospital too soon before she's ready?  - Will she be  going to TCU?  - Will she get home PT/OT?  - Will she be able to walk right away? (per patient, took her ~ 3 wks last time)  - Will her /family be allowed to stay with her in the hospital?  - She usually has urinary retention postop.  Requests for her torres to stay in as long as possible?  - ICU stay? Hospital stay?      TT 25 mins, > 50% CC.      Evangelist Vasquez MD    Orthopaedic Spine Surgery  Dept Orthopaedic Surgery, [facility]  [phone] office, [phone] pager  [email or web address]

## 2020-12-15 ENCOUNTER — VIRTUAL VISIT (OUTPATIENT)
Dept: ORTHOPEDICS | Facility: CLINIC | Age: 60
End: 2020-12-15
Payer: COMMERCIAL

## 2020-12-15 DIAGNOSIS — Z98.1 S/P SPINAL FUSION: ICD-10-CM

## 2020-12-15 DIAGNOSIS — M54.18 RIGHT SACRAL RADICULOPATHY: Primary | ICD-10-CM

## 2020-12-15 DIAGNOSIS — M43.17 SPONDYLOLISTHESIS OF LUMBOSACRAL REGION: ICD-10-CM

## 2020-12-15 PROCEDURE — 99214 OFFICE O/P EST MOD 30 MIN: CPT | Mod: 95 | Performed by: ORTHOPAEDIC SURGERY

## 2020-12-15 NOTE — LETTER
"    12/15/2020         RE: Aleena Ontiveros  [address]  [place] [address]        Dear Colleague,    Thank you for referring your patient, Aleena Ontiveros, to the [facility]. Please see a copy of my visit note below.    Spine Surgical Hx:  1974 - PISF T4-L4 with Carpio gavino instrumentation x 2 (Dr. Bandar Rose, [place]) for AIS.  10/01/2019 - 3-column osteotomy L5-S1 with sacral dome osteotomy; TLIF with Cunningham laminectomy L5-S1; bilateral PISF L2-pelvis using bilateral double S2AI screws; use of Infuse BMP Large kit (Christina/Rashid/Mara) for Gr 4 isthmic spondylolisthesis L5-S1 with severe sagittal malalignment.  [Implants:  Medtronic Solera and Ballast screw systems, with dual headed screws, reductions screws; 5.5 mm CoCr rods x 3].      VIDEO VISIT:  Patient is evaluated today via billable video visit.    The patient has been notified of following:   \"This video visit will be conducted via a call between you and your physician/provider. We have found that certain health care needs can be provided without the need for an in-person physical exam.  This service lets us provide the care you need with a video conversation.  If a prescription is necessary we can send it directly to your pharmacy.  If lab work is needed we can place an order for that and you can then stop by our lab to have the test done at a later time.  If during the course of the call the physician/provider feels a video visit is not appropriate, you will not be charged for this service.\"     Physician has received verbal consent for a Video Visit from the patient.  Video software/ml used:  Eyenalyze  Video time:  7:34am to 7:59am  Originating Location (pt. Location): Home  Distant Location (provider location):  [facility]     Chief Complaint   Patient presents with     RECHECK     Discuss surgery       Last Visit Date: 11/17/20  Previous Impression:  1.  Right S1 " radiculopathy, with positive response to R S1 TFESI (11/9/2020).  2.  Pseudarthrosis L4-5, without evidence of fixation loosening/failure.  3.  Residual R>L spinal stenosis L5-S1.  4.  1 yr s/p distal extension of T4-L4 fusion down to pelvis (10/1/2019).  Previous Plan:  - Case request placed:  Revision laminectomy L5-S1, with decompression of R S1 nerve root; Revision PSF L4-S1.  - PAC referral.        S>  60 year old female, here for final preop discussion.  Surgery scheduled next week 12/21/20.    Per patient, symptoms are getting worse.  Still R>L, but the left leg has also been hurting and getting worse.  Mainly in calves, also goes down to foot.  Getting more difficult to sleep at night.        Oswestry (EPI) Questionnaire    OSWESTRY DISABILITY INDEX 12/15/2020   Count 9   Sum 26   Oswestry Score (%) 57.78   Some recent data might be hidden      Preop EPI       70%  6 wks               72%  3 mos              35.56%  6 mos              33.33%  1 yr                    48.89%  14 mos  57.78%       Physical Examination:    This was a video visit, so very limited exam could be performed.  On video, patient appeared alert, oriented x 3, cooperative, with coherent speech, and not in cardiorespiratory distress.  Able to respond to questions appropriately and follow instructions.    Imaging:    No new imaging reviewed today.    Assessment:    1.  Right S1 radiculopathy, with positive response to R S1 TFESI (11/9/2020).  2.  Pseudarthrosis L4-5, without evidence of fixation loosening/failure.  3.  Residual R>L spinal stenosis L5-S1.  4.  1 yr s/p distal extension of T4-L4 fusion down to pelvis (10/1/2019).    Plan:    Had a good long discussion with patient and .  Her surgery is scheduled next Monday.  She had a long list of questions that she wanted to go over.  I spent a long time answering her questions to the best of my ability and to her apparent satisfaction.  I reassured her that we will definitely do  our best in treating her current problem, and we are well prepared for next week's surgery.    Patient had questions:  - will there be co-surgeon? No  - will L4-5 pseudo be addressed?  - Will she be discharged from hospital too soon before she's ready?  - Will she be going to TCU?  - Will she get home PT/OT?  - Will she be able to walk right away? (per patient, took her ~ 3 wks last time)  - Will her /family be allowed to stay with her in the hospital?  - She usually has urinary retention postop.  Requests for her torres to stay in as long as possible?  - ICU stay? Hospital stay?      TT 25 mins, > 50% CC.      Evangelist Vasquez MD    Orthopaedic Spine Surgery  Dept Orthopaedic Surgery, [facility]  [phone] office, [phone] pager  [email or web address]

## 2020-12-15 NOTE — NURSING NOTE
Reason For Visit:   Chief Complaint   Patient presents with     RECHECK     Discuss surgery       Primary MD: Arabella Conner      There were no vitals taken for this visit.    Pain Assessment  Patient Currently in Pain: Yes  0-10 Pain Scale: 6  Primary Pain Location: Back    Oswestry (EPI) Questionnaire    OSWESTRY DISABILITY INDEX 12/15/2020   Count 9   Sum 26   Oswestry Score (%) 57.78   Some recent data might be hidden            Visual Analog Pain Scale  Back Pain Scale 0-10: 6  Right leg pain: 5  Left leg pain: 7    Promis 10 Assessment    PROMIS 10 12/15/2020   In general, would you say your health is: Excellent   In general, would you say your quality of life is: Good   In general, how would you rate your physical health? Good   In general, how would you rate your mental health, including your mood and your ability to think? Excellent   In general, how would you rate your satisfaction with your social activities and relationships? Good   In general, please rate how well you carry out your usual social activities and roles Good   To what extent are you able to carry out your everyday physical activities such as walking, climbing stairs, carrying groceries, or moving a chair? A little   How often have you been bothered by emotional problems such as feeling anxious, depressed or irritable? Rarely   How would you rate your fatigue on average? Mild   How would you rate your pain on average?   0 = No Pain  to  10 = Worst Imaginable Pain 6   In general, would you say your health is: 5   In general, would you say your quality of life is: 3   In general, how would you rate your physical health? 3   In general, how would you rate your mental health, including your mood and your ability to think? 5   In general, how would you rate your satisfaction with your social activities and relationships? 3   In general, please rate how well you carry out your usual social activities and roles. (This includes activities at home,  at work and in your community, and responsibilities as a parent, child, spouse, employee, friend, etc.) 3   To what extent are you able to carry out your everyday physical activities such as walking, climbing stairs, carrying groceries, or moving a chair? 2   In the past 7 days, how often have you been bothered by emotional problems such as feeling anxious, depressed, or irritable? 2   In the past 7 days, how would you rate your fatigue on average? 2   In the past 7 days, how would you rate your pain on average, where 0 means no pain, and 10 means worst imaginable pain? 6   Global Mental Health Score 15   Global Physical Health Score 12   PROMIS TOTAL - SUBSCORES 27   Some recent data might be hidden                Virgie Boyer LPN

## 2020-12-16 ENCOUNTER — PRE VISIT (OUTPATIENT)
Dept: SURGERY | Facility: CLINIC | Age: 60
End: 2020-12-16

## 2020-12-16 ENCOUNTER — ANESTHESIA EVENT (OUTPATIENT)
Dept: SURGERY | Facility: CLINIC | Age: 60
DRG: 029 | End: 2020-12-16
Payer: COMMERCIAL

## 2020-12-16 ENCOUNTER — OFFICE VISIT (OUTPATIENT)
Dept: SURGERY | Facility: CLINIC | Age: 60
End: 2020-12-16
Payer: COMMERCIAL

## 2020-12-16 VITALS
HEIGHT: 62 IN | HEART RATE: 80 BPM | BODY MASS INDEX: 32.02 KG/M2 | SYSTOLIC BLOOD PRESSURE: 142 MMHG | WEIGHT: 174 LBS | OXYGEN SATURATION: 97 % | TEMPERATURE: 97.6 F | RESPIRATION RATE: 16 BRPM | DIASTOLIC BLOOD PRESSURE: 82 MMHG

## 2020-12-16 DIAGNOSIS — Z01.818 PRE-OP EXAMINATION: Primary | ICD-10-CM

## 2020-12-16 DIAGNOSIS — M54.18 RIGHT SACRAL RADICULOPATHY: ICD-10-CM

## 2020-12-16 LAB
ANION GAP SERPL CALCULATED.3IONS-SCNC: 6 MMOL/L (ref 3–14)
BUN SERPL-MCNC: 16 MG/DL (ref 7–30)
CALCIUM SERPL-MCNC: 9.3 MG/DL (ref 8.5–10.1)
CHLORIDE SERPL-SCNC: 100 MMOL/L (ref 94–109)
CO2 SERPL-SCNC: 29 MMOL/L (ref 20–32)
CREAT SERPL-MCNC: 0.64 MG/DL (ref 0.52–1.04)
ERYTHROCYTE [DISTWIDTH] IN BLOOD BY AUTOMATED COUNT: 12.3 % (ref 10–15)
GFR SERPL CREATININE-BSD FRML MDRD: >90 ML/MIN/{1.73_M2}
GLUCOSE SERPL-MCNC: 97 MG/DL (ref 70–99)
HCT VFR BLD AUTO: 41.9 % (ref 35–47)
HGB BLD-MCNC: 13.8 G/DL (ref 11.7–15.7)
MCH RBC QN AUTO: 28.5 PG (ref 26.5–33)
MCHC RBC AUTO-ENTMCNC: 32.9 G/DL (ref 31.5–36.5)
MCV RBC AUTO: 87 FL (ref 78–100)
PLATELET # BLD AUTO: 301 10E9/L (ref 150–450)
POTASSIUM SERPL-SCNC: 3.6 MMOL/L (ref 3.4–5.3)
RBC # BLD AUTO: 4.84 10E12/L (ref 3.8–5.2)
SODIUM SERPL-SCNC: 135 MMOL/L (ref 133–144)
WBC # BLD AUTO: 8.5 10E9/L (ref 4–11)

## 2020-12-16 PROCEDURE — 86900 BLOOD TYPING SEROLOGIC ABO: CPT | Performed by: PATHOLOGY

## 2020-12-16 PROCEDURE — 86850 RBC ANTIBODY SCREEN: CPT | Performed by: PATHOLOGY

## 2020-12-16 PROCEDURE — 99214 OFFICE O/P EST MOD 30 MIN: CPT | Performed by: PHYSICIAN ASSISTANT

## 2020-12-16 PROCEDURE — 85027 COMPLETE CBC AUTOMATED: CPT | Performed by: PATHOLOGY

## 2020-12-16 PROCEDURE — 36415 COLL VENOUS BLD VENIPUNCTURE: CPT | Performed by: PATHOLOGY

## 2020-12-16 PROCEDURE — 80048 BASIC METABOLIC PNL TOTAL CA: CPT | Performed by: PATHOLOGY

## 2020-12-16 PROCEDURE — 86901 BLOOD TYPING SEROLOGIC RH(D): CPT | Performed by: PATHOLOGY

## 2020-12-16 RX ORDER — MULTIPLE VITAMINS W/ MINERALS TAB 9MG-400MCG
2 TAB ORAL 2 TIMES DAILY
COMMUNITY

## 2020-12-16 RX ORDER — LISINOPRIL AND HYDROCHLOROTHIAZIDE 12.5; 2 MG/1; MG/1
2 TABLET ORAL DAILY
COMMUNITY
Start: 2020-10-01

## 2020-12-16 RX ORDER — IBUPROFEN 200 MG
200 TABLET ORAL 2 TIMES DAILY
Status: ON HOLD | COMMUNITY
End: 2020-12-24

## 2020-12-16 RX ORDER — APREPITANT 40 MG/1
40 CAPSULE ORAL ONCE
Status: CANCELLED | OUTPATIENT
Start: 2020-12-21

## 2020-12-16 RX ORDER — OMEGA-3/DHA/EPA/FISH OIL 60 MG-90MG
2 CAPSULE ORAL DAILY
COMMUNITY

## 2020-12-16 ASSESSMENT — ENCOUNTER SYMPTOMS: SEIZURES: 0

## 2020-12-16 ASSESSMENT — MIFFLIN-ST. JEOR: SCORE: 1302.51

## 2020-12-16 NOTE — PATIENT INSTRUCTIONS
Preparing for Your Surgery      Name:  Aleena Ontiveros   MRN:  [id number]   :  1960   Today's Date:  2020       Arriving for surgery:  Surgery date:  2020  Arrival time:  10:30 am    Restrictions due to COVID 19:  No visitors are allowed at this time.    [employer] parking is available for anyone with mobility limitations or disabilities.  ([place]  24 hours/ 7 days a week; [facility]  7 am- 3:30 pm, Mon- Fri)    Please come to:     [facility] [address]  [address] [place]  [address]    -Proceed to the 3rd floor, check in at the Adult Surgery Waiting Lounge. [phone]    If an escort is needed stop at the Information Desk in the lobby.         What can I eat or drink?  -  You may eat and drink normally for up to 8 hours before your surgery. (Until 5 am)  -  You may have clear liquids until 2 hours before surgery. (Until 10:30 am arrival time)    Examples of clear liquids:  Water  Clear broth  Juices (apple, white grape, white cranberry  and cider) without pulp  Noncarbonated, powder based beverages  (lemonade and Brown-Aid)  Sodas (Sprite, 7-Up, ginger ale and seltzer)  Coffee or tea (without milk or cream)  Gatorade    -  No Alcohol for at least 24 hours before surgery     Which medicines can I take?    Hold Aspirin for 7 days before surgery.   Hold Multivitamins for 7 days before surgery.  Hold Supplements (fish oil) for 7 days before surgery.    Hold Ibuprofen (Advil, Motrin) for 1 day before surgery--unless otherwise directed by surgeon.    Hold Naproxen (Aleve) for 4 days before surgery.    -  DO NOT take these medications the day of surgery:  Lisinopril hydrochlorothiazide        -  PLEASE TAKE these medications the day of surgery:  Acetaminophen   Inhalers as usual and bring to hospital  Amlodipine  Fexofenadine (Allegra)  Fluticasone (Flonase)  Gabapentin  Levothyroxine   Tramadol if needed           How do I prepare myself?  - Please take 2 showers  before surgery using Scrubcare or Hibiclens soap.    Use this soap only from the neck to your toes.     Leave the soap on your skin for one minute--then rinse thoroughly.      You may use your own shampoo and conditioner; no other hair products.   - Please remove all jewelry and body piercings.  - No lotions, deodorants or fragrance.  - No makeup or fingernail polish.   - Bring your ID and insurance card.    -Patient Relations will be notified of your admission---and meet with you   [facility] Patient Relations [phone]    - All patients are required to have a Covid-19 test within 4 days of surgery/procedure.      -Patients will be contacted by the [facility] scheduling team within 1 week of surgery to make an appointment.      - Patients may call the Scheduling team at [phone] if they have not been scheduled within 4 days of  surgery.         Questions or Concerns:    - For any questions regarding the day of surgery or your hospital stay, please contact the Pre Admission Nursing Office at [phone].       - If you have health changes between today and your surgery please call your surgeon.       For questions after surgery please call your surgeons office.

## 2020-12-16 NOTE — ANESTHESIA PREPROCEDURE EVALUATION
Anesthesia Pre-Procedure Evaluation    Patient: Aleena Ontiveros   MRN:     [id number] Gender:   female   Age:    60 year old :      1960        Preoperative Diagnosis: Right sacral radiculopathy [M54.18]  S/P spinal fusion [Z98.1]  Spondylolisthesis of lumbosacral region [M43.17]   Procedure(s):  Revision laminectomy lumbar 5-sacral 1 with decompression of Right Sacral 1 nerve root; revision posterior spinal fusion (pseudarthrosis repair) lumbar 4-sacral 1     Results for ALEENA ONTIVEROS (MRN [id number]) as of 2020 16:54   Ref. Range 2020 16:26   Sodium Latest Ref Range: 133 - 144 mmol/L 135   Potassium Latest Ref Range: 3.4 - 5.3 mmol/L 3.6   Chloride Latest Ref Range: 94 - 109 mmol/L 100   Carbon Dioxide Latest Ref Range: 20 - 32 mmol/L 29   Urea Nitrogen Latest Ref Range: 7 - 30 mg/dL 16   Creatinine Latest Ref Range: 0.52 - 1.04 mg/dL 0.64   GFR Estimate Latest Ref Range: >60 mL/min/1.73_m2 >90   GFR Estimate If Black Latest Ref Range: >60 mL/min/1.73_m2 >90   Calcium Latest Ref Range: 8.5 - 10.1 mg/dL 9.3   Anion Gap Latest Ref Range: 3 - 14 mmol/L 6   Glucose Latest Ref Range: 70 - 99 mg/dL 97   WBC Latest Ref Range: 4.0 - 11.0 10e9/L 8.5   Hemoglobin Latest Ref Range: 11.7 - 15.7 g/dL 13.8   Hematocrit Latest Ref Range: 35.0 - 47.0 % 41.9   Platelet Count Latest Ref Range: 150 - 450 10e9/L 301   RBC Count Latest Ref Range: 3.8 - 5.2 10e12/L 4.84   MCV Latest Ref Range: 78 - 100 fl 87   MCH Latest Ref Range: 26.5 - 33.0 pg 28.5   MCHC Latest Ref Range: 31.5 - 36.5 g/dL 32.9   RDW Latest Ref Range: 10.0 - 15.0 % 12.3     Preop Vitals    BP Readings from Last 3 Encounters:   20 (!) 151/95   20 133/78   12/10/19 120/81    Pulse Readings from Last 3 Encounters:   20 80   20 82   12/10/19 79      Resp Readings from Last 3 Encounters:   20 16   20 16   10/31/19 16    SpO2 Readings from Last 3 Encounters:   20 97%   20 97%   12/10/19 95%      Temp  "Readings from Last 1 Encounters:   20 97.6  F (36.4  C) (Oral)    Ht Readings from Last 1 Encounters:   20 1.575 m (5' 2\")      Wt Readings from Last 1 Encounters:   20 78.9 kg (174 lb)    Estimated body mass index is 31.83 kg/m  as calculated from the following:    Height as of this encounter: 1.575 m (5' 2\").    Weight as of this encounter: 78.9 kg (174 lb).     LDA:  Peripheral IV 10/15/19 Right (Active)   Number of days: 428        Past Medical History:   Diagnosis Date     Anxiety      Arthritis     osteoarthritis of both knee     Asthma      Chronic osteoarthritis      Hypertension      PONV (postoperative nausea and vomiting)      Thyroid disease      Uncomplicated asthma       Past Surgical History:   Procedure Laterality Date     ABDOMEN SURGERY  ,         ARTHROPLASTY KNEE Right 8/10/2017    Procedure: ARTHROPLASTY KNEE;  RIGHT TOTAL KNEE ARTHROPLASTY ;  Surgeon: Grady Alvarez MD;  Location:  OR     ARTHROPLASTY KNEE Left 2017    Procedure: ARTHROPLASTY KNEE;  LEFT TOTAL KNEE ARTHROPLASTY;  Surgeon: Grady Alvarez MD;  Location:  OR     BACK SURGERY      spinal fusion     ENT SURGERY      tonsilectomy     GYN SURGERY  11    hysterectomy     OPTICAL TRACKING SYSTEM FUSION POSTERIOR SPINE THORACIC THREE+ LEVELS N/A 10/1/2019    Procedure: Transforaminal Lumbar Interbody Fusion Three Column Osteotomy L5-S1, Posterior Spinal Fusion L2-Pelvis. Use of BMP bone graft;  Surgeon: Evangelist Vasquez MD;  Location:  OR     ORTHOPEDIC SURGERY      sinal fusion,hand     thumb mass resection       THYROIDECTOMY  2014    Procedure: THYROIDECTOMY;  Surgeon: Krzysztof Marquez MD;  Location: [facility]      Allergies   Allergen Reactions     Adhesive Tape      Erythromycin Nausea and Vomiting     Pills cause vomiting,         Anesthesia Evaluation     . Pt has had prior anesthetic.     History of anesthetic complications   - PONV  difficulty urinating " and having BM.  left catheter in for several days after knee replacement in 2017 which was beneficial to her      ROS/MED HX    ENT/Pulmonary:     (+)AUBREY risk factors hypertension, Mild Persistent asthma Treatment: Inhaler prn, Inhaled steroids and Oral steroids,  , . .   (-) recent URI   Neurologic:  - neg neurologic ROS    (-) seizures, CVA, TIA and migraines   Cardiovascular:     (+) hypertension----. : . . . :. . Previous cardiac testing date:results:date: results:ECG reviewed date:10/15/19 results:Sinus rhythm, possible left atrial enlargement, non specific T wave abnormality date: results:          METS/Exercise Tolerance:  3 - Able to walk 1-2 blocks without stopping   Hematologic:     (+) Anemia, History of Transfusion no previous transfusion reaction -     (-) history of blood clots   Musculoskeletal: Comment: S/p bilateral knee replacement  (+) arthritis,  -       GI/Hepatic:  - neg GI/hepatic ROS      (-) GERD   Renal/Genitourinary:  - ROS Renal section negative       Endo:     (+) thyroid problem hypothyroidism, .   (-) chronic steroid usage   Psychiatric:     (+) psychiatric history anxiety      Infectious Disease:  - neg infectious disease ROS       Malignancy:      - no malignancy   Other:    (+) No chance of pregnancy no H/O Chronic Pain,no other significant disability                        PHYSICAL EXAM:   Mental Status/Neuro: Age Appropriate; A/A/O   Airway: Facies: Feasible  Mallampati: I  Mouth/Opening: Full  TM distance: > 6 cm  Neck ROM: Limited   Respiratory: Auscultation: CTAB     Resp. Rate: Normal     Resp. Effort: Normal      CV: Rhythm: Regular  Heart: Normal Sounds  Edema: None  Pulses: Normal  Neck: Normal   Comments:      Dental: Normal Dentition                Assessment:   ASA SCORE: 2    H&P: History and physical reviewed and following examination; no interval change.    NPO Status: NPO Appropriate     Plan:   Anes. Type:  General   Pre-Medication: None   Induction:  IV (Standard)    Airway: ETT; Oral   Access/Monitoring: PIV; 2nd PIV; A-Line   Maintenance: Balanced     Drips/Meds: Sufentanil; Ketamine     Advanced Monitoring: BIS     Postop Plan:   Postop Pain: Opioids  Postop Sedation/Airway: Not planned  Disposition: Inpatient/Admit     PONV Management:   Adult Risk Factors: Female, H/o PONV or Motion Sickness, Postop Opioids   Prevention: Ondansetron, Dexamethasone     CONSENT: Direct conversation   Plan and risks discussed with: Patient   Blood Products: Consented (ALL Blood Products)                PAC Discussion and Assessment    ASA Classification: 3  Case is suitable for: [place]  Anesthetic techniques and relevant risks discussed: GA  Invasive monitoring and risk discussed:   Types:   Possibility and Risk of blood transfusion discussed:   NPO instructions given:   Additional anesthetic preparation and risks discussed:   Needs early admission to pre-op area:   Other:     PAC Resident/NP Anesthesia Assessment:  Aleena is a 60 year old woman who is scheduled for Revision laminectomy lumbar 5-sacral 1 with decompression of Right Sacral 1 nerve root; revision posterior spinal fusion (pseudarthrosis repair) lumbar 4-sacral 1 on 12/21/20 by Dr. Vasquez in treatment of Right sacral radiculopathy.  PAC referral for risk assessment and optimization for anesthesia with comorbid conditions of HTN, asthma, allergies, hypothyroidism, osteoarthritis, anxiety:     Pre-operative considerations:     1.  Cardiac:  Functional status- METS 3, the patient was able to walk around the block this summer but in the past couple of months is limited secondary to her back/leg pain. She denies any cardiac symptoms.  Intermediate risk surgery with 0.4% (RCRI #) risk of major adverse cardiac event.    ~ HTN - continue norvasc and hold lisinopril-hydrochlorothiazide DOS     2.  Pulm:  Airway feasible.  AUBREY risk: Low   ~ Asthma - patient reports no recent exacerbations. Continue advair, singulair and PRN albuterol.     ~ Allergies - continue allegra and flonase     3. Heme: Anemia - hgb was 9.7 on 10/25/19- recheck labs today     4. Endo: hypothyroidism - continue levothyroxine     5. GI:  Risk of PONV score = 4.  If > 2, anti-emetic intervention recommended. For the patient past surgery Emend was ordered. Will order this again for the patient.     6. Psych: anxiety - the patient is not on medications but reports baseline anxiety and lots of anxiety with her post op care. She reports that after surgeries her bladder and bowels are very slow to wake up. After her knee surgeries her torres catheter was left in place longer which helped. After her most recent back surgery, her torres catheter was removed and then she couldn't urinate. She had 5 different nurses try to straight cath her overnight which was very traumatic for the patient and eventually resulted in a UTI and sepsis. The patient also felt that her blood pressure and medications weren't well managed while in the hospital. She reports that she discussed with Dr. Vasquez who informed her he wasn't sure why those orders were placed and it would be the hospitalist or that she could speak with anesthesia. We discussed that anesthesia is not in charge of admission orders but during her surgery we would be closely monitoring her blood pressure and urine output via a catheter. As the patient is not allowed to have family members stay with her due to COVID-19 and it's unknown who the hospitalist will be, will have a patient advocate arranged for the patient during her hospitalization.      7. Musculoskeletal: Osteoarthritis - s/p bilateral TKA, previous back surgeries, right sacral radiculopathy - procedure as above. Consideration for careful positioning to minimize discomfort. Continue tylenol, neurontin and ultram. Morphine eq = 40 MEDD. Hold ibuprofen 24 hours.      VTE risk: 0.5%     Patient is optimized and is acceptable candidate for the proposed procedure.  No further  diagnostic evaluation is needed.      For further details of assessment, testing, and physical exam please see H and P completed on same date.     Nasima Muhammad PA-C       Mid-Level Provider/Resident: Nasima Muhammad PA-C  Date: 12/16/20  Time:     Attending Anesthesiologist Anesthesia Assessment:        Anesthesiologist:   Date:   Time:   Pass/Fail:   Disposition:     PAC Pharmacist Assessment:        Pharmacist:   Date:   Time:    Nasima Muhammad PA-C

## 2020-12-16 NOTE — H&P
Pre-Operative H & P     CC:  Preoperative exam to assess for increased cardiopulmonary risk while undergoing surgery and anesthesia.    Date of Encounter: 12/16/2020  Primary Care Physician:  Arabella Conner     Reason for visit: pre operative examination, Right sacral radiculopathy     HPI  Aleena Ontiveros is a 60 year old female who presents for pre-operative H & P in preparation for Revision laminectomy lumbar 5-sacral 1 with decompression of Right Sacral 1 nerve root; revision posterior spinal fusion (pseudarthrosis repair) lumbar 4-sacral 1  with Dr. Vasquez on 12/21/20 at [facility].     The patient is a 60-year-old woman with a past medical history significant for hypertension, asthma, seasonal anxiety, osteoarthritis hypothyroidism, and right sacral radiculopathy.  The patient has had prior back surgeries the first surgery being a posterior spinal fusion of T4-L4 and 1974.  She most recently had 3 column osteotomy of L5-S1 with sacral dome osteotomy, total lumbar interbody fusion with Cunningham laminectomy L5-S1, bilateral posterior instrumented spinal fusion of L2 through pelvis by Dr. Vasquez and Dr. Terry and Dr. Mike on 10/1/2019.  She continued to follow-up with Dr. Vasquez 3 months, 6 months and a year postop and found that her back pain had significantly improved.  At her year postop visit she complained of right buttock groin and thigh pain that went down to her foot.  She did have work-up by neurology and an EMG which showed right S1 radiculopathy.  Dr. Vasquez recommended S1 transforaminal epidural steroid injection with the patient underwent on 11/9/2020.  She followed up with Dr. Vasquez again 11/17/2020 and they discussed her current symptoms and treatment options.  The patient has now been scheduled for the procedure as above.     History is obtained from the patient and chart review    Past Medical History  Past Medical History:   Diagnosis Date      Anemia      Anxiety      Arthritis     osteoarthritis of both knee     Chronic osteoarthritis      Hypertension      Hypothyroidism      PONV (postoperative nausea and vomiting)      Seasonal allergic rhinitis      Uncomplicated asthma        Past Surgical History  Past Surgical History:   Procedure Laterality Date     ABDOMEN SURGERY  ,         ARTHROPLASTY KNEE Right 08/10/2017    Procedure: ARTHROPLASTY KNEE;  RIGHT TOTAL KNEE ARTHROPLASTY ;  Surgeon: Grady Alvarez MD;  Location:  OR     ARTHROPLASTY KNEE Left 2017    Procedure: ARTHROPLASTY KNEE;  LEFT TOTAL KNEE ARTHROPLASTY;  Surgeon: Grady Alvarez MD;  Location:  OR     BACK SURGERY      spinal fusion     ENT SURGERY      tonsilectomy     GYN SURGERY  2013    hysterectomy     OPTICAL TRACKING SYSTEM FUSION POSTERIOR SPINE THORACIC THREE+ LEVELS N/A 10/01/2019    Procedure: Transforaminal Lumbar Interbody Fusion Three Column Osteotomy L5-S1, Posterior Spinal Fusion L2-Pelvis. Use of BMP bone graft;  Surgeon: Evangelist Vasquez MD;  Location:  OR     ORTHOPEDIC SURGERY      sinal fusion,hand     thumb mass resection       THYROIDECTOMY  2014    Procedure: THYROIDECTOMY;  Surgeon: Krzysztof Marquez MD;  Location: [facility]       Hx of Blood transfusions/reactions: yes, in  without issues     Hx of abnormal bleeding or anti-platelet use: none    Menstrual history: No LMP recorded. Patient has had a hysterectomy.:     Steroid use in the last year: none    Personal or FH with difficulty with Anesthesia:  PONV, bowel and bladder slow to wake after anesthesia     Prior to Admission Medications  Current Outpatient Medications   Medication Sig Dispense Refill     acetaminophen (TYLENOL) 500 MG tablet Take 500 mg by mouth 5 times daily        albuterol (PROAIR HFA/PROVENTIL HFA/VENTOLIN HFA) 108 (90 Base) MCG/ACT inhaler Inhale 2 puffs into the lungs every 4 hours as needed for shortness of breath /  dyspnea or wheezing       amLODIPine (NORVASC) 5 MG tablet Take 5 mg by mouth At Bedtime        estradiol (ESTRACE) 0.1 MG/GM cream Place 2 g vaginally At Bedtime        Fexofenadine HCl (ALLEGRA PO) Take 180 mg by mouth every morning        fluticasone (FLONASE) 50 MCG/ACT spray Spray 1 spray into both nostrils 2 times daily        fluticasone-salmeterol (ADVAIR) 500-50 MCG/DOSE inhaler Inhale 1 puff into the lungs every 12 hours       gabapentin (NEURONTIN) 100 MG capsule Take 1-2 caps Four times per day as needed for pain 120 capsule 3     gabapentin (NEURONTIN) 400 MG capsule Take 1 cap=400mg QID & continue weaning down 180 capsule 0     ibuprofen (ADVIL/MOTRIN) 200 MG tablet Take 200 mg by mouth 2 times daily       ibuprofen (ADVIL/MOTRIN) 600 MG tablet Take 600 mg by mouth At Bedtime        levothyroxine (SYNTHROID) 125 MCG tablet Take 1 tablet (125 mcg) by mouth every morning       lisinopril-hydrochlorothiazide (ZESTORETIC) 20-12.5 MG tablet Take 2 tablets by mouth daily       Montelukast Sodium (SINGULAIR PO) Take 10 mg by mouth At Bedtime        multivitamin w/minerals (THERA-VIT-M) tablet Take 2 tablets by mouth 2 times daily       Omega-3 Fatty Acids (FISH OIL) 500 MG CAPS Take 2 capsules by mouth daily       traMADol (ULTRAM) 50 MG tablet Take 100 mg by mouth every 6 hours as needed for severe pain         Allergies  Allergies   Allergen Reactions     Adhesive Tape      Erythromycin Nausea and Vomiting     Pills cause vomiting,        Social History  Social History     Socioeconomic History     Marital status:      Spouse name: Not on file     Number of children: Not on file     Years of education: Not on file     Highest education level: Not on file   Occupational History     Not on file   Social Needs     Financial resource strain: Not on file     Food insecurity     Worry: Not on file     Inability: Not on file     Transportation needs     Medical: Not on file     Non-medical: Not on file    Tobacco Use     Smoking status: Never Smoker     Smokeless tobacco: Never Used   Substance and Sexual Activity     Alcohol use: Yes     Binge frequency: Patient refused     Comment: 1-2 per month     Drug use: No     Sexual activity: Not on file   Lifestyle     Physical activity     Days per week: Not on file     Minutes per session: Not on file     Stress: Not on file   Relationships     Social connections     Talks on phone: Not on file     Gets together: Not on file     Attends Voodoo service: Not on file     Active member of club or organization: Not on file     Attends meetings of clubs or organizations: Not on file     Relationship status: Not on file     Intimate partner violence     Fear of current or ex partner: Not on file     Emotionally abused: Not on file     Physically abused: Not on file     Forced sexual activity: Not on file   Other Topics Concern     Parent/sibling w/ CABG, MI or angioplasty before 65F 55M? Not Asked   Social History Narrative     Not on file       Family History  Family History   Problem Relation Age of Onset     Breast Cancer Mother      Cancer - colorectal Father      C.A.D. Father      Cerebrovascular Disease Father      Thyroid Disease Brother      Thyroid Disease Sister        Review of Systems    ROS/MED HX    ENT/Pulmonary:     (+)AUBREY risk factors hypertension, Mild Persistent asthma Treatment: Inhaler prn, Inhaled steroids and Oral steroids,  , . .   (-) recent URI   Neurologic:  - neg neurologic ROS    (-) seizures, CVA, TIA and migraines   Cardiovascular:     (+) hypertension----. : . . . :. . Previous cardiac testing date:results:date: results:ECG reviewed date:10/15/19 results:Sinus rhythm, possible left atrial enlargement, non specific T wave abnormality date: results:          METS/Exercise Tolerance:  3 - Able to walk 1-2 blocks without stopping   Hematologic:     (+) Anemia, History of Transfusion no previous transfusion reaction -     (-) history of blood  "clots   Musculoskeletal: Comment: S/p bilateral knee replacement  (+) arthritis,  -       GI/Hepatic:  - neg GI/hepatic ROS      (-) GERD   Renal/Genitourinary:  - ROS Renal section negative       Endo:     (+) thyroid problem hypothyroidism, .   (-) chronic steroid usage   Psychiatric:     (+) psychiatric history anxiety      Infectious Disease:  - neg infectious disease ROS       Malignancy:      - no malignancy   Other:    (+) No chance of pregnancy no H/O Chronic Pain,no other significant disability          The complete review of systems is negative other than noted in the HPI or here.   Temp: 97.6  F (36.4  C) Temp src: Oral BP: (!) 151/95 Pulse: 80   Resp: 16 SpO2: 97 %         174 lbs 0 oz  5' 2\"   Body mass index is 31.83 kg/m .       Physical Exam  Constitutional: Awake, alert, cooperative, no apparent distress, and appears stated age.  Eyes: Pupils equal, round and reactive to light, extra ocular muscles intact, sclera clear, conjunctiva normal.  HENT: Normocephalic, oral pharynx with moist mucus membranes, good dentition. No goiter appreciated.   Respiratory: Clear to auscultation bilaterally, no crackles or wheezing.  Cardiovascular: Regular rate and rhythm, normal S1 and S2, and no murmur noted.  Carotids +2, no bruits. No edema. Palpable pulses to radial  DP and PT arteries.   GI: Normal bowel sounds, soft, non-distended, non-tender, no masses palpated, no hepatosplenomegaly.    Lymph/Hematologic: No cervical lymphadenopathy and no supraclavicular lymphadenopathy.  Genitourinary:  defer  Skin: Warm and dry.  No rashes at anticipated surgical site.   Musculoskeletal: Very limited ROM of neck. There is no redness, warmth, or swelling of the joints. Gross motor strength is normal.    Neurologic: Awake, alert, oriented to name, place and time. Cranial nerves II-XII are grossly intact. Gait is normal.   Neuropsychiatric: Calm, cooperative. Normal affect.     Labs: (personally reviewed)  Results for NALINI, " ALEENA JESSICA (MRN [id number]) as of 12/16/2020 16:54   Ref. Range 12/16/2020 16:26   Sodium Latest Ref Range: 133 - 144 mmol/L 135   Potassium Latest Ref Range: 3.4 - 5.3 mmol/L 3.6   Chloride Latest Ref Range: 94 - 109 mmol/L 100   Carbon Dioxide Latest Ref Range: 20 - 32 mmol/L 29   Urea Nitrogen Latest Ref Range: 7 - 30 mg/dL 16   Creatinine Latest Ref Range: 0.52 - 1.04 mg/dL 0.64   GFR Estimate Latest Ref Range: >60 mL/min/1.73_m2 >90   GFR Estimate If Black Latest Ref Range: >60 mL/min/1.73_m2 >90   Calcium Latest Ref Range: 8.5 - 10.1 mg/dL 9.3   Anion Gap Latest Ref Range: 3 - 14 mmol/L 6   Glucose Latest Ref Range: 70 - 99 mg/dL 97   WBC Latest Ref Range: 4.0 - 11.0 10e9/L 8.5   Hemoglobin Latest Ref Range: 11.7 - 15.7 g/dL 13.8   Hematocrit Latest Ref Range: 35.0 - 47.0 % 41.9   Platelet Count Latest Ref Range: 150 - 450 10e9/L 301   RBC Count Latest Ref Range: 3.8 - 5.2 10e12/L 4.84   MCV Latest Ref Range: 78 - 100 fl 87   MCH Latest Ref Range: 26.5 - 33.0 pg 28.5   MCHC Latest Ref Range: 31.5 - 36.5 g/dL 32.9   RDW Latest Ref Range: 10.0 - 15.0 % 12.3     EKG: 10/16/19  Sinus rhythm, possible left atrial enlargement, non specific T wave abnormality    The patient's records and results personally reviewed by this provider.     Outside records reviewed from: care everywhere    ASSESSMENT and PLAN  Aleena is a 60 year old woman who is scheduled for Revision laminectomy lumbar 5-sacral 1 with decompression of Right Sacral 1 nerve root; revision posterior spinal fusion (pseudarthrosis repair) lumbar 4-sacral 1 on 12/21/20 by Dr. Vasquez in treatment of Right sacral radiculopathy.  PAC referral for risk assessment and optimization for anesthesia with comorbid conditions of HTN, asthma, allergies, hypothyroidism, osteoarthritis, anxiety:     Pre-operative considerations:     1.  Cardiac:  Functional status- METS 3, the patient was able to walk around the block this summer but in the past couple of months is limited  secondary to her back/leg pain. She denies any cardiac symptoms.  Intermediate risk surgery with 0.4% (RCRI #) risk of major adverse cardiac event.    ~ HTN - continue norvasc and hold lisinopril-hydrochlorothiazide DOS     2.  Pulm:  Airway feasible.  AUBREY risk: Low   ~ Asthma - patient reports no recent exacerbations. Continue advair, singulair and PRN albuterol.    ~ Allergies - continue allegra and flonase     3. Heme: Anemia - hgb was 9.7 on 10/25/19- recheck labs today     4. Endo: hypothyroidism - continue levothyroxine     5. GI:  Risk of PONV score = 4.  If > 2, anti-emetic intervention recommended. For the patient past surgery Emend was ordered. Will order this again for the patient.     6. Psych: anxiety - the patient is not on medications but reports baseline anxiety and lots of anxiety with her post op care. She reports that after surgeries her bladder and bowels are very slow to wake up. After her knee surgeries her torres catheter was left in place longer which helped. After her most recent back surgery, her torres catheter was removed and then she couldn't urinate. She had 5 different nurses try to straight cath her overnight which was very traumatic for the patient and eventually resulted in a UTI and sepsis. The patient also felt that her blood pressure and medications weren't well managed while in the hospital. She reports that she discussed with Dr. Vasquez who informed her he wasn't sure why those orders were placed and it would be the hospitalist or that she could speak with anesthesia. We discussed that anesthesia is not in charge of admission orders but during her surgery we would be closely monitoring her blood pressure and urine output via a catheter. As the patient is not allowed to have family members stay with her due to COVID-19 and as at this time it is unknown who her hospitalist will be, will have a patient advocate arranged for the patient during her hospitalization.      7.  Musculoskeletal: Osteoarthritis - s/p bilateral TKA, previous back surgeries, right sacral radiculopathy - procedure as above. Consideration for careful positioning to minimize discomfort. Continue tylenol, neurontin and ultram. Morphine eq = 40 MEDD. Hold ibuprofen 24 hours.      VTE risk: 0.5%     The patient is optimized for their procedure. AVS with information on surgery time/arrival time, meds and NPO status given by nursing staff.        Nasima Muhammad PA-C  Preoperative Assessment Center  [facility]  Clinic and Surgery Center  Phone: [phone]  Fax: [phone]

## 2020-12-18 DIAGNOSIS — Z11.59 ENCOUNTER FOR SCREENING FOR OTHER VIRAL DISEASES: ICD-10-CM

## 2020-12-18 PROCEDURE — U0003 INFECTIOUS AGENT DETECTION BY NUCLEIC ACID (DNA OR RNA); SEVERE ACUTE RESPIRATORY SYNDROME CORONAVIRUS 2 (SARS-COV-2) (CORONAVIRUS DISEASE [COVID-19]), AMPLIFIED PROBE TECHNIQUE, MAKING USE OF HIGH THROUGHPUT TECHNOLOGIES AS DESCRIBED BY CMS-2020-01-R: HCPCS | Performed by: ORTHOPAEDIC SURGERY

## 2020-12-19 LAB
SARS-COV-2 RNA SPEC QL NAA+PROBE: NOT DETECTED
SPECIMEN SOURCE: NORMAL

## 2020-12-21 ENCOUNTER — HOSPITAL ENCOUNTER (INPATIENT)
Facility: CLINIC | Age: 60
LOS: 5 days | Discharge: HOME HEALTH | DRG: 029 | End: 2020-12-26
Attending: ORTHOPAEDIC SURGERY | Admitting: ORTHOPAEDIC SURGERY
Payer: COMMERCIAL

## 2020-12-21 ENCOUNTER — ANESTHESIA (OUTPATIENT)
Dept: SURGERY | Facility: CLINIC | Age: 60
DRG: 029 | End: 2020-12-21
Payer: COMMERCIAL

## 2020-12-21 ENCOUNTER — APPOINTMENT (OUTPATIENT)
Dept: GENERAL RADIOLOGY | Facility: CLINIC | Age: 60
DRG: 029 | End: 2020-12-21
Attending: ORTHOPAEDIC SURGERY
Payer: COMMERCIAL

## 2020-12-21 DIAGNOSIS — M54.18 RIGHT SACRAL RADICULOPATHY: ICD-10-CM

## 2020-12-21 DIAGNOSIS — Z98.1 S/P SPINAL FUSION: ICD-10-CM

## 2020-12-21 DIAGNOSIS — M43.17 SPONDYLOLISTHESIS OF LUMBOSACRAL REGION: ICD-10-CM

## 2020-12-21 LAB
ABO + RH BLD: NORMAL
ABO + RH BLD: NORMAL
BASE DEFICIT BLDA-SCNC: 1.1 MMOL/L
BLD GP AB SCN SERPL QL: NORMAL
BLOOD BANK CMNT PATIENT-IMP: NORMAL
BLOOD BANK CMNT PATIENT-IMP: NORMAL
CA-I BLD-MCNC: 4.2 MG/DL (ref 4.4–5.2)
GLUCOSE BLD-MCNC: 112 MG/DL (ref 70–99)
GLUCOSE BLDC GLUCOMTR-MCNC: 97 MG/DL (ref 70–99)
HCO3 BLD-SCNC: 22 MMOL/L (ref 21–28)
HGB BLD-MCNC: 11.8 G/DL (ref 11.7–15.7)
LACTATE BLD-SCNC: 1 MMOL/L (ref 0.7–2)
O2/TOTAL GAS SETTING VFR VENT: 40 %
PCO2 BLD: 33 MM HG (ref 35–45)
PH BLD: 7.45 PH (ref 7.35–7.45)
PO2 BLD: 170 MM HG (ref 80–105)
POTASSIUM BLD-SCNC: 3 MMOL/L (ref 3.4–5.3)
POTASSIUM SERPL-SCNC: 3.9 MMOL/L (ref 3.4–5.3)
SODIUM BLD-SCNC: 138 MMOL/L (ref 133–144)
SPECIMEN EXP DATE BLD: NORMAL

## 2020-12-21 PROCEDURE — 4A11X4G MONITORING OF PERIPHERAL NERVOUS ELECTRICAL ACTIVITY, INTRAOPERATIVE, EXTERNAL APPROACH: ICD-10-PCS | Performed by: ORTHOPAEDIC SURGERY

## 2020-12-21 PROCEDURE — 250N000009 HC RX 250: Performed by: ORTHOPAEDIC SURGERY

## 2020-12-21 PROCEDURE — 82947 ASSAY GLUCOSE BLOOD QUANT: CPT | Performed by: ORTHOPAEDIC SURGERY

## 2020-12-21 PROCEDURE — 258N000003 HC RX IP 258 OP 636: Performed by: PHYSICIAN ASSISTANT

## 2020-12-21 PROCEDURE — 63011 REMOVE SPINE LAMINA 1/2 SCRL: CPT | Mod: 51 | Performed by: ORTHOPAEDIC SURGERY

## 2020-12-21 PROCEDURE — 82803 BLOOD GASES ANY COMBINATION: CPT | Performed by: ORTHOPAEDIC SURGERY

## 2020-12-21 PROCEDURE — 250N000011 HC RX IP 250 OP 636: Performed by: STUDENT IN AN ORGANIZED HEALTH CARE EDUCATION/TRAINING PROGRAM

## 2020-12-21 PROCEDURE — 258N000003 HC RX IP 258 OP 636: Performed by: NURSE ANESTHETIST, CERTIFIED REGISTERED

## 2020-12-21 PROCEDURE — C1762 CONN TISS, HUMAN(INC FASCIA): HCPCS | Performed by: ORTHOPAEDIC SURGERY

## 2020-12-21 PROCEDURE — 258N000003 HC RX IP 258 OP 636: Performed by: ORTHOPAEDIC SURGERY

## 2020-12-21 PROCEDURE — 36415 COLL VENOUS BLD VENIPUNCTURE: CPT | Performed by: STUDENT IN AN ORGANIZED HEALTH CARE EDUCATION/TRAINING PROGRAM

## 2020-12-21 PROCEDURE — 250N000009 HC RX 250: Performed by: PHYSICIAN ASSISTANT

## 2020-12-21 PROCEDURE — 0SG707Z FUSION OF RIGHT SACROILIAC JOINT WITH AUTOLOGOUS TISSUE SUBSTITUTE, OPEN APPROACH: ICD-10-PCS | Performed by: ORTHOPAEDIC SURGERY

## 2020-12-21 PROCEDURE — 250N000011 HC RX IP 250 OP 636: Performed by: PHYSICIAN ASSISTANT

## 2020-12-21 PROCEDURE — 84132 ASSAY OF SERUM POTASSIUM: CPT | Performed by: STUDENT IN AN ORGANIZED HEALTH CARE EDUCATION/TRAINING PROGRAM

## 2020-12-21 PROCEDURE — 0SG1071 FUSION OF 2 OR MORE LUMBAR VERTEBRAL JOINTS WITH AUTOLOGOUS TISSUE SUBSTITUTE, POSTERIOR APPROACH, POSTERIOR COLUMN, OPEN APPROACH: ICD-10-PCS | Performed by: ORTHOPAEDIC SURGERY

## 2020-12-21 PROCEDURE — 250N000011 HC RX IP 250 OP 636: Performed by: ORTHOPAEDIC SURGERY

## 2020-12-21 PROCEDURE — 250N000009 HC RX 250: Performed by: NURSE ANESTHETIST, CERTIFIED REGISTERED

## 2020-12-21 PROCEDURE — 250N000013 HC RX MED GY IP 250 OP 250 PS 637: Performed by: PHYSICIAN ASSISTANT

## 2020-12-21 PROCEDURE — 999N000139 HC STATISTIC PRE-PROCEDURE ASSESSMENT II: Performed by: ORTHOPAEDIC SURGERY

## 2020-12-21 PROCEDURE — 84132 ASSAY OF SERUM POTASSIUM: CPT | Performed by: ORTHOPAEDIC SURGERY

## 2020-12-21 PROCEDURE — 20930 SP BONE ALGRFT MORSEL ADD-ON: CPT | Performed by: ORTHOPAEDIC SURGERY

## 2020-12-21 PROCEDURE — 82330 ASSAY OF CALCIUM: CPT | Performed by: ORTHOPAEDIC SURGERY

## 2020-12-21 PROCEDURE — 360N000041 HC SURGERY LEVEL 6 EA 15 ADDTL MIN - UMMC: Performed by: ORTHOPAEDIC SURGERY

## 2020-12-21 PROCEDURE — 999N000157 HC STATISTIC RCP TIME EA 10 MIN

## 2020-12-21 PROCEDURE — P9041 ALBUMIN (HUMAN),5%, 50ML: HCPCS | Performed by: NURSE ANESTHETIST, CERTIFIED REGISTERED

## 2020-12-21 PROCEDURE — 360N000043 HC SURGERY LEVEL 6 W FLUORO 1ST 30 MIN - UMMC: Performed by: ORTHOPAEDIC SURGERY

## 2020-12-21 PROCEDURE — 22849 REINSERT SPINAL FIXATION: CPT | Mod: 80 | Performed by: ORTHOPAEDIC SURGERY

## 2020-12-21 PROCEDURE — C1713 ANCHOR/SCREW BN/BN,TIS/BN: HCPCS | Performed by: ORTHOPAEDIC SURGERY

## 2020-12-21 PROCEDURE — 370N000002 HC ANESTHESIA TECHNICAL FEE, EACH ADDTL 15 MIN: Performed by: ORTHOPAEDIC SURGERY

## 2020-12-21 PROCEDURE — 272N000004 HC RX 272: Performed by: ORTHOPAEDIC SURGERY

## 2020-12-21 PROCEDURE — 01NR0ZZ RELEASE SACRAL NERVE, OPEN APPROACH: ICD-10-PCS | Performed by: ORTHOPAEDIC SURGERY

## 2020-12-21 PROCEDURE — 250N000003 HC SEVOFLURANE, EA 15 MIN: Performed by: ORTHOPAEDIC SURGERY

## 2020-12-21 PROCEDURE — 0SG3071 FUSION OF LUMBOSACRAL JOINT WITH AUTOLOGOUS TISSUE SUBSTITUTE, POSTERIOR APPROACH, POSTERIOR COLUMN, OPEN APPROACH: ICD-10-PCS | Performed by: ORTHOPAEDIC SURGERY

## 2020-12-21 PROCEDURE — 22614 ARTHRD PST TQ 1NTRSPC EA ADD: CPT | Performed by: ORTHOPAEDIC SURGERY

## 2020-12-21 PROCEDURE — 761N000003 HC RECOVERY PHASE 1 LEVEL 2 FIRST HR: Performed by: ORTHOPAEDIC SURGERY

## 2020-12-21 PROCEDURE — 258N000003 HC RX IP 258 OP 636: Performed by: STUDENT IN AN ORGANIZED HEALTH CARE EDUCATION/TRAINING PROGRAM

## 2020-12-21 PROCEDURE — 999N001017 HC STATISTIC GLUCOSE BY METER IP

## 2020-12-21 PROCEDURE — 0SW007Z REVISION OF AUTOLOGOUS TISSUE SUBSTITUTE IN LUMBAR VERTEBRAL JOINT, OPEN APPROACH: ICD-10-PCS | Performed by: ORTHOPAEDIC SURGERY

## 2020-12-21 PROCEDURE — 22849 REINSERT SPINAL FIXATION: CPT | Performed by: ORTHOPAEDIC SURGERY

## 2020-12-21 PROCEDURE — 0SG807Z FUSION OF LEFT SACROILIAC JOINT WITH AUTOLOGOUS TISSUE SUBSTITUTE, OPEN APPROACH: ICD-10-PCS | Performed by: ORTHOPAEDIC SURGERY

## 2020-12-21 PROCEDURE — 20936 SP BONE AGRFT LOCAL ADD-ON: CPT | Performed by: ORTHOPAEDIC SURGERY

## 2020-12-21 PROCEDURE — 250N000011 HC RX IP 250 OP 636: Performed by: NURSE ANESTHETIST, CERTIFIED REGISTERED

## 2020-12-21 PROCEDURE — 61783 SCAN PROC SPINAL: CPT | Mod: 59 | Performed by: ORTHOPAEDIC SURGERY

## 2020-12-21 PROCEDURE — 99232 SBSQ HOSP IP/OBS MODERATE 35: CPT | Performed by: STUDENT IN AN ORGANIZED HEALTH CARE EDUCATION/TRAINING PROGRAM

## 2020-12-21 PROCEDURE — 120N000002 HC R&B MED SURG/OB UMMC

## 2020-12-21 PROCEDURE — 272N000001 HC OR GENERAL SUPPLY STERILE: Performed by: ORTHOPAEDIC SURGERY

## 2020-12-21 PROCEDURE — 8E0WXBZ COMPUTER ASSISTED PROCEDURE OF TRUNK REGION: ICD-10-PCS | Performed by: ORTHOPAEDIC SURGERY

## 2020-12-21 PROCEDURE — 99207 PR CONSULT E&M CHANGED TO SUBSEQUENT LEVEL: CPT | Performed by: STUDENT IN AN ORGANIZED HEALTH CARE EDUCATION/TRAINING PROGRAM

## 2020-12-21 PROCEDURE — 22612 ARTHRD PST TQ 1NTRSPC LUMBAR: CPT | Performed by: ORTHOPAEDIC SURGERY

## 2020-12-21 PROCEDURE — 761N000004 HC RECOVERY PHASE 1 LEVEL 2 EA ADDTL HR: Performed by: ORTHOPAEDIC SURGERY

## 2020-12-21 PROCEDURE — 250N000012 HC RX MED GY IP 250 OP 636 PS 637: Performed by: PHYSICIAN ASSISTANT

## 2020-12-21 PROCEDURE — 00QT0ZZ REPAIR SPINAL MENINGES, OPEN APPROACH: ICD-10-PCS | Performed by: ORTHOPAEDIC SURGERY

## 2020-12-21 PROCEDURE — 84295 ASSAY OF SERUM SODIUM: CPT | Performed by: ORTHOPAEDIC SURGERY

## 2020-12-21 PROCEDURE — 370N000001 HC ANESTHESIA TECHNICAL FEE, 1ST 30 MIN: Performed by: ORTHOPAEDIC SURGERY

## 2020-12-21 PROCEDURE — 999N000180 XR SURGERY CARM FLUORO LESS THAN 5 MIN: Mod: TC

## 2020-12-21 PROCEDURE — 22848 INSERT PELV FIXATION DEVICE: CPT | Mod: 59 | Performed by: ORTHOPAEDIC SURGERY

## 2020-12-21 PROCEDURE — 999N000015 HC STATISTIC ARTERIAL MONITORING DAILY

## 2020-12-21 PROCEDURE — 83605 ASSAY OF LACTIC ACID: CPT | Performed by: ORTHOPAEDIC SURGERY

## 2020-12-21 DEVICE — IMP SCR MEDT 5.5/6.0MM SOLERA 7.5X65MM MA 55840007565: Type: IMPLANTABLE DEVICE | Site: SPINE LUMBAR | Status: FUNCTIONAL

## 2020-12-21 DEVICE — GRAFT BONE CRUSH CANC 30ML 400080: Type: IMPLANTABLE DEVICE | Site: SPINE LUMBAR | Status: FUNCTIONAL

## 2020-12-21 DEVICE — GRAFT BONE MAGNIFUSE 1CMX10CM 7509211: Type: IMPLANTABLE DEVICE | Site: SPINE LUMBAR | Status: FUNCTIONAL

## 2020-12-21 DEVICE — IMP ROD MEDT SOLERA LINED 5.5X500MM CHR 1555200500: Type: IMPLANTABLE DEVICE | Site: SPINE LUMBAR | Status: FUNCTIONAL

## 2020-12-21 DEVICE — IMP SCR SET MEDT SOLERA BREAK OFF 5.5MM TI 5540030: Type: IMPLANTABLE DEVICE | Site: SPINE LUMBAR | Status: FUNCTIONAL

## 2020-12-21 RX ORDER — ACETAMINOPHEN 325 MG/1
975 TABLET ORAL ONCE
Status: COMPLETED | OUTPATIENT
Start: 2020-12-21 | End: 2020-12-21

## 2020-12-21 RX ORDER — CEFAZOLIN SODIUM 2 G/100ML
2 INJECTION, SOLUTION INTRAVENOUS
Status: COMPLETED | OUTPATIENT
Start: 2020-12-21 | End: 2020-12-21

## 2020-12-21 RX ORDER — ALBUMIN, HUMAN INJ 5% 5 %
SOLUTION INTRAVENOUS CONTINUOUS PRN
Status: DISCONTINUED | OUTPATIENT
Start: 2020-12-21 | End: 2020-12-21

## 2020-12-21 RX ORDER — SODIUM CHLORIDE 9 MG/ML
INJECTION, SOLUTION INTRAVENOUS CONTINUOUS PRN
Status: DISCONTINUED | OUTPATIENT
Start: 2020-12-21 | End: 2020-12-21

## 2020-12-21 RX ORDER — CEFAZOLIN SODIUM 1 G/3ML
1 INJECTION, POWDER, FOR SOLUTION INTRAMUSCULAR; INTRAVENOUS SEE ADMIN INSTRUCTIONS
Status: DISCONTINUED | OUTPATIENT
Start: 2020-12-21 | End: 2020-12-21 | Stop reason: HOSPADM

## 2020-12-21 RX ORDER — GABAPENTIN 100 MG/1
300 CAPSULE ORAL
Status: COMPLETED | OUTPATIENT
Start: 2020-12-21 | End: 2020-12-21

## 2020-12-21 RX ORDER — VANCOMYCIN HYDROCHLORIDE 1 G/20ML
INJECTION, POWDER, LYOPHILIZED, FOR SOLUTION INTRAVENOUS PRN
Status: DISCONTINUED | OUTPATIENT
Start: 2020-12-21 | End: 2020-12-22 | Stop reason: HOSPADM

## 2020-12-21 RX ORDER — HYDROMORPHONE HYDROCHLORIDE 1 MG/ML
.3-.5 INJECTION, SOLUTION INTRAMUSCULAR; INTRAVENOUS; SUBCUTANEOUS EVERY 5 MIN PRN
Status: DISCONTINUED | OUTPATIENT
Start: 2020-12-21 | End: 2020-12-22 | Stop reason: HOSPADM

## 2020-12-21 RX ORDER — LIDOCAINE HYDROCHLORIDE ANHYDROUS AND DEXTROSE MONOHYDRATE .8; 5 G/100ML; G/100ML
1 INJECTION, SOLUTION INTRAVENOUS CONTINUOUS
Status: DISCONTINUED | OUTPATIENT
Start: 2020-12-21 | End: 2020-12-22

## 2020-12-21 RX ORDER — PROPOFOL 10 MG/ML
INJECTION, EMULSION INTRAVENOUS PRN
Status: DISCONTINUED | OUTPATIENT
Start: 2020-12-21 | End: 2020-12-21

## 2020-12-21 RX ORDER — MAGNESIUM HYDROXIDE 1200 MG/15ML
LIQUID ORAL PRN
Status: DISCONTINUED | OUTPATIENT
Start: 2020-12-21 | End: 2020-12-22 | Stop reason: HOSPADM

## 2020-12-21 RX ORDER — ONDANSETRON 2 MG/ML
INJECTION INTRAMUSCULAR; INTRAVENOUS PRN
Status: DISCONTINUED | OUTPATIENT
Start: 2020-12-21 | End: 2020-12-21

## 2020-12-21 RX ORDER — BUPIVACAINE HYDROCHLORIDE AND EPINEPHRINE 5; 5 MG/ML; UG/ML
INJECTION, SOLUTION PERINEURAL PRN
Status: DISCONTINUED | OUTPATIENT
Start: 2020-12-21 | End: 2020-12-22 | Stop reason: HOSPADM

## 2020-12-21 RX ORDER — LIDOCAINE HYDROCHLORIDE ANHYDROUS AND DEXTROSE MONOHYDRATE .8; 5 G/100ML; G/100ML
1 INJECTION, SOLUTION INTRAVENOUS CONTINUOUS
Status: DISCONTINUED | OUTPATIENT
Start: 2020-12-21 | End: 2020-12-21

## 2020-12-21 RX ORDER — APREPITANT 40 MG/1
40 CAPSULE ORAL ONCE
Status: COMPLETED | OUTPATIENT
Start: 2020-12-21 | End: 2020-12-21

## 2020-12-21 RX ORDER — ONDANSETRON 4 MG/1
4 TABLET, ORALLY DISINTEGRATING ORAL EVERY 30 MIN PRN
Status: DISCONTINUED | OUTPATIENT
Start: 2020-12-21 | End: 2020-12-22 | Stop reason: HOSPADM

## 2020-12-21 RX ORDER — LABETALOL HYDROCHLORIDE 5 MG/ML
10 INJECTION, SOLUTION INTRAVENOUS
Status: DISCONTINUED | OUTPATIENT
Start: 2020-12-21 | End: 2020-12-22 | Stop reason: HOSPADM

## 2020-12-21 RX ORDER — FENTANYL CITRATE 50 UG/ML
25-50 INJECTION, SOLUTION INTRAMUSCULAR; INTRAVENOUS
Status: DISCONTINUED | OUTPATIENT
Start: 2020-12-21 | End: 2020-12-22 | Stop reason: HOSPADM

## 2020-12-21 RX ORDER — DEXAMETHASONE SODIUM PHOSPHATE 4 MG/ML
INJECTION, SOLUTION INTRA-ARTICULAR; INTRALESIONAL; INTRAMUSCULAR; INTRAVENOUS; SOFT TISSUE PRN
Status: DISCONTINUED | OUTPATIENT
Start: 2020-12-21 | End: 2020-12-21

## 2020-12-21 RX ORDER — LIDOCAINE HYDROCHLORIDE 20 MG/ML
INJECTION, SOLUTION INFILTRATION; PERINEURAL PRN
Status: DISCONTINUED | OUTPATIENT
Start: 2020-12-21 | End: 2020-12-21

## 2020-12-21 RX ORDER — SODIUM CHLORIDE, SODIUM LACTATE, POTASSIUM CHLORIDE, CALCIUM CHLORIDE 600; 310; 30; 20 MG/100ML; MG/100ML; MG/100ML; MG/100ML
INJECTION, SOLUTION INTRAVENOUS CONTINUOUS
Status: DISCONTINUED | OUTPATIENT
Start: 2020-12-21 | End: 2020-12-22 | Stop reason: HOSPADM

## 2020-12-21 RX ORDER — ONDANSETRON 2 MG/ML
4 INJECTION INTRAMUSCULAR; INTRAVENOUS EVERY 30 MIN PRN
Status: DISCONTINUED | OUTPATIENT
Start: 2020-12-21 | End: 2020-12-22 | Stop reason: HOSPADM

## 2020-12-21 RX ORDER — PROPOFOL 10 MG/ML
INJECTION, EMULSION INTRAVENOUS CONTINUOUS PRN
Status: DISCONTINUED | OUTPATIENT
Start: 2020-12-21 | End: 2020-12-21

## 2020-12-21 RX ORDER — FENTANYL CITRATE 50 UG/ML
INJECTION, SOLUTION INTRAMUSCULAR; INTRAVENOUS PRN
Status: DISCONTINUED | OUTPATIENT
Start: 2020-12-21 | End: 2020-12-21

## 2020-12-21 RX ORDER — SODIUM CHLORIDE, SODIUM LACTATE, POTASSIUM CHLORIDE, CALCIUM CHLORIDE 600; 310; 30; 20 MG/100ML; MG/100ML; MG/100ML; MG/100ML
INJECTION, SOLUTION INTRAVENOUS CONTINUOUS PRN
Status: DISCONTINUED | OUTPATIENT
Start: 2020-12-21 | End: 2020-12-21

## 2020-12-21 RX ADMIN — Medication 1 G: at 18:30

## 2020-12-21 RX ADMIN — HYDROMORPHONE HYDROCHLORIDE 0.5 MG: 1 INJECTION, SOLUTION INTRAMUSCULAR; INTRAVENOUS; SUBCUTANEOUS at 21:20

## 2020-12-21 RX ADMIN — Medication 2 G: at 16:36

## 2020-12-21 RX ADMIN — Medication 1 MG/KG/HR: at 16:11

## 2020-12-21 RX ADMIN — PHENYLEPHRINE HYDROCHLORIDE 100 MCG: 10 INJECTION INTRAVENOUS at 18:25

## 2020-12-21 RX ADMIN — LIDOCAINE HYDROCHLORIDE 80 MG: 20 INJECTION, SOLUTION INFILTRATION; PERINEURAL at 15:43

## 2020-12-21 RX ADMIN — MIDAZOLAM 2 MG: 1 INJECTION INTRAMUSCULAR; INTRAVENOUS at 15:34

## 2020-12-21 RX ADMIN — FENTANYL CITRATE 25 MCG: 50 INJECTION, SOLUTION INTRAMUSCULAR; INTRAVENOUS at 23:11

## 2020-12-21 RX ADMIN — PHENYLEPHRINE HYDROCHLORIDE 100 MCG: 10 INJECTION INTRAVENOUS at 15:56

## 2020-12-21 RX ADMIN — TRANEXAMIC ACID 2310 MG: 100 INJECTION, SOLUTION INTRAVENOUS at 16:11

## 2020-12-21 RX ADMIN — PHENYLEPHRINE HYDROCHLORIDE 0.4 MCG/KG/MIN: 10 INJECTION INTRAVENOUS at 18:56

## 2020-12-21 RX ADMIN — PHENYLEPHRINE HYDROCHLORIDE 100 MCG: 10 INJECTION INTRAVENOUS at 19:01

## 2020-12-21 RX ADMIN — PHENYLEPHRINE HYDROCHLORIDE 100 MCG: 10 INJECTION INTRAVENOUS at 16:08

## 2020-12-21 RX ADMIN — ONDANSETRON 4 MG: 2 INJECTION INTRAMUSCULAR; INTRAVENOUS at 21:17

## 2020-12-21 RX ADMIN — PHENYLEPHRINE HYDROCHLORIDE 0.3 MCG/KG/MIN: 10 INJECTION INTRAVENOUS at 16:22

## 2020-12-21 RX ADMIN — PHENYLEPHRINE HYDROCHLORIDE: 10 INJECTION INTRAVENOUS at 17:07

## 2020-12-21 RX ADMIN — Medication 1 G: at 20:30

## 2020-12-21 RX ADMIN — SODIUM CHLORIDE, POTASSIUM CHLORIDE, SODIUM LACTATE AND CALCIUM CHLORIDE: 600; 310; 30; 20 INJECTION, SOLUTION INTRAVENOUS at 15:36

## 2020-12-21 RX ADMIN — PROPOFOL 100 MCG/KG/MIN: 10 INJECTION, EMULSION INTRAVENOUS at 16:10

## 2020-12-21 RX ADMIN — FENTANYL CITRATE 75 MCG: 50 INJECTION, SOLUTION INTRAMUSCULAR; INTRAVENOUS at 15:43

## 2020-12-21 RX ADMIN — SUFENTANIL CITRATE 0.2 MCG/KG/HR: 50 INJECTION EPIDURAL; INTRAVENOUS at 16:11

## 2020-12-21 RX ADMIN — DEXAMETHASONE SODIUM PHOSPHATE 6 MG: 4 INJECTION, SOLUTION INTRAMUSCULAR; INTRAVENOUS at 15:44

## 2020-12-21 RX ADMIN — PHENYLEPHRINE HYDROCHLORIDE 100 MCG: 10 INJECTION INTRAVENOUS at 16:20

## 2020-12-21 RX ADMIN — ALBUMIN HUMAN: 0.05 INJECTION, SOLUTION INTRAVENOUS at 18:28

## 2020-12-21 RX ADMIN — SODIUM CHLORIDE, POTASSIUM CHLORIDE, SODIUM LACTATE AND CALCIUM CHLORIDE: 600; 310; 30; 20 INJECTION, SOLUTION INTRAVENOUS at 21:33

## 2020-12-21 RX ADMIN — SODIUM CHLORIDE, POTASSIUM CHLORIDE, SODIUM LACTATE AND CALCIUM CHLORIDE: 600; 310; 30; 20 INJECTION, SOLUTION INTRAVENOUS at 22:25

## 2020-12-21 RX ADMIN — PHENYLEPHRINE HYDROCHLORIDE 100 MCG: 10 INJECTION INTRAVENOUS at 20:31

## 2020-12-21 RX ADMIN — KETAMINE HYDROCHLORIDE 5 MG/HR: 100 INJECTION, SOLUTION, CONCENTRATE INTRAMUSCULAR; INTRAVENOUS at 16:11

## 2020-12-21 RX ADMIN — PROPOFOL 120 MG: 10 INJECTION, EMULSION INTRAVENOUS at 15:43

## 2020-12-21 RX ADMIN — APREPITANT 40 MG: 40 CAPSULE ORAL at 12:24

## 2020-12-21 RX ADMIN — TRANEXAMIC ACID 10 MG/KG/HR: 100 INJECTION, SOLUTION INTRAVENOUS at 16:47

## 2020-12-21 RX ADMIN — GABAPENTIN 300 MG: 300 CAPSULE ORAL at 12:24

## 2020-12-21 RX ADMIN — PROPOFOL 50 MG: 10 INJECTION, EMULSION INTRAVENOUS at 16:16

## 2020-12-21 RX ADMIN — ROCURONIUM BROMIDE 20 MG: 10 INJECTION INTRAVENOUS at 16:40

## 2020-12-21 RX ADMIN — SODIUM CHLORIDE: 9 INJECTION, SOLUTION INTRAVENOUS at 16:30

## 2020-12-21 RX ADMIN — PHENYLEPHRINE HYDROCHLORIDE 100 MCG: 10 INJECTION INTRAVENOUS at 16:01

## 2020-12-21 RX ADMIN — FENTANYL CITRATE 25 MCG: 50 INJECTION, SOLUTION INTRAMUSCULAR; INTRAVENOUS at 23:06

## 2020-12-21 RX ADMIN — PHENYLEPHRINE HYDROCHLORIDE 100 MCG: 10 INJECTION INTRAVENOUS at 16:28

## 2020-12-21 RX ADMIN — PHENYLEPHRINE HYDROCHLORIDE 100 MCG: 10 INJECTION INTRAVENOUS at 21:05

## 2020-12-21 RX ADMIN — ACETAMINOPHEN 975 MG: 325 TABLET, FILM COATED ORAL at 12:24

## 2020-12-21 RX ADMIN — Medication 80 MG: at 15:43

## 2020-12-21 RX ADMIN — HYDROMORPHONE HYDROCHLORIDE 0.3 MG: 1 INJECTION, SOLUTION INTRAMUSCULAR; INTRAVENOUS; SUBCUTANEOUS at 23:43

## 2020-12-21 ASSESSMENT — MIFFLIN-ST. JEOR: SCORE: 1283.25

## 2020-12-21 NOTE — PROGRESS NOTES
December 21, 2020    Spoke to Urology today regarding patient issues.   Patient with sleepy bladder and retention following surgery        Recommendations     Keep catheter in place for 24 hours after surgery  Then okay to remove if okay with primary team  Consult Urology if any retention issues after removal   Avoid anti-cholingernics      Dr Dos Santos  [phone]  Urology

## 2020-12-21 NOTE — PROGRESS NOTES
"Contacted by primary team regarding Ms. Aleena Ontiveros, a patient that is undergoing a revision laminectomy today, as she has had prior issues with post operative retention.    Per the primary team, she does not have retention issues at baseline, and they are reaching out as she has previously had an issue with retention after a procedure.    Recommendation would be to continue the torres catheter for at least 24 hours after surgery, as it appears her prior retention is anesthetic related. The patient is also reported to have said \"my bowels and bladder wake up slowly after surgery\". If the patient is to remain hospitalized for several days, pending clinical course, though we would defer to the primary team regarding torres catheter management, it would be wise to leave the torres catheter in until the patient is ambulating.    If the patient experiences issues with urinary retention after the torres catheter is removed, please contact urology on call. We would set up outpatient follow up torres catheter plan. Here are our recommendations, ONLY IF THE PATIENT IS IN ACUTE URINARY RETENTION POST CATHETER REMOVAL:    - Avoid anticholinergic, antihistamine, and alpha-agonist medications as these will exacerbate urinary retention  - Minimize narcotic pain medication regimen as tolerated  - Send UA/UC to rule out UTI as etiology of urinary retention  - Increased ambulation/mobility will also usually help with improvement in the degree of urinary retention  - Start tamsulosin 0.4 mg qday    - Patient can either be managed with a indwelling torres catheter vs clean intermittent catheterization until seen in f/u in urology clinic  - If CIC is chosen, then the patient will need nursing instruction on proper self-intermittent catheterization technique.  - Remind patient that CIC should be performed ONLY after an attempt at spontaneous voiding is made  - Frequency of SIC can be determined based on the average post-void residual:  If PVR " < 150cc - no cathing needed  If PVR is 150-250cc - cath bid (qam & qhs)  If PVR is 251-350cc - cath tid  If PVR is 351-450cc - cath qid  If PVR is > 450cc - cath every 4 hours  - The patient will need to keep a journal of his cathing regimen after discharge (will need to include frequency of cathing and post-void residual amount obtained from CIC) and bring this to clinic for review   - If patient unwilling or unable to perform CIC, may leave torres catheter in place  - Patient will need to f/u in urology clinic approximately 1 week after discharge for a trial of void    Rodrigo Arboleda MD  PGY-2 Urology

## 2020-12-22 LAB
ANION GAP SERPL CALCULATED.3IONS-SCNC: 5 MMOL/L (ref 3–14)
BUN SERPL-MCNC: 9 MG/DL (ref 7–30)
CALCIUM SERPL-MCNC: 7.7 MG/DL (ref 8.5–10.1)
CHLORIDE SERPL-SCNC: 107 MMOL/L (ref 94–109)
CO2 SERPL-SCNC: 26 MMOL/L (ref 20–32)
CREAT SERPL-MCNC: 0.56 MG/DL (ref 0.52–1.04)
GFR SERPL CREATININE-BSD FRML MDRD: >90 ML/MIN/{1.73_M2}
GLUCOSE SERPL-MCNC: 137 MG/DL (ref 70–99)
HGB BLD-MCNC: 10.3 G/DL (ref 11.7–15.7)
POTASSIUM SERPL-SCNC: 3.9 MMOL/L (ref 3.4–5.3)
SODIUM SERPL-SCNC: 138 MMOL/L (ref 133–144)

## 2020-12-22 PROCEDURE — 250N000013 HC RX MED GY IP 250 OP 250 PS 637: Performed by: STUDENT IN AN ORGANIZED HEALTH CARE EDUCATION/TRAINING PROGRAM

## 2020-12-22 PROCEDURE — 120N000002 HC R&B MED SURG/OB UMMC

## 2020-12-22 PROCEDURE — 80048 BASIC METABOLIC PNL TOTAL CA: CPT | Performed by: STUDENT IN AN ORGANIZED HEALTH CARE EDUCATION/TRAINING PROGRAM

## 2020-12-22 PROCEDURE — 99232 SBSQ HOSP IP/OBS MODERATE 35: CPT | Performed by: INTERNAL MEDICINE

## 2020-12-22 PROCEDURE — 85018 HEMOGLOBIN: CPT | Performed by: STUDENT IN AN ORGANIZED HEALTH CARE EDUCATION/TRAINING PROGRAM

## 2020-12-22 PROCEDURE — 250N000011 HC RX IP 250 OP 636: Performed by: STUDENT IN AN ORGANIZED HEALTH CARE EDUCATION/TRAINING PROGRAM

## 2020-12-22 PROCEDURE — 258N000003 HC RX IP 258 OP 636: Performed by: STUDENT IN AN ORGANIZED HEALTH CARE EDUCATION/TRAINING PROGRAM

## 2020-12-22 PROCEDURE — 250N000013 HC RX MED GY IP 250 OP 250 PS 637: Performed by: CLINICAL NURSE SPECIALIST

## 2020-12-22 PROCEDURE — 36415 COLL VENOUS BLD VENIPUNCTURE: CPT | Performed by: STUDENT IN AN ORGANIZED HEALTH CARE EDUCATION/TRAINING PROGRAM

## 2020-12-22 RX ORDER — FEXOFENADINE HCL 180 MG/1
180 TABLET ORAL EVERY MORNING
Status: DISCONTINUED | OUTPATIENT
Start: 2020-12-22 | End: 2020-12-26 | Stop reason: HOSPADM

## 2020-12-22 RX ORDER — AMOXICILLIN 250 MG
2 CAPSULE ORAL 2 TIMES DAILY
Status: DISCONTINUED | OUTPATIENT
Start: 2020-12-22 | End: 2020-12-25

## 2020-12-22 RX ORDER — NALOXONE HYDROCHLORIDE 0.4 MG/ML
0.4 INJECTION, SOLUTION INTRAMUSCULAR; INTRAVENOUS; SUBCUTANEOUS
Status: ACTIVE | OUTPATIENT
Start: 2020-12-22 | End: 2020-12-23

## 2020-12-22 RX ORDER — OXYCODONE HYDROCHLORIDE 5 MG/1
5-10 TABLET ORAL
Status: DISCONTINUED | OUTPATIENT
Start: 2020-12-22 | End: 2020-12-26 | Stop reason: HOSPADM

## 2020-12-22 RX ORDER — ALBUTEROL SULFATE 90 UG/1
2 AEROSOL, METERED RESPIRATORY (INHALATION) EVERY 4 HOURS PRN
Status: DISCONTINUED | OUTPATIENT
Start: 2020-12-22 | End: 2020-12-26 | Stop reason: HOSPADM

## 2020-12-22 RX ORDER — LIDOCAINE 40 MG/G
CREAM TOPICAL
Status: DISCONTINUED | OUTPATIENT
Start: 2020-12-22 | End: 2020-12-26 | Stop reason: HOSPADM

## 2020-12-22 RX ORDER — NALOXONE HYDROCHLORIDE 0.4 MG/ML
0.2 INJECTION, SOLUTION INTRAMUSCULAR; INTRAVENOUS; SUBCUTANEOUS
Status: ACTIVE | OUTPATIENT
Start: 2020-12-22 | End: 2020-12-23

## 2020-12-22 RX ORDER — ACETAMINOPHEN 325 MG/1
325-650 TABLET ORAL
Status: DISCONTINUED | OUTPATIENT
Start: 2020-12-22 | End: 2020-12-26 | Stop reason: HOSPADM

## 2020-12-22 RX ORDER — PROCHLORPERAZINE MALEATE 5 MG
10 TABLET ORAL EVERY 6 HOURS PRN
Status: DISCONTINUED | OUTPATIENT
Start: 2020-12-22 | End: 2020-12-26 | Stop reason: HOSPADM

## 2020-12-22 RX ORDER — ONDANSETRON 2 MG/ML
4 INJECTION INTRAMUSCULAR; INTRAVENOUS EVERY 6 HOURS PRN
Status: DISCONTINUED | OUTPATIENT
Start: 2020-12-22 | End: 2020-12-26 | Stop reason: HOSPADM

## 2020-12-22 RX ORDER — METHOCARBAMOL 750 MG/1
750 TABLET, FILM COATED ORAL 4 TIMES DAILY PRN
Status: DISCONTINUED | OUTPATIENT
Start: 2020-12-22 | End: 2020-12-26 | Stop reason: HOSPADM

## 2020-12-22 RX ORDER — AMOXICILLIN 250 MG
1 CAPSULE ORAL 2 TIMES DAILY
Status: DISCONTINUED | OUTPATIENT
Start: 2020-12-22 | End: 2020-12-25

## 2020-12-22 RX ORDER — HYDROXYZINE HYDROCHLORIDE 25 MG/1
25 TABLET, FILM COATED ORAL EVERY 6 HOURS PRN
Status: DISCONTINUED | OUTPATIENT
Start: 2020-12-22 | End: 2020-12-26 | Stop reason: HOSPADM

## 2020-12-22 RX ORDER — PENICILLIN V POTASSIUM 500 MG/1
500 TABLET, FILM COATED ORAL 4 TIMES DAILY
Status: DISCONTINUED | OUTPATIENT
Start: 2020-12-23 | End: 2020-12-26 | Stop reason: HOSPADM

## 2020-12-22 RX ORDER — MONTELUKAST SODIUM 10 MG/1
10 TABLET ORAL AT BEDTIME
Status: DISCONTINUED | OUTPATIENT
Start: 2020-12-22 | End: 2020-12-26 | Stop reason: HOSPADM

## 2020-12-22 RX ORDER — POLYETHYLENE GLYCOL 3350 17 G/17G
17 POWDER, FOR SOLUTION ORAL DAILY PRN
Status: DISCONTINUED | OUTPATIENT
Start: 2020-12-22 | End: 2020-12-25

## 2020-12-22 RX ORDER — ACETAMINOPHEN 325 MG/1
650 TABLET ORAL EVERY 4 HOURS PRN
Status: DISCONTINUED | OUTPATIENT
Start: 2020-12-24 | End: 2020-12-26 | Stop reason: HOSPADM

## 2020-12-22 RX ORDER — LIDOCAINE HYDROCHLORIDE ANHYDROUS AND DEXTROSE MONOHYDRATE .8; 5 G/100ML; G/100ML
1 INJECTION, SOLUTION INTRAVENOUS CONTINUOUS
Status: DISCONTINUED | OUTPATIENT
Start: 2020-12-22 | End: 2020-12-22

## 2020-12-22 RX ORDER — ONDANSETRON 4 MG/1
4 TABLET, ORALLY DISINTEGRATING ORAL EVERY 6 HOURS PRN
Status: DISCONTINUED | OUTPATIENT
Start: 2020-12-22 | End: 2020-12-26 | Stop reason: HOSPADM

## 2020-12-22 RX ORDER — SODIUM CHLORIDE 9 MG/ML
INJECTION, SOLUTION INTRAVENOUS CONTINUOUS
Status: DISCONTINUED | OUTPATIENT
Start: 2020-12-22 | End: 2020-12-26 | Stop reason: HOSPADM

## 2020-12-22 RX ORDER — CEFAZOLIN SODIUM 2 G/100ML
2 INJECTION, SOLUTION INTRAVENOUS EVERY 8 HOURS
Status: COMPLETED | OUTPATIENT
Start: 2020-12-22 | End: 2020-12-22

## 2020-12-22 RX ORDER — LEVOTHYROXINE SODIUM 125 UG/1
125 TABLET ORAL
Status: DISCONTINUED | OUTPATIENT
Start: 2020-12-22 | End: 2020-12-26 | Stop reason: HOSPADM

## 2020-12-22 RX ORDER — LIDOCAINE HYDROCHLORIDE ANHYDROUS AND DEXTROSE MONOHYDRATE .8; 5 G/100ML; G/100ML
1 INJECTION, SOLUTION INTRAVENOUS CONTINUOUS
Status: DISPENSED | OUTPATIENT
Start: 2020-12-22 | End: 2020-12-23

## 2020-12-22 RX ORDER — HYDROMORPHONE HYDROCHLORIDE 1 MG/ML
.3-.5 INJECTION, SOLUTION INTRAMUSCULAR; INTRAVENOUS; SUBCUTANEOUS
Status: DISCONTINUED | OUTPATIENT
Start: 2020-12-22 | End: 2020-12-26 | Stop reason: HOSPADM

## 2020-12-22 RX ORDER — ACETAMINOPHEN 325 MG/1
975 TABLET ORAL EVERY 8 HOURS
Status: DISCONTINUED | OUTPATIENT
Start: 2020-12-22 | End: 2020-12-22

## 2020-12-22 RX ADMIN — HYDROMORPHONE HYDROCHLORIDE 0.5 MG: 1 INJECTION, SOLUTION INTRAMUSCULAR; INTRAVENOUS; SUBCUTANEOUS at 00:55

## 2020-12-22 RX ADMIN — GABAPENTIN 400 MG: 100 CAPSULE ORAL at 21:29

## 2020-12-22 RX ADMIN — SODIUM CHLORIDE: 9 INJECTION, SOLUTION INTRAVENOUS at 15:48

## 2020-12-22 RX ADMIN — OXYCODONE HYDROCHLORIDE 5 MG: 5 TABLET ORAL at 11:46

## 2020-12-22 RX ADMIN — SODIUM CHLORIDE: 9 INJECTION, SOLUTION INTRAVENOUS at 03:53

## 2020-12-22 RX ADMIN — FEXOFENADINE HYDROCHLORIDE 180 MG: 180 TABLET, FILM COATED ORAL at 07:50

## 2020-12-22 RX ADMIN — OXYCODONE HYDROCHLORIDE 5 MG: 5 TABLET ORAL at 18:09

## 2020-12-22 RX ADMIN — METHOCARBAMOL 750 MG: 750 TABLET, FILM COATED ORAL at 00:40

## 2020-12-22 RX ADMIN — OXYCODONE HYDROCHLORIDE 5 MG: 5 TABLET ORAL at 14:15

## 2020-12-22 RX ADMIN — ACETAMINOPHEN 650 MG: 325 TABLET, FILM COATED ORAL at 21:29

## 2020-12-22 RX ADMIN — HYDROMORPHONE HYDROCHLORIDE 0.5 MG: 1 INJECTION, SOLUTION INTRAMUSCULAR; INTRAVENOUS; SUBCUTANEOUS at 07:50

## 2020-12-22 RX ADMIN — OXYCODONE HYDROCHLORIDE 5 MG: 5 TABLET ORAL at 15:02

## 2020-12-22 RX ADMIN — OXYCODONE HYDROCHLORIDE 5 MG: 5 TABLET ORAL at 23:05

## 2020-12-22 RX ADMIN — ACETAMINOPHEN 975 MG: 325 TABLET, FILM COATED ORAL at 07:50

## 2020-12-22 RX ADMIN — HYDROMORPHONE HYDROCHLORIDE 0.5 MG: 1 INJECTION, SOLUTION INTRAMUSCULAR; INTRAVENOUS; SUBCUTANEOUS at 03:49

## 2020-12-22 RX ADMIN — POLYETHYLENE GLYCOL 3350 17 G: 17 POWDER, FOR SOLUTION ORAL at 19:59

## 2020-12-22 RX ADMIN — METHOCARBAMOL 750 MG: 750 TABLET, FILM COATED ORAL at 23:07

## 2020-12-22 RX ADMIN — CEFAZOLIN SODIUM 2 G: 2 INJECTION, SOLUTION INTRAVENOUS at 03:50

## 2020-12-22 RX ADMIN — Medication 1 MG/KG/HR: at 16:27

## 2020-12-22 RX ADMIN — OXYCODONE HYDROCHLORIDE 5 MG: 5 TABLET ORAL at 09:16

## 2020-12-22 RX ADMIN — Medication 1 MG/KG/HR: at 08:12

## 2020-12-22 RX ADMIN — HYDROMORPHONE HYDROCHLORIDE 0.3 MG: 1 INJECTION, SOLUTION INTRAMUSCULAR; INTRAVENOUS; SUBCUTANEOUS at 00:15

## 2020-12-22 RX ADMIN — GABAPENTIN 400 MG: 100 CAPSULE ORAL at 18:09

## 2020-12-22 RX ADMIN — MONTELUKAST 10 MG: 10 TABLET, FILM COATED ORAL at 21:29

## 2020-12-22 RX ADMIN — GABAPENTIN 400 MG: 100 CAPSULE ORAL at 14:15

## 2020-12-22 RX ADMIN — DOCUSATE SODIUM AND SENNOSIDES 2 TABLET: 8.6; 5 TABLET ORAL at 07:50

## 2020-12-22 RX ADMIN — CEFAZOLIN SODIUM 2 G: 2 INJECTION, SOLUTION INTRAVENOUS at 11:46

## 2020-12-22 RX ADMIN — OXYCODONE HYDROCHLORIDE 5 MG: 5 TABLET ORAL at 21:29

## 2020-12-22 RX ADMIN — LEVOTHYROXINE SODIUM 125 MCG: 125 TABLET ORAL at 07:51

## 2020-12-22 RX ADMIN — ACETAMINOPHEN 650 MG: 325 TABLET, FILM COATED ORAL at 14:16

## 2020-12-22 RX ADMIN — HYDROMORPHONE HYDROCHLORIDE 0.4 MG: 1 INJECTION, SOLUTION INTRAMUSCULAR; INTRAVENOUS; SUBCUTANEOUS at 00:24

## 2020-12-22 RX ADMIN — DOCUSATE SODIUM AND SENNOSIDES 2 TABLET: 8.6; 5 TABLET ORAL at 19:59

## 2020-12-22 RX ADMIN — ACETAMINOPHEN 650 MG: 325 TABLET, FILM COATED ORAL at 18:09

## 2020-12-22 RX ADMIN — FLUTICASONE FUROATE AND VILANTEROL TRIFENATATE 1 PUFF: 200; 25 POWDER RESPIRATORY (INHALATION) at 07:51

## 2020-12-22 NOTE — PROGRESS NOTES
"Orthopaedic Surgery Progress Note:  12/22/2020     Subjective:   NAEO. Pain well controlled. Tolerating diet, no n/v. No cp, sob, fever, chills. Endorses dullness in the right S1 distribution, consistent with baseline. Slept a few hours overnight.    Objective:   /57 (BP Location: Left arm)   Pulse 60   Temp 97.2  F (36.2  C) (Oral)   Resp 9   Ht 1.575 m (5' 2\")   Wt 77 kg (169 lb 12.1 oz)   SpO2 95%   BMI 31.05 kg/m    I/O this shift:  In: 518.33 [P.O.:160; I.V.:358.33]  Out: 900 [Urine:900]  Gen: NAD. Resting comfortably in bed  Resp: Breathing comfortably on RA  Drain:   Superficial drain output: Minimal since OR  Musculoskeletal: dressing c/d/I.    Lower extremities:  Motor Strength Right Left   Hip flexion: L1, L2, L3 4+*/5 4+*/5   Hip adduction: L2, L3 5/5 5/5   Knee flexion: S1 4*/5 4*/5   Knee extension: L3, L4 5/5 5/5   Ankle dosiflexion: L4, L5 5/5 5/5   EHL: L5 4/5 4/5   Ankle plantarflexion: S1 4+*/5 5/5   *Limited by pain in back  Sensation from L2-S2 is preserved, endorses dullness in S1 distribution on the right consistent with baseline.    Labs:  Lab Results   Component Value Date    WBC 8.5 12/16/2020    HGB 10.3 (L) 12/22/2020     12/16/2020    INR 1.11 10/01/2019        Assessment & Plan:   Aleena Ontiveros is a 60 year old female with PMH including hypothyroidism, HTN, asthma and right S1 radiculopathy with pseudarthrosis now s/p revision L2-pelvis and right S1 decompression complicated by dural tear repaired primarily on 12/21/20 with Dr. Vasquez.     Goals for today:  -Bedrest until Wed  -Continue cares    PLAN:  Ortho Primary  Medicine Comanagement  Activity: Bedrest until Wed 12/23 due to Dural Tear. No excessive bending or twisting. No lifting >10 lbs x 6 weeks. No Ashish lift for transfers.   Weight bearing status: WBAT.  Pain management: Multimodal pain mgmt. Transition from IV to PO as tolerated. No NSAIDs. Continue lidocaine 1 mg/kg/hr as per intraop x24 " hrs.  Antibiotics: Ancef x24 hrs periop  Diet: Begin with clear fluids and progress diet as tolerated.   DVT prophylaxis: SCDs only. No chemical DVT ppx needed.  Imaging: XR Full Spine PTDC - ordered.  Labs: Trend Hgb POD1,2,3  Bracing/Splinting: [place] brace when OOB  Dressings: Keep Aquacel c/d/i x 7 days.  Drains: Document output per shift, will be discontinued at Orthopedic Surgery discretion, <10 ml/shift x2  [place] catheter: DO NOT REMOVE HERNANDES UNTIL DIRECTED  Physical Therapy/Occupational Therapy: Eval and treat.  Consults: Hospitalist, urology  Follow-up: Clinic with Dr. Vasquez in 6 weeks with repeat x-rays (1/28/21)  Disposition: Pending progress with therapies, pain control on orals, and medical stability.    Orthopaedics surgery staff for this patient is Dr. Vasquez.    ------------------------------------------------------------------------------------------    [   ] Drains removed.  [   ] Post xrays done.  [   ] Discharge orders done.  [   ] Discharge summary started.    Lam Bustillo MD  Orthopaedic Surgery, PGY4  [phone]     Please page me directly with any questions/concerns during regular weekday hours before 5pm.     If there is no response, if it is a weekend, or if it is during evening hours then please page the orthopaedic surgery resident on call as listed on [facility] call schedule under the orthopaedics tab.    FOLLOWUP:    Future Appointments   Date Time Provider Department [place]   12/22/2020 10:15 AM Janna Krishnan PT [facility] [place]   12/22/2020  2:30 PM Janna Krishnan PT [facility] [place]   12/23/2020  8:45 AM Marisa Cueva OT [facility] [place]   1/28/2021  1:40 PM Evangelist Vasquez MD [facility]   3/18/2021  2:00 PM Evangelist Vasquez MD [facility]

## 2020-12-22 NOTE — BRIEF OP NOTE
[facility]     Brief Operative Note    Pre-operative diagnosis: Right sacral radiculopathy [M54.18]  S/P spinal fusion [Z98.1]  Spondylolisthesis of lumbosacral region [M43.17]  Post-operative diagnosis Same as pre-operative diagnosis    Procedure: Procedure(s):  Revision laminectomy lumbar 5-sacral 1 with decompression of Right Sacral 1 nerve root; revision posterior instumentee spinal fusion (pseudarthrosis repair) lumbar 2-sacral 1.  Surgeon: Surgeon(s) and Role:     * Evangelist Vasquez MD - Primary     * Lam Terry MD - Assisting     * Grey Ahumada MD - Resident - Assisting  Anesthesia: General   Estimated blood loss: 450mL with 155mL returned in cell saver  Drains: Hemovac  Specimens: * No specimens in log *  Findings:   Enterotomy, serosal tear, dural tear, or laceration secondary to the nature of the procedure, that was recognized and repaired.  Complications: Unintended puncture/laceration of the L5-S1 dural sac.  Implants:   Implant Name Type Inv. Item Serial No.  Lot No. LRB No. Used Action   Medtronic Cannulated Biased MAS for 5.5/6.0m rods 10.5mm x 100mm   [id number] MED[employer] [id number] N/A 2 Implanted   Medtronic Canulated Biased MAS for 5.5/6.0 Zac 10.5 x 110mm   [id number] [employer]  N/A 1 Wasted   Medtronic Dual Zac Multi-Axial Screw for 5.5/6.0 Rods   [id number] [employer] [id number] N/A 1 Implanted   IMP SCR SET MEDT SOLERA BREAK OFF 5.5MM TI [id number] Metallic Hardware/Isabella IMP SCR SET MEDT SOLERA BREAK OFF 5.5MM TI [id number]  [employer]  N/A 13 Implanted   9.5 x 90 Ballast screw    [employer]  N/A 1 Implanted   Medtronic 9.5 x 80 Ballast screw   [id number] MED[employer]IC  N/A 1 Implanted   IMP SCR MEDT 5.5/6.0MM SOLERA 7.5X70MM MA [id number] Metallic Hardware/Isabella IMP SCR MEDT 5.5/6.0MM SOLERA 7.5X70MM MA [id number]  [employer]  N/A 1 Wasted   IMP SCR MEDT 5.5/6.0MM SOLERA 7.5X65MM MA [id number] Metallic  Hardware/Center IMP SCR MEDT 5.5/6.0MM SOLERA 7.5X65MM MA [id number]  [employer]  N/A 1 Implanted   IMP YUMIKO MEDT SOLERA LINED 5.6L116WM CHR [id number] Metallic Hardware/Center IMP YUMIKO MEDT SOLERA LINED 5.1E815DF CHR [id number]  [employer]  N/A 1 Implanted   GRAFT BONE MAGNIFUSE [id number] Bone/Tissue/Biologic GRAFT BONE MAGNIFUSE [id number] MED[employer]IC [employer]  N/A 1 Implanted   GRAFT BONE CRUSH CANC 30ML [id number] Bone/Tissue/Biologic GRAFT BONE CRUSH CANC 30ML [id number] MUSCULOSKELETAL DUGGAN  N/A 1 Implanted     PLAN:    Ortho Primary  Medicine Comanagement  Activity: Bedrest until Wed 12/23 due to Dural Tear. No excessive bending or twisting. No lifting >10 lbs x 6 weeks. No Ashish lift for transfers.   Weight bearing status: WBAT.  Pain management: Multimodal pain mgmt. Transition from IV to PO as tolerated. No NSAIDs. Continue lidocaine 1 mg/kg/hr as per intraop x24 hrs.  Antibiotics: Ancef x24 hrs periop  Diet: Begin with clear fluids and progress diet as tolerated.   DVT prophylaxis: SCDs only. No chemical DVT ppx needed.  Imaging: XR Full Spine PTDC - ordered.  Labs: Trend Hgb POD1,2,3  Bracing/Splinting: Kingston brace when OOB  Dressings: Keep Aquacel c/d/i x 7 days.  Drains: Document output per shift, will be discontinued at Orthopedic Surgery discretion, <10 ml/shift x2  Hernandes catheter: DO NOT REMOVE HERNANDES UNTIL DIRECTED  Physical Therapy/Occupational Therapy: Eval and treat.  Consults: Hospitalist, urology  Follow-up: Clinic with Dr. Vasquez in 6 weeks with repeat x-rays (1/28/21)  Disposition: Pending progress with therapies, pain control on orals, and medical stability.     Grey Ahumada MD  Orthopaedic Surgery PGY-4  #: [phone]

## 2020-12-22 NOTE — PLAN OF CARE
Pt on bedrest until tomorrow 12/23. Repositioning with assist of 1. [place] patent and draining - not supposed to be removed until Thursday morning. Baseline CMS intact with BLE numbness below the knees. Lidocaine infusing. Pain meds given as needed - taking 5mg oxy. On 1lpm NC. Hemovac patent. Dressing C/D/I. Pt able to make needs known.

## 2020-12-22 NOTE — ANESTHESIA POSTPROCEDURE EVALUATION
Anesthesia POST Procedure Evaluation    Patient: Aleena Ontiveros   MRN:     [id number] Gender:   female   Age:    60 year old :      1960        Preoperative Diagnosis: Right sacral radiculopathy [M54.18]  S/P spinal fusion [Z98.1]  Spondylolisthesis of lumbosacral region [M43.17]   Procedure(s):  Revision laminectomy lumbar 5-sacral 1 with decompression of Right Sacral 1 nerve root; revision posterior instumentee spinal fusion (pseudarthrosis repair) lumbar 2-sacral 1.   Postop Comments: No value filed.     Anesthesia Type: General       Disposition: Admission   Postop Pain Control: Uneventful            Sign Out: Well controlled pain   PONV: No   Neuro/Psych: Uneventful            Sign Out: Acceptable/Baseline neuro status   Airway/Respiratory: Uneventful            Sign Out: Acceptable/Baseline resp. status   CV/Hemodynamics: Uneventful            Sign Out: Acceptable CV status   Other NRE: NONE   DID A NON-ROUTINE EVENT OCCUR? No         Last Anesthesia Record Vitals:  CRNA VITALS  2020 2152 - 2020 2252      2020             Pulse:  91    ART BP:  95/47    ART Mean:  65    SpO2:  99 %    Resp Rate (observed):  (!) 3          Last PACU Vitals:  Vitals Value Taken Time   /78 20 0145   Temp 36.7  C (98  F) 20 0130   Pulse 64 20 0154   Resp 7 20 0154   SpO2 97 % 20 0154   Temp src     NIBP     Pulse     SpO2     Resp     Temp     Ht Rate     Temp 2     Vitals shown include unvalidated device data.      Electronically Signed By: Ana Luna MD, 2020, 12:58 PM

## 2020-12-22 NOTE — ANESTHESIA PROCEDURE NOTES
Arterial Line Procedure Note      Staff -   Anesthesiologist:  Ana Luna MD  Performed By: anesthesiologist    Location: In OR After Induction  Procedure Start/Stop Times:     patient identified, IV checked, site marked, risks and benefits discussed, informed consent, monitors and equipment checked, pre-op evaluation and at physician/surgeon's request      Correct Patient: Yes      Correct Position: Yes      Correct Site: Yes      Correct Procedure: Yes      Correct Laterality:  Yes    Site Marked:  Yes  Line Placement:     Procedure:  Arterial Line    Insertion Site:  Radial    Insertion laterality:  Right    Skin Prep: Chloraprep      Patient Prep: patient draped, mask, sterile gloves, hat and hand hygiene      Local skin infiltration:  None    Ultrasound Guided?: No      Catheter size:  20 gauge, 12 cm    Cath secured with: suture      Dressing:  Tegaderm    Complications:  None obvious    Arterial waveform: Yes      IBP within 10% of NIBP: Yes

## 2020-12-22 NOTE — OR NURSING
PACU to Inpatient Nursing Handoff    Patient Aleena Ontiveros is a 60 year old female who speaks English.   Procedure Procedure(s):  Revision laminectomy lumbar 5-sacral 1 with decompression of Right Sacral 1 nerve root; revision posterior instumentee spinal fusion (pseudarthrosis repair) lumbar 2-sacral 1.   Surgeon(s) Primary: Evangelist Vasquez MD  Assisting: Lam Terry MD  Resident - Assisting: Grey Ahumada MD     Allergies   Allergen Reactions     Adhesive Tape      Erythromycin Nausea and Vomiting     Pills cause vomiting,        Isolation  No active isolations     Past Medical History   has a past medical history of Anemia, Anxiety, Arthritis, Chronic osteoarthritis, Hypertension, Hypothyroidism, PONV (postoperative nausea and vomiting), Seasonal allergic rhinitis, and Uncomplicated asthma.    Anesthesia General   Dermatome Level     Preop Meds acetaminophen (Tylenol) - time given: 1224  gabapentin (Neurontin) - time given: 1224  EMEND capsule @1224 - time given: 1224   Nerve block Not applicable   Intraop Meds dexamethasone (Decadron)  fentanyl (Sublimaze): 75 mcg total  hydromorphone (Dilaudid): 0.5 mg total  ondansetron (Zofran): last given at 2117  lidocaine drip and ketamine drip run throughout OR case, Katamine d/c prior to PACU arrival, Lidocaine remains running per order for 24 hours   Local Meds Yes   Antibiotics cefazolin (Ancef) - last given at 2030     Pain Patient Currently in Pain: yes   PACU meds  fentanyl (Sublimaze): 50 mcg (total dose) last given at 2230   hydromorphone (Dilaudid): 1.5mg  mg (total dose) last given at 0055   750mg PO Robaxin at 0040   PCA / epidural No   Capnography     Telemetry ECG Rhythm: Normal sinus rhythm   Inpatient Telemetry Monitor Ordered? Yes - Per [facility] status        Labs Glucose Lab Results   Component Value Date     12/21/2020       Hgb Lab Results   Component Value Date    HGB 11.8 12/21/2020       INR Lab Results   Component Value Date     INR 1.11 10/01/2019      PACU Imaging Not applicable     Wound/Incision Incision/Surgical Site 08/10/17 Right Knee (Active)   Number of days: 1230       Incision/Surgical Site 12/13/17 Left Knee (Active)   Number of days: 1105       Incision/Surgical Site 10/01/19 Posterior;Midline Back (Active)   Number of days: 448       Incision/Surgical Site 12/21/20 Posterior;Midline Back (Active)   Incision Assessment UTV 12/21/20 2225   Candis-Incision Assessment UTV 12/21/20 2225   Closure Sutures;Liquid bandage;MARU 12/21/20 2225   Incision Drainage Amount UTV 12/21/20 2225   Dressing Intervention Clean, dry, intact 12/21/20 2225   Number of days: 1      CMS        Equipment ice pack   Other LDA       IV Access Peripheral IV 10/15/19 Right (Active)   Number of days: 434       Peripheral IV 12/21/20 Anterior;Right Hand (Active)   Site Assessment WDL 12/21/20 2225   Line Status Saline locked 12/21/20 2225   Dressing Intervention New dressing  12/21/20 1248   Phlebitis Scale 0-->no symptoms 12/21/20 1248   Number of days: 1       Peripheral IV 12/21/20 Right Lower forearm (Active)   Site Assessment WDL 12/21/20 2225   Line Status Infusing;Checked every 1 hour 12/21/20 2225   Phlebitis Scale 0-->no symptoms 12/21/20 2225   Infiltration Scale 0 12/21/20 2225   Number of days: 1      Blood Products Cell saver  Albumin  mL   Intake/Output    Drains / [place] Closed/Suction Drain 1 Posterior Back Other (Comment) 10 Sinhala (Active)   Site Description UTV 12/21/20 2225   Dressing Status Normal: Clean, Dry & Intact 12/21/20 2225   Drainage Appearance Bloody/Bright Red 12/21/20 2225   Status Other (Comment) 12/21/20 2225   Number of days: 1       Urethral Catheter Latex;Straight-tip 16 fr (Active)   Collection Container Standard 12/21/20 2225   Securement Method Securing device (Describe) 12/21/20 2225   Rationale for Continued Use Other (Comment) 12/21/20 2225   Number of days: 1      Time of void PreOp Void Prior to Procedure:  1430 (12/21/20 1522)    PostOp Urine Occurrence: 1 (12/21/20 1430)    Diapered? No   Bladder Scan     PO    tolerating sips and ice chips     Vitals    B/P: 138/88  T: 97.9  F (36.6  C)    Temp src: Axillary  P:  Pulse: 71 (12/22/20 0045)          R: 16  O2:  SpO2: 96 %    O2 Device: Nasal cannula (12/22/20 0045)    Oxygen Delivery: 4 LPM (12/21/20 2345)         Family/support present Multiple attempts to reach family - home line is disconnected, both cell phones beep with busy signal with no voicemail option   Patient belongings     Patient transported on bed and air mat   DC meds/scripts (obs/outpt) Not applicable   Inpatient Pain Meds Released? Yes       Special needs/considerations try to update family in AM or when Aleena is able to work her phone give an update to her    Tasks needing completion None       Preeti Keyes RN  [facility] [id number]

## 2020-12-22 NOTE — ANESTHESIA CARE TRANSFER NOTE
Patient: Aleena Ontiveros    Procedure(s):  Revision laminectomy lumbar 5-sacral 1 with decompression of Right Sacral 1 nerve root; revision posterior instumentee spinal fusion (pseudarthrosis repair) lumbar 2-sacral 1.    Diagnosis: Right sacral radiculopathy [M54.18]  S/P spinal fusion [Z98.1]  Spondylolisthesis of lumbosacral region [M43.17]  Diagnosis Additional Information: No value filed.    Anesthesia Type:   General     Note:  Airway :Face Mask  Patient transferred to:PACU  Handoff Report: Identifed the Patient, Identified the Reponsible Provider, Reviewed the pertinent medical history, Discussed the surgical course, Reviewed Intra-OP anesthesia mangement and issues during anesthesia, Set expectations for post-procedure period and Allowed opportunity for questions and acknowledgement of understanding      Vitals: (Last set prior to Anesthesia Care Transfer)    CRNA VITALS  12/21/2020 2152 - 12/21/2020 2228 12/21/2020             Pulse:  91    ART BP:  95/47    ART Mean:  65    SpO2:  99 %    Resp Rate (observed):  (!) 3                Electronically Signed By: JAZMIN Ruiz CRNA  December 21, 2020  10:28 PM

## 2020-12-22 NOTE — OR NURSING
"Patient awake and alert in PACU however falls asleep often before fully answering questions. Much of her PACU stay is her being restless and expressing pain but unable to describe what her pain feels like or any indication of how severe it is. When asked if she still feels \"groggy or out of it from anesthesia\" she clearly replies yes. She appears to be getting some relief from ice and repositioning between doses of IV fentanyl and dilaudid. She does frequently fall back asleep to a snore. After further assessment, patient describes feeling consistent with muscle spasms. Robaxin dose for post-surgery given in PACU.  "

## 2020-12-22 NOTE — PROGRESS NOTES
"[facility] [place]   Internal Medicine Daily Note           Interval History/Events     Overnight events reviewed  Reports doing well  No nausea, vomiting, chest pain, shortness of breath  No lightheadedness or dizziness           Review of Systems        4 point ROS including Respiratory, CV, GI and , other than that noted above is negative      Medications   I have reviewed current medications  in the \"current medication\" section of Epic.  Relevant changes include:     Physical Exam   General:       Vital signs:    Blood pressure 130/60, pulse 68, temperature 98.2  F (36.8  C), temperature source Oral, resp. rate 11, height 1.575 m (5' 2\"), weight 77 kg (169 lb 12.1 oz), SpO2 97 %.  Estimated body mass index is 31.05 kg/m  as calculated from the following:    Height as of this encounter: 1.575 m (5' 2\").    Weight as of this encounter: 77 kg (169 lb 12.1 oz).      Intake/Output Summary (Last 24 hours) at 12/22/2020 1301  Last data filed at 12/22/2020 0626  Gross per 24 hour   Intake 3028.33 ml   Output 2000 ml   Net 1028.33 ml        Constitutional: Laying in bed in no distress  Eye: No icterus, no pallor  Mouth/ENT: Normal oral mucosa  Cardiovascular: S1, S2 normal   Respiratory: B/l CTA  GI: Soft, NT, BS+  : No torres's catheter  Neurology:   Psych:   MSK:   Integumentary:   Heme/Lymph/Imm:      Laboratory and Imaging Studies     I have reviewed  laboratory and imaging studies in the Epic. Pertinent findings are as below:    BMP  Recent Labs   Lab 12/22/20  0623 12/21/20  1722 12/21/20  1215 12/16/20  1626    138  --  135   POTASSIUM 3.9 3.0* 3.9 3.6   CHLORIDE 107  --   --  100   SIXTO 7.7*  --   --  9.3   CO2 26  --   --  29   BUN 9  --   --  16   CR 0.56  --   --  0.64   * 112*  --  97     CBC  Recent Labs   Lab 12/22/20  0623 12/16/20  1626 12/16/20  1626   WBC  --   --  8.5   RBC  --   --  4.84   HGB 10.3*   < > 13.8   HCT  --   --  41.9   MCV  --   --  87   MCH  " --   --  28.5   MCHC  --   --  32.9   RDW  --   --  12.3   PLT  --   --  301    < > = values in this interval not displayed.     INRNo lab results found in last 7 days.  LFTsNo lab results found in last 7 days.   PANCNo lab results found in last 7 days.        Impression/Plan        60 year old female admitted on 12/21/2020. She has  aknown h/o mild persistent asthma, hypothyroidism, Hypertension and back pain.  She underwent Revision laminectomy lumbar 5-sacral 1 with decompression of Right Sacral 1 nerve root; revision posterior spinal fusion (pseudarthrosis repair) lumbar 4-sacral 1. Internal Medicine consulted for post operative co management.  Individual problems and their management are outlined below        S/P  Revision laminectomy lumbar 5-sacral 1 with decompression of Right Sacral 1 nerve root; revision posterior spinal fusion (pseudarthrosis repair) lumbar 4-sacral 1, POD#0:  Management primarily per Ortho team.  - Wound cares, Dressings, Surgical pain management, DVT Prophylaxis  per primary team.   - Monitor anemia, hemodynamics, Input/Output, Capnography (for 24 hours)  - Encourage Incentive spirometry  - Laxatives for constipation prophylaxis     # Anemia: Most likely due to blood loss, and post surgical inflammation.   Hg 10. 3 gm/dl. Asymptomatic  - Transfuse if Hg < 7 gm/dl       # Hypothyroidism:   Resume home dose of levothyroxine        # HTN:  Hold home dose of Lisinopril - hydrochlorothiazide 20-12.5 and amlodipine 5 mg  for now        # Mild Persistent Asthma:  Resume home dose of advair or equivalent  PRN albuterol  Allegra 180 mg every morning           Pt's care was discussed with bedside RN, patient and  during Care Team Rounds.               Gildardo Vera MD  Hospitalist ( Internal medicine)  Pager: [phone]

## 2020-12-22 NOTE — PLAN OF CARE
Problem: Adult Inpatient Plan of Care  Goal: Plan of Care Review  Outcome: No Change   Note for the night shift, from 0200 to 0730.    D: Pt arrived from the PACU at 0200. Pt came in her own bed. Alert, but drifts off easily. Memory poor. Drinking water without problems. Drsg CDI. Able to roll from side to side woihtout issues. Very scant drainage in the Hemovac. [place] patent. HOB flat due to dural tear. Neuros intact. Has baseline numbness in her feet. Dozes off and on. Pain getting worse by 0345. IV Dilaudid given. Vitals stable. Lido drip infusing.  No headache. Call light with in reach. Able to make needs known.  Sinus rhythm without ectopics.  A;Doing OK. P: Monitor closely.  Supportive cares. Medicate for pain as needed. Bed rest for now.

## 2020-12-22 NOTE — OR NURSING
Handoff complete at 0213 at bedside in 801 with 8A Liane Manning RN. Discussed plan of care with patient. Also post-op findings written by surgery Activity: Bedrest until Wed 12/23 due to Dural Tear. No excessive bending or twisting. No lifting >10 lbs x 6 weeks. No Ashish lift for transfers.     Reviewed plan of care extensively with patient. She continues to express her concern with no longer having 1:1 care. I discussed her concerns with the charge and we were able to move her closer to the nurses station for her comfort. She understands she is progressing in her recovery and how Madonna will continue to follow the plan of care in regard to her pain control. She acknowledges understanding of this and appears to be reassured and satisfied with her transfer to a different unit with different staff.

## 2020-12-22 NOTE — PHARMACY-ADMISSION MEDICATION HISTORY
Admission medication history interview status for the 12/21/2020 admission is complete. See Epic admission navigator for allergy information, pharmacy, prior to admission medications and immunization status.     Medication history interview sources:  Patient via phone and Surecripts    Changes made to PTA medication list (reason)  Added: None  Deleted: None  Changed: gabapentin 4 times a day >> 5 times a day    Additional medication history information (including reliability of information, actions taken by pharmacist): patient experienced significant pain when reviewing her home medications with her. Subsequent phone calls were not answered probably due to pain.  Last dose was recorded by RN pre-op.  Phentermine: patient denies she is currently taking it.      Prior to Admission medications    Medication Sig Last Dose Taking? Auth Provider   acetaminophen (TYLENOL) 500 MG tablet Take 500 mg by mouth 5 times daily  12/21/2020 at 0630 Yes Reported, Patient   albuterol (PROAIR HFA/PROVENTIL HFA/VENTOLIN HFA) 108 (90 Base) MCG/ACT inhaler Inhale 2 puffs into the lungs every 4 hours as needed for shortness of breath / dyspnea or wheezing 12/21/2020 at 0630 Yes Krzysztof Whitten MD   amLODIPine (NORVASC) 5 MG tablet Take 5 mg by mouth At Bedtime  12/20/2020 at 2200 Yes Reported, Patient   estradiol (ESTRACE) 0.1 MG/GM cream Place 2 g vaginally At Bedtime  12/20/2020 at Unknown time Yes Reported, Patient   Fexofenadine HCl (ALLEGRA PO) Take 180 mg by mouth every morning  12/21/2020 at 0930 Yes Reported, Patient   fluticasone (FLONASE) 50 MCG/ACT spray Spray 1 spray into both nostrils 2 times daily  12/21/2020 at 0930 Yes Reported, Patient   fluticasone-salmeterol (ADVAIR) 500-50 MCG/DOSE inhaler Inhale 1 puff into the lungs every 12 hours 12/21/2020 at 0930 Yes Emily Bryan APRN CNP   gabapentin (NEURONTIN) 400 MG capsule Take 1 cap=400mg QID & continue weaning down  Patient taking differently: Take 1 cap=400mg  5  times a day 12/21/2020 at 0630 Yes Shari Albarado PA-C   ibuprofen (ADVIL/MOTRIN) 200 MG tablet Take 200 mg by mouth 2 times daily Past Month at Unknown time Yes Reported, Patient   ibuprofen (ADVIL/MOTRIN) 600 MG tablet Take 600 mg by mouth At Bedtime  Past Month at Unknown time Yes Reported, Patient   levothyroxine (SYNTHROID) 125 MCG tablet Take 1 tablet (125 mcg) by mouth every morning 12/21/2020 at 0630 Yes Krzysztof Whitten MD   lisinopril-hydrochlorothiazide (ZESTORETIC) 20-12.5 MG tablet Take 2 tablets by mouth daily 12/20/2020 at Unknown time Yes Reported, Patient   Montelukast Sodium (SINGULAIR PO) Take 10 mg by mouth At Bedtime  12/20/2020 at Unknown time Yes Reported, Patient   multivitamin w/minerals (THERA-VIT-M) tablet Take 2 tablets by mouth 2 times daily Past Week at Unknown time Yes Reported, Patient   Omega-3 Fatty Acids (FISH OIL) 500 MG CAPS Take 2 capsules by mouth daily Past Week at Unknown time Yes Reported, Patient   traMADol (ULTRAM) 50 MG tablet Take 100 mg by mouth every 6 hours as needed for severe pain 12/21/2020 at 0930 Yes Reported, Patient   gabapentin (NEURONTIN) 100 MG capsule Take 1-2 caps Four times per day as needed for pain   Evangelist Vasquez MD         Medication history completed by: Erika Smiley PharmD, [facility] December 22, 2020

## 2020-12-22 NOTE — PLAN OF CARE
PT order received and chart reviewed. Per notes patient on bedrest until 12/23 with hold PT evaluation today and schedule for later morning tomorrow.

## 2020-12-22 NOTE — CONSULTS
[facility]   Consult Note - Hospitalist Service     Date of Admission:  12/21/2020  Consult Requested by: Evangelist Vasquez MD  Reason for Consult: postoperative co management     Assessment & Plan   Aleena Ontiveros is a 60 year old female admitted on 12/21/2020. She has  aknown h/o mild persistent asthma, hypothyroidism, Hypertension and back pain.  She underwent Revision laminectomy lumbar 5-sacral 1 with decompression of Right Sacral 1 nerve root; revision posterior spinal fusion (pseudarthrosis repair) lumbar 4-sacral 1. Internal Medicine consulted for post operative co management.  Individual problems and their management are outlined below      S/P  Revision laminectomy lumbar 5-sacral 1 with decompression of Right Sacral 1 nerve root; revision posterior spinal fusion (pseudarthrosis repair) lumbar 4-sacral 1, POD#0:  Management by primary team. Please see their notes for further details  Continue IV fluids until able to take oral diet   Pain control with acetaminophen 975 mg every 8 hrs for 3 days  Lidocaine infusion at 6.3 mL/hr  Oxycodone 5-10 mg every 3 hrs PRN and IV hydromorphone 0.2-0.mg q 2 hrs  PRN for breakthrough pain   DVT prophylaxis with  SCDs while in hospital  Perioperative antibiotics as per primary team   Overnight Capnography monitoring  PT/OT as per protocol  C[place] to come out after 24 hours per Urology recommendation (has history of urinary retention in the past after surgery), see consult note for full recommendations.  Incentive spirometry and aggressive bowel regimen (This has been ordered)  We will monitor for acute blood loss anemia. Pre op Hgb at 13.8      Hypothyroidism:   Resume home dose of levothyroxine      HTN:  Hold home dose of Lisinopril - hydrochlorothiazide 20-12.5 and amlodipine 5 mg postoperativley   Resume after BMP check, as BP allows       Mild Persistent Asthma:  Resume home dose of advair or equivalent  PRN  albuterol  Allegra 180 mg every morning       The patient's care was discussed with the Bedside Nurse and Patient.    Erin Pineda MD  [facility]     ______________________________________________________________________    Chief Complaint   S/P Revision laminectomy lumbar 5-sacral 1 with decompression of Right Sacral 1 nerve root; revision posterior spinal fusion (pseudarthrosis repair) lumbar 4-sacral 1    History is obtained from the patient, per op physical and perioperative notes     History of Present Illness   Aleena Ontiveros is a 60 year old female with history of hypothyroidism, asthma, anxiety, hypertension, and multiple previous back surgeries who presents today for revision laminectomy with decompression of Right Sacral 1 nerve root for R sacral radiculopathy.     Currently Aleena is very sleepy after the procedure. She has pain in the middle to lower part of her back. No current radiation of pain into buttocks or legs. No nausea or vomiting. No shortness of breath or chest pain. She is concerned about having urinary retention and constipation, which were problems after her previous back surgery.      Surgery was uncomplicated with normal recovery. Crystalloids: 1200, Alb: 250, EBL : 350.    Review of Systems   Review Of Systems  Skin: negative for, rash, itching, bruising  Eyes: negative for, visual blurring, double vision  Ears/Nose/Throat: negative for, nasal congestion. Positive for dry mouth.   Respiratory: No shortness of breath, dyspnea on exertion, cough, or hemoptysis  Cardiovascular: negative for, irregular heart beat, chest pain, dyspnea on exertion, lower extremity edema and syncope or near-syncope  Gastrointestinal: negative for, nausea, vomiting and abdominal pain. Positive for history of constipation.  Genitourinary: No dysuria or hematuria. Positive for history of urinary retention after surgery.  Musculoskeletal: Positive for back pain.    Neurologic: negative for, headaches, local weakness, numbness or tingling of hands, numbness or tingling of feet and speech problems  Psychiatric: positive for feeling anxious  Hematologic/Lymphatic/Immunologic: negative  Endocrine: negative        Past Medical History    I have reviewed this patient's medical history and updated it with pertinent information if needed.   Past Medical History:   Diagnosis Date     Anemia      Anxiety      Arthritis     osteoarthritis of both knee     Chronic osteoarthritis      Hypertension      Hypothyroidism      PONV (postoperative nausea and vomiting)      Seasonal allergic rhinitis      Uncomplicated asthma        Past Surgical History   I have reviewed this patient's surgical history and updated it with pertinent information if needed.  Past Surgical History:   Procedure Laterality Date     ABDOMEN SURGERY  ,         ARTHROPLASTY KNEE Right 08/10/2017    Procedure: ARTHROPLASTY KNEE;  RIGHT TOTAL KNEE ARTHROPLASTY ;  Surgeon: Grady Alvarez MD;  Location:  OR     ARTHROPLASTY KNEE Left 2017    Procedure: ARTHROPLASTY KNEE;  LEFT TOTAL KNEE ARTHROPLASTY;  Surgeon: Grady Alvarez MD;  Location:  OR     BACK SURGERY  1974    spinal fusion     ENT SURGERY      tonsilectomy     GYN SURGERY  2013    hysterectomy     OPTICAL TRACKING SYSTEM FUSION POSTERIOR SPINE THORACIC THREE+ LEVELS N/A 10/01/2019    Procedure: Transforaminal Lumbar Interbody Fusion Three Column Osteotomy L5-S1, Posterior Spinal Fusion L2-Pelvis. Use of BMP bone graft;  Surgeon: Evangelist Vasquez MD;  Location:  OR     ORTHOPEDIC SURGERY      sinal fusion,hand     thumb mass resection       THYROIDECTOMY  2014    Procedure: THYROIDECTOMY;  Surgeon: Krzysztof Marquez MD;  [facility]       Social History   I have reviewed this patient's social history and updated it with pertinent information if needed.  Social History     Tobacco Use     Smoking  status: Never Smoker     Smokeless tobacco: Never Used   Substance Use Topics     Alcohol use: Yes     Binge frequency: Patient refused     Comment: 1-2 per month     Drug use: No       Family History   I have reviewed this patient's family history and updated it with pertinent information if needed.   Family History   Problem Relation Age of Onset     Breast Cancer Mother      Cancer - colorectal Father      C.A.D. Father      Cerebrovascular Disease Father      Thyroid Disease Brother      Thyroid Disease Sister        Medications   I have reviewed this patient's current medications  Medications Prior to Admission   Medication Sig Dispense Refill Last Dose     acetaminophen (TYLENOL) 500 MG tablet Take 500 mg by mouth 5 times daily    12/21/2020 at 0630     albuterol (PROAIR HFA/PROVENTIL HFA/VENTOLIN HFA) 108 (90 Base) MCG/ACT inhaler Inhale 2 puffs into the lungs every 4 hours as needed for shortness of breath / dyspnea or wheezing   12/21/2020 at 0630     amLODIPine (NORVASC) 5 MG tablet Take 5 mg by mouth At Bedtime    12/20/2020 at 2200     estradiol (ESTRACE) 0.1 MG/GM cream Place 2 g vaginally At Bedtime    12/20/2020 at Unknown time     Fexofenadine HCl (ALLEGRA PO) Take 180 mg by mouth every morning    12/21/2020 at 0930     fluticasone (FLONASE) 50 MCG/ACT spray Spray 1 spray into both nostrils 2 times daily    12/21/2020 at 0930     fluticasone-salmeterol (ADVAIR) 500-50 MCG/DOSE inhaler Inhale 1 puff into the lungs every 12 hours   12/21/2020 at 0930     gabapentin (NEURONTIN) 400 MG capsule Take 1 cap=400mg QID & continue weaning down 180 capsule 0 12/21/2020 at 0630     ibuprofen (ADVIL/MOTRIN) 200 MG tablet Take 200 mg by mouth 2 times daily   Past Month at Unknown time     ibuprofen (ADVIL/MOTRIN) 600 MG tablet Take 600 mg by mouth At Bedtime    Past Month at Unknown time     levothyroxine (SYNTHROID) 125 MCG tablet Take 1 tablet (125 mcg) by mouth every morning   12/21/2020 at 0630      lisinopril-hydrochlorothiazide (ZESTORETIC) 20-12.5 MG tablet Take 2 tablets by mouth daily   12/20/2020 at Unknown time     Montelukast Sodium (SINGULAIR PO) Take 10 mg by mouth At Bedtime    12/20/2020 at Unknown time     multivitamin w/minerals (THERA-VIT-M) tablet Take 2 tablets by mouth 2 times daily   Past Week at Unknown time     Omega-3 Fatty Acids (FISH OIL) 500 MG CAPS Take 2 capsules by mouth daily   Past Week at Unknown time     traMADol (ULTRAM) 50 MG tablet Take 100 mg by mouth every 6 hours as needed for severe pain   12/21/2020 at 0930     gabapentin (NEURONTIN) 100 MG capsule Take 1-2 caps Four times per day as needed for pain 120 capsule 3        Allergies   Allergies   Allergen Reactions     Adhesive Tape      Erythromycin Nausea and Vomiting     Pills cause vomiting,        Physical Exam   Vital Signs: Temp: 98.1  F (36.7  C) Temp src: Oral BP: (!) 140/76 Pulse: 77   Resp: 20 SpO2: 97 % O2 Device: None (Room air)    Weight: 169 lbs 12.07 oz    General Appearance: Sleeping, lying flat in bed, wakes to voice, pleasant and coversant  Eyes: Sclera clear, PERRLA  HEENT: Mouth dry.   Respiratory: Comfortable work of breathing, has nasal cannula in place, lungs clear to auscultation bilaterally.  Cardiovascular: RRR, no murmurs, rubs, or gallops. Radial pulse 2+ bilaterally.  GI: Abdomen soft, non-tender, non-distended, hypoactive bowel sounds.   Skin: No rash on visible skin  Musculoskeletal: Back incision is c/d/i.   Neurologic: face symmetric, speech intact. Falls asleep frequently but oriented to conversation and wakes easily. Moving all extremities.   Psychiatric: Slightly anxious. Normal affect.     Data   Results for orders placed or performed during the hospital encounter of 12/21/20 (from the past 24 hour(s))   Glucose by meter   Result Value Ref Range    Glucose 97 70 - 99 mg/dL   Potassium   Result Value Ref Range    Potassium 3.9 3.4 - 5.3 mmol/L   Arterial Panel   Result Value Ref Range     pH Arterial 7.45 7.35 - 7.45 pH    pCO2 Arterial 33 (L) 35 - 45 mm Hg    pO2 Arterial 170 (H) 80 - 105 mm Hg    Bicarbonate Arterial 22 21 - 28 mmol/L    Base Deficit Art 1.1 mmol/L    FIO2 40     Sodium 138 133 - 144 mmol/L    Potassium 3.0 (L) 3.4 - 5.3 mmol/L    Hemoglobin 11.8 11.7 - 15.7 g/dL    Glucose 112 (H) 70 - 99 mg/dL    Calcium Ionized Whole Blood 4.2 (L) 4.4 - 5.2 mg/dL   Lactic acid whole blood   Result Value Ref Range    Lactic Acid 1.0 0.7 - 2.0 mmol/L   XR Surgery NICOLE L/T 5 Min Fluoro    Narrative    This exam was marked as non-reportable because it will not be read by a   radiologist or a [place] non-radiologist provider.

## 2020-12-23 ENCOUNTER — APPOINTMENT (OUTPATIENT)
Dept: OCCUPATIONAL THERAPY | Facility: CLINIC | Age: 60
DRG: 029 | End: 2020-12-23
Attending: ORTHOPAEDIC SURGERY
Payer: COMMERCIAL

## 2020-12-23 ENCOUNTER — APPOINTMENT (OUTPATIENT)
Dept: PHYSICAL THERAPY | Facility: CLINIC | Age: 60
DRG: 029 | End: 2020-12-23
Attending: ORTHOPAEDIC SURGERY
Payer: COMMERCIAL

## 2020-12-23 LAB — HGB BLD-MCNC: 9.8 G/DL (ref 11.7–15.7)

## 2020-12-23 PROCEDURE — 250N000011 HC RX IP 250 OP 636: Performed by: STUDENT IN AN ORGANIZED HEALTH CARE EDUCATION/TRAINING PROGRAM

## 2020-12-23 PROCEDURE — 36415 COLL VENOUS BLD VENIPUNCTURE: CPT | Performed by: STUDENT IN AN ORGANIZED HEALTH CARE EDUCATION/TRAINING PROGRAM

## 2020-12-23 PROCEDURE — 250N000013 HC RX MED GY IP 250 OP 250 PS 637: Performed by: CLINICAL NURSE SPECIALIST

## 2020-12-23 PROCEDURE — 120N000002 HC R&B MED SURG/OB UMMC

## 2020-12-23 PROCEDURE — 97110 THERAPEUTIC EXERCISES: CPT | Mod: GP

## 2020-12-23 PROCEDURE — 97161 PT EVAL LOW COMPLEX 20 MIN: CPT | Mod: GP

## 2020-12-23 PROCEDURE — 258N000003 HC RX IP 258 OP 636: Performed by: STUDENT IN AN ORGANIZED HEALTH CARE EDUCATION/TRAINING PROGRAM

## 2020-12-23 PROCEDURE — 97165 OT EVAL LOW COMPLEX 30 MIN: CPT | Mod: GO | Performed by: OCCUPATIONAL THERAPIST

## 2020-12-23 PROCEDURE — 97530 THERAPEUTIC ACTIVITIES: CPT | Mod: GP

## 2020-12-23 PROCEDURE — 250N000013 HC RX MED GY IP 250 OP 250 PS 637: Performed by: STUDENT IN AN ORGANIZED HEALTH CARE EDUCATION/TRAINING PROGRAM

## 2020-12-23 PROCEDURE — 85018 HEMOGLOBIN: CPT | Performed by: STUDENT IN AN ORGANIZED HEALTH CARE EDUCATION/TRAINING PROGRAM

## 2020-12-23 PROCEDURE — 99231 SBSQ HOSP IP/OBS SF/LOW 25: CPT | Performed by: INTERNAL MEDICINE

## 2020-12-23 PROCEDURE — 97530 THERAPEUTIC ACTIVITIES: CPT | Mod: GO | Performed by: OCCUPATIONAL THERAPIST

## 2020-12-23 PROCEDURE — 97116 GAIT TRAINING THERAPY: CPT | Mod: GP | Performed by: PHYSICAL THERAPIST

## 2020-12-23 PROCEDURE — 97530 THERAPEUTIC ACTIVITIES: CPT | Mod: GP | Performed by: PHYSICAL THERAPIST

## 2020-12-23 PROCEDURE — 97535 SELF CARE MNGMENT TRAINING: CPT | Mod: GO | Performed by: OCCUPATIONAL THERAPIST

## 2020-12-23 RX ORDER — CALCIUM CARBONATE 500 MG/1
500-1000 TABLET, CHEWABLE ORAL 3 TIMES DAILY PRN
Status: DISCONTINUED | OUTPATIENT
Start: 2020-12-23 | End: 2020-12-26 | Stop reason: HOSPADM

## 2020-12-23 RX ORDER — NALOXONE HYDROCHLORIDE 0.4 MG/ML
0.2 INJECTION, SOLUTION INTRAMUSCULAR; INTRAVENOUS; SUBCUTANEOUS
Status: DISCONTINUED | OUTPATIENT
Start: 2020-12-23 | End: 2020-12-26 | Stop reason: HOSPADM

## 2020-12-23 RX ORDER — NALOXONE HYDROCHLORIDE 0.4 MG/ML
0.4 INJECTION, SOLUTION INTRAMUSCULAR; INTRAVENOUS; SUBCUTANEOUS
Status: DISCONTINUED | OUTPATIENT
Start: 2020-12-23 | End: 2020-12-26 | Stop reason: HOSPADM

## 2020-12-23 RX ADMIN — CALCIUM CARBONATE (ANTACID) CHEW TAB 500 MG 1000 MG: 500 CHEW TAB at 01:46

## 2020-12-23 RX ADMIN — GABAPENTIN 400 MG: 100 CAPSULE ORAL at 10:55

## 2020-12-23 RX ADMIN — ACETAMINOPHEN 650 MG: 325 TABLET, FILM COATED ORAL at 10:56

## 2020-12-23 RX ADMIN — GABAPENTIN 400 MG: 100 CAPSULE ORAL at 21:06

## 2020-12-23 RX ADMIN — HYDROMORPHONE HYDROCHLORIDE 0.5 MG: 1 INJECTION, SOLUTION INTRAMUSCULAR; INTRAVENOUS; SUBCUTANEOUS at 00:14

## 2020-12-23 RX ADMIN — OXYCODONE HYDROCHLORIDE 10 MG: 5 TABLET ORAL at 04:45

## 2020-12-23 RX ADMIN — OXYCODONE HYDROCHLORIDE 5 MG: 5 TABLET ORAL at 23:56

## 2020-12-23 RX ADMIN — DOCUSATE SODIUM AND SENNOSIDES 2 TABLET: 8.6; 5 TABLET ORAL at 20:59

## 2020-12-23 RX ADMIN — GABAPENTIN 400 MG: 100 CAPSULE ORAL at 13:57

## 2020-12-23 RX ADMIN — OXYCODONE HYDROCHLORIDE 10 MG: 5 TABLET ORAL at 07:50

## 2020-12-23 RX ADMIN — METHOCARBAMOL 750 MG: 750 TABLET, FILM COATED ORAL at 06:14

## 2020-12-23 RX ADMIN — FLUTICASONE FUROATE AND VILANTEROL TRIFENATATE 1 PUFF: 200; 25 POWDER RESPIRATORY (INHALATION) at 07:52

## 2020-12-23 RX ADMIN — GABAPENTIN 400 MG: 100 CAPSULE ORAL at 07:48

## 2020-12-23 RX ADMIN — SODIUM CHLORIDE: 9 INJECTION, SOLUTION INTRAVENOUS at 11:24

## 2020-12-23 RX ADMIN — OXYCODONE HYDROCHLORIDE 10 MG: 5 TABLET ORAL at 13:57

## 2020-12-23 RX ADMIN — PENICILLIN V POTASSIUM 500 MG: 500 TABLET, FILM COATED ORAL at 07:48

## 2020-12-23 RX ADMIN — FEXOFENADINE HYDROCHLORIDE 180 MG: 180 TABLET, FILM COATED ORAL at 07:49

## 2020-12-23 RX ADMIN — PENICILLIN V POTASSIUM 500 MG: 500 TABLET, FILM COATED ORAL at 17:04

## 2020-12-23 RX ADMIN — MONTELUKAST 10 MG: 10 TABLET, FILM COATED ORAL at 21:06

## 2020-12-23 RX ADMIN — ACETAMINOPHEN 650 MG: 325 TABLET, FILM COATED ORAL at 07:49

## 2020-12-23 RX ADMIN — ACETAMINOPHEN 650 MG: 325 TABLET, FILM COATED ORAL at 21:06

## 2020-12-23 RX ADMIN — SODIUM CHLORIDE: 9 INJECTION, SOLUTION INTRAVENOUS at 21:34

## 2020-12-23 RX ADMIN — OXYCODONE HYDROCHLORIDE 5 MG: 5 TABLET ORAL at 20:59

## 2020-12-23 RX ADMIN — ACETAMINOPHEN 650 MG: 325 TABLET, FILM COATED ORAL at 17:04

## 2020-12-23 RX ADMIN — DOCUSATE SODIUM AND SENNOSIDES 2 TABLET: 8.6; 5 TABLET ORAL at 07:49

## 2020-12-23 RX ADMIN — OXYCODONE HYDROCHLORIDE 5 MG: 5 TABLET ORAL at 17:04

## 2020-12-23 RX ADMIN — PENICILLIN V POTASSIUM 500 MG: 500 TABLET, FILM COATED ORAL at 20:59

## 2020-12-23 RX ADMIN — SODIUM CHLORIDE: 9 INJECTION, SOLUTION INTRAVENOUS at 01:45

## 2020-12-23 RX ADMIN — Medication 1 MG/KG/HR: at 00:21

## 2020-12-23 RX ADMIN — PENICILLIN V POTASSIUM 500 MG: 500 TABLET, FILM COATED ORAL at 12:34

## 2020-12-23 RX ADMIN — LEVOTHYROXINE SODIUM 125 MCG: 125 TABLET ORAL at 07:48

## 2020-12-23 RX ADMIN — OXYCODONE HYDROCHLORIDE 10 MG: 5 TABLET ORAL at 01:45

## 2020-12-23 RX ADMIN — POLYETHYLENE GLYCOL 3350 17 G: 17 POWDER, FOR SOLUTION ORAL at 21:08

## 2020-12-23 RX ADMIN — OXYCODONE HYDROCHLORIDE 10 MG: 5 TABLET ORAL at 10:55

## 2020-12-23 RX ADMIN — ACETAMINOPHEN 650 MG: 325 TABLET, FILM COATED ORAL at 13:57

## 2020-12-23 RX ADMIN — PENICILLIN V POTASSIUM 500 MG: 500 TABLET, FILM COATED ORAL at 00:02

## 2020-12-23 RX ADMIN — GABAPENTIN 400 MG: 100 CAPSULE ORAL at 17:04

## 2020-12-23 NOTE — PROGRESS NOTES
"Orthopaedic Surgery Progress Note:  12/23/2020     Subjective:   NAEO. Pain well controlled. Tolerating diet, no n/v. No cp, sob, fever, chills. She had a better night last night. She is eager to sit and work with PT. Answered all questions from patient and family.    Objective:   /62 (BP Location: Left arm)   Pulse 86   Temp 99.5  F (37.5  C) (Oral)   Resp 16   Ht 1.575 m (5' 2\")   Wt 77 kg (169 lb 12.1 oz)   SpO2 95%   BMI 31.05 kg/m    No intake/output data recorded.  Gen: NAD. Resting comfortably in bed  Resp: Breathing comfortably on RA  Drain:   Superficial drain output: 20 ml last shift // 30 ml for 24 hrs  Musculoskeletal: dressing c/d/I.    Lower extremities:  Motor Strength Right Left   Hip flexion: L1, L2, L3 4+*/5 4+*/5   Hip adduction: L2, L3 5/5 5/5   Knee flexion: S1 4*/5 4*/5   Knee extension: L3, L4 5/5 5/5   Ankle dosiflexion: L4, L5 5/5 5/5   EHL: L5 4/5 4/5   Ankle plantarflexion: S1 4+*/5 5/5   *Limited by pain in back  Sensation from L2-S2 is preserved, endorses dullness in S1 distribution on the right consistent with baseline.    Labs:  Lab Results   Component Value Date    WBC 8.5 12/16/2020    HGB 9.8 (L) 12/23/2020     12/16/2020    INR 1.11 10/01/2019        Assessment & Plan:   Aleena Ontiveros is a 60 year old female with PMH including hypothyroidism, HTN, asthma and right S1 radiculopathy with pseudarthrosis now s/p revision L2-pelvis and right S1 decompression complicated by dural tear repaired primarily on 12/21/20 with Dr. Vasquez.     Goals for today:  -Advance activity this morning off bedrest - advance HOB -> seated at bedside -> up in chair -> ambulating with assist. Monitor for headaches and decrease if symptoms develop.  -Keep torres until Thursday AM  -Continue drain     PLAN:  Ortho Primary  Medicine Comanagement  Activity: Advance activity this morning off bedrest - advance HOB -> seated at bedside -> up in chair -> ambulating with assist. Monitor for " headaches and decrease if symptoms develop. No excessive bending or twisting. No lifting >10 lbs x 6 weeks. No Ashish lift for transfers.   Weight bearing status: WBAT.  Pain management: Multimodal pain mgmt. Transition from IV to PO as tolerated. No NSAIDs. Continue lidocaine 1 mg/kg/hr until AM POD2.  Antibiotics: Ancef x24 hrs periop - completed  Diet: Begin with clear fluids and progress diet as tolerated.   DVT prophylaxis: SCDs only. No chemical DVT ppx needed.  Imaging: XR Full Spine PTDC - ordered.  Labs: Trend Hgb POD1,2,3  Bracing/Splinting: Goode brace when OOB  Dressings: Keep Aquacel c/d/i x 7 days.  Drains: Document output per shift, will be discontinued at Orthopedic Surgery discretion, <10 ml/shift x2  Hernandes catheter: DO NOT REMOVE HERNANDES UNTIL DIRECTED  Physical Therapy/Occupational Therapy: Eval and treat.  Consults: Hospitalist, urology  Follow-up: Clinic with Dr. Vasquez in 6 weeks with repeat x-rays (1/28/21)  Disposition: Pending progress with therapies, pain control on orals, and medical stability.    Orthopaedics surgery staff for this patient is Dr. Vasquez.    ------------------------------------------------------------------------------------------    [   ] Drains removed.  [   ] Post xrays done.  [   ] Discharge orders done.  [   ] Discharge summary started.    Lam Bustillo MD  Orthopaedic Surgery, PGY4  [phone]     Please page me directly with any questions/concerns during regular weekday hours before 5pm.     If there is no response, if it is a weekend, or if it is during evening hours then please page the orthopaedic surgery resident on call as listed on [facility] call schedule under the orthopaedics tab.    FOLLOWUP:    Future Appointments   Date Time Provider Department Center   12/22/2020 10:15 AM Janna Krishnan PT [facility]   12/22/2020  2:30 PM Janna Krishnan PT [facility]   12/23/2020  8:45 AM Marisa Cueva OT [facility] [place]   1/28/2021  1:40 PM Christina,  Evangelist Miranda MD [facility]   3/18/2021  2:00 PM Evangelist Vasquez MD [facility]

## 2020-12-23 NOTE — PROGRESS NOTES
Called by nurse that patient reported taking penicillin 500 mg 4 times daily for a dental infection prior to admission, has 4 days left in 12 day course. Ordered Penicillin 500 mg QID for 4 days.     Erin Pineda MD  Internal Medicine-Pediatrics Hospitalist

## 2020-12-23 NOTE — PLAN OF CARE
Pt doing well. HOB elevated this morning and denies headache. Sat at EOB with therapy and stood with walker. CMS unchanged from baseline. Pain meds given as available. Pt encouraged to drink PO fluids. Lidocaine discontinued this AM. Pt off IMC/tele. Hemovac patent. [place] patent and draining - plan for removal tomorrow. Pt able to make needs known.

## 2020-12-23 NOTE — PLAN OF CARE
"  VS:   BP (!) 140/69 (BP Location: Left arm)   Pulse 87   Temp 98.6  F (37  C) (Oral)   Resp 14   Ht 1.575 m (5' 2\")   Wt 77 kg (169 lb 12.1 oz)   SpO2 95%   BMI 31.05 kg/m    2LO2   Output:   Covington in place and patent.   LBM before surgery. Pt not passing gas, bowel sounds present but hypoactive   Lungs Lung sounds clear   Activity:   Pt on flat bedrest until Wednesday morning   Skin: WDL ex incision   Pain:   Lidocaine pump infusing, PRN oxy in use, 5-10   Neuro/CMS:   CMS intact, pt has baseline N/T in feet, A&Ox4   Dressing(s):   Aquacel dressing is CDI   Diet:   Regular diet, tolerating well, pt had broth and orange juice tonight.   LDA:   PIV infusing NS @ 100/hr with lidocaine drip. Second PIV SL   Equipment:   CAPNO, IV pole, lidocaine pump   Plan:   Continue to monitor pain, encourage therapies, watch for headache tomorrow morning   Additional Info:   Pt had dural tear, able to sit up tomorrow morning       "

## 2020-12-23 NOTE — PROGRESS NOTES
"[facility] [place]   Internal Medicine Daily Note           Interval History/Events     Overnight events reviewed  Reports doing well  Pain controlled  Passing gas  No chest pain, shortness of breath  No lightheadedness or dizziness          Review of Systems        4 point ROS including Respiratory, CV, GI and , other than that noted above is negative      Medications   I have reviewed current medications  in the \"current medication\" section of Epic.  Relevant changes include:     Physical Exam   General:       Vital signs:    Blood pressure 117/60, pulse 81, temperature 100.1  F (37.8  C), temperature source Oral, resp. rate 15, height 1.575 m (5' 2\"), weight 77 kg (169 lb 12.1 oz), SpO2 98 %.  Estimated body mass index is 31.05 kg/m  as calculated from the following:    Height as of this encounter: 1.575 m (5' 2\").    Weight as of this encounter: 77 kg (169 lb 12.1 oz).      Intake/Output Summary (Last 24 hours) at 12/22/2020 1301  Last data filed at 12/22/2020 0626  Gross per 24 hour   Intake 3028.33 ml   Output 2000 ml   Net 1028.33 ml        Constitutional: Laying in bed in no distress  Eye: No icterus, no pallor  Mouth/ENT: Normal oral mucosa  Cardiovascular: S1, S2 normal   Respiratory: B/l CTA  GI: Soft, NT, BS+  : No torres's catheter  Neurology:   Psych:   MSK:   Integumentary:   Heme/Lymph/Imm:      Laboratory and Imaging Studies     I have reviewed  laboratory and imaging studies in the Epic. Pertinent findings are as below:    BMP  Recent Labs   Lab 12/22/20  0623 12/21/20  1722 12/21/20  1215 12/16/20  1626    138  --  135   POTASSIUM 3.9 3.0* 3.9 3.6   CHLORIDE 107  --   --  100   SIXTO 7.7*  --   --  9.3   CO2 26  --   --  29   BUN 9  --   --  16   CR 0.56  --   --  0.64   * 112*  --  97     CBC  Recent Labs   Lab 12/23/20  0543 12/16/20  1626 12/16/20  1626   WBC  --   --  8.5   RBC  --   --  4.84   HGB 9.8*   < > 13.8   HCT  --   --  41.9   MCV  --   --  " 87   MCH  --   --  28.5   MCHC  --   --  32.9   RDW  --   --  12.3   PLT  --   --  301    < > = values in this interval not displayed.     INRNo lab results found in last 7 days.  LFTsNo lab results found in last 7 days.   PANCNo lab results found in last 7 days.        Impression/Plan        60 year old female admitted on 12/21/2020. She has  aknown h/o mild persistent asthma, hypothyroidism, Hypertension and back pain.  She underwent Revision laminectomy lumbar 5-sacral 1 with decompression of Right Sacral 1 nerve root; revision posterior spinal fusion (pseudarthrosis repair) lumbar 4-sacral 1. Internal Medicine consulted for post operative co management.  Individual problems and their management are outlined below        S/P  Revision laminectomy lumbar 5-sacral 1 with decompression of Right Sacral 1 nerve root; revision posterior spinal fusion (pseudarthrosis repair) lumbar 4-sacral 1, POD#0:  Management primarily per Ortho team.  Bed rest per orthopedics surgery  - Activity plan per Ortho  - Wound cares, Dressings, Surgical pain management, DVT Prophylaxis  per primary team.   - Monitor anemia, hemodynamics, Input/Output, Capnography (for 24 hours)  - Encourage Incentive spirometry  - Laxatives for constipation prophylaxis     # Anemia: Most likely due to blood loss, and post surgical inflammation.   Hg 10. 3 gm/dl. Asymptomatic  - Transfuse if Hg < 7 gm/dl       # Hypothyroidism:   Resume home dose of levothyroxine        # HTN:  Hold home dose of Lisinopril - hydrochlorothiazide 20-12.5 and amlodipine 5 mg  for now        # Mild Persistent Asthma:  Resume home dose of advair or equivalent  PRN albuterol  Allegra 180 mg every morning           Pt's care was discussed with bedside RN, patient and  during Care Team Rounds.               Gildardo Vera MD  Hospitalist ( Internal medicine)  Pager: [phone]

## 2020-12-23 NOTE — PROGRESS NOTES
S: Patient seen today at  for an eval for a [place] Hyperextension brace as ordered by Lam Shah    O/G: reduce spinal flexion    A: The patient was fit with a thoracolumbosacral anterior hyperextension orthosis [place] size Large.  Multiple adjustments made to customize the fit of the brace on the patient. The pt was instructed on donning/doffing and proper care of the orthisis was explained.  The fitting of the orthosis was fit per manufactures recommendations.  Upon completion of initial fitting, pt had no complaints and the fitting appears to be satisfactory at this time.    P: I have instructed pt/staff to contact our facility with any questions and/or concerns.   JUANY Olmedo.

## 2020-12-23 NOTE — PLAN OF CARE
A/O x 4. Pt having increased pain early in shift, IVP dilaudid given and an Ice pack, both effective at decreasing pain.  Gave 10 mg of oxycodone every 3 hours during the night and replaced ice packs. Reports feeling much more comfortable this morning. Lidocaine is infusing, no s/s of lidocaine toxicity. VSS. Slight low grade temp, encourage use of IS.  Afebrile this morning. Reports baseline numbness in bilateral feet which is unchanged from pre-op.  Dressing to spine is c/d/I. H-vac is patent. [place] draining adequate amounts of urine.  Call light is in reach. Cont to assess.

## 2020-12-23 NOTE — PROGRESS NOTES
12/23/20 1120   Quick Adds   Type of Visit Initial PT Evaluation       Present no   Language English   Living Environment   People in home spouse   Current Living Arrangements house   Home Accessibility stairs to enter home;stairs within home   Number of Stairs, Main Entrance 3   Stair Railings, Main Entrance none   Number of Stairs, Within Home, Primary   (flight, does not need to access )   Stair Railings, Within Home, Primary railings safe and in good condition   Transportation Anticipated family or friend will provide   Self-Care   Usual Activity Tolerance good   Current Activity Tolerance moderate   Regular Exercise No   Equipment Currently Used at Home none  (has walker at [facility])   Disability/Function   Hearing Difficulty or Deaf no   Wear Glasses or Blind no   Concentrating, Remembering or Making Decisions Difficulty no   Difficulty Communicating no   Difficulty Eating/Swallowing no   Walking or Climbing Stairs Difficulty no   Dressing/Bathing Difficulty no   Toileting issues no   Doing Errands Independently Difficulty (such as shopping) yes   Fall history within last six months no   General Information   Onset of Illness/Injury or Date of Surgery 12/21/20   Referring Physician Lam Bustillo MD   Patient/Family Therapy Goals Statement (PT) Did not endorse   Pertinent History of Current Problem (include personal factors and/or comorbidities that impact the POC) 60 year old female with PMH including hypothyroidism, HTN, asthma and right S1 radiculopathy with pseudarthrosis now s/p revision L2-pelvis and right S1 decompression complicated by dural tear repaired primarily on 12/21/20 with Dr. Vasquez   Existing Precautions/Restrictions brace worn when out of bed;fall;spinal   Weight-Bearing Status - LUE full weight-bearing   Weight-Bearing Status - RUE full weight-bearing   Weight-Bearing Status - LLE weight-bearing as tolerated   Weight-Bearing Status - RLE weight-bearing as tolerated    General Observations Supine in bed upon arrival, agreeable to PT   Cognition   Orientation Status (Cognition) oriented x 4   Affect/Mental Status (Cognition) WNL   Follows Commands (Cognition) WNL   Pain Assessment   Patient Currently in Pain Yes, see Vital Sign flowsheet   Integumentary/Edema   Integumentary/Edema no deficits were identifed   Posture    Posture Forward head position;Protracted shoulders;Kyphosis   Posture Comments Mild sitting EOB and standing   Range of Motion (ROM)   ROM Comment Did not formally assess, demonstrates functional ROM with mobility   Strength   Strength Comments Did not formally assess, demonstrates functional strength with mobility   Bed Mobility   Comment (Bed Mobility) PT: Completes supine<>sit transfer with use of log roll technique, side rail and overall Walter   Transfers   Transfer Safety Comments PT: Completes sit<>stand transfer with Walter and use of walker   Gait/Stairs (Locomotion)   Comment (Gait/Stairs) PT: Takes a few steps EOB with CGA and use of walker, safe throughout   Balance   Balance Comments Independent sitting balance, CGA for standing balance with UE support from walker   Sensory Examination   Sensory Perception Comments Patient baseline numbness/tingling to bilateral LEs   PT Discharge Planning    PT Discharge Recommendation (DC Rec) home with assist;home with home care physical therapy   PT Rationale for DC Rec PT: Patient will have support from spouse. Recommend home PT for safety evaluation and progression   Total Evaluation Time   Total Evaluation Time (Minutes) 9

## 2020-12-23 NOTE — PROGRESS NOTES
12/23/20 1225   Quick Adds   Type of Visit Initial Occupational Therapy Evaluation   Living Environment   People in home spouse   Current Living Arrangements house   Home Accessibility stairs to enter home;stairs within home   Number of Stairs, Main Entrance 3   Stair Railings, Main Entrance none   Stair Railings, Within Home, Primary railings safe and in good condition   Transportation Anticipated family or friend will provide   Living Environment Comments Has tub shower and shower chair, reports she was not using shower chair prior to this surgery. Adequate height toilet. Has multiple walkers, reports  is disabled. Limited information as patient is very drowsy today/in and out of sleep.   Self-Care   Usual Activity Tolerance good   Current Activity Tolerance moderate   Regular Exercise No   Equipment Currently Used at Home none  (has multiple walkers)   Disability/Function   Hearing Difficulty or Deaf no   Wear Glasses or Blind no   Concentrating, Remembering or Making Decisions Difficulty no   Difficulty Communicating no   Difficulty Eating/Swallowing no   Walking or Climbing Stairs Difficulty no   Dressing/Bathing Difficulty no   Toileting issues no   Doing Errands Independently Difficulty (such as shopping) yes   Fall history within last six months no   General Information   Onset of Illness/Injury or Date of Surgery 12/21/20   Referring Physician Lam Bustillo MD   Patient/Family Therapy Goal Statement (OT) Patient very drowsy today, did not endorse.   Additional Occupational Profile Info/Pertinent History of Current Problem Aleena Ontiveros is a 60 year old female with PMH including hypothyroidism, HTN, asthma and right S1 radiculopathy with pseudarthrosis now s/p revision L2-pelvis and right S1 decompression complicated by dural tear repaired primarily on 12/21/20 with Dr. Vasquez.   Existing Precautions/Restrictions fall;lifting;spinal;brace worn when out of bed   Left Upper Extremity  (Weight-bearing Status) partial weight-bearing (PWB)  (<10lbs)   Right Upper Extremity (Weight-bearing Status) partial weight-bearing (PWB)  (<10lbs)   Cognitive Status Examination   Orientation Status orientation to person, place and time   Visual Perception   Visual Impairment/Limitations WFL   Sensory   Sensory Comments Reports NT in BLE   Pain Assessment   Patient Currently in Pain Yes, see Vital Sign flowsheet   Range of Motion Comprehensive   General Range of Motion no range of motion deficits identified   Strength Comprehensive (MMT)   Comment, General Manual Muscle Testing (MMT) Assessment Not formally tested, strength impaired secondary to spinal precautions.   Bed Mobility   Comment (Bed Mobility) SBA rolling>L using log roll   Transfers   Transfer Comments Not assessed today   Clinical Impression   Criteria for Skilled Therapeutic Interventions Met (OT) yes   OT Diagnosis Reduced functional mobility and ADL participation   OT Problem List-Impairments impacting ADL strength;post-surgical precautions   Assessment of Occupational Performance 3-5 Performance Deficits   Identified Performance Deficits bathing, dressing, toileting, functional transfers, home mgmt   Planned Therapy Interventions (OT) ADL retraining;transfer training   Clinical Decision Making Complexity (OT) low complexity   Therapy Frequency (OT) Daily   Predicted Duration of Therapy 3 days   Anticipated Equipment Needs Upon Discharge (OT)   (TBD)   Risks and Benefits of Treatment have been explained. Yes   Patient, Family & other staff in agreement with plan of care Yes   OT Discharge Planning    OT Discharge Recommendation (DC Rec) Home with assist   OT Rationale for DC Rec Anticipate patient will be safe to discharge home with assist from . Would benefit from continued inpatient services to maximize safety and independence with ADLs.   OT Brief overview of current status  SBA bed mobility   Total Evaluation Time (Minutes)   Total  Evaluation Time (Minutes) 8

## 2020-12-24 ENCOUNTER — APPOINTMENT (OUTPATIENT)
Dept: OCCUPATIONAL THERAPY | Facility: CLINIC | Age: 60
DRG: 029 | End: 2020-12-24
Attending: ORTHOPAEDIC SURGERY
Payer: COMMERCIAL

## 2020-12-24 ENCOUNTER — APPOINTMENT (OUTPATIENT)
Dept: PHYSICAL THERAPY | Facility: CLINIC | Age: 60
DRG: 029 | End: 2020-12-24
Attending: ORTHOPAEDIC SURGERY
Payer: COMMERCIAL

## 2020-12-24 ENCOUNTER — APPOINTMENT (OUTPATIENT)
Dept: GENERAL RADIOLOGY | Facility: CLINIC | Age: 60
DRG: 029 | End: 2020-12-24
Attending: ORTHOPAEDIC SURGERY
Payer: COMMERCIAL

## 2020-12-24 PROCEDURE — 72082 X-RAY EXAM ENTIRE SPI 2/3 VW: CPT

## 2020-12-24 PROCEDURE — 97535 SELF CARE MNGMENT TRAINING: CPT | Mod: GO

## 2020-12-24 PROCEDURE — 97530 THERAPEUTIC ACTIVITIES: CPT | Mod: GP

## 2020-12-24 PROCEDURE — 250N000013 HC RX MED GY IP 250 OP 250 PS 637: Performed by: STUDENT IN AN ORGANIZED HEALTH CARE EDUCATION/TRAINING PROGRAM

## 2020-12-24 PROCEDURE — 97116 GAIT TRAINING THERAPY: CPT | Mod: GP

## 2020-12-24 PROCEDURE — 97110 THERAPEUTIC EXERCISES: CPT | Mod: GP

## 2020-12-24 PROCEDURE — 72082 X-RAY EXAM ENTIRE SPI 2/3 VW: CPT | Mod: 26 | Performed by: STUDENT IN AN ORGANIZED HEALTH CARE EDUCATION/TRAINING PROGRAM

## 2020-12-24 PROCEDURE — 99232 SBSQ HOSP IP/OBS MODERATE 35: CPT | Performed by: INTERNAL MEDICINE

## 2020-12-24 PROCEDURE — 250N000013 HC RX MED GY IP 250 OP 250 PS 637: Performed by: CLINICAL NURSE SPECIALIST

## 2020-12-24 PROCEDURE — 120N000002 HC R&B MED SURG/OB UMMC

## 2020-12-24 RX ORDER — AMOXICILLIN 250 MG
1 CAPSULE ORAL 2 TIMES DAILY
Qty: 40 TABLET | Refills: 0 | Status: SHIPPED | OUTPATIENT
Start: 2020-12-24 | End: 2021-04-01

## 2020-12-24 RX ORDER — HYDROXYZINE HYDROCHLORIDE 25 MG/1
25 TABLET, FILM COATED ORAL EVERY 6 HOURS PRN
Qty: 30 TABLET | Refills: 0 | Status: SHIPPED | OUTPATIENT
Start: 2020-12-24 | End: 2021-01-07

## 2020-12-24 RX ORDER — METHOCARBAMOL 750 MG/1
750 TABLET, FILM COATED ORAL 4 TIMES DAILY PRN
Qty: 40 TABLET | Refills: 0 | Status: SHIPPED | OUTPATIENT
Start: 2020-12-24 | End: 2021-01-07

## 2020-12-24 RX ORDER — ACETAMINOPHEN 500 MG
500 TABLET ORAL
Qty: 100 TABLET | Refills: 0 | Status: SHIPPED | OUTPATIENT
Start: 2020-12-24 | End: 2021-01-07

## 2020-12-24 RX ORDER — POLYETHYLENE GLYCOL 3350 17 G/17G
17 POWDER, FOR SOLUTION ORAL DAILY
Qty: 510 G | Refills: 0 | Status: SHIPPED | OUTPATIENT
Start: 2020-12-24 | End: 2021-04-01

## 2020-12-24 RX ADMIN — PENICILLIN V POTASSIUM 500 MG: 500 TABLET, FILM COATED ORAL at 08:05

## 2020-12-24 RX ADMIN — PENICILLIN V POTASSIUM 500 MG: 500 TABLET, FILM COATED ORAL at 12:54

## 2020-12-24 RX ADMIN — POLYETHYLENE GLYCOL 3350 17 G: 17 POWDER, FOR SOLUTION ORAL at 12:54

## 2020-12-24 RX ADMIN — DOCUSATE SODIUM AND SENNOSIDES 2 TABLET: 8.6; 5 TABLET ORAL at 08:06

## 2020-12-24 RX ADMIN — GABAPENTIN 400 MG: 100 CAPSULE ORAL at 08:02

## 2020-12-24 RX ADMIN — DOCUSATE SODIUM AND SENNOSIDES 2 TABLET: 8.6; 5 TABLET ORAL at 21:13

## 2020-12-24 RX ADMIN — LEVOTHYROXINE SODIUM 125 MCG: 125 TABLET ORAL at 08:05

## 2020-12-24 RX ADMIN — ALBUTEROL SULFATE 2 PUFF: 90 AEROSOL, METERED RESPIRATORY (INHALATION) at 05:48

## 2020-12-24 RX ADMIN — GABAPENTIN 400 MG: 100 CAPSULE ORAL at 14:54

## 2020-12-24 RX ADMIN — GABAPENTIN 400 MG: 100 CAPSULE ORAL at 12:53

## 2020-12-24 RX ADMIN — OXYCODONE HYDROCHLORIDE 5 MG: 5 TABLET ORAL at 02:59

## 2020-12-24 RX ADMIN — ACETAMINOPHEN 650 MG: 325 TABLET, FILM COATED ORAL at 12:52

## 2020-12-24 RX ADMIN — METHOCARBAMOL 750 MG: 750 TABLET, FILM COATED ORAL at 21:14

## 2020-12-24 RX ADMIN — OXYCODONE HYDROCHLORIDE 5 MG: 5 TABLET ORAL at 16:30

## 2020-12-24 RX ADMIN — OXYCODONE HYDROCHLORIDE 5 MG: 5 TABLET ORAL at 21:14

## 2020-12-24 RX ADMIN — PENICILLIN V POTASSIUM 500 MG: 500 TABLET, FILM COATED ORAL at 16:30

## 2020-12-24 RX ADMIN — FLUTICASONE FUROATE AND VILANTEROL TRIFENATATE 1 PUFF: 200; 25 POWDER RESPIRATORY (INHALATION) at 08:02

## 2020-12-24 RX ADMIN — MONTELUKAST 10 MG: 10 TABLET, FILM COATED ORAL at 21:14

## 2020-12-24 RX ADMIN — OXYCODONE HYDROCHLORIDE 10 MG: 5 TABLET ORAL at 12:53

## 2020-12-24 RX ADMIN — GABAPENTIN 400 MG: 100 CAPSULE ORAL at 21:13

## 2020-12-24 RX ADMIN — ACETAMINOPHEN 650 MG: 325 TABLET, FILM COATED ORAL at 08:07

## 2020-12-24 RX ADMIN — OXYCODONE HYDROCHLORIDE 10 MG: 5 TABLET ORAL at 08:40

## 2020-12-24 RX ADMIN — OXYCODONE HYDROCHLORIDE 5 MG: 5 TABLET ORAL at 05:52

## 2020-12-24 RX ADMIN — FEXOFENADINE HYDROCHLORIDE 180 MG: 180 TABLET, FILM COATED ORAL at 08:02

## 2020-12-24 RX ADMIN — OXYCODONE HYDROCHLORIDE 5 MG: 5 TABLET ORAL at 18:17

## 2020-12-24 RX ADMIN — METHOCARBAMOL 750 MG: 750 TABLET, FILM COATED ORAL at 08:40

## 2020-12-24 RX ADMIN — PENICILLIN V POTASSIUM 500 MG: 500 TABLET, FILM COATED ORAL at 21:14

## 2020-12-24 RX ADMIN — ACETAMINOPHEN 650 MG: 325 TABLET, FILM COATED ORAL at 18:13

## 2020-12-24 RX ADMIN — ALBUTEROL SULFATE 2 PUFF: 90 AEROSOL, METERED RESPIRATORY (INHALATION) at 00:01

## 2020-12-24 RX ADMIN — GABAPENTIN 400 MG: 100 CAPSULE ORAL at 18:13

## 2020-12-24 ASSESSMENT — PAIN DESCRIPTION - DESCRIPTORS
DESCRIPTORS: ACHING
DESCRIPTORS: ACHING;SORE

## 2020-12-24 NOTE — OP NOTE
Date of Service: 12/21/20      Surgeon:  Evangelist Vasquez MD   Assistants:  (1) Lam Terry MD ortho spine surgeon - for removal of posterior instrumentation and dural repair; (2) Lam Bustillo MD PGY-4 (for first 75% of case); (3) Grey Ahumada MD PGY-4 (for latter 25% of case).      Preoperative Diagnosis:     1.  Right S1 radiculopathy, with positive response to R S1 TFESI (11/9/2020).  2.  Pseudarthrosis L4-5, without evidence of fixation loosening/failure.  3.  Residual R>L spinal stenosis L5-S1.  4.  1 yr s/p distal extension of T4-L4 fusion down to pelvis (10/1/2019).      Postoperative Diagnosis:     Same      Procedures:   1.  Revision posterior spinal fusion L2 to sacrum, using local autograft, Magnifuse allograft and crushed cancellous allograft.  2.  Revision bilateral segmental posterior instrumentation L2 to pelvis, with revision of screws using old tracks (right L4, left upper S2 AI, left lower S2 AI, right lower S2 AI), revision of right upper S2 AI screw using new track, and placement of new screw (left S1 pedicle screw into the L5 vertebral body).  3.  Revision decompression of right S1 nerve root by performing right sacral dome osteoplasty.  4.  Exploration of fusion mass L2-S1.  5.  Suture repair of dural tear, right dorsal L4-5.  6.  Intraoperative neuro monitoring using transcranial MEP's, SSEPs, and bilateral lower extremity triggered and free running EMGs.  7.  Computer navigation using O-arm and Stealth.      Anesthesia:  General endotracheal.   EBL:   450 mL (Cell Saver return 155 mL).  Complications:  Dural tear with CSF leak, right L4-5, noted after removal of hook at this level.  Suture repair performed, reinforced with Duraseal fibrin glue.  Implants / Equipment used:   1.  [employer] Solera and ballast screw systems: L2 = old screws retained (dual headed on left); L3 = old screws retained; L4 =7.5 x 55 mm dual headed screw right, old left screw retained; S1 = none on right, 7.5 x 65  mm left; upper S2 AI =9.5 x 90 mm right, 9.5 x 80 mm left; lower S2 AI =10.5 x 100 mm bilateral.  5.5 mm cobalt chrome rods x4.  2.  Magnifuse allograft 10 cm strips x2.  3.  Crushed cancellous allograft 60 mL.  4.  Tranexamic acid high-dose (30/10).  5.  Vancomycin powder 1 g.  6.  DuraSeal 5 mL.      Indications: 60/female with multiple previous spinal surgeries, culminating in T4 to pelvis fusion.  Most recent surgery was a year ago, with L2 to pelvis fusion for high-grade lumbosacral spondylolisthesis and flatback deformity.  Currently complaining of significant right greater than left radicular leg pain.  Imaging revealed likely right S1 nerve root impingement over the posterior aspect of the S1 body secondary to the high-grade slippage at L5-S1.  Also showed pseudoarthrosis L4-5, with vacuum disc.  Tried nonoperative treatment, continues to have significant disabling symptoms.  I thus offered surgery in form of revision decompression of the right S1 nerve root via sacral dome osteoplasty, and revision posterior fusion, with possible revision instrumentation.  Consented after thorough discussion of the rationale, risks, benefits and alternatives.        Details:  Properly identified in preop area, site marked, informed consent signed.  Wheeled to OR.  Brief earlier performed.  General anesthesia administered.  Covington inserted.  Antibiotic given: Cefazolin 2 g IV; TXA started.    Neuro monitoring electrodes placed by [employer] technician.  Positioned prone on pro-Axis table.  Table flexed 15 degrees to facilitate exposure and instrumentation.  Lumbar region squared off, prepped with chlorhexidine, alcohol and ChloraPrep and draped in sterile fashion. Surgical timeout performed.  Both Dr. Terry and myself were present for timeout.  Baseline final cord signals obtained; good signals obtained from all extremities.      Revision posterior midline skin incision made, utilizing part of the previous surgical scar.   Revision bilateral subperiosteal dissection and exposure performed, including exposure of the recently placed posterior instrumentation L2 to pelvis.  Also exposed the lower portion of the previously placed Carpio agvino and laminar hook at the L4-5 level.  Previous instrumentation used for localization of levels; furthermore, agreed upon by 2 attending surgeons (Dr. Terry and myself); thus, radiologist confirmation not warranted.  Confirmatory timeout performed.  We proceeded to remove the set plugs and bilateral rods L2 to pelvis.  We used a large gavino cutter to cut the old Carpio gavino, and levered the gavino upward to remove the hook.  We then noted leakage of clear fluid.  We explored the area where the hole came out.  There was a transversely oriented dural tear approximately 6 mm in length.  No herniation of nerve rootlets.  We packed a square piece of Gelfoam into the tear.  We exposed around the tear by removing bone using bur and Kerrisons, taking care not to propagate the dural tear.  After adequate exposure, we proceeded with dural tear repair, he using Prolene 6-0.  We were able to get reasonably good repair.  Valsalva maneuver performed; there was only very scant fluid leakage.  Later, prior to closure, we augmented the repair with Square piece of Gelfoam and DuraSeal.    After instrumentation removal and dural repair, Dr. Terry scrubbed out.  We proceeded with exploring the fusion mass and revising instrumentation as needed.  There was a fibrous cleft at L4-5, including at the area where the hook was located.  There was mild motion across this level, indicative of pseudoarthrosis.  Other levels were noted to have solid arthrodesis.  We applied spinous process clamp with denia frame at L2.  O-arm 3D scan obtained for navigation purposes.  We tested the individual screws and replaced those that were deemed loose.  The pelvic screws were noted to be quite loose.  All 4 were removed and revised to larger  diameter screws.  At right upper S2 AI, I also decided to lateralize the screw insertion point and create a completely new track, to facilitate placement of another gavino, in order to make it a four gavino construct instead of 3.  I also decided to place an additional point of distal fixation on the left side.  This was a left S1 pedicle screw that went through the S1 superior endplate and into the L5 vertebral body, thus a long (65 mm) screw was used.  At right L4, even though the screw was not noted to be loose, I elected to replace this with a dual headed screw, again to facilitate four gavino construct.  3D check scan showed good placement of all screws.    I explored the right S1 nerve root.  This was draped over the S1 body, were asked the L5 vertebral body was translated significantly anteriorly.  Thus, the nerve root was getting stretched as it was draped over the posterior aspect of the S1 body.  I had to dissect and take down a lot of scar tissue in order to mobilize the S1 nerve root sufficiently to allow retraction medially.  This included revision foraminotomy right L5-S1.  I then used an osteotome to resect the posterior superior corner of the right S1 vertebral body; this allowed the S1 nerve root to relax a little bit, with less stretch.  Direct stimulation of the S1 nerve root showed good signal transmission that was picked up on EMG.    We elected to use new rods, as there may have been some fatiguing of the old rods, and they are probably more likely to fracture at this point.  We cut new rods to appropriate lengths, and manually contoured using Compound Timeer.  We applied the long rods first spanning L2 to pelvis; setscrews applied starting from the bottom.  We then applied the short gavino from L4-S2 AI on the right.  Stitched AP and lateral O arm images showed acceptable coronal and sagittal alignment.  Of note, her sagittal alignment is still abnormal similar to preoperative.  We did not intend or attempt  to make significant changes to her sagittal alignment at this surgery, and instead prioritize decompressing the nerve root, stabilizing the instrumentation, and obtaining solid arthrodesis.  Thus, we accepted the current alignment.  Construct final tightened.  DuraSeal applied over the dural repair site    Posterior spinal fusion performed L2-S1.  This included pseudoarthrosis repair L4-5, and fusion augmentation at other levels.  Posterior elements (lamina, spinous processes and facet joints) decorticated using saeed.  I also decorticated the L4 and L5 transverse processes as well as the sacral ala using the same.  Magnifuse allograft applied on bilateral posterior lateral beds for intertransverse fusion.  Local autograft and crushed cancellous allograft applied over the decorticated fusion bed L2-S1.    Vancomycin powder 1 g applied deep.  Superficial medium Hemovac drain applied over the fascia.  Deep fascia closed with #1 Vicryl pop offs; deep subcutaneous tissue with running 0 Vicryl; subcutaneous tissue with 2-0 Vicryl; skin with 3-0 Monocryl.  Skin glue and sterile dressings applied.  Injected anesthetic.  Turned supine, taken off general anesthetic, transferred to recovery in satisfactory condition.       Postop:  Admit to surgical floor IMC bed.  Lidocaine IV 1 mg/kg/h.  Antibiotics x 24 hours.  Mechanical DVT prophylaxis.  PT and OT consult.  Advance diet as tolerated.  No lifting more than 10 pounds, no excessive bending or twisting.  Hospitalist comanagement for medical issues.    EOS full spine AP-lat standing x-rays prior to discharge.  Anticipate discharge in 3-4 days.  RTC 6 weeks with EOS lumbar AP-lat x-rays.

## 2020-12-24 NOTE — CONSULTS
Care Management Initial Consult    General Information  Assessment completed with:  patient        Primary Care Provider verified and updated as needed:   yws  Readmission within the last 30 days:   Not addressed        Advance Care Planning:    Not addressed        Communication Assessment  Patient's communication style: spoken language (English or Bilingual)    Hearing Difficulty or Deaf: no   Wear Glasses or Blind: no    Cognitive  Cognitive/Neuro/Behavioral: WDL  Level of Consciousness: alert  Arousal Level: opens eyes spontaneously  Orientation: oriented x 4  Mood/Behavior: calm, cooperative  Best Language: 0 - No aphasia  Speech: clear, logical    Living Environment:   People in home: spouse     Current living Arrangements: house      Able to return to prior arrangements:  yes       Family/Social Support:  Care provided by:  self  Provides care for:  N/A                Description of Support System:   family        Current Resources:   Skilled Home Care Services:  No  Community Resources:  No  Equipment currently used at home: none(has multiple walkers)  Supplies currently used at home:  N/A    Employment/Financial:  Employment Status:     Not addressed     Financial Concerns:   Denies          Lifestyle & Psychosocial Needs:        Socioeconomic History     Marital status:      Spouse name: Not on file     Number of children: Not on file     Years of education: Not on file     Highest education level: Not on file     Tobacco Use     Smoking status: Never Smoker     Smokeless tobacco: Never Used   Substance and Sexual Activity     Alcohol use: Yes     Binge frequency: Patient refused     Comment: 1-2 per month     Drug use: No       Functional Status:  Prior to admission patient needed assistance: No             Mental Health Status: WNL          Chemical Dependency Status: Not addressed                Values/Beliefs:  Spiritual, Cultural Beliefs, Congregational Practices, Values that affect care:       No           Additional Information:    This RNCC spoke with patient to discuss discharge planning, patient doesn't appear to have any complex needs during admission or at discharge and is progressing with mobility. Plan is for patient to return to previous living situation and would benefit from further skilled home physical therapy. Patient aware that PT may not start until the week of 12/28/20. Patient has concerns about her not being able to urinate, states torres was just removed but has  Had issues in the past, will not leave until she knows she can void. Spouse will provide transport at time of discharge.     Teena SCHWARTZN RN CCM  RN Care Coordinator [address] [place] [id number]  [email or web address]   Office: [phone]   Pager: [phone]    To contact Weekend RNCC, dial [phone] and enter job [phone] at prompt. This pager can not be contacted by text page or outside line.

## 2020-12-24 NOTE — PLAN OF CARE
Rylan  post op spine  S- Pt stated pain improved after am meds.  Pt able to walk magana w PT, up in chair or dangle at bedside for meals.    Covington cath removed [id number]   voided x1 600cc.   Will check PVR next void  Jewit brace on when pt up out of bed.    Ate small brfst.  No BM  miralax given   enc walk in magana.   States leg spasms  robaxin given w good control    pt declined afternoon dose but wants it this kendra and during nite.    ICE packs freq  Sleepy  in aternoon after po pain med- napping  A- tolerating increased ambulation w fairly good pain control  R- enc walk  enc chair form meals    check PVR  enc IBD/cough

## 2020-12-24 NOTE — PROGRESS NOTES
"[facility], [place]   Internal Medicine Daily Note           Interval History/Events     Overnight events reviewed  Reports pain on the back, RN just gave Oxycodone  Passing gas  Tolerating diet  No cough, chest pain, shortness of breath         Review of Systems        4 point ROS including Respiratory, CV, GI and , other than that noted above is negative      Medications   I have reviewed current medications  in the \"current medication\" section of Epic.  Relevant changes include:     Physical Exam   General:       Vital signs:    Blood pressure 135/66, pulse 87, temperature 97.8  F (36.6  C), temperature source Axillary, resp. rate 16, height 1.575 m (5' 2\"), weight 77 kg (169 lb 12.1 oz), SpO2 96 %.  Estimated body mass index is 31.05 kg/m  as calculated from the following:    Height as of this encounter: 1.575 m (5' 2\").    Weight as of this encounter: 77 kg (169 lb 12.1 oz).      Intake/Output Summary (Last 24 hours) at 12/22/2020 1301  Last data filed at 12/22/2020 0626  Gross per 24 hour   Intake 3028.33 ml   Output 2000 ml   Net 1028.33 ml        Constitutional: Laying in bed in no distress  Eye: No icterus, no pallor  Mouth/ENT: Normal oral mucosa  Cardiovascular: S1, S2 normal   Respiratory: B/l CTA  GI: Soft, NT, BS+  : Covington's catheter  Neurology:   Psych:   MSK:   Integumentary:   Heme/Lymph/Imm:      Laboratory and Imaging Studies     I have reviewed  laboratory and imaging studies in the Epic. Pertinent findings are as below:    BMP  Recent Labs   Lab 12/22/20  0623 12/21/20  1722 12/21/20  1215    138  --    POTASSIUM 3.9 3.0* 3.9   CHLORIDE 107  --   --    SIXTO 7.7*  --   --    CO2 26  --   --    BUN 9  --   --    CR 0.56  --   --    * 112*  --      CBC  Recent Labs   Lab 12/23/20  0543   HGB 9.8*     INRNo lab results found in last 7 days.  LFTsNo lab results found in last 7 days.   PANCNo lab results found in last 7 days.        Impression/Plan    "     60 year old female admitted on 12/21/2020. She has  aknown h/o mild persistent asthma, hypothyroidism, Hypertension and back pain.  She underwent Revision laminectomy lumbar 5-sacral 1 with decompression of Right Sacral 1 nerve root; revision posterior spinal fusion (pseudarthrosis repair) lumbar 4-sacral 1. Internal Medicine consulted for post operative co management.  Individual problems and their management are outlined below        S/P  Revision laminectomy lumbar 5-sacral 1 with decompression of Right Sacral 1 nerve root; revision posterior spinal fusion (pseudarthrosis repair) lumbar 4-sacral 1, POD#0:  Management primarily per Ortho team.  Bed rest per orthopedics surgery  - Activity plan per Ortho  - Wound cares, Dressings, Surgical pain management, DVT Prophylaxis  per primary team.   - Monitor anemia, hemodynamics, Input/Output, Capnography (for 24 hours)  - Encourage Incentive spirometry  - Laxatives for constipation prophylaxis     # Anemia: Most likely due to blood loss, and post surgical inflammation.   Hg 9.8  gm/dl. Asymptomatic  - Transfuse if Hg < 7 gm/dl      # Elevated temp: Mildly elevated temp. No obvious signs of infection  Likely post operative.   - Continue to monitor     # Hypothyroidism:   Resume home dose of levothyroxine        # HTN:  Hold home dose of Lisinopril - hydrochlorothiazide 20-12.5 and amlodipine 5 mg  for now        # Mild Persistent Asthma:  Resume home dose of advair or equivalent  PRN albuterol  Allegra 180 mg every morning           Pt's care was discussed with bedside RN, patient and  during Care Team Rounds.               Gildardo Vera MD  Hospitalist ( Internal medicine)  Pager: [phone]

## 2020-12-24 NOTE — PLAN OF CARE
"      VS:   /66 (BP Location: Left arm)   Pulse 93   Temp 100.9  F (38.3  C) (Oral)   Resp 16   Ht 1.575 m (5' 2\")   Wt 77 kg (169 lb 12.1 oz)   SpO2 96%   BMI 31.05 kg/m       Output:   Pt put out 900 ml overnight through torres catheter. No BM since surgery. Is now passing gas. Nothing put out of hemovac.   Lungs CTA Bilaterally. Pt on 1L NC. Took albuterol twice overnight for complaint of shortness of breath.   Activity:   Assist of one.    Skin: Some bruising on hands and arms. Incision on spine.   Pain:   Pt received 5 oxy q3.   Neuro/CMS:   Baseline numbness in feet. No new numbness of tingling.    Dressing(s):   Aquacell CDI. Dressing for Hemovac has scant drainage.   Diet:   Regular   LDA:   PIVs saline locked right hand and forearm.   Plan:   X-rays 12/24.   Additional Info:   Pt able to make needs known.       "

## 2020-12-24 NOTE — PLAN OF CARE
"  VS:   /66 (BP Location: Left arm)   Pulse 93   Temp 100.9  F (38.3  C) (Oral)   Resp 16   Ht 1.575 m (5' 2\")   Wt 77 kg (169 lb 12.1 oz)   SpO2 96%   BMI 31.05 kg/m    Room air   Output:   Covington in place and patent, stays in until tomorrow. Pt is not passing gas yet, bowel sounds present. Encouraging fluids and ambulation. Pt has been walking in hallway with therapy, took senna and miralax.   Lungs Lung sounds clear and equal bilaterally   Activity:   Up with assist x1, using walker and gait belt   Skin: WDL ex incision   Pain:   Pt taking 5-10 mg of oxy for pain, scheduled gabapentin and tylenol   Neuro/CMS:   CMS intact, denies N/T, A&Ox4   Dressing(s):   Aquacel on back is CDI   Diet:   Regular diet, tolerating well   LDA:   PIV infusing maintenance @ 100/hr   Equipment:   Walker and call light in use, IV pole   Plan:   Continue to monitor   Additional Info:   Pt had dural tear but sat up this morning and is doing fine, no headache        "

## 2020-12-25 PROCEDURE — 250N000013 HC RX MED GY IP 250 OP 250 PS 637: Performed by: STUDENT IN AN ORGANIZED HEALTH CARE EDUCATION/TRAINING PROGRAM

## 2020-12-25 PROCEDURE — 120N000002 HC R&B MED SURG/OB UMMC

## 2020-12-25 PROCEDURE — 99232 SBSQ HOSP IP/OBS MODERATE 35: CPT | Performed by: INTERNAL MEDICINE

## 2020-12-25 PROCEDURE — 250N000013 HC RX MED GY IP 250 OP 250 PS 637: Performed by: CLINICAL NURSE SPECIALIST

## 2020-12-25 RX ORDER — BISACODYL 10 MG
10 SUPPOSITORY, RECTAL RECTAL DAILY PRN
Status: DISCONTINUED | OUTPATIENT
Start: 2020-12-25 | End: 2020-12-26 | Stop reason: HOSPADM

## 2020-12-25 RX ORDER — POLYETHYLENE GLYCOL 3350 17 G/17G
17 POWDER, FOR SOLUTION ORAL 2 TIMES DAILY PRN
Status: DISCONTINUED | OUTPATIENT
Start: 2020-12-25 | End: 2020-12-26 | Stop reason: HOSPADM

## 2020-12-25 RX ORDER — AMOXICILLIN 250 MG
2 CAPSULE ORAL 2 TIMES DAILY
Status: DISCONTINUED | OUTPATIENT
Start: 2020-12-25 | End: 2020-12-26 | Stop reason: HOSPADM

## 2020-12-25 RX ORDER — POLYETHYLENE GLYCOL 3350 17 G/17G
17 POWDER, FOR SOLUTION ORAL DAILY
Status: DISCONTINUED | OUTPATIENT
Start: 2020-12-25 | End: 2020-12-26 | Stop reason: HOSPADM

## 2020-12-25 RX ORDER — AMOXICILLIN 250 MG
2 CAPSULE ORAL 2 TIMES DAILY
Status: DISCONTINUED | OUTPATIENT
Start: 2020-12-25 | End: 2020-12-25

## 2020-12-25 RX ADMIN — FLUTICASONE FUROATE AND VILANTEROL TRIFENATATE 1 PUFF: 200; 25 POWDER RESPIRATORY (INHALATION) at 08:10

## 2020-12-25 RX ADMIN — POLYETHYLENE GLYCOL 3350 17 G: 17 POWDER, FOR SOLUTION ORAL at 20:23

## 2020-12-25 RX ADMIN — POLYETHYLENE GLYCOL 3350 17 G: 17 POWDER, FOR SOLUTION ORAL at 09:16

## 2020-12-25 RX ADMIN — ACETAMINOPHEN 650 MG: 325 TABLET, FILM COATED ORAL at 22:02

## 2020-12-25 RX ADMIN — OXYCODONE HYDROCHLORIDE 10 MG: 5 TABLET ORAL at 04:51

## 2020-12-25 RX ADMIN — GABAPENTIN 400 MG: 100 CAPSULE ORAL at 14:21

## 2020-12-25 RX ADMIN — ACETAMINOPHEN 650 MG: 325 TABLET, FILM COATED ORAL at 08:05

## 2020-12-25 RX ADMIN — GABAPENTIN 400 MG: 100 CAPSULE ORAL at 18:06

## 2020-12-25 RX ADMIN — DOCUSATE SODIUM AND SENNOSIDES 2 TABLET: 8.6; 5 TABLET ORAL at 20:23

## 2020-12-25 RX ADMIN — DOCUSATE SODIUM AND SENNOSIDES 2 TABLET: 8.6; 5 TABLET ORAL at 08:07

## 2020-12-25 RX ADMIN — PENICILLIN V POTASSIUM 500 MG: 500 TABLET, FILM COATED ORAL at 20:23

## 2020-12-25 RX ADMIN — OXYCODONE HYDROCHLORIDE 5 MG: 5 TABLET ORAL at 18:42

## 2020-12-25 RX ADMIN — ACETAMINOPHEN 325 MG: 325 TABLET, FILM COATED ORAL at 00:06

## 2020-12-25 RX ADMIN — GABAPENTIN 400 MG: 100 CAPSULE ORAL at 08:07

## 2020-12-25 RX ADMIN — PENICILLIN V POTASSIUM 500 MG: 500 TABLET, FILM COATED ORAL at 08:07

## 2020-12-25 RX ADMIN — METHOCARBAMOL 750 MG: 750 TABLET, FILM COATED ORAL at 11:28

## 2020-12-25 RX ADMIN — PENICILLIN V POTASSIUM 500 MG: 500 TABLET, FILM COATED ORAL at 11:28

## 2020-12-25 RX ADMIN — PENICILLIN V POTASSIUM 500 MG: 500 TABLET, FILM COATED ORAL at 15:22

## 2020-12-25 RX ADMIN — METHOCARBAMOL 750 MG: 750 TABLET, FILM COATED ORAL at 20:23

## 2020-12-25 RX ADMIN — MONTELUKAST 10 MG: 10 TABLET, FILM COATED ORAL at 22:02

## 2020-12-25 RX ADMIN — ACETAMINOPHEN 650 MG: 325 TABLET, FILM COATED ORAL at 11:28

## 2020-12-25 RX ADMIN — OXYCODONE HYDROCHLORIDE 5 MG: 5 TABLET ORAL at 00:05

## 2020-12-25 RX ADMIN — OXYCODONE HYDROCHLORIDE 5 MG: 5 TABLET ORAL at 08:05

## 2020-12-25 RX ADMIN — METHOCARBAMOL 750 MG: 750 TABLET, FILM COATED ORAL at 04:24

## 2020-12-25 RX ADMIN — GABAPENTIN 400 MG: 100 CAPSULE ORAL at 22:02

## 2020-12-25 RX ADMIN — ACETAMINOPHEN 650 MG: 325 TABLET, FILM COATED ORAL at 18:06

## 2020-12-25 RX ADMIN — OXYCODONE HYDROCHLORIDE 5 MG: 5 TABLET ORAL at 11:28

## 2020-12-25 RX ADMIN — LEVOTHYROXINE SODIUM 125 MCG: 125 TABLET ORAL at 08:07

## 2020-12-25 RX ADMIN — OXYCODONE HYDROCHLORIDE 5 MG: 5 TABLET ORAL at 22:41

## 2020-12-25 RX ADMIN — OXYCODONE HYDROCHLORIDE 5 MG: 5 TABLET ORAL at 02:08

## 2020-12-25 RX ADMIN — FEXOFENADINE HYDROCHLORIDE 180 MG: 180 TABLET, FILM COATED ORAL at 08:07

## 2020-12-25 RX ADMIN — GABAPENTIN 400 MG: 100 CAPSULE ORAL at 11:28

## 2020-12-25 RX ADMIN — ACETAMINOPHEN 650 MG: 325 TABLET, FILM COATED ORAL at 14:21

## 2020-12-25 NOTE — PLAN OF CARE
Patient A&O x4, lungs sound clear, Bowel sound active- passing gas and no BM yet- will offer suppository later, Denied CP, lightheadedness, dizziness, tingling and SOB. Numbness remains as baseline for patient.  drinking well and voiding spontaneously without difficulties, able to wiggle toes, dressing on back clean,dry and intact.  CMS intact, pain tolerable and taking Tylenol and oxycodone for pain,  incentive spirometer encouraged and done several times, up independently using walker. heels elevated off bed, demonstrates the ability to use call light appropriately, will continue to monitor patient.

## 2020-12-25 NOTE — PLAN OF CARE
"VS: /52 (BP Location: Left arm)   Pulse 85   Temp 98.4  F (36.9  C) (Oral)   Resp 16   Ht 1.575 m (5' 2\")   Wt 77 kg (169 lb 12.1 oz)   SpO2 98%   BMI 31.05 kg/m    A&O x4.   O2: Room air. Denies SOB.     Output: Voiding spontaneously in bathroom. PVR 14.     Last BM: No BM since surgery.     Activity: Up independently. Brace on when OOB.     Skin: Intact, except for incision.     Pain: Managed w/ 5mg of oxy q3 hrs. PRN robaxin x1.     CMS: Intact, baseline numbness in LE.     Dressing: CDI.     Diet: Regular.     LDA: IV in L FA saline locked. Hemovac removed  .   Plan: Possible discharge tomorrow following x-ray results. Continue to monitor.   Additional Info:         "

## 2020-12-25 NOTE — PROGRESS NOTES
"[facility], [place]   Internal Medicine Daily Note           Interval History/Events     Overnight events reviewed  Reports pain is controlled.   Passing gas, no BM yet  Tolerating diet  No cough, chest pain, shortness of breath         Review of Systems        4 point ROS including Respiratory, CV, GI and , other than that noted above is negative      Medications   I have reviewed current medications  in the \"current medication\" section of Epic.  Relevant changes include:     Physical Exam   General:       Vital signs:    Blood pressure 136/61, pulse 78, temperature 98.6  F (37  C), temperature source Oral, resp. rate 16, height 1.575 m (5' 2\"), weight 77 kg (169 lb 12.1 oz), SpO2 97 %.  Estimated body mass index is 31.05 kg/m  as calculated from the following:    Height as of this encounter: 1.575 m (5' 2\").    Weight as of this encounter: 77 kg (169 lb 12.1 oz).      Intake/Output Summary (Last 24 hours) at 12/22/2020 1301  Last data filed at 12/22/2020 0626  Gross per 24 hour   Intake 3028.33 ml   Output 2000 ml   Net 1028.33 ml        Constitutional: Laying in bed in no distress  Eye: No icterus, no pallor  Mouth/ENT: Normal oral mucosa  Cardiovascular: S1, S2 normal   Respiratory: B/l CTA  GI: Soft, NT, BS+  : Covington's catheter  Neurology:   Psych:   MSK:   Integumentary:   Heme/Lymph/Imm:      Laboratory and Imaging Studies     I have reviewed  laboratory and imaging studies in the Epic. Pertinent findings are as below:    BMP  Recent Labs   Lab 12/22/20  0623 12/21/20  1722 12/21/20  1215    138  --    POTASSIUM 3.9 3.0* 3.9   CHLORIDE 107  --   --    SIXTO 7.7*  --   --    CO2 26  --   --    BUN 9  --   --    CR 0.56  --   --    * 112*  --      CBC  Recent Labs   Lab 12/23/20  0543   HGB 9.8*     INRNo lab results found in last 7 days.  LFTsNo lab results found in last 7 days.   PANCNo lab results found in last 7 days.        Impression/Plan        60 year old " female admitted on 12/21/2020. She has  aknown h/o mild persistent asthma, hypothyroidism, Hypertension and back pain.  She underwent Revision laminectomy lumbar 5-sacral 1 with decompression of Right Sacral 1 nerve root; revision posterior spinal fusion (pseudarthrosis repair) lumbar 4-sacral 1. Internal Medicine consulted for post operative co management.  Individual problems and their management are outlined below        S/P  Revision laminectomy lumbar 5-sacral 1 with decompression of Right Sacral 1 nerve root; revision posterior spinal fusion (pseudarthrosis repair) lumbar 4-sacral 1, POD#0:  Management primarily per Ortho team.  Bed rest per orthopedics surgery  - Activity plan per Ortho  - Wound cares, Dressings, Surgical pain management, DVT Prophylaxis  per primary team.   - Monitor anemia, hemodynamics, Input/Output, Capnography (for 24 hours)  - Encourage Incentive spirometry  - Laxatives for constipation prophylaxis     # Anemia: Most likely due to blood loss, and post surgical inflammation.   Hg 9.8  gm/dl. Asymptomatic  - Transfuse if Hg < 7 gm/dl      # Elevated temp: Mildly elevated temp. No obvious signs of infection  Likely post operative.   - Continue to monitor     # Hypothyroidism:   Resume home dose of levothyroxine        # HTN:  Hold home dose of Lisinopril - hydrochlorothiazide 20-12.5 and amlodipine 5 mg  for now due to soft pressures  BP improving this AM, likely start in AM tomorrow        # Mild Persistent Asthma:  Resume home dose of advair or equivalent  PRN albuterol  Allegra 180 mg every morning           Pt's care was discussed with bedside RN, patient and  during Care Team Rounds.               Gildardo Vera MD  Hospitalist ( Internal medicine)  Pager: [phone]

## 2020-12-25 NOTE — PLAN OF CARE
VS:   VSS, RA.   Output:   Voids spontaneously without difficulty. NO BM yet. Passed gas.    Lungs Clear.    Activity:   Independent with walker, brace on when OOB.    Skin: Intact exp for Incision site.     Pain:   C/o incision site pain, managed with PRN oxy, robaxin, and scheduled tylenol.    Neuro/CMS:   Baseline Both feet numbness. A & o x 4.    Dressing(s):   C/D/I.    Diet:   R/t, good appetite.    LDA:   None.     Equipment:   Walker. Brace.   Plan:   Pt took the scheduled Miralax, Senokot due to constipation.  PRN Dulcolax planned to give today. If no BM after these treatments. Plan to have enema tomorrow.  Then, discharge home.    Additional Info:   Pt's  called for the updated information this shift. Also asks if the PT able to visit pt at pt's home after the discharge. Care Coordinator was paged about it.

## 2020-12-25 NOTE — PLAN OF CARE
Alert and oriented X 4. Able to make needs known. Call light in reach. Back pain managed with Oxycodone 5-10 mg Q 3 hours, Tylenol 650 mg  X 1 and Robaxin 750 mg  X 1 with moderate relief. Back dressing CDI. CMS/Neuros WNLs. Reports baseline numbness in feet. Up ad hernán, independent with walker. Stuart brace on when out of bed.  Continent of bladder. Voiding without difficulty.  Passing flatus, no BM yet. PIV patent, saline locked. Tolerating PO. Denies nausea, headache, dizziness, lightheadedness, chest pain or SOB. Appears to be sleeping for short intervals during night. Continue with plan of care.

## 2020-12-25 NOTE — PROGRESS NOTES
"Orthopaedic Surgery Progress Note:  12/25/2020     Subjective:   NAEO. Pain well controlled, patient out of bed several times yesterday without headache or other symptoms. Able to urinate several times yesterday, still waiting on having a bowel movement and nervous about going home until she has one. No new numbness/tingling/weakness in BLE.     Objective:   /61 (BP Location: Left arm)   Pulse 78   Temp 98.6  F (37  C) (Oral)   Resp 16   Ht 1.575 m (5' 2\")   Wt 77 kg (169 lb 12.1 oz)   SpO2 97%   BMI 31.05 kg/m    No intake/output data recorded.  Gen: NAD. Resting comfortably in bed  Resp: Breathing comfortably on RA    Musculoskeletal: dressing c/d/I.    Lower extremities:  Motor Strength Right Left   Hip flexion: L1, L2, L3 5/5 5/5   Hip adduction: L2, L3 5/5 5/5   Knee flexion: S1 4+/5 4+/5   Knee extension: L3, L4 5/5 5/5   Ankle dosiflexion: L4, L5 5/5 5/5   EHL: L5 5/5 3+/5   Ankle plantarflexion: S1 5/5 5/5     Sensation from L2-S2 is preserved, endorses dullness in S1 distribution on the right consistent with baseline.    Labs:  Lab Results   Component Value Date    WBC 8.5 12/16/2020    HGB 9.8 (L) 12/23/2020     12/16/2020    INR 1.11 10/01/2019        Assessment & Plan:   Aleena Ontiveros is a 60 year old female with PMH including hypothyroidism, HTN, asthma and right S1 radiculopathy with pseudarthrosis now s/p revision L2-pelvis and right S1 decompression complicated by dural tear repaired primarily on 12/21/20 with Dr. Vasquez.     Goals for today:  -Bowel movement - bowel regimen increased and suppository added  -Upright XR spine today 12/25    PLAN:  Ortho Primary  Medicine Comanagement  Activity: Up with assist. No excessive bending or twisting. No lifting >10 lbs x 6 weeks. No Ashish lift for transfers.   Weight bearing status: WBAT.  Pain management: Multimodal pain mgmt. Transition from IV to PO as tolerated. No NSAIDs.   Antibiotics: Ancef x24 hrs periop - completed  Diet: Begin " with clear fluids and progress diet as tolerated.   DVT prophylaxis: SCDs only. No chemical DVT ppx needed.  Imaging: XR Full Spine PTDC - ordered.  Labs: Trend Hgb POD1,2,3  Bracing/Splinting: [place] brace when OOB  Dressings: Keep Aquacel c/d/i x 7 days.  Drains: DCed 12/24  Covington catheter: Removed   Physical Therapy/Occupational Therapy: Eval and treat.  Consults: Hospitalist, urology  Follow-up: Clinic with Dr. Vasquez in 6 weeks with repeat x-rays (1/28/21)  Disposition: Pending bowel movement, anticipate discharge today 12/25 vs tomorrow 12/26    Orthopaedics surgery staff for this patient is Dr. Vasquez.    ------------------------------------------------------------------------------------------    [x] Drains removed.  [   ] Post xrays done.  [x] Discharge orders done.  [x] Discharge summary started.    Homero Tavarez MD  Orthopedic Surgery PGY-4  Pager: [phone]      FOLLOWUP:    Future Appointments   Date Time Provider Department Center   12/22/2020 10:15 AM Janna Krishnan, PT [facility] [place]   12/22/2020  2:30 PM Janna Krishnan, PT [facility] [place]   12/23/2020  8:45 AM Marisa Cueva OT [facility] [place]   1/28/2021  1:40 PM Evangelist Vasquez MD [facility]   3/18/2021  2:00 PM Evangelist Vasquez MD [facility]

## 2020-12-25 NOTE — DISCHARGE SUMMARY
ORTHOPAEDIC DISCHARGE SUMMARY     Date of Admission: 12/21/2020  Date of Discharge: 12/26/2020  Disposition: Home  Staff Physician: Evangelist Vasquez*  Primary Care Provider: Arabella Conner    DISCHARGE DIAGNOSIS:  Right sacral radiculopathy [M54.18]  S/P spinal fusion [Z98.1]  Spondylolisthesis of lumbosacral region [M43.17]    PROCEDURES: Procedure(s):  1.  Revision posterior spinal fusion L2 to sacrum, using local autograft, Magnifuse allograft and crushed cancellous allograft.  2.  Revision bilateral segmental posterior instrumentation L2 to pelvis, with revision of screws using old tracks (right L4, left upper S2 AI, left lower S2 AI, right lower S2 AI), revision of right upper S2 AI screw using new track, and placement of new screw (left S1 pedicle screw into the L5 vertebral body).  3.  Revision decompression of right S1 nerve root by performing right sacral dome osteoplasty.  4.  Exploration of fusion mass L2-S1.  5.  Intraoperative neuro monitoring using transcranial MEP's, SSEPs, and bilateral lower extremity triggered and free running EMGs.  6.  Computer navigation using O-arm and Heal[employer].    Implants  1.  Medtronic Solera and ballast screw systems: L2 = old screws retained (dual headed on left); L3 = old screws retained; L4 =7.5 x 55 mm dual headed screw right, old left screw retained; S1 = none on right, 7.5 x 65 mm left; upper S2 AI =9.5 x 90 mm right, 9.5 x 80 mm left; lower S2 AI =10.5 x 100 mm bilateral.  5.5 mm cobalt chrome rods x4.  2.  Magnifuse allograft 10 cm strips x2.  3.  Crushed cancellous allograft 60 mL.  4.  Tranexamic acid high-dose (30/10).  5.  Vancomycin powder 1 g.  6.  DuraSeal 5 mL.    BRIEF HISTORY:  Aleena Ontiveros is a 60 year old female with multiple previous spinal surgeries, culminating in T4 to pelvis fusion. Her most recent surgery was a year ago, with L2 to pelvis fusion for high-grade lumbosacral spondylolisthesis and flatback deformity. She is currently  complaining of significant right greater than left radicular leg pain with imaging revealing likely right S1 nerve root impingement over the posterior aspect of the S1 body secondary to the high-grade slippage at L5-S1 and pseudoarthrosis L4-5, with vacuum disc. She attempted nonoperative treatment but continues to have significant disabling symptoms. She was indicated for the above procedure.    HOSPITAL COURSE:    Surgery was complicated by dural tear which was repaired primarily. She was on bedrest until POD2 and then slowly advanced without difficulty. Aleena Ontiveros has done well post-operatively. Medicine was consulted post operatively to aid in management of medical comorbidities. She had drain and torres removed on Thursday 12/24/20 without difficulty. The patient received routine nursing cares and is medically stable. Vital signs are stable. The patient is tolerating a regular diet without GI distress/nausea or vomiting. Voiding spontaneously. All PT/OT goals have been met for safe mobility. Pain is now controlled on oral medications which will be available on discharge. Stool softeners have been used while taking pain medications to help prevent constipation. Aleena Ontiveros is deemed medically safe to discharge.     Antibiotics:  Ancef given periop and 24 hours postop.   DVT prophylaxis:  SCDs  PT Progress:  Has met PT/OT goals for safe mobility.    Pain Meds:  Weaned off all IV pain meds by discharge.  Inpatient Events: Intraop dural tear repaired primarily.    Discharge orders and instructions as below.    FOLLOWUP:    Follow up with Dr. Vasquez at 6 weeks postoperatively on 1/28/21.  Future Appointments   Date Time Provider Department Center   12/25/2020  6:30 AM UR PT OVERFLOW [facility]   12/26/2020 11:00 AM Janna Krishnan, PT [facility]   12/26/2020  3:30 PM Janna Krishnan, PT [facility]   1/28/2021  1:40 PM Evangelist Vasquez MD [facility]   3/18/2021  2:00 PM Evangelist Vasquez  MD [facility]       CALL OUR CLINIC ([phone]) during regular office hours for questions or if you haven't heard regarding these appointments within 7 days of discharge.    You may also be seen at our Orthopaedic Walk-In clinic that runs 7 days per week from 7a-7p at [address] [place] [address]    PLANNED DISCHARGE ORDERS:           Current Discharge Medication List      START taking these medications    Details   hydrOXYzine (ATARAX) 25 MG tablet Take 1 tablet (25 mg) by mouth every 6 hours as needed for itching  Qty: 30 tablet, Refills: 0    Associated Diagnoses: Right sacral radiculopathy      methocarbamol (ROBAXIN) 750 MG tablet Take 1 tablet (750 mg) by mouth 4 times daily as needed for muscle spasms  Qty: 40 tablet, Refills: 0    Associated Diagnoses: Right sacral radiculopathy      polyethylene glycol (MIRALAX) 17 GM/Dose powder Take 17 g by mouth daily  Qty: 510 g, Refills: 0    Associated Diagnoses: Right sacral radiculopathy      senna-docusate (SENOKOT-S/PERICOLACE) 8.6-50 MG tablet Take 1 tablet by mouth 2 times daily  Qty: 40 tablet, Refills: 0    Associated Diagnoses: Right sacral radiculopathy         CONTINUE these medications which have CHANGED    Details   acetaminophen (TYLENOL) 500 MG tablet Take 1 tablet (500 mg) by mouth 5 times daily  Qty: 100 tablet, Refills: 0    Associated Diagnoses: Right sacral radiculopathy         CONTINUE these medications which have NOT CHANGED    Details   albuterol (PROAIR HFA/PROVENTIL HFA/VENTOLIN HFA) 108 (90 Base) MCG/ACT inhaler Inhale 2 puffs into the lungs every 4 hours as needed for shortness of breath / dyspnea or wheezing    Comments: Pharmacy may dispense brand covered by insurance (Proair, or proventil or ventolin or generic albuterol inhaler)  Associated Diagnoses: Intermittent asthma without complication, unspecified asthma severity      amLODIPine (NORVASC) 5 MG tablet Take 5 mg by mouth At Bedtime       estradiol (ESTRACE) 0.1 MG/GM cream Place 2  g vaginally At Bedtime       Fexofenadine HCl (ALLEGRA PO) Take 180 mg by mouth every morning       fluticasone (FLONASE) 50 MCG/ACT spray Spray 1 spray into both nostrils 2 times daily       fluticasone-salmeterol (ADVAIR) 500-50 MCG/DOSE inhaler Inhale 1 puff into the lungs every 12 hours    Associated Diagnoses: Mild intermittent asthma without complication      !! gabapentin (NEURONTIN) 400 MG capsule Take 1 cap=400mg QID & continue weaning down  Qty: 180 capsule, Refills: 0    Associated Diagnoses: Postoperative pain after spinal surgery      levothyroxine (SYNTHROID) 125 MCG tablet Take 1 tablet (125 mcg) by mouth every morning    Comments: Give 1/2 hour before breakfast.  Associated Diagnoses: Hypothyroidism, unspecified type      lisinopril-hydrochlorothiazide (ZESTORETIC) 20-12.5 MG tablet Take 2 tablets by mouth daily      Montelukast Sodium (SINGULAIR PO) Take 10 mg by mouth At Bedtime       multivitamin w/minerals (THERA-VIT-M) tablet Take 2 tablets by mouth 2 times daily      Omega-3 Fatty Acids (FISH OIL) 500 MG CAPS Take 2 capsules by mouth daily      traMADol (ULTRAM) 50 MG tablet Take 100 mg by mouth every 6 hours as needed for severe pain      !! gabapentin (NEURONTIN) 100 MG capsule Take 1-2 caps Four times per day as needed for pain  Qty: 120 capsule, Refills: 3    Associated Diagnoses: Postoperative pain after spinal surgery       !! - Potential duplicate medications found. Please discuss with provider.      STOP taking these medications       ibuprofen (ADVIL/MOTRIN) 200 MG tablet Comments:   Reason for Stopping:         ibuprofen (ADVIL/MOTRIN) 600 MG tablet Comments:   Reason for Stopping:                 Discharge Procedure Orders   Home Care PT Referral for Hospital Discharge   Referral Priority: Routine Referral Type: Home Health Therapies & Aides   Number of Visits Requested: 1     MD face to face encounter   Order Comments: Documentation of Face to Face and Certification for Home Health  Services    I certify that patient: Aleena Ontiveros is under my care and that I, or a nurse practitioner or physician's assistant working with me, had a face-to-face encounter that meets the physician face-to-face encounter requirements with this patient on: 12/24/2020.    This encounter with the patient was in whole, or in part, for the following medical condition, which is the primary reason for home health care: post  L4 through L5 Posterior Spinal Fusion  I certify that, based on my findings, the following services are medically necessary home health services: Physical Therapy    My clinical findings support the need for the above services because:  Physical Therapy to progress safety and independence with functional mobility.     Further, I certify that my clinical findings support that this patient is homebound (i.e. absences from home require considerable and taxing effort and are for medical reasons or [facility] services or infrequently or of short duration when for other reasons) because: patient is homebound, ability to exit home safely supervision of another for safe transfer. Patient unable to drive.    Based on the above findings. I certify that this patient is confined to the home and needs intermittent skilled nursing care, physical therapy and/or speech therapy.  The patient is under my care, and I have initiated the establishment of the plan of care.  This patient will be followed by a physician who will periodically review the plan of care.  Physician/Provider to provide follow up care: Arabella Conner    Attending hospital physician (the Medicare certified [place] provider): Evangelist Vasquez*  Physician Signature: See electronic signature associated with these discharge orders.  Date: 12/24/2020     Reason for your hospital stay   Order Comments: Spine surgery     [facility]ult [facility] Follow-up and recommended labs and tests   Order Comments: 1/28/21 with Dr. Vasquez    Appointments on [place]  and/or [facility] (with [facility] or [facility] provider or service). Call [phone] if you haven't heard regarding these appointments within 7 days of discharge.     Activity   Order Comments: Your activity upon discharge: no heavy lifting, pushing, pulling until clinic  Weight bearing as tolerated     Order Specific Question Answer Comments   Is discharge order? Yes      Wound care and dressings   Order Comments: Instructions to care for your wound at home: ice to area for comfort, keep wound clean and dry and may get incision wet in shower but do not soak or scrub. Remove aquacel at postoperative day 7.     Discharge Instructions   Order Comments: You are being discharged from hospital cares. Please ice and elevate your affected area as much as possible to reduce swelling. Swelling is one of the most common causes of pain after surgery.      Wound Care    You may leave the dry dressing in place over your incision for 2-3 days after discharge. After this, the dressing can be removed and the wound can be left open to air.    If you had a lumbar procedure and your belt line contacts the incision, then place a dry dressing over the incision daily in order to keep it from being rubbed by your pants. Similarly, if you are wearing a cervical collar and your incision is on your neck, you can keep a dry dressing over the incision to keep it from being rubbed. However you should change the dressing every 1-2 days.    If the dressing becomes wet for any reason, such as with sweat or water, it should be changed.    5 days after discharge, you may shower with the dressing removed and allow water to gently fall over the wound. After the shower, simply pat the wound dry. Do not apply hand creams or lotions to your wound.    You wound should remain free of discharge or significant surrounding redness. If you notice any drainage or discharge, or it it develops significant  Redness, please contact us the clinic immediately at  [phone]. Ask for Dr. Vasquez's nurse or nurse triage. After hours or weekends, you may go to the Orthopedic  walk in clinic on [address] [place] [address] from 7 am to 7pm daily.       Activity    We encourage you to be up and out of bed regularly. Please try to walk 30 minutes per day. If you have been prescribed a brace, please wear the brace when up and out of bed. Avoid lifting over 10 pounds, avoid lifting anything overhead, and avoid repetitive bending or twisting.  This means no sporting activities. (such as running, tennis, bowling, golf, bicycling, etc.) until you are seen in clinic      Please take the pain medication as instructed. You may wean off your pain medications as tolerated.    Please call or seek medical attention if your pain continues to worsen, if you have new onset of numbness or tingling, or if you have extreme swelling. Please see a medical provider for new onset of chest pain or shortness of breath. This may be a sign of a heart attack or a blood clot in your lungs.     After discharge, most patients will return to clinic in 1-2 weeks for a wound check. Please call the orthopedic clinic in the interim for any questions or concerns about your post-operative course.     You may reach the clinic by dialing [phone].  You may also be seen at our Orthopaedic Walk-In clinic that runs 7 days per week from 7a-7p at [address] [place] [address]. After hours, FOR URGENT PROBLEMS ONLY, you may reach the resident on call by dialing [phone].     Diet   Order Comments: Follow this diet upon discharge: Regular diet     Order Specific Question Answer Comments   Is discharge order? Yes      Assign Questionnaire Series to Patient         Lam Bustillo MD  Orthopedic Surgery, PGY4

## 2020-12-26 ENCOUNTER — APPOINTMENT (OUTPATIENT)
Dept: PHYSICAL THERAPY | Facility: CLINIC | Age: 60
DRG: 029 | End: 2020-12-26
Attending: ORTHOPAEDIC SURGERY
Payer: COMMERCIAL

## 2020-12-26 VITALS
HEART RATE: 75 BPM | TEMPERATURE: 97.4 F | HEIGHT: 62 IN | RESPIRATION RATE: 16 BRPM | WEIGHT: 169.75 LBS | DIASTOLIC BLOOD PRESSURE: 75 MMHG | BODY MASS INDEX: 31.24 KG/M2 | SYSTOLIC BLOOD PRESSURE: 135 MMHG | OXYGEN SATURATION: 100 %

## 2020-12-26 LAB
ERYTHROCYTE [DISTWIDTH] IN BLOOD BY AUTOMATED COUNT: 12.4 % (ref 10–15)
HCT VFR BLD AUTO: 28 % (ref 35–47)
HGB BLD-MCNC: 9.5 G/DL (ref 11.7–15.7)
MCH RBC QN AUTO: 29.3 PG (ref 26.5–33)
MCHC RBC AUTO-ENTMCNC: 33.9 G/DL (ref 31.5–36.5)
MCV RBC AUTO: 86 FL (ref 78–100)
PLATELET # BLD AUTO: 251 10E9/L (ref 150–450)
RBC # BLD AUTO: 3.24 10E12/L (ref 3.8–5.2)
WBC # BLD AUTO: 6.1 10E9/L (ref 4–11)

## 2020-12-26 PROCEDURE — 250N000013 HC RX MED GY IP 250 OP 250 PS 637: Performed by: STUDENT IN AN ORGANIZED HEALTH CARE EDUCATION/TRAINING PROGRAM

## 2020-12-26 PROCEDURE — 99232 SBSQ HOSP IP/OBS MODERATE 35: CPT | Performed by: INTERNAL MEDICINE

## 2020-12-26 PROCEDURE — 97116 GAIT TRAINING THERAPY: CPT | Mod: GP | Performed by: CLINIC/CENTER

## 2020-12-26 PROCEDURE — 97110 THERAPEUTIC EXERCISES: CPT | Mod: GP | Performed by: CLINIC/CENTER

## 2020-12-26 PROCEDURE — 250N000013 HC RX MED GY IP 250 OP 250 PS 637: Performed by: CLINICAL NURSE SPECIALIST

## 2020-12-26 PROCEDURE — 85027 COMPLETE CBC AUTOMATED: CPT | Performed by: STUDENT IN AN ORGANIZED HEALTH CARE EDUCATION/TRAINING PROGRAM

## 2020-12-26 PROCEDURE — 250N000013 HC RX MED GY IP 250 OP 250 PS 637: Performed by: INTERNAL MEDICINE

## 2020-12-26 PROCEDURE — 36415 COLL VENOUS BLD VENIPUNCTURE: CPT | Performed by: STUDENT IN AN ORGANIZED HEALTH CARE EDUCATION/TRAINING PROGRAM

## 2020-12-26 RX ORDER — OXYCODONE HYDROCHLORIDE 5 MG/1
5-10 TABLET ORAL EVERY 4 HOURS PRN
Qty: 50 TABLET | Refills: 0 | Status: SHIPPED | OUTPATIENT
Start: 2020-12-26 | End: 2021-01-07

## 2020-12-26 RX ORDER — AMOXICILLIN 250 MG
2 CAPSULE ORAL 2 TIMES DAILY
Qty: 40 TABLET | Refills: 0 | Status: SHIPPED | OUTPATIENT
Start: 2020-12-26 | End: 2021-01-07

## 2020-12-26 RX ORDER — POLYETHYLENE GLYCOL 3350 17 G/17G
17 POWDER, FOR SOLUTION ORAL 2 TIMES DAILY PRN
Qty: 7 PACKET | Refills: 0 | Status: SHIPPED | OUTPATIENT
Start: 2020-12-26 | End: 2021-04-01

## 2020-12-26 RX ADMIN — OXYCODONE HYDROCHLORIDE 5 MG: 5 TABLET ORAL at 09:13

## 2020-12-26 RX ADMIN — POLYETHYLENE GLYCOL 3350 17 G: 17 POWDER, FOR SOLUTION ORAL at 08:05

## 2020-12-26 RX ADMIN — FLUTICASONE FUROATE AND VILANTEROL TRIFENATATE 1 PUFF: 200; 25 POWDER RESPIRATORY (INHALATION) at 09:11

## 2020-12-26 RX ADMIN — LEVOTHYROXINE SODIUM 125 MCG: 125 TABLET ORAL at 08:04

## 2020-12-26 RX ADMIN — DOCUSATE SODIUM AND SENNOSIDES 2 TABLET: 8.6; 5 TABLET ORAL at 08:05

## 2020-12-26 RX ADMIN — ACETAMINOPHEN 650 MG: 325 TABLET, FILM COATED ORAL at 08:04

## 2020-12-26 RX ADMIN — ACETAMINOPHEN 650 MG: 325 TABLET, FILM COATED ORAL at 12:27

## 2020-12-26 RX ADMIN — FEXOFENADINE HYDROCHLORIDE 180 MG: 180 TABLET, FILM COATED ORAL at 08:04

## 2020-12-26 RX ADMIN — OXYCODONE HYDROCHLORIDE 5 MG: 5 TABLET ORAL at 05:37

## 2020-12-26 RX ADMIN — GABAPENTIN 400 MG: 100 CAPSULE ORAL at 08:04

## 2020-12-26 RX ADMIN — OXYCODONE HYDROCHLORIDE 5 MG: 5 TABLET ORAL at 12:28

## 2020-12-26 RX ADMIN — OXYCODONE HYDROCHLORIDE 5 MG: 5 TABLET ORAL at 02:01

## 2020-12-26 RX ADMIN — GABAPENTIN 400 MG: 100 CAPSULE ORAL at 12:27

## 2020-12-26 RX ADMIN — METHOCARBAMOL 750 MG: 750 TABLET, FILM COATED ORAL at 06:58

## 2020-12-26 RX ADMIN — PENICILLIN V POTASSIUM 500 MG: 500 TABLET, FILM COATED ORAL at 08:04

## 2020-12-26 RX ADMIN — PENICILLIN V POTASSIUM 500 MG: 500 TABLET, FILM COATED ORAL at 12:28

## 2020-12-26 RX ADMIN — OXYCODONE HYDROCHLORIDE 5 MG: 5 TABLET ORAL at 05:47

## 2020-12-26 NOTE — PLAN OF CARE
Physical Therapy Discharge Summary    Reason for therapy discharge:    Discharged to home with home therapy.    Progress towards therapy goal(s). See goals on Care Plan in Psychiatric electronic health record for goal details.  Goals met    Therapy recommendation(s):    Continued therapy is recommended.  Rationale/Recommendations:  Pt would benefit from  PT for safety evaluation and progression of safety and independence.

## 2020-12-26 NOTE — PLAN OF CARE
"      VS:   /75 (BP Location: Left arm)   Pulse 75   Temp 97.4  F (36.3  C) (Oral)   Resp 16   Ht 1.575 m (5' 2\")   Wt 77 kg (169 lb 12.1 oz)   SpO2 100%   BMI 31.05 kg/m    LS clear   Output:   Pt voiding good amounts without difficulty, last BM 12/25, +gas   Activity:   Up in room independently, pt Dons and Doffs jewitt brace when OOB independently. Steady on feet, ambulates with walker.    Skin: Intact with exception of posterior incision.    Pain:   Pain in Mid/lower back, pt describes pain as constant- managed with 5mg Oxycodone and scheduled medications, ice packs applied to back.    Neuro/CMS:   A&Ox4, baseline numbness in R hip that radiates down R leg into foot as well as in L leg and foot- pt reports slight improvement in numbness.    Dressing(s):   Posterior dressing CDI.    Diet:   Regular diet, pt tolerating well denies N/V.   LDA:   None   Equipment:   Jewitt Brace, Walker   Plan:   Plan is for pt to discharge home this afternoon, ride arranged for 1500.    Additional Info:   All Discharge paperwork and medications reviewed with patient an all questions answered. Pt D/[facility] unit around 1530.       "

## 2020-12-26 NOTE — PROGRESS NOTES
Patient reported that she had  a bowel movement this evening. No need for suppository as per patient.

## 2020-12-26 NOTE — PROGRESS NOTES
"[facility], [place]   Internal Medicine Daily Note           Interval History/Events     Overnight events reviewed  Reports doing well  No nausea, vomiting, chest pain, shortness of breath  Had BM   No issues with urination       Review of Systems        4 point ROS including Respiratory, CV, GI and , other than that noted above is negative      Medications   I have reviewed current medications  in the \"current medication\" section of Epic.  Relevant changes include:     Physical Exam   General:       Vital signs:    Blood pressure 135/75, pulse 75, temperature 97.4  F (36.3  C), temperature source Oral, resp. rate 16, height 1.575 m (5' 2\"), weight 77 kg (169 lb 12.1 oz), SpO2 100 %.  Estimated body mass index is 31.05 kg/m  as calculated from the following:    Height as of this encounter: 1.575 m (5' 2\").    Weight as of this encounter: 77 kg (169 lb 12.1 oz).      Intake/Output Summary (Last 24 hours) at 12/22/2020 1301  Last data filed at 12/22/2020 0626  Gross per 24 hour   Intake 3028.33 ml   Output 2000 ml   Net 1028.33 ml        Constitutional: Laying in bed in no distress  Eye: No icterus, no pallor  Mouth/ENT: Normal oral mucosa  Cardiovascular: S1, S2 normal   Respiratory: B/l CTA  GI: Soft, NT, BS+  : Covington's catheter  Neurology:   Psych:   MSK:   Integumentary:   Heme/Lymph/Imm:      Laboratory and Imaging Studies     I have reviewed  laboratory and imaging studies in the Epic. Pertinent findings are as below:    BMP  Recent Labs   Lab 12/22/20  0623 12/21/20  1722 12/21/20  1215    138  --    POTASSIUM 3.9 3.0* 3.9   CHLORIDE 107  --   --    SIXTO 7.7*  --   --    CO2 26  --   --    BUN 9  --   --    CR 0.56  --   --    * 112*  --      CBC  Recent Labs   Lab 12/26/20  0725   WBC 6.1   RBC 3.24*   HGB 9.5*   HCT 28.0*   MCV 86   MCH 29.3   MCHC 33.9   RDW 12.4        INRNo lab results found in last 7 days.  LFTsNo lab results found in last 7 days. "   PANCNo lab results found in last 7 days.        Impression/Plan        60 year old female admitted on 12/21/2020. She has  aknown h/o mild persistent asthma, hypothyroidism, Hypertension and back pain.  She underwent Revision laminectomy lumbar 5-sacral 1 with decompression of Right Sacral 1 nerve root; revision posterior spinal fusion (pseudarthrosis repair) lumbar 4-sacral 1. Internal Medicine consulted for post operative co management.  Individual problems and their management are outlined below        S/P  Revision laminectomy lumbar 5-sacral 1 with decompression of Right Sacral 1 nerve root; revision posterior spinal fusion (pseudarthrosis repair) lumbar 4-sacral 1, POD#0:  Management primarily per Ortho team.  Bed rest per orthopedics surgery  - Activity plan per Ortho  - Wound cares, Dressings, Surgical pain management, DVT Prophylaxis  per primary team.   - Monitor anemia, hemodynamics, Input/Output, Capnography (for 24 hours)  - Encourage Incentive spirometry  - Laxatives for constipation prophylaxis     # Anemia: Most likely due to blood loss, and post surgical inflammation.   Hg 9.5  gm/dl. Asymptomatic  - Transfuse if Hg < 7 gm/dl      # Elevated temp: Mildly elevated temp. No obvious signs of infection  Likely post operative. No elevated temp in last 48 hours  - Continue to monitor     # Hypothyroidism:   Resume home dose of levothyroxine        # HTN:  Hold home dose of Lisinopril - hydrochlorothiazide 20-12.5 and amlodipine 5 mg  for now due to soft pressures  BP improving this AM, restart from tomorrow        # Mild Persistent Asthma:  Resume home dose of advair or equivalent  PRN albuterol  Allegra 180 mg every morning           Pt's care was discussed with bedside RN, patient and  during Care Team Rounds.               Gildardo Vera MD  Hospitalist ( Internal medicine)  Pager: [phone]

## 2020-12-26 NOTE — PROGRESS NOTES
"Orthopaedic Surgery Progress Note   December 26, 2020     Subjective:    No acute events overnight. Pain well controlled after 10 of oxy. Good pain day yesterday but fell behind a bit last night. C/o R lumbar pain. Tolerating diet. Voiding spontaneously. Denies fever or chills, CP, SOB. Does have baseline, consistent numbness in R buttock, lateral thigh, lateral foot. No new numbness, tingling. Has questions about brace and dispo planning (home PT.) Doing \"laps\" yesterday in the hallways with walker. BM last evening.    Objective:   /67 (BP Location: Left arm)   Pulse 73   Temp 98.1  F (36.7  C) (Oral)   Resp 16   Ht 1.575 m (5' 2\")   Wt 77 kg (169 lb 12.1 oz)   SpO2 98%   BMI 31.05 kg/m    No intake/output data recorded.   AVSS  Gen: NAD. Resting comfortably in bed.  Resp: Breathing comfortably on NC.  No Drain  Musculoskeletal: dressing c/d/I. Feet wwp.  Lower extremities:  Motor Strength Right Left   Hip flexion: L1, L2, L3 5/5 5/5   Hip adduction: L2, L3 5/5 5/5   Knee flexion: S1 5/5 5/5   Knee extension: L3, L4 5/5 5/5   Ankle dosiflexion: L4, L5 5/5 5/5   EHL: L5 5/5 4/5   Ankle plantarflexion: S1 5/5 5/5     Sensation from L3-S1 is preserved.  Baseline numbness in S1 distribution (R) present.    Lab Results   Component Value Date    WBC 8.5 12/16/2020    HGB 9.8 (L) 12/23/2020     12/16/2020    INR 1.11 10/01/2019       Imaging: XR spine complete, 12/24  Hardware in good position. Carpio gavino in lumbar region removed. Additional screw to L5 endplate. Improvement in lumbar lordosis. Some flattening of thoracic kyphosis, c/w older imaging.       Assessment & Plan:   Aleena Ontiveros is a 60 year old female with PMH including hypothyroidism, HTN, asthma and right S1 radiculopathy with pseudarthrosis now s/p revision L2-pelvis and right S1 decompression complicated by dural tear repaired primarily on 12/21/20 with Dr. Vasquez.     Ortho Primary, medicine comanagement  Activity: Up with assist " until independent. No excessive bending or twisting. No lifting >10 lbs x 6 weeks. No Ashish lift for transfers.   Weight bearing status: WBAT.  Pain management: Transition from IV to PO as tolerated. NO NSAIDS. Try ice pack/hot pack on R quadratus lumborum area for spasms/pain. Robaxin may help, too.   Antibiotics: periop ancef - completed.  Diet: Begin with clear fluids and progress diet as tolerated.   DVT prophylaxis: SCDs only. No chemical DVT ppx needed.  Imaging: XR Upright PTDC - ordered.  Labs: Hgb POD#1,2,3. Last hgb 9.8. Will get a hgb today, if she's still here.  Bracing/Splinting: [place] brace when OOB. Can remove for showers, when sitting in chair.  Dressings: Keep dressing c/d/i x 7 days.  Drains: discontinued on 12/24.  Covington catheter: Removed.   Physical Therapy/Occupational Therapy: Eval and treat.  Consults: Hospitalist.   Follow-up: Clinic with Dr. Vasquez in 6 weeks   With repeat x-rays (1/28/21).   Disposition: Dispo to home today.    Above plan discussed with Dr. Vasquez.      Gerri Gomez MD  Orthopaedic Surgery, PGY-1  Pager: [phone]

## 2020-12-26 NOTE — PLAN OF CARE
VSS.Independent and self dons lisa brace when up with activity.Back drsg,CDI.Baseline numbness on BLE unchanged.Back pain managed with oxycodone 5-10 mg q3hrs,robaxin x1,and scheduled gabapentin and tylenol.No PIV line.Voiding spontaneously.Passing gas and reported had BM yesterday.Possibly discharging home today.  6AM:Have concerns as to how long pt would wear her brace on.Awaiting ortho to address concern.

## 2020-12-26 NOTE — DISCHARGE INSTRUCTIONS
-Wound Care: Your incision was closed with sutures underneath the skin. If you have a brown/tan rubber-like dressing (called Aquacel) applied to your surgical site, you may shower over this and pat dry afterward. You may remove the dressing 1 week after surgery (your surgery date is 12/21/20). If you had a lumbar procedure and your belt line contacts the area of the incision, then place a dry dressing over the incision daily in order to keep it from being rubbed by your pants. Similarly, if you are wearing a cervical collar and your incision is on your neck, you can keep a dry dressing over the incision to keep it from being rubbed. Otherwise, the dressing can be removed and the wound can be left open to air. If you keep your incision covered, change the bandages every other day and keep wound clean and dry.  If the dressing becomes wet for any reason, such as with sweat or water, it should be changed. Showering is allowed if your incision is dry. No scrubbing or submerging your incision in standing water such as a bath x 4 weeks. Steri-strips (small white tape that is directly on the incision areas), if present, should be left on until they fall off or are removed at your first office visit. Do not apply creams or lotions to your wound.     Your wound should remain free of discharge or significant surrounding redness. If you notice any drainage or discharge, or it it develops significant redness, please contact the clinic immediately. After hours or weekends, you may go to the Orthopaedic Surgery walk-in clinic from 7 am to 7 pm daily at the [facility] ([address] [place] [address]).     -Pain control: Take medication as prescribed, but only as often as necessary. You have been prescribed a narcotic pain medication for assistance with pain control. Narcotic pain medications have numerous side effects which can include nausea, constipation, drowsiness, and itching. If you  experience nausea, this may be relieved by taking th e medication with food. To avoid constipation, you should take a stool softener, as well as drink plenty of water and eat fruits and vegetables. Do not drive or drink alcohol while you are taking narcotic pain medication.  As soon as you are able, you should try to wean off the narcotic pain medication. Remember that Tylenol can be used for pain relief as well, as you wean off the narcotics. Do not exceed 4,000 mg of Tylenol in 24 hours. You may not take non-steroidal anti-inflammatory medications (i.e., Advil, Ibuprofen, Aleve) for the first 3 months after surgery as it interferes with bone healing.     -Please ice the affected area as much as possible to reduce swelling. Swelling is one of the most common causes of pain after surgery.    -Activity: No excessive or repetitive bending, twisting or lifting. No lifting >10 lbs x 6 weeks. This means no sporting activities (such as running, tennis, bowling, golf, bicycling, etc.) until you are seen in clinic. We encourage you to be up and out of bed regularly. Please try to walk 30 minutes per day. If you have been prescribed a brace, please wear the brace when up and out of bed. You may remove the brace to shower or when sitting in a chair for a long period of time.    -When you get home, you may resume your normal diet as tolerated. You may not be very hungry, but try to eat small healthy meals to help you heal. Remember to drink plenty of water/fluids to help keep you hydrated.    -CALL OUR CLINIC ([phone] during regular office hours, or [phone] for the on-call resident MD for questions - IF: Pain in your surgical area persists or worsens in the first few days after surgery. Excessive redness or drainage of cloudy or bloody material from the wounds (clear red tinted fluid and some mild drainage should be expected). Drainage of any kind > one week after surgery should be reported to the doctor. You have a  temperature elevation greater than 101.5  You have pain, swelling or redness in your calf. You have numbness or weakness in your arm, hand, leg or foot.    -Call your primary doctor or seek medical care if you have any of the following: temperature greater than 101.4, chest pain, increased shortness of breath, nausea or vomiting.     We will see you in clinic on January 28th, with Dr. Vasquez.

## 2020-12-27 ENCOUNTER — PATIENT OUTREACH (OUTPATIENT)
Dept: CARE COORDINATION | Facility: CLINIC | Age: 60
End: 2020-12-27

## 2020-12-28 ENCOUNTER — TELEPHONE (OUTPATIENT)
Dept: ORTHOPEDICS | Facility: CLINIC | Age: 60
End: 2020-12-28

## 2020-12-28 ENCOUNTER — MEDICAL CORRESPONDENCE (OUTPATIENT)
Dept: HEALTH INFORMATION MANAGEMENT | Facility: CLINIC | Age: 60
End: 2020-12-28

## 2020-12-28 NOTE — TELEPHONE ENCOUNTER
[facility] Call Center    Phone Message    May a detailed message be left on voicemail: yes     Reason for Call: Order(s): Home Care Orders: Physical Therapy (PT): 1X A WEEK FOR 2 WEEKS , 2X A WEEK FOR 3 WEEKS FOR GAIT TRAINING    Action Taken: Message routed to:  [facility]    Travel Screening: Not Applicable    [facility] needs verbal orders

## 2020-12-28 NOTE — TELEPHONE ENCOUNTER
Called Kay back and left a voicemail giving verbal authorization for the homecare PT orders stated below

## 2020-12-29 NOTE — PROGRESS NOTES
Attempted to contact the patient x3 for post-hospital call without answer. Will close encounter at this time.    Liberty Roberts CMA, [facility]  Post Hospital Discharge Team

## 2020-12-31 ENCOUNTER — MEDICAL CORRESPONDENCE (OUTPATIENT)
Dept: HEALTH INFORMATION MANAGEMENT | Facility: CLINIC | Age: 60
End: 2020-12-31

## 2020-12-31 ENCOUNTER — TELEPHONE (OUTPATIENT)
Dept: ORTHOPEDICS | Facility: CLINIC | Age: 60
End: 2020-12-31

## 2020-12-31 NOTE — TELEPHONE ENCOUNTER
Called and spoke to PT. Kay stated that the pt requested more PT from home healthcare. PT stated that pt sounded anxious and she feels fragile and will need more health. Went over recovery plan on pt chart and answered all questions. Encouraged Kay or other home healthcare PT to call back with questions/concerns.     Marycruz Rodriguez ATC

## 2020-12-31 NOTE — TELEPHONE ENCOUNTER
[facility] Call Center    Phone Message    May a detailed message be left on voicemail: yes     Reason for Call: Other: Kay, PT from  homecare, would like a call back to discuss PT restrictions for pt post-op. Patient is requesting exercises and many visits per week.      Action Taken: Message routed to:  [facility] ([facility] ortho    Travel Screening: Not Applicable

## 2021-01-03 ENCOUNTER — APPOINTMENT (OUTPATIENT)
Dept: GENERAL RADIOLOGY | Facility: CLINIC | Age: 61
End: 2021-01-03
Attending: EMERGENCY MEDICINE
Payer: COMMERCIAL

## 2021-01-03 ENCOUNTER — TELEPHONE (OUTPATIENT)
Dept: MEDSURG UNIT | Facility: CLINIC | Age: 61
End: 2021-01-03

## 2021-01-03 ENCOUNTER — APPOINTMENT (OUTPATIENT)
Dept: CT IMAGING | Facility: CLINIC | Age: 61
End: 2021-01-03
Attending: EMERGENCY MEDICINE
Payer: COMMERCIAL

## 2021-01-03 ENCOUNTER — HOSPITAL ENCOUNTER (EMERGENCY)
Facility: CLINIC | Age: 61
Discharge: HOME | End: 2021-01-03
Attending: EMERGENCY MEDICINE | Admitting: EMERGENCY MEDICINE
Payer: COMMERCIAL

## 2021-01-03 VITALS
TEMPERATURE: 98.4 F | HEART RATE: 66 BPM | OXYGEN SATURATION: 99 % | RESPIRATION RATE: 16 BRPM | SYSTOLIC BLOOD PRESSURE: 130 MMHG | DIASTOLIC BLOOD PRESSURE: 74 MMHG

## 2021-01-03 DIAGNOSIS — R51.9 SCALP PAIN: ICD-10-CM

## 2021-01-03 LAB
ANION GAP SERPL CALCULATED.3IONS-SCNC: 6 MMOL/L (ref 3–14)
BASOPHILS # BLD AUTO: 0.1 10E9/L (ref 0–0.2)
BASOPHILS NFR BLD AUTO: 1.3 %
BUN SERPL-MCNC: 8 MG/DL (ref 7–30)
CALCIUM SERPL-MCNC: 9.1 MG/DL (ref 8.5–10.1)
CHLORIDE SERPL-SCNC: 101 MMOL/L (ref 94–109)
CO2 SERPL-SCNC: 27 MMOL/L (ref 20–32)
CREAT SERPL-MCNC: 0.59 MG/DL (ref 0.52–1.04)
CRP SERPL-MCNC: 27 MG/L (ref 0–8)
DIFFERENTIAL METHOD BLD: ABNORMAL
EOSINOPHIL # BLD AUTO: 0.8 10E9/L (ref 0–0.7)
EOSINOPHIL NFR BLD AUTO: 9.8 %
ERYTHROCYTE [DISTWIDTH] IN BLOOD BY AUTOMATED COUNT: 12.8 % (ref 10–15)
ERYTHROCYTE [SEDIMENTATION RATE] IN BLOOD BY WESTERGREN METHOD: 55 MM/H (ref 0–30)
GFR SERPL CREATININE-BSD FRML MDRD: >90 ML/MIN/{1.73_M2}
GLUCOSE SERPL-MCNC: 97 MG/DL (ref 70–99)
HCT VFR BLD AUTO: 33.7 % (ref 35–47)
HGB BLD-MCNC: 10.9 G/DL (ref 11.7–15.7)
IMM GRANULOCYTES # BLD: 0.1 10E9/L (ref 0–0.4)
IMM GRANULOCYTES NFR BLD: 0.9 %
LYMPHOCYTES # BLD AUTO: 1.4 10E9/L (ref 0.8–5.3)
LYMPHOCYTES NFR BLD AUTO: 16.6 %
MCH RBC QN AUTO: 27.6 PG (ref 26.5–33)
MCHC RBC AUTO-ENTMCNC: 32.3 G/DL (ref 31.5–36.5)
MCV RBC AUTO: 85 FL (ref 78–100)
MONOCYTES # BLD AUTO: 0.6 10E9/L (ref 0–1.3)
MONOCYTES NFR BLD AUTO: 7.1 %
NEUTROPHILS # BLD AUTO: 5.5 10E9/L (ref 1.6–8.3)
NEUTROPHILS NFR BLD AUTO: 64.3 %
NRBC # BLD AUTO: 0 10*3/UL
NRBC BLD AUTO-RTO: 0 /100
PLATELET # BLD AUTO: 529 10E9/L (ref 150–450)
POTASSIUM SERPL-SCNC: 3.6 MMOL/L (ref 3.4–5.3)
RBC # BLD AUTO: 3.95 10E12/L (ref 3.8–5.2)
SODIUM SERPL-SCNC: 134 MMOL/L (ref 133–144)
WBC # BLD AUTO: 8.6 10E9/L (ref 4–11)

## 2021-01-03 PROCEDURE — 96374 THER/PROPH/DIAG INJ IV PUSH: CPT | Performed by: EMERGENCY MEDICINE

## 2021-01-03 PROCEDURE — 85025 COMPLETE CBC W/AUTO DIFF WBC: CPT | Performed by: EMERGENCY MEDICINE

## 2021-01-03 PROCEDURE — 72040 X-RAY EXAM NECK SPINE 2-3 VW: CPT

## 2021-01-03 PROCEDURE — 250N000011 HC RX IP 250 OP 636: Performed by: EMERGENCY MEDICINE

## 2021-01-03 PROCEDURE — 99284 EMERGENCY DEPT VISIT MOD MDM: CPT | Performed by: EMERGENCY MEDICINE

## 2021-01-03 PROCEDURE — 96360 HYDRATION IV INFUSION INIT: CPT | Performed by: EMERGENCY MEDICINE

## 2021-01-03 PROCEDURE — 258N000003 HC RX IP 258 OP 636: Performed by: EMERGENCY MEDICINE

## 2021-01-03 PROCEDURE — 99284 EMERGENCY DEPT VISIT MOD MDM: CPT | Mod: 25 | Performed by: EMERGENCY MEDICINE

## 2021-01-03 PROCEDURE — 85652 RBC SED RATE AUTOMATED: CPT | Performed by: EMERGENCY MEDICINE

## 2021-01-03 PROCEDURE — 86140 C-REACTIVE PROTEIN: CPT | Performed by: EMERGENCY MEDICINE

## 2021-01-03 PROCEDURE — 70450 CT HEAD/BRAIN W/O DYE: CPT

## 2021-01-03 PROCEDURE — 80048 BASIC METABOLIC PNL TOTAL CA: CPT | Performed by: EMERGENCY MEDICINE

## 2021-01-03 RX ORDER — CEPHALEXIN 500 MG/1
500 CAPSULE ORAL 4 TIMES DAILY
Qty: 40 CAPSULE | Refills: 0 | Status: SHIPPED | OUTPATIENT
Start: 2021-01-03 | End: 2021-01-13

## 2021-01-03 RX ORDER — SODIUM CHLORIDE 9 MG/ML
INJECTION, SOLUTION INTRAVENOUS CONTINUOUS
Status: DISCONTINUED | OUTPATIENT
Start: 2021-01-03 | End: 2021-01-03 | Stop reason: HOSPADM

## 2021-01-03 RX ADMIN — SODIUM CHLORIDE 1000 ML: 9 INJECTION, SOLUTION INTRAVENOUS at 18:57

## 2021-01-03 RX ADMIN — PROCHLORPERAZINE EDISYLATE 10 MG: 5 INJECTION INTRAMUSCULAR; INTRAVENOUS at 19:05

## 2021-01-03 ASSESSMENT — ENCOUNTER SYMPTOMS: COLOR CHANGE: 1

## 2021-01-03 NOTE — ED NOTES
Per daughter, patient having memory problems due to insomnia, medications and pain; surgeon told them that they both could come into the hospital

## 2021-01-03 NOTE — TELEPHONE ENCOUNTER
Patient status post the following surgery:    1. Revision posterior spinal fusion L2 to sacrum, using local autograft, Magnifuse allograft and crushed cancellous allograft.  2.  Revision bilateral segmental posterior instrumentation L2 to pelvis, with revision of screws using old tracks (right L4, left upper S2 AI, left lower S2 AI, right lower S2 AI), revision of right upper S2 AI screw using new track, and placement of new screw (left S1 pedicle screw into the L5 vertebral body).  3.  Revision decompression of right S1 nerve root by performing right sacral dome osteoplasty.     With Dr. Vasquez on 12/21/20.     Calling in with pain in there posterior aspect of her head. Notes increasing tenderness to the posterior aspect of the head. Denies increased drainage. Denies dizziness. Denies radicular symptoms.     Discussed options. Patient will come into the ED for evaluation.     KAY Wong MD  PGY-4, Orthopaedic Surgery

## 2021-01-03 NOTE — ED AVS SNAPSHOT
[facility]  [address]  [place] [address]  Phone: [phone]  Fax: [phone]                                    Aleena Ontiveros   MRN: [id number]    Department: [facility]   Date of Visit: 1/3/2021           After Visit Summary Signature Page    I have received my discharge instructions, and my questions have been answered. I have discussed any challenges I see with this plan with the nurse or doctor.    ..........................................................................................................................................  Patient/Patient Representative Signature      ..........................................................................................................................................  Patient Representative Print Name and Relationship to Patient    ..................................................               ................................................  Date                                   Time    ..........................................................................................................................................  Reviewed by Signature/Title    ...................................................              ..............................................  Date                                               Time          22EPIC Rev 08/18

## 2021-01-03 NOTE — ED PROVIDER NOTES
[facility] EMERGENCY DEPARTMENT ([facility])  1/03/21  History     Chief Complaint   Patient presents with     Headache     patient having much pain at base of skull, top of cervical spine; movement or touching hurts head a lot; BP low at home reportedly; had spinal surgery on Dec 21st; insomnia due to pain     History was provided by the patient and medical records.        Aleena Ontiveros is a 60 year old female with a past medical history of spondylolithiasis of lumbosacral region and right sacral radiculopathy s/p multiple spinal surgeries including revisional laminectomy for L5-S1 and revision posterior instrumented spinal fusion L2-S1 (12/21/2020) who presents for evaluation of posterior head pain.    Per review of medical records, patient was admitted from 12/21-12/26 to undergo laminectomy and postop care.  Patient reports developing pain in her occipital skull near her incision site from 12/24, 10 days ago.  Patient states the pain is localized to that spot does not radiate.  Is very painful to palpation.  Patient states that may have been the site which are halo was inserted.  Patient's daughter noticed a quarter sized area of redness on her occipital skull.  Patient denies pain in other areas of her head or neck.  Patient denies currently being on antibiotics.  She estimates finishing her course of Ancef on 12/30, 4 days ago.  Of note, the patient reports being diagnosed with hypertension in the past with her systolic pressure typically being recorded of 140.  Recently, she states her blood pressure has been dropping.  She has been eating and drinking normally.  Patient reports following a similar spinal procedure in 2019 began to record lower blood pressure values and was eventually admitted to the hospital for sepsis.    Past Medical History:   Diagnosis Date     Anemia      Anxiety      Arthritis     osteoarthritis of both knee     Chronic osteoarthritis      Hypertension      Hypothyroidism       PONV (postoperative nausea and vomiting)      Seasonal allergic rhinitis      Uncomplicated asthma        Past Surgical History:   Procedure Laterality Date     ABDOMEN SURGERY  ,         ARTHROPLASTY KNEE Right 08/10/2017    Procedure: ARTHROPLASTY KNEE;  RIGHT TOTAL KNEE ARTHROPLASTY ;  Surgeon: Grady Alvarez MD;  Location:  OR     ARTHROPLASTY KNEE Left 2017    Procedure: ARTHROPLASTY KNEE;  LEFT TOTAL KNEE ARTHROPLASTY;  Surgeon: Grady Alvarez MD;  Location:  OR     BACK SURGERY  1974    spinal fusion     ENT SURGERY      tonsilectomy     GYN SURGERY  2013    hysterectomy     OPTICAL TRACKING SYSTEM FUSION POSTERIOR SPINE LUMBAR N/A 2020    Procedure: Revision laminectomy lumbar 5-sacral 1 with decompression of Right Sacral 1 nerve root; revision posterior instumentee spinal fusion (pseudarthrosis repair) lumbar 2-sacral 1.;  Surgeon: Evangelist Vasquez MD;  Location:  OR     OPTICAL TRACKING SYSTEM FUSION POSTERIOR SPINE THORACIC THREE+ LEVELS N/A 10/01/2019    Procedure: Transforaminal Lumbar Interbody Fusion Three Column Osteotomy L5-S1, Posterior Spinal Fusion L2-Pelvis. Use of BMP bone graft;  Surgeon: Evangelist Vasquez MD;  Location:  OR     ORTHOPEDIC SURGERY      sinal fusion,hand     thumb mass resection       THYROIDECTOMY  2014    Procedure: THYROIDECTOMY;  Surgeon: Krzysztof Marquez MD;  Location: [facility]       Family History   Problem Relation Age of Onset     Breast Cancer Mother      Cancer - colorectal Father      C.A.D. Father      Cerebrovascular Disease Father      Thyroid Disease Brother      Thyroid Disease Sister        Social History     Tobacco Use     Smoking status: Never Smoker     Smokeless tobacco: Never Used   Substance Use Topics     Alcohol use: Yes     Binge frequency: Patient refused     Comment: 1-2 per month--none since surgery     Current Facility-Administered Medications   Medication     sodium  chloride 0.9% infusion     Current Outpatient Medications   Medication     cephALEXin (KEFLEX) 500 MG capsule     acetaminophen (TYLENOL) 500 MG tablet     albuterol (PROAIR HFA/PROVENTIL HFA/VENTOLIN HFA) 108 (90 Base) MCG/ACT inhaler     amLODIPine (NORVASC) 5 MG tablet     estradiol (ESTRACE) 0.1 MG/GM cream     Fexofenadine HCl (ALLEGRA PO)     fluticasone (FLONASE) 50 MCG/ACT spray     fluticasone-salmeterol (ADVAIR) 500-50 MCG/DOSE inhaler     gabapentin (NEURONTIN) 100 MG capsule     gabapentin (NEURONTIN) 400 MG capsule     hydrOXYzine (ATARAX) 25 MG tablet     levothyroxine (SYNTHROID) 125 MCG tablet     lisinopril-hydrochlorothiazide (ZESTORETIC) 20-12.5 MG tablet     methocarbamol (ROBAXIN) 750 MG tablet     Montelukast Sodium (SINGULAIR PO)     multivitamin w/minerals (THERA-VIT-M) tablet     Omega-3 Fatty Acids (FISH OIL) 500 MG CAPS     oxyCODONE (ROXICODONE) 5 MG tablet     polyethylene glycol (MIRALAX) 17 g packet     polyethylene glycol (MIRALAX) 17 GM/Dose powder     senna-docusate (SENOKOT-S/PERICOLACE) 8.6-50 MG tablet     senna-docusate (SENOKOT-S/PERICOLACE) 8.6-50 MG tablet     traMADol (ULTRAM) 50 MG tablet        Allergies   Allergen Reactions     Adhesive Tape      Erythromycin Nausea and Vomiting     Pills cause vomiting,          Review of Systems   Musculoskeletal:        Positive for occipital skull pain   Skin: Positive for color change (Posterior skull).     A complete review of systems was performed with pertinent positives and negatives noted in the HPI, and all other systems negative.    Physical Exam   BP: 107/56  Pulse: 83  Temp: 99.1  F (37.3  C)  Resp: 16  SpO2: 99 %  Physical Exam  Constitutional:       General: She is not in acute distress.     Appearance: She is not diaphoretic.   HENT:      Head: Normocephalic.      Mouth/Throat:      Pharynx: No oropharyngeal exudate.   Eyes:      Extraocular Movements: Extraocular movements intact.   Neck:      Musculoskeletal: Neck  supple.   Cardiovascular:      Rate and Rhythm: Normal rate and regular rhythm.      Heart sounds: Normal heart sounds.   Pulmonary:      Effort: No respiratory distress.      Breath sounds: Normal breath sounds.   Abdominal:      General: There is no distension.      Palpations: Abdomen is soft.      Tenderness: There is no abdominal tenderness.   Musculoskeletal:         General: No deformity.   Skin:     General: Skin is warm and dry.      Comments: 2 cm area right occipital scalp mildly erythematous and tender.  No vesicular lesions, no evidence of abscess no fluctuance.   Neurological:      Mental Status: She is alert.      Comments: alert   Psychiatric:         Behavior: Behavior normal.       ED Course     5:09 PM  The patient was seen and examined by Dr. Brennan Lisa in Room ED 07.     Procedures   Results for orders placed or performed during the hospital encounter of 01/03/21   CT Head w/o Contrast     Status: None    Narrative    CT SCAN OF THE HEAD WITHOUT CONTRAST   1/3/2021 6:04 PM     HISTORY: Recent surgery, head pain    TECHNIQUE:  Axial images of the head and coronal reformations without  IV contrast material. Radiation dose for this scan was reduced using  automated exposure control, adjustment of the mA and/or kV according  to patient size, or iterative reconstruction technique.    COMPARISON: None.    FINDINGS:      The brain parenchymal volume and the ventricular size and morphology  are within normal limits for age. No secondary features of increased  intraventricular or intracranial pressure. No significant white matter  change.  No territorial gray-white differentiation loss or obscuration  of the basal ganglia to suggest acute/subacute infarction.No  intracranial hemorrhage or extra-axial fluid collection. No evidence  for mass, mass effect, shift of midline or basal cistern effacement.   The midline structures and the posterior fossa structures are normal.    The paranasal sinuses are  clear. Temporal bone structures are clear.  No destructive/aggressive appearing lesion involving the skull base or  cranium. The orbits are unremarkable. Left greater than right  temporomandibular joint degenerative changes.      Impression    IMPRESSION:     1. No acute intracranial process.    SANDEE MCCAIN MD   XR Cervical Spine 2/3 Views     Status: None    Narrative    EXAM: XR CERVICAL SPINE 2/3 VWS  LOCATION: [facility]  DATE/TIME: 1/3/2021 6:30 PM    INDICATION: Neck pain.  COMPARISON: None.  TECHNIQUE: CR Cervical Spine.      Impression    IMPRESSION: Severe lower cervical spine degenerative disc disease. Multilevel mild to moderate facet arthropathy. Alignment is significant for reversal of normal cervical lordosis, grade I anterolisthesis of C3 on C4, and grade I anterolisthesis of C4 on   C5. Probable facet joint ankylosis at C3-C4. There is also dextroconvex curvature and rightward tilting of the upper cervical spine.     CBC with platelets differential     Status: Abnormal   Result Value Ref Range    WBC 8.6 4.0 - 11.0 10e9/L    RBC Count 3.95 3.8 - 5.2 10e12/L    Hemoglobin 10.9 (L) 11.7 - 15.7 g/dL    Hematocrit 33.7 (L) 35.0 - 47.0 %    MCV 85 78 - 100 fl    MCH 27.6 26.5 - 33.0 pg    MCHC 32.3 31.5 - 36.5 g/dL    RDW 12.8 10.0 - 15.0 %    Platelet Count 529 (H) 150 - 450 10e9/L    Diff Method Automated Method     % Neutrophils 64.3 %    % Lymphocytes 16.6 %    % Monocytes 7.1 %    % Eosinophils 9.8 %    % Basophils 1.3 %    % Immature Granulocytes 0.9 %    Nucleated RBCs 0 0 /100    Absolute Neutrophil 5.5 1.6 - 8.3 10e9/L    Absolute Lymphocytes 1.4 0.8 - 5.3 10e9/L    Absolute Monocytes 0.6 0.0 - 1.3 10e9/L    Absolute Eosinophils 0.8 (H) 0.0 - 0.7 10e9/L    Absolute Basophils 0.1 0.0 - 0.2 10e9/L    Abs Immature Granulocytes 0.1 0 - 0.4 10e9/L    Absolute Nucleated RBC 0.0    Basic metabolic panel     Status: None   Result Value Ref Range    Sodium 134 133 - 144 mmol/L     Potassium 3.6 3.4 - 5.3 mmol/L    Chloride 101 94 - 109 mmol/L    Carbon Dioxide 27 20 - 32 mmol/L    Anion Gap 6 3 - 14 mmol/L    Glucose 97 70 - 99 mg/dL    Urea Nitrogen 8 7 - 30 mg/dL    Creatinine 0.59 0.52 - 1.04 mg/dL    GFR Estimate >90 >60 mL/min/[1.73_m2]    GFR Estimate If Black >90 >60 mL/min/[1.73_m2]    Calcium 9.1 8.5 - 10.1 mg/dL   CRP inflammation     Status: Abnormal   Result Value Ref Range    CRP Inflammation 27.0 (H) 0.0 - 8.0 mg/L   Erythrocyte sedimentation rate auto     Status: Abnormal   Result Value Ref Range    Sed Rate 55 (H) 0 - 30 mm/h                Results for orders placed or performed during the hospital encounter of 01/03/21   CT Head w/o Contrast     Status: None    Narrative    CT SCAN OF THE HEAD WITHOUT CONTRAST   1/3/2021 6:04 PM     HISTORY: Recent surgery, head pain    TECHNIQUE:  Axial images of the head and coronal reformations without  IV contrast material. Radiation dose for this scan was reduced using  automated exposure control, adjustment of the mA and/or kV according  to patient size, or iterative reconstruction technique.    COMPARISON: None.    FINDINGS:      The brain parenchymal volume and the ventricular size and morphology  are within normal limits for age. No secondary features of increased  intraventricular or intracranial pressure. No significant white matter  change.  No territorial gray-white differentiation loss or obscuration  of the basal ganglia to suggest acute/subacute infarction.No  intracranial hemorrhage or extra-axial fluid collection. No evidence  for mass, mass effect, shift of midline or basal cistern effacement.   The midline structures and the posterior fossa structures are normal.    The paranasal sinuses are clear. Temporal bone structures are clear.  No destructive/aggressive appearing lesion involving the skull base or  cranium. The orbits are unremarkable. Left greater than right  temporomandibular joint degenerative changes.       Impression    IMPRESSION:     1. No acute intracranial process.    SANDEE MCCAIN MD   XR Cervical Spine 2/3 Views     Status: None    Narrative    EXAM: XR CERVICAL SPINE 2/3 VWS  LOCATION: [facility]  DATE/TIME: 1/3/2021 6:30 PM    INDICATION: Neck pain.  COMPARISON: None.  TECHNIQUE: CR Cervical Spine.      Impression    IMPRESSION: Severe lower cervical spine degenerative disc disease. Multilevel mild to moderate facet arthropathy. Alignment is significant for reversal of normal cervical lordosis, grade I anterolisthesis of C3 on C4, and grade I anterolisthesis of C4 on   C5. Probable facet joint ankylosis at C3-C4. There is also dextroconvex curvature and rightward tilting of the upper cervical spine.     CBC with platelets differential     Status: Abnormal   Result Value Ref Range    WBC 8.6 4.0 - 11.0 10e9/L    RBC Count 3.95 3.8 - 5.2 10e12/L    Hemoglobin 10.9 (L) 11.7 - 15.7 g/dL    Hematocrit 33.7 (L) 35.0 - 47.0 %    MCV 85 78 - 100 fl    MCH 27.6 26.5 - 33.0 pg    MCHC 32.3 31.5 - 36.5 g/dL    RDW 12.8 10.0 - 15.0 %    Platelet Count 529 (H) 150 - 450 10e9/L    Diff Method Automated Method     % Neutrophils 64.3 %    % Lymphocytes 16.6 %    % Monocytes 7.1 %    % Eosinophils 9.8 %    % Basophils 1.3 %    % Immature Granulocytes 0.9 %    Nucleated RBCs 0 0 /100    Absolute Neutrophil 5.5 1.6 - 8.3 10e9/L    Absolute Lymphocytes 1.4 0.8 - 5.3 10e9/L    Absolute Monocytes 0.6 0.0 - 1.3 10e9/L    Absolute Eosinophils 0.8 (H) 0.0 - 0.7 10e9/L    Absolute Basophils 0.1 0.0 - 0.2 10e9/L    Abs Immature Granulocytes 0.1 0 - 0.4 10e9/L    Absolute Nucleated RBC 0.0    Basic metabolic panel     Status: None   Result Value Ref Range    Sodium 134 133 - 144 mmol/L    Potassium 3.6 3.4 - 5.3 mmol/L    Chloride 101 94 - 109 mmol/L    Carbon Dioxide 27 20 - 32 mmol/L    Anion Gap 6 3 - 14 mmol/L    Glucose 97 70 - 99 mg/dL    Urea Nitrogen 8 7 - 30 mg/dL    Creatinine 0.59 0.52 - 1.04 mg/dL    GFR  Estimate >90 >60 mL/min/[1.73_m2]    GFR Estimate If Black >90 >60 mL/min/[1.73_m2]    Calcium 9.1 8.5 - 10.1 mg/dL   CRP inflammation     Status: Abnormal   Result Value Ref Range    CRP Inflammation 27.0 (H) 0.0 - 8.0 mg/L   Erythrocyte sedimentation rate auto     Status: Abnormal   Result Value Ref Range    Sed Rate 55 (H) 0 - 30 mm/h     Medications   0.9% sodium chloride BOLUS (1,000 mLs Intravenous New Bag 1/3/21 1857)     Followed by   sodium chloride 0.9% infusion (has no administration in time range)   prochlorperazine (COMPAZINE) injection 10 mg (10 mg Intravenous Given 1/3/21 1905)        Assessments & Plan (with Medical Decision Making)   60-year-old female presents to us with chief complaint of scalp pain.  Labs show some elevation in inflammatory markers.  This is likely persistent from her recent surgery.  X-ray of her neck and CT scan of her head were unremarkable.  Visually the area could represent a fungal infection however due to the tenderness I feel that is less likely.  There are no vesicular lesions consistent with shingles and no fluctuance or evidence of abscess on the CT scan.  We will treat her with antibiotics for what may be a localized cellulitis.  I do encourage her to follow-up with dermatology and her surgeons as scheduled.  She is comfortable discharge home and the plan.    I have reviewed the nursing notes. I have reviewed the findings, diagnosis, plan and need for follow up with the patient.    New Prescriptions    CEPHALEXIN (KEFLEX) 500 MG CAPSULE    Take 1 capsule (500 mg) by mouth 4 times daily for 10 days       Final diagnoses:   Scalp pain     IWilton, am serving as a trained medical scribe to document services personally performed by Brennan Lisa DO, based on the provider's statements to me.      IBrennan DO, was physically present and have reviewed and verified the accuracy of this note documented by Wilton Calderon.  --  DO COURTNEY Grady  [facility] EMERGENCY DEPARTMENT  1/3/2021     Brennan Lisa DO  01/03/21 2001

## 2021-01-04 NOTE — DISCHARGE INSTRUCTIONS
Follow-up with your primary care provider.  I also recommend you follow-up with dermatology.  Return to the emergency department as needed for any new or worsening symptoms.    Please make an appointment to follow up with Dermatology Clinic (phone: [phone]) as soon as possible unless symptoms completely resolve.

## 2021-01-04 NOTE — CONSULTS
Orthopaedic Surgery Consultation Note    Aleena Ontiveros MRN# [id number]   Age: 60 year old YOB: 1960     Date of Admission:  1/3/2021    Reason for consult:  Pain management after lumbar spine surgery       [place]  [facility] [place]         Assessment and Plan:   Assessment:  lAeena Ontiveros is a 60 year old female with PMH including hypothyroidism, HTN, asthma and right S1 radiculopathy with pseudarthrosis now s/p revision L2-pelvis and right S1 decompression complicated by dural tear repaired primarily on 12/21/20 with Dr. Vasquez.     Patient initially called to speak to orthopedic provider regarding focal pain in the posterior right aspect of her occiput.  Elected to come into the emergency department for evaluation.    Small superficial bump with extreme focal tenderness appreciated on examination.  No erythema.  No drainage.    Had a long conversation with the patient and her daughter regarding the etiology of this bump.  It appears as if a superficial nerve could be irritated but this is an abnormal location for such an event.    Provided reassurance to the patient that there does not appear to be any signs or symptoms that would indicate problems with her recent spine surgery.  The bump at this time does not seem infectious.  Her wounds are well-healed.  Additionally her signs and symptoms do not appear to be related to her dural tear.    Recommend observation at this time.  Patient encouraged to seek more urgent follow-up than her scheduled January 28 appointment should this painful bump continue to progress or should she develop other symptoms including systemic symptoms or increased numbness tingling or weakness in her extremities.    Patient was directly discussed with Dr. Evangelist Vasquez who is in agreement with this assessment and plan          History of Present Illness:   Patient was seen and examined by me. History, PMH, Meds, SH, complete ROS (10 organ  systems) and PE reviewed with patient and prior medical records.      Aleena Ontiveros is a 60 year old female with PMH including hypothyroidism, HTN, asthma and right S1 radiculopathy with pseudarthrosis now s/p revision L2-pelvis and right S1 decompression complicated by dural tear repaired primarily on 20 with Dr. Vasquez.     Patient reports that since surgery she has been having increased pain in the posterior right aspect of her head.  She has appreciated a superficial bump that is developed there.  This bump is extremely focally tender.  She denies numbness or radiation of pain.  She denies systemic symptoms.  She reports no wound healing problems and has not had any erythema or drainage from the bump on the back of her head.  Patient denies that her head pain is positional in nature.  Of note she was concerned that she had been hypotensive when taking her blood pressure at home.  However at admission to the emergency department her blood pressure was 106 systolic.          Past Medical History:     Past Medical History:   Diagnosis Date     Anemia      Anxiety      Arthritis     osteoarthritis of both knee     Chronic osteoarthritis      Hypertension      Hypothyroidism      PONV (postoperative nausea and vomiting)      Seasonal allergic rhinitis      Uncomplicated asthma              Past Surgical History:     Past Surgical History:   Procedure Laterality Date     ABDOMEN SURGERY  ,         ARTHROPLASTY KNEE Right 08/10/2017    Procedure: ARTHROPLASTY KNEE;  RIGHT TOTAL KNEE ARTHROPLASTY ;  Surgeon: Grady Alvarez MD;  Location: [facility] OR     ARTHROPLASTY KNEE Left 2017    Procedure: ARTHROPLASTY KNEE;  LEFT TOTAL KNEE ARTHROPLASTY;  Surgeon: Grady Alvarez MD;  Location:  OR     BACK SURGERY      spinal fusion     ENT SURGERY      tonsilectomy     GYN SURGERY  2013    hysterectomy     OPTICAL TRACKING SYSTEM FUSION POSTERIOR SPINE LUMBAR N/A 2020     Procedure: Revision laminectomy lumbar 5-sacral 1 with decompression of Right Sacral 1 nerve root; revision posterior instumentee spinal fusion (pseudarthrosis repair) lumbar 2-sacral 1.;  Surgeon: Evangelist Vasquez MD;  Location:  OR     OPTICAL TRACKING SYSTEM FUSION POSTERIOR SPINE THORACIC THREE+ LEVELS N/A 10/01/2019    Procedure: Transforaminal Lumbar Interbody Fusion Three Column Osteotomy L5-S1, Posterior Spinal Fusion L2-Pelvis. Use of BMP bone graft;  Surgeon: Evangelist Vasquez MD;  Location:  OR     ORTHOPEDIC SURGERY      sinal fusion,hand     thumb mass resection       THYROIDECTOMY  05/13/2014    Procedure: THYROIDECTOMY;  Surgeon: Krzysztof Marquez MD;  Location: [facility]             Social History:     Patient is living with her  and daughter in [place] after recovering from spine surgery.  She does not smoke.           Medications:     Current Facility-Administered Medications   Medication     0.9% sodium chloride BOLUS    Followed by     sodium chloride 0.9% infusion     prochlorperazine (COMPAZINE) injection 10 mg     Current Outpatient Medications   Medication Sig     acetaminophen (TYLENOL) 500 MG tablet Take 1 tablet (500 mg) by mouth 5 times daily     albuterol (PROAIR HFA/PROVENTIL HFA/VENTOLIN HFA) 108 (90 Base) MCG/ACT inhaler Inhale 2 puffs into the lungs every 4 hours as needed for shortness of breath / dyspnea or wheezing     amLODIPine (NORVASC) 5 MG tablet Take 5 mg by mouth At Bedtime      estradiol (ESTRACE) 0.1 MG/GM cream Place 2 g vaginally At Bedtime      Fexofenadine HCl (ALLEGRA PO) Take 180 mg by mouth every morning      fluticasone (FLONASE) 50 MCG/ACT spray Spray 1 spray into both nostrils 2 times daily      fluticasone-salmeterol (ADVAIR) 500-50 MCG/DOSE inhaler Inhale 1 puff into the lungs every 12 hours     gabapentin (NEURONTIN) 100 MG capsule Take 1-2 caps Four times per day as needed for pain     gabapentin (NEURONTIN) 400 MG capsule  Take 1 cap=400mg QID & continue weaning down (Patient taking differently: Take 1 cap=400mg  5 times a day)     hydrOXYzine (ATARAX) 25 MG tablet Take 1 tablet (25 mg) by mouth every 6 hours as needed for itching     levothyroxine (SYNTHROID) 125 MCG tablet Take 1 tablet (125 mcg) by mouth every morning     lisinopril-hydrochlorothiazide (ZESTORETIC) 20-12.5 MG tablet Take 2 tablets by mouth daily     methocarbamol (ROBAXIN) 750 MG tablet Take 1 tablet (750 mg) by mouth 4 times daily as needed for muscle spasms     Montelukast Sodium (SINGULAIR PO) Take 10 mg by mouth At Bedtime      multivitamin w/minerals (THERA-VIT-M) tablet Take 2 tablets by mouth 2 times daily     Omega-3 Fatty Acids (FISH OIL) 500 MG CAPS Take 2 capsules by mouth daily     oxyCODONE (ROXICODONE) 5 MG tablet Take 1-2 tablets (5-10 mg) by mouth every 4 hours as needed     polyethylene glycol (MIRALAX) 17 g packet Take 17 g by mouth 2 times daily as needed for constipation     polyethylene glycol (MIRALAX) 17 GM/Dose powder Take 17 g by mouth daily     senna-docusate (SENOKOT-S/PERICOLACE) 8.6-50 MG tablet Take 2 tablets by mouth 2 times daily     senna-docusate (SENOKOT-S/PERICOLACE) 8.6-50 MG tablet Take 1 tablet by mouth 2 times daily     traMADol (ULTRAM) 50 MG tablet Take 100 mg by mouth every 6 hours as needed for severe pain             Allergies:      Allergies   Allergen Reactions     Adhesive Tape      Erythromycin Nausea and Vomiting     Pills cause vomiting,             Review of Systems:   A comprehensive 10 point review of systems (constitutional, ENT, cardiac, peripheral vascular, respiratory, GI, , Musculoskeletal, skin, Neurological) was performed and found to be negative except as described in this note.           Physical Exam:   COMPLETE EXAMINATION:   VITAL SIGNS: /56   Pulse 83   Temp 99.1  F (37.3  C) (Oral)   Resp 16   SpO2 99%   GENERAL:  No acute distress, calm and cooperative   RESP: Non labored  breathing  ABD: Benign  SKIN: Grossly normal   LYMPHATIC:  Grossly normal  NEURO:  Grossly normal   VASCULAR: Satisfactory perfusion of all extremities  MUSCULOSKELETAL:     Right Upper Extremity  - No gross deformity. Skin intact  - All compartments soft and compressible  - 5/5 deltoid, biceps, triceps, wrist extension/flexion, EPL, FPL, FDS/FDP/EDC all fingers, IO  - SILT to axillary, median, radial, and ulnar nerve distributions  - 2+ radial pulse, fingers warm and well perfused    Left Upper Extremity  - No gross deformity. Skin intact  - All compartments soft and compressible  - 5/5 deltoid, biceps, triceps, wrist extension/flexion, EPL, FPL, FDS/FDP/EDC all fingers, IO  - SILT to axillary, median, radial, and ulnar nerve distributions  - 2+ radial pulse, fingers warm and well perfused    Right Lower Extremity  - No gross deformity. Skin intact  - All compartments soft and compressible  - No pain with log roll or axial loading.  - 5/5 quad, TA, gastroc/soleus, EHL, FHL, wiggles toes. Able to SLR  - SILT to superficial peroneal, deep peroneal, saphenous, sural, and tibial nerve distributions  - 2+ DP and PT, foot warm and well perfused    Left Lower Extremity  - No gross deformity. Skin intact  - All compartments soft and compressible  - No pain with log roll or axial loading.  - 5/5 quad, TA, gastroc/soleus, EHL, FHL, wiggles toes. Able to SLR  - SILT to superficial peroneal, deep peroneal, saphenous, sural, and tibial nerve distributions  - 2+ DP and PT, foot warm and well perfused    Musculoskeletal: Incision healing appropriately without sign of infection     Lower extremities:  Motor Strength Right Left   Hip flexion: L1, L2, L3 5/5 5/5   Hip adduction: L2, L3 5/5 5/5   Knee flexion: S1 4+/5 4+/5   Knee extension: L3, L4 5/5 5/5   Ankle dosiflexion: L4, L5 5/5 5/5   EHL: L5 5/5 3+/5   Ankle plantarflexion: S1 5/5 5/5      Sensation from L2-S2 is preserved, endorses dullness in S1 distribution on the right  consistent with baseline.            Data:   All pertinent laboratory data reviewed  Recent Labs   Lab Test 01/03/21  1744 12/26/20  0725 12/23/20  0543 12/16/20  1626 12/16/20  1626   HGB 10.9* 9.5* 9.8*   < > 13.8   SED 55*  --   --   --   --    CRP 27.0*  --   --   --   --    WBC 8.6 6.1  --   --  8.5    < > = values in this interval not displayed.     Imaging:     CT head: No acute intracranial process.  No abscess appreciated in occipital area.    X-ray cervical spine per radiology: Severe lower cervical spine degenerative disc disease. Multilevel mild to moderate facet arthropathy. Alignment is significant for reversal of normal cervical lordosis, grade I anterolisthesis of C3 on C4, and grade I anterolisthesis of C4 on C5. Probable facet joint ankylosis at C3-C4. There is also dextroconvex curvature and rightward tilting of the upper cervical spine.      The attending staff for this consultation is MD KAY Olmedo MD  PGY-4, Orthopaedic Surgery  Pager: [phone]

## 2021-01-07 ENCOUNTER — MYC REFILL (OUTPATIENT)
Dept: OTHER | Age: 61
End: 2021-01-07

## 2021-01-07 ENCOUNTER — MYC MEDICAL ADVICE (OUTPATIENT)
Dept: ORTHOPEDICS | Facility: CLINIC | Age: 61
End: 2021-01-07

## 2021-01-07 DIAGNOSIS — Z98.1 S/P SPINAL FUSION: ICD-10-CM

## 2021-01-07 DIAGNOSIS — M54.18 RIGHT SACRAL RADICULOPATHY: ICD-10-CM

## 2021-01-07 RX ORDER — METHOCARBAMOL 750 MG/1
750 TABLET, FILM COATED ORAL 4 TIMES DAILY PRN
Qty: 120 TABLET | Refills: 2 | Status: SHIPPED | OUTPATIENT
Start: 2021-01-07 | End: 2023-08-18

## 2021-01-07 RX ORDER — ACETAMINOPHEN 500 MG
500 TABLET ORAL
Qty: 1 BOTTLE | Refills: 1 | Status: SHIPPED | OUTPATIENT
Start: 2021-01-07 | End: 2021-02-22

## 2021-01-07 RX ORDER — OXYCODONE HYDROCHLORIDE 5 MG/1
5-10 TABLET ORAL EVERY 4 HOURS PRN
Qty: 50 TABLET | Refills: 0 | Status: CANCELLED | OUTPATIENT
Start: 2021-01-07

## 2021-01-07 RX ORDER — OXYCODONE HYDROCHLORIDE 5 MG/1
5-10 TABLET ORAL EVERY 4 HOURS PRN
Qty: 50 TABLET | Refills: 0 | Status: SHIPPED | OUTPATIENT
Start: 2021-01-07 | End: 2021-04-01

## 2021-01-07 RX ORDER — HYDROXYZINE HYDROCHLORIDE 25 MG/1
25 TABLET, FILM COATED ORAL EVERY 6 HOURS PRN
Qty: 60 TABLET | Refills: 2 | Status: SHIPPED | OUTPATIENT
Start: 2021-01-07 | End: 2021-04-01

## 2021-01-07 RX ORDER — AMOXICILLIN 250 MG
2 CAPSULE ORAL 2 TIMES DAILY
Qty: 40 TABLET | Refills: 2 | Status: SHIPPED | OUTPATIENT
Start: 2021-01-07 | End: 2021-04-01

## 2021-01-07 NOTE — TELEPHONE ENCOUNTER
Reviewed [place]  online for this patient (going through [employer] resulted in error message). Patient on tramadol BID from PCP (Dr. Arabella Conner) which she was on Pre-op. Last oxycodone filled 12/26 (oxycodone 5-10mg Q4H PRN severe pain).     Will send refill for oxycodone to patient today.    Shari Albarado PA-C

## 2021-01-07 NOTE — TELEPHONE ENCOUNTER
PDMP Review       Value Time User    State PDMP site checked  Yes 1/7/2021  3:00 PM Shari Albarado PA-C

## 2021-01-08 ENCOUNTER — TELEPHONE (OUTPATIENT)
Dept: ORTHOPEDICS | Facility: CLINIC | Age: 61
End: 2021-01-08

## 2021-01-08 ENCOUNTER — NURSE TRIAGE (OUTPATIENT)
Dept: NURSING | Facility: CLINIC | Age: 61
End: 2021-01-08

## 2021-01-08 NOTE — TELEPHONE ENCOUNTER
See My Chart message. I called pt & reminded her that any forms for work or Disability or Insurance go to  Shira [employer] leon [phone] fax [phone] & she should fax & then call her & confirm received.  Pt agreed.  Call back prn.    Chyna Bonner RN.

## 2021-01-08 NOTE — TELEPHONE ENCOUNTER
"On 12/21/2020 Pt had neck surgery.  Since the surgery, Pt has been having pain on the outside surface of the neck and head area.     There is redness and 3 little white spots in her scalp area.       Pt has gone to see a dermatologist and a Primary Care Provider and did not find anything.   It is not shingles.    Pt was told to follow up with the Ortho Provider that did the surgery to see what else they could do for her.     Please call Pt back @ [phone] for further assistance.     Laurne Mena RN  Central Triage Red Flags/Med Refills      Additional Information    Negative: Shock suspected (e.g., cold/pale/clammy skin, too weak to stand, low BP, rapid pulse)    Negative: Difficult to awaken or acting confused (e.g., disoriented, slurred speech)    Negative: [1] Similar pain previously AND [2] it was from \"heart attack\"    Negative: [1] Similar pain previously AND [2] it was from \"angina\" AND [3] not relieved by nitroglycerin    Negative: Sounds like a life-threatening emergency to the triager    Negative: Followed a neck injury (e.g., MVA, sports, impact or collision)    Negative: Chest pain    Negative: Lymph node in the neck is swollen or painful to the touch    Negative: Sore throat is main symptom    Negative: Difficulty breathing or unusual sweating (e.g., sweating without exertion)    Negative: [1] Stiff neck (can't put chin to chest) AND [2] headache    Negative: [1] Stiff neck (can't put chin to chest) AND [2] fever    Negative: Weakness of an arm or hand    Negative: Problems with bowel or bladder control    Negative: Head is twisting to one side (or ask \"is it turning against your will?\")    Negative: Patient sounds very sick or weak to the triager    Negative: [1] SEVERE neck pain (e.g., excruciating, unable to do any normal activities) AND [2] not improved after 2 hours of pain medicine    Negative: [1] Fever > 100.0 F (37.8 C) AND [2] IVDA (intravenous drug abuse)    Negative: [1] Fever > 100.0 F " "(37.8 C) AND [2] diabetes mellitus or weak immune system (e.g., HIV positive, cancer chemo, splenectomy, organ transplant, chronic steroids)    Negative: Numbness in an arm or hand (i.e., loss of sensation)    Negative: Tenderness or swelling of front of neck over windpipe    Rash in same area as pain (may be described as \"small blisters\")    Protocols used: NECK PAIN OR [id number]    1-8-20 see triage call center phone message.  See ER note & CT scan of 1-3-21 when seen for pain postop DOS 12-21-20.  I called pt back.  She states her pain is better today but still present.    She states also saw Dermatology & Primary MD about  Areas on skin described above & was tested for Shingles which was negative & told to F/U with .  She states they were unsure if she had an electrical burn?    I made appt with  in his next clinic in person for exam Th 1-14-21.  Pt agreed.  Call back prn.; pt agreed.  Will inform .   Chyna Bonner RN.        "

## 2021-01-12 DIAGNOSIS — Z98.1 S/P SPINAL FUSION: Primary | ICD-10-CM

## 2021-01-12 ASSESSMENT — ENCOUNTER SYMPTOMS
NIGHT SWEATS: 0
POOR WOUND HEALING: 0
POLYPHAGIA: 0
SMELL DISTURBANCE: 0
NECK MASS: 0
WEIGHT LOSS: 0
INCREASED ENERGY: 0
FATIGUE: 1
DECREASED APPETITE: 0
FEVER: 0
HOARSE VOICE: 0
TROUBLE SWALLOWING: 0
CHILLS: 0
WEIGHT GAIN: 0
NAIL CHANGES: 0
SINUS CONGESTION: 0
SORE THROAT: 0
POLYDIPSIA: 0
SINUS PAIN: 0
TASTE DISTURBANCE: 0
HALLUCINATIONS: 0
ALTERED TEMPERATURE REGULATION: 0
SKIN CHANGES: 0

## 2021-01-14 ENCOUNTER — OFFICE VISIT (OUTPATIENT)
Dept: ORTHOPEDICS | Facility: CLINIC | Age: 61
End: 2021-01-14
Payer: COMMERCIAL

## 2021-01-14 ENCOUNTER — HEALTH MAINTENANCE LETTER (OUTPATIENT)
Age: 61
End: 2021-01-14

## 2021-01-14 DIAGNOSIS — Z98.1 S/P SPINAL FUSION: Primary | ICD-10-CM

## 2021-01-14 PROCEDURE — 99024 POSTOP FOLLOW-UP VISIT: CPT | Performed by: ORTHOPAEDIC SURGERY

## 2021-01-14 NOTE — NURSING NOTE
Reason For Visit:   Chief Complaint   Patient presents with     Surgical Followup     DOS 12/21/20 S/P Revision laminectomy lumbar 5-sacral 1 with decompression of Right Sacral 1 nerve root; revision posterior instumentee spinal fusion (pseudarthrosis repair) lumbar 2-sacral 1.       Primary MD: Arabella Conner    There were no vitals taken for this visit.    Pain Assessment  Patient Currently in Pain: Yes  0-10 Pain Scale: 6  Primary Pain Location: Back  Other Pain Locations: Head    Oswestry (EPI) Questionnaire    OSWESTRY DISABILITY INDEX 1/12/2021   Count 9   Sum 26   Oswestry Score (%) 57.78   Some recent data might be hidden            Visual Analog Pain Scale  Back Pain Scale 0-10: 6  Right leg pain: 5  Left leg pain: 0    Promis 10 Assessment    PROMIS 10 1/12/2021   In general, would you say your health is: Very good   In general, would you say your quality of life is: Very good   In general, how would you rate your physical health? Very good   In general, how would you rate your mental health, including your mood and your ability to think? Excellent   In general, how would you rate your satisfaction with your social activities and relationships? Good   In general, please rate how well you carry out your usual social activities and roles Good   To what extent are you able to carry out your everyday physical activities such as walking, climbing stairs, carrying groceries, or moving a chair? A little   How often have you been bothered by emotional problems such as feeling anxious, depressed or irritable? Rarely   How would you rate your fatigue on average? Moderate   How would you rate your pain on average?   0 = No Pain  to  10 = Worst Imaginable Pain 7   In general, would you say your health is: 4   In general, would you say your quality of life is: 4   In general, how would you rate your physical health? 4   In general, how would you rate your mental health, including your mood and your ability to think? 5    In general, how would you rate your satisfaction with your social activities and relationships? 3   In general, please rate how well you carry out your usual social activities and roles. (This includes activities at home, at work and in your community, and responsibilities as a parent, child, spouse, employee, friend, etc.) 3   To what extent are you able to carry out your everyday physical activities such as walking, climbing stairs, carrying groceries, or moving a chair? 2   In the past 7 days, how often have you been bothered by emotional problems such as feeling anxious, depressed, or irritable? 2   In the past 7 days, how would you rate your fatigue on average? 3   In the past 7 days, how would you rate your pain on average, where 0 means no pain, and 10 means worst imaginable pain? 7   Global Mental Health Score 16   Global Physical Health Score 11   PROMIS TOTAL - SUBSCORES 27   Some recent data might be hidden                Virgie Boyer LPN

## 2021-01-14 NOTE — LETTER
1/14/2021         RE: Aleena Ontiveros  [address]  [place] [facility] [address]        Dear Colleague,    Thank you for referring your patient, Aleena Ontiveros, to the [facility] [place]. Please see a copy of my visit note below.    Spine Surgical Hx:  1974 - PISF T4-L4 with Carpio gavino instrumentation x 2 (Dr. Bandar Rose, [place]) for AIS.  10/01/2019 - 3-column osteotomy L5-S1 with sacral dome osteotomy; TLIF with Cunningham laminectomy L5-S1; bilateral PISF L2-pelvis using bilateral double S2AI screws; use of Infuse BMP Large kit (Damienbraronaldo/Rashid/Mara) for Gr 4 isthmic spondylolisthesis L5-S1 with severe sagittal malalignment.  [Implants:  [employer]tronic Solera and Ballast screw systems, with dual headed screws, reductions screws; 5.5 mm CoCr rods x 3].  12/21/2020 - Rev PISF L2-pelvis with partial removal of old Carpio gavino instrumentation; rev decomp of R S1 nerve root via R sacral dome osteoplasty; use of Magnifuse allograft (Christina/Maar) for persistent R S1 radiculopathy; pseudarthrosis L4-5.  [Implants: Medtronic Solera and Ballast].      Reason for Visit:  Chief Complaint   Patient presents with     Surgical Followup     DOS 12/21/20 S/P Revision laminectomy lumbar 5-sacral 1 with decompression of Right Sacral 1 nerve root; revision posterior instumentee spinal fusion (pseudarthrosis repair) lumbar 2-sacral 1.       S>  60/f, 3.5 wks postop; 1st postop visit.  Here earlier than scheduled 6 wk visit because of concerns re painful scalp lesion near the right occiput.  This was bothersome enough that it prompted an ER visit on 1/3/21.  The initial suspicion was shingles, however she was evaluated by Dermatology and her PCP who deemed it is not shingles.  There was a thought that it might be a burn injury perhaps related to the intraop neuromonitoring electrodes, thus advised to f/u with me. She described the pain as a sharp, burning sensation with an associated area of redness  and dry skin.  Overall, the burning pain has improved significantly since her visit at the emergency department.  She does still have some pain at the site, however it is much less painful to palpation or movement.  She also describes adhesive allergy and pain with removal of the Tegaderm dressing in the low back.  She has no residual rash or pain incision and no concerns about wound dehiscence or infection.    With regard to her preoperative symptoms, she feels that with the pain down her legs and in her feet has improved since surgery.  There is worse dullness in the posterior aspect of the right thigh.  Overall symptoms are better, and continue to improve.  With regard to pain control, she currently is taking 5 mg oxycodone twice daily, Robaxin 3 times daily, Atarax as needed, Tylenol.  She has weaned down on the narcotic medication from the initial discharge time and is confident that she will be able to be totally off the oxycodone by 6-week sherice.  She is doing home physical therapy and has found improvement with this.  She is excited and motivated to start outpatient physical therapy after she starts work.  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    Oswestry (EPI) Questionnaire    OSWESTRY DISABILITY INDEX 1/12/2021   Count 9   Sum 26   Oswestry Score (%) 57.78   Some recent data might be hidden      In relation to 10/1/19 surgery:  Preop EPI       70%  6 wks               72%  3 mos              35.56%  6 mos              33.33%  1 yr                    48.89%  14 mos               57.78%    In relation to 12/21/2020 surgery:  Preop EPI 57.78%  4 wks  57.78%       PROMIS-10 Scores  Global Mental Health Score: (P) 16  Global Physical Health Score: (P) 11  PROMIS TOTAL - SUBSCORES: (P) 27    O>   Alert, oriented x 3, cooperative.  Not in CP distress.  There were no vitals taken for this visit.  Surgical incision well-healed, no sign of  infection.  Ambulates independently.   Grossly neurologically intact.    Imaging: No new imaging today  XR C spine 1/3/21 demonstrates severe degenerative disc disease with associated facet arthroplasty. Evidence of cervical scoliosis and kyphosis with grade I anterolistehsis at C3-4 and C4-5.    A>   60/f here for first postoperative visit 3.5 weeks s/p above procedure    P>    Overall doing well with improvement in her preoperative symptoms, particularly the bilateral radiculopathy pain.  She does have a new burning pain and rash in the occiput since surgery.  There is some concern about shingles or an allergic reaction, however dermatology and PCP do not feel this to be the etiology.  There is also some concern about possible burn injury from one of the intraoperative neuro monitoring electrodes or referred pain from surgery.  Given the location of the lesion, this is less likely.  We discussed with a neuromonitoring technician, who has not seen burn from an intraoperative neuro monitoring electrode. Ultimately, we may not know the true etiology of this burning pain, however, it is improving significantly. We will continue to monitor this lesion.     - Continue to wean narcotic medications  - Placed order for Physical Therapy at [facility].  - Cancel her 6 wk appointment later this month.    RTC at 3 mos postop on 3/18/21 with EOS full spine ap-lat    The patient was seen and discussed with Dr. Vasquez, who agrees with the assessment and plan.    Lam Bustillo MD  Orthopaedic Surgery, PGY4  [phone]    Attestation:  I (Dr. Evangelist Vasquez - Spine Surgeon) have personally evaluated patient with PGY-4 [place], and agree with findings and plan outlined in the note, which I also edited.  I discussed at length with the patient/family, explained the nature of spinal condition, and formulated workup and/or treatment plan together.  All questions were answered to the best of my ability and to patient's apparent  satisfaction.    I also reached out to one of the [employer] neuro monitoring techs (Hemal Victor), and asked him if they had seen scalp burns previously from neuro monitoring electrode placement/stimulation.  He got back to me and told me they have not seen such complication.    Evangelist Vasquez MD    Orthopaedic Spine Surgery  Dept Orthopaedic Surgery, [facility]  [phone] office, [phone] pager  [email or web address]

## 2021-01-14 NOTE — PROGRESS NOTES
Spine Surgical Hx:  1976 - PISF T4-L4 with Carpio gavino instrumentation x 2 (Dr. Bandar Rose, [place]) for AIS.  10/01/2019 - 3-column osteotomy L5-S1 with sacral dome osteotomy; TLIF with Cunningham laminectomy L5-S1; bilateral PISF L2-pelvis using bilateral double S2AI screws; use of Infuse BMP Large kit (Sembrano/Mike/Mara) for Gr 4 isthmic spondylolisthesis L5-S1 with severe sagittal malalignment.  [Implants:  Medtronic Solera and Ballast screw systems, with dual headed screws, reductions screws; 5.5 mm CoCr rods x 3].  12/21/2020 - Rev PISF L2-pelvis with partial removal of old Carpio gavino instrumentation; rev decomp of R S1 nerve root via R sacral dome osteoplasty; use of Magnifuse allograft (Sembraronaldo/Mara) for persistent R S1 radiculopathy; pseudarthrosis L4-5.  [Implants: Medtronic Solera and Ballast].      Reason for Visit:  Chief Complaint   Patient presents with     Surgical Followup     DOS 12/21/20 S/P Revision laminectomy lumbar 5-sacral 1 with decompression of Right Sacral 1 nerve root; revision posterior instumentee spinal fusion (pseudarthrosis repair) lumbar 2-sacral 1.       S>  60/f, 3.5 wks postop; 1st postop visit.  Here earlier than scheduled 6 wk visit because of concerns re painful scalp lesion near the right occiput.  This was bothersome enough that it prompted an ER visit on 1/3/21.  The initial suspicion was shingles, however she was evaluated by Dermatology and her PCP who deemed it is not shingles.  There was a thought that it might be a burn injury perhaps related to the intraop neuromonitoring electrodes, thus advised to f/u with me. She described the pain as a sharp, burning sensation with an associated area of redness and dry skin.  Overall, the burning pain has improved significantly since her visit at the emergency department.  She does still have some pain at the site, however it is much less painful to palpation or movement.  She also describes adhesive allergy and pain  with removal of the Tegaderm dressing in the low back.  She has no residual rash or pain incision and no concerns about wound dehiscence or infection.    With regard to her preoperative symptoms, she feels that with the pain down her legs and in her feet has improved since surgery.  There is worse dullness in the posterior aspect of the right thigh.  Overall symptoms are better, and continue to improve.  With regard to pain control, she currently is taking 5 mg oxycodone twice daily, Robaxin 3 times daily, Atarax as needed, Tylenol.  She has weaned down on the narcotic medication from the initial discharge time and is confident that she will be able to be totally off the oxycodone by 6-week sherice.  She is doing home physical therapy and has found improvement with this.  She is excited and motivated to start outpatient physical therapy after she starts work.  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    Oswestry (EPI) Questionnaire    OSWESTRY DISABILITY INDEX 1/12/2021   Count 9   Sum 26   Oswestry Score (%) 57.78   Some recent data might be hidden      In relation to 10/1/19 surgery:  Preop EPI       70%  6 wks               72%  3 mos              35.56%  6 mos              33.33%  1 yr                    48.89%  14 mos               57.78%    In relation to 12/21/2020 surgery:  Preop EPI 57.78%  4 wks  57.78%       PROMIS-10 Scores  Global Mental Health Score: (P) 16  Global Physical Health Score: (P) 11  PROMIS TOTAL - SUBSCORES: (P) 27    O>   Alert, oriented x 3, cooperative.  Not in CP distress.  There were no vitals taken for this visit.  Surgical incision well-healed, no sign of infection.  Ambulates independently.   Grossly neurologically intact.    Imaging: No new imaging today  XR C spine 1/3/21 demonstrates severe degenerative disc disease with associated facet arthroplasty. Evidence of cervical scoliosis and kyphosis with grade I  anterolistehsis at C3-4 and C4-5.    A>   60/f here for first postoperative visit 3.5 weeks s/p above procedure    P>    Overall doing well with improvement in her preoperative symptoms, particularly the bilateral radiculopathy pain.  She does have a new burning pain and rash in the occiput since surgery.  There is some concern about shingles or an allergic reaction, however dermatology and PCP do not feel this to be the etiology.  There is also some concern about possible burn injury from one of the intraoperative neuro monitoring electrodes or referred pain from surgery.  Given the location of the lesion, this is less likely.  We discussed with a neuromonitoring technician, who has not seen burn from an intraoperative neuro monitoring electrode. Ultimately, we may not know the true etiology of this burning pain, however, it is improving significantly. We will continue to monitor this lesion.     - Continue to wean narcotic medications  - Placed order for Physical Therapy at [facility].  - Cancel her 6 wk appointment later this month.    RTC at 3 mos postop on 3/18/21 with EOS full spine ap-lat    The patient was seen and discussed with Dr. Vasquez, who agrees with the assessment and plan.    Lam Bustillo MD  Orthopaedic Surgery, PGY4  [phone]    Attestation:  I (Dr. Evangelist Vasquez - Spine Surgeon) have personally evaluated patient with PGY-4 Iwoan, and agree with findings and plan outlined in the note, which I also edited.  I discussed at length with the patient/family, explained the nature of spinal condition, and formulated workup and/or treatment plan together.  All questions were answered to the best of my ability and to patient's apparent satisfaction.    I also reached out to one of the [employer] neuro monitoring techs (Hemal Victor), and asked him if they had seen scalp burns previously from neuro monitoring electrode placement/stimulation.  He got back to me and told me they have not seen such  complication.    Evangelist Vasquez MD    Orthopaedic Spine Surgery  Dept Orthopaedic Surgery, [facility]  [phone] office, [phone] pager  [email or web address]

## 2021-02-01 ENCOUNTER — THERAPY VISIT (OUTPATIENT)
Dept: PHYSICAL THERAPY | Facility: CLINIC | Age: 61
End: 2021-02-01
Payer: COMMERCIAL

## 2021-02-01 DIAGNOSIS — G89.29 CHRONIC BILATERAL LOW BACK PAIN WITH BILATERAL SCIATICA: ICD-10-CM

## 2021-02-01 DIAGNOSIS — Z98.1 S/P SPINAL FUSION: ICD-10-CM

## 2021-02-01 DIAGNOSIS — M54.42 CHRONIC BILATERAL LOW BACK PAIN WITH BILATERAL SCIATICA: ICD-10-CM

## 2021-02-01 DIAGNOSIS — Z47.89 AFTERCARE FOLLOWING SURGERY OF THE MUSCULOSKELETAL SYSTEM, NEC: ICD-10-CM

## 2021-02-01 DIAGNOSIS — M54.41 CHRONIC BILATERAL LOW BACK PAIN WITH BILATERAL SCIATICA: ICD-10-CM

## 2021-02-01 PROCEDURE — 97161 PT EVAL LOW COMPLEX 20 MIN: CPT | Mod: GP | Performed by: PHYSICAL THERAPIST

## 2021-02-01 PROCEDURE — 97110 THERAPEUTIC EXERCISES: CPT | Mod: GP | Performed by: PHYSICAL THERAPIST

## 2021-02-01 NOTE — PROGRESS NOTES
[facility] Initial Evaluation    Subjective:  Aleena Ontiveros is a 60 year old female.    Patient s chief complaints: Post operative for Back pain with radicular symptoms.    Condition occurred due to DOS 12/21/20 S/P Revision laminectomy lumbar 5-sacral 1 with decompression of Right Sacral 1 nerve root; revision posterior instumentee spinal fusion (pseudarthrosis repair) lumbar 2-sacral 1.  Spine Surgical Hx:  1976 - PISF T4-L4 with Carpio gavino instrumentation x 2 (Dr. Bandar Rose, [place]) for AIS.  10/01/2019 - 3-column osteotomy L5-S1 with sacral dome osteotomy; TLIF with Cunningham laminectomy L5-S1; bilateral PISF L2-pelvis using bilateral double S2AI screws; use of Infuse BMP Large kit (Christina/Rashid/Mara) for Gr 4 isthmic spondylolisthesis L5-S1 with severe sagittal malalignment.  [Implants:  Medtronic Solera and Ballast screw systems, with dual headed screws, reductions screws; 5.5 mm CoCr rods x 3].  12/21/2020 - Rev PISF L2-pelvis with partial removal of old Carpio gavino instrumentation; rev decomp of R S1 nerve root via R sacral dome osteoplasty; use of Magnifuse allograft (Christina/Mara) for persistent R S1 radiculopathy; pseudarthrosis L4-5.  [Implants: Medtronic Solera and Ballast]..  Date of Onset: 12/21/20  Location of symptoms is LBP, R hip and buttock to lateral calf pain and some numbness, some numbness plantar feet (though better than surgery).  L leg is largely okay. General weakness--had noted R calf atrophy prior to surgery.  Current general weakness and deconditioning  Symptoms other than pain include:  As above  Quality of pain is pins/needles and dull/achey, sharp/shooting (variably) and frequency is constant unless laying flat.    Pain dependence on time of day is: worse in evenings.   Pain rating is: 6/10.    Symptoms are exacerbated by: sitting, walking, standing, lifting (restricted to 10lb per MD).    Symptoms are relieved by:  Medication (cannot do ibuprofen  or celebrex for another 6 weeks).  Backing off on oxycodone, but takes as needed.    Progression of symptoms is that symptoms are:  Gradually improving.  Imaging/Special tests include: 12/24/20 x-ray  1. Postsurgical changes of revision spinal fusion instrumentation L2  to the pelvis. Hardware appears intact.  2. Mild biconvex scoliotic curvature with mild right convex curvature  of the main thoracic spine and mild left convex curvature of the  thoracolumbar/lumbar spine.   3. Positive global sagittal imbalance.  4. Small left pleural effusion with overlying basilar opacity,  atelectasis or infection.   Previous treatments include: previous surgery and home PT.   Patient reports that general health is: fair.   Pertinent medical history includes:  Asthma, high blood pressure and numbness/tingling.    Medical allergies includes: adhesive tape, erythromycin   Surgical history includes: thyroidectomy, TKA B, prior spinal fusion for scoliosis, thyroidedctomy  Current medications include: asthma medications, pain, gabapentin, lisinopril, thyroid  Current occupation is:  theatre    Work status is: currently not working due to current condition  Primary job tasks include: Prolonged sitting and computer work  Barriers include: sitting, standing, lifting  Red flags include: B numbness/tingling     Patient's expectations for therapy include: be able to walk, do normal daily activities and return to work    LUMBAR:    Posture: fair    Neurological:    Motor Deficit:  Myotomes L R   L1-2 (hip flexion) 5 5   L3 (knee extension) 5 5   L4 (ankle DF) 5 5   L5 (g. toe ext) 5 5   S1 (ankle PF or knee flex) 5 5     Sensory Deficit, Reflexes: intact on the L side, R side trace numbness lateral thigh on R and more numbness R lateral lower leg and lateral foot.    AROM: (Major, Moderate, Minimal or Nil loss)  Movement Loss Ang Mod Min Nil Pain   Flexion  X   Just past knee, no increased pain   Extension  X   no increased  pain   Side Gliding L   X  no increased pain   Side Gliding R   X  no increased pain     Repeated movement testing:  Not tested due to post op status    Provisional Classification: other, post op  Principle of Management: will initiate posture education, core strengthening, LE strengthening and functional exercise    Assessment/Plan:    Patient is a 60 year old female with lumbar complaints.    Patient has the following significant findings with corresponding treatment plan.                Diagnosis 1:  S/p L5-S1 decompression revision (with prior spine surgeries)    Pain -  hot/cold therapy, US, electric stimulation, manual therapy and directional preference exercise  Decreased ROM/flexibility - manual therapy and therapeutic exercise  Decreased strength - therapeutic exercise and therapeutic activities  Impaired balance - neuro re-education and therapeutic activities  Decreased proprioception - neuro re-education and therapeutic activities  Edema - electric stimulation and cold therapy  Impaired gait - gait training  Decreased function - therapeutic activities  Impaired posture - neuro re-education    Therapy Evaluation Codes:   1) History comprised of:   Personal factors that impact the plan of care:      None.    Comorbidity factors that impact the plan of care are:      None.     Medications impacting care: None.  2) Examination of Body Systems comprised of:   Body structures and functions that impact the plan of care:      Lumbar spine.   Activity limitations that impact the plan of care are:      Bending, Lifting, Sitting, Standing, Walking and Sleeping.  3) Clinical presentation characteristics are:   Stable/Uncomplicated.  4) Decision-Making    Low complexity using standardized patient assessment instrument and/or measureable assessment of functional outcome.  Cumulative Therapy Evaluation is: Low complexity.    Previous and current functional limitations:  (See Goal Flow Sheet for this information)     Short term and Long term goals: (See Goal Flow Sheet for this information)     Communication ability:  Patient appears to be able to clearly communicate and understand verbal and written communication and follow directions correctly.  Treatment Explanation - The following has been discussed with the patient:   RX ordered/plan of care  Anticipated outcomes  Possible risks and side effects  This patient would benefit from PT intervention to resume normal activities.   Rehab potential is good.    Frequency:  2 X week, once daily  Duration:  for 12 weeks  Discharge Plan:  Achieve all LTG.  Independent in home treatment program.  Reach maximal therapeutic benefit.    Please refer to the daily flowsheet for treatment today, total treatment time and time spent performing 1:1 timed codes.

## 2021-02-04 ENCOUNTER — THERAPY VISIT (OUTPATIENT)
Dept: PHYSICAL THERAPY | Facility: CLINIC | Age: 61
End: 2021-02-04
Payer: COMMERCIAL

## 2021-02-04 DIAGNOSIS — M54.41 CHRONIC BILATERAL LOW BACK PAIN WITH BILATERAL SCIATICA: Primary | ICD-10-CM

## 2021-02-04 DIAGNOSIS — Z47.89 AFTERCARE FOLLOWING SURGERY OF THE MUSCULOSKELETAL SYSTEM, NEC: ICD-10-CM

## 2021-02-04 DIAGNOSIS — G89.29 CHRONIC BILATERAL LOW BACK PAIN WITH BILATERAL SCIATICA: Primary | ICD-10-CM

## 2021-02-04 DIAGNOSIS — M54.42 CHRONIC BILATERAL LOW BACK PAIN WITH BILATERAL SCIATICA: Primary | ICD-10-CM

## 2021-02-04 PROCEDURE — 97110 THERAPEUTIC EXERCISES: CPT | Mod: GP | Performed by: PHYSICAL THERAPIST

## 2021-02-04 PROCEDURE — 97112 NEUROMUSCULAR REEDUCATION: CPT | Mod: GP | Performed by: PHYSICAL THERAPIST

## 2021-02-04 PROCEDURE — 97530 THERAPEUTIC ACTIVITIES: CPT | Mod: GP | Performed by: PHYSICAL THERAPIST

## 2021-02-08 ENCOUNTER — THERAPY VISIT (OUTPATIENT)
Dept: PHYSICAL THERAPY | Facility: CLINIC | Age: 61
End: 2021-02-08
Payer: COMMERCIAL

## 2021-02-08 DIAGNOSIS — M54.41 CHRONIC BILATERAL LOW BACK PAIN WITH BILATERAL SCIATICA: Primary | ICD-10-CM

## 2021-02-08 DIAGNOSIS — Z47.89 AFTERCARE FOLLOWING SURGERY OF THE MUSCULOSKELETAL SYSTEM, NEC: ICD-10-CM

## 2021-02-08 DIAGNOSIS — M54.42 CHRONIC BILATERAL LOW BACK PAIN WITH BILATERAL SCIATICA: Primary | ICD-10-CM

## 2021-02-08 DIAGNOSIS — G89.29 CHRONIC BILATERAL LOW BACK PAIN WITH BILATERAL SCIATICA: Primary | ICD-10-CM

## 2021-02-08 PROCEDURE — 97530 THERAPEUTIC ACTIVITIES: CPT | Mod: GP | Performed by: PHYSICAL THERAPIST

## 2021-02-08 PROCEDURE — 97110 THERAPEUTIC EXERCISES: CPT | Mod: GP | Performed by: PHYSICAL THERAPIST

## 2021-02-08 PROCEDURE — 97112 NEUROMUSCULAR REEDUCATION: CPT | Mod: GP | Performed by: PHYSICAL THERAPIST

## 2021-02-11 ENCOUNTER — THERAPY VISIT (OUTPATIENT)
Dept: PHYSICAL THERAPY | Facility: CLINIC | Age: 61
End: 2021-02-11
Payer: COMMERCIAL

## 2021-02-11 DIAGNOSIS — M54.42 CHRONIC BILATERAL LOW BACK PAIN WITH BILATERAL SCIATICA: Primary | ICD-10-CM

## 2021-02-11 DIAGNOSIS — M54.41 CHRONIC BILATERAL LOW BACK PAIN WITH BILATERAL SCIATICA: Primary | ICD-10-CM

## 2021-02-11 DIAGNOSIS — G89.29 CHRONIC BILATERAL LOW BACK PAIN WITH BILATERAL SCIATICA: Primary | ICD-10-CM

## 2021-02-11 DIAGNOSIS — Z47.89 AFTERCARE FOLLOWING SURGERY OF THE MUSCULOSKELETAL SYSTEM, NEC: ICD-10-CM

## 2021-02-11 PROCEDURE — 97530 THERAPEUTIC ACTIVITIES: CPT | Mod: GP | Performed by: PHYSICAL THERAPIST

## 2021-02-11 PROCEDURE — 97112 NEUROMUSCULAR REEDUCATION: CPT | Mod: GP | Performed by: PHYSICAL THERAPIST

## 2021-02-11 PROCEDURE — 97110 THERAPEUTIC EXERCISES: CPT | Mod: GP | Performed by: PHYSICAL THERAPIST

## 2021-02-15 ENCOUNTER — THERAPY VISIT (OUTPATIENT)
Dept: PHYSICAL THERAPY | Facility: CLINIC | Age: 61
End: 2021-02-15
Payer: COMMERCIAL

## 2021-02-15 DIAGNOSIS — M54.41 CHRONIC BILATERAL LOW BACK PAIN WITH BILATERAL SCIATICA: Primary | ICD-10-CM

## 2021-02-15 DIAGNOSIS — M54.42 CHRONIC BILATERAL LOW BACK PAIN WITH BILATERAL SCIATICA: Primary | ICD-10-CM

## 2021-02-15 DIAGNOSIS — Z47.89 AFTERCARE FOLLOWING SURGERY OF THE MUSCULOSKELETAL SYSTEM, NEC: ICD-10-CM

## 2021-02-15 DIAGNOSIS — G89.29 CHRONIC BILATERAL LOW BACK PAIN WITH BILATERAL SCIATICA: Primary | ICD-10-CM

## 2021-02-15 PROCEDURE — 97530 THERAPEUTIC ACTIVITIES: CPT | Mod: GP | Performed by: PHYSICAL THERAPIST

## 2021-02-15 PROCEDURE — 97112 NEUROMUSCULAR REEDUCATION: CPT | Mod: GP | Performed by: PHYSICAL THERAPIST

## 2021-02-15 PROCEDURE — 97110 THERAPEUTIC EXERCISES: CPT | Mod: GP | Performed by: PHYSICAL THERAPIST

## 2021-02-16 DIAGNOSIS — Z98.1 S/P SPINAL FUSION: Primary | ICD-10-CM

## 2021-02-18 ENCOUNTER — THERAPY VISIT (OUTPATIENT)
Dept: PHYSICAL THERAPY | Facility: CLINIC | Age: 61
End: 2021-02-18
Payer: COMMERCIAL

## 2021-02-18 DIAGNOSIS — Z47.89 AFTERCARE FOLLOWING SURGERY OF THE MUSCULOSKELETAL SYSTEM, NEC: ICD-10-CM

## 2021-02-18 DIAGNOSIS — M54.42 CHRONIC BILATERAL LOW BACK PAIN WITH BILATERAL SCIATICA: Primary | ICD-10-CM

## 2021-02-18 DIAGNOSIS — G89.29 CHRONIC BILATERAL LOW BACK PAIN WITH BILATERAL SCIATICA: Primary | ICD-10-CM

## 2021-02-18 DIAGNOSIS — M54.41 CHRONIC BILATERAL LOW BACK PAIN WITH BILATERAL SCIATICA: Primary | ICD-10-CM

## 2021-02-18 PROCEDURE — 97112 NEUROMUSCULAR REEDUCATION: CPT | Mod: GP | Performed by: PHYSICAL THERAPIST

## 2021-02-18 PROCEDURE — 97110 THERAPEUTIC EXERCISES: CPT | Mod: GP | Performed by: PHYSICAL THERAPIST

## 2021-02-21 ENCOUNTER — MYC MEDICAL ADVICE (OUTPATIENT)
Dept: ORTHOPEDICS | Facility: CLINIC | Age: 61
End: 2021-02-21

## 2021-02-21 DIAGNOSIS — M54.18 RIGHT SACRAL RADICULOPATHY: ICD-10-CM

## 2021-02-22 ENCOUNTER — THERAPY VISIT (OUTPATIENT)
Dept: PHYSICAL THERAPY | Facility: CLINIC | Age: 61
End: 2021-02-22
Payer: COMMERCIAL

## 2021-02-22 DIAGNOSIS — Z47.89 AFTERCARE FOLLOWING SURGERY OF THE MUSCULOSKELETAL SYSTEM, NEC: ICD-10-CM

## 2021-02-22 DIAGNOSIS — M54.41 CHRONIC BILATERAL LOW BACK PAIN WITH BILATERAL SCIATICA: Primary | ICD-10-CM

## 2021-02-22 DIAGNOSIS — M54.42 CHRONIC BILATERAL LOW BACK PAIN WITH BILATERAL SCIATICA: Primary | ICD-10-CM

## 2021-02-22 DIAGNOSIS — G89.29 CHRONIC BILATERAL LOW BACK PAIN WITH BILATERAL SCIATICA: Primary | ICD-10-CM

## 2021-02-22 PROCEDURE — 97110 THERAPEUTIC EXERCISES: CPT | Mod: GP | Performed by: PHYSICAL THERAPIST

## 2021-02-22 PROCEDURE — 97112 NEUROMUSCULAR REEDUCATION: CPT | Mod: GP | Performed by: PHYSICAL THERAPIST

## 2021-02-22 PROCEDURE — 97530 THERAPEUTIC ACTIVITIES: CPT | Mod: GP | Performed by: PHYSICAL THERAPIST

## 2021-02-22 RX ORDER — ACETAMINOPHEN 500 MG
TABLET ORAL
Qty: 180 TABLET | Refills: 2 | Status: SHIPPED | OUTPATIENT
Start: 2021-02-22 | End: 2021-07-09

## 2021-02-22 NOTE — TELEPHONE ENCOUNTER
See My Chart message.  I called pt.    She is taking 500mg ES Tylenol 2 Tabs TID & requests quantity be increased to #180.  States doing well.  RTC postop appt 3-18-21.  Refilled Tylenol #180.  Call back prn. Pt agreed.    S.O./Chyna Bonner RN.

## 2021-02-25 ENCOUNTER — THERAPY VISIT (OUTPATIENT)
Dept: PHYSICAL THERAPY | Facility: CLINIC | Age: 61
End: 2021-02-25
Payer: COMMERCIAL

## 2021-02-25 DIAGNOSIS — M54.42 CHRONIC BILATERAL LOW BACK PAIN WITH BILATERAL SCIATICA: Primary | ICD-10-CM

## 2021-02-25 DIAGNOSIS — Z47.89 AFTERCARE FOLLOWING SURGERY OF THE MUSCULOSKELETAL SYSTEM, NEC: ICD-10-CM

## 2021-02-25 DIAGNOSIS — M54.41 CHRONIC BILATERAL LOW BACK PAIN WITH BILATERAL SCIATICA: Primary | ICD-10-CM

## 2021-02-25 DIAGNOSIS — G89.29 CHRONIC BILATERAL LOW BACK PAIN WITH BILATERAL SCIATICA: Primary | ICD-10-CM

## 2021-02-25 PROCEDURE — 97112 NEUROMUSCULAR REEDUCATION: CPT | Mod: GP | Performed by: PHYSICAL THERAPIST

## 2021-02-25 PROCEDURE — 97140 MANUAL THERAPY 1/> REGIONS: CPT | Mod: GP | Performed by: PHYSICAL THERAPIST

## 2021-02-25 PROCEDURE — 97110 THERAPEUTIC EXERCISES: CPT | Mod: GP | Performed by: PHYSICAL THERAPIST

## 2021-03-01 ENCOUNTER — THERAPY VISIT (OUTPATIENT)
Dept: PHYSICAL THERAPY | Facility: CLINIC | Age: 61
End: 2021-03-01
Payer: COMMERCIAL

## 2021-03-01 DIAGNOSIS — M54.41 CHRONIC BILATERAL LOW BACK PAIN WITH BILATERAL SCIATICA: Primary | ICD-10-CM

## 2021-03-01 DIAGNOSIS — M54.42 CHRONIC BILATERAL LOW BACK PAIN WITH BILATERAL SCIATICA: Primary | ICD-10-CM

## 2021-03-01 DIAGNOSIS — Z47.89 AFTERCARE FOLLOWING SURGERY OF THE MUSCULOSKELETAL SYSTEM, NEC: ICD-10-CM

## 2021-03-01 DIAGNOSIS — G89.29 CHRONIC BILATERAL LOW BACK PAIN WITH BILATERAL SCIATICA: Primary | ICD-10-CM

## 2021-03-01 PROCEDURE — 97110 THERAPEUTIC EXERCISES: CPT | Mod: GP | Performed by: PHYSICAL THERAPIST

## 2021-03-01 PROCEDURE — 97112 NEUROMUSCULAR REEDUCATION: CPT | Mod: GP | Performed by: PHYSICAL THERAPIST

## 2021-03-01 PROCEDURE — 97530 THERAPEUTIC ACTIVITIES: CPT | Mod: GP | Performed by: PHYSICAL THERAPIST

## 2021-03-02 ENCOUNTER — DOCUMENTATION ONLY (OUTPATIENT)
Dept: CARE COORDINATION | Facility: CLINIC | Age: 61
End: 2021-03-02

## 2021-03-04 ENCOUNTER — THERAPY VISIT (OUTPATIENT)
Dept: PHYSICAL THERAPY | Facility: CLINIC | Age: 61
End: 2021-03-04
Payer: COMMERCIAL

## 2021-03-04 DIAGNOSIS — Z47.89 AFTERCARE FOLLOWING SURGERY OF THE MUSCULOSKELETAL SYSTEM, NEC: ICD-10-CM

## 2021-03-04 DIAGNOSIS — M54.41 CHRONIC BILATERAL LOW BACK PAIN WITH BILATERAL SCIATICA: Primary | ICD-10-CM

## 2021-03-04 DIAGNOSIS — G89.29 CHRONIC BILATERAL LOW BACK PAIN WITH BILATERAL SCIATICA: Primary | ICD-10-CM

## 2021-03-04 DIAGNOSIS — M54.42 CHRONIC BILATERAL LOW BACK PAIN WITH BILATERAL SCIATICA: Primary | ICD-10-CM

## 2021-03-04 PROCEDURE — 97110 THERAPEUTIC EXERCISES: CPT | Mod: GP | Performed by: PHYSICAL THERAPIST

## 2021-03-04 PROCEDURE — 97112 NEUROMUSCULAR REEDUCATION: CPT | Mod: GP | Performed by: PHYSICAL THERAPIST

## 2021-03-04 PROCEDURE — 97530 THERAPEUTIC ACTIVITIES: CPT | Mod: GP | Performed by: PHYSICAL THERAPIST

## 2021-03-04 NOTE — PROGRESS NOTES
Spine Surgical Hx:  1976 - PISF T4-L4 with Carpio gavino instrumentation x 2 (Dr. Bandar Rose, [place]) for AIS.  10/01/2019 - 3-column osteotomy L5-S1 with sacral dome osteotomy; TLIF with Cunningham laminectomy L5-S1; bilateral PISF L2-pelvis using bilateral double S2AI screws; use of Infuse BMP Large kit (Damienbraronaldo/Rashid/Mara) for Gr 4 isthmic spondylolisthesis L5-S1 with severe sagittal malalignment.  [Implants:  Medtronic Solera and Ballast screw systems, with dual headed screws, reductions screws; 5.5 mm CoCr rods x 3].  12/21/2020 - Rev PISF L2-pelvis with partial removal of old Carpio gavino instrumentation; rev decomp of R S1 nerve root via R sacral dome osteoplasty; use of Magnifuse allograft; repair of dural tear R L4-5 (Christina/Mara) for persistent R S1 radiculopathy; pseudarthrosis L4-5.  [Implants: Medtronic Solera and Ballast].      In-Person Visit    Reason for Visit:  Chief Complaint   Patient presents with     Surgical Followup     DOS 12/21/20 S/P Revision laminectomy lumbar 5-sacral 1 with decompression of Right Sacral 1 nerve root; revision posterior instumentee spinal fusion (pseudarthrosis repair) lumbar 2-sacral 1.       S>  60/f, 3 mos postop.  Last seen at 3.5 wks.  Had a painful scalp lesion.  Reported some improvement of preop pain sxs.  P> Wean down opioids; Internal PT; RTC at 3 mos with rpt EOS full spine x-rays.    Unaccompanied.  Doing well.  Happy with surgery.  Feels that it helped with her preoperative radicular symptoms.  Off opioids.  Still takes gabapentin, although it seems that she has been taking it in varying doses depending on her symptoms.  She says her primary care physician prescribes 400 mg capsules, while we prescribe 100 mg capsules.  She would like to request for a refill, as she feels that the 100 mg capsules allow her more flexibility in the dosage that she takes.  She would like to request referral to a pain clinic more so to discuss her medications, to  optimize dosing, and to consider alternative medicines.     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  She was given a temporary handicap parking pass, but would like to request if we could give her a longer term pass, as the previous one had already .    She has been doing physical therapy at [facility] in [place].  She thinks this really helps.  She thinks she would need PT for a while longer yet.    Her only complaint is that since the surgery she has noted some left foot discomfort and that her left toes seem to deviate laterally.  However, this does not interfere with her activities, and is not disabling.      Oswestry (EPI) Questionnaire    OSWESTRY DISABILITY INDEX 3/18/2021   Count 9   Sum 22   Oswestry Score (%) 48.89   Some recent data might be hidden      3CO L5-S1; PISF L2-pelvis 10/1/19:  Preop EPI       70%  6 wks               72%  3 mos              35.56%  6 mos              33.33%  1 yr                    48.89%  14 mos               57.78%     Rev decomp of R S1 nerve root; rev PISF L2-pelvis 2020:  Preop EPI         57.78%  4 wks                 57.78%  6 wks  48.57%  3 mos  48.89%    Visual Analog Pain Scale  Back Pain Scale 0-10: 4  Right leg pain: 3  Left leg pain: 0    PROMIS-10 Scores  Global Mental Health Score: (P) 18  Global Physical Health Score: (P) 14  PROMIS TOTAL - SUBSCORES: (P) 32    O>   Alert, oriented x 3, cooperative.  Not in CP distress.  There were no vitals taken for this visit.  Surgical incision well-healed, no sign of infection.  Ambulates independently.   Grossly neurologically intact.    Imaging:   EOS full spine AP lateral standing x-rays today show stable instrumentation, without evidence of loosening or failure.    A>   3 months postoperative, doing very well.    P>    Congratulated and reassured patient.  Am glad that her surgery helped.  Regarding her left foot symptoms, I do not know how to directly explain this.   We did sustain dural tear during her surgery, but this was on the right side.    She will let us know if she would in the future need another order for physical therapy; in the meantime, continue PT.    -Gabapentin renewal prescription placed.  -Pain clinic referral placed, for optimization of medications (nonopioid) and comprehensive nonoperative treatment.  -Will renew her handicap parking sticker.  We will apply for a 1 year past.    Return to clinic in 3 months (6 months postoperative) with repeat EOS full spine AP lateral x-rays.      Evangelist Vasquez MD    Orthopaedic Spine Surgery  Dept Orthopaedic Surgery, [facility]  [phone] office, [phone] pager  [email or web address]

## 2021-03-08 ENCOUNTER — THERAPY VISIT (OUTPATIENT)
Dept: PHYSICAL THERAPY | Facility: CLINIC | Age: 61
End: 2021-03-08
Payer: COMMERCIAL

## 2021-03-08 DIAGNOSIS — G89.29 CHRONIC BILATERAL LOW BACK PAIN WITH BILATERAL SCIATICA: Primary | ICD-10-CM

## 2021-03-08 DIAGNOSIS — M54.41 CHRONIC BILATERAL LOW BACK PAIN WITH BILATERAL SCIATICA: Primary | ICD-10-CM

## 2021-03-08 DIAGNOSIS — M54.42 CHRONIC BILATERAL LOW BACK PAIN WITH BILATERAL SCIATICA: Primary | ICD-10-CM

## 2021-03-08 DIAGNOSIS — Z47.89 AFTERCARE FOLLOWING SURGERY OF THE MUSCULOSKELETAL SYSTEM, NEC: ICD-10-CM

## 2021-03-08 PROCEDURE — 97530 THERAPEUTIC ACTIVITIES: CPT | Mod: GP | Performed by: PHYSICAL THERAPIST

## 2021-03-08 PROCEDURE — 97110 THERAPEUTIC EXERCISES: CPT | Mod: GP | Performed by: PHYSICAL THERAPIST

## 2021-03-08 PROCEDURE — 97112 NEUROMUSCULAR REEDUCATION: CPT | Mod: GP | Performed by: PHYSICAL THERAPIST

## 2021-03-11 ENCOUNTER — THERAPY VISIT (OUTPATIENT)
Dept: PHYSICAL THERAPY | Facility: CLINIC | Age: 61
End: 2021-03-11
Payer: COMMERCIAL

## 2021-03-11 DIAGNOSIS — G89.29 CHRONIC BILATERAL LOW BACK PAIN WITH BILATERAL SCIATICA: Primary | ICD-10-CM

## 2021-03-11 DIAGNOSIS — M54.42 CHRONIC BILATERAL LOW BACK PAIN WITH BILATERAL SCIATICA: Primary | ICD-10-CM

## 2021-03-11 DIAGNOSIS — Z47.89 AFTERCARE FOLLOWING SURGERY OF THE MUSCULOSKELETAL SYSTEM, NEC: ICD-10-CM

## 2021-03-11 DIAGNOSIS — M54.41 CHRONIC BILATERAL LOW BACK PAIN WITH BILATERAL SCIATICA: Primary | ICD-10-CM

## 2021-03-11 PROCEDURE — 97110 THERAPEUTIC EXERCISES: CPT | Mod: GP | Performed by: PHYSICAL THERAPIST

## 2021-03-11 PROCEDURE — 97112 NEUROMUSCULAR REEDUCATION: CPT | Mod: GP | Performed by: PHYSICAL THERAPIST

## 2021-03-11 PROCEDURE — 97530 THERAPEUTIC ACTIVITIES: CPT | Mod: GP | Performed by: PHYSICAL THERAPIST

## 2021-03-18 ENCOUNTER — THERAPY VISIT (OUTPATIENT)
Dept: PHYSICAL THERAPY | Facility: CLINIC | Age: 61
End: 2021-03-18
Payer: COMMERCIAL

## 2021-03-18 ENCOUNTER — ANCILLARY PROCEDURE (OUTPATIENT)
Dept: GENERAL RADIOLOGY | Facility: CLINIC | Age: 61
End: 2021-03-18
Attending: ORTHOPAEDIC SURGERY
Payer: COMMERCIAL

## 2021-03-18 ENCOUNTER — OFFICE VISIT (OUTPATIENT)
Dept: ORTHOPEDICS | Facility: CLINIC | Age: 61
End: 2021-03-18
Payer: COMMERCIAL

## 2021-03-18 DIAGNOSIS — Z98.1 S/P SPINAL FUSION: ICD-10-CM

## 2021-03-18 DIAGNOSIS — Z98.1 S/P SPINAL FUSION: Primary | ICD-10-CM

## 2021-03-18 DIAGNOSIS — M54.41 CHRONIC BILATERAL LOW BACK PAIN WITH BILATERAL SCIATICA: Primary | ICD-10-CM

## 2021-03-18 DIAGNOSIS — G89.18 POSTOPERATIVE PAIN AFTER SPINAL SURGERY: ICD-10-CM

## 2021-03-18 DIAGNOSIS — M54.18 RIGHT SACRAL RADICULOPATHY: ICD-10-CM

## 2021-03-18 DIAGNOSIS — G89.29 CHRONIC BILATERAL LOW BACK PAIN WITH BILATERAL SCIATICA: Primary | ICD-10-CM

## 2021-03-18 DIAGNOSIS — M54.42 CHRONIC BILATERAL LOW BACK PAIN WITH BILATERAL SCIATICA: Primary | ICD-10-CM

## 2021-03-18 DIAGNOSIS — Z47.89 AFTERCARE FOLLOWING SURGERY OF THE MUSCULOSKELETAL SYSTEM, NEC: ICD-10-CM

## 2021-03-18 PROCEDURE — 99024 POSTOP FOLLOW-UP VISIT: CPT | Performed by: ORTHOPAEDIC SURGERY

## 2021-03-18 PROCEDURE — 72082 X-RAY EXAM ENTIRE SPI 2/3 VW: CPT | Performed by: STUDENT IN AN ORGANIZED HEALTH CARE EDUCATION/TRAINING PROGRAM

## 2021-03-18 PROCEDURE — 97112 NEUROMUSCULAR REEDUCATION: CPT | Mod: GP | Performed by: PHYSICAL THERAPIST

## 2021-03-18 PROCEDURE — 97530 THERAPEUTIC ACTIVITIES: CPT | Mod: GP | Performed by: PHYSICAL THERAPIST

## 2021-03-18 PROCEDURE — 97110 THERAPEUTIC EXERCISES: CPT | Mod: GP | Performed by: PHYSICAL THERAPIST

## 2021-03-18 RX ORDER — GABAPENTIN 100 MG/1
CAPSULE ORAL
Qty: 120 CAPSULE | Refills: 3 | Status: SHIPPED | OUTPATIENT
Start: 2021-03-18 | End: 2021-07-09

## 2021-03-18 NOTE — LETTER
3/18/2021         RE: Aleena Ontiveros  [address]  [place] [address]        Dear Colleague,    Thank you for referring your patient, Aleena Ontiveros, to the [facility] [place]. Please see a copy of my visit note below.    Spine Surgical Hx:  1976 - PISF T4-L4 with Carpio gavino instrumentation x 2 (Dr. Bandar Rose, [place]) for AIS.  10/01/2019 - 3-column osteotomy L5-S1 with sacral dome osteotomy; TLIF with Cunningham laminectomy L5-S1; bilateral PISF L2-pelvis using bilateral double S2AI screws; use of Infuse BMP Large kit (Christina/Rashid/Mara) for Gr 4 isthmic spondylolisthesis L5-S1 with severe sagittal malalignment.  [Implants:  Medtronic Solera and Ballast screw systems, with dual headed screws, reductions screws; 5.5 mm CoCr rods x 3].  12/21/2020 - Rev PISF L2-pelvis with partial removal of old Carpio gavino instrumentation; rev decomp of R S1 nerve root via R sacral dome osteoplasty; use of Magnifuse allograft; repair of dural tear R L4-5 (Damienbraronaldo/Mara) for persistent R S1 radiculopathy; pseudarthrosis L4-5.  [Implants: Medtronic Solera and Ballast].      In-Person Visit    Reason for Visit:  Chief Complaint   Patient presents with     Surgical Followup     DOS 12/21/20 S/P Revision laminectomy lumbar 5-sacral 1 with decompression of Right Sacral 1 nerve root; revision posterior instumentee spinal fusion (pseudarthrosis repair) lumbar 2-sacral 1.       S>  60/f, 3 mos postop.  Last seen at 3.5 wks.  Had a painful scalp lesion.  Reported some improvement of preop pain sxs.  P> Wean down opioids; Internal PT; RTC at 3 mos with rpt EOS full spine x-rays.    Unaccompanied.  Doing well.  Happy with surgery.  Feels that it helped with her preoperative radicular symptoms.  Off opioids.  Still takes gabapentin, although it seems that she has been taking it in varying doses depending on her symptoms.  She says her primary care physician prescribes 400 mg capsules, while we  prescribe 100 mg capsules.  She would like to request for a refill, as she feels that the 100 mg capsules allow her more flexibility in the dosage that she takes.  She would like to request referral to a pain clinic more so to discuss her medications, to optimize dosing, and to consider alternative medicines.     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  She was given a temporary handicap parking pass, but would like to request if we could give her a longer term pass, as the previous one had already .    She has been doing physical therapy at [facility] in [place].  She thinks this really helps.  She thinks she would need PT for a while longer yet.    Her only complaint is that since the surgery she has noted some left foot discomfort and that her left toes seem to deviate laterally.  However, this does not interfere with her activities, and is not disabling.      Oswestry (EPI) Questionnaire    OSWESTRY DISABILITY INDEX 3/18/2021   Count 9   Sum 22   Oswestry Score (%) 48.89   Some recent data might be hidden      3CO L5-S1; PISF L2-pelvis 10/1/19:  Preop EPI       70%  6 wks               72%  3 mos              35.56%  6 mos              33.33%  1 yr                    48.89%  14 mos               57.78%     Rev decomp of R S1 nerve root; rev PISF L2-pelvis 2020:  Preop EPI         57.78%  4 wks                 57.78%  6 wks  48.57%  3 mos  48.89%    Visual Analog Pain Scale  Back Pain Scale 0-10: 4  Right leg pain: 3  Left leg pain: 0    PROMIS-10 Scores  Global Mental Health Score: (P) 18  Global Physical Health Score: (P) 14  PROMIS TOTAL - SUBSCORES: (P) 32    O>   Alert, oriented x 3, cooperative.  Not in CP distress.  There were no vitals taken for this visit.  Surgical incision well-healed, no sign of infection.  Ambulates independently.   Grossly neurologically intact.    Imaging:   EOS full spine AP lateral standing x-rays today show stable  instrumentation, without evidence of loosening or failure.    A>   3 months postoperative, doing very well.    P>    Congratulated and reassured patient.  Am glad that her surgery helped.  Regarding her left foot symptoms, I do not know how to directly explain this.  We did sustain dural tear during her surgery, but this was on the right side.    She will let us know if she would in the future need another order for physical therapy; in the meantime, continue PT.    -Gabapentin renewal prescription placed.  -Pain clinic referral placed, for optimization of medications (nonopioid) and comprehensive nonoperative treatment.  -Will renew her handicap parking sticker.  We will apply for a 1 year past.    Return to clinic in 3 months (6 months postoperative) with repeat EOS full spine AP lateral x-rays.      Evangelist Vasquez MD    Orthopaedic Spine Surgery  Dept Orthopaedic Surgery, [facility]  [phone] office, [phone] pager  [email or web address]

## 2021-03-22 ENCOUNTER — THERAPY VISIT (OUTPATIENT)
Dept: PHYSICAL THERAPY | Facility: CLINIC | Age: 61
End: 2021-03-22
Payer: COMMERCIAL

## 2021-03-22 DIAGNOSIS — M54.42 CHRONIC BILATERAL LOW BACK PAIN WITH BILATERAL SCIATICA: Primary | ICD-10-CM

## 2021-03-22 DIAGNOSIS — M54.41 CHRONIC BILATERAL LOW BACK PAIN WITH BILATERAL SCIATICA: Primary | ICD-10-CM

## 2021-03-22 DIAGNOSIS — G89.29 CHRONIC BILATERAL LOW BACK PAIN WITH BILATERAL SCIATICA: Primary | ICD-10-CM

## 2021-03-22 DIAGNOSIS — Z47.89 AFTERCARE FOLLOWING SURGERY OF THE MUSCULOSKELETAL SYSTEM, NEC: ICD-10-CM

## 2021-03-22 PROCEDURE — 97530 THERAPEUTIC ACTIVITIES: CPT | Mod: GP | Performed by: PHYSICAL THERAPIST

## 2021-03-22 PROCEDURE — 97110 THERAPEUTIC EXERCISES: CPT | Mod: GP | Performed by: PHYSICAL THERAPIST

## 2021-03-22 PROCEDURE — 97112 NEUROMUSCULAR REEDUCATION: CPT | Mod: GP | Performed by: PHYSICAL THERAPIST

## 2021-03-25 ENCOUNTER — THERAPY VISIT (OUTPATIENT)
Dept: PHYSICAL THERAPY | Facility: CLINIC | Age: 61
End: 2021-03-25
Payer: COMMERCIAL

## 2021-03-25 DIAGNOSIS — M54.42 CHRONIC BILATERAL LOW BACK PAIN WITH BILATERAL SCIATICA: Primary | ICD-10-CM

## 2021-03-25 DIAGNOSIS — Z47.89 AFTERCARE FOLLOWING SURGERY OF THE MUSCULOSKELETAL SYSTEM, NEC: ICD-10-CM

## 2021-03-25 DIAGNOSIS — G89.29 CHRONIC BILATERAL LOW BACK PAIN WITH BILATERAL SCIATICA: Primary | ICD-10-CM

## 2021-03-25 DIAGNOSIS — M54.41 CHRONIC BILATERAL LOW BACK PAIN WITH BILATERAL SCIATICA: Primary | ICD-10-CM

## 2021-03-25 PROCEDURE — 97530 THERAPEUTIC ACTIVITIES: CPT | Mod: GP | Performed by: PHYSICAL THERAPIST

## 2021-03-25 PROCEDURE — 97112 NEUROMUSCULAR REEDUCATION: CPT | Mod: GP | Performed by: PHYSICAL THERAPIST

## 2021-03-25 PROCEDURE — 97110 THERAPEUTIC EXERCISES: CPT | Mod: GP | Performed by: PHYSICAL THERAPIST

## 2021-03-26 ASSESSMENT — ENCOUNTER SYMPTOMS
PARALYSIS: 0
DIZZINESS: 0
JOINT SWELLING: 1
LOSS OF CONSCIOUSNESS: 0
HEADACHES: 0
MYALGIAS: 1
NECK PAIN: 0
TINGLING: 1
TREMORS: 0
WEAKNESS: 0
BACK PAIN: 1
DISTURBANCES IN COORDINATION: 0
SPEECH CHANGE: 0
MUSCLE CRAMPS: 1
MUSCLE WEAKNESS: 1
NUMBNESS: 1
STIFFNESS: 1
ARTHRALGIAS: 1
SEIZURES: 0

## 2021-04-01 ENCOUNTER — OFFICE VISIT (OUTPATIENT)
Dept: ANESTHESIOLOGY | Facility: CLINIC | Age: 61
End: 2021-04-01
Attending: ORTHOPAEDIC SURGERY
Payer: COMMERCIAL

## 2021-04-01 ENCOUNTER — THERAPY VISIT (OUTPATIENT)
Dept: PHYSICAL THERAPY | Facility: CLINIC | Age: 61
End: 2021-04-01
Payer: COMMERCIAL

## 2021-04-01 VITALS
SYSTOLIC BLOOD PRESSURE: 139 MMHG | RESPIRATION RATE: 16 BRPM | WEIGHT: 174 LBS | DIASTOLIC BLOOD PRESSURE: 85 MMHG | BODY MASS INDEX: 32.02 KG/M2 | HEART RATE: 87 BPM | HEIGHT: 62 IN

## 2021-04-01 DIAGNOSIS — G89.29 CHRONIC BILATERAL LOW BACK PAIN WITH BILATERAL SCIATICA: Primary | ICD-10-CM

## 2021-04-01 DIAGNOSIS — G89.18 POSTOPERATIVE PAIN AFTER SPINAL SURGERY: ICD-10-CM

## 2021-04-01 DIAGNOSIS — M54.42 CHRONIC BILATERAL LOW BACK PAIN WITH BILATERAL SCIATICA: Primary | ICD-10-CM

## 2021-04-01 DIAGNOSIS — Z47.89 AFTERCARE FOLLOWING SURGERY OF THE MUSCULOSKELETAL SYSTEM, NEC: ICD-10-CM

## 2021-04-01 DIAGNOSIS — Z98.1 S/P SPINAL FUSION: ICD-10-CM

## 2021-04-01 DIAGNOSIS — M54.41 CHRONIC BILATERAL LOW BACK PAIN WITH BILATERAL SCIATICA: Primary | ICD-10-CM

## 2021-04-01 DIAGNOSIS — G57.01 PIRIFORMIS SYNDROME OF RIGHT SIDE: Primary | ICD-10-CM

## 2021-04-01 DIAGNOSIS — M54.18 RIGHT SACRAL RADICULOPATHY: ICD-10-CM

## 2021-04-01 PROCEDURE — 97110 THERAPEUTIC EXERCISES: CPT | Mod: GP | Performed by: PHYSICAL THERAPIST

## 2021-04-01 PROCEDURE — 99214 OFFICE O/P EST MOD 30 MIN: CPT | Mod: GC | Performed by: ANESTHESIOLOGY

## 2021-04-01 PROCEDURE — 97112 NEUROMUSCULAR REEDUCATION: CPT | Mod: GP | Performed by: PHYSICAL THERAPIST

## 2021-04-01 RX ORDER — DULOXETIN HYDROCHLORIDE 30 MG/1
CAPSULE, DELAYED RELEASE ORAL
Qty: 60 CAPSULE | Refills: 1 | Status: SHIPPED | OUTPATIENT
Start: 2021-04-01 | End: 2021-07-09

## 2021-04-01 RX ORDER — CYCLOBENZAPRINE HCL 10 MG
5-10 TABLET ORAL 3 TIMES DAILY PRN
Qty: 60 TABLET | Refills: 0 | Status: SHIPPED | OUTPATIENT
Start: 2021-04-01 | End: 2021-05-05

## 2021-04-01 ASSESSMENT — ENCOUNTER SYMPTOMS
TINGLING: 1
SEIZURES: 0
WEAKNESS: 0
NECK PAIN: 0
LOSS OF CONSCIOUSNESS: 0
BACK PAIN: 1
SPEECH CHANGE: 0
MYALGIAS: 1
HEADACHES: 0
DIZZINESS: 0
TREMORS: 0

## 2021-04-01 ASSESSMENT — PAIN SCALES - GENERAL: PAINLEVEL: MODERATE PAIN (5)

## 2021-04-01 ASSESSMENT — MIFFLIN-ST. JEOR: SCORE: 1302.51

## 2021-04-01 NOTE — PATIENT INSTRUCTIONS
Medications:    diclofenac (VOLTAREN) 1 % topical gel. Apply 1-2 g topically 4 times daily.    DULoxetine (CYMBALTA) 30 MG capsule.  Take 30 mg daily for one week, then increase to 30 mg twice daily. Continue at this dose    Discontinue Methocarbamol.     Trial cyclobenzaprine (FLEXERIL) 10 MG tablet-  Take 0.5-1 tablets (5-10 mg) by mouth 3 times daily as needed for muscle spasms.     For refills of medications - You MUST call (Or MyChart) the clinic DIRECTLY and at least 7 days before you run out of your medication.      Referrals:    Continue PT. Ask therapist to focus on piriformis stretching.   Acupuncture referral placed. Contact insurance to verify coverage.        Procedures:    Call to schedule your procedure: [phone], option #2  Right Piriformis Injection    Your pre-procedure instructions are below, please call our clinic if you have any questions.      Recommended Follow up:      Follow up in 6-8 weeks after procedure or earlier if indicated.    Please call [phone], option #1 to schedule your clinic appointment if you don't already have an appointment scheduled.    Procedure Information related to COVID-19    Please call [phone] option #2 to schedule, reschedule, or cancel your procedure appointment.   Phones are answered Monday - Friday from 08:00 - 4:30pm.  Leave a voicemail with your name, birth date, and phone number if no one is available to take your call.     You will need to be tested for COVID-19 within 4 days (96 hours) of your procedure.  You will be called to schedule your COVID test by a central scheduling team. If you have not been contacted to schedule your test within 4 days of the scheduled procedure, call [phone]    Please be aware that the turn around time for the test is approximately 24-72 hours.   If your results are still pending the day of your procedure, you will be notified as soon as possible as the procedure may be cancelled.    Please note: You will only  be contacted for positive and pending results.       At this time there are NO VISITORS allowed.  The procedure center staff will call you several days before the procedure to review important information that you will need to know for the day of the procedure.   Please contact the clinic if you have further questions about this information.       Information related to Scheduling and Pre-Procedure Instructions:      If you are having a Diagnostic procedure, you will be given a Pain Diary to document your pain relief.   Please fax the completed form to our office.   fax number is [phone]    If you must reschedule your procedure more than two times, you must follow up in clinic before rescheduling again.        Preparing for your procedure    CAUTION - FAILURE TO FOLLOW THESE PRE-PROCEDURE INSTRUCTIONS WILL RESULT IN YOUR PROCEDURE BEING RESCHEDULED.      Your procedure: Piriformis Injection            You must have a  take you home after your procedure. Transportation by taxi or para-transit is okay as long as you have a responsible adult accompany you. You must provide your 's full name and contact number at time of check in.     Fasting Protocol Please have nothing to eat and drink for 2 hours before the procedure.      Medications If you take any medications, DO NOT STOP. Take your medications as usual the day of your procedure with a sip of water AT LEAST 2 HOURS PRIOR TO ARRIVAL.    Antibiotics If you are currently taking antibiotics, you must complete the entire dose 7 days prior to your scheduled procedure. You must be clear of any signs or symptoms of infection. If you begin antibiotics, please contact our clinic for instructions.     Fever, Chills, or Rash If you experience a fever of higher than 100 degrees, chills, rash, or open wounds during the one week before your procedure, please call the clinic to see if you may proceed with your procedure.      Medication Hold  List  **Patients under Cardiology/Neurology care should consult their provider prior to the pain procedure to verify pre-procedure medication instructions. The information below contains general guidelines.**      Blood Thinners If you are taking daily ASPIRIN, PLAVIX, OR OTHER BLOOD THINNERS SUCH AS COUMADIN/WARFARIN, we will need your prescribing doctor to sign a release permitting you to stop these medications. Once approved by your prescribing doctor - STOP ALL BLOOD THINNERS BASED ON THE TIME TABLE BELOW PRIOR TO YOUR PROCEDURE. If you have been instructed to stop WARFARIN(COUMADIN), you must have an INR lab drawn the day before your procedure. . Your INR must be within normal limits before we can perform your injection. MEDICATIONS CAN BE RESTARTED AFTER YOUR PROCEDURE.    14 DAY HOLD  Ticlid (ticlopidine)    10 DAY HOLD  Effient (Prasugel)    3 DAY HOLD  Xarelto (rivaroxaban) 7 DAY HOLD  Anacin, Bufferin, Ecotrin, Excedrin, Aggrenox (Aspirin)  Brilinta (ticagrelor)  Coumadin (Warfarin)  Pradexa (Dabigatran)  Elmiron (Pentosan)  Plavix (Clopidogrel Bisulfate)  Pletal (Cilostazol)    24 HOUR HOLD  Lovenox (enoxaparin)  Agrylin (Anagrelide)        Non-steroidal Anti-inflammatories (NSAIDs) DO NOT TAKE any non-steroidal anti-inflammatory medications (NSAIDs) listed on the table below. MEDICATIONS CAN BE RESTARTED AFTER YOUR PROCEDURE. Celebrex is OK to take and does not need to be discontinued.     Medications to stop:  3 DAY HOLD  Advil, Motrin (Ibuprofen)  Arthrotec (diciofenac sodium/misoprostol)  Clinoril (Sulindac)  Indocin (Indomethacin)  Lodine (Etodolac)  Toradol (Ketorolac)  Vicoprofen (Hydrocodone and Ibuprofen)  Voltaren (Diclotenac)    14 DAY HOLD  Daypro (Oxaprozin)  Feldene (Piroxicam)   7 DAY HOLD  Aleve (Naproxen sodium)  Darvon compound (contains aspirin)  Naprosyn (Naproxen)  Norgesic Forte (contains aspirin)  Mobic (Meloxicam)  Oruvall (Ketoprofen)  Percodan (contains aspirin)  Relafen  (Nabumetone)  Salsalate  Trilisate  Vitamin E (more than 400 mg per day)  Any medication containing aspirin                To speak with a nurse, schedule/reschedule/cancel a clinic appointment, or request a medication refill call: [phone]     You can also reach us by [employer]: [email or web address]

## 2021-04-01 NOTE — PROGRESS NOTES
AVS reviewed with the pt.   Dr. Sanford also added Flexeril for the pt.   Methocarbamol was discontinued.   LPN sent the updated AVS [facility] LPN called and informed the pt on VM.     Loly Cisneros LPN

## 2021-04-01 NOTE — PROGRESS NOTES
ATTENDING ATTESTATION: I have seen and evaluated the patient with the medical student and agree with the history, examination, assessment, and plan as detailed below.  Additionally, I have selectively edited the note below such that it reflects my own assessment of the patient and plan of care.  The patient is a 60 year old female with PMHx remarkable for HTN, asthma, arthritis, anxiety, scoliosis, and multiple spine surgeries who presents for evaluation of persistent right lower extremity pain following revision lumbosacral spine surgery on 12/21/2020.  Based upon the patient's history, reported symptoms, physical exam, and available imaging her pain is most consistent with a persistent right L5 and/or sacral 1 radiculopathy.  In addition, her baseline pain is exacerbated piriformis stretch which may be independently causing pain or exacerbating underlying irritation of these nerve roots at the level of the sciatic nerve.  At this point as the patient is only 4 months out from surgery there is still a potential for additional recovery of motor and sensory function as well as improvement in pain and we will attempt to optimize the patient's medication regimen to help better control her pain.  Additionally we will refer her for piriformis injection to mitigate any exacerbation of her underlying symptoms by this pain generator.  Pending the evolution of her symptoms following her most recent surgery she could potentially be a candidate for spinal cord stimulation however I would defer this to Dr. Mendel Marinelli with neurosurgery for consideration of paddle lead placement in the setting of her multiple spine surgeries.    VISIT RECOMMENDATIONS:  1. Start duloxetine 30mg daily and increased to 30mg BID in 1 week if no side effects.   2. Start diclofenac gel 1% QID prn for hand pain   3. Referral to acupuncture for lumbar pain and radiculopathy   4. Order placed for right piriformis muscle injection with steroid  medication  5. Continue PT and add piriformis stretching     COMPREHENSIVE PAIN CLINIC INITIAL EVALUATION    I had the pleasure of meeting Ms. Aleena Ontiveros on 4/1/2021 [facility] in consult for Dr. Vasquez with regards to her right sided radicular symptoms.     Subjective:  The patient is a 60 year old female with past medical history of right lumbar radiculopathy after spondylothesis s/p multiple spinal surgeries including scoliosis rods in 1974, fusion in 2019, and revision of fusion 12/2020 who presents for evaluation of ongoing R lumbar and leg pain and numbness.  The patient's pain began in 2019 following her spinal surgery and has improved somewhat since the revision surgery in 2020.  She reports that her pain is located primarily in her right buttocks and radiates to posterior thigh, medial calf and medial plantar foot.  The patient describes the pain as stabbing and sharp.  She reports that the pain is made worse throughout the day and especially with prolonged periods of sitting.  Her pain is improved with rest and standing.  In addition, she reports persistent numbness in the same distribution as the pain. The patient's pain is most severe in the afternoons and evenings.  She rates her average pain score at 4-6/10, but it can be as low as 3/10 each morning or as severe as 8-9/10 in the evenings.     She denies any new problems with falls or balance, any new numbness or weakness of the arms or legs, any new bowel or bladder incontinence.    Aleena Ontiveros has not been seen at a pain clinic in the past.      Patient reported symptoms:  Patient Supplied Answers To the [facility] Pain Questionnaire  [facility] -  Patient Entered Questionnaire/Answers 3/26/2021   What number best describes your pain right now:  0 = No pain  to  10 = Worst pain imaginable 4   How would you describe the pain? cramping, numbness, dull, aching, pressure, other   Which of the following worsen your pain? sitting, walking   Which  of the following improve or reduce your pain?  lying down, standing, sitting, exercise, medication, relaxation, thinking about something else   What number best describes your average pain for the past week:  0 = No pain  to  10 = Worst pain imaginable 5   What number best describes your LOWEST pain in past 24 hours:  0 = No pain  to  10 = Worst pain imaginable 3   What number best describes your WORST pain in past 24 hours:  0 = No pain  to  10 = Worst pain imaginable 6   When is your pain worst? PM   What non-medicine treatments have you already had for your pain? physical therapy, surgery   Have you tried treating your pain with medication?  Yes   Are you currently taking medications for your pain? Yes             Current treatments:  Tylenol:  500 QID with gabapentin and 325 1-2x daily - concerned about taking too much  Ibuprofen: had been on 800 BID, now taking 400 noon and 4pm   Gabapentin: 400 x6 daily, and sometimes adjusting up or down using 100s as needed - has had brain fog and word finding difficulty at higher doses but does get more pain relief at higher doses  Robaxin: 750 BID     Previous medication treatments included:  Muscle relaxors: on robaxin  Anti-depressants: no  Acetaminophen/NSAIDs: currently taking  Topicals: diclofenac long time ago  Opioids: tramadol and oxycodone after surgeries but does not want to take anything addicting     Other treatments have included:  Physical therapy: gets good relief from therapy, still doing   Pain Psychology: currently doing some hypnosis and home exercises and gets benefit from it  Chiropractic: no  Acupuncture: no, but interested  TENs Unit: no  Injections: no  Surgeries: yes, see above    Past Medical History:  Medical history reviewed.   Past Medical History:   Diagnosis Date     Anemia      Anxiety      Arthritis     osteoarthritis of both knee     Chronic osteoarthritis      Hypertension      Hypothyroidism      PONV (postoperative nausea and vomiting)       Seasonal allergic rhinitis      Uncomplicated asthma       Patient Active Problem List   Diagnosis     S/P complete thyroidectomy     S/P total thyroidectomy     S/P total knee replacement using cement, right     S/P total knee arthroplasty, right     Benign essential hypertension     Slow transit constipation     Physical deconditioning     Advance Care Planning     S/P total knee arthroplasty, left     Asthma     Primary osteoarthritis of knee     Anemia due to blood loss, acute     Other secondary kyphosis, thoracolumbar region     Acquired spondylolisthesis of lumbosacral region     H/O spinal fusion     Osteoarthritis     Postoperative pain after spinal surgery     Primary osteoarthritis involving multiple joints     UTI (urinary tract infection)     Hypotension, unspecified hypotension type     Right sacral radiculopathy     S/P spinal fusion     Spondylolisthesis of lumbosacral region     Chronic bilateral low back pain with bilateral sciatica     Aftercare following surgery of the musculoskeletal system, NEC       Past Surgical History:  Pertinent surgical history reviewed.   Past Surgical History:   Procedure Laterality Date     ABDOMEN SURGERY  ,         ARTHROPLASTY KNEE Right 08/10/2017    Procedure: ARTHROPLASTY KNEE;  RIGHT TOTAL KNEE ARTHROPLASTY ;  Surgeon: Grady Alvarez MD;  Location:  OR     ARTHROPLASTY KNEE Left 2017    Procedure: ARTHROPLASTY KNEE;  LEFT TOTAL KNEE ARTHROPLASTY;  Surgeon: Grady Alvarez MD;  Location:  OR     BACK SURGERY      spinal fusion     ENT SURGERY      tonsilectomy     GYN SURGERY  2013    hysterectomy     OPTICAL TRACKING SYSTEM FUSION POSTERIOR SPINE LUMBAR N/A 2020    Procedure: Revision laminectomy lumbar 5-sacral 1 with decompression of Right Sacral 1 nerve root; revision posterior instumentee spinal fusion (pseudarthrosis repair) lumbar 2-sacral 1.;  Surgeon: Evangelist Vasquez MD;  Location:  OR      OPTICAL TRACKING SYSTEM FUSION POSTERIOR SPINE THORACIC THREE+ LEVELS N/A 10/01/2019    Procedure: Transforaminal Lumbar Interbody Fusion Three Column Osteotomy L5-S1, Posterior Spinal Fusion L2-Pelvis. Use of BMP bone graft;  Surgeon: Evangelist Vasquez MD;  Location:  OR     ORTHOPEDIC SURGERY      sinal fusion,hand     thumb mass resection       THYROIDECTOMY  05/13/2014    Procedure: THYROIDECTOMY;  Surgeon: Krzysztof Marquez MD;  Location: [facility]        Medications: Pertinent medications reviewed and updated  Current Outpatient Medications   Medication Sig Dispense Refill     acetaminophen (TYLENOL) 500 MG tablet Take 2 Tabs Every 8 Hours as needed for pain 180 tablet 2     albuterol (PROAIR HFA/PROVENTIL HFA/VENTOLIN HFA) 108 (90 Base) MCG/ACT inhaler Inhale 2 puffs into the lungs every 4 hours as needed for shortness of breath / dyspnea or wheezing       amLODIPine (NORVASC) 5 MG tablet Take 5 mg by mouth At Bedtime        estradiol (ESTRACE) 0.1 MG/GM cream Place 2 g vaginally At Bedtime        Fexofenadine HCl (ALLEGRA PO) Take 180 mg by mouth every morning        fluticasone (FLONASE) 50 MCG/ACT spray Spray 1 spray into both nostrils 2 times daily        fluticasone-salmeterol (ADVAIR) 500-50 MCG/DOSE inhaler Inhale 1 puff into the lungs every 12 hours       gabapentin (NEURONTIN) 100 MG capsule Take 1-2 caps Four times per day as needed for pain 120 capsule 3     gabapentin (NEURONTIN) 400 MG capsule Take 1 cap=400mg QID & continue weaning down (Patient taking differently: Take 1 cap=400mg  5 times a day) 180 capsule 0     levothyroxine (SYNTHROID) 125 MCG tablet Take 1 tablet (125 mcg) by mouth every morning       lisinopril-hydrochlorothiazide (ZESTORETIC) 20-12.5 MG tablet Take 2 tablets by mouth daily       methocarbamol (ROBAXIN) 750 MG tablet Take 1 tablet (750 mg) by mouth 4 times daily as needed for muscle spasms 120 tablet 2     Montelukast Sodium (SINGULAIR PO) Take 10 mg by mouth  At Bedtime        multivitamin w/minerals (THERA-VIT-M) tablet Take 2 tablets by mouth 2 times daily       Omega-3 Fatty Acids (FISH OIL) 500 MG CAPS Take 2 capsules by mouth daily       traMADol (ULTRAM) 50 MG tablet Take 100 mg by mouth every 6 hours as needed for severe pain         [place] and [place] Prescription Monitoring Program reviewed.     Allergies: Pertinent allergies reviewed.     Allergies   Allergen Reactions     Adhesive Tape      Erythromycin Nausea and Vomiting     Pills cause vomiting,        Family History:   family history includes Breast Cancer in her mother; C.A.D. in her father; Cancer - colorectal in her father; Cerebrovascular Disease in her father; Thyroid Disease in her brother and sister.    Social history:   Social History     Socioeconomic History     Marital status:      Spouse name: Not on file     Number of children: Not on file     Years of education: Not on file     Highest education level: Not on file   Occupational History     Not on file   Social Needs     Financial resource strain: Not on file     Food insecurity     Worry: Not on file     Inability: Not on file     Transportation needs     Medical: Not on file     Non-medical: Not on file   Tobacco Use     Smoking status: Never Smoker     Smokeless tobacco: Never Used   Substance and Sexual Activity     Alcohol use: Yes     Binge frequency: Patient refused     Comment: 1-2 per month--none since surgery     Drug use: No     Sexual activity: Not on file   Lifestyle     Physical activity     Days per week: Not on file     Minutes per session: Not on file     Stress: Not on file   Relationships     Social connections     Talks on phone: Not on file     Gets together: Not on file     Attends [employer] service: Not on file     Active member of club or organization: Not on file     Attends meetings of clubs or organizations: Not on file     Relationship status: Not on file     Intimate partner violence     Fear of current or ex  "partner: Not on file     Emotionally abused: Not on file     Physically abused: Not on file     Forced sexual activity: Not on file   Other Topics Concern     Parent/sibling w/ CABG, MI or angioplasty before 65F 55M? Not Asked   Social History Narrative     Not on file     Social History     Social History Narrative     Not on file      She is currently working. 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      Review of Systems:  Review of Systems   Musculoskeletal: Positive for back pain and myalgias. Negative for neck pain.   Neurological: Positive for tingling. Negative for dizziness, tremors, speech change, seizures, loss of consciousness, weakness and headaches.      The 14 system ROS was reviewed from the intake questionnaire; results listed at end of note.    Physical Exam:  /85   Pulse 87   Resp 16   Ht 1.575 m (5' 2\")   Wt 78.9 kg (174 lb)   BMI 31.83 kg/m      Physical Exam   Constitutional: She is oriented to person, place, and time.  She appears well-developed and well-nourished. She is not in acute distress.   HENT:     Head: Normocephalic and atraumatic.     Eyes: EOM are normal. No scleral icterus.     Neck: Normal range of motion.  Cardiovascular: Distal pulses strong and regular  Pulmonary/Chest:  NWOB. No respiratory distress.   Abdominal: deferred  Genitourinary: deferred  Neurological: She is alert and oriented to person, place, and time. CN II-XII grossly intact, coordination grossly normal. Sensation decreased on lateral R thigh, and medial calf/ankle/plantar foot.   Skin: Skin is warm and dry. She is not diaphoretic.   Psychiatric: She has a normal mood and affect. Her behavior is normal. Judgment and thought content normal.  MSK: Modified FAIR test with patient sitting reproduces pain she has been having. R gastrocnemius atrophy.     Imaging: Images personally reviewed by MD  Exam: Full spine radiographs using EOS      History: S/P spinal " fusion     Techniques: AP and lateral images of full spine were submitted for  interpretation.     Comparison: Radiograph 12/24/2020.     Findings:     12 rib bearing vertebral bodies with hypoplastic T12 ribs and 5 lumbar  type vertebral bodies are identified.     Spinal fusion instrumentation L2 to the pelvis with bilateral  sacroiliac screws. Additional posterior rods extend from T4-T12 and  T9-L1. Hardware appears intact without evidence of failure. Advanced  multilevel cervical spondylosis with severe disc height loss at C4-5  and C5-6. Mild grade 1 stepwise anterolisthesis of the 3 at C4 and  C4-C5. Thoracolumbar spine is not well assessed given lack of osseous  detail from low-dose EOS technique.     Coronal Deformity:     Biconvex scoliosis with a mild  convexed right curvature of thoracic  spine with apex at T8 and a mild  convexed left curvature of  thoracolumbar/lumbar spine with apex at L1     No substantial global coronal imbalance.     Sagittal Vertical Axis (A vertical line drawn from the center of C7  (troy line) to the posterosuperior aspect of the S1 on sagittal  plane):  less than 4 cm      Additional Findings:     The lungs are clear. Cardiac silhouette is normal. Bowel gas pattern  is nonobstructive. No acute fracture.                                                                      Impression:  1. Stable spinal fusion instrumentation from L2 to the pelvis.  Additional posterior rods are stable. Hardware is intact without  evidence of failure.  2. Mild right convexed curvature of the main thoracic spine and mild  left convexed curvature of the thoracolumbar/lumbar spine.   3. No  global coronal or sagittal imbalance.  4. Severe multilevel cervical spondylosis.    Assessment:    The patient is a 60 year old female with past medical history of right lumbar radiculopathy after spondylothesis s/p multiple spinal surgeries including scoliosis rods in 1974, fusion in 2019, and revision of fusion  12/2020 who was referred by Dr. Vasquez for evaluation of ongoing R lumbar and leg pain and numbness. Her pain and numbness as well as muscle atrophy are consistent with S1 radiculopathy which is consistent with postlaminectomy syndrome but also appears to have a component of piriformis impingement. Given that her most recent surgery was only 3 months ago, she may still have nerve regeneration potential for both pain and strength. However, given incomplete resolution of pain after her surgery and positive FAIR test, she may also benefit from piriformis stretching through PT as well as a trigger point injection of the piriformis muscle.     Visit Diagnoses:  1. Right lumbar radiculopathy  2. Piriformis syndrome     Plan:  Patient education:    We discussed with the patient the likely mechanisms underlying her pain.  In her case, this includes neuropathic pain from both nerve root compression and peripheral irritation at the piriformis muscle which are likely contributing to her overall pain picture.  In addition we discussed the role of central and peripheral pain processing in the development and propagation of chronic pain as a disorder as well as the importance of multidisciplinary treatment and multimodal medication therapy.    Work up:    none    Referrals:    None, may be a good candidate for spinal cord stimulation in the future. However, given complex spinal anatomy would want to refer to Dr. Mendel Marinelli for consideration of paddle stimulator.     Medications:    Start duloxetine for neuropathic pain     Start diclofenac for hand pain and could consider decreasing tylenol or ibuprofen if patient desires     Therapies:    Acupuncture referral    Interventions:    Piriformis trigger point injection     Follow up: 6-8 weeks    Thank you for the consult.    Total time spent was 45 minutes, and more than 50% of face to face time was spent in counseling and/or coordination of care regarding principles of  multidisciplinary care, medication management, and treatment options    Pham Gutiérrez, MS4     Answers for HPI/ROS submitted by the patient on 3/26/2021   General Symptoms: No  Skin Symptoms: No  HENT Symptoms: No  EYE SYMPTOMS: No  HEART SYMPTOMS: No  LUNG SYMPTOMS: No  INTESTINAL SYMPTOMS: No  URINARY SYMPTOMS: No  GYNECOLOGIC SYMPTOMS: No  BREAST SYMPTOMS: No  SKELETAL SYMPTOMS: Yes  BLOOD SYMPTOMS: No  NERVOUS SYSTEM SYMPTOMS: Yes  MENTAL HEALTH SYMPTOMS: No  Back pain: Yes  Muscle aches: Yes  Neck pain: No  Swollen joints: Yes  Joint pain: Yes  Bone pain: No  Muscle cramps: Yes  Muscle weakness: Yes  Joint stiffness: Yes  Bone fracture: No  Trouble with coordination: No  Dizziness or trouble with balance: No  Fainting or black-out spells: No  Headache: No  Seizures: No  Speech problems: No  Tingling: Yes  Tremor: No  Weakness: No  Difficulty walking: Yes  Paralysis: No  Numbness: Yes

## 2021-04-01 NOTE — LETTER
4/1/2021       RE: Aleena Ontiveros  [address]  [facility] [address]     Dear Colleague,    Thank you for referring your patient, Aleena Ontiveros, to the [facility] FOR COMPREHENSIVE PAIN MANAGEMENT [place] at [facility]. Please see a copy of my visit note below.    ATTENDING ATTESTATION: I have seen and evaluated the patient with the medical student and agree with the history, examination, assessment, and plan as detailed below.  Additionally, I have selectively edited the note below such that it reflects my own assessment of the patient and plan of care.  The patient is a 60 year old female with PMHx remarkable for HTN, asthma, arthritis, anxiety, scoliosis, and multiple spine surgeries who presents for evaluation of persistent right lower extremity pain following revision lumbosacral spine surgery on 12/21/2020.  Based upon the patient's history, reported symptoms, physical exam, and available imaging her pain is most consistent with a persistent right L5 and/or sacral 1 radiculopathy.  In addition, her baseline pain is exacerbated piriformis stretch which may be independently causing pain or exacerbating underlying irritation of these nerve roots at the level of the sciatic nerve.  At this point as the patient is only 4 months out from surgery there is still a potential for additional recovery of motor and sensory function as well as improvement in pain and we will attempt to optimize the patient's medication regimen to help better control her pain.  Additionally we will refer her for piriformis injection to mitigate any exacerbation of her underlying symptoms by this pain generator.  Pending the evolution of her symptoms following her most recent surgery she could potentially be a candidate for spinal cord stimulation however I would defer this to Dr. Mendel Marinelli with neurosurgery for consideration of paddle lead placement in the setting of  her multiple spine surgeries.    VISIT RECOMMENDATIONS:  1. Start duloxetine 30mg daily and increased to 30mg BID in 1 week if no side effects.   2. Start diclofenac gel 1% QID prn for hand pain   3. Referral to acupuncture for lumbar pain and radiculopathy   4. Order placed for right piriformis muscle injection with steroid medication  5. Continue PT and add piriformis stretching     COMPREHENSIVE PAIN CLINIC INITIAL EVALUATION    I had the pleasure of meeting Ms. Aleena Ontiveros on 4/1/2021 in the [facility] in consult for Dr. Vasquez with regards to her right sided radicular symptoms.     Subjective:  The patient is a 60 year old female with past medical history of right lumbar radiculopathy after spondylothesis s/p multiple spinal surgeries including scoliosis rods in 1974, fusion in 2019, and revision of fusion 12/2020 who presents for evaluation of ongoing R lumbar and leg pain and numbness.  The patient's pain began in 2019 following her spinal surgery and has improved somewhat since the revision surgery in 2020.  She reports that her pain is located primarily in her right buttocks and radiates to posterior thigh, medial calf and medial plantar foot.  The patient describes the pain as stabbing and sharp.  She reports that the pain is made worse throughout the day and especially with prolonged periods of sitting.  Her pain is improved with rest and standing.  In addition, she reports persistent numbness in the same distribution as the pain. The patient's pain is most severe in the afternoons and evenings.  She rates her average pain score at 4-6/10, but it can be as low as 3/10 each morning or as severe as 8-9/10 in the evenings.     She denies any new problems with falls or balance, any new numbness or weakness of the arms or legs, any new bowel or bladder incontinence.    Aleena Ontiveros has not been seen at a pain clinic in the past.      Patient reported symptoms:  Patient Supplied Answers To the [facility]  Pain Questionnaire  [facility] Pain -  Patient Entered Questionnaire/Answers 3/26/2021   What number best describes your pain right now:  0 = No pain  to  10 = Worst pain imaginable 4   How would you describe the pain? cramping, numbness, dull, aching, pressure, other   Which of the following worsen your pain? sitting, walking   Which of the following improve or reduce your pain?  lying down, standing, sitting, exercise, medication, relaxation, thinking about something else   What number best describes your average pain for the past week:  0 = No pain  to  10 = Worst pain imaginable 5   What number best describes your LOWEST pain in past 24 hours:  0 = No pain  to  10 = Worst pain imaginable 3   What number best describes your WORST pain in past 24 hours:  0 = No pain  to  10 = Worst pain imaginable 6   When is your pain worst? PM   What non-medicine treatments have you already had for your pain? physical therapy, surgery   Have you tried treating your pain with medication?  Yes   Are you currently taking medications for your pain? Yes             Current treatments:  Tylenol:  500 QID with gabapentin and 325 1-2x daily - concerned about taking too much  Ibuprofen: had been on 800 BID, now taking 400 noon and 4pm   Gabapentin: 400 x6 daily, and sometimes adjusting up or down using 100s as needed - has had brain fog and word finding difficulty at higher doses but does get more pain relief at higher doses  Robaxin: 750 BID     Previous medication treatments included:  Muscle relaxors: on robaxin  Anti-depressants: no  Acetaminophen/NSAIDs: currently taking  Topicals: diclofenac long time ago  Opioids: tramadol and oxycodone after surgeries but does not want to take anything addicting     Other treatments have included:  Physical therapy: gets good relief from therapy, still doing   Pain Psychology: currently doing some hypnosis and home exercises and gets benefit from it  Chiropractic: no  Acupuncture: no, but  interested  TENs Unit: no  Injections: no  Surgeries: yes, see above    Past Medical History:  Medical history reviewed.   Past Medical History:   Diagnosis Date     Anemia      Anxiety      Arthritis     osteoarthritis of both knee     Chronic osteoarthritis      Hypertension      Hypothyroidism      PONV (postoperative nausea and vomiting)      Seasonal allergic rhinitis      Uncomplicated asthma       Patient Active Problem List   Diagnosis     S/P complete thyroidectomy     S/P total thyroidectomy     S/P total knee replacement using cement, right     S/P total knee arthroplasty, right     Benign essential hypertension     Slow transit constipation     Physical deconditioning     Advance Care Planning     S/P total knee arthroplasty, left     Asthma     Primary osteoarthritis of knee     Anemia due to blood loss, acute     Other secondary kyphosis, thoracolumbar region     Acquired spondylolisthesis of lumbosacral region     H/O spinal fusion     Osteoarthritis     Postoperative pain after spinal surgery     Primary osteoarthritis involving multiple joints     UTI (urinary tract infection)     Hypotension, unspecified hypotension type     Right sacral radiculopathy     S/P spinal fusion     Spondylolisthesis of lumbosacral region     Chronic bilateral low back pain with bilateral sciatica     Aftercare following surgery of the musculoskeletal system, NEC       Past Surgical History:  Pertinent surgical history reviewed.   Past Surgical History:   Procedure Laterality Date     ABDOMEN SURGERY  ,         ARTHROPLASTY KNEE Right 08/10/2017    Procedure: ARTHROPLASTY KNEE;  RIGHT TOTAL KNEE ARTHROPLASTY ;  Surgeon: Grady Alvarez MD;  Location:  OR     ARTHROPLASTY KNEE Left 2017    Procedure: ARTHROPLASTY KNEE;  LEFT TOTAL KNEE ARTHROPLASTY;  Surgeon: Grady Alvarez MD;  Location:  OR     BACK SURGERY      spinal fusion     ENT SURGERY      tonsilectomy     GYN SURGERY   05/13/2013    hysterectomy     OPTICAL TRACKING SYSTEM FUSION POSTERIOR SPINE LUMBAR N/A 12/21/2020    Procedure: Revision laminectomy lumbar 5-sacral 1 with decompression of Right Sacral 1 nerve root; revision posterior instumentee spinal fusion (pseudarthrosis repair) lumbar 2-sacral 1.;  Surgeon: Evangelist Vasquez MD;  Location: [facility] OR     OPTICAL TRACKING SYSTEM FUSION POSTERIOR SPINE THORACIC THREE+ LEVELS N/A 10/01/2019    Procedure: Transforaminal Lumbar Interbody Fusion Three Column Osteotomy L5-S1, Posterior Spinal Fusion L2-Pelvis. Use of BMP bone graft;  Surgeon: Evangelist Vasquez MD;  Location: [facility] OR     ORTHOPEDIC SURGERY      sinal fusion,hand     thumb mass resection       THYROIDECTOMY  05/13/2014    Procedure: THYROIDECTOMY;  Surgeon: Krzysztof Marquez MD;  Location: [facility]        Medications: Pertinent medications reviewed and updated  Current Outpatient Medications   Medication Sig Dispense Refill     acetaminophen (TYLENOL) 500 MG tablet Take 2 Tabs Every 8 Hours as needed for pain 180 tablet 2     albuterol (PROAIR HFA/PROVENTIL HFA/VENTOLIN HFA) 108 (90 Base) MCG/ACT inhaler Inhale 2 puffs into the lungs every 4 hours as needed for shortness of breath / dyspnea or wheezing       amLODIPine (NORVASC) 5 MG tablet Take 5 mg by mouth At Bedtime        estradiol (ESTRACE) 0.1 MG/GM cream Place 2 g vaginally At Bedtime        Fexofenadine HCl (ALLEGRA PO) Take 180 mg by mouth every morning        fluticasone (FLONASE) 50 MCG/ACT spray Spray 1 spray into both nostrils 2 times daily        fluticasone-salmeterol (ADVAIR) 500-50 MCG/DOSE inhaler Inhale 1 puff into the lungs every 12 hours       gabapentin (NEURONTIN) 100 MG capsule Take 1-2 caps Four times per day as needed for pain 120 capsule 3     gabapentin (NEURONTIN) 400 MG capsule Take 1 cap=400mg QID & continue weaning down (Patient taking differently: Take 1 cap=400mg  5 times a day) 180 capsule 0     levothyroxine  (SYNTHROID) 125 MCG tablet Take 1 tablet (125 mcg) by mouth every morning       lisinopril-hydrochlorothiazide (ZESTORETIC) 20-12.5 MG tablet Take 2 tablets by mouth daily       methocarbamol (ROBAXIN) 750 MG tablet Take 1 tablet (750 mg) by mouth 4 times daily as needed for muscle spasms 120 tablet 2     Montelukast Sodium (SINGULAIR PO) Take 10 mg by mouth At Bedtime        multivitamin w/minerals (THERA-VIT-M) tablet Take 2 tablets by mouth 2 times daily       Omega-3 Fatty Acids (FISH OIL) 500 MG CAPS Take 2 capsules by mouth daily       traMADol (ULTRAM) 50 MG tablet Take 100 mg by mouth every 6 hours as needed for severe pain         [place] and [place] Prescription Monitoring Program reviewed.     Allergies: Pertinent allergies reviewed.     Allergies   Allergen Reactions     Adhesive Tape      Erythromycin Nausea and Vomiting     Pills cause vomiting,        Family History:   family history includes Breast Cancer in her mother; C.A.D. in her father; Cancer - colorectal in her father; Cerebrovascular Disease in her father; Thyroid Disease in her brother and sister.    Social history:   Social History     Socioeconomic History     Marital status:      Spouse name: Not on file     Number of children: Not on file     Years of education: Not on file     Highest education level: Not on file   Occupational History     Not on file   Social Needs     Financial resource strain: Not on file     Food insecurity     Worry: Not on file     Inability: Not on file     Transportation needs     Medical: Not on file     Non-medical: Not on file   Tobacco Use     Smoking status: Never Smoker     Smokeless tobacco: Never Used   Substance and Sexual Activity     Alcohol use: Yes     Binge frequency: Patient refused     Comment: 1-2 per month--none since surgery     Drug use: No     Sexual activity: Not on file   Lifestyle     Physical activity     Days per week: Not on file     Minutes per session: Not on file     Stress: Not on  "file   Relationships     Social connections     Talks on phone: Not on file     Gets together: Not on file     Attends Alevism service: Not on file     Active member of club or organization: Not on file     Attends meetings of clubs or organizations: Not on file     Relationship status: Not on file     Intimate partner violence     Fear of current or ex partner: Not on file     Emotionally abused: Not on file     Physically abused: Not on file     Forced sexual activity: Not on file   Other Topics Concern     Parent/sibling w/ CABG, MI or angioplasty before 65F 55M? Not Asked   Social History Narrative     Not on file     Social History     Social History Narrative     Not on file      She is currently working. 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      Review of Systems:  Review of Systems   Musculoskeletal: Positive for back pain and myalgias. Negative for neck pain.   Neurological: Positive for tingling. Negative for dizziness, tremors, speech change, seizures, loss of consciousness, weakness and headaches.      The 14 system ROS was reviewed from the intake questionnaire; results listed at end of note.    Physical Exam:  /85   Pulse 87   Resp 16   Ht 1.575 m (5' 2\")   Wt 78.9 kg (174 lb)   BMI 31.83 kg/m      Physical Exam   Constitutional: She is oriented to person, place, and time.  She appears well-developed and well-nourished. She is not in acute distress.   HENT:     Head: Normocephalic and atraumatic.     Eyes: EOM are normal. No scleral icterus.     Neck: Normal range of motion.  Cardiovascular: Distal pulses strong and regular  Pulmonary/Chest:  NWOB. No respiratory distress.   Abdominal: deferred  Genitourinary: deferred  Neurological: She is alert and oriented to person, place, and time. CN II-XII grossly intact, coordination grossly normal. Sensation decreased on lateral R thigh, and medial calf/ankle/plantar foot.   Skin: Skin is warm and dry. She is not " diaphoretic.   Psychiatric: She has a normal mood and affect. Her behavior is normal. Judgment and thought content normal.  MSK: Modified FAIR test with patient sitting reproduces pain she has been having. R gastrocnemius atrophy.     Imaging: Images personally reviewed by MD  Exam: Full spine radiographs using EOS      History: S/P spinal fusion     Techniques: AP and lateral images of full spine were submitted for  interpretation.     Comparison: Radiograph 12/24/2020.     Findings:     12 rib bearing vertebral bodies with hypoplastic T12 ribs and 5 lumbar  type vertebral bodies are identified.     Spinal fusion instrumentation L2 to the pelvis with bilateral  sacroiliac screws. Additional posterior rods extend from T4-T12 and  T9-L1. Hardware appears intact without evidence of failure. Advanced  multilevel cervical spondylosis with severe disc height loss at C4-5  and C5-6. Mild grade 1 stepwise anterolisthesis of the 3 at C4 and  C4-C5. Thoracolumbar spine is not well assessed given lack of osseous  detail from low-dose EOS technique.     Coronal Deformity:     Biconvex scoliosis with a mild  convexed right curvature of thoracic  spine with apex at T8 and a mild  convexed left curvature of  thoracolumbar/lumbar spine with apex at L1     No substantial global coronal imbalance.     Sagittal Vertical Axis (A vertical line drawn from the center of C7  (troy line) to the posterosuperior aspect of the S1 on sagittal  plane):  less than 4 cm      Additional Findings:     The lungs are clear. Cardiac silhouette is normal. Bowel gas pattern  is nonobstructive. No acute fracture.                                                                      Impression:  1. Stable spinal fusion instrumentation from L2 to the pelvis.  Additional posterior rods are stable. Hardware is intact without  evidence of failure.  2. Mild right convexed curvature of the main thoracic spine and mild  left convexed curvature of the  thoracolumbar/lumbar spine.   3. No  global coronal or sagittal imbalance.  4. Severe multilevel cervical spondylosis.    Assessment:    The patient is a 60 year old female with past medical history of right lumbar radiculopathy after spondylothesis s/p multiple spinal surgeries including scoliosis rods in 1974, fusion in 2019, and revision of fusion 12/2020 who was referred by Dr. Vasquez for evaluation of ongoing R lumbar and leg pain and numbness. Her pain and numbness as well as muscle atrophy are consistent with S1 radiculopathy which is consistent with postlaminectomy syndrome but also appears to have a component of piriformis impingement. Given that her most recent surgery was only 3 months ago, she may still have nerve regeneration potential for both pain and strength. However, given incomplete resolution of pain after her surgery and positive FAIR test, she may also benefit from piriformis stretching through PT as well as a trigger point injection of the piriformis muscle.     Visit Diagnoses:  1. Right lumbar radiculopathy  2. Piriformis syndrome     Plan:  Patient education:    We discussed with the patient the likely mechanisms underlying her pain.  In her case, this includes neuropathic pain from both nerve root compression and peripheral irritation at the piriformis muscle which are likely contributing to her overall pain picture.  In addition we discussed the role of central and peripheral pain processing in the development and propagation of chronic pain as a disorder as well as the importance of multidisciplinary treatment and multimodal medication therapy.    Work up:    none    Referrals:    None, may be a good candidate for spinal cord stimulation in the future. However, given complex spinal anatomy would want to refer to Dr. Mendel Marinelli for consideration of paddle stimulator.     Medications:    Start duloxetine for neuropathic pain     Start diclofenac for hand pain and could consider  decreasing tylenol or ibuprofen if patient desires     Therapies:    Acupuncture referral    Interventions:    Piriformis trigger point injection     Follow up: 6-8 weeks    Thank you for the consult.    Total time spent was 45 minutes, and more than 50% of face to face time was spent in counseling and/or coordination of care regarding principles of multidisciplinary care, medication management, and treatment options    Pham Gutiérrez, MS4       AVS reviewed with the pt.   Dr. Sanford also added Flexeril for the pt.   Methocarbamol was discontinued.   LPN sent the updated AVS to the pt's MyChart. LPN called and informed the pt on VM.     Loly Cisneros LPN    Again, thank you for allowing me to participate in the care of your patient.      Sincerely,    Héctor Snaford MD

## 2021-04-02 ENCOUNTER — TELEPHONE (OUTPATIENT)
Dept: ANESTHESIOLOGY | Facility: CLINIC | Age: 61
End: 2021-04-02

## 2021-04-02 NOTE — TELEPHONE ENCOUNTER
Prior Authorization Retail Medication Request    Medication/Dose:   ICD code (if different than what is on RX):    Previously Tried and Failed:    Rationale:      Insurance Name:    Insurance ID:        Pharmacy Information (if different than what is on RX)  Name:    Phone:

## 2021-04-05 ENCOUNTER — THERAPY VISIT (OUTPATIENT)
Dept: PHYSICAL THERAPY | Facility: CLINIC | Age: 61
End: 2021-04-05
Payer: COMMERCIAL

## 2021-04-05 DIAGNOSIS — M54.42 CHRONIC BILATERAL LOW BACK PAIN WITH BILATERAL SCIATICA: Primary | ICD-10-CM

## 2021-04-05 DIAGNOSIS — M54.41 CHRONIC BILATERAL LOW BACK PAIN WITH BILATERAL SCIATICA: Primary | ICD-10-CM

## 2021-04-05 DIAGNOSIS — G89.29 CHRONIC BILATERAL LOW BACK PAIN WITH BILATERAL SCIATICA: Primary | ICD-10-CM

## 2021-04-05 DIAGNOSIS — Z47.89 AFTERCARE FOLLOWING SURGERY OF THE MUSCULOSKELETAL SYSTEM, NEC: ICD-10-CM

## 2021-04-05 PROCEDURE — 97110 THERAPEUTIC EXERCISES: CPT | Mod: GP | Performed by: PHYSICAL THERAPIST

## 2021-04-05 PROCEDURE — 97112 NEUROMUSCULAR REEDUCATION: CPT | Mod: GP | Performed by: PHYSICAL THERAPIST

## 2021-04-05 NOTE — TELEPHONE ENCOUNTER
Central Prior Authorization Team   Phone: [phone]      PA Initiation    Medication: diclofenac (VOLTAREN) 1 % topical gel  Insurance Company: [employer] - Phone [phone] Fax [phone]  Pharmacy Filling the Rx: [id number] - [place], [place] - [address] AT [facility] [address]  Filling Pharmacy Phone: [phone]  Filling Pharmacy Fax:    Start Date: 4/5/2021

## 2021-04-05 NOTE — TELEPHONE ENCOUNTER
Prior Authorization Approval          Authorization Effective Date: 3/6/2021  Authorization Expiration Date: 4/5/2022  Medication: diclofenac (VOLTAREN) 1 % topical gel  Approved Dose/Quantity: 100gm   Reference #: [id number]   Insurance Company: [employer] - Phone [phone] Fax [phone]  Expected CoPay:        Which Pharmacy is filling the prescription (Not needed for infusion/clinic administered): [id number] - [place], [place] - [address] AT [facility] [address]  Pharmacy Notified: Yes   Patient Notified: No

## 2021-04-09 ENCOUNTER — TELEPHONE (OUTPATIENT)
Dept: ANESTHESIOLOGY | Facility: CLINIC | Age: 61
End: 2021-04-09

## 2021-04-09 NOTE — TELEPHONE ENCOUNTER
Attempt # 1    I called Aleena to schedule a procedure with Dr. Sanford. Left a voicemail with my call back number and request that they leave a good call back time if they get my voicemail.    Flor ZAMAN  [facility]-Op Coordinator

## 2021-04-12 DIAGNOSIS — M79.18 MYOFASCIAL PAIN: Primary | ICD-10-CM

## 2021-04-22 ENCOUNTER — THERAPY VISIT (OUTPATIENT)
Dept: PHYSICAL THERAPY | Facility: CLINIC | Age: 61
End: 2021-04-22
Payer: COMMERCIAL

## 2021-04-22 DIAGNOSIS — M54.41 CHRONIC BILATERAL LOW BACK PAIN WITH BILATERAL SCIATICA: Primary | ICD-10-CM

## 2021-04-22 DIAGNOSIS — G89.29 CHRONIC BILATERAL LOW BACK PAIN WITH BILATERAL SCIATICA: Primary | ICD-10-CM

## 2021-04-22 DIAGNOSIS — M54.42 CHRONIC BILATERAL LOW BACK PAIN WITH BILATERAL SCIATICA: Primary | ICD-10-CM

## 2021-04-22 DIAGNOSIS — Z47.89 AFTERCARE FOLLOWING SURGERY OF THE MUSCULOSKELETAL SYSTEM, NEC: ICD-10-CM

## 2021-04-22 PROCEDURE — 97110 THERAPEUTIC EXERCISES: CPT | Mod: GP | Performed by: PHYSICAL THERAPIST

## 2021-04-22 PROCEDURE — 97140 MANUAL THERAPY 1/> REGIONS: CPT | Mod: GP | Performed by: PHYSICAL THERAPIST

## 2021-04-27 DIAGNOSIS — Z11.59 ENCOUNTER FOR SCREENING FOR OTHER VIRAL DISEASES: ICD-10-CM

## 2021-05-03 DIAGNOSIS — Z11.59 ENCOUNTER FOR SCREENING FOR OTHER VIRAL DISEASES: ICD-10-CM

## 2021-05-03 LAB
SARS-COV-2 RNA RESP QL NAA+PROBE: NORMAL
SPECIMEN SOURCE: NORMAL

## 2021-05-03 PROCEDURE — U0003 INFECTIOUS AGENT DETECTION BY NUCLEIC ACID (DNA OR RNA); SEVERE ACUTE RESPIRATORY SYNDROME CORONAVIRUS 2 (SARS-COV-2) (CORONAVIRUS DISEASE [COVID-19]), AMPLIFIED PROBE TECHNIQUE, MAKING USE OF HIGH THROUGHPUT TECHNOLOGIES AS DESCRIBED BY CMS-2020-01-R: HCPCS | Performed by: ANESTHESIOLOGY

## 2021-05-03 PROCEDURE — U0005 INFEC AGEN DETEC AMPLI PROBE: HCPCS | Performed by: ANESTHESIOLOGY

## 2021-05-04 LAB
LABORATORY COMMENT REPORT: NORMAL
SARS-COV-2 RNA RESP QL NAA+PROBE: NEGATIVE
SPECIMEN SOURCE: NORMAL

## 2021-05-05 DIAGNOSIS — G89.18 POSTOPERATIVE PAIN AFTER SPINAL SURGERY: ICD-10-CM

## 2021-05-05 DIAGNOSIS — G57.01 PIRIFORMIS SYNDROME OF RIGHT SIDE: ICD-10-CM

## 2021-05-05 RX ORDER — CYCLOBENZAPRINE HCL 10 MG
5-10 TABLET ORAL 3 TIMES DAILY PRN
Qty: 60 TABLET | Refills: 0 | Status: SHIPPED | OUTPATIENT
Start: 2021-05-05 | End: 2021-06-04

## 2021-05-05 NOTE — TELEPHONE ENCOUNTER
Medication refill sent to pharmacy. No changes. Patient last seen on 4/1/21.     Mesha Osullivan RN

## 2021-05-07 ENCOUNTER — HOSPITAL ENCOUNTER (OUTPATIENT)
Facility: AMBULATORY SURGERY CENTER | Age: 61
Discharge: HOME | End: 2021-05-07
Attending: ANESTHESIOLOGY | Admitting: ANESTHESIOLOGY
Payer: COMMERCIAL

## 2021-05-07 ENCOUNTER — ANCILLARY PROCEDURE (OUTPATIENT)
Dept: RADIOLOGY | Facility: AMBULATORY SURGERY CENTER | Age: 61
End: 2021-05-07
Attending: ANESTHESIOLOGY
Payer: COMMERCIAL

## 2021-05-07 VITALS
HEART RATE: 86 BPM | DIASTOLIC BLOOD PRESSURE: 83 MMHG | SYSTOLIC BLOOD PRESSURE: 141 MMHG | OXYGEN SATURATION: 96 % | RESPIRATION RATE: 18 BRPM

## 2021-05-07 DIAGNOSIS — M79.18 MYOFASCIAL PAIN: ICD-10-CM

## 2021-05-07 PROCEDURE — 20552 NJX 1/MLT TRIGGER POINT 1/2: CPT | Mod: RT

## 2021-05-07 RX ORDER — BUPIVACAINE HYDROCHLORIDE 2.5 MG/ML
INJECTION, SOLUTION EPIDURAL; INFILTRATION; INTRACAUDAL PRN
Status: DISCONTINUED | OUTPATIENT
Start: 2021-05-07 | End: 2021-05-07 | Stop reason: HOSPADM

## 2021-05-07 RX ORDER — TRIAMCINOLONE ACETONIDE 40 MG/ML
INJECTION, SUSPENSION INTRA-ARTICULAR; INTRAMUSCULAR PRN
Status: DISCONTINUED | OUTPATIENT
Start: 2021-05-07 | End: 2021-05-07 | Stop reason: HOSPADM

## 2021-05-07 RX ORDER — LIDOCAINE HYDROCHLORIDE 10 MG/ML
INJECTION, SOLUTION EPIDURAL; INFILTRATION; INTRACAUDAL; PERINEURAL PRN
Status: DISCONTINUED | OUTPATIENT
Start: 2021-05-07 | End: 2021-05-07 | Stop reason: HOSPADM

## 2021-05-07 RX ORDER — IOPAMIDOL 408 MG/ML
INJECTION, SOLUTION INTRAVASCULAR PRN
Status: DISCONTINUED | OUTPATIENT
Start: 2021-05-07 | End: 2021-05-07 | Stop reason: HOSPADM

## 2021-05-07 NOTE — DISCHARGE INSTRUCTIONS
Home Care Instructions after a Piriformis Muscle Injection  The piriformis muscle is a small muscle located deep in the buttock (behind the gluteus prateek).     Activity  -You may resume most normal activity levels with the exception of strenuous activity. It is important for us to know if your pain with normal activity is relieved after this injection.  -DO NOT shower for 24 hours  -DO NOT remove bandaid for 24 hours    Pain  -You may experience soreness at the injection site for one or two days  -You may use an ice pack for 20 minutes every 2 hours for the first 24 hours  -You may use a heating pad after the first 24 hours  -You may use Tylenol (acetaminophen) every 4 hours or other pain medicines as     directed by your physician    You may experience numbness radiating into your legs or arms (depending on the procedure location). This numbness may last several hours. Until sensation returns to normal; please use caution in walking, climbing stairs, and stepping out of your vehicle, etc.        DID YOU RECEIVE STEROIDS TODAY?  Yes    Common side effects of steroids:  Not everyone will experience corticosteroid side effects. If side effects are experienced, they will gradually subside in the 7-10 day period following an injection. Most common side effects include:  -Flushed face and/or chest  -Feeling of warmth, particularly in the face but could be an overall feeling of warmth  -Increased blood sugar in diabetic patients  -Menstrual irregularities my occur. If taking hormone-based birth control an alternate method of birth control is recommended  -Sleep disturbances and/or mood swings are possible  -Leg cramps      PLEASE KEEP TRACK OF YOUR SYMPTOMS AND NOTE YOUR IMPROVEMENT FOR YOUR DOCTOR.     Please contact us if you have:  -Severe pain  -Fever more than 101.5 degrees Fahrenheit  -Signs of infection at the injection site (redness, swelling, or drainage)    If you have questions, please contact our office at  [phone] between the hours of 7:00 am and 3:00 pm Monday through Friday. After office hours you can contact the on call provider by dialing [phone]. If you need immediate attention, we recommend that you go to a hospital emergency room or dial [phone].

## 2021-05-10 NOTE — H&P
Abbreviated History and Physical    Patient Name: Aleena Ontiveros  YOB: 1960    Reason for Procedure: Myofascial pain    History: Aleena Ontiveros is a 60 year old year old female who presents today for right piriformis muscle injection.  She has a history of myofascial pain of the piriformis muscle for which the aforementioned procedure will likely provide significant therapeutic and diagnostic value.  We discussed the procedure at length, including the risks, benefits, and alternatives, and she wishes to proceed with the procedure.  She denies any recent anticoagulant use, recent steroid exposure, recent or upcoming surgeries, or recent signs of infection or systemic illness.    Past Medical History:   Diagnosis Date     Anemia      Anxiety      Arthritis     osteoarthritis of both knee     Chronic osteoarthritis      Hypertension      Hypothyroidism      PONV (postoperative nausea and vomiting)      Seasonal allergic rhinitis      Uncomplicated asthma        History of obstructive sleep apnea? NA    History of problems with sedation? NA    Physical exam:  BP (!) 141/83   Pulse 86   Resp 18   SpO2 96%   General: in no apparent distress   Neuro: At least antigravity strength noted in all 4 extremities  Respiratory: Clear to ausculation bilaterally   Cardiac: Regular rate and rhythm  Skin: No rashes or lesions on exposed areas of skin    Medications:   Current Outpatient Medications   Medication     acetaminophen (TYLENOL) 500 MG tablet     albuterol (PROAIR HFA/PROVENTIL HFA/VENTOLIN HFA) 108 (90 Base) MCG/ACT inhaler     amLODIPine (NORVASC) 5 MG tablet     cyclobenzaprine (FLEXERIL) 10 MG tablet     DULoxetine (CYMBALTA) 30 MG capsule     Fexofenadine HCl (ALLEGRA PO)     fluticasone (FLONASE) 50 MCG/ACT spray     fluticasone-salmeterol (ADVAIR) 500-50 MCG/DOSE inhaler     gabapentin (NEURONTIN) 100 MG capsule     levothyroxine (SYNTHROID) 125 MCG tablet     lisinopril-hydrochlorothiazide (ZESTORETIC)  20-12.5 MG tablet     Montelukast Sodium (SINGULAIR PO)     multivitamin w/minerals (THERA-VIT-M) tablet     Omega-3 Fatty Acids (FISH OIL) 500 MG CAPS     diclofenac (VOLTAREN) 1 % topical gel     estradiol (ESTRACE) 0.1 MG/GM cream     gabapentin (NEURONTIN) 400 MG capsule     methocarbamol (ROBAXIN) 750 MG tablet     traMADol (ULTRAM) 50 MG tablet     No current facility-administered medications for this encounter.        Allergies:     Allergies   Allergen Reactions     Adhesive Tape      Erythromycin Nausea and Vomiting     Pills cause vomiting,        ASA Classification: 2    OK for local anesthetic use.     Héctor Matta MD    Department of Anesthesiology  Pain Management Division

## 2021-05-10 NOTE — PROCEDURES
Patient: Aleena Ontiveros Age: 60 year old   MRN: [id number] Attending: Dr. Matta     Date of Visit: May 10, 2021    PAIN MEDICINE CLINIC PROCEDURE NOTE    ATTENDING CLINICIAN:    Héctor Matta MD    ASSISTANT CLINICIAN:  Bethel Arriola MD (Pain Medicine Fellow)    PREPROCEDURE DIAGNOSES:  1.  Right piriformis syndrome   2.  Right myofascial pain of the back and buttock  3.  Chronic pain    POSTPROCEDURE DIAGNOSES:  1.  Right piriformis syndrome   2.  Right myofascial pain of the back and buttock  3.  Chronic pain    PROCEDURE(S) PERFORMED:  1.  Right Piriformis muscle injection with steroid  2.  Fluoroscopic guidance for the above-namdromed procedure(s)    ANESTHESIA:  Local.    MEDICATIONS ADMINISTERED:  Bupivacaine 0.25%: 2 mL  Depomedrol 40mg/mL : 1 mL  Isovue 200: 1.5 mL)    (wasted: 8.5 mL)    COMPLICATIONS:  None.    INDICATIONS:  Aleena Ontiveros is a 60 year old female with a history of chronic low back/buttock pain likely secondary to myofascial pain of the piriformis muscle.  During her last clinic visit we recommended a diagnostic and therapeutic injection of her right piriformis muscle. The patient stated that she was in her usual state of health and denied recent anticoagulant use or recent infections.  Therefore, the plan is to perform above mentioned procedure.     Procedure Details:  Written informed consent was obtained and saved in the electronic medical record, after the risks, benefits, and alternatives were discussed with the patient.  All of the patient s questions were answered.  The patient was brought to the procedure room, where she was identified by name, medical record number and date of birth.      The patient was placed in the prone position on the procedure room table.  All pressure points were checked and comfortably padded.  Routine monitors were placed.  Vital signs were stable.  A formal time-out procedure was performed, as per protocol, including patient name, title of procedure, and  site of procedure, and all in the room concurred.    A chlorhexidine prep was completed followed by sterile draping per standard procedure.    AP fluoroscopic guidance was used to identify the inferior border of the right SI joint.  This site was marked and then the target site was identified 1.5 cm inferior, 1.5 cm lateral to the lower border of the SI joint.  After 1% lidocaine infiltration using a 25 gauge 1.5 inch needle, a 22G was advanced at the target with AP guidance until it was seated in muscle and coaxial with the fluoroscopic beam.  Utilizing lateral imaging, the needle was then advanced using intermittent fluoroscopic guidance until a subtle texture change was noted as the needle entered the piriformis muscle.  This was confirmed to be at a depth approximately 1cm beyond the anterior border of the sacrum.  At this point, the above listed contrast medication was injected which demonstrated expected intramuscular spread.  AP imaging was used to confirm expected dye spread within the piriformis muscle.  After negative aspiration, the above listed injection solution was injected, the needle was flushed with 1% lidocaine and withdrawn.  No paresthesias were noted throughout the duration of the procedure.    A contralateral procedure WAS NOT performed.    Light pressure was held at the puncture site(s) to prevent ecchymosis and oozing.  The patient's skin was cleansed, and hemostasis was confirmed.  Band-aids were applied to the needle injection site(s).      Condition:    The patient remained awake and alert throughout the procedure.  The patient tolerated the procedure well and was monitored for approximately 15 minutes afterward in the post procedure area.  There were no immediate post procedure complications noted.  The patient was then discharged to home as per protocol.      Pre-procedure pain score: 3/10  Post-procedure pain score: 2/10    Héctor Matta MD    Department of  Anesthesiology  Pain Management Division

## 2021-05-11 ENCOUNTER — THERAPY VISIT (OUTPATIENT)
Dept: PHYSICAL THERAPY | Facility: CLINIC | Age: 61
End: 2021-05-11
Payer: COMMERCIAL

## 2021-05-11 DIAGNOSIS — G89.29 CHRONIC BILATERAL LOW BACK PAIN WITH BILATERAL SCIATICA: Primary | ICD-10-CM

## 2021-05-11 DIAGNOSIS — M54.41 CHRONIC BILATERAL LOW BACK PAIN WITH BILATERAL SCIATICA: Primary | ICD-10-CM

## 2021-05-11 DIAGNOSIS — Z47.89 AFTERCARE FOLLOWING SURGERY OF THE MUSCULOSKELETAL SYSTEM, NEC: ICD-10-CM

## 2021-05-11 DIAGNOSIS — M54.42 CHRONIC BILATERAL LOW BACK PAIN WITH BILATERAL SCIATICA: Primary | ICD-10-CM

## 2021-05-11 PROCEDURE — 97110 THERAPEUTIC EXERCISES: CPT | Mod: GP | Performed by: PHYSICAL THERAPIST

## 2021-05-11 PROCEDURE — 97112 NEUROMUSCULAR REEDUCATION: CPT | Mod: GP | Performed by: PHYSICAL THERAPIST

## 2021-05-13 ENCOUNTER — THERAPY VISIT (OUTPATIENT)
Dept: PHYSICAL THERAPY | Facility: CLINIC | Age: 61
End: 2021-05-13
Payer: COMMERCIAL

## 2021-05-13 DIAGNOSIS — G89.29 CHRONIC BILATERAL LOW BACK PAIN WITH BILATERAL SCIATICA: Primary | ICD-10-CM

## 2021-05-13 DIAGNOSIS — M54.41 CHRONIC BILATERAL LOW BACK PAIN WITH BILATERAL SCIATICA: Primary | ICD-10-CM

## 2021-05-13 DIAGNOSIS — Z47.89 AFTERCARE FOLLOWING SURGERY OF THE MUSCULOSKELETAL SYSTEM, NEC: ICD-10-CM

## 2021-05-13 DIAGNOSIS — M54.42 CHRONIC BILATERAL LOW BACK PAIN WITH BILATERAL SCIATICA: Primary | ICD-10-CM

## 2021-05-13 PROCEDURE — 97110 THERAPEUTIC EXERCISES: CPT | Performed by: PHYSICAL THERAPIST

## 2021-05-13 PROCEDURE — 97112 NEUROMUSCULAR REEDUCATION: CPT | Performed by: PHYSICAL THERAPIST

## 2021-05-17 ENCOUNTER — THERAPY VISIT (OUTPATIENT)
Dept: PHYSICAL THERAPY | Facility: CLINIC | Age: 61
End: 2021-05-17
Payer: COMMERCIAL

## 2021-05-17 DIAGNOSIS — Z47.89 AFTERCARE FOLLOWING SURGERY OF THE MUSCULOSKELETAL SYSTEM, NEC: ICD-10-CM

## 2021-05-17 DIAGNOSIS — M54.41 CHRONIC BILATERAL LOW BACK PAIN WITH BILATERAL SCIATICA: Primary | ICD-10-CM

## 2021-05-17 DIAGNOSIS — M54.42 CHRONIC BILATERAL LOW BACK PAIN WITH BILATERAL SCIATICA: Primary | ICD-10-CM

## 2021-05-17 DIAGNOSIS — G89.29 CHRONIC BILATERAL LOW BACK PAIN WITH BILATERAL SCIATICA: Primary | ICD-10-CM

## 2021-05-17 PROCEDURE — 97112 NEUROMUSCULAR REEDUCATION: CPT | Performed by: PHYSICAL THERAPIST

## 2021-05-17 PROCEDURE — 97110 THERAPEUTIC EXERCISES: CPT | Performed by: PHYSICAL THERAPIST

## 2021-05-19 DIAGNOSIS — G57.01 PIRIFORMIS SYNDROME OF RIGHT SIDE: ICD-10-CM

## 2021-05-19 DIAGNOSIS — M54.18 RIGHT SACRAL RADICULOPATHY: ICD-10-CM

## 2021-05-24 ENCOUNTER — THERAPY VISIT (OUTPATIENT)
Dept: PHYSICAL THERAPY | Facility: CLINIC | Age: 61
End: 2021-05-24
Payer: COMMERCIAL

## 2021-05-24 DIAGNOSIS — Z47.89 AFTERCARE FOLLOWING SURGERY OF THE MUSCULOSKELETAL SYSTEM, NEC: ICD-10-CM

## 2021-05-24 DIAGNOSIS — G89.29 CHRONIC BILATERAL LOW BACK PAIN WITH BILATERAL SCIATICA: Primary | ICD-10-CM

## 2021-05-24 DIAGNOSIS — M54.41 CHRONIC BILATERAL LOW BACK PAIN WITH BILATERAL SCIATICA: Primary | ICD-10-CM

## 2021-05-24 DIAGNOSIS — M54.42 CHRONIC BILATERAL LOW BACK PAIN WITH BILATERAL SCIATICA: Primary | ICD-10-CM

## 2021-05-24 PROCEDURE — 97110 THERAPEUTIC EXERCISES: CPT | Performed by: PHYSICAL THERAPIST

## 2021-05-24 PROCEDURE — 97112 NEUROMUSCULAR REEDUCATION: CPT | Performed by: PHYSICAL THERAPIST

## 2021-05-27 ENCOUNTER — THERAPY VISIT (OUTPATIENT)
Dept: PHYSICAL THERAPY | Facility: CLINIC | Age: 61
End: 2021-05-27
Payer: COMMERCIAL

## 2021-05-27 DIAGNOSIS — G89.29 CHRONIC BILATERAL LOW BACK PAIN WITH BILATERAL SCIATICA: Primary | ICD-10-CM

## 2021-05-27 DIAGNOSIS — M54.42 CHRONIC BILATERAL LOW BACK PAIN WITH BILATERAL SCIATICA: Primary | ICD-10-CM

## 2021-05-27 DIAGNOSIS — M54.41 CHRONIC BILATERAL LOW BACK PAIN WITH BILATERAL SCIATICA: Primary | ICD-10-CM

## 2021-05-27 DIAGNOSIS — Z47.89 AFTERCARE FOLLOWING SURGERY OF THE MUSCULOSKELETAL SYSTEM, NEC: ICD-10-CM

## 2021-05-27 PROCEDURE — 97112 NEUROMUSCULAR REEDUCATION: CPT | Mod: GP | Performed by: PHYSICAL THERAPIST

## 2021-05-27 PROCEDURE — 97110 THERAPEUTIC EXERCISES: CPT | Mod: GP | Performed by: PHYSICAL THERAPIST

## 2021-06-03 ENCOUNTER — THERAPY VISIT (OUTPATIENT)
Dept: PHYSICAL THERAPY | Facility: CLINIC | Age: 61
End: 2021-06-03
Payer: COMMERCIAL

## 2021-06-03 DIAGNOSIS — M54.42 CHRONIC BILATERAL LOW BACK PAIN WITH BILATERAL SCIATICA: Primary | ICD-10-CM

## 2021-06-03 DIAGNOSIS — Z47.89 AFTERCARE FOLLOWING SURGERY OF THE MUSCULOSKELETAL SYSTEM, NEC: ICD-10-CM

## 2021-06-03 DIAGNOSIS — G89.29 CHRONIC BILATERAL LOW BACK PAIN WITH BILATERAL SCIATICA: Primary | ICD-10-CM

## 2021-06-03 DIAGNOSIS — M54.41 CHRONIC BILATERAL LOW BACK PAIN WITH BILATERAL SCIATICA: Primary | ICD-10-CM

## 2021-06-03 PROCEDURE — 97110 THERAPEUTIC EXERCISES: CPT | Mod: GP | Performed by: PHYSICAL THERAPIST

## 2021-06-03 PROCEDURE — 97112 NEUROMUSCULAR REEDUCATION: CPT | Mod: GP | Performed by: PHYSICAL THERAPIST

## 2021-06-03 NOTE — PROGRESS NOTES
PROGRESS  REPORT    Progress reporting period is from 2/1/21 to 6/3/21.       SUBJECTIVE  Subjective changes noted by patient:  Patient reports overall progress.  She does still have pain in the LB and R piriformis that varies.  R thigh and B feet are still with some numbness. R gastroc strength is improving, but still weaker than L side, which contributes to mild gait deviation.  She is using an physioball to sit on at work, and attempting walking short distances.  She is consistent with her HEP and working on her gait pattern.  She did recieve piriformis injection a few weeks ago, and will follow up with her surgeon next week.     Current Pain level: 4/10.     Initial Pain level: 6/10.   Changes in function:  Yes (See Goal flowsheet attached for changes in current functional level)  Adverse reaction to treatment or activity: None    OBJECTIVE  Changes noted in objective findings:  Yes,   Objective:   SLS with 3-4 touch downs over 30 seconds.  More difficulty on R than L.    Some tenderness remains R piriformis area.    With gait, she was lacking some push off due to R PF weakness, this was causing some R knee abduction/circumduction which she is working to correct and improving.  Working on R gastroc strength with toe raises and on leg press.    She's otherwise doing well with gentle spine mobilty exercises, core strength, UE and LE strength and gait.      ASSESSMENT/PLAN  Updated problem list and treatment plan: Diagnosis 1:  s/p L5/S1 revision (hx of Carpio gavino with more recent SIJ fusion and lumbar decompression fusion within about the past year--Carpio rods from distant past). x-rays in epic    Pain -  hot/cold therapy and manual therapy  Decreased strength - therapeutic exercise and therapeutic activities  Impaired balance - neuro re-education and therapeutic activities  Decreased proprioception - neuro re-education and therapeutic activities  Impaired gait - gait training  Decreased function -  therapeutic activities  Impaired posture - neuro re-education  STG/LTGs have been met or progress has been made towards goals:  Yes (See Goal flow sheet completed today.)  Assessment of Progress: The patient's condition is improving.  Self Management Plans:  Patient has been instructed in a home treatment program.  I have re-evaluated this patient and find that the nature, scope, duration and intensity of the therapy is appropriate for the medical condition of the patient.  Aleena continues to require the following intervention to meet STG and LTG's:  PT    Recommendations:  This patient would benefit from continued therapy.     Frequency:  1 X week, once daily  Duration:  for 8 weeks        Please refer to the daily flowsheet for treatment today, total treatment time and time spent performing 1:1 timed codes.

## 2021-06-04 DIAGNOSIS — G57.01 PIRIFORMIS SYNDROME OF RIGHT SIDE: ICD-10-CM

## 2021-06-04 DIAGNOSIS — G89.18 POSTOPERATIVE PAIN AFTER SPINAL SURGERY: ICD-10-CM

## 2021-06-04 DIAGNOSIS — Z98.1 S/P SPINAL FUSION: Primary | ICD-10-CM

## 2021-06-04 RX ORDER — CYCLOBENZAPRINE HCL 10 MG
5-10 TABLET ORAL 3 TIMES DAILY PRN
Qty: 60 TABLET | Refills: 1 | Status: SHIPPED | OUTPATIENT
Start: 2021-06-04 | End: 2021-07-23

## 2021-06-04 NOTE — TELEPHONE ENCOUNTER
Medication refill sent to pharmacy. Patient last seen 4/1/21. Video visit scheduled for 6/11.    Mesha Osullivan RN

## 2021-06-10 ENCOUNTER — OFFICE VISIT (OUTPATIENT)
Dept: ORTHOPEDICS | Facility: CLINIC | Age: 61
End: 2021-06-10
Payer: COMMERCIAL

## 2021-06-10 ENCOUNTER — ANCILLARY PROCEDURE (OUTPATIENT)
Dept: GENERAL RADIOLOGY | Facility: CLINIC | Age: 61
End: 2021-06-10
Attending: ORTHOPAEDIC SURGERY
Payer: COMMERCIAL

## 2021-06-10 DIAGNOSIS — Z98.1 S/P SPINAL FUSION: ICD-10-CM

## 2021-06-10 DIAGNOSIS — Z98.1 S/P SPINAL FUSION: Primary | ICD-10-CM

## 2021-06-10 LAB — RADIOLOGIST FLAGS: NORMAL

## 2021-06-10 PROCEDURE — 99212 OFFICE O/P EST SF 10 MIN: CPT | Performed by: ORTHOPAEDIC SURGERY

## 2021-06-10 PROCEDURE — 72082 X-RAY EXAM ENTIRE SPI 2/3 VW: CPT | Performed by: STUDENT IN AN ORGANIZED HEALTH CARE EDUCATION/TRAINING PROGRAM

## 2021-06-10 ASSESSMENT — ENCOUNTER SYMPTOMS
MUSCLE WEAKNESS: 1
COUGH DISTURBING SLEEP: 1
SPEECH CHANGE: 0
SNORES LOUDLY: 0
COUGH: 1
POSTURAL DYSPNEA: 0
TREMORS: 0
HEADACHES: 0
PARALYSIS: 0
DYSPNEA ON EXERTION: 0
SPUTUM PRODUCTION: 0
TINGLING: 1
LOSS OF CONSCIOUSNESS: 0
NECK PAIN: 0
BACK PAIN: 1
MYALGIAS: 0
WEAKNESS: 1
WHEEZING: 0
HEMOPTYSIS: 0
MEMORY LOSS: 0
MUSCLE CRAMPS: 1
JOINT SWELLING: 1
SEIZURES: 0
DISTURBANCES IN COORDINATION: 0
NUMBNESS: 1
ARTHRALGIAS: 1
DIZZINESS: 1
SHORTNESS OF BREATH: 1
STIFFNESS: 1

## 2021-06-10 NOTE — PROGRESS NOTES
Spine Surgical Hx:  1974 - PISF T4-L4 with Carpio gavino instrumentation x 2 (Dr. Bandar Rose, [place]) for AIS.  10/01/2019 - 3-column osteotomy L5-S1 with sacral dome osteotomy; TLIF with Cunningham laminectomy L5-S1; bilateral PISF L2-pelvis using bilateral double S2AI screws; use of Infuse BMP Large kit (Sembraronaldo/Rashid/Mara) for Gr 4 isthmic spondylolisthesis L5-S1 with severe sagittal malalignment.  [Implants:  Medtronic Solera and Ballast screw systems, with dual headed screws, reductions screws; 5.5 mm CoCr rods x 3].  12/21/2020 - Rev PISF L2-pelvis with partial removal of old Carpio gavino instrumentation; rev decomp of R S1 nerve root via R sacral dome osteoplasty; use of Magnifuse allograft; repair of dural tear R L4-5 (Christina/Mara) for persistent R S1 radiculopathy; pseudarthrosis L4-5.  [Implants: Medtronic Solera and Ballast].      In-Person Visit    Reason for Visit:  Chief Complaint   Patient presents with     Surgical Followup     DOS 12/21/20 Revision laminectomy lumbar 5-sacral 1 with decompression of Right Sacral 1 nerve root; revision posterior instumentee spinal fusion (pseudarthrosis repair) lumbar 2-sacral 1.       S>  60/f, 6 mos postop.  Last seen at 3 mos.  Doing well; happy with surgery.  Still takes gabapentin (varying dosage).  P> gabapentin prescription; Pain Clinic referral.  RTC at 6 mos with rpt EOS full spine ap-lat.    Unaccompanied.  Back still feels stable.  Some numbness has gotten worse, including R heel.    Working full time, as ; currently doing a lot of concerts.    Still on gabapentin 2,000 mg/day.  Per patient, this is lower compared to previous.  And cymbalta.    5/7/21 - R piriformis muscle injection with steroid (Dr. Sanford).  Per patient, helped some.    Still doing PT 2x/week ([facility] in [place]).  Feels her legs/calves are getting stronger.      Oswestry (EPI) Questionnaire    OSWESTRY DISABILITY INDEX 6/10/2021   Count 9   Sum 15   Oswestry  Score (%) 33.33   Some recent data might be hidden      3CO L5-S1; PISF L2-pelvis 10/1/19:  Preop EPI       70%  6 wks               72%  3 mos              35.56%  6 mos              33.33%  1 yr                    48.89%  14 mos               57.78%     Rev decomp of R S1 nerve root; rev PISF L2-pelvis 12/21/2020:  Preop EPI         57.78%  4 wks                 57.78%  6 wks                 48.57%  3 mos                 48.89%  6 mos  33.33%      Visual Analog Pain Scale  Back Pain Scale 0-10: 2  Right leg pain: 5  Left leg pain: 0    PROMIS-10 Scores  Global Mental Health Score: (P) 18  Global Physical Health Score: (P) 14  PROMIS TOTAL - SUBSCORES: (P) 32    O>   Alert, oriented x 3, cooperative.  Not in CP distress.  There were no vitals taken for this visit.  Surgical incision well-healed, no sign of infection.  Ambulates independently.   Grossly neurologically intact.    Imaging:   EOS full spine AP lateral standing x-rays taken today show stable instrumentation compared to previous.  No sign of loosening or failure.  Alignment likewise maintained.  Overall, very reassuring radiographs.    A>   6 months postoperative, with improved preoperative symptoms.  Recent worsening of right heel numbness (right S1 radiculopathy).    P>    Congratulated and reassured patient.  For now, we will observe the numbness in her right heel.  However, she is now back to work full-time, she is still very functional, and is off opioids.  She is taking gabapentin and Cymbalta for pain.  Please appear to be working well and her symptoms are well controlled.    We will plan to see her back in 6 months (1 year postoperative) with lumbar CT for fusion status evaluation.    TT 15 mins.      Evangelist Vasquez MD    Orthopaedic Spine Surgery  Dept Orthopaedic Surgery, [facility]  [phone] office, [phone] pager  [email or web address]

## 2021-06-10 NOTE — LETTER
6/10/2021         RE: Aleena Ontiveros  [address]  [place] [address]        Dear Colleague,    Thank you for referring your patient, Aleena Ontiveros, to the [facility] [place]. Please see a copy of my visit note below.    Spine Surgical Hx:  1974 - PISF T4-L4 with Carpio gavino instrumentation x 2 (Dr. Bandar Rose, [place]) for AIS.  10/01/2019 - 3-column osteotomy L5-S1 with sacral dome osteotomy; TLIF with Cunningham laminectomy L5-S1; bilateral PISF L2-pelvis using bilateral double S2AI screws; use of Infuse BMP Large kit (Christina/Rashid/Mara) for Gr 4 isthmic spondylolisthesis L5-S1 with severe sagittal malalignment.  [Implants:  Medtronic Solera and Ballast screw systems, with dual headed screws, reductions screws; 5.5 mm CoCr rods x 3].  12/21/2020 - Rev PISF L2-pelvis with partial removal of old Carpio gavino instrumentation; rev decomp of R S1 nerve root via R sacral dome osteoplasty; use of Magnifuse allograft; repair of dural tear R L4-5 (Damienbraronaldo/Mara) for persistent R S1 radiculopathy; pseudarthrosis L4-5.  [Implants: Medtronic Solera and Ballast].      In-Person Visit    Reason for Visit:  Chief Complaint   Patient presents with     Surgical Followup     DOS 12/21/20 Revision laminectomy lumbar 5-sacral 1 with decompression of Right Sacral 1 nerve root; revision posterior instumentee spinal fusion (pseudarthrosis repair) lumbar 2-sacral 1.       S>  60/f, 6 mos postop.  Last seen at 3 mos.  Doing well; happy with surgery.  Still takes gabapentin (varying dosage).  P> gabapentin prescription; Pain Clinic referral.  RTC at 6 mos with rpt EOS full spine ap-lat.    Unaccompanied.  Back still feels stable.  Some numbness has gotten worse, including R heel.    Working full time, as ; currently doing a lot of concerts.    Still on gabapentin 2,000 mg/day.  Per patient, this is lower compared to previous.  And cymbalta.    5/7/21 - R piriformis muscle  injection with steroid (Dr. Sanford).  Per patient, helped some.    Still doing PT 2x/week ([facility] in [place]).  Feels her legs/calves are getting stronger.      Oswestry (EPI) Questionnaire    OSWESTRY DISABILITY INDEX 6/10/2021   Count 9   Sum 15   Oswestry Score (%) 33.33   Some recent data might be hidden      3CO L5-S1; PISF L2-pelvis 10/1/19:  Preop EPI       70%  6 wks               72%  3 mos              35.56%  6 mos              33.33%  1 yr                    48.89%  14 mos               57.78%     Rev decomp of R S1 nerve root; rev PISF L2-pelvis 12/21/2020:  Preop EPI         57.78%  4 wks                 57.78%  6 wks                 48.57%  3 mos                 48.89%  6 mos  33.33%      Visual Analog Pain Scale  Back Pain Scale 0-10: 2  Right leg pain: 5  Left leg pain: 0    PROMIS-10 Scores  Global Mental Health Score: (P) 18  Global Physical Health Score: (P) 14  PROMIS TOTAL - SUBSCORES: (P) 32    O>   Alert, oriented x 3, cooperative.  Not in CP distress.  There were no vitals taken for this visit.  Surgical incision well-healed, no sign of infection.  Ambulates independently.   Grossly neurologically intact.    Imaging:   EOS full spine AP lateral standing x-rays taken today show stable instrumentation compared to previous.  No sign of loosening or failure.  Alignment likewise maintained.  Overall, very reassuring radiographs.    A>   6 months postoperative, with improved preoperative symptoms.  Recent worsening of right heel numbness (right S1 radiculopathy).    P>    Congratulated and reassured patient.  For now, we will observe the numbness in her right heel.  However, she is now back to work full-time, she is still very functional, and is off opioids.  She is taking gabapentin and Cymbalta for pain.  Please appear to be working well and her symptoms are well controlled.    We will plan to see her back in 6 months (1 year postoperative) with lumbar CT for fusion status evaluation.    TT  15 mins.      Evangelist Vasquez MD    Orthopaedic Spine Surgery  Dept Orthopaedic Surgery, [facility]  [phone] office, [phone] pager  [email or web address]

## 2021-06-11 ENCOUNTER — VIRTUAL VISIT (OUTPATIENT)
Dept: ANESTHESIOLOGY | Facility: CLINIC | Age: 61
End: 2021-06-11
Payer: COMMERCIAL

## 2021-06-11 DIAGNOSIS — M79.18 MYOFASCIAL PAIN: ICD-10-CM

## 2021-06-11 DIAGNOSIS — M54.18 RIGHT SACRAL RADICULOPATHY: ICD-10-CM

## 2021-06-11 DIAGNOSIS — G57.01 PIRIFORMIS SYNDROME OF RIGHT SIDE: Primary | ICD-10-CM

## 2021-06-11 PROCEDURE — 99213 OFFICE O/P EST LOW 20 MIN: CPT | Mod: 95 | Performed by: ANESTHESIOLOGY

## 2021-06-11 ASSESSMENT — PAIN SCALES - GENERAL: PAINLEVEL: MODERATE PAIN (5)

## 2021-06-11 NOTE — LETTER
6/11/2021       RE: Aleena Ontiveros  [address]  [facility] [address]     Dear Colleague,    Thank you for referring your patient, Aleena Ontiveros, to the [facility] FOR COMPREHENSIVE PAIN MANAGEMENT [place] [facility]. Please see a copy of my visit note below.    Aleena is a 60 year old who is being evaluated via a billable video visit.      How would you like to obtain your AVS? MyChart   If the video visit is dropped, the invitation should be resent by: Text to cell phone: [phone]  Will anyone else be joining your video visit? No        Aleena is a 60 year old who is being evaluated via a billable video visit.      How would you like to obtain your AVS? MyChart  If the video visit is dropped, the invitation should be resent by: Text to cell phone: [phone]  Will anyone else be joining your video visit? No      Video Start Time: 2:07 PM    Video-Visit Details    Type of service:  Video Visit    Video End Time: 2:19 PM    Originating Location (pt. Location): Home    Distant Location (provider location):  [facility] FOR COMPREHENSIVE PAIN MANAGEMENT [place]     Platform used for Video Visit: [employer]      COMPREHENSIVE PAIN CLINIC FOLLOW UP EVALUATION  06/11/21  VISIT RECOMMENDATIONS:  1. Trial increase from duloxetine 30 mg daily to 30mg BID.  Will assess for benefit at follow up  2. Continue to work with physical therapy re: myofascial pain and piriformis muscle pain  3. Given positive response to piriformis muscle injection > 6 weeks, this can be repeated PRN in the future  4. The patient will continue to explore options for acupuncture    Interval History:  The patient is a 60 year old female with a PMHx of HTN, asthma, arthritis, anxiety, scoliosis, and multiple spine surgeries who presents via video today for continued evaluation of right lower extremity pain.  Since her last visit, the patient' reports:  - She underwent  right piriformis muscle injection on 5/7/21 which she reports did help somewhat  - She is still pretty reliant on gabapentin for ongoing pain control, although she has been able to decrease her dosage to a total daily dose of 2000 mg on average  - She would like to continue to wean this medication as she is able provided she is able to maintain pain control  - She has found benefit with the addition of duloxetine 30 mg daily and would like to trial an increase to 60 mg daily  - She also reports improvement in her symptoms with the incorporation of piriformis muscle specific exercises/stretches into PT  - She is still interested in acupuncture although she has not yet been able to establish care    Previous Interval History on 04/01/21:   The patient is a 60 year old female with past medical history of right lumbar radiculopathy after spondylothesis s/p multiple spinal surgeries including scoliosis rods in 1974, fusion in 2019, and revision of fusion 12/2020 who presents for evaluation of ongoing R lumbar and leg pain and numbness.  The patient's pain began in 2019 following her spinal surgery and has improved somewhat since the revision surgery in 2020.  She reports that her pain is located primarily in her right buttocks and radiates to posterior thigh, medial calf and medial plantar foot.  The patient describes the pain as stabbing and sharp.  She reports that the pain is made worse throughout the day and especially with prolonged periods of sitting.  Her pain is improved with rest and standing.  In addition, she reports persistent numbness in the same distribution as the pain. The patient's pain is most severe in the afternoons and evenings.  She rates her average pain score at 4-6/10, but it can be as low as 3/10 each morning or as severe as 8-9/10 in the evenings.     Previous recommendations from visit on 04/01/21 include:  1. Start duloxetine 30mg daily and increased to 30mg BID in 1 week if no side effects.    2. Start diclofenac gel 1% QID prn for hand pain   3. Referral to acupuncture for lumbar pain and radiculopathy   4. Order placed for right piriformis muscle injection with steroid medication  5. Continue PT and add piriformis stretching     Current Treatments:  Acetaminophen PRN  Cyclobenzaprine 5-10 mg TID PRN  Duloxetine 30 mg daily  Gabapentin, ~2000 mg daily, divided dosing  Diclofenac 1%    [facility] Data Base Reviewed:    YES;     Allergies reviewed:   Allergies   Allergen Reactions     Adhesive Tape      Erythromycin Nausea and Vomiting     Pills cause vomiting,      Medications reviewed: Pertinent medications reviewed and updated  Current Outpatient Medications   Medication     acetaminophen (TYLENOL) 500 MG tablet     albuterol (PROAIR HFA/PROVENTIL HFA/VENTOLIN HFA) 108 (90 Base) MCG/ACT inhaler     amLODIPine (NORVASC) 5 MG tablet     cyclobenzaprine (FLEXERIL) 10 MG tablet     diclofenac (VOLTAREN) 1 % topical gel     DULoxetine (CYMBALTA) 30 MG capsule     estradiol (ESTRACE) 0.1 MG/GM cream     Fexofenadine HCl (ALLEGRA PO)     fluticasone (FLONASE) 50 MCG/ACT spray     fluticasone-salmeterol (ADVAIR) 500-50 MCG/DOSE inhaler     gabapentin (NEURONTIN) 100 MG capsule     gabapentin (NEURONTIN) 400 MG capsule     levothyroxine (SYNTHROID) 125 MCG tablet     lisinopril-hydrochlorothiazide (ZESTORETIC) 20-12.5 MG tablet     Montelukast Sodium (SINGULAIR PO)     multivitamin w/minerals (THERA-VIT-M) tablet     Omega-3 Fatty Acids (FISH OIL) 500 MG CAPS     methocarbamol (ROBAXIN) 750 MG tablet     traMADol (ULTRAM) 50 MG tablet     No current facility-administered medications for this visit.      Medical history reviewed:   Patient Active Problem List   Diagnosis     S/P complete thyroidectomy     S/P total thyroidectomy     S/P total knee replacement using cement, right     S/P total knee arthroplasty, right     Benign essential hypertension     Slow transit constipation     Physical  deconditioning     Advance Care Planning     S/P total knee arthroplasty, left     Asthma     Primary osteoarthritis of knee     Anemia due to blood loss, acute     Other secondary kyphosis, thoracolumbar region     Acquired spondylolisthesis of lumbosacral region     H/O spinal fusion     Osteoarthritis     Postoperative pain after spinal surgery     Primary osteoarthritis involving multiple joints     UTI (urinary tract infection)     Hypotension, unspecified hypotension type     Right sacral radiculopathy     S/P spinal fusion     Spondylolisthesis of lumbosacral region     Chronic bilateral low back pain with bilateral sciatica     Aftercare following surgery of the musculoskeletal system, NEC     Myofascial pain     Review of Systems:  The 14 system ROS was reviewed from the intake questionnaire; results listed at end of note.    Physical Exam:  There were no vitals taken for this visit.    Physical Exam   Constitutional: Patient is oriented to person, place, and time.  Patient appears well-developed and well-nourished. No distress.   HENT:   Head: Normocephalic and atraumatic.   Neurological: She is alert and oriented to person, place, and time. No gross cranial nerve deficits noted. Coordination is grossly normal without focal deficit.   Psychiatric: she has a normal mood and affect. her behavior is normal. Judgment and thought content normal.    Imaging:  Exam: Full spine radiographs using EOS      History: S/P spinal fusion     Techniques: AP and lateral views of spine were submitted for  interpretation.     Comparison: Radiographs of the spine 3/18/2021, 12/24/2018, CT lumbar  spine 10/29/2020., Chest x-ray 10/15/2018, spine radiographs 2/14/2018     Findings:     12 rib bearing vertebral bodies and 5 lumbar type vertebral bodies are  identified. Postoperative changes of spinal fusion instrumentation  from L2 through the pelvis. Stable thoracic Carpio rods. Hardware  is intact. Severe disc space  narrowing at C5-C6 with grade 1  anterolisthesis. Additional multilevel spondylosis of the spine. Grade  3 anterolisthesis of L5 on S1 is better demonstrated on CT from  10/29/2020.     Coronal Deformity:     There is a mild  convexed right curvature of thoracic spine with apex  at T8.      No substantial global coronal imbalance.     Sagittal Vertical Axis (A vertical line drawn from the center of C7  (troy line) to the posterosuperior aspect of the S1 on sagittal  plane):  positive      Additional Findings:     Small nodular opacity projects over the left upper lobe between the  posterior fifth and sixth ribs, and is not substantially changed from  the most recent comparison on 3/18/2021 but is not definitely present  on the earlier exams.     No acute osseous abnormality.  There is a nonobstructive bowel gas  pattern. Mild degenerative changes of both hips.                                                                      Impression:  1. Postoperative changes of spinal fusion instrumentation from L2  through the pelvis and Carpio rods in the thoracic spine. No  evidence of hardware complication.  2. Multilevel spondylosis, including severe disc space narrowing and  grade 1 anterolisthesis of C4 on C5. Grade 3 anterolisthesis of L5 on  S1 is better demonstrated on CT from 10/29/2020. Mild convex right  curvature of the thoracic spine.  3. Positive global sagittal imbalance.  4. Nodular opacity projects over the left upper lobe. Recommend  low-dose CT of the chest for further evaluation.    Assessment:    The patient is a 60 year old female with PMHx of HTN, asthma, arthritis, anxiety, scoliosis, and multiple spine surgeries who returns to clinic today via video visit for continued management of right-sided low back and leg pain.  The patient has had generally positive response to prior treatments including a trial of duloxetine and a right-sided piriformis muscle injection with steroid medication.  She has  been able to begin to taper her gabapentin and is currently taking approximately 2000 mg/day.  She would like to increase her duloxetine given her positive response to this so far and we will trial an increase to 60 mg.  She also had some improvement following piriformis injection and incorporation of piriformis specific exercises into physical therapy, so this injection can be repeated in the future should the patient's pain returned to baseline.  Given the nature of today's visit over video we are unable to assess for any changes to sensation or muscle bulk in a previously affected right lumbosacral radiculopathy, however this can be reassessed at follow-up.    Visit Diagnoses:  Right lumbosacral radiculopathy  Myofascial pain  Right-sided piriformis syndrome    Plan:  Work up:    None at this time, will reassess for any resolution of sensorimotor symptoms of previous identified right lumbosacral radiculopathy at follow-up    Referrals:    None at this time    Medications:    Trial increase of duloxetine from 30 mg daily to 30 mg twice daily    Continue to wean gabapentin as tolerated    Therapies:    Continue with PT    Interventions:    None at this time, however repeat right-sided piriformis muscle injection can be considered if sedation s/p baseline    Follow up: 8-12 weeks or sooner if needed    A total of 21 minutes was spent on chart review, ordering studies/procedures/medications, face to face interaction, care coordination, and documentation on the day of the patient's visit.  Greater than 50% of the time spent was in direct patient interaction and care.    Héctor Matta MD    Department of Anesthesiology  Pain Management Division      Again, thank you for allowing me to participate in the care of your patient.      Sincerely,    Héctor Matta MD

## 2021-06-11 NOTE — PROGRESS NOTES
Aleena is a 60 year old who is being evaluated via a billable video visit.      How would you like to obtain your AVS? MyChart  If the video visit is dropped, the invitation should be resent by: Text to cell phone: [phone]  Will anyone else be joining your video visit? No      Video Start Time: 2:07 PM    Video-Visit Details    Type of service:  Video Visit    Video End Time: 2:19 PM    Originating Location (pt. Location): Home    Distant Location (provider location):  [facility] FOR COMPREHENSIVE PAIN MANAGEMENT [place]     Platform used for Video Visit: [facility]      COMPREHENSIVE PAIN CLINIC FOLLOW UP EVALUATION  06/11/21  VISIT RECOMMENDATIONS:  1. Trial increase from duloxetine 30 mg daily to 30mg BID.  Will assess for benefit at follow up  2. Continue to work with physical therapy re: myofascial pain and piriformis muscle pain  3. Given positive response to piriformis muscle injection > 6 weeks, this can be repeated PRN in the future  4. The patient will continue to explore options for acupuncture    Interval History:  The patient is a 60 year old female with a PMHx of HTN, asthma, arthritis, anxiety, scoliosis, and multiple spine surgeries who presents via video today for continued evaluation of right lower extremity pain.  Since her last visit, the patient' reports:  - She underwent right piriformis muscle injection on 5/7/21 which she reports did help somewhat  - She is still pretty reliant on gabapentin for ongoing pain control, although she has been able to decrease her dosage to a total daily dose of 2000 mg on average  - She would like to continue to wean this medication as she is able provided she is able to maintain pain control  - She has found benefit with the addition of duloxetine 30 mg daily and would like to trial an increase to 60 mg daily  - She also reports improvement in her symptoms with the incorporation of piriformis muscle specific exercises/stretches into PT  - She is  still interested in acupuncture although she has not yet been able to establish care    Previous Interval History on 04/01/21:   The patient is a 60 year old female with past medical history of right lumbar radiculopathy after spondylothesis s/p multiple spinal surgeries including scoliosis rods in 1974, fusion in 2019, and revision of fusion 12/2020 who presents for evaluation of ongoing R lumbar and leg pain and numbness.  The patient's pain began in 2019 following her spinal surgery and has improved somewhat since the revision surgery in 2020.  She reports that her pain is located primarily in her right buttocks and radiates to posterior thigh, medial calf and medial plantar foot.  The patient describes the pain as stabbing and sharp.  She reports that the pain is made worse throughout the day and especially with prolonged periods of sitting.  Her pain is improved with rest and standing.  In addition, she reports persistent numbness in the same distribution as the pain. The patient's pain is most severe in the afternoons and evenings.  She rates her average pain score at 4-6/10, but it can be as low as 3/10 each morning or as severe as 8-9/10 in the evenings.     Previous recommendations from visit on 04/01/21 include:  1. Start duloxetine 30mg daily and increased to 30mg BID in 1 week if no side effects.   2. Start diclofenac gel 1% QID prn for hand pain   3. Referral to acupuncture for lumbar pain and radiculopathy   4. Order placed for right piriformis muscle injection with steroid medication  5. Continue PT and add piriformis stretching     Current Treatments:  Acetaminophen PRN  Cyclobenzaprine 5-10 mg TID PRN  Duloxetine 30 mg daily  Gabapentin, ~2000 mg daily, divided dosing  Diclofenac 1%    [place] Board [employer] Data Base Reviewed:    YES;     Allergies reviewed:   Allergies   Allergen Reactions     Adhesive Tape      Erythromycin Nausea and Vomiting     Pills cause vomiting,      Medications  reviewed: Pertinent medications reviewed and updated  Current Outpatient Medications   Medication     acetaminophen (TYLENOL) 500 MG tablet     albuterol (PROAIR HFA/PROVENTIL HFA/VENTOLIN HFA) 108 (90 Base) MCG/ACT inhaler     amLODIPine (NORVASC) 5 MG tablet     cyclobenzaprine (FLEXERIL) 10 MG tablet     diclofenac (VOLTAREN) 1 % topical gel     DULoxetine (CYMBALTA) 30 MG capsule     estradiol (ESTRACE) 0.1 MG/GM cream     Fexofenadine HCl (ALLEGRA PO)     fluticasone (FLONASE) 50 MCG/ACT spray     fluticasone-salmeterol (ADVAIR) 500-50 MCG/DOSE inhaler     gabapentin (NEURONTIN) 100 MG capsule     gabapentin (NEURONTIN) 400 MG capsule     levothyroxine (SYNTHROID) 125 MCG tablet     lisinopril-hydrochlorothiazide (ZESTORETIC) 20-12.5 MG tablet     Montelukast Sodium (SINGULAIR PO)     multivitamin w/minerals (THERA-VIT-M) tablet     Omega-3 Fatty Acids (FISH OIL) 500 MG CAPS     methocarbamol (ROBAXIN) 750 MG tablet     traMADol (ULTRAM) 50 MG tablet     No current facility-administered medications for this visit.      Medical history reviewed:   Patient Active Problem List   Diagnosis     S/P complete thyroidectomy     S/P total thyroidectomy     S/P total knee replacement using cement, right     S/P total knee arthroplasty, right     Benign essential hypertension     Slow transit constipation     Physical deconditioning     Advance Care Planning     S/P total knee arthroplasty, left     Asthma     Primary osteoarthritis of knee     Anemia due to blood loss, acute     Other secondary kyphosis, thoracolumbar region     Acquired spondylolisthesis of lumbosacral region     H/O spinal fusion     Osteoarthritis     Postoperative pain after spinal surgery     Primary osteoarthritis involving multiple joints     UTI (urinary tract infection)     Hypotension, unspecified hypotension type     Right sacral radiculopathy     S/P spinal fusion     Spondylolisthesis of lumbosacral region     Chronic bilateral low back pain  with bilateral sciatica     Aftercare following surgery of the musculoskeletal system, NEC     Myofascial pain     Review of Systems:  The 14 system ROS was reviewed from the intake questionnaire; results listed at end of note.    Physical Exam:  There were no vitals taken for this visit.    Physical Exam   Constitutional: Patient is oriented to person, place, and time.  Patient appears well-developed and well-nourished. No distress.   HENT:   Head: Normocephalic and atraumatic.   Neurological: She is alert and oriented to person, place, and time. No gross cranial nerve deficits noted. Coordination is grossly normal without focal deficit.   Psychiatric: she has a normal mood and affect. her behavior is normal. Judgment and thought content normal.    Imaging:  Exam: Full spine radiographs using EOS      History: S/P spinal fusion     Techniques: AP and lateral views of spine were submitted for  interpretation.     Comparison: Radiographs of the spine 3/18/2021, 12/24/2018, CT lumbar  spine 10/29/2020., Chest x-ray 10/15/2018, spine radiographs 2/14/2018     Findings:     12 rib bearing vertebral bodies and 5 lumbar type vertebral bodies are  identified. Postoperative changes of spinal fusion instrumentation  from L2 through the pelvis. Stable thoracic Carpio rods. Hardware  is intact. Severe disc space narrowing at C5-C6 with grade 1  anterolisthesis. Additional multilevel spondylosis of the spine. Grade  3 anterolisthesis of L5 on S1 is better demonstrated on CT from  10/29/2020.     Coronal Deformity:     There is a mild  convexed right curvature of thoracic spine with apex  at T8.      No substantial global coronal imbalance.     Sagittal Vertical Axis (A vertical line drawn from the center of C7  (troy line) to the posterosuperior aspect of the S1 on sagittal  plane):  positive      Additional Findings:     Small nodular opacity projects over the left upper lobe between the  posterior fifth and sixth ribs,  and is not substantially changed from  the most recent comparison on 3/18/2021 but is not definitely present  on the earlier exams.     No acute osseous abnormality.  There is a nonobstructive bowel gas  pattern. Mild degenerative changes of both hips.                                                                      Impression:  1. Postoperative changes of spinal fusion instrumentation from L2  through the pelvis and Carpio rods in the thoracic spine. No  evidence of hardware complication.  2. Multilevel spondylosis, including severe disc space narrowing and  grade 1 anterolisthesis of C4 on C5. Grade 3 anterolisthesis of L5 on  S1 is better demonstrated on CT from 10/29/2020. Mild convex right  curvature of the thoracic spine.  3. Positive global sagittal imbalance.  4. Nodular opacity projects over the left upper lobe. Recommend  low-dose CT of the chest for further evaluation.    Assessment:    The patient is a 60 year old female with PMHx of HTN, asthma, arthritis, anxiety, scoliosis, and multiple spine surgeries who returns to clinic today via video visit for continued management of right-sided low back and leg pain.  The patient has had generally positive response to prior treatments including a trial of duloxetine and a right-sided piriformis muscle injection with steroid medication.  She has been able to begin to taper her gabapentin and is currently taking approximately 2000 mg/day.  She would like to increase her duloxetine given her positive response to this so far and we will trial an increase to 60 mg.  She also had some improvement following piriformis injection and incorporation of piriformis specific exercises into physical therapy, so this injection can be repeated in the future should the patient's pain returned to baseline.  Given the nature of today's visit over video we are unable to assess for any changes to sensation or muscle bulk in a previously affected right lumbosacral  radiculopathy, however this can be reassessed at follow-up.    Visit Diagnoses:  Right lumbosacral radiculopathy  Myofascial pain  Right-sided piriformis syndrome    Plan:  Work up:    None at this time, will reassess for any resolution of sensorimotor symptoms of previous identified right lumbosacral radiculopathy at follow-up    Referrals:    None at this time    Medications:    Trial increase of duloxetine from 30 mg daily to 30 mg twice daily    Continue to wean gabapentin as tolerated    Therapies:    Continue with PT    Interventions:    None at this time, however repeat right-sided piriformis muscle injection can be considered if sedation s/p baseline    Follow up: 8-12 weeks or sooner if needed    A total of 21 minutes was spent on chart review, ordering studies/procedures/medications, face to face interaction, care coordination, and documentation on the day of the patient's visit.  Greater than 50% of the time spent was in direct patient interaction and care.    Héctor Matta MD    Department of Anesthesiology  Pain Management Division

## 2021-06-11 NOTE — PROGRESS NOTES
Aleena is a 60 year old who is being evaluated via a billable video visit.      How would you like to obtain your AVS? MyChart   If the video visit is dropped, the invitation should be resent by: Text to cell phone: [phone]  Will anyone else be joining your video visit? No

## 2021-06-11 NOTE — PATIENT INSTRUCTIONS
1. Trial duloxetine 30 mg BID from daily  2. Continue with PT  3. Repeat injection PRN  4. Explore acupuncture  5. Follow up 3 months

## 2021-06-14 ENCOUNTER — TELEPHONE (OUTPATIENT)
Dept: ORTHOPEDICS | Facility: CLINIC | Age: 61
End: 2021-06-14

## 2021-06-14 NOTE — TELEPHONE ENCOUNTER
Writer received a call from [employer] reporting an incident finding in the 6/10 xr spine imaging. The radiologist identified Lung nodules. Writer notified provider.     Diane Bliss LPN

## 2021-06-15 ENCOUNTER — TELEPHONE (OUTPATIENT)
Dept: ORTHOPEDICS | Facility: CLINIC | Age: 61
End: 2021-06-15

## 2021-06-15 NOTE — TELEPHONE ENCOUNTER
RN called and spoke with patient on behalf Dr. Vasquez's clinic.  Report to patient of the incidental finding of a lung nodule and per Dr. Vasquez, patient should reach out to PCP to follow up on this to get a work up. Patient expressed understanding.    Renato Palencia RN

## 2021-06-21 ENCOUNTER — THERAPY VISIT (OUTPATIENT)
Dept: PHYSICAL THERAPY | Facility: CLINIC | Age: 61
End: 2021-06-21
Payer: COMMERCIAL

## 2021-06-21 DIAGNOSIS — Z47.89 AFTERCARE FOLLOWING SURGERY OF THE MUSCULOSKELETAL SYSTEM, NEC: ICD-10-CM

## 2021-06-21 DIAGNOSIS — M54.41 CHRONIC BILATERAL LOW BACK PAIN WITH BILATERAL SCIATICA: Primary | ICD-10-CM

## 2021-06-21 DIAGNOSIS — G89.29 CHRONIC BILATERAL LOW BACK PAIN WITH BILATERAL SCIATICA: Primary | ICD-10-CM

## 2021-06-21 DIAGNOSIS — M54.42 CHRONIC BILATERAL LOW BACK PAIN WITH BILATERAL SCIATICA: Primary | ICD-10-CM

## 2021-06-21 PROCEDURE — 97112 NEUROMUSCULAR REEDUCATION: CPT | Mod: GP | Performed by: PHYSICAL THERAPIST

## 2021-06-21 PROCEDURE — 97110 THERAPEUTIC EXERCISES: CPT | Mod: GP | Performed by: PHYSICAL THERAPIST

## 2021-06-30 ENCOUNTER — MYC MEDICAL ADVICE (OUTPATIENT)
Dept: ANESTHESIOLOGY | Facility: CLINIC | Age: 61
End: 2021-06-30

## 2021-06-30 DIAGNOSIS — M54.18 RIGHT SACRAL RADICULOPATHY: ICD-10-CM

## 2021-06-30 DIAGNOSIS — G57.01 PIRIFORMIS SYNDROME OF RIGHT SIDE: ICD-10-CM

## 2021-06-30 DIAGNOSIS — G89.18 POSTOPERATIVE PAIN AFTER SPINAL SURGERY: ICD-10-CM

## 2021-07-02 DIAGNOSIS — G57.01 PIRIFORMIS SYNDROME OF RIGHT SIDE: ICD-10-CM

## 2021-07-02 DIAGNOSIS — M54.18 RIGHT SACRAL RADICULOPATHY: ICD-10-CM

## 2021-07-02 NOTE — TELEPHONE ENCOUNTER
Medication refill sent to pharmacy. No changes. Patient last seen 6/11/21.    Mesha Osullivan RN

## 2021-07-04 ENCOUNTER — HEALTH MAINTENANCE LETTER (OUTPATIENT)
Age: 61
End: 2021-07-04

## 2021-07-08 ENCOUNTER — MYC MEDICAL ADVICE (OUTPATIENT)
Dept: ANESTHESIOLOGY | Facility: CLINIC | Age: 61
End: 2021-07-08

## 2021-07-08 ENCOUNTER — THERAPY VISIT (OUTPATIENT)
Dept: PHYSICAL THERAPY | Facility: CLINIC | Age: 61
End: 2021-07-08
Payer: COMMERCIAL

## 2021-07-08 DIAGNOSIS — G89.29 CHRONIC BILATERAL LOW BACK PAIN WITH BILATERAL SCIATICA: Primary | ICD-10-CM

## 2021-07-08 DIAGNOSIS — M54.41 CHRONIC BILATERAL LOW BACK PAIN WITH BILATERAL SCIATICA: Primary | ICD-10-CM

## 2021-07-08 DIAGNOSIS — M54.42 CHRONIC BILATERAL LOW BACK PAIN WITH BILATERAL SCIATICA: Primary | ICD-10-CM

## 2021-07-08 DIAGNOSIS — Z47.89 AFTERCARE FOLLOWING SURGERY OF THE MUSCULOSKELETAL SYSTEM, NEC: ICD-10-CM

## 2021-07-08 PROCEDURE — 97112 NEUROMUSCULAR REEDUCATION: CPT | Mod: GP | Performed by: PHYSICAL THERAPIST

## 2021-07-08 PROCEDURE — 97110 THERAPEUTIC EXERCISES: CPT | Mod: GP | Performed by: PHYSICAL THERAPIST

## 2021-07-09 ENCOUNTER — TELEPHONE (OUTPATIENT)
Dept: ANESTHESIOLOGY | Facility: CLINIC | Age: 61
End: 2021-07-09

## 2021-07-09 ENCOUNTER — MYC MEDICAL ADVICE (OUTPATIENT)
Dept: ANESTHESIOLOGY | Facility: CLINIC | Age: 61
End: 2021-07-09

## 2021-07-09 DIAGNOSIS — M54.18 RIGHT SACRAL RADICULOPATHY: ICD-10-CM

## 2021-07-09 DIAGNOSIS — G57.01 PIRIFORMIS SYNDROME OF RIGHT SIDE: ICD-10-CM

## 2021-07-09 RX ORDER — DULOXETIN HYDROCHLORIDE 30 MG/1
30 CAPSULE, DELAYED RELEASE ORAL 2 TIMES DAILY
Qty: 180 CAPSULE | Refills: 1 | Status: SHIPPED | OUTPATIENT
Start: 2021-07-09 | End: 2021-07-09

## 2021-07-09 RX ORDER — GABAPENTIN 100 MG/1
CAPSULE ORAL
Qty: 450 CAPSULE | Refills: 1 | Status: SHIPPED | OUTPATIENT
Start: 2021-07-09

## 2021-07-09 RX ORDER — ACETAMINOPHEN 500 MG
TABLET ORAL
Qty: 450 TABLET | Refills: 1 | Status: SHIPPED | OUTPATIENT
Start: 2021-07-09

## 2021-07-09 RX ORDER — DULOXETIN HYDROCHLORIDE 60 MG/1
60 CAPSULE, DELAYED RELEASE ORAL DAILY
Qty: 90 CAPSULE | Refills: 1 | Status: SHIPPED | OUTPATIENT
Start: 2021-07-09 | End: 2021-12-30

## 2021-07-09 NOTE — TELEPHONE ENCOUNTER
[facility] Call Center    Phone Message    May a detailed message be left on voicemail: yes     Reason for Call: Medication Question or concern regarding medication   Prescription Clarification  Name of Medication: DULoxetine (CYMBALTA) 30 MG capsule  Prescribing Provider: Dr Sanford   Pharmacy:      [id number] - [place], [place] - [address] [facility] [address]     What on the order needs clarification? Pt says there needs to be an override. Or send a new prescription in for 60mg for 90days. She needs this today. Please call her back ASAP          Action Taken: Message routed to:  [facility] ([facility] Pain clinic    Travel Screening: Not Applicable

## 2021-07-09 NOTE — TELEPHONE ENCOUNTER
Dr. Sanford was updated and medication was updated to 60 mg tablets- 1 tablet daily, 90 day supply given    Pt was updated.     Loly Cisneros LPN

## 2021-07-16 NOTE — PROGRESS NOTES
"Orthopaedic Surgery Progress Note:  12/24/2020     Subjective:   NAEO. Pain well controlled, patient out of bed yesterday without headache or other symptoms. Tolerating diet, no n/v. No cp, sob, fever, chills. Discussed plan to pull torres today, which patient is amenable to.    Objective:   /66 (BP Location: Left arm)   Pulse 93   Temp 100.9  F (38.3  C) (Oral)   Resp 16   Ht 1.575 m (5' 2\")   Wt 77 kg (169 lb 12.1 oz)   SpO2 96%   BMI 31.05 kg/m    No intake/output data recorded.  Gen: NAD. Resting comfortably in bed  Resp: Breathing comfortably on RA  Drain:   Superficial drain output: 0 ml last shift // 20 ml for 24 hrs  Musculoskeletal: dressing c/d/I.    Lower extremities:  Motor Strength Right Left   Hip flexion: L1, L2, L3 4+*/5 4+*/5   Hip adduction: L2, L3 5/5 5/5   Knee flexion: S1 4+/5 4+/5   Knee extension: L3, L4 5/5 5/5   Ankle dosiflexion: L4, L5 5/5 5/5   EHL: L5 4/5 4/5   Ankle plantarflexion: S1 5/5 5/5   *Limited by pain in back  Sensation from L2-S2 is preserved, endorses dullness in S1 distribution on the right consistent with baseline.    Labs:  Lab Results   Component Value Date    WBC 8.5 12/16/2020    HGB 9.8 (L) 12/23/2020     12/16/2020    INR 1.11 10/01/2019        Assessment & Plan:   Aleena Ontiveros is a 60 year old female with PMH including hypothyroidism, HTN, asthma and right S1 radiculopathy with pseudarthrosis now s/p revision L2-pelvis and right S1 decompression complicated by dural tear repaired primarily on 12/21/20 with Dr. Vasquez.     Goals for today:  -Remove torres this AM  -Remove drain  -Upright XR spine today vs tomorrow after drain removal    PLAN:  Ortho Primary  Medicine Comanagement  Activity: Up with assist. No excessive bending or twisting. No lifting >10 lbs x 6 weeks. No Ashish lift for transfers.   Weight bearing status: WBAT.  Pain management: Multimodal pain mgmt. Transition from IV to PO as tolerated. No NSAIDs.   Antibiotics: Ancef x24 hrs " [Use of Plain Language] : use of plain language periop - completed  Diet: Begin with clear fluids and progress diet as tolerated.   DVT prophylaxis: SCDs only. No chemical DVT ppx needed.  Imaging: XR Full Spine PTDC - ordered.  Labs: Trend Hgb POD1,2,3  Bracing/Splinting: [place] brace when OOB  Dressings: Keep Aquacel c/d/i x 7 days.  Drains: Document output per shift, will be discontinued at Orthopedic Surgery discretion, <10 ml/shift x2. Plan to remove 12/24  Hernandes catheter: DO NOT REMOVE HERNANDES UNTIL DIRECTED  Physical Therapy/Occupational Therapy: Eval and treat.  Consults: Hospitalist, urology  Follow-up: Clinic with Dr. Vasquez in 6 weeks with repeat x-rays (1/28/21)  Disposition: Pending progress with therapies, pain control on orals, and medical stability.    Orthopaedics surgery staff for this patient is Dr. Vasquez.    ------------------------------------------------------------------------------------------    [   ] Drains removed.  [   ] Post xrays done.  [   ] Discharge orders done.  [   ] Discharge summary started.    Lam Bustillo MD  Orthopaedic Surgery, PGY4  [phone]     Please page me directly with any questions/concerns during regular weekday hours before 5pm.     If there is no response, if it is a weekend, or if it is during evening hours then please page the orthopaedic surgery resident on call as listed on [facility] call schedule under the orthopaedics tab.    FOLLOWUP:    Future Appointments   Date Time Provider Department [place]   12/22/2020 10:15 AM Janna Krishnan PT [facility] [place]   12/22/2020  2:30 PM Janna Krishnan PT [facility] [place]   12/23/2020  8:45 AM Marisa Cueva OT [facility] [place]   1/28/2021  1:40 PM Evangelist Vasquez MD [facility]   3/18/2021  2:00 PM Evangelist Vasquez MD [facility]        [Adequate] : adequate [None] : none

## 2021-07-19 ENCOUNTER — THERAPY VISIT (OUTPATIENT)
Dept: PHYSICAL THERAPY | Facility: CLINIC | Age: 61
End: 2021-07-19
Payer: COMMERCIAL

## 2021-07-19 DIAGNOSIS — M54.41 CHRONIC BILATERAL LOW BACK PAIN WITH BILATERAL SCIATICA: Primary | ICD-10-CM

## 2021-07-19 DIAGNOSIS — G89.29 CHRONIC BILATERAL LOW BACK PAIN WITH BILATERAL SCIATICA: Primary | ICD-10-CM

## 2021-07-19 DIAGNOSIS — Z47.89 AFTERCARE FOLLOWING SURGERY OF THE MUSCULOSKELETAL SYSTEM, NEC: ICD-10-CM

## 2021-07-19 DIAGNOSIS — M54.42 CHRONIC BILATERAL LOW BACK PAIN WITH BILATERAL SCIATICA: Primary | ICD-10-CM

## 2021-07-19 PROCEDURE — 97112 NEUROMUSCULAR REEDUCATION: CPT | Mod: GP | Performed by: PHYSICAL THERAPIST

## 2021-07-19 PROCEDURE — 97110 THERAPEUTIC EXERCISES: CPT | Mod: GP | Performed by: PHYSICAL THERAPIST

## 2021-07-22 ENCOUNTER — HOSPITAL ENCOUNTER (EMERGENCY)
Facility: CLINIC | Age: 61
Discharge: HOME | End: 2021-07-22
Attending: EMERGENCY MEDICINE | Admitting: EMERGENCY MEDICINE
Payer: COMMERCIAL

## 2021-07-22 ENCOUNTER — APPOINTMENT (OUTPATIENT)
Dept: CT IMAGING | Facility: CLINIC | Age: 61
End: 2021-07-22
Attending: EMERGENCY MEDICINE
Payer: COMMERCIAL

## 2021-07-22 VITALS
OXYGEN SATURATION: 92 % | HEIGHT: 63 IN | BODY MASS INDEX: 30.12 KG/M2 | RESPIRATION RATE: 18 BRPM | SYSTOLIC BLOOD PRESSURE: 119 MMHG | WEIGHT: 170 LBS | TEMPERATURE: 98 F | DIASTOLIC BLOOD PRESSURE: 69 MMHG | HEART RATE: 78 BPM

## 2021-07-22 DIAGNOSIS — K52.9 COLITIS: ICD-10-CM

## 2021-07-22 LAB
ALBUMIN SERPL-MCNC: 3.6 G/DL (ref 3.4–5)
ALP SERPL-CCNC: 77 U/L (ref 40–150)
ALT SERPL W P-5'-P-CCNC: 58 U/L (ref 0–50)
ANION GAP SERPL CALCULATED.3IONS-SCNC: 4 MMOL/L (ref 3–14)
AST SERPL W P-5'-P-CCNC: 23 U/L (ref 0–45)
BASOPHILS # BLD AUTO: 0.1 10E3/UL (ref 0–0.2)
BASOPHILS NFR BLD AUTO: 1 %
BILIRUB SERPL-MCNC: 0.6 MG/DL (ref 0.2–1.3)
BUN SERPL-MCNC: 9 MG/DL (ref 7–30)
CALCIUM SERPL-MCNC: 9.1 MG/DL (ref 8.5–10.1)
CHLORIDE BLD-SCNC: 100 MMOL/L (ref 94–109)
CO2 SERPL-SCNC: 30 MMOL/L (ref 20–32)
CREAT SERPL-MCNC: 0.72 MG/DL (ref 0.52–1.04)
EOSINOPHIL # BLD AUTO: 0.4 10E3/UL (ref 0–0.7)
EOSINOPHIL NFR BLD AUTO: 3 %
ERYTHROCYTE [DISTWIDTH] IN BLOOD BY AUTOMATED COUNT: 14.4 % (ref 10–15)
GFR SERPL CREATININE-BSD FRML MDRD: 89 ML/MIN/1.73M2
GLUCOSE BLD-MCNC: 105 MG/DL (ref 70–99)
HCT VFR BLD AUTO: 40.7 % (ref 35–47)
HEMOCCULT STL QL: NEGATIVE
HGB BLD-MCNC: 13.5 G/DL (ref 11.7–15.7)
IMM GRANULOCYTES # BLD: 0.1 10E3/UL
IMM GRANULOCYTES NFR BLD: 1 %
LYMPHOCYTES # BLD AUTO: 1.2 10E3/UL (ref 0.8–5.3)
LYMPHOCYTES NFR BLD AUTO: 9 %
MCH RBC QN AUTO: 29 PG (ref 26.5–33)
MCHC RBC AUTO-ENTMCNC: 33.2 G/DL (ref 31.5–36.5)
MCV RBC AUTO: 87 FL (ref 78–100)
MONOCYTES # BLD AUTO: 0.7 10E3/UL (ref 0–1.3)
MONOCYTES NFR BLD AUTO: 5 %
NEUTROPHILS # BLD AUTO: 10.8 10E3/UL (ref 1.6–8.3)
NEUTROPHILS NFR BLD AUTO: 81 %
NRBC # BLD AUTO: 0 10E3/UL
NRBC BLD AUTO-RTO: 0 /100
PLATELET # BLD AUTO: 279 10E3/UL (ref 150–450)
POTASSIUM BLD-SCNC: 3.3 MMOL/L (ref 3.4–5.3)
PROT SERPL-MCNC: 7.2 G/DL (ref 6.8–8.8)
RBC # BLD AUTO: 4.66 10E6/UL (ref 3.8–5.2)
SODIUM SERPL-SCNC: 134 MMOL/L (ref 133–144)
WBC # BLD AUTO: 13.1 10E3/UL (ref 4–11)

## 2021-07-22 PROCEDURE — 36415 COLL VENOUS BLD VENIPUNCTURE: CPT | Performed by: EMERGENCY MEDICINE

## 2021-07-22 PROCEDURE — 80053 COMPREHEN METABOLIC PANEL: CPT | Performed by: EMERGENCY MEDICINE

## 2021-07-22 PROCEDURE — 250N000011 HC RX IP 250 OP 636: Performed by: EMERGENCY MEDICINE

## 2021-07-22 PROCEDURE — 99285 EMERGENCY DEPT VISIT HI MDM: CPT | Mod: 25

## 2021-07-22 PROCEDURE — 85025 COMPLETE CBC W/AUTO DIFF WBC: CPT | Performed by: EMERGENCY MEDICINE

## 2021-07-22 PROCEDURE — 82272 OCCULT BLD FECES 1-3 TESTS: CPT | Performed by: EMERGENCY MEDICINE

## 2021-07-22 PROCEDURE — 250N000009 HC RX 250: Performed by: EMERGENCY MEDICINE

## 2021-07-22 PROCEDURE — 74177 CT ABD & PELVIS W/CONTRAST: CPT

## 2021-07-22 RX ORDER — IOPAMIDOL 755 MG/ML
85 INJECTION, SOLUTION INTRAVASCULAR ONCE
Status: COMPLETED | OUTPATIENT
Start: 2021-07-22 | End: 2021-07-22

## 2021-07-22 RX ADMIN — SODIUM CHLORIDE 64 ML: 9 INJECTION, SOLUTION INTRAVENOUS at 20:33

## 2021-07-22 RX ADMIN — IOPAMIDOL 85 ML: 755 INJECTION, SOLUTION INTRAVENOUS at 20:30

## 2021-07-22 ASSESSMENT — ENCOUNTER SYMPTOMS
VOMITING: 1
SHORTNESS OF BREATH: 1
DIARRHEA: 1
RECTAL PAIN: 0
BACK PAIN: 1
ABDOMINAL PAIN: 1
ANAL BLEEDING: 1
NAUSEA: 1

## 2021-07-22 ASSESSMENT — MIFFLIN-ST. JEOR: SCORE: 1287.3

## 2021-07-22 NOTE — ED TRIAGE NOTES
"Pt stated she had some severe abd pain last night - this morning she thought she had loose stool at 0800 but there were actual blood clots. Around 1535 she awoke to a bed with \" blood everywhere. \" VSS,.   "

## 2021-07-23 DIAGNOSIS — G89.18 POSTOPERATIVE PAIN AFTER SPINAL SURGERY: ICD-10-CM

## 2021-07-23 DIAGNOSIS — G57.01 PIRIFORMIS SYNDROME OF RIGHT SIDE: ICD-10-CM

## 2021-07-23 RX ORDER — CYCLOBENZAPRINE HCL 10 MG
5-10 TABLET ORAL 3 TIMES DAILY PRN
Qty: 60 TABLET | Refills: 3 | Status: SHIPPED | OUTPATIENT
Start: 2021-07-23 | End: 2023-08-18

## 2021-07-23 NOTE — ED PROVIDER NOTES
History   Chief Complaint:  Rectal Bleeding and Abdominal Pain    HPI   Aleena Ontiveros is a 61 year old female with history of hypertension and anemia who presents with rectal bleeding at 0800 today. The patient state she had a sudden onset of right lower quadrant abdominal pain that spread to her entire abdomen last night at 2100. She notes that the pain felt like a cramp. She then had an episode of emesis followed by diarrhea. This morning at 0800 she felt like she was going to have another bowel movement, however, she only passed multiple small blood clots. She fell asleep afterwards for around 4 hours and woke up to blood-stained sheets. She called her primary care provider who advised her to visit the emergency department for further evaluation. She denies rectal pain or urinary symptoms. She endorses chest pain and shortness of breath that may be secondary to asthma. She notes she had a recent mechanical fall 3 days ago and has slight left sided back pain.  Of note she does take ibuprofen 200 mg 3 times a day for her chronic pain.  Otherwise no aspirin or anticoagulation.    Review of Systems   Respiratory: Positive for shortness of breath.    Cardiovascular: Positive for chest pain.   Gastrointestinal: Positive for abdominal pain, anal bleeding, diarrhea, nausea (reolved) and vomiting (resolved). Negative for rectal pain.   Genitourinary: Negative.    Musculoskeletal: Positive for back pain.   All other systems reviewed and are negative.    Allergies:  Erythromycin    Medications:  Albuterol inhaler  Norvasc  Flexeril  Cymbalta  Estrace  Allegra  Flonase  Neurontin  Synthroid  Zestoretic  Robaxin  Singulair  Ultram    Past Medical History:    Anemia  Anxiety  Arthritis  Hypertension  Hypothyroidism  Spondylolisthesis of lumbosacral region    Past Surgical History:     section  Knee arthroplasty, right  Knee arthoplasty, left  Spinal fusion  Tonsillectomy  Hysterectomy  Sinal fusion,  "hand  Thyroidectomy     Family History:    Mother - Breast Cancer  Father - Colorectal Cancer, Coronary Artery Disease  Sister - Thyroid Disease  Brother - Thyroid Disease    Social History:  The patient was unaccompanied to the emergency department.    Physical Exam     Patient Vitals for the past 24 hrs:   BP Temp Temp src Pulse Resp SpO2 Height Weight   07/22/21 2001 133/75 -- -- 86 16 97 % -- --   07/22/21 1641 133/85 98  F (36.7  C) Temporal 96 16 98 % 1.588 m (5' 2.5\") 77.1 kg (170 lb)       Physical Exam    Physical Exam   Constitutional:  Patient is oriented to person, place, and time. They appear well-developed and well-nourished. Mild distress secondary to rectal bleeding.   HENT:   Mouth/Throat:   Oropharynx is clear and moist.   Eyes:    Conjunctivae normal and EOM are normal. Pupils are equal, round, and reactive to light.   Neck:    Normal range of motion.   Cardiovascular: Normal rate, regular rhythm and normal heart sounds.  Exam reveals no gallop and no friction rub.  No murmur heard.  Pulmonary/Chest:  Effort normal and breath sounds normal. Patient has no wheezes. Patient has no rales.   Abdominal:   Soft. Bowel sounds are normal. Patient exhibits no mass. There is no tenderness. There is no rebound and no guarding.   Musculoskeletal:  Normal range of motion. Patient exhibits no edema.   Rectal:   She has scant amount of stool. Maybe a trace amount of redness.  Neurological:   Patient is alert and oriented to person, place, and time. Patient has normal strength. No cranial nerve deficit or sensory deficit. GCS 15  Skin:   Skin is warm and dry. No rash noted. No erythema.   Psychiatric:   Patient has a normal mood and affect. Patient's behavior is normal. Judgment and thought content normal.     Emergency Department Course     Imaging:  CT Abdomen Pelvis with Contrast  1.  Focal segment of bowel wall thickening in the proximal transverse  colon is suspicious for a focal colitis, most likely " infectious or  inflammatory in etiology.  2.  The gallbladder is distended. If there is clinical suspicion for  cholecystitis, right upper quadrant ultrasound should be considered  for further evaluation.  Reading per radiology.    Laboratory:   CBC: WBC 13.1 (H), HGB 13.5,    CMP: Potassium 3.3 (L), Glucose 105 (H), ALT 58 (H), o/w WNL (Creatinine 0.72)     Occult Blood Stool: negative.    Emergency Department Course:    Reviewed:  I reviewed vitals and past medical history    Assessments:  1945 I obtained history and examined the patient as noted above.   2115 I rechecked the patient and explained findings.    Disposition:  The patient was discharged to home.     Impression & Plan   CMS Diagnoses: None     Medical Decision Making:  Aleena Ontiveros is a 61 year old female presenting with blood in her stool and maybe some generalized abdominal discomfort. Her exam was very reassuring and thus she did not have a surgical abdomen and really minimal pain. Basic blood work was performed. Her hemoglobin is normal. Her white count is slightly elevated. Potassium is slightly low. It looks like this has been slightly low to low normal in the past. A CT was performed that does show transverse colon inflammation suspicious for focal colitis and is consistent with her symptoms. Her gallbladder was noted to be distended on CT but she did not have pain over the gallbladder and this was not post prandial pain. At this time she is safe to be discharged and I referred her to gastroenterology. She will hold off on using her NSAIDs and was advised return to the emergency department if she develops worsening pain, fevers, inability to keep fluids down.     Diagnosis:    ICD-10-CM    1. Colitis  K52.9      Scribe Disclosure:  I, Roxy Ruiz, am serving as a scribe at 7:38 PM on 7/22/2021 to document services personally performed by Asia Paez MD based on my observations and the provider's statements to me.     Philippe  Asia Sharpe MD  07/22/21 9194

## 2021-08-12 ENCOUNTER — THERAPY VISIT (OUTPATIENT)
Dept: PHYSICAL THERAPY | Facility: CLINIC | Age: 61
End: 2021-08-12
Payer: COMMERCIAL

## 2021-08-12 DIAGNOSIS — G89.29 CHRONIC BILATERAL LOW BACK PAIN WITH BILATERAL SCIATICA: Primary | ICD-10-CM

## 2021-08-12 DIAGNOSIS — M54.42 CHRONIC BILATERAL LOW BACK PAIN WITH BILATERAL SCIATICA: Primary | ICD-10-CM

## 2021-08-12 DIAGNOSIS — M54.41 CHRONIC BILATERAL LOW BACK PAIN WITH BILATERAL SCIATICA: Primary | ICD-10-CM

## 2021-08-12 DIAGNOSIS — Z47.89 AFTERCARE FOLLOWING SURGERY OF THE MUSCULOSKELETAL SYSTEM, NEC: ICD-10-CM

## 2021-08-12 PROCEDURE — 97110 THERAPEUTIC EXERCISES: CPT | Mod: GP | Performed by: PHYSICAL THERAPIST

## 2021-08-12 PROCEDURE — 97140 MANUAL THERAPY 1/> REGIONS: CPT | Mod: GP | Performed by: PHYSICAL THERAPIST

## 2021-08-26 ENCOUNTER — THERAPY VISIT (OUTPATIENT)
Dept: PHYSICAL THERAPY | Facility: CLINIC | Age: 61
End: 2021-08-26
Payer: COMMERCIAL

## 2021-08-26 DIAGNOSIS — M54.41 CHRONIC BILATERAL LOW BACK PAIN WITH BILATERAL SCIATICA: Primary | ICD-10-CM

## 2021-08-26 DIAGNOSIS — M54.42 CHRONIC BILATERAL LOW BACK PAIN WITH BILATERAL SCIATICA: Primary | ICD-10-CM

## 2021-08-26 DIAGNOSIS — G89.29 CHRONIC BILATERAL LOW BACK PAIN WITH BILATERAL SCIATICA: Primary | ICD-10-CM

## 2021-08-26 DIAGNOSIS — Z47.89 AFTERCARE FOLLOWING SURGERY OF THE MUSCULOSKELETAL SYSTEM, NEC: ICD-10-CM

## 2021-08-26 PROCEDURE — 97110 THERAPEUTIC EXERCISES: CPT | Mod: GP | Performed by: PHYSICAL THERAPIST

## 2021-08-26 PROCEDURE — 97112 NEUROMUSCULAR REEDUCATION: CPT | Mod: GP | Performed by: PHYSICAL THERAPIST

## 2021-09-02 ENCOUNTER — THERAPY VISIT (OUTPATIENT)
Dept: PHYSICAL THERAPY | Facility: CLINIC | Age: 61
End: 2021-09-02
Payer: COMMERCIAL

## 2021-09-02 DIAGNOSIS — M54.41 CHRONIC BILATERAL LOW BACK PAIN WITH BILATERAL SCIATICA: Primary | ICD-10-CM

## 2021-09-02 DIAGNOSIS — M54.42 CHRONIC BILATERAL LOW BACK PAIN WITH BILATERAL SCIATICA: Primary | ICD-10-CM

## 2021-09-02 DIAGNOSIS — Z47.89 AFTERCARE FOLLOWING SURGERY OF THE MUSCULOSKELETAL SYSTEM, NEC: ICD-10-CM

## 2021-09-02 DIAGNOSIS — G89.29 CHRONIC BILATERAL LOW BACK PAIN WITH BILATERAL SCIATICA: Primary | ICD-10-CM

## 2021-09-02 PROCEDURE — 97110 THERAPEUTIC EXERCISES: CPT | Mod: GP | Performed by: PHYSICAL THERAPIST

## 2021-09-02 PROCEDURE — 97112 NEUROMUSCULAR REEDUCATION: CPT | Mod: GP | Performed by: PHYSICAL THERAPIST

## 2021-09-13 ENCOUNTER — THERAPY VISIT (OUTPATIENT)
Dept: PHYSICAL THERAPY | Facility: CLINIC | Age: 61
End: 2021-09-13
Payer: COMMERCIAL

## 2021-09-13 DIAGNOSIS — Z47.89 AFTERCARE FOLLOWING SURGERY OF THE MUSCULOSKELETAL SYSTEM, NEC: ICD-10-CM

## 2021-09-13 DIAGNOSIS — M54.42 CHRONIC BILATERAL LOW BACK PAIN WITH BILATERAL SCIATICA: Primary | ICD-10-CM

## 2021-09-13 DIAGNOSIS — M54.41 CHRONIC BILATERAL LOW BACK PAIN WITH BILATERAL SCIATICA: Primary | ICD-10-CM

## 2021-09-13 DIAGNOSIS — G89.29 CHRONIC BILATERAL LOW BACK PAIN WITH BILATERAL SCIATICA: Primary | ICD-10-CM

## 2021-09-13 PROCEDURE — 97112 NEUROMUSCULAR REEDUCATION: CPT | Mod: GP | Performed by: PHYSICAL THERAPIST

## 2021-09-13 PROCEDURE — 97110 THERAPEUTIC EXERCISES: CPT | Mod: GP | Performed by: PHYSICAL THERAPIST

## 2021-09-20 ENCOUNTER — THERAPY VISIT (OUTPATIENT)
Dept: PHYSICAL THERAPY | Facility: CLINIC | Age: 61
End: 2021-09-20
Payer: COMMERCIAL

## 2021-09-20 DIAGNOSIS — M54.41 CHRONIC BILATERAL LOW BACK PAIN WITH BILATERAL SCIATICA: Primary | ICD-10-CM

## 2021-09-20 DIAGNOSIS — M54.42 CHRONIC BILATERAL LOW BACK PAIN WITH BILATERAL SCIATICA: Primary | ICD-10-CM

## 2021-09-20 DIAGNOSIS — G89.29 CHRONIC BILATERAL LOW BACK PAIN WITH BILATERAL SCIATICA: Primary | ICD-10-CM

## 2021-09-20 DIAGNOSIS — Z47.89 AFTERCARE FOLLOWING SURGERY OF THE MUSCULOSKELETAL SYSTEM, NEC: ICD-10-CM

## 2021-09-20 PROCEDURE — 97110 THERAPEUTIC EXERCISES: CPT | Mod: GP | Performed by: PHYSICAL THERAPIST

## 2021-09-20 PROCEDURE — 97112 NEUROMUSCULAR REEDUCATION: CPT | Mod: GP | Performed by: PHYSICAL THERAPIST

## 2021-09-28 ENCOUNTER — TELEPHONE (OUTPATIENT)
Dept: ANESTHESIOLOGY | Facility: CLINIC | Age: 61
End: 2021-09-28

## 2021-09-28 NOTE — TELEPHONE ENCOUNTER
Prior Authorization Retail Medication Request    Medication/Dose: gabapentin (NEURONTIN) 100 MG capsule  ICD code (if different than what is on RX):    Previously Tried and Failed:    Rationale:      Insurance Name:    Insurance ID:        Pharmacy Information (if different than what is on RX)  Name:   Phone:

## 2021-09-28 NOTE — TELEPHONE ENCOUNTER
Central Prior Authorization Team   Phone: [phone]    PA Initiation    Medication: gabapentin (NEURONTIN) 100 MG capsule  Insurance Company: [employer] - Phone [phone] Fax [phone]  Pharmacy Filling the Rx: [facility] #[id number] - [place], [place] - [address] AT [facility] [address]  Filling Pharmacy Phone: [phone]  Filling Pharmacy Fax: [phone]  Start Date: 9/28/2021

## 2021-09-29 NOTE — TELEPHONE ENCOUNTER
Prior Authorization Approval        Authorization Effective Date: 8/29/2021  Authorization Expiration Date: 9/28/2022  Medication: gabapentin (NEURONTIN) 100 MG capsule-PA APPROVED   Approved Dose/Quantity:   Reference #:     Insurance Company: [employer] - Phone [phone] Fax [phone]  Expected CoPay:       CoPay Card Available:      Foundation Assistance Needed:    Which Pharmacy is filling the prescription (Not needed for infusion/clinic administered): [facility] #[id number] - [place], [place] - [address] AT [facility] [address]  Pharmacy Notified: Yes- **Instructed pharmacy to notify patient when script is ready to /ship.**   Patient Notified: Yes

## 2021-10-12 PROCEDURE — 88305 TISSUE EXAM BY PATHOLOGIST: CPT | Performed by: PATHOLOGY

## 2021-10-13 ENCOUNTER — LAB REQUISITION (OUTPATIENT)
Dept: LAB | Facility: CLINIC | Age: 61
End: 2021-10-13

## 2021-10-13 DIAGNOSIS — R12 HEARTBURN: ICD-10-CM

## 2021-10-13 DIAGNOSIS — K92.2 GASTROINTESTINAL HEMORRHAGE, UNSPECIFIED: ICD-10-CM

## 2021-10-13 DIAGNOSIS — K62.5 HEMORRHAGE OF ANUS AND RECTUM: ICD-10-CM

## 2021-10-13 DIAGNOSIS — K63.89 OTHER SPECIFIED DISEASES OF INTESTINE: ICD-10-CM

## 2021-10-13 DIAGNOSIS — K52.9 NONINFECTIVE GASTROENTERITIS AND COLITIS, UNSPECIFIED: ICD-10-CM

## 2021-10-13 DIAGNOSIS — K57.30 DIVERTICULOSIS OF LARGE INTESTINE WITHOUT PERFORATION OR ABSCESS WITHOUT BLEEDING: ICD-10-CM

## 2021-10-13 DIAGNOSIS — K30 FUNCTIONAL DYSPEPSIA: ICD-10-CM

## 2021-10-13 DIAGNOSIS — K63.5 POLYP OF COLON: ICD-10-CM

## 2021-10-14 LAB
PATH REPORT.COMMENTS IMP SPEC: NORMAL
PATH REPORT.COMMENTS IMP SPEC: NORMAL
PATH REPORT.FINAL DX SPEC: NORMAL
PATH REPORT.GROSS SPEC: NORMAL
PATH REPORT.MICROSCOPIC SPEC OTHER STN: NORMAL
PATH REPORT.RELEVANT HX SPEC: NORMAL
PHOTO IMAGE: NORMAL

## 2021-10-21 ENCOUNTER — THERAPY VISIT (OUTPATIENT)
Dept: PHYSICAL THERAPY | Facility: CLINIC | Age: 61
End: 2021-10-21
Payer: COMMERCIAL

## 2021-10-21 DIAGNOSIS — G89.29 CHRONIC BILATERAL LOW BACK PAIN WITH BILATERAL SCIATICA: ICD-10-CM

## 2021-10-21 DIAGNOSIS — M54.41 CHRONIC BILATERAL LOW BACK PAIN WITH BILATERAL SCIATICA: ICD-10-CM

## 2021-10-21 DIAGNOSIS — Z98.1 S/P SPINAL FUSION: Primary | ICD-10-CM

## 2021-10-21 DIAGNOSIS — M54.42 CHRONIC BILATERAL LOW BACK PAIN WITH BILATERAL SCIATICA: ICD-10-CM

## 2021-10-21 DIAGNOSIS — Z47.89 AFTERCARE FOLLOWING SURGERY OF THE MUSCULOSKELETAL SYSTEM, NEC: ICD-10-CM

## 2021-10-21 PROCEDURE — 97110 THERAPEUTIC EXERCISES: CPT | Mod: GP | Performed by: PHYSICAL THERAPIST

## 2021-10-21 PROCEDURE — 97140 MANUAL THERAPY 1/> REGIONS: CPT | Mod: GP | Performed by: PHYSICAL THERAPIST

## 2021-10-21 PROCEDURE — 97112 NEUROMUSCULAR REEDUCATION: CPT | Mod: GP | Performed by: PHYSICAL THERAPIST

## 2021-10-24 ENCOUNTER — HEALTH MAINTENANCE LETTER (OUTPATIENT)
Age: 61
End: 2021-10-24

## 2021-11-18 ENCOUNTER — THERAPY VISIT (OUTPATIENT)
Dept: PHYSICAL THERAPY | Facility: CLINIC | Age: 61
End: 2021-11-18
Payer: COMMERCIAL

## 2021-11-18 DIAGNOSIS — Z47.89 AFTERCARE FOLLOWING SURGERY OF THE MUSCULOSKELETAL SYSTEM, NEC: ICD-10-CM

## 2021-11-18 DIAGNOSIS — Z98.1 S/P SPINAL FUSION: Primary | ICD-10-CM

## 2021-11-18 PROCEDURE — 97112 NEUROMUSCULAR REEDUCATION: CPT | Mod: GP | Performed by: PHYSICAL THERAPIST

## 2021-11-18 PROCEDURE — 97110 THERAPEUTIC EXERCISES: CPT | Mod: GP | Performed by: PHYSICAL THERAPIST

## 2021-12-06 ENCOUNTER — IMMUNIZATION (OUTPATIENT)
Dept: NURSING | Facility: CLINIC | Age: 61
End: 2021-12-06
Payer: COMMERCIAL

## 2021-12-06 PROCEDURE — 0064A COVID-19,PF,MODERNA (18+ YRS BOOSTER .25ML): CPT

## 2021-12-06 PROCEDURE — 91306 COVID-19,PF,MODERNA (18+ YRS BOOSTER .25ML): CPT

## 2021-12-09 ENCOUNTER — THERAPY VISIT (OUTPATIENT)
Dept: PHYSICAL THERAPY | Facility: CLINIC | Age: 61
End: 2021-12-09
Payer: COMMERCIAL

## 2021-12-09 DIAGNOSIS — Z98.1 S/P SPINAL FUSION: Primary | ICD-10-CM

## 2021-12-09 DIAGNOSIS — G89.29 CHRONIC BILATERAL LOW BACK PAIN WITH BILATERAL SCIATICA: ICD-10-CM

## 2021-12-09 DIAGNOSIS — M54.42 CHRONIC BILATERAL LOW BACK PAIN WITH BILATERAL SCIATICA: ICD-10-CM

## 2021-12-09 DIAGNOSIS — Z47.89 AFTERCARE FOLLOWING SURGERY OF THE MUSCULOSKELETAL SYSTEM, NEC: ICD-10-CM

## 2021-12-09 DIAGNOSIS — M54.41 CHRONIC BILATERAL LOW BACK PAIN WITH BILATERAL SCIATICA: ICD-10-CM

## 2021-12-09 PROCEDURE — 97112 NEUROMUSCULAR REEDUCATION: CPT | Mod: GP | Performed by: PHYSICAL THERAPIST

## 2021-12-09 PROCEDURE — 97110 THERAPEUTIC EXERCISES: CPT | Mod: GP | Performed by: PHYSICAL THERAPIST

## 2021-12-09 NOTE — PROGRESS NOTES
PROGRESS  REPORT    Progress reporting period is from 6/3/21 to 12/9/21.       SUBJECTIVE  Subjective changes noted by patient:  Patient notes that overall she is managing fairly well.  She has attempted to reduce her medications some, backing down on flexerill and gabapentin dosages some, but still utilizing each medication.  She also notes that she does still have some pain in the right piriformis area that varies.  It is better if she engages her abdominal, or does her piriformis stretch.  She notes R gastroc weakness (medial gastroc).  She also has some residual numbness/tingling patchy throughout both LE's.  This likely contirbutes to some difficulty with gait and balance (secondary to limitation in proprioceptive input).  She will see her surgeon for follow up next week.  She note she is now [age] year old and feels that she will likely have some permanent nerve damage.  We discussed maximizing strength and being aware of enviromenment (eg with gait/balance difficulties to exercise more caution, go slower or use a device for icy surfaces or uneven surfaces when needed).      Current Pain level: 4/10.     Initial Pain level: 6/10.   Changes in function:  Yes (See Goal flowsheet attached for changes in current functional level)  Adverse reaction to treatment or activity: None    OBJECTIVE  Changes noted in objective findings:  Yes,   Objective:     Pain: Pirifomis stretch helps to alleviate R glute pain.    Strength: There is some weakness R medial gastroc (heel drops down if trying to assess eccentric control).    Palpably there is some atrophy R medial gastroc compared to L.  Did not re asses dermatomes at todays visit, but she does report some patches of numbness on both LE's.    Gait: With gait, likely secondary to decreased push off R gastroc, she picks the R foot up, circumducts a flexed R knee, which contributes to some trunk sway.  She has been working on this with some visible improvement, though still some  gait deviation noted.    AROM: Did not re-assess lumbar AROM today, tough it has improved.      Core Strength/PT: She is able to do neutral spine core strengthening (bridge/bridge on ball, band push outs in standing for example).  She is working on LE strength as well (knee bends supported, standing hip abd/ER), leg press machine for quads and for gastroc.      ASSESSMENT/PLAN  Updated problem list and treatment plan: Diagnosis 1:  s/p L5/S1 revision (hx of Carpio gavino with more recent SIJ fusion and lumbar decompression fusion within about the past year--Carpio rods from distant past). x-rays in epic    Pain -  hot/cold therapy, manual therapy and home program  Decreased ROM/flexibility - manual therapy and therapeutic exercise  Decreased strength - therapeutic exercise and therapeutic activities  Impaired balance - neuro re-education and therapeutic activities  Decreased proprioception - neuro re-education and therapeutic activities  Impaired gait - gait training  Decreased function - therapeutic activities  STG/LTGs have been met or progress has been made towards goals:  Yes (See Goal flow sheet completed today.)  Assessment of Progress: The patient's condition has potential to improve.  Self Management Plans:  Patient has been instructed in a home treatment program.  I have re-evaluated this patient and find that the nature, scope, duration and intensity of the therapy is appropriate for the medical condition of the patient.  Aleena continues to require the following intervention to meet STG and LTG's:  PT    Recommendations:  Will follow up with her surgeon next week.   She then has 3 visits of PT scheduled.  May continue beyond or transition to HEP as her surgeon advises or as the patient needs.     Please refer to the daily flowsheet for treatment today, total treatment time and time spent performing 1:1 timed codes.

## 2021-12-16 ENCOUNTER — ANCILLARY PROCEDURE (OUTPATIENT)
Dept: CT IMAGING | Facility: CLINIC | Age: 61
End: 2021-12-16
Attending: ORTHOPAEDIC SURGERY
Payer: COMMERCIAL

## 2021-12-16 ENCOUNTER — OFFICE VISIT (OUTPATIENT)
Dept: ORTHOPEDICS | Facility: CLINIC | Age: 61
End: 2021-12-16
Payer: COMMERCIAL

## 2021-12-16 DIAGNOSIS — M54.18 RIGHT SACRAL RADICULOPATHY: ICD-10-CM

## 2021-12-16 DIAGNOSIS — Z98.1 S/P SPINAL FUSION: ICD-10-CM

## 2021-12-16 DIAGNOSIS — Z98.1 S/P SPINAL FUSION: Primary | ICD-10-CM

## 2021-12-16 PROCEDURE — 99213 OFFICE O/P EST LOW 20 MIN: CPT | Performed by: ORTHOPAEDIC SURGERY

## 2021-12-16 PROCEDURE — 72131 CT LUMBAR SPINE W/O DYE: CPT | Mod: GC | Performed by: STUDENT IN AN ORGANIZED HEALTH CARE EDUCATION/TRAINING PROGRAM

## 2021-12-16 NOTE — PROGRESS NOTES
Spine Surgical Hx:  1974 - PISF T4-L4 with Carpio gavino instrumentation x 2 (Dr. Bandar Rose, [place]) for AIS.  10/01/2019 - 3-column osteotomy L5-S1 with sacral dome osteotomy; TLIF with Cunningham laminectomy L5-S1; bilateral PISF L2-pelvis using bilateral double S2AI screws; use of Infuse BMP Large kit (Damienbraronaldo/Rashid/Mara) for Gr 4 isthmic spondylolisthesis L5-S1 with severe sagittal malalignment.  [Implants:  Medtronic Solera and Ballast screw systems, with dual headed screws, reductions screws; 5.5 mm CoCr rods x 3].  12/21/2020 - Rev PISF L2-pelvis with partial removal of old Carpio gavino instrumentation; rev decomp of R S1 nerve root via R sacral dome osteoplasty; use of Magnifuse allograft; repair of dural tear R L4-5 (Christina/Mara) for persistent R S1 radiculopathy; pseudarthrosis L4-5.  [Implants: Medtronic Solera and Ballast].      In-Person Visit    Chief Complaint   Patient presents with     Surgical Followup     DOS 12/21/20 S/P Revision laminectomy lumbar 5-sacral 1 with decompression of Right Sacral 1 nerve root; revision posterior instumentee spinal fusion (pseudarthrosis repair) lumbar 2-sacral 1.     RECHECK     same day CT        S>  61 year old female, 1 yr postop; last seen at 6 mos.  Back felt more stable after last surgery, but some numbness had gotten worse including R heel.  Gabapentin 2,000 mg/day; cymbalta.  Was doing PT 2x/week ([facility] in [place]).    5/7/21 - R piriformis muscle injection with steroid (Dr. Sanford).  Per patient, helped some.    Working full time, as ; currently doing a lot of concerts.    Gabapentin 2,000 mg/day, cymbalta, tylenol.    Comes to the [facility]; sees Dr. Sanford.    Continues to do PT with [facility] ([place]      Oswestry (EPI) Questionnaire    OSWESTRY DISABILITY INDEX 12/16/2021   Count 9   Sum 16   Oswestry Score (%) 35.56   Some recent data might be hidden      3CO L5-S1; PISF L2-pelvis 10/1/19:  Preop  EPI       70%  6 wks               72%  3 mos              35.56%  6 mos              33.33%  1 yr                    48.89%  14 mos               57.78%  2 yrs  35.56%     Rev decomp of R S1 nerve root; rev PISF L2-pelvis 12/21/2020:  Preop EPI         57.78%  4 wks                 57.78%  6 wks                 48.57%  3 mos                 48.89%  6 mos                 33.33%  1 yr  35.56%    Visual Analog Pain Scale  Back Pain Scale 0-10: 5  Right leg pain: 0  Left leg pain: 0    PROMIS-10 Scores  Global Mental Health Score: (P) 18  Global Physical Health Score: (P) 13  PROMIS TOTAL - SUBSCORES: (P) 31    O>   Alert, oriented x 3, cooperative.  Not in CP distress.  There were no vitals taken for this visit.  Surgical incision(s) well-healed, no sign of infection.  Ambulates independently.  Grossly neurologically intact.    Imaging:    Lumbar CT from today reviewed.  Shows persistent vacuum signal within L4-5 disc suggestive of persistent pseudoarthrosis.  I cannot tell if the posterior fusion mass is bridging or if there is still some persistent cleft.  The instrumentation appears intact, no loosening of screws, no broken rods.    A>   - 1 year status post revision decompression L5-S1 with revision fusion L2 to pelvis (12/21/2020), doing well, with improved back pain, but persistent right S1 radiculopathy.  - Persistent vacuum signal within L4-5 disc, suggestive of persistent pseudoarthrosis.    P>    Had good discussion with the patient and .  I understand that things are not perfect, but certainly better than they were before.  She is doing better.  She is back to full-time work.  Her right gastrocnemius is still weaker than the left, but even this appears somewhat improved compared to preoperative.    -Placed new internal PT order ([facility]), for right calf strengthening.    Return to clinic for annual postoperative visit in 1 years time (around December 2022) with repeat lumbar CT and EOS  full body AP lateral standing x-rays.    15 minutes spent on the date of the encounter doing chart review/review of outside records/review of test results/interpretation of tests/patient visit/documentation/discussion with other provider(s)/discussion with patient and family.      Evangelist Vasquez MD    Orthopaedic Spine Surgery  Dept Orthopaedic Surgery, [facility]  [phone] office, [phone] pager  [email or web address]

## 2021-12-16 NOTE — LETTER
12/16/2021         RE: Aleena Ontiveros  [address]  [facility] [address]        Dear Colleague,    Thank you for referring your patient, Aleena Ontiveros, to the [facility]. Please see a copy of my visit note below.    Spine Surgical Hx:  1974 - PISF T4-L4 with Carpio gavino instrumentation x 2 (Dr. Bandar Rose, [place]) for AIS.  10/01/2019 - 3-column osteotomy L5-S1 with sacral dome osteotomy; TLIF with Cunningham laminectomy L5-S1; bilateral PISF L2-pelvis using bilateral double S2AI screws; use of Infuse BMP Large kit (Christina/Rashid/Mara) for Gr 4 isthmic spondylolisthesis L5-S1 with severe sagittal malalignment.  [Implants:  Medtronic Solera and Ballast screw systems, with dual headed screws, reductions screws; 5.5 mm CoCr rods x 3].  12/21/2020 - Rev PISF L2-pelvis with partial removal of old Carpio gavino instrumentation; rev decomp of R S1 nerve root via R sacral dome osteoplasty; use of Magnifuse allograft; repair of dural tear R L4-5 (Christina/Mara) for persistent R S1 radiculopathy; pseudarthrosis L4-5.  [Implants: Medtronic Solera and Ballast].      In-Person Visit    Chief Complaint   Patient presents with     Surgical Followup     DOS 12/21/20 S/P Revision laminectomy lumbar 5-sacral 1 with decompression of Right Sacral 1 nerve root; revision posterior instumentee spinal fusion (pseudarthrosis repair) lumbar 2-sacral 1.     RECHECK     same day CT        S>  61 year old female, 1 yr postop; last seen at 6 mos.  Back felt more stable after last surgery, but some numbness had gotten worse including R heel.  Gabapentin 2,000 mg/day; cymbalta.  Was doing PT 2x/week ([facility] in [place]).    5/7/21 - R piriformis muscle injection with steroid (Dr. Sanford).  Per patient, helped some.    Working full time, as ; currently doing a lot of concerts.    Gabapentin 2,000 mg/day, cymbalta, tylenol.    Comes to the [facility]; sees   Juvenal.    Continues to do PT with [facility] ([place]).      Oswestry (EPI) Questionnaire    OSWESTRY DISABILITY INDEX 12/16/2021   Count 9   Sum 16   Oswestry Score (%) 35.56   Some recent data might be hidden      3CO L5-S1; PISF L2-pelvis 10/1/19:  Preop EPI       70%  6 wks               72%  3 mos              35.56%  6 mos              33.33%  1 yr                    48.89%  14 mos               57.78%  2 yrs  35.56%     Rev decomp of R S1 nerve root; rev PISF L2-pelvis 12/21/2020:  Preop EPI         57.78%  4 wks                 57.78%  6 wks                 48.57%  3 mos                 48.89%  6 mos                 33.33%  1 yr  35.56%    Visual Analog Pain Scale  Back Pain Scale 0-10: 5  Right leg pain: 0  Left leg pain: 0    PROMIS-10 Scores  Global Mental Health Score: (P) 18  Global Physical Health Score: (P) 13  PROMIS TOTAL - SUBSCORES: (P) 31    O>   Alert, oriented x 3, cooperative.  Not in CP distress.  There were no vitals taken for this visit.  Surgical incision(s) well-healed, no sign of infection.  Ambulates independently.  Grossly neurologically intact.    Imaging:    Lumbar CT from today reviewed.  Shows persistent vacuum signal within L4-5 disc suggestive of persistent pseudoarthrosis.  I cannot tell if the posterior fusion mass is bridging or if there is still some persistent cleft.  The instrumentation appears intact, no loosening of screws, no broken rods.    A>   - 1 year status post revision decompression L5-S1 with revision fusion L2 to pelvis (12/21/2020), doing well, with improved back pain, but persistent right S1 radiculopathy.  - Persistent vacuum signal within L4-5 disc, suggestive of persistent pseudoarthrosis.    P>    Had good discussion with the patient and .  I understand that things are not perfect, but certainly better than they were before.  She is doing better.  She is back to full-time work.  Her right gastrocnemius is still weaker than the left, but even this  appears somewhat improved compared to preoperative.    -Placed new internal PT order ([facility]), for right calf strengthening.    Return to clinic for annual postoperative visit in 1 years time (around December 2022) with repeat lumbar CT and EOS full body AP lateral standing x-rays.    15 minutes spent on the date of the encounter doing chart review/review of outside records/review of test results/interpretation of tests/patient visit/documentation/discussion with other provider(s)/discussion with patient and family.      Evangelist Vasquez MD    Orthopaedic Spine Surgery  Dept Orthopaedic Surgery, [facility]  [phone] office, [phone] pager  [email or web address]

## 2021-12-30 ENCOUNTER — THERAPY VISIT (OUTPATIENT)
Dept: PHYSICAL THERAPY | Facility: CLINIC | Age: 61
End: 2021-12-30
Payer: COMMERCIAL

## 2021-12-30 ENCOUNTER — TELEPHONE (OUTPATIENT)
Dept: ANESTHESIOLOGY | Facility: CLINIC | Age: 61
End: 2021-12-30

## 2021-12-30 DIAGNOSIS — M54.42 CHRONIC BILATERAL LOW BACK PAIN WITH BILATERAL SCIATICA: ICD-10-CM

## 2021-12-30 DIAGNOSIS — G89.29 CHRONIC BILATERAL LOW BACK PAIN WITH BILATERAL SCIATICA: ICD-10-CM

## 2021-12-30 DIAGNOSIS — M54.18 RIGHT SACRAL RADICULOPATHY: ICD-10-CM

## 2021-12-30 DIAGNOSIS — Z47.89 AFTERCARE FOLLOWING SURGERY OF THE MUSCULOSKELETAL SYSTEM, NEC: ICD-10-CM

## 2021-12-30 DIAGNOSIS — M54.41 CHRONIC BILATERAL LOW BACK PAIN WITH BILATERAL SCIATICA: ICD-10-CM

## 2021-12-30 DIAGNOSIS — G57.01 PIRIFORMIS SYNDROME OF RIGHT SIDE: ICD-10-CM

## 2021-12-30 DIAGNOSIS — Z98.1 S/P SPINAL FUSION: Primary | ICD-10-CM

## 2021-12-30 PROCEDURE — 97110 THERAPEUTIC EXERCISES: CPT | Mod: GP | Performed by: PHYSICAL THERAPIST

## 2021-12-30 PROCEDURE — 97112 NEUROMUSCULAR REEDUCATION: CPT | Mod: GP | Performed by: PHYSICAL THERAPIST

## 2021-12-30 RX ORDER — DULOXETIN HYDROCHLORIDE 60 MG/1
60 CAPSULE, DELAYED RELEASE ORAL DAILY
Qty: 90 CAPSULE | Refills: 0 | Status: SHIPPED | OUTPATIENT
Start: 2021-12-30 | End: 2022-04-03

## 2021-12-30 NOTE — TELEPHONE ENCOUNTER
Please call patient to schedule a follow up visit in clinic with any available provider. Schedule as a 40 minute return visit to reestablish care. Thanks.      Mesha Osullivan RN

## 2021-12-30 NOTE — TELEPHONE ENCOUNTER
Medication refill sent to pharmacy. No changes. Patient last seen 6/11/21 and is past due for a follow up visit. Message sent to clinic coordinators to schedule patient for a follow up visit with any available provider.       Mesha Osullivan RN

## 2022-01-03 ENCOUNTER — THERAPY VISIT (OUTPATIENT)
Dept: PHYSICAL THERAPY | Facility: CLINIC | Age: 62
End: 2022-01-03
Payer: COMMERCIAL

## 2022-01-03 DIAGNOSIS — Z98.1 S/P SPINAL FUSION: Primary | ICD-10-CM

## 2022-01-03 DIAGNOSIS — Z47.89 AFTERCARE FOLLOWING SURGERY OF THE MUSCULOSKELETAL SYSTEM, NEC: ICD-10-CM

## 2022-01-03 DIAGNOSIS — M54.42 CHRONIC BILATERAL LOW BACK PAIN WITH BILATERAL SCIATICA: ICD-10-CM

## 2022-01-03 DIAGNOSIS — G89.29 CHRONIC BILATERAL LOW BACK PAIN WITH BILATERAL SCIATICA: ICD-10-CM

## 2022-01-03 DIAGNOSIS — M54.41 CHRONIC BILATERAL LOW BACK PAIN WITH BILATERAL SCIATICA: ICD-10-CM

## 2022-01-03 PROCEDURE — 97140 MANUAL THERAPY 1/> REGIONS: CPT | Mod: GP | Performed by: PHYSICAL THERAPIST

## 2022-01-03 PROCEDURE — 97110 THERAPEUTIC EXERCISES: CPT | Mod: GP | Performed by: PHYSICAL THERAPIST

## 2022-01-03 ASSESSMENT — ENCOUNTER SYMPTOMS
COUGH DISTURBING SLEEP: 1
WHEEZING: 0
POSTURAL DYSPNEA: 0
SHORTNESS OF BREATH: 1
MEMORY LOSS: 0
SPUTUM PRODUCTION: 0
ARTHRALGIAS: 1
SPEECH CHANGE: 0
SEIZURES: 0
MUSCLE WEAKNESS: 1
DYSPNEA ON EXERTION: 0
STIFFNESS: 1
MYALGIAS: 0
NECK PAIN: 0
JOINT SWELLING: 1
LOSS OF CONSCIOUSNESS: 0
HEADACHES: 0
COUGH: 0
TREMORS: 0
BACK PAIN: 1
HEMOPTYSIS: 0
DISTURBANCES IN COORDINATION: 0
NUMBNESS: 1
SNORES LOUDLY: 1
WEAKNESS: 1
PARALYSIS: 0
DIZZINESS: 0
TINGLING: 1
MUSCLE CRAMPS: 1

## 2022-01-06 ENCOUNTER — OFFICE VISIT (OUTPATIENT)
Dept: ANESTHESIOLOGY | Facility: CLINIC | Age: 62
End: 2022-01-06
Payer: COMMERCIAL

## 2022-01-06 VITALS — HEART RATE: 91 BPM | SYSTOLIC BLOOD PRESSURE: 138 MMHG | OXYGEN SATURATION: 97 % | DIASTOLIC BLOOD PRESSURE: 86 MMHG

## 2022-01-06 DIAGNOSIS — G89.4 CHRONIC PAIN SYNDROME: Primary | ICD-10-CM

## 2022-01-06 DIAGNOSIS — M79.18 PIRIFORMIS MUSCLE PAIN: ICD-10-CM

## 2022-01-06 PROCEDURE — 99213 OFFICE O/P EST LOW 20 MIN: CPT | Mod: GC | Performed by: ANESTHESIOLOGY

## 2022-01-06 RX ORDER — GABAPENTIN 400 MG/1
300 CAPSULE ORAL
COMMUNITY
Start: 2021-11-08 | End: 2023-12-21

## 2022-01-06 ASSESSMENT — PAIN SCALES - GENERAL: PAINLEVEL: MODERATE PAIN (5)

## 2022-01-06 NOTE — PROGRESS NOTES
"COMPREHENSIVE PAIN CLINIC FOLLOW UP EVALUATION  1/6/21    Patient states that her pain is located behind her right thigh. She states the pain is constant and feels as if she was \"kicked\" at that particular location. She states that sitting for prolonged periods of time make it worse. She has noticed that physical therapy, which she does weekly, piriformis injections, and sitting on an exercise ball make the pain feel better. She has also noticed the pain occurs later in the day. She rates the pain as 5/10 on average but can range from 3/10-8/10 in severity.    Location: right side thigh  Duration: constant  Characterization: \"kicked\"  Aggravating: sitting for prolonged periods of time  Alleviating: PT, piriformis injection, exercise ball  Radiation: non radiating  Time: later in the day  Severity: 5/10; 3/10-8/10      Interval History:  The patient is a 60 year old female with a PMHx of HTN, asthma, arthritis, anxiety, scoliosis, and multiple spine surgeries who presents via video today for continued evaluation of right lower extremity pain.  Since her last visit, the patient' reports:  - She underwent right piriformis muscle injection on 5/7/21 which she reports did help somewhat  - She is still reliant on gabapentin - her dosage to a total daily dose of 2000 mg on average  - She would like to continue to wean this medication as she is able provided she is able to maintain pain control  - She has found benefit with the addition of duloxetine 60 mg daily  - She also reports improvement in her symptoms with the incorporation of piriformis muscle specific exercises/stretches into PT  - She is still interested in acupuncture     Previous Interval History on 6/11/21:   The patient is a 60 year old female with a PMHx of HTN, asthma, arthritis, anxiety, scoliosis, and multiple spine surgeries who presents via video today for continued evaluation of right lower extremity pain.  Since her last visit, the patient' " reports:  - She underwent right piriformis muscle injection on 5/7/21 which she reports did help somewhat  - She is still pretty reliant on gabapentin for ongoing pain control, although she has been able to decrease her dosage to a total daily dose of 2000 mg on average  - She would like to continue to wean this medication as she is able provided she is able to maintain pain control  - She has found benefit with the addition of duloxetine 30 mg daily and would like to trial an increase to 60 mg daily  - She also reports improvement in her symptoms with the incorporation of piriformis muscle specific exercises/stretches into PT  - She is still interested in acupuncture although she has not yet been able to establish care    Previous recommendations from visit on 6/11/21 include:  1. Trial increase from duloxetine 30 mg daily to 30mg BID.  Will assess for benefit at follow up  2. Continue to work with physical therapy re: myofascial pain and piriformis muscle pain  3. Given positive response to piriformis muscle injection > 6 weeks, this can be repeated PRN in the future  4. The patient will continue to explore options for acupuncture    Current Treatments:  Duloxetine 60 mg daily  Gabapentin, ~2000 mg daily, divided dosing  Diclofenac 1%    [place] Board of Pharmacy Data Base Reviewed:    YES    Allergies reviewed:   Allergies   Allergen Reactions     Adhesive Tape      Erythromycin Nausea and Vomiting     Pills cause vomiting,      Medications reviewed: Pertinent medications reviewed and updated  Current Outpatient Medications   Medication     acetaminophen (TYLENOL) 500 MG tablet     albuterol (PROAIR HFA/PROVENTIL HFA/VENTOLIN HFA) 108 (90 Base) MCG/ACT inhaler     amLODIPine (NORVASC) 5 MG tablet     diclofenac (VOLTAREN) 1 % topical gel     DULoxetine (CYMBALTA) 60 MG capsule     estradiol (ESTRACE) 0.1 MG/GM cream     Fexofenadine HCl (ALLEGRA PO)     fluticasone (FLONASE) 50 MCG/ACT spray      fluticasone-salmeterol (ADVAIR) 500-50 MCG/DOSE inhaler     gabapentin (NEURONTIN) 100 MG capsule     gabapentin (NEURONTIN) 400 MG capsule     levothyroxine (SYNTHROID) 125 MCG tablet     lisinopril-hydrochlorothiazide (ZESTORETIC) 20-12.5 MG tablet     Montelukast Sodium (SINGULAIR PO)     multivitamin w/minerals (THERA-VIT-M) tablet     Omega-3 Fatty Acids (FISH OIL) 500 MG CAPS     cyclobenzaprine (FLEXERIL) 10 MG tablet     methocarbamol (ROBAXIN) 750 MG tablet     traMADol (ULTRAM) 50 MG tablet     No current facility-administered medications for this visit.     Medical history reviewed:   Patient Active Problem List   Diagnosis     S/P complete thyroidectomy     S/P total thyroidectomy     S/P total knee replacement using cement, right     S/P total knee arthroplasty, right     Benign essential hypertension     Slow transit constipation     Physical deconditioning     Advance Care Planning     S/P total knee arthroplasty, left     Asthma     Primary osteoarthritis of knee     Anemia due to blood loss, acute     Other secondary kyphosis, thoracolumbar region     Acquired spondylolisthesis of lumbosacral region     H/O spinal fusion     Osteoarthritis     Postoperative pain after spinal surgery     Primary osteoarthritis involving multiple joints     UTI (urinary tract infection)     Hypotension, unspecified hypotension type     Right sacral radiculopathy     S/P spinal fusion     Spondylolisthesis of lumbosacral region     Myofascial pain     Review of Systems:  The 14 system ROS was reviewed from the intake questionnaire; results listed at end of note.    Physical Exam:  /86   Pulse 91   SpO2 97%     Physical Exam   Constitutional: Patient is oriented to person, place, and time.  Patient appears well-developed and well-nourished. No distress.   HENT:   Head: Normocephalic and atraumatic.   Neurological: She is alert and oriented to person, place, and time. No gross cranial nerve deficits noted.  Coordination is grossly normal without focal deficit.   Psychiatric: she has a normal mood and affect. her behavior is normal. Judgment and thought content normal.    Imaging:  Exam: Full spine radiographs using EOS      History: S/P spinal fusion     Techniques: AP and lateral views of spine were submitted for  interpretation.     Comparison: Radiographs of the spine 3/18/2021, 12/24/2018, CT lumbar  spine 10/29/2020., Chest x-ray 10/15/2018, spine radiographs 2/14/2018     Findings:     12 rib bearing vertebral bodies and 5 lumbar type vertebral bodies are  identified. Postoperative changes of spinal fusion instrumentation  from L2 through the pelvis. Stable thoracic Carpio rods. Hardware  is intact. Severe disc space narrowing at C5-C6 with grade 1  anterolisthesis. Additional multilevel spondylosis of the spine. Grade  3 anterolisthesis of L5 on S1 is better demonstrated on CT from  10/29/2020.     Coronal Deformity:     There is a mild  convexed right curvature of thoracic spine with apex  at T8.      No substantial global coronal imbalance.     Sagittal Vertical Axis (A vertical line drawn from the center of C7  (troy line) to the posterosuperior aspect of the S1 on sagittal  plane):  positive      Additional Findings:     Small nodular opacity projects over the left upper lobe between the  posterior fifth and sixth ribs, and is not substantially changed from  the most recent comparison on 3/18/2021 but is not definitely present  on the earlier exams.     No acute osseous abnormality.  There is a nonobstructive bowel gas  pattern. Mild degenerative changes of both hips.                                                                      Impression:  1. Postoperative changes of spinal fusion instrumentation from L2  through the pelvis and Carpio rods in the thoracic spine. No  evidence of hardware complication.  2. Multilevel spondylosis, including severe disc space narrowing and  grade 1  anterolisthesis of C4 on C5. Grade 3 anterolisthesis of L5 on  S1 is better demonstrated on CT from 10/29/2020. Mild convex right  curvature of the thoracic spine.  3. Positive global sagittal imbalance.  4. Nodular opacity projects over the left upper lobe. Recommend  low-dose CT of the chest for further evaluation.    Assessment:    The patient is a 60 year old female with PMHx of HTN, asthma, arthritis, anxiety, scoliosis, and multiple spine surgeries who returns to clinic today via video visit for continued management of right-sided low back and leg pain.  The patient has had generally positive response to prior treatments including a trial of duloxetine and a right-sided piriformis muscle injection with steroid medication.  She has been able to begin to taper her gabapentin and is currently taking approximately 2000 mg/day.   She also had some improvement following piriformis injection and incorporation of piriformis specific exercises into physical therapy, so this injection can be repeated in the future should the patient's pain returned to baseline.      Visit Diagnoses:  Right lumbosacral radiculopathy  Myofascial pain  Right-sided piriformis syndrome    Plan:  Work up:    None at this time    Referrals:    Accupuncture    Medications:    Duloxetine 60mg daily    Continue gabapentin (up to 500mg four times daily for total dose of 2000mg)    Therapies:    Continue with PT    Interventions:    None at this time, however repeat right-sided piriformis muscle injection can be considered    Will place order for piriformis injection if patient would like to schedule     Follow up: 6 to 8 weeks after injection or as needed      I saw and examined the patient with the Pain Fellow/Resident. I have reviewed and agree with the resident's note and plan of care and made changes and corrections directly to the body of the note.    TIME SPENT:  BY FELLOW/RESIDENT ALONE 25 MIN  BY MYSELF AND FELLOW/RESIDENT TOGETHER 25  MIN    These times included more than 50% time spent counseling her about her diagnosis and treatment options and coordination of care with the primary team. This time also includes time for chart review, preparation, and documentation.     Ana Luna MD  Pain Medicine, Department of Anesthesiology  , [facility]        Answers for HPI/ROS submitted by the patient on 1/3/2022  General Symptoms: No  Skin Symptoms: No  HENT Symptoms: No  EYE SYMPTOMS: No  HEART SYMPTOMS: No  LUNG SYMPTOMS: Yes  INTESTINAL SYMPTOMS: No  URINARY SYMPTOMS: No  GYNECOLOGIC SYMPTOMS: No  BREAST SYMPTOMS: No  SKELETAL SYMPTOMS: Yes  BLOOD SYMPTOMS: No  NERVOUS SYSTEM SYMPTOMS: Yes  MENTAL HEALTH SYMPTOMS: No  Cough: No  Sputum or phlegm: No  Coughing up blood: No  Difficulty breating or shortness of breath: Yes  Snoring: Yes  Wheezing: No  Difficulty breathing on exertion: No  Nighttime Cough: Yes  Difficulty breathing when lying flat: No  Back pain: Yes  Muscle aches: No  Neck pain: No  Swollen joints: Yes  Joint pain: Yes  Bone pain: No  Muscle cramps: Yes  Muscle weakness: Yes  Joint stiffness: Yes  Bone fracture: No  Trouble with coordination: No  Dizziness or trouble with balance: No  Fainting or black-out spells: No  Memory loss: No  Headache: No  Seizures: No  Speech problems: No  Tingling: Yes  Tremor: No  Weakness: Yes  Difficulty walking: No  Paralysis: No  Numbness: Yes

## 2022-01-06 NOTE — PATIENT INSTRUCTIONS
Medications:    Continue Gabapentin as prescribed.     Please provide the clinic with a minium of 1 week notice, on all prescription refills.     Referrals:    Acupuncture referral placed.  -Please call your insurance provider to find out about acupuncture coverage, being that not all policies cover acupuncture services.    [facility] Acupuncture   [phone]    Procedures:    Call to schedule your procedure: [phone]    Right sided Piriformis injection    Your pre-procedure instructions are below, please call our clinic if you have any questions.    Recommended Follow up:      3-4 weeks after the procedure.        Procedure Information related to COVID-19    Please call [phone] to schedule, reschedule, or cancel your procedure appointment.   Phones are answered Monday - Friday from 08:00 - 4:30pm.  Leave a voicemail with your name, birth date, and phone number if no one is available to take your call.     You will need to be tested for COVID-19 within 4 days (96 hours) of your procedure.  You will be called to schedule your COVID test by a central scheduling team. If you have not been contacted to schedule your test within 4 days of the scheduled procedure, call [phone]    Please be aware that the turn around time for the test is approximately 24-72 hours.   If your results are still pending the day of your procedure, you will be notified as soon as possible as the procedure may be cancelled.    Please note: You will only be contacted for positive and pending results.     The procedure center staff will call you several days before the procedure to review important information that you will need to know for the day of the procedure.   Please contact the clinic if you have further questions about this information.       Information related to Scheduling and Pre-Procedure Instructions:    If you are having a Diagnostic procedure, you will be given a Pain Diary to document your pain  relief.   Please fax the completed form to our office.   fax number is [phone]    If you must reschedule your procedure more than two times, you must follow up in clinic before rescheduling again.        Preparing for your procedure    CAUTION - FAILURE TO FOLLOW THESE PRE-PROCEDURE INSTRUCTIONS WILL RESULT IN YOUR PROCEDURE BEING RESCHEDULED.      Your procedure: Right sided Piriformis injection            You must have a  take you home after your procedure. Transportation by taxi or para-transit is okay as long as you have a responsible adult accompany you. You must provide your 's full name and contact number at time of check in.     Fasting Protocol Please have nothing to eat and drink for 2 hours before the procedure.      Medications If you take any medications, DO NOT STOP. Take your medications as usual the day of your procedure with a sip of water AT LEAST 2 HOURS PRIOR TO ARRIVAL.    Antibiotics If you are currently taking antibiotics, you must complete the entire dose 7 days prior to your scheduled procedure. You must be clear of any signs or symptoms of infection. If you begin antibiotics, please contact our clinic for instructions.     Fever, Chills, or Rash If you experience a fever of higher than 100 degrees, chills, rash, or open wounds during the one week before your procedure, please call the clinic to see if you may proceed with your procedure.      Medication Hold List  **Patients under Cardiology/Neurology care should consult their provider prior to the pain procedure to verify pre-procedure medication instructions. The information below contains general guidelines.**    Blood Thinners If you are taking daily ASPIRIN, PLAVIX, OR OTHER BLOOD THINNERS SUCH AS COUMADIN/WARFARIN, we will need your prescribing doctor to sign a release permitting you to stop these medications. Once approved by your prescribing doctor - STOP ALL BLOOD THINNERS BASED ON THE TIME TABLE BELOW PRIOR  TO YOUR PROCEDURE. If you have been instructed to stop WARFARIN(COUMADIN), you must have an INR lab drawn the day before your procedure. . Your INR must be within normal limits before we can perform your injection. MEDICATIONS CAN BE RESTARTED AFTER YOUR PROCEDURE.    14 DAY HOLD  Ticlid (ticlopidine)    10 DAY HOLD  Effient (Prasugel)    3 DAY HOLD  Xarelto (rivaroxaban) 7 DAY HOLD  Anacin, Bufferin, Ecotrin, Excedrin, Aggrenox (Aspirin)  Brilinta (ticagrelor)  Coumadin (Warfarin)  Pradexa (Dabigatran)  Elmiron (Pentosan)  Plavix (Clopidogrel Bisulfate)  Pletal (Cilostazol)    24 HOUR HOLD  Lovenox (enoxaparin)  Agrylin (Anagrelide)        Non-steroidal Anti-inflammatories (NSAIDs) DO NOT TAKE any non-steroidal anti-inflammatory medications (NSAIDs) listed on the table below. MEDICATIONS CAN BE RESTARTED AFTER YOUR PROCEDURE. Celebrex is OK to take and does not need to be discontinued.     Medications to stop:  3 DAY HOLD  Advil, Motrin (Ibuprofen)  Arthrotec (diciofenac sodium/misoprostol)  Clinoril (Sulindac)  Indocin (Indomethacin)  Lodine (Etodolac)  Toradol (Ketorolac)  Vicoprofen (Hydrocodone and Ibuprofen)  Voltaren (Diclotenac)    14 DAY HOLD  Daypro (Oxaprozin)  Feldene (Piroxicam)   7 DAY HOLD  Aleve (Naproxen sodium)  Darvon compound (contains aspirin)  Naprosyn (Naproxen)  Norgesic Forte (contains aspirin)  Mobic (Meloxicam)  Oruvall (Ketoprofen)  Percodan (contains aspirin)  Relafen (Nabumetone)  Salsalate  Trilisate  Vitamin E (more than 400 mg per day)  Any medication containing aspirin                To speak with a nurse, schedule/reschedule/cancel a clinic appointment, or request a medication refill call: [phone]     You can also reach us by [employer]: [email or web address]

## 2022-01-06 NOTE — NURSING NOTE
Patient presents with:  Consult: UMP NEW,RM 14, patient reports, lower back and right hip pain 5/10      Moderate Pain (5)     Pain Medications     Analgesics Other Refills Start End     acetaminophen (TYLENOL) 500 MG tablet    1 7/9/2021     Sig: Take 2 Tabs Every 8 Hours as needed for pain    Class: E-Prescribe    Opioid Agonists Refills Start End     traMADol (ULTRAM) 50 MG tablet          Sig - Route: Take 100 mg by mouth every 6 hours as needed for severe pain - Oral    Class: Historical          What medications are you using for pain?   Gabapentin, acetaminophen, cymbalta,       Willy Jj, EMT

## 2022-01-06 NOTE — NURSING NOTE
LPN read through the instructions with pt for the recommended procedure: Right sided piriformis injection  Pt verbalized understanding to holding appropriate medication per protocol, and was agreeable to NPO policy and needing a .    Anticoagulant: None, pt denied.     Recommended follow up: 3-4 weeks after the procedure.     Loly Cisneros LPN

## 2022-01-06 NOTE — LETTER
"1/6/2022       RE: Alenea Ontiveros  [address]  [facility] [address]     Dear Colleague,    Thank you for referring your patient, Aleena Ontiveros, to the [facility] CLINIC FOR COMPREHENSIVE PAIN MANAGEMENT [place] at [facility]. Please see a copy of my visit note below.    COMPREHENSIVE PAIN CLINIC FOLLOW UP EVALUATION  1/6/21    Patient states that her pain is located behind her right thigh. She states the pain is constant and feels as if she was \"kicked\" at that particular location. She states that sitting for prolonged periods of time make it worse. She has noticed that physical therapy, which she does weekly, piriformis injections, and sitting on an exercise ball make the pain feel better. She has also noticed the pain occurs later in the day. She rates the pain as 5/10 on average but can range from 3/10-8/10 in severity.    Location: right side thigh  Duration: constant  Characterization: \"kicked\"  Aggravating: sitting for prolonged periods of time  Alleviating: PT, piriformis injection, exercise ball  Radiation: non radiating  Time: later in the day  Severity: 5/10; 3/10-8/10      Interval History:  The patient is a 60 year old female with a PMHx of HTN, asthma, arthritis, anxiety, scoliosis, and multiple spine surgeries who presents via video today for continued evaluation of right lower extremity pain.  Since her last visit, the patient' reports:  - She underwent right piriformis muscle injection on 5/7/21 which she reports did help somewhat  - She is still reliant on gabapentin - her dosage to a total daily dose of 2000 mg on average  - She would like to continue to wean this medication as she is able provided she is able to maintain pain control  - She has found benefit with the addition of duloxetine 60 mg daily  - She also reports improvement in her symptoms with the incorporation of piriformis muscle specific exercises/stretches into PT  - " She is still interested in acupuncture     Previous Interval History on 6/11/21:   The patient is a 60 year old female with a PMHx of HTN, asthma, arthritis, anxiety, scoliosis, and multiple spine surgeries who presents via video today for continued evaluation of right lower extremity pain.  Since her last visit, the patient' reports:  - She underwent right piriformis muscle injection on 5/7/21 which she reports did help somewhat  - She is still pretty reliant on gabapentin for ongoing pain control, although she has been able to decrease her dosage to a total daily dose of 2000 mg on average  - She would like to continue to wean this medication as she is able provided she is able to maintain pain control  - She has found benefit with the addition of duloxetine 30 mg daily and would like to trial an increase to 60 mg daily  - She also reports improvement in her symptoms with the incorporation of piriformis muscle specific exercises/stretches into PT  - She is still interested in acupuncture although she has not yet been able to establish care    Previous recommendations from visit on 6/11/21 include:  1. Trial increase from duloxetine 30 mg daily to 30mg BID.  Will assess for benefit at follow up  2. Continue to work with physical therapy re: myofascial pain and piriformis muscle pain  3. Given positive response to piriformis muscle injection > 6 weeks, this can be repeated PRN in the future  4. The patient will continue to explore options for acupuncture    Current Treatments:  Duloxetine 60 mg daily  Gabapentin, ~2000 mg daily, divided dosing  Diclofenac 1%    [place] Board of Pharmacy Data Base Reviewed:    YES    Allergies reviewed:   Allergies   Allergen Reactions     Adhesive Tape      Erythromycin Nausea and Vomiting     Pills cause vomiting,      Medications reviewed: Pertinent medications reviewed and updated  Current Outpatient Medications   Medication     acetaminophen (TYLENOL) 500 MG tablet      albuterol (PROAIR HFA/PROVENTIL HFA/VENTOLIN HFA) 108 (90 Base) MCG/ACT inhaler     amLODIPine (NORVASC) 5 MG tablet     diclofenac (VOLTAREN) 1 % topical gel     DULoxetine (CYMBALTA) 60 MG capsule     estradiol (ESTRACE) 0.1 MG/GM cream     Fexofenadine HCl (ALLEGRA PO)     fluticasone (FLONASE) 50 MCG/ACT spray     fluticasone-salmeterol (ADVAIR) 500-50 MCG/DOSE inhaler     gabapentin (NEURONTIN) 100 MG capsule     gabapentin (NEURONTIN) 400 MG capsule     levothyroxine (SYNTHROID) 125 MCG tablet     lisinopril-hydrochlorothiazide (ZESTORETIC) 20-12.5 MG tablet     Montelukast Sodium (SINGULAIR PO)     multivitamin w/minerals (THERA-VIT-M) tablet     Omega-3 Fatty Acids (FISH OIL) 500 MG CAPS     cyclobenzaprine (FLEXERIL) 10 MG tablet     methocarbamol (ROBAXIN) 750 MG tablet     traMADol (ULTRAM) 50 MG tablet     No current facility-administered medications for this visit.     Medical history reviewed:   Patient Active Problem List   Diagnosis     S/P complete thyroidectomy     S/P total thyroidectomy     S/P total knee replacement using cement, right     S/P total knee arthroplasty, right     Benign essential hypertension     Slow transit constipation     Physical deconditioning     Advance Care Planning     S/P total knee arthroplasty, left     Asthma     Primary osteoarthritis of knee     Anemia due to blood loss, acute     Other secondary kyphosis, thoracolumbar region     Acquired spondylolisthesis of lumbosacral region     H/O spinal fusion     Osteoarthritis     Postoperative pain after spinal surgery     Primary osteoarthritis involving multiple joints     UTI (urinary tract infection)     Hypotension, unspecified hypotension type     Right sacral radiculopathy     S/P spinal fusion     Spondylolisthesis of lumbosacral region     Myofascial pain     Review of Systems:  The 14 system ROS was reviewed from the intake questionnaire; results listed at end of note.    Physical Exam:  /86   Pulse 91    SpO2 97%     Physical Exam   Constitutional: Patient is oriented to person, place, and time.  Patient appears well-developed and well-nourished. No distress.   HENT:   Head: Normocephalic and atraumatic.   Neurological: She is alert and oriented to person, place, and time. No gross cranial nerve deficits noted. Coordination is grossly normal without focal deficit.   Psychiatric: she has a normal mood and affect. her behavior is normal. Judgment and thought content normal.    Imaging:  Exam: Full spine radiographs using EOS      History: S/P spinal fusion     Techniques: AP and lateral views of spine were submitted for  interpretation.     Comparison: Radiographs of the spine 3/18/2021, 12/24/2018, CT lumbar  spine 10/29/2020., Chest x-ray 10/15/2018, spine radiographs 2/14/2018     Findings:     12 rib bearing vertebral bodies and 5 lumbar type vertebral bodies are  identified. Postoperative changes of spinal fusion instrumentation  from L2 through the pelvis. Stable thoracic Carpio rods. Hardware  is intact. Severe disc space narrowing at C5-C6 with grade 1  anterolisthesis. Additional multilevel spondylosis of the spine. Grade  3 anterolisthesis of L5 on S1 is better demonstrated on CT from  10/29/2020.     Coronal Deformity:     There is a mild  convexed right curvature of thoracic spine with apex  at T8.      No substantial global coronal imbalance.     Sagittal Vertical Axis (A vertical line drawn from the center of C7  (troy line) to the posterosuperior aspect of the S1 on sagittal  plane):  positive      Additional Findings:     Small nodular opacity projects over the left upper lobe between the  posterior fifth and sixth ribs, and is not substantially changed from  the most recent comparison on 3/18/2021 but is not definitely present  on the earlier exams.     No acute osseous abnormality.  There is a nonobstructive bowel gas  pattern. Mild degenerative changes of both hips.                                                                       Impression:  1. Postoperative changes of spinal fusion instrumentation from L2  through the pelvis and Carpio rods in the thoracic spine. No  evidence of hardware complication.  2. Multilevel spondylosis, including severe disc space narrowing and  grade 1 anterolisthesis of C4 on C5. Grade 3 anterolisthesis of L5 on  S1 is better demonstrated on CT from 10/29/2020. Mild convex right  curvature of the thoracic spine.  3. Positive global sagittal imbalance.  4. Nodular opacity projects over the left upper lobe. Recommend  low-dose CT of the chest for further evaluation.    Assessment:    The patient is a 60 year old female with PMHx of HTN, asthma, arthritis, anxiety, scoliosis, and multiple spine surgeries who returns to clinic today via video visit for continued management of right-sided low back and leg pain.  The patient has had generally positive response to prior treatments including a trial of duloxetine and a right-sided piriformis muscle injection with steroid medication.  She has been able to begin to taper her gabapentin and is currently taking approximately 2000 mg/day.   She also had some improvement following piriformis injection and incorporation of piriformis specific exercises into physical therapy, so this injection can be repeated in the future should the patient's pain returned to baseline.      Visit Diagnoses:  Right lumbosacral radiculopathy  Myofascial pain  Right-sided piriformis syndrome    Plan:  Work up:    None at this time    Referrals:    Accupuncture    Medications:    Duloxetine 60mg daily    Continue gabapentin (up to 500mg four times daily for total dose of 2000mg)    Therapies:    Continue with PT    Interventions:    None at this time, however repeat right-sided piriformis muscle injection can be considered    Will place order for piriformis injection if patient would like to schedule     Follow up: 6 to 8 weeks after injection or as  needed      I saw and examined the patient with the Pain Fellow/Resident. I have reviewed and agree with the resident's note and plan of care and made changes and corrections directly to the body of the note.    TIME SPENT:  BY FELLOW/RESIDENT ALONE 25 MIN  BY MYSELF AND FELLOW/RESIDENT TOGETHER 25 MIN    These times included more than 50% time spent counseling her about her diagnosis and treatment options and coordination of care with the primary team. This time also includes time for chart review, preparation, and documentation.     Ana Luna MD  Pain Medicine, Department of Anesthesiology  , [facility]        Answers for HPI/ROS submitted by the patient on 1/3/2022  General Symptoms: No  Skin Symptoms: No  HENT Symptoms: No  EYE SYMPTOMS: No  HEART SYMPTOMS: No  LUNG SYMPTOMS: Yes  INTESTINAL SYMPTOMS: No  URINARY SYMPTOMS: No  GYNECOLOGIC SYMPTOMS: No  BREAST SYMPTOMS: No  SKELETAL SYMPTOMS: Yes  BLOOD SYMPTOMS: No  NERVOUS SYSTEM SYMPTOMS: Yes  MENTAL HEALTH SYMPTOMS: No  Cough: No  Sputum or phlegm: No  Coughing up blood: No  Difficulty breating or shortness of breath: Yes  Snoring: Yes  Wheezing: No  Difficulty breathing on exertion: No  Nighttime Cough: Yes  Difficulty breathing when lying flat: No  Back pain: Yes  Muscle aches: No  Neck pain: No  Swollen joints: Yes  Joint pain: Yes  Bone pain: No  Muscle cramps: Yes  Muscle weakness: Yes  Joint stiffness: Yes  Bone fracture: No  Trouble with coordination: No  Dizziness or trouble with balance: No  Fainting or black-out spells: No  Memory loss: No  Headache: No  Seizures: No  Speech problems: No  Tingling: Yes  Tremor: No  Weakness: Yes  Difficulty walking: No  Paralysis: No  Numbness: Yes        Ana Luna MD

## 2022-01-11 ENCOUNTER — TELEPHONE (OUTPATIENT)
Dept: ANESTHESIOLOGY | Facility: CLINIC | Age: 62
End: 2022-01-11
Payer: COMMERCIAL

## 2022-01-11 DIAGNOSIS — Z11.59 ENCOUNTER FOR SCREENING FOR OTHER VIRAL DISEASES: Primary | ICD-10-CM

## 2022-01-11 PROBLEM — M79.18 MYOFASCIAL PAIN: Status: ACTIVE | Noted: 2021-04-13

## 2022-01-17 ENCOUNTER — THERAPY VISIT (OUTPATIENT)
Dept: PHYSICAL THERAPY | Facility: CLINIC | Age: 62
End: 2022-01-17
Payer: COMMERCIAL

## 2022-01-17 DIAGNOSIS — Z47.89 AFTERCARE FOLLOWING SURGERY OF THE MUSCULOSKELETAL SYSTEM, NEC: ICD-10-CM

## 2022-01-17 DIAGNOSIS — Z98.1 S/P SPINAL FUSION: Primary | ICD-10-CM

## 2022-01-17 DIAGNOSIS — M54.42 CHRONIC BILATERAL LOW BACK PAIN WITH BILATERAL SCIATICA: ICD-10-CM

## 2022-01-17 DIAGNOSIS — M54.41 CHRONIC BILATERAL LOW BACK PAIN WITH BILATERAL SCIATICA: ICD-10-CM

## 2022-01-17 DIAGNOSIS — G89.29 CHRONIC BILATERAL LOW BACK PAIN WITH BILATERAL SCIATICA: ICD-10-CM

## 2022-01-17 PROCEDURE — 97110 THERAPEUTIC EXERCISES: CPT | Mod: GP | Performed by: PHYSICAL THERAPIST

## 2022-01-17 PROCEDURE — 97112 NEUROMUSCULAR REEDUCATION: CPT | Mod: GP | Performed by: PHYSICAL THERAPIST

## 2022-01-19 ENCOUNTER — LAB (OUTPATIENT)
Dept: LAB | Facility: CLINIC | Age: 62
End: 2022-01-19
Attending: ANESTHESIOLOGY
Payer: COMMERCIAL

## 2022-01-19 DIAGNOSIS — Z11.59 ENCOUNTER FOR SCREENING FOR OTHER VIRAL DISEASES: ICD-10-CM

## 2022-01-19 LAB — SARS-COV-2 RNA RESP QL NAA+PROBE: NEGATIVE

## 2022-01-19 PROCEDURE — U0005 INFEC AGEN DETEC AMPLI PROBE: HCPCS | Mod: 90 | Performed by: PATHOLOGY

## 2022-01-19 PROCEDURE — 99000 SPECIMEN HANDLING OFFICE-LAB: CPT | Performed by: PATHOLOGY

## 2022-01-19 PROCEDURE — U0003 INFECTIOUS AGENT DETECTION BY NUCLEIC ACID (DNA OR RNA); SEVERE ACUTE RESPIRATORY SYNDROME CORONAVIRUS 2 (SARS-COV-2) (CORONAVIRUS DISEASE [COVID-19]), AMPLIFIED PROBE TECHNIQUE, MAKING USE OF HIGH THROUGHPUT TECHNOLOGIES AS DESCRIBED BY CMS-2020-01-R: HCPCS | Mod: 90 | Performed by: PATHOLOGY

## 2022-01-21 ENCOUNTER — HOSPITAL ENCOUNTER (OUTPATIENT)
Facility: AMBULATORY SURGERY CENTER | Age: 62
Discharge: HOME | End: 2022-01-21
Attending: ANESTHESIOLOGY | Admitting: ANESTHESIOLOGY
Payer: COMMERCIAL

## 2022-01-21 ENCOUNTER — ANCILLARY PROCEDURE (OUTPATIENT)
Dept: RADIOLOGY | Facility: AMBULATORY SURGERY CENTER | Age: 62
End: 2022-01-21
Attending: ANESTHESIOLOGY
Payer: COMMERCIAL

## 2022-01-21 VITALS
RESPIRATION RATE: 16 BRPM | BODY MASS INDEX: 31.28 KG/M2 | TEMPERATURE: 96.9 F | HEIGHT: 62 IN | WEIGHT: 170 LBS | HEART RATE: 83 BPM | SYSTOLIC BLOOD PRESSURE: 145 MMHG | DIASTOLIC BLOOD PRESSURE: 76 MMHG | OXYGEN SATURATION: 96 %

## 2022-01-21 PROCEDURE — 20552 NJX 1/MLT TRIGGER POINT 1/2: CPT

## 2022-01-21 RX ORDER — LIDOCAINE HYDROCHLORIDE 10 MG/ML
INJECTION, SOLUTION EPIDURAL; INFILTRATION; INTRACAUDAL; PERINEURAL PRN
Status: DISCONTINUED | OUTPATIENT
Start: 2022-01-21 | End: 2022-01-21 | Stop reason: HOSPADM

## 2022-01-21 RX ORDER — TRIAMCINOLONE ACETONIDE 40 MG/ML
INJECTION, SUSPENSION INTRA-ARTICULAR; INTRAMUSCULAR PRN
Status: DISCONTINUED | OUTPATIENT
Start: 2022-01-21 | End: 2022-01-21 | Stop reason: HOSPADM

## 2022-01-21 RX ORDER — BUPIVACAINE HYDROCHLORIDE 2.5 MG/ML
INJECTION, SOLUTION EPIDURAL; INFILTRATION; INTRACAUDAL PRN
Status: DISCONTINUED | OUTPATIENT
Start: 2022-01-21 | End: 2022-01-21 | Stop reason: HOSPADM

## 2022-01-21 ASSESSMENT — MIFFLIN-ST. JEOR: SCORE: 1279.36

## 2022-01-21 NOTE — DISCHARGE INSTRUCTIONS
Home Care Instructions after a Piriformis Muscle Injection  The piriformis muscle is a small muscle located deep in the buttock (behind the gluteus prateek).     Activity  -You may resume most normal activity levels with the exception of strenuous activity. It is important for us to know if your pain with normal activity is relieved after this injection.  -DO NOT shower for 24 hours  -DO NOT remove bandaid for 24 hours    Pain  -You may experience soreness at the injection site for one or two days  -You may use an ice pack for 20 minutes every 2 hours for the first 24 hours  -You may use a heating pad after the first 24 hours  -You may use Tylenol (acetaminophen) every 4 hours or other pain medicines as     directed by your physician    You may experience numbness radiating into your legs or arms (depending on the procedure location). This numbness may last several hours. Until sensation returns to normal; please use caution in walking, climbing stairs, and stepping out of your vehicle, etc.        DID YOU RECEIVE STEROIDS TODAY?  Yes  Common side effects of steroids:  Not everyone will experience corticosteroid side effects. If side effects are experienced, they will gradually subside in the 7-10 day period following an injection. Most common side effects include:  -Flushed face and/or chest  -Feeling of warmth, particularly in the face but could be an overall feeling of warmth  -Increased blood sugar in diabetic patients  -Menstrual irregularities my occur. If taking hormone-based birth control an alternate method of birth control is recommended  -Sleep disturbances and/or mood swings are possible  -Leg cramps      PLEASE KEEP TRACK OF YOUR SYMPTOMS AND NOTE YOUR IMPROVEMENT FOR YOUR DOCTOR.     Please contact us if you have:  -Severe pain  -Fever more than 101.5 degrees Fahrenheit  -Signs of infection at the injection site (redness, swelling, or drainage)    If you have questions, please contact our office at  [phone] between the hours of 7:00 am and 3:00 pm Monday through Friday. After office hours you can contact the on call provider by dialing [phone]. If you need immediate attention, we recommend that you go to a hospital emergency room or dial [phone].

## 2022-01-27 NOTE — PROCEDURES
Patient: Aleena Ontiveros Age: 61 year old   MRN: [id number] Attending: Dr. Luna     Date of Visit: January 21, 2022      PAIN MEDICINE CLINIC PROCEDURE NOTE    ATTENDING CLINICIAN:    Ana Luna MD    ASSISTANT CLINICIAN:      PREPROCEDURE DIAGNOSES:  1.  Right piriformis syndrome   2.  Other myositis, right thigh  3.  Chronic pain    POSTPROCEDURE DIAGNOSES:  1.  Right piriformis syndrome   2.  Other myositis, right thigh  3.  Chronic pain      PROCEDURE(S) PERFORMED:  1.  Right piriformis muscle injection  2.  Fluoroscopic guidance for the above-namdromed procedure(s)      ANESTHESIA:  Local.    BLOOD LOSS:  Minimal.    DRAINS AND SPECIMENS:  None.    COMPLICATIONS:  None.    INDICATIONS:  Aleena Ontiveros is a 61 year old female with a history of  chronic low back pain secondary to sacrolitis .  At this time, the patient's symptoms and exam are most consistent with pyriformis syndrome. During the last clinic visit we recommended a diagnostic and therapeutic injection of her right piriformis muscle. The patient stated that the patient was in their usual state of health and denied recent anticoagulant use or recent infections.  Therefore, the plan is to perform above mentioned procedure.     Procedure Details:  The patient was met in the procedure room, where the patient was identified by name, medical record number and date of birth.  All of the patient s last minute questions were answered. Written informed consent was obtained and saved in the electronic medical record, after the risks, benefits, and alternatives were discussed with the patient.      A formal time-out procedure was performed, as per protocol, including patient name, title of procedure, and site of procedure, and all in the room concurred.  Routine monitors were applied.      The patient was placed in the prone position on the procedure room table.  All pressure points were checked and comfortably padded.  Routine monitors were placed.  Vital  signs were stable.    A chlorhexidine prep was completed followed by sterile draping per standard procedure.     AP fluoroscopic guidance was used to identify the Right SI joint(s). Target site is located 1.5 cm inferior, 1.5 cm lateral to the lower border of right SI joint. After 1% lidocaine infiltration using a 25 gauge 1.5 inch needle, a 22G 100 mm sonoplex needle was introduced with the nerve stimulator set at 1.0 mA at 2 Hz. The gluteus muscle twitching observed and the needle in intermittently advanced until no twitching is seen. At this time,lateral view is taken to see needle position in relation to the sacrum. The needle was then intermittently advanced past sacrum 0.5-1 cm  until it  penetrates piriformis muscle, a change in resistance (more firm) observed. The patient didn't notice any feeling sharp pain shooting down their leg as the needle tip advanced. Then 1-2 ml of Isovue 220  contrast injected.  Contrast flow showed a diagonal pattern from cephalad to caudad as it goes toward the femoral attachment site of the piriformis muscle. Lateral view is also taken.    Then after negative aspiration for heme, 5 mL of a treatment mixture containing 1 mL of triamcinolone and 4 mL of bupivacaine 0.25% was injected.  The needle was then removed.      Light pressure was held at the puncture site(s) to prevent ecchymosis and oozing.  The patient's skin was cleansed, and hemostasis was confirmed.  Band-aids were applied to the needle injection site(s).      Condition:    The patient remained awake and alert throughout the procedure.  The patient tolerated the procedure well and was monitored for approximately 15 minutes afterward in the post procedure area.  There were no immediate post procedure complications noted.  The patient was then discharged to home as per protocol.      Pre-procedure pain score: 7/10  Post-procedure pain score: 4/10    Ana Luna MD    Pain Medicine  Department of  Anesthesiology  [facility]

## 2022-01-27 NOTE — H&P
ABBREVIATED H&P [facility]      Patient Name: Aleena Ontiveros   MRN: [id number]   YOB: 1960     1. Reason for Procedure:  Procedure Summary     Date: 01/21/22 Room / Location: [facility] PROCEDURE ROOM 06 / [facility]    Anesthesia Start:  Anesthesia Stop:     Procedure: Piriformis Muscle Injection (Right Buttocks) Diagnosis:       Piriformis muscle pain      (Piriformis muscle pain [M79.18])    Providers: Ana Luna MD Responsible Provider:     Anesthesia Type: Not recorded ASA Status: Not recorded          2. History:   Past Medical History:   Diagnosis Date     Anemia      Anxiety      Arthritis     osteoarthritis of both knee     Chronic osteoarthritis      Hypertension      Hypothyroidism      PONV (postoperative nausea and vomiting)      Seasonal allergic rhinitis      Uncomplicated asthma        Comorbidities: None    Any history of sleep apnea? No    Any history of problems with sedation? No    3. Physical:    General: Normal  Skin:  Normal.  Respiratory: Clear to auscultation bilateral, no wheezing  Cardio:  Regular rate and rhythm  Abdomen: Soft, nontender, nondistended, no palpable masses.  Musculoskeletal: Normal  Neuro: Sensory exam normal, motor exam 5/5, bilateral upper and lower extremities         4. Current Medications (if not in Epic):   Current Outpatient Medications   Medication Sig Dispense Refill     acetaminophen (TYLENOL) 500 MG tablet Take 2 Tabs Every 8 Hours as needed for pain 450 tablet 1     albuterol (PROAIR HFA/PROVENTIL HFA/VENTOLIN HFA) 108 (90 Base) MCG/ACT inhaler Inhale 2 puffs into the lungs every 4 hours as needed for shortness of breath / dyspnea or wheezing       amLODIPine (NORVASC) 5 MG tablet Take 5 mg by mouth At Bedtime        DULoxetine (CYMBALTA) 60 MG capsule Take 1 capsule (60 mg) by mouth daily 90 capsule 0     Fexofenadine HCl (ALLEGRA PO) Take 180 mg by mouth every morning         fluticasone (FLONASE) 50 MCG/ACT spray Spray 1 spray into both nostrils 2 times daily        fluticasone-salmeterol (ADVAIR) 500-50 MCG/DOSE inhaler Inhale 1 puff into the lungs every 12 hours       gabapentin (NEURONTIN) 400 MG capsule TAKE 5 CAPSULES BY MOUTH DAILY       levothyroxine (SYNTHROID) 125 MCG tablet Take 1 tablet (125 mcg) by mouth every morning       lisinopril-hydrochlorothiazide (ZESTORETIC) 20-12.5 MG tablet Take 2 tablets by mouth daily       Montelukast Sodium (SINGULAIR PO) Take 10 mg by mouth At Bedtime        multivitamin w/minerals (THERA-VIT-M) tablet Take 2 tablets by mouth 2 times daily       Omega-3 Fatty Acids (FISH OIL) 500 MG CAPS Take 2 capsules by mouth daily       cyclobenzaprine (FLEXERIL) 10 MG tablet Take 0.5-1 tablets (5-10 mg) by mouth 3 times daily as needed for muscle spasms 60 tablet 3     diclofenac (VOLTAREN) 1 % topical gel Apply 1-2 g topically 4 times daily 100 g 1     estradiol (ESTRACE) 0.1 MG/GM cream Place 2 g vaginally At Bedtime        gabapentin (NEURONTIN) 100 MG capsule Take 1-2 caps Four times per day as needed for pain 450 capsule 1     methocarbamol (ROBAXIN) 750 MG tablet Take 1 tablet (750 mg) by mouth 4 times daily as needed for muscle spasms 120 tablet 2     traMADol (ULTRAM) 50 MG tablet Take 100 mg by mouth every 6 hours as needed for severe pain          5. Allergies and Reactions:  is allergic to adhesive tape and erythromycin.

## 2022-02-24 ENCOUNTER — THERAPY VISIT (OUTPATIENT)
Dept: PHYSICAL THERAPY | Facility: CLINIC | Age: 62
End: 2022-02-24
Payer: COMMERCIAL

## 2022-02-24 DIAGNOSIS — M54.41 CHRONIC BILATERAL LOW BACK PAIN WITH BILATERAL SCIATICA: ICD-10-CM

## 2022-02-24 DIAGNOSIS — M54.42 CHRONIC BILATERAL LOW BACK PAIN WITH BILATERAL SCIATICA: ICD-10-CM

## 2022-02-24 DIAGNOSIS — Z47.89 AFTERCARE FOLLOWING SURGERY OF THE MUSCULOSKELETAL SYSTEM, NEC: ICD-10-CM

## 2022-02-24 DIAGNOSIS — G89.29 CHRONIC BILATERAL LOW BACK PAIN WITH BILATERAL SCIATICA: ICD-10-CM

## 2022-02-24 DIAGNOSIS — Z98.1 S/P SPINAL FUSION: Primary | ICD-10-CM

## 2022-02-24 PROCEDURE — 97112 NEUROMUSCULAR REEDUCATION: CPT | Mod: GP | Performed by: PHYSICAL THERAPIST

## 2022-02-24 PROCEDURE — 97110 THERAPEUTIC EXERCISES: CPT | Mod: GP | Performed by: PHYSICAL THERAPIST

## 2022-03-10 ENCOUNTER — THERAPY VISIT (OUTPATIENT)
Dept: PHYSICAL THERAPY | Facility: CLINIC | Age: 62
End: 2022-03-10
Payer: COMMERCIAL

## 2022-03-10 DIAGNOSIS — Z47.89 AFTERCARE FOLLOWING SURGERY OF THE MUSCULOSKELETAL SYSTEM, NEC: ICD-10-CM

## 2022-03-10 DIAGNOSIS — Z98.1 S/P SPINAL FUSION: Primary | ICD-10-CM

## 2022-03-10 PROCEDURE — 97110 THERAPEUTIC EXERCISES: CPT | Mod: GP | Performed by: PHYSICAL THERAPIST

## 2022-03-10 PROCEDURE — 97112 NEUROMUSCULAR REEDUCATION: CPT | Mod: GP | Performed by: PHYSICAL THERAPIST

## 2022-03-17 ENCOUNTER — THERAPY VISIT (OUTPATIENT)
Dept: PHYSICAL THERAPY | Facility: CLINIC | Age: 62
End: 2022-03-17
Payer: COMMERCIAL

## 2022-03-17 DIAGNOSIS — Z98.1 S/P SPINAL FUSION: Primary | ICD-10-CM

## 2022-03-17 DIAGNOSIS — Z47.89 AFTERCARE FOLLOWING SURGERY OF THE MUSCULOSKELETAL SYSTEM, NEC: ICD-10-CM

## 2022-03-17 DIAGNOSIS — G89.29 CHRONIC BILATERAL LOW BACK PAIN WITH BILATERAL SCIATICA: ICD-10-CM

## 2022-03-17 DIAGNOSIS — M54.42 CHRONIC BILATERAL LOW BACK PAIN WITH BILATERAL SCIATICA: ICD-10-CM

## 2022-03-17 DIAGNOSIS — M54.41 CHRONIC BILATERAL LOW BACK PAIN WITH BILATERAL SCIATICA: ICD-10-CM

## 2022-03-17 PROCEDURE — 97110 THERAPEUTIC EXERCISES: CPT | Mod: GP | Performed by: PHYSICAL THERAPIST

## 2022-03-17 PROCEDURE — 97112 NEUROMUSCULAR REEDUCATION: CPT | Mod: GP | Performed by: PHYSICAL THERAPIST

## 2022-03-24 ENCOUNTER — THERAPY VISIT (OUTPATIENT)
Dept: PHYSICAL THERAPY | Facility: CLINIC | Age: 62
End: 2022-03-24
Payer: COMMERCIAL

## 2022-03-24 DIAGNOSIS — M54.42 CHRONIC BILATERAL LOW BACK PAIN WITH BILATERAL SCIATICA: ICD-10-CM

## 2022-03-24 DIAGNOSIS — G89.29 CHRONIC BILATERAL LOW BACK PAIN WITH BILATERAL SCIATICA: ICD-10-CM

## 2022-03-24 DIAGNOSIS — Z47.89 AFTERCARE FOLLOWING SURGERY OF THE MUSCULOSKELETAL SYSTEM, NEC: ICD-10-CM

## 2022-03-24 DIAGNOSIS — Z98.1 S/P SPINAL FUSION: Primary | ICD-10-CM

## 2022-03-24 DIAGNOSIS — M54.41 CHRONIC BILATERAL LOW BACK PAIN WITH BILATERAL SCIATICA: ICD-10-CM

## 2022-03-24 PROCEDURE — 97110 THERAPEUTIC EXERCISES: CPT | Mod: GP | Performed by: PHYSICAL THERAPIST

## 2022-03-24 PROCEDURE — 97112 NEUROMUSCULAR REEDUCATION: CPT | Mod: GP | Performed by: PHYSICAL THERAPIST

## 2022-03-30 NOTE — PLAN OF CARE
Occupational Therapy Discharge Summary    Reason for therapy discharge:    All goals and outcomes met, no further needs identified.    Progress towards therapy goal(s). See goals on Care Plan in [facility] electronic health record for goal details.  Adequate for discharge     Therapy recommendation(s):    No further therapy is recommended.       No

## 2022-03-31 ENCOUNTER — THERAPY VISIT (OUTPATIENT)
Dept: PHYSICAL THERAPY | Facility: CLINIC | Age: 62
End: 2022-03-31
Payer: COMMERCIAL

## 2022-03-31 DIAGNOSIS — G89.29 CHRONIC BILATERAL LOW BACK PAIN WITH BILATERAL SCIATICA: ICD-10-CM

## 2022-03-31 DIAGNOSIS — M54.41 CHRONIC BILATERAL LOW BACK PAIN WITH BILATERAL SCIATICA: ICD-10-CM

## 2022-03-31 DIAGNOSIS — Z98.1 S/P SPINAL FUSION: Primary | ICD-10-CM

## 2022-03-31 DIAGNOSIS — M54.42 CHRONIC BILATERAL LOW BACK PAIN WITH BILATERAL SCIATICA: ICD-10-CM

## 2022-03-31 DIAGNOSIS — Z47.89 AFTERCARE FOLLOWING SURGERY OF THE MUSCULOSKELETAL SYSTEM, NEC: ICD-10-CM

## 2022-03-31 PROCEDURE — 97112 NEUROMUSCULAR REEDUCATION: CPT | Mod: GP | Performed by: PHYSICAL THERAPIST

## 2022-03-31 PROCEDURE — 97110 THERAPEUTIC EXERCISES: CPT | Mod: GP | Performed by: PHYSICAL THERAPIST

## 2022-04-03 ENCOUNTER — MYC REFILL (OUTPATIENT)
Dept: ANESTHESIOLOGY | Facility: CLINIC | Age: 62
End: 2022-04-03
Payer: COMMERCIAL

## 2022-04-03 DIAGNOSIS — M54.18 RIGHT SACRAL RADICULOPATHY: ICD-10-CM

## 2022-04-03 DIAGNOSIS — G57.01 PIRIFORMIS SYNDROME OF RIGHT SIDE: ICD-10-CM

## 2022-04-04 RX ORDER — DULOXETIN HYDROCHLORIDE 60 MG/1
60 CAPSULE, DELAYED RELEASE ORAL DAILY
Qty: 90 CAPSULE | Refills: 0 | Status: SHIPPED | OUTPATIENT
Start: 2022-04-04 | End: 2022-07-13

## 2022-04-04 NOTE — TELEPHONE ENCOUNTER
Medication refill sent to patient's pharmacy. No changes. Patient last seen 1/6/22. No follow up scheduled at this time.       Mesha Osullivan RN

## 2022-04-28 NOTE — TELEPHONE ENCOUNTER
REASON FOR CALL: Pt called to schedule an appt w/Dr. Cameron. She is currently scheduled for 09/09 in . Pt states that she is having a lot of nerve damage, and was supposed to let Dr. Cameron know when there are any major changes regarding to her back. She is wondering if she should be seen sooner. Forwarding for care team to review.    · Patient known to Dr Amada Quintana of [facility]  · Diagnosed in May 2021  · Currently on Folfiri with Neulasta growth factor support

## 2022-05-31 NOTE — PROGRESS NOTES
DISCHARGE REPORT    Aleena did not return for further treatment after the last visit on 3/31/22.   Current status is unknown.  Please see information below for last known relevant information.  Patient seen for 43 visits.    SUBJECTIVE  Subjective changes noted by patient:  Noting some L big toe pain last night.  We discussed seated piriformis stretch or flexion in step standing.  She will trial.   .  Current pain level is 4/10.     Previous pain level was  6/10.   Changes in function: (See Goal flowsheet attached for changes in current functional level)  Adverse reaction to treatment or activity: None    OBJECTIVE  Changes noted in objective findings:   Ambulation is a bit more steady today.  Is able to progress with strengthening: on leg press increased to 120lb 2x10 for quads.       ASSESSMENT/PLAN  Diagnosis: s/p L5/S1 revision (hx of Carpio gavino with more recent SIJ fusion and lumbar decompression fusion within about the past year--Carpio rods from distant past). x-rays in epic   Updated problem list and treatment plan:  Patient will continue to utilize HEP for any remaining deficits.   STG/LTGs have been met or progress has been made towards goals:  Please see goal flowsheet for most current information  Assessment of Progress: current status is unknown.    Last current status: Pt is progressing as expected   Self Management Plans:  HEP  I have re-evaluated this patient and find that the nature, scope, duration and intensity of the therapy is appropriate for the medical condition of the patient.  Aleena continues to require the following intervention to meet STG and LTG's:  HEP.    Recommendations:  Discharge with current home program.  Patient to follow up with MD as needed.    Please refer to the daily flowsheet for treatment today, total treatment time and time spent performing 1:1 timed codes.

## 2022-06-05 ENCOUNTER — HEALTH MAINTENANCE LETTER (OUTPATIENT)
Age: 62
End: 2022-06-05

## 2022-07-13 ENCOUNTER — TELEPHONE (OUTPATIENT)
Dept: ANESTHESIOLOGY | Facility: CLINIC | Age: 62
End: 2022-07-13

## 2022-07-13 NOTE — TELEPHONE ENCOUNTER
Message sent to clinic coordinators asking for assistance with them scheduling the pt for a follow up appointment with Dr. Luna.     Loly Cisneros LPN

## 2022-10-10 ENCOUNTER — HEALTH MAINTENANCE LETTER (OUTPATIENT)
Age: 62
End: 2022-10-10

## 2022-10-12 ENCOUNTER — VIRTUAL VISIT (OUTPATIENT)
Dept: ANESTHESIOLOGY | Facility: CLINIC | Age: 62
End: 2022-10-12
Payer: COMMERCIAL

## 2022-10-12 DIAGNOSIS — M79.18 PIRIFORMIS MUSCLE PAIN: Primary | ICD-10-CM

## 2022-10-12 PROCEDURE — 99213 OFFICE O/P EST LOW 20 MIN: CPT | Mod: 95 | Performed by: ANESTHESIOLOGY

## 2022-10-12 NOTE — NURSING NOTE
Patient presents with:  RECHECK: Follow-up: routine follow-up      Data Unavailable     Pain Medications     Analgesics Other Refills Start End     acetaminophen (TYLENOL) 500 MG tablet    1 7/9/2021     Sig: Take 2 Tabs Every 8 Hours as needed for pain    Class: E-Prescribe    Opioid Agonists Refills Start End     traMADol (ULTRAM) 50 MG tablet          Sig - Route: Take 100 mg by mouth every 6 hours as needed for severe pain - Oral    Class: Historical          What medications are you using for pain? Cymbalta, Tylenol, gabapentin2      (Return Patients only) What refills are you needing today? Yes, Cymbalta

## 2022-10-12 NOTE — PATIENT INSTRUCTIONS
Procedures:    Call to schedule your procedure: [phone]    Piriformis Muscle Injection.    Your pre-procedure instructions are below, please call our clinic if you have any questions.        Recommended Follow up:      Follow up 6 weeks after procedure.         Please call [phone] to schedule your clinic appointment if you don't already have an appointment scheduled.      Procedure Information related to COVID-19     Please call [phone] to schedule, reschedule, or cancel your procedure appointment.   Phones are answered Monday - Friday from 08:00 - 4:30pm.  Leave a voicemail with your name, birth date, and phone number if no one is available to take your call.         You will need to be tested for COVID-19 before your procedure. If you're going home on the same day as your procedure (surgery), you may take an at-home rapid antigen test 1 to 2 days before your procedure. If your test is negative, please take a photo of the test. Show the photo to the nurse when you come in for your procedure. If your test is positive, please update our office right away and your procedure may have to be postponed.     If you do not have access to an at-home rapid test, you will have to be tested at a lab within 4 days of your procedure.  You will be called to schedule your COVID test by a central scheduling team. If you have not been contacted to schedule your test within 4 days of the scheduled procedure, call [phone]     Please be aware that the turn around time for the test is approximately 24-72 hours.   If your results are still pending the day of your procedure, you will be notified as soon as possible as the procedure may be cancelled.     Please note: You will only be contacted for positive and pending results.      The procedure center staff will call you several days before the procedure to review important information that you will need to know for the day of the procedure.   Please contact the  Patient scheduled to see neurologist in November.   clinic if you have further questions about this information.         Information related to Scheduling and Pre-Procedure Instructions:    If you must reschedule your procedure more than two times, you must follow up in clinic before rescheduling again.    Preparing for your procedure    CAUTION - FAILURE TO FOLLOW THESE PRE-PROCEDURE INSTRUCTIONS WILL RESULT IN YOUR PROCEDURE BEING RESCHEDULED.    Your Procedure: Piriformis Muscle Injection            You must have a  take you home after your procedure. Transportation by taxi or para-transit is okay as long as you have a responsible adult accompany you. You must provide your 's full name and contact number at time of check in.     Fasting Protocol  Please have nothing to eat and drink for 2 hours before the procedure.    Medications If you take any medications, DO NOT STOP. Take your medications as usual the day of your procedure with a sip of water AT LEAST 2 HOURS PRIOR TO ARRIVAL.    Antibiotics If you are currently taking antibiotics, you must complete the entire dose 7 days prior to your scheduled procedure. You must be clear of any signs or symptoms of infection. If you begin antibiotics, please contact our clinic for instructions.     Fever, Chills, or Rash If you experience a fever of higher than 100 degrees, chills, rash, or open wounds during the one week before your procedure, please call the clinic to see if you may proceed with your procedure.      Medication Hold List  **Patients under Cardiology/Neurology care should consult their provider prior to the pain procedure to verify pre-procedure medication instructions. The information below contains general guidelines.**      Blood Thinners If you are taking daily ASPIRIN, PLAVIX, OR OTHER BLOOD THINNERS SUCH AS COUMADIN/WARFARIN, we will need your prescribing doctor to sign a release permitting you to stop these medications. Once approved by your prescribing doctor - STOP ALL BLOOD  THINNERS BASED ON THE TIME TABLE BELOW PRIOR TO YOUR PROCEDURE. If you have been instructed to stop WARFARIN(COUMADIN), you must have an INR lab drawn the day before your procedure. . Your INR must be within normal limits before we can perform your injection. MEDICATIONS CAN BE RESTARTED AFTER YOUR PROCEDURE.    14 DAY HOLD  Ticlid (ticlopidine)    10 DAY HOLD  Effient (Prasugel)    3 DAY HOLD  Xarelto (rivaroxaban) 7 DAY HOLD  Anacin, Bufferin, Ecotrin, Excedrin, Aggrenox (Aspirin)  Brilinta (ticagrelor)  Coumadin (Warfarin)  Pradexa (Dabigatran)  Elmiron (Pentosan)  Plavix (Clopidogrel Bisulfate)  Pletal (Cilostazol)    24 HOUR HOLD  Lovenox (enoxaparin)  Agrylin (Anagrelide)        Non-steroidal Anti-inflammatories (NSAIDs) DO NOT TAKE any non-steroidal anti-inflammatory medications (NSAIDs) listed on the table below. MEDICATIONS CAN BE RESTARTED AFTER YOUR PROCEDURE. Celebrex is OK to take and does not need to be discontinued.     Medications to stop:  3 DAY HOLD  Advil, Motrin (Ibuprofen)  Arthrotec (diciofenac sodium/misoprostol)  Clinoril (Sulindac)  Indocin (Indomethacin)  Lodine (Etodolac)  Toradol (Ketorolac)  Vicoprofen (Hydrocodone and Ibuprofen)  Voltaren (Diclotenac)    14 DAY HOLD  Daypro (Oxaprozin)  Feldene (Piroxicam)   7 DAY HOLD  Aleve (Naproxen sodium)  Darvon compound (contains aspirin)  Naprosyn (Naproxen)  Norgesic Forte (contains aspirin)  Mobic (Meloxicam)  Oruvall (Ketoprofen)  Percodan (contains aspirin)  Relafen (Nabumetone)  Salsalate  Trilisate  Vitamin E (more than 400 mg per day)  Any medication containing aspirin                To speak with a nurse, schedule/reschedule/cancel a clinic appointment, or request a medication refill call: [phone]    You can also reach us by "[employer]": [email or web address]

## 2022-10-12 NOTE — LETTER
10/12/2022       RE: Aleena Ontiveros  [address]  [facility] [address]     Dear Colleague,    Thank you for referring your patient, Aleena Ontiveros, to the [facility] [place] at [facility]. Please see a copy of my visit note below.    [facility]    Date of visit: 10/12/2022    Chief complaint:   Chief Complaint   Patient presents with     RECHECK     Follow-up: routine follow-up       Interval history:  Aleena Ontiveros was last seen by me on 1/6/2022.      Recommendations/plan at the last visit included:  Work up:    None at this time     Referrals:    Accupuncture     Medications:    Duloxetine 60mg daily    Continue gabapentin (up to 300 mg four times daily for total dose of 2000mg)     Therapies:    Continue with PT     Interventions:    None at this time, however repeat right-sided piriformis muscle injection can be considered    Will place order for piriformis injection if patient would like to schedule      Follow up: 6 to 8 weeks after injection or as needed    Since her last visit, Aleena Ontiveros reports:    Would like to titrate down her gabapentin because she is worried that it is impacting her word recall   The pain is in her right side (she has had a piriformis injection in the past)  Would like to think about a piriformis muscle injection (will order but not have it scheduled)    Pain scores:  Pain intensity on average is 6 on a scale of 0-10.     Current pain treatments:     Duloxetine 60 mg daily - refilled  Gabapentin 4 times per day on average 300 mg each time (1200 mg/day) - sometimes more and sometimes less - prescribed by PCP  Tylenol 325 mg     Past pain treatments:    Tramadol      Side Effects: denies any problems    Medications:  Current Outpatient Medications   Medication Sig Dispense Refill     acetaminophen (TYLENOL) 500 MG tablet Take 2 Tabs Every 8 Hours as needed for  pain 450 tablet 1     albuterol (PROAIR HFA/PROVENTIL HFA/VENTOLIN HFA) 108 (90 Base) MCG/ACT inhaler Inhale 2 puffs into the lungs every 4 hours as needed for shortness of breath / dyspnea or wheezing       amLODIPine (NORVASC) 5 MG tablet Take 5 mg by mouth At Bedtime        diclofenac (VOLTAREN) 1 % topical gel Apply 1-2 g topically 4 times daily 100 g 1     DULoxetine (CYMBALTA) 60 MG capsule Take 1 capsule (60 mg) by mouth daily 90 capsule 0     estradiol (ESTRACE) 0.1 MG/GM cream Place 2 g vaginally At Bedtime        Fexofenadine HCl (ALLEGRA PO) Take 180 mg by mouth every morning        fluticasone (FLONASE) 50 MCG/ACT spray Spray 1 spray into both nostrils 2 times daily        fluticasone-salmeterol (ADVAIR) 500-50 MCG/DOSE inhaler Inhale 1 puff into the lungs every 12 hours       gabapentin (NEURONTIN) 100 MG capsule Take 1-2 caps Four times per day as needed for pain 450 capsule 1     gabapentin (NEURONTIN) 400 MG capsule 300 mg       levothyroxine (SYNTHROID) 125 MCG tablet Take 1 tablet (125 mcg) by mouth every morning       lisinopril-hydrochlorothiazide (ZESTORETIC) 20-12.5 MG tablet Take 2 tablets by mouth daily       Montelukast Sodium (SINGULAIR PO) Take 10 mg by mouth At Bedtime        multivitamin w/minerals (THERA-VIT-M) tablet Take 2 tablets by mouth 2 times daily       Omega-3 Fatty Acids (FISH OIL) 500 MG CAPS Take 2 capsules by mouth daily       cyclobenzaprine (FLEXERIL) 10 MG tablet Take 0.5-1 tablets (5-10 mg) by mouth 3 times daily as needed for muscle spasms 60 tablet 3     methocarbamol (ROBAXIN) 750 MG tablet Take 1 tablet (750 mg) by mouth 4 times daily as needed for muscle spasms 120 tablet 2     traMADol (ULTRAM) 50 MG tablet Take 100 mg by mouth every 6 hours as needed for severe pain         Medical History: any changes in medical history since they were last seen? No    Review of Systems:  The 14 system ROS was reviewed from the intake questionnaire, and is positive for: sij back  pain  Any bowel or bladder problems: denies  Mood: stable    Physical Exam: Virtual Visit  There were no vitals taken for this visit.  General: AAOx3, NAD      Assessment:   Chronic Right Side Low Back Pain   Sacroiliitis  Piriformis Muscle Jaleel    Aleena Ontiveros is a 62 year old female who is seen at the pain clinic for chronic low back pain. She has previously had good success with piriformis muscle injection but would like to hold off for now scheduling the next one.  She will call us when she is ready to schedule it.     Plan:  1. Physical Therapy:  Continue daily HEPs  2. Clinical Health Psychologist to address issues of relaxation, behavioral change, coping style, and other factors important to improvement.  Not at this time  3. Diagnostic Studies:  none  4. Medication Management:  willl consider wean, instructions provided  5. Further procedures recommended: none  6. Recommendations to PCP: none  7. Follow up: when ready to repeat injection      Ana Luna MD    Pain Medicine  Department of Anesthesiology  [facility]

## 2022-10-12 NOTE — PROGRESS NOTES
[facility]    Date of visit: 10/12/2022    Chief complaint:   Chief Complaint   Patient presents with     RECHECK     Follow-up: routine follow-up       Interval history:  Aleena Ontiveros was last seen by me on 1/6/2022.      Recommendations/plan at the last visit included:  Work up:    None at this time     Referrals:    Accupuncture     Medications:    Duloxetine 60mg daily    Continue gabapentin (up to 300 mg four times daily for total dose of 2000mg)     Therapies:    Continue with PT     Interventions:    None at this time, however repeat right-sided piriformis muscle injection can be considered    Will place order for piriformis injection if patient would like to schedule      Follow up: 6 to 8 weeks after injection or as needed    Since her last visit, Aleena Ontiveros reports:    Would like to titrate down her gabapentin because she is worried that it is impacting her word recall   The pain is in her right side (she has had a piriformis injection in the past)  Would like to think about a piriformis muscle injection (will order but not have it scheduled)    Pain scores:  Pain intensity on average is 6 on a scale of 0-10.     Current pain treatments:     Duloxetine 60 mg daily - refilled  Gabapentin 4 times per day on average 300 mg each time (1200 mg/day) - sometimes more and sometimes less - prescribed by PCP  Tylenol 325 mg     Past pain treatments:    Tramadol      Side Effects: denies any problems    Medications:  Current Outpatient Medications   Medication Sig Dispense Refill     acetaminophen (TYLENOL) 500 MG tablet Take 2 Tabs Every 8 Hours as needed for pain 450 tablet 1     albuterol (PROAIR HFA/PROVENTIL HFA/VENTOLIN HFA) 108 (90 Base) MCG/ACT inhaler Inhale 2 puffs into the lungs every 4 hours as needed for shortness of breath / dyspnea or wheezing       amLODIPine (NORVASC) 5 MG tablet Take 5 mg by mouth At Bedtime        diclofenac (VOLTAREN) 1 % topical gel Apply 1-2 g topically 4  times daily 100 g 1     DULoxetine (CYMBALTA) 60 MG capsule Take 1 capsule (60 mg) by mouth daily 90 capsule 0     estradiol (ESTRACE) 0.1 MG/GM cream Place 2 g vaginally At Bedtime        Fexofenadine HCl (ALLEGRA PO) Take 180 mg by mouth every morning        fluticasone (FLONASE) 50 MCG/ACT spray Spray 1 spray into both nostrils 2 times daily        fluticasone-salmeterol (ADVAIR) 500-50 MCG/DOSE inhaler Inhale 1 puff into the lungs every 12 hours       gabapentin (NEURONTIN) 100 MG capsule Take 1-2 caps Four times per day as needed for pain 450 capsule 1     gabapentin (NEURONTIN) 400 MG capsule 300 mg       levothyroxine (SYNTHROID) 125 MCG tablet Take 1 tablet (125 mcg) by mouth every morning       lisinopril-hydrochlorothiazide (ZESTORETIC) 20-12.5 MG tablet Take 2 tablets by mouth daily       Montelukast Sodium (SINGULAIR PO) Take 10 mg by mouth At Bedtime        multivitamin w/minerals (THERA-VIT-M) tablet Take 2 tablets by mouth 2 times daily       Omega-3 Fatty Acids (FISH OIL) 500 MG CAPS Take 2 capsules by mouth daily       cyclobenzaprine (FLEXERIL) 10 MG tablet Take 0.5-1 tablets (5-10 mg) by mouth 3 times daily as needed for muscle spasms 60 tablet 3     methocarbamol (ROBAXIN) 750 MG tablet Take 1 tablet (750 mg) by mouth 4 times daily as needed for muscle spasms 120 tablet 2     traMADol (ULTRAM) 50 MG tablet Take 100 mg by mouth every 6 hours as needed for severe pain         Medical History: any changes in medical history since they were last seen? No    Review of Systems:  The 14 system ROS was reviewed from the intake questionnaire, and is positive for: sij back pain  Any bowel or bladder problems: denies  Mood: stable    Physical Exam: Virtual Visit  There were no vitals taken for this visit.  General: AAOx3, NAD      Assessment:   Chronic Right Side Low Back Pain   Sacroiliitis  Piriformis Muscle Jaleel    Aleena Ontiveros is a 62 year old female who is seen at the pain clinic for chronic low back  pain. She has previously had good success with piriformis muscle injection but would like to hold off for now scheduling the next one.  She will call us when she is ready to schedule it.     Plan:  1. Physical Therapy:  Continue daily HEPs  2. Clinical Health Psychologist to address issues of relaxation, behavioral change, coping style, and other factors important to improvement.  Not at this time  3. Diagnostic Studies:  none  4. Medication Management:  willl consider wean, instructions provided  5. Further procedures recommended: none  6. Recommendations to PCP: none  7. Follow up: when ready to repeat injection    Ana Luna MD    Pain Medicine  Department of Anesthesiology  [facility]

## 2022-10-12 NOTE — PROGRESS NOTES
Aleena is a 62 year old who is being evaluated via a billable video visit.      How would you like to obtain your AVS? MyChart  If the video visit is dropped, the invitation should be resent by: Text to cell phone: [phone]  Will anyone else be joining your video visit? No

## 2022-12-23 ENCOUNTER — HOSPITAL ENCOUNTER (OUTPATIENT)
Dept: CT IMAGING | Facility: CLINIC | Age: 62
Discharge: HOME | End: 2022-12-23
Attending: FAMILY MEDICINE | Admitting: FAMILY MEDICINE
Payer: COMMERCIAL

## 2022-12-23 DIAGNOSIS — R10.30 LOWER ABDOMINAL PAIN: ICD-10-CM

## 2022-12-23 PROCEDURE — 74176 CT ABD & PELVIS W/O CONTRAST: CPT

## 2023-01-04 ENCOUNTER — MYC REFILL (OUTPATIENT)
Dept: ANESTHESIOLOGY | Facility: CLINIC | Age: 63
End: 2023-01-04

## 2023-01-04 DIAGNOSIS — G57.01 PIRIFORMIS SYNDROME OF RIGHT SIDE: ICD-10-CM

## 2023-01-04 DIAGNOSIS — M54.18 RIGHT SACRAL RADICULOPATHY: ICD-10-CM

## 2023-01-05 RX ORDER — DULOXETIN HYDROCHLORIDE 60 MG/1
60 CAPSULE, DELAYED RELEASE ORAL DAILY
Qty: 90 CAPSULE | Refills: 0 | Status: SHIPPED | OUTPATIENT
Start: 2023-01-05 | End: 2023-04-02

## 2023-03-22 ENCOUNTER — TELEPHONE (OUTPATIENT)
Dept: PALLIATIVE MEDICINE | Facility: CLINIC | Age: 63
End: 2023-03-22
Payer: COMMERCIAL

## 2023-03-22 NOTE — TELEPHONE ENCOUNTER
Patient is scheduled with Dr. Luna   Spoke with: the patient   Date of Procedure: 03/31   Location: [facility]   Informed patient they will need an adult : yes   Additional comments: n/a    Patient is aware pre-op RN will call 2-3 days prior to procedure with arrival time and instructions       Kareem Mark on 3/22/2023 at 3:50 PM

## 2023-03-23 NOTE — TELEPHONE ENCOUNTER
RN reviewed patient chart. Pre procedure instructions were reviewed with patient during clinic visit.    Letty Camacho RNCC

## 2023-03-27 NOTE — PROGRESS NOTES
"Orthopaedic Surgery Progress Note:  10/03/2019     Subjective:   POD #2.    No acute events overnight.  Transferred to the floor yesterday.  Pain control today improved some, less left leg radicular pain.  Covington in place.  Passing gas, no BM.  Denies chest pain, shortness of breath, nausea, vomiting.     Objective:   /58 (BP Location: Left arm)   Pulse 76   Temp 96.7  F (35.9  C) (Oral)   Resp 16   Ht 1.575 m (5' 2\")   Wt 70.5 kg (155 lb 6.8 oz)   SpO2 99%   BMI 28.43 kg/m    No intake/output data recorded.  Gen: NAD. Resting comfortably in bed  Resp: Breathing comfortably on RA  Drain:   Drain output of 150/0/180/20cc last 4 shifts.  Musculoskeletal: Dressing is C/D/I.        Lower extremities:  Motor Strength Right Left   Hip flexion: L1, L2, L3 NT/5 NT/5   Hip adduction: L2, L3 NT/5 NT/5   Knee flexion: S1 5/5 5/5   Knee extension: L3, L4 5/5 5/5   Ankle dosiflexion: L4, L5 5/5 4/5   EHL: L5 4/5 0/5   Ankle plantarflexion: S1 5/5 5/5   0/5 left ankle eversion  0/5 left lesser toe extension    Sensation from L1-S2 is preserved.    Labs:  Lab Results   Component Value Date    WBC 7.7 10/02/2019    HGB 10.0 (L) 10/02/2019     10/02/2019    INR 1.11 10/01/2019        Assessment & Plan:   Aleena Ontiveros is a 59 year old female now s/p revision L2-pelvis PSF on 10/1 with Dr. Vasquez.     Goals for today  Medrol dose pack after CT scan  Pain control- discontinue PCA  Ambulate with PT      Orthopedics Primary  Activity: Up with assist until independent. No excessive bending or twisting. No lifting >10 lbs x 6 weeks. No Ashish lift for transfers.   Weight bearing status: WBAT.  Pain management: Transition from IV to PO as tolerated. No NSAIDs   Antibiotics: Ancef x24 hours  Diet: Begin with clear fluids and progress diet as tolerated.   DVT prophylaxis: SCDs only. No chemical DVT ppx needed.  Imaging: XR Upright PTDC - ordered.  Labs: Labs PRN  Bracing/Splinting: None.  Dressings: Keep Aquacel c/d/i " x 7 days.  Drains: Document output per shift, will be discontinued at Orthopedic Surgery discretion.  Covington catheter: Remove POD#1.   Physical Therapy/Occupational Therapy: Eval and treat.  Cultures: None    Consults: Hospitalist, Pain Service.  Follow-up: Clinic with Dr. Vasquez in 6 weeks with repeat x-rays.   Disposition: Pending progress with therapies, pain control on orals, and medical stability, anticipate discharge to home vs rehab on POD #4-6.    Orthopaedics surgery staff for this patient is Dr. Vasquez.    ------------------------------------------------------------------------------------------    [   ] Discontinue PCA  [   ] Drains removed.  [   ] Post xrays done.  [   ] Discharge orders done.  [   ] Discharge summary started.      Ashish Cline MD  Orthopedic Surgery, PGY-4  Pager: [phone]        Please contact me directly regarding this patient during normal business hours Mon-Fri before 5pm @ [phone].  IF it is Night, a Weekend, or there is No Response, then please page the Orthopaedic Resident On Call in [facility]. Thank you!        FOLLOWUP:    Future Appointments   Date Time Provider Department Center   10/2/2019  2:15 PM Janna Krishnan, PT [facility]   10/3/2019  6:30 AM UR PT REHAB TECH [facility]   10/3/2019  8:00 AM Amira Cook, OT [facility]   10/3/2019  6:30 PM UR PT REHAB TECH [facility]   11/12/2019  9:45 AM Evangelist Vasquez MD [facility]   12/31/2019 10:45 AM Evangelist Vasquez MD [facility]        No

## 2023-03-31 ENCOUNTER — ANCILLARY PROCEDURE (OUTPATIENT)
Dept: RADIOLOGY | Facility: AMBULATORY SURGERY CENTER | Age: 63
End: 2023-03-31
Attending: ANESTHESIOLOGY
Payer: COMMERCIAL

## 2023-03-31 ENCOUNTER — HOSPITAL ENCOUNTER (OUTPATIENT)
Facility: AMBULATORY SURGERY CENTER | Age: 63
Discharge: HOME | End: 2023-03-31
Attending: ANESTHESIOLOGY | Admitting: ANESTHESIOLOGY
Payer: COMMERCIAL

## 2023-03-31 VITALS
DIASTOLIC BLOOD PRESSURE: 82 MMHG | RESPIRATION RATE: 16 BRPM | HEART RATE: 88 BPM | OXYGEN SATURATION: 98 % | SYSTOLIC BLOOD PRESSURE: 152 MMHG

## 2023-03-31 DIAGNOSIS — M79.18 MYOFASCIAL PAIN: ICD-10-CM

## 2023-03-31 PROCEDURE — 20552 NJX 1/MLT TRIGGER POINT 1/2: CPT | Performed by: ANESTHESIOLOGY

## 2023-03-31 PROCEDURE — 77002 NEEDLE LOCALIZATION BY XRAY: CPT | Mod: 26 | Performed by: ANESTHESIOLOGY

## 2023-03-31 PROCEDURE — 20552 NJX 1/MLT TRIGGER POINT 1/2: CPT

## 2023-03-31 RX ORDER — TRIAMCINOLONE ACETONIDE 40 MG/ML
INJECTION, SUSPENSION INTRA-ARTICULAR; INTRAMUSCULAR DAILY PRN
Status: DISCONTINUED | OUTPATIENT
Start: 2023-03-31 | End: 2023-03-31 | Stop reason: HOSPADM

## 2023-03-31 RX ORDER — BUPIVACAINE HYDROCHLORIDE 2.5 MG/ML
INJECTION, SOLUTION EPIDURAL; INFILTRATION; INTRACAUDAL DAILY PRN
Status: DISCONTINUED | OUTPATIENT
Start: 2023-03-31 | End: 2023-03-31 | Stop reason: HOSPADM

## 2023-03-31 RX ORDER — LIDOCAINE HYDROCHLORIDE 10 MG/ML
INJECTION, SOLUTION EPIDURAL; INFILTRATION; INTRACAUDAL; PERINEURAL DAILY PRN
Status: DISCONTINUED | OUTPATIENT
Start: 2023-03-31 | End: 2023-03-31 | Stop reason: HOSPADM

## 2023-03-31 RX ORDER — IOPAMIDOL 408 MG/ML
INJECTION, SOLUTION INTRAVASCULAR DAILY PRN
Status: DISCONTINUED | OUTPATIENT
Start: 2023-03-31 | End: 2023-03-31 | Stop reason: HOSPADM

## 2023-03-31 NOTE — DISCHARGE INSTRUCTIONS
Home Care Instructions after a Piriformis Muscle Injection  The piriformis muscle is a small muscle located deep in the buttock (behind the gluteus prateek).     Activity  -You may resume most normal activity levels with the exception of strenuous activity. It is important for us to know if your pain with normal activity is relieved after this injection.  -DO NOT shower for 24 hours  -DO NOT remove bandaid for 24 hours    Pain  -You may experience soreness at the injection site for one or two days  -You may use an ice pack for 20 minutes every 2 hours for the first 24 hours  -You may use a heating pad after the first 24 hours  -You may use Tylenol (acetaminophen) every 4 hours or other pain medicines as     directed by your physician    You may experience numbness radiating into your legs or arms (depending on the procedure location). This numbness may last several hours. Until sensation returns to normal; please use caution in walking, climbing stairs, and stepping out of your vehicle, etc.    DID YOU RECEIVE STEROIDS TODAY?  Yes    Common side effects of steroids:  Not everyone will experience corticosteroid side effects. If side effects are experienced, they will gradually subside in the 7-10 day period following an injection. Most common side effects include:  -Flushed face and/or chest  -Feeling of warmth, particularly in the face but could be an overall feeling of warmth  -Increased blood sugar in diabetic patients  -Menstrual irregularities my occur. If taking hormone-based birth control an alternate method of birth control is recommended  -Sleep disturbances and/or mood swings are possible  -Leg cramps    PLEASE KEEP TRACK OF YOUR SYMPTOMS AND NOTE YOUR IMPROVEMENT FOR YOUR DOCTOR.     Please contact us if you have:  -Severe pain  -Fever more than 101.5 degrees Fahrenheit  -Signs of infection at the injection site (redness, swelling, or drainage)    FOR PAIN CENTER PATIENTS:  If you have questions, please  contact the Pain Clinic at [phone] Option #1 between the hours of 7:00 am and 3:00 pm Monday through Friday. After office hours you can contact the on call provider by dialing [phone]. If you need immediate attention, we recommend that you go to a hospital emergency room or dial [phone].

## 2023-04-02 ENCOUNTER — MYC REFILL (OUTPATIENT)
Dept: ANESTHESIOLOGY | Facility: CLINIC | Age: 63
End: 2023-04-02
Payer: COMMERCIAL

## 2023-04-02 DIAGNOSIS — M54.18 RIGHT SACRAL RADICULOPATHY: ICD-10-CM

## 2023-04-02 DIAGNOSIS — G57.01 PIRIFORMIS SYNDROME OF RIGHT SIDE: ICD-10-CM

## 2023-04-03 RX ORDER — DULOXETIN HYDROCHLORIDE 60 MG/1
60 CAPSULE, DELAYED RELEASE ORAL DAILY
Qty: 90 CAPSULE | Refills: 0 | Status: SHIPPED | OUTPATIENT
Start: 2023-04-03 | End: 2023-09-14

## 2023-04-03 NOTE — TELEPHONE ENCOUNTER
Medication refilled. No changes. Patient last seen 10/2022 with Dr. Luna.       Mesha Osullivan RN

## 2023-04-11 NOTE — OP NOTE
Patient: Aleena Ontiveros Age: 62 year old   MRN: [id number] Attending: Dr. Luna      Date of Visit: March 31, 2023        PAIN MEDICINE CLINIC PROCEDURE NOTE     ATTENDING CLINICIAN:    Ana Luna MD     ASSISTANT CLINICIAN:        PREPROCEDURE DIAGNOSES:  1.  Right piriformis syndrome   2.  Other myositis, right thigh  3.  Chronic pain     POSTPROCEDURE DIAGNOSES:  1.  Right piriformis syndrome   2.  Other myositis, right thigh  3.  Chronic pain        PROCEDURE(S) PERFORMED:  1.  Right piriformis muscle injection  2.  Fluoroscopic guidance for the above-namdromed procedure(s)        ANESTHESIA:  Local.     BLOOD LOSS:  Minimal.     DRAINS AND SPECIMENS:  None.     COMPLICATIONS:  None.     INDICATIONS:  Aleena Ontiveros is a 61 year old female with a history of  chronic low back pain secondary to sacrolitis .  At this time, the patient's symptoms and exam are most consistent with pyriformis syndrome. During the last clinic visit we recommended a diagnostic and therapeutic injection of her right piriformis muscle. The patient stated that the patient was in their usual state of health and denied recent anticoagulant use or recent infections.  Therefore, the plan is to perform above mentioned procedure.      Procedure Details:  The patient was met in the procedure room, where the patient was identified by name, medical record number and date of birth.  All of the patient s last minute questions were answered. Written informed consent was obtained and saved in the electronic medical record, after the risks, benefits, and alternatives were discussed with the patient.       A formal time-out procedure was performed, as per protocol, including patient name, title of procedure, and site of procedure, and all in the room concurred.  Routine monitors were applied.       The patient was placed in the prone position on the procedure room table.  All pressure points were checked and comfortably padded.  Routine monitors were  placed.  Vital signs were stable.     A chlorhexidine prep was completed followed by sterile draping per standard procedure.      AP fluoroscopic guidance was used to identify the Right SI joint(s). Target site is located 1.5 cm inferior, 1.5 cm lateral to the lower border of right SI joint. After 1% lidocaine infiltration using a 25 gauge 1.5 inch needle, a 22G 100 mm sonoplex needle was introduced with the nerve stimulator set at 1.0 mA at 2 Hz. The gluteus muscle twitching observed and the needle in intermittently advanced until no twitching is seen. At this time,lateral view is taken to see needle position in relation to the sacrum. The needle was then intermittently advanced past sacrum 0.5-1 cm  until it  penetrates piriformis muscle, a change in resistance (more firm) observed. The patient didn't notice any feeling sharp pain shooting down their leg as the needle tip advanced. Then 1-2 ml of Isovue 220  contrast injected.  Contrast flow showed a diagonal pattern from cephalad to caudad as it goes toward the femoral attachment site of the piriformis muscle. Lateral view is also taken.     Then after negative aspiration for heme, 5 mL of a treatment mixture containing 1 mL of triamcinolone and 4 mL of bupivacaine 0.25% was injected.  The needle was then removed.       Light pressure was held at the puncture site(s) to prevent ecchymosis and oozing.  The patient's skin was cleansed, and hemostasis was confirmed.  Band-aids were applied to the needle injection site(s).       Condition:     The patient remained awake and alert throughout the procedure.  The patient tolerated the procedure well and was monitored for approximately 15 minutes afterward in the post procedure area.  There were no immediate post procedure complications noted.  The patient was then discharged to home as per protocol.       Pre-procedure pain score: 4/10  Post-procedure pain score: 2/10     Ana Luna MD    Pain  Medicine  Department of Anesthesiology  [facility]

## 2023-04-11 NOTE — H&P
ABBREVIATED H&P [facility] AMBULATORY SURGERY CENTER      Patient Name: Aleena Ontiveros   MRN: [id number]   YOB: 1960     1. Reason for Procedure:  Procedure Summary     Date: 03/31/23 [place] [facility] [place] [facility]    Anesthesia Start:  Anesthesia Stop:     Procedure: Piriformis Muscle Injection (Right: Buttocks) Diagnosis:       Piriformis muscle pain      (Piriformis muscle pain [M79.18])    Providers: Ana Luna MD Responsible Provider:     Anesthesia Type: Not recorded ASA Status: Not recorded          2. History:   Past Medical History:   Diagnosis Date     Anemia      Anxiety      Arthritis     osteoarthritis of both knee     Chronic osteoarthritis      Hypertension      Hypothyroidism      PONV (postoperative nausea and vomiting)      Seasonal allergic rhinitis      Uncomplicated asthma        Comorbidities: None    Any history of sleep apnea? No    Any history of problems with sedation? No    3. Physical:    General: Normal  Skin:  Normal.  Respiratory: Clear to auscultation bilateral, no wheezing  Cardio:  Regular rate and rhythm  Abdomen: Soft, nontender, nondistended, no palpable masses.  Musculoskeletal: Normal  Neuro: Sensory exam normal, motor exam 5/5, bilateral upper and lower extremities       4. Current Medications (if not in Epic):   Current Outpatient Medications   Medication Sig Dispense Refill     acetaminophen (TYLENOL) 500 MG tablet Take 2 Tabs Every 8 Hours as needed for pain 450 tablet 1     amLODIPine (NORVASC) 5 MG tablet Take 5 mg by mouth At Bedtime        Fexofenadine HCl (ALLEGRA PO) Take 180 mg by mouth every morning        gabapentin (NEURONTIN) 100 MG capsule Take 1-2 caps Four times per day as needed for pain 450 capsule 1     levothyroxine (SYNTHROID) 125 MCG tablet Take 1 tablet (125 mcg) by mouth every morning       lisinopril-hydrochlorothiazide (ZESTORETIC) 20-12.5 MG tablet Take 2  tablets by mouth daily       Montelukast Sodium (SINGULAIR PO) Take 10 mg by mouth At Bedtime        multivitamin w/minerals (THERA-VIT-M) tablet Take 2 tablets by mouth 2 times daily       Omega-3 Fatty Acids (FISH OIL) 500 MG CAPS Take 2 capsules by mouth daily       albuterol (PROAIR HFA/PROVENTIL HFA/VENTOLIN HFA) 108 (90 Base) MCG/ACT inhaler Inhale 2 puffs into the lungs every 4 hours as needed for shortness of breath / dyspnea or wheezing       cyclobenzaprine (FLEXERIL) 10 MG tablet Take 0.5-1 tablets (5-10 mg) by mouth 3 times daily as needed for muscle spasms 60 tablet 3     diclofenac (VOLTAREN) 1 % topical gel Apply 1-2 g topically 4 times daily 100 g 1     DULoxetine (CYMBALTA) 60 MG capsule Take 1 capsule (60 mg) by mouth daily 90 capsule 0     estradiol (ESTRACE) 0.1 MG/GM cream Place 2 g vaginally At Bedtime        fluticasone (FLONASE) 50 MCG/ACT spray Spray 1 spray into both nostrils 2 times daily        fluticasone-salmeterol (ADVAIR) 500-50 MCG/DOSE inhaler Inhale 1 puff into the lungs every 12 hours       gabapentin (NEURONTIN) 400 MG capsule 300 mg       methocarbamol (ROBAXIN) 750 MG tablet Take 1 tablet (750 mg) by mouth 4 times daily as needed for muscle spasms 120 tablet 2     traMADol (ULTRAM) 50 MG tablet Take 100 mg by mouth every 6 hours as needed for severe pain          5. Allergies and Reactions:  is allergic to adhesive tape and erythromycin.

## 2023-05-27 ENCOUNTER — HEALTH MAINTENANCE LETTER (OUTPATIENT)
Age: 63
End: 2023-05-27

## 2023-07-18 ENCOUNTER — ANCILLARY PROCEDURE (OUTPATIENT)
Dept: ULTRASOUND IMAGING | Facility: CLINIC | Age: 63
End: 2023-07-18
Attending: FAMILY MEDICINE
Payer: OTHER MISCELLANEOUS

## 2023-07-18 DIAGNOSIS — R60.0 LEG EDEMA: ICD-10-CM

## 2023-07-18 PROCEDURE — 93971 EXTREMITY STUDY: CPT | Mod: LT

## 2023-08-07 ENCOUNTER — TELEPHONE (OUTPATIENT)
Dept: ANESTHESIOLOGY | Facility: CLINIC | Age: 63
End: 2023-08-07

## 2023-08-07 ENCOUNTER — TELEPHONE (OUTPATIENT)
Dept: PALLIATIVE MEDICINE | Facility: CLINIC | Age: 63
End: 2023-08-07
Payer: COMMERCIAL

## 2023-08-07 NOTE — TELEPHONE ENCOUNTER
Voice message on 8/4/2023 at 4:45 PM    Reason for Call:  Other appointment    Detailed comments: Per patient's voice message, she would like to schedule another piriformis muscle injection. Patient requested to be called back.     Phone Number Patient can be reached at: Cell number on file:    Telephone Information:   Mobile [phone]       Best Time: not indicated in voice message    Can we leave a detailed message on this number?  not indicated in voice message    Voice message retrieved on 8/7/2023 at 7:34 AM by Richa Cassidy

## 2023-08-07 NOTE — TELEPHONE ENCOUNTER
Patient is calling to schedule procedure with dr. Luna. Patient last injection procedure was on 03/31/23 and would like the same injection. Please place a new order.

## 2023-08-10 DIAGNOSIS — M79.18 PIRIFORMIS MUSCLE PAIN: Primary | ICD-10-CM

## 2023-08-11 ENCOUNTER — TELEPHONE (OUTPATIENT)
Dept: PALLIATIVE MEDICINE | Facility: CLINIC | Age: 63
End: 2023-08-11
Payer: COMMERCIAL

## 2023-08-15 ENCOUNTER — TELEPHONE (OUTPATIENT)
Dept: PALLIATIVE MEDICINE | Facility: CLINIC | Age: 63
End: 2023-08-15
Payer: COMMERCIAL

## 2023-08-15 PROBLEM — M79.18 PIRIFORMIS MUSCLE PAIN: Status: ACTIVE | Noted: 2023-08-10

## 2023-08-15 NOTE — TELEPHONE ENCOUNTER
Patient is scheduled for surgery with Dr. Luna    Spoke with: patient    Date of Surgery: 08/18/23    Location: [facility] [place]    Informed patient they will need an adult  yes    Additional comments: Patient is aware of date and time of the procedure.        Danni Ruiz MA on 8/15/2023 at 4:05 PM;'

## 2023-08-15 NOTE — TELEPHONE ENCOUNTER
[facility] Call Center    Phone Message    May a detailed message be left on voicemail: yes     Reason for Call: Appointment Request From: Aleena Ontiveros     With Provider: Ana Luna MD [[facility]]     Preferred Date Range: Any     Preferred Times: Any Time     Reason for visit: Request an Appointment     Comments:  I urgently need another piriformis muscle injection. Have called and spoken to someone twice but am not sure what happened? Could I please get an appt asap with Dr Luna?   I already have a followup scheduled in Sept which was earliest available. Really need injection now in order to walk and work. Thank you very much     Action Taken: Other: Routed to [facility]    Travel Screening: Not Applicable

## 2023-08-18 ENCOUNTER — HOSPITAL ENCOUNTER (OUTPATIENT)
Facility: AMBULATORY SURGERY CENTER | Age: 63
Discharge: HOME | End: 2023-08-18
Attending: ANESTHESIOLOGY | Admitting: ANESTHESIOLOGY
Payer: COMMERCIAL

## 2023-08-18 ENCOUNTER — ANCILLARY PROCEDURE (OUTPATIENT)
Dept: RADIOLOGY | Facility: AMBULATORY SURGERY CENTER | Age: 63
End: 2023-08-18
Attending: ANESTHESIOLOGY
Payer: COMMERCIAL

## 2023-08-18 VITALS
TEMPERATURE: 95.9 F | SYSTOLIC BLOOD PRESSURE: 170 MMHG | DIASTOLIC BLOOD PRESSURE: 98 MMHG | HEART RATE: 86 BPM | OXYGEN SATURATION: 98 % | RESPIRATION RATE: 16 BRPM

## 2023-08-18 DIAGNOSIS — R52 PAIN: ICD-10-CM

## 2023-08-18 PROCEDURE — 20552 NJX 1/MLT TRIGGER POINT 1/2: CPT | Mod: RT

## 2023-08-18 RX ORDER — BUPIVACAINE HYDROCHLORIDE 2.5 MG/ML
INJECTION, SOLUTION EPIDURAL; INFILTRATION; INTRACAUDAL DAILY PRN
Status: DISCONTINUED | OUTPATIENT
Start: 2023-08-18 | End: 2023-08-18 | Stop reason: HOSPADM

## 2023-08-18 RX ORDER — LIDOCAINE HYDROCHLORIDE 10 MG/ML
INJECTION, SOLUTION EPIDURAL; INFILTRATION; INTRACAUDAL; PERINEURAL DAILY PRN
Status: DISCONTINUED | OUTPATIENT
Start: 2023-08-18 | End: 2023-08-18 | Stop reason: HOSPADM

## 2023-08-18 RX ORDER — IOPAMIDOL 408 MG/ML
INJECTION, SOLUTION INTRAVASCULAR DAILY PRN
Status: DISCONTINUED | OUTPATIENT
Start: 2023-08-18 | End: 2023-08-18 | Stop reason: HOSPADM

## 2023-08-18 RX ORDER — TRIAMCINOLONE ACETONIDE 40 MG/ML
INJECTION, SUSPENSION INTRA-ARTICULAR; INTRAMUSCULAR DAILY PRN
Status: DISCONTINUED | OUTPATIENT
Start: 2023-08-18 | End: 2023-08-18 | Stop reason: HOSPADM

## 2023-08-18 NOTE — DISCHARGE INSTRUCTIONS
PAIN INJECTION HOME CARE INSTRUCTIONS  Activity  -You may resume most normal activity levels with the exception of strenuous activity. It may help to move in ways that hurt before the injection, to see if the pain is still there, but do not overdo it.     -DO NOT remove bandaid for 24 hours  -DO NOT shower for 24 hours      Pain  -You may feel immediate pain relief and numbness for a period of time after the injection. This may indicate the medication has reached the right spot.  -Your pain may return after this short pain-free period, or may even be a little worse for a day or two. It may be caused by needle irritation or by the medication itself. The medications usually take two or three days to start working, but can take as long as a week.    -You may use an ice pack for 20 minutes every 2 hours for the first 24 hours  -You may use a heating pad after the first 24 hours  -You may use Tylenol (acetaminophen) every 4 hours or other pain medicines as directed by your physician      DID YOU RECEIVE STEROIDS TODAY?  Yes    Common side effects of steroids:  Not everyone will experience corticosteroid side effects. If side effects are experienced, they will gradually subside in the 7-10 day period following an injection. Most common side effects include:  -Flushed face and/or chest  -Feeling of warmth, particularly in the face but could be an overall feeling of warmth  -Increased blood sugar in diabetic patients  -Menstrual irregularities my occur. If taking hormone-based birth control an alternate method of birth control is recommended  -Sleep disturbances and/or mood swings are possible  -Leg cramps    PLEASE KEEP TRACK OF YOUR SYMPTOMS AND NOTE ANY CHANGES FOR YOUR DOCTOR.       Please contact us if you have:  -Severe pain  -Fever more than 101.5 degrees Fahrenheit  -Signs of infection at the injection site (redness, swelling, or drainage)      FOR PAIN CENTER PATIENTS:  If you have questions, please contact the  Pain Clinic at [phone] Option 1 between the hours of 7:00 am and 3:00 pm Monday through Friday. After office hours you can contact the on call provider by dialing [phone]. If you need immediate attention, we recommend that you go to a hospital emergency room or dial [phone]. If you need to Fax information, the number is [phone].      FOR PM&R PATIENTS:  For patients seen by the PM and R service, please call [phone]. (Monday through Friday 8a-5p.  After business hours and weekends call [phone] and ask for the PM and R doctor on call). If you need to fax a pain diary to PM&R the fax number is [phone]. If you are unable to fax, uploading to [employer] is encouraged, then send to provider. If you have procedure scheduling questions please call [phone] Option #2.

## 2023-08-20 ENCOUNTER — HEALTH MAINTENANCE LETTER (OUTPATIENT)
Age: 63
End: 2023-08-20

## 2023-08-24 NOTE — OP NOTE
Patient: Aleena Ontiveros Age: 63 year old   MRN: [id number] Attending: Dr. Luna      Date of Visit: August 18, 2023        PAIN MEDICINE CLINIC PROCEDURE NOTE     ATTENDING CLINICIAN:    Ana Luna MD     ASSISTANT CLINICIAN:        PREPROCEDURE DIAGNOSES:  1.  Right piriformis syndrome   2.  Other myositis, right thigh  3.  Chronic pain     POSTPROCEDURE DIAGNOSES:  1.  Right piriformis syndrome   2.  Other myositis, right thigh  3.  Chronic pain        PROCEDURE(S) PERFORMED:  1.  Right piriformis muscle injection  2.  Fluoroscopic guidance for the above-namdromed procedure(s)        ANESTHESIA:  Local.     BLOOD LOSS:  Minimal.     DRAINS AND SPECIMENS:  None.     COMPLICATIONS:  None.     INDICATIONS:  Aleena Ontiveros is a 61 year old female with a history of  chronic low back pain secondary to sacrolitis .  At this time, the patient's symptoms and exam are most consistent with pyriformis syndrome. During the last clinic visit we recommended a diagnostic and therapeutic injection of her right piriformis muscle. The patient stated that the patient was in their usual state of health and denied recent anticoagulant use or recent infections.  Therefore, the plan is to perform above mentioned procedure.      Procedure Details:  The patient was met in the procedure room, where the patient was identified by name, medical record number and date of birth.  All of the patient s last minute questions were answered. Written informed consent was obtained and saved in the electronic medical record, after the risks, benefits, and alternatives were discussed with the patient.       A formal time-out procedure was performed, as per protocol, including patient name, title of procedure, and site of procedure, and all in the room concurred.  Routine monitors were applied.       The patient was placed in the prone position on the procedure room table.  All pressure points were checked and comfortably padded.  Routine monitors  were placed.  Vital signs were stable.     A chlorhexidine prep was completed followed by sterile draping per standard procedure.      AP fluoroscopic guidance was used to identify the Right SI joint(s). Target site is located 1.5 cm inferior, 1.5 cm lateral to the lower border of right SI joint. After 1% lidocaine infiltration using a 25 gauge 1.5 inch needle, a 22G 100 mm sonoplex needle was introduced with the nerve stimulator set at 1.0 mA at 2 Hz. The gluteus muscle twitching observed and the needle in intermittently advanced until no twitching is seen. At this time,lateral view is taken to see needle position in relation to the sacrum. The needle was then intermittently advanced past sacrum 0.5-1 cm  until it  penetrates piriformis muscle, a change in resistance (more firm) observed. The patient didn't notice any feeling sharp pain shooting down their leg as the needle tip advanced. Then 1-2 ml of Isovue 220  contrast injected.  Contrast flow showed a diagonal pattern from cephalad to caudad as it goes toward the femoral attachment site of the piriformis muscle. Lateral view is also taken.     Then after negative aspiration for heme, 5 mL of a treatment mixture containing 1 mL of triamcinolone and 4 mL of bupivacaine 0.25% was injected.  The needle was then removed.       Light pressure was held at the puncture site(s) to prevent ecchymosis and oozing.  The patient's skin was cleansed, and hemostasis was confirmed.  Band-aids were applied to the needle injection site(s).       Condition:     The patient remained awake and alert throughout the procedure.  The patient tolerated the procedure well and was monitored for approximately 15 minutes afterward in the post procedure area.  There were no immediate post procedure complications noted.  The patient was then discharged to home as per protocol.       Pre-procedure pain score: 4/10  Post-procedure pain score: 2/10     Ana Luna MD  Assistant    Pain Medicine  Department of Anesthesiology  [facility]

## 2023-08-24 NOTE — H&P
ABBREVIATED H&P [facility] [place] AM[facility]ULATORY SURGERY CENTER      Patient Name: Aleena Ontiveros   MRN: [id number]   YOB: 1960     1. Reason for Procedure:  Procedure Summary       Date: 08/18/23 Room / Location: [facility] [place] PROCEDURE ROOM 06 / [facility]    Anesthesia Start:  Anesthesia Stop:     Procedure: Piriformis Muscle Injection (Right: Buttocks) Diagnosis:       Piriformis muscle pain      (Piriformis muscle pain [M79.18])    Providers: Ana Luna MD Responsible Provider:     Anesthesia Type: Not recorded ASA Status: Not recorded            2. History:   Past Medical History:   Diagnosis Date    Anemia     Anxiety     Arthritis     osteoarthritis of both knee    Chronic osteoarthritis     Hypertension     Hypothyroidism     PONV (postoperative nausea and vomiting)     Seasonal allergic rhinitis     Uncomplicated asthma        Comorbidities: None    Any history of sleep apnea? No    Any history of problems with sedation? No    3. Physical:    General: Normal  Skin:  Normal.  Respiratory: Clear to auscultation bilateral, no wheezing  Cardio:  Regular rate and rhythm  Abdomen: Soft, nontender, nondistended, no palpable masses.  Musculoskeletal: Normal  Neuro: Sensory exam normal, motor exam 5/5, bilateral upper and lower extremities       4. Current Medications (if not in Epic):   Current Outpatient Medications   Medication Sig Dispense Refill    acetaminophen (TYLENOL) 500 MG tablet Take 2 Tabs Every 8 Hours as needed for pain 450 tablet 1    albuterol (PROAIR HFA/PROVENTIL HFA/VENTOLIN HFA) 108 (90 Base) MCG/ACT inhaler Inhale 2 puffs into the lungs every 4 hours as needed for shortness of breath / dyspnea or wheezing      amLODIPine (NORVASC) 5 MG tablet Take 5 mg by mouth At Bedtime       diclofenac (VOLTAREN) 1 % topical gel Apply 1-2 g topically 4 times daily 100 g 1    DULoxetine (CYMBALTA) 60 MG capsule Take 1 capsule (60 mg) by mouth daily 90 capsule 0     estradiol (ESTRACE) 0.1 MG/GM cream Place 2 g vaginally At Bedtime       fluticasone (FLONASE) 50 MCG/ACT spray Spray 1 spray into both nostrils 2 times daily       fluticasone-salmeterol (ADVAIR) 500-50 MCG/DOSE inhaler Inhale 1 puff into the lungs every 12 hours      gabapentin (NEURONTIN) 400 MG capsule 300 mg      levothyroxine (SYNTHROID) 125 MCG tablet Take 1 tablet (125 mcg) by mouth every morning      lisinopril-hydrochlorothiazide (ZESTORETIC) 20-12.5 MG tablet Take 2 tablets by mouth daily      Montelukast Sodium (SINGULAIR PO) Take 10 mg by mouth At Bedtime       multivitamin w/minerals (THERA-VIT-M) tablet Take 2 tablets by mouth 2 times daily      Omega-3 Fatty Acids (FISH OIL) 500 MG CAPS Take 2 capsules by mouth daily      Fexofenadine HCl (ALLEGRA PO) Take 180 mg by mouth every morning       gabapentin (NEURONTIN) 100 MG capsule Take 1-2 caps Four times per day as needed for pain 450 capsule 1        5. Allergies and Reactions:  is allergic to adhesive tape and erythromycin.

## 2023-09-14 ENCOUNTER — VIRTUAL VISIT (OUTPATIENT)
Dept: ANESTHESIOLOGY | Facility: CLINIC | Age: 63
End: 2023-09-14
Payer: COMMERCIAL

## 2023-09-14 DIAGNOSIS — G57.01 PIRIFORMIS SYNDROME OF RIGHT SIDE: ICD-10-CM

## 2023-09-14 DIAGNOSIS — M54.18 RIGHT SACRAL RADICULOPATHY: ICD-10-CM

## 2023-09-14 PROCEDURE — 99213 OFFICE O/P EST LOW 20 MIN: CPT | Mod: VID | Performed by: ANESTHESIOLOGY

## 2023-09-14 RX ORDER — DULOXETIN HYDROCHLORIDE 60 MG/1
60 CAPSULE, DELAYED RELEASE ORAL DAILY
Qty: 90 CAPSULE | Refills: 1 | Status: SHIPPED | OUTPATIENT
Start: 2023-09-14 | End: 2023-12-06

## 2023-09-14 ASSESSMENT — ENCOUNTER SYMPTOMS
WHEEZING: 1
LOSS OF CONSCIOUSNESS: 0
PARALYSIS: 0
DISTURBANCES IN COORDINATION: 0
ARTHRALGIAS: 0
MUSCLE CRAMPS: 0
SNORES LOUDLY: 1
NUMBNESS: 1
COUGH: 1
MYALGIAS: 1
TREMORS: 0
NECK PAIN: 0
STIFFNESS: 1
BACK PAIN: 1
COUGH DISTURBING SLEEP: 0
SPUTUM PRODUCTION: 0
MUSCLE WEAKNESS: 1
MEMORY LOSS: 0
DYSPNEA ON EXERTION: 1
SHORTNESS OF BREATH: 1
POSTURAL DYSPNEA: 0
DIZZINESS: 0
TINGLING: 1
SPEECH CHANGE: 0
WEAKNESS: 1
HEADACHES: 0
HEMOPTYSIS: 0
SEIZURES: 0
JOINT SWELLING: 0

## 2023-09-14 ASSESSMENT — PAIN SCALES - GENERAL: PAINLEVEL: MILD PAIN (3)

## 2023-09-14 NOTE — PATIENT INSTRUCTIONS
Medications:    DULoxetine (CYMBALTA) 60 MG capsule - Take 1 capsule (60 mg) by mouth daily     Please provide the clinic with a minium of 1 week notice, on all prescription refills.       Recommended Follow up:      3 to 6 months       To speak with a nurse, schedule/reschedule/cancel a clinic appointment, or request a medication refill call: [phone].    You can also reach us by [email or web address]: [email or web address]

## 2023-09-14 NOTE — LETTER
9/14/2023       RE: Aleena Ontiveros  [address] [place] [address]       Dear Colleague,    Thank you for referring your patient, Aleena Ontiveros, to the [facility] CLIN[facility] FOR COMPREHENSIVE PAIN MANAGEMENT [place] at [facility]. Please see a copy of my visit note below.    [facility]    Date of visit: 9/14/2023    Chief complaint:   Chief Complaint   Patient presents with    Follow Up     Right sided lower back pain       Interval history:  Aleena Ontiveros was last seen by me on 10/12/2022 in clinic and 8/18/2023 for Right Piriformis injection.        Since her last visit, Aleena Ontiveros reports:      Did well after her injection - took 2 weeks to kick in but is now doing much better  Refilled duloxetine  Gabapentin - taking approx 1800 mg / day - this is not causing any issues or negative side effects  Is active and doing well overall  Will call when the injection wears off and she would like it repeated      Pain scores:  Pain intensity on average is 3 on a scale of 0-10.     Current pain treatments:   Duloxetine 60 mg daily        Side Effects: denies any problems    Medications:  Current Outpatient Medications   Medication Sig Dispense Refill    acetaminophen (TYLENOL) 500 MG tablet Take 2 Tabs Every 8 Hours as needed for pain 450 tablet 1    albuterol (PROAIR HFA/PROVENTIL HFA/VENTOLIN HFA) 108 (90 Base) MCG/ACT inhaler Inhale 2 puffs into the lungs every 4 hours as needed for shortness of breath / dyspnea or wheezing      amLODIPine (NORVASC) 5 MG tablet Take 5 mg by mouth At Bedtime       diclofenac (VOLTAREN) 1 % topical gel Apply 1-2 g topically 4 times daily 100 g 1    DULoxetine (CYMBALTA) 60 MG capsule Take 1 capsule (60 mg) by mouth daily 90 capsule 0    estradiol (ESTRACE) 0.1 MG/GM cream Place 2 g vaginally At Bedtime       Fexofenadine HCl (ALLEGRA PO) Take 180 mg by mouth every morning       fluticasone (FLONASE) 50  MCG/ACT spray Spray 1 spray into both nostrils 2 times daily       fluticasone-salmeterol (ADVAIR) 500-50 MCG/DOSE inhaler Inhale 1 puff into the lungs every 12 hours      gabapentin (NEURONTIN) 100 MG capsule Take 1-2 caps Four times per day as needed for pain 450 capsule 1    gabapentin (NEURONTIN) 400 MG capsule 300 mg      levothyroxine (SYNTHROID) 125 MCG tablet Take 1 tablet (125 mcg) by mouth every morning      lisinopril-hydrochlorothiazide (ZESTORETIC) 20-12.5 MG tablet Take 2 tablets by mouth daily      Montelukast Sodium (SINGULAIR PO) Take 10 mg by mouth At Bedtime       multivitamin w/minerals (THERA-VIT-M) tablet Take 2 tablets by mouth 2 times daily      Omega-3 Fatty Acids (FISH OIL) 500 MG CAPS Take 2 capsules by mouth daily         Medical History: any changes in medical history since they were last seen? No    Review of Systems:  The 14 system ROS was reviewed from the intake questionnaire, and is positive for: back pain  Any bowel or bladder problems: sosa  Mood: stable    Physical Exam: Virtual Visit  There were no vitals taken for this visit.  General: AAOx3, NAD      Assessment:   Piriformis Muscle Pain, Right side  Lumbosacral Radiculopathy  Osteoarthritis of Multiple joitns    Aleena Ontiveros is a 63 year old female who is seen at the pain clinic for follow up to discuss the results of her previous injection, which did provide significant relief.  She is also taking duloxetine for pain and is due for refill.      Plan:  Physical Therapy:  continue daily HEPs  Clinical Health Psychologist to address issues of relaxation, behavioral change, coping style, and other factors important to improvement.  Not indicated  Diagnostic Studies:  none  Medication Management:  Refill Duloxetine provided  Further procedures recommended: Call when ready to schedule repeat Piriformis Muscle injection on right side  Recommendations to PCP: none  Follow up: 6 months or sooner if needed    Ana Luna  MD    Pain Medicine  Department of Anesthesiology  [facility]        \Answers submitted by the patient for this visit:  Symptoms you have experienced in the last 30 days (Submitted on 9/14/2023)  General Symptoms: No  Skin Symptoms: No  HENT Symptoms: No  EYE SYMPTOMS: No  HEART SYMPTOMS: No  LUNG SYMPTOMS: Yes  INTESTINAL SYMPTOMS: No  URINARY SYMPTOMS: No  GYNECOLOGIC SYMPTOMS: No  BREAST SYMPTOMS: No  SKELETAL SYMPTOMS: Yes  BLOOD SYMPTOMS: No  NERVOUS SYSTEM SYMPTOMS: Yes  MENTAL HEALTH SYMPTOMS: No  Please answer the questions below to tell us what condition you are experiencing: (Submitted on 9/14/2023)  Cough: Yes  Sputum or phlegm: No  Coughing up blood: No  Difficulty breating or shortness of breath: Yes  Snoring: Yes  Wheezing: Yes  Difficulty breathing on exertion: Yes  Nighttime Cough: No  Difficulty breathing when lying flat: No  Please answer the questions below to tell us what condition you are experiencing: (Submitted on 9/14/2023)  Back pain: Yes  Muscle aches: Yes  Neck pain: No  Swollen joints: No  Joint pain: No  Bone pain: Yes  Muscle cramps: No  Muscle weakness: Yes  Joint stiffness: Yes  Bone fracture: No  Please answer the questions below to tell us what condition you are experiencing: (Submitted on 9/14/2023)  Trouble with coordination: No  Dizziness or trouble with balance: No  Fainting or black-out spells: No  Memory loss: No  Headache: No  Seizures: No  Speech problems: No  Tingling: Yes  Tremor: No  Weakness: Yes  Difficulty walking: Yes  Paralysis: No  Numbness: Yes      Aleena is a 63 year old who is being evaluated via a billable video visit.      How would you like to obtain your AVS? MyChart  If the video visit is dropped, the invitation should be resent by: Text to cell phone: [phone]  Will anyone else be joining your video visit? No            Again, thank you for allowing me to participate in the care of your patient.      Sincerely,    Ana  MD Cheryl

## 2023-09-14 NOTE — PROGRESS NOTES
[facility]    Date of visit: 9/14/2023    Chief complaint:   Chief Complaint   Patient presents with    Follow Up     Right sided lower back pain       Interval history:  Aleena Ontiveros was last seen by me on 10/12/2022 in clinic and 8/18/2023 for Right Piriformis injection.        Since her last visit, Aleena Ontiveros reports:      Did well after her injection - took 2 weeks to kick in but is now doing much better  Refilled duloxetine  Gabapentin - taking approx 1800 mg / day - this is not causing any issues or negative side effects  Is active and doing well overall  Will call when the injection wears off and she would like it repeated      Pain scores:  Pain intensity on average is 3 on a scale of 0-10.     Current pain treatments:   Duloxetine 60 mg daily        Side Effects: denies any problems    Medications:  Current Outpatient Medications   Medication Sig Dispense Refill    acetaminophen (TYLENOL) 500 MG tablet Take 2 Tabs Every 8 Hours as needed for pain 450 tablet 1    albuterol (PROAIR HFA/PROVENTIL HFA/VENTOLIN HFA) 108 (90 Base) MCG/ACT inhaler Inhale 2 puffs into the lungs every 4 hours as needed for shortness of breath / dyspnea or wheezing      amLODIPine (NORVASC) 5 MG tablet Take 5 mg by mouth At Bedtime       diclofenac (VOLTAREN) 1 % topical gel Apply 1-2 g topically 4 times daily 100 g 1    DULoxetine (CYMBALTA) 60 MG capsule Take 1 capsule (60 mg) by mouth daily 90 capsule 0    estradiol (ESTRACE) 0.1 MG/GM cream Place 2 g vaginally At Bedtime       Fexofenadine HCl (ALLEGRA PO) Take 180 mg by mouth every morning       fluticasone (FLONASE) 50 MCG/ACT spray Spray 1 spray into both nostrils 2 times daily       fluticasone-salmeterol (ADVAIR) 500-50 MCG/DOSE inhaler Inhale 1 puff into the lungs every 12 hours      gabapentin (NEURONTIN) 100 MG capsule Take 1-2 caps Four times per day as needed for pain 450 capsule 1    gabapentin (NEURONTIN) 400 MG capsule 300 mg      levothyroxine  (SYNTHROID) 125 MCG tablet Take 1 tablet (125 mcg) by mouth every morning      lisinopril-hydrochlorothiazide (ZESTORETIC) 20-12.5 MG tablet Take 2 tablets by mouth daily      Montelukast Sodium (SINGULAIR PO) Take 10 mg by mouth At Bedtime       multivitamin w/minerals (THERA-VIT-M) tablet Take 2 tablets by mouth 2 times daily      Omega-3 Fatty Acids (FISH OIL) 500 MG CAPS Take 2 capsules by mouth daily         Medical History: any changes in medical history since they were last seen? No    Review of Systems:  The 14 system ROS was reviewed from the intake questionnaire, and is positive for: back pain  Any bowel or bladder problems: sosa  Mood: stable    Physical Exam: Virtual Visit  There were no vitals taken for this visit.  General: AAOx3, NAD      Assessment:   Piriformis Muscle Pain, Right side  Lumbosacral Radiculopathy  Osteoarthritis of Multiple joitns    Aleena Ontiveros is a 63 year old female who is seen at the pain clinic for follow up to discuss the results of her previous injection, which did provide significant relief.  She is also taking duloxetine for pain and is due for refill.      Plan:  Physical Therapy:  continue daily HEPs  Clinical Health Psychologist to address issues of relaxation, behavioral change, coping style, and other factors important to improvement.  Not indicated  Diagnostic Studies:  none  Medication Management:  Refill Duloxetine provided  Further procedures recommended: Call when ready to schedule repeat Piriformis Muscle injection on right side  Recommendations to PCP: none  Follow up: 6 months or sooner if needed    Ana Luna MD    Pain Medicine  Department of Anesthesiology  [facility]        \Answers submitted by the patient for this visit:  Symptoms you have experienced in the last 30 days (Submitted on 9/14/2023)  General Symptoms: No  Skin Symptoms: No  HENT Symptoms: No  EYE SYMPTOMS: No  HEART SYMPTOMS: No  LUNG SYMPTOMS:  Yes  INTESTINAL SYMPTOMS: No  URINARY SYMPTOMS: No  GYNECOLOGIC SYMPTOMS: No  BREAST SYMPTOMS: No  SKELETAL SYMPTOMS: Yes  BLOOD SYMPTOMS: No  NERVOUS SYSTEM SYMPTOMS: Yes  MENTAL HEALTH SYMPTOMS: No  Please answer the questions below to tell us what condition you are experiencing: (Submitted on 9/14/2023)  Cough: Yes  Sputum or phlegm: No  Coughing up blood: No  Difficulty breating or shortness of breath: Yes  Snoring: Yes  Wheezing: Yes  Difficulty breathing on exertion: Yes  Nighttime Cough: No  Difficulty breathing when lying flat: No  Please answer the questions below to tell us what condition you are experiencing: (Submitted on 9/14/2023)  Back pain: Yes  Muscle aches: Yes  Neck pain: No  Swollen joints: No  Joint pain: No  Bone pain: Yes  Muscle cramps: No  Muscle weakness: Yes  Joint stiffness: Yes  Bone fracture: No  Please answer the questions below to tell us what condition you are experiencing: (Submitted on 9/14/2023)  Trouble with coordination: No  Dizziness or trouble with balance: No  Fainting or black-out spells: No  Memory loss: No  Headache: No  Seizures: No  Speech problems: No  Tingling: Yes  Tremor: No  Weakness: Yes  Difficulty walking: Yes  Paralysis: No  Numbness: Yes

## 2023-09-14 NOTE — PROGRESS NOTES
Aleena is a 63 year old who is being evaluated via a billable video visit.      How would you like to obtain your AVS? MyChart  If the video visit is dropped, the invitation should be resent by: Text to cell phone: [phone]  Will anyone else be joining your video visit? No

## 2023-09-14 NOTE — NURSING NOTE
Patient presents with:  Follow Up: Right sided lower back pain      Mild Pain (3)     Pain Medications       Analgesics Other Refills Start End     acetaminophen (TYLENOL) 500 MG tablet    1 7/9/2021     Sig: Take 2 Tabs Every 8 Hours as needed for pain    Class: E-Prescribe            What medications are you using for pain? Gabapentin, tylenol,     (New patients only) Have you been seen by another pain clinic/ provider? no    (Return Patients only) What refills are you needing today? no

## 2023-10-10 ENCOUNTER — MEDICAL CORRESPONDENCE (OUTPATIENT)
Dept: HEALTH INFORMATION MANAGEMENT | Facility: CLINIC | Age: 63
End: 2023-10-10

## 2023-12-04 ENCOUNTER — TELEPHONE (OUTPATIENT)
Dept: ANESTHESIOLOGY | Facility: CLINIC | Age: 63
End: 2023-12-04
Payer: COMMERCIAL

## 2023-12-04 NOTE — TELEPHONE ENCOUNTER
[facility] Call Center    Phone Message    May a detailed message be left on voicemail: yes     Reason for Call: Appointment Request From: Aleena Ontiveros     With Provider: Ana Luna [[facility] [place]]     Preferred Date Range: Any     Preferred Times: Any Time     Reason for visit: Request an Appointment     Comments:  This would only be for a repeat piriformis injection. Not sure that it requires an appt too. Thx     Action Taken: Other: Routed to [facility]    Travel Screening: Not Applicable

## 2023-12-06 ENCOUNTER — VIRTUAL VISIT (OUTPATIENT)
Dept: ANESTHESIOLOGY | Facility: CLINIC | Age: 63
End: 2023-12-06
Payer: COMMERCIAL

## 2023-12-06 DIAGNOSIS — M54.18 RIGHT SACRAL RADICULOPATHY: ICD-10-CM

## 2023-12-06 DIAGNOSIS — G57.01 PIRIFORMIS SYNDROME OF RIGHT SIDE: ICD-10-CM

## 2023-12-06 DIAGNOSIS — M79.18 PIRIFORMIS MUSCLE PAIN: Primary | ICD-10-CM

## 2023-12-06 PROCEDURE — 99214 OFFICE O/P EST MOD 30 MIN: CPT | Mod: VID | Performed by: ANESTHESIOLOGY

## 2023-12-06 RX ORDER — DULOXETIN HYDROCHLORIDE 60 MG/1
60 CAPSULE, DELAYED RELEASE ORAL DAILY
Qty: 90 CAPSULE | Refills: 3 | Status: SHIPPED | OUTPATIENT
Start: 2023-12-06

## 2023-12-06 ASSESSMENT — PAIN SCALES - GENERAL: PAINLEVEL: SEVERE PAIN (7)

## 2023-12-06 NOTE — PATIENT INSTRUCTIONS
Medications:    DULoxetine (CYMBALTA) 60 MG capsule. Take 1 capsule (60 mg) by mouth daily     *Please provide the clinic with a minium of 1 week notice, on all prescription refills.       Referrals:    Physical Therapy Referral placed-   If you have not heard from the scheduling office within 2 business days, please call [phone] for all locations       Procedures:    Call to schedule your procedure: [phone] option #2    Piriformis Muscle Injection, Fluoroscopy Guided     Your pre-procedure instructions are below, please call our clinic if you have any questions.      Recommended Follow up:       Follow up 6 weeks after procedure.      Please call [phone] to schedule your clinic appointment if you don't already have an appointment scheduled.        To speak with a nurse, schedule/reschedule/cancel a clinic appointment, or request a medication refill call: [phone]    You can also reach us by [employer]: [email or web address]     Procedure Information:     Please call [phone] option #2 to schedule, reschedule, or cancel your procedure appointment.   Phones are answered Monday - Friday from 08:00 - 4:30pm.  Leave a voicemail with your name, birth date, and phone number if no one is available to take your call.        You no longer need to test for COVID- 19 prior to your procedure/surgery, unless your physician specifically requests that you test. If you experience COVID symptoms or have tested positive for COVID-19 within 14 days of your scheduled surgery or procedure, please update our office right away and your procedure may have to be postponed.       The procedure center staff will call you several days before the procedure to review important information that you will need to know for the day of the procedure.     Please contact the clinic if you have further questions about this information [phone].        Information related to Scheduling and Pre-Procedure  Instructions:    If you must reschedule your procedure more than two times, you must follow up in clinic before rescheduling again.    Preparing for your procedure    CAUTION - FAILURE TO FOLLOW THESE PRE-PROCEDURE INSTRUCTIONS WILL RESULT IN YOUR PROCEDURE BEING RESCHEDULED.    Your Procedure: Piriformis Muscle Injection, Fluoroscopy Guided             You must have a  take you home after your procedure. Transportation by taxi or para-transit is okay as long as you have a responsible adult accompany you. You must provide your 's full name and contact number at time of check in.     Fasting Protocol Please have nothing to eat or drink 1 hour prior to arrival.   Medications If you take any medications, DO NOT STOP. Take your medications as usual the day of your procedure with a sip of water AT LEAST 2 HOURS PRIOR TO ARRIVAL.    Antibiotics If you are currently taking antibiotics, you must complete the entire dose 7 days prior to your scheduled procedure. You must be clear of any signs or symptoms of infection. If you begin antibiotics, please contact our clinic for instructions.     Fever, Chills, or Rash If you experience a fever of higher than 100 degrees, chills, rash, or open wounds during the one week before your procedure, please call the clinic to see if you may proceed with your procedure.      Medication Hold List  **Patients under Cardiology/Neurology care should consult their provider prior to the pain procedure to verify pre-procedure medication instructions. The information below contains general guidelines.**      Blood Thinners If you are taking daily ASPIRIN, PLAVIX, OR OTHER BLOOD THINNERS SUCH AS COUMADIN/WARFARIN, we will need your prescribing doctor to sign a release permitting you to stop these medications. Once approved by your prescribing doctor - STOP ALL BLOOD THINNERS BASED ON THE TIME TABLE BELOW PRIOR TO YOUR PROCEDURE. If you have been instructed to stop  WARFARIN(COUMADIN), you must have an INR lab drawn the day before your procedure. Your INR must be within normal limits before we can perform your injection. MEDICATIONS CAN BE RESTARTED AFTER YOUR PROCEDURE.    24 HOUR HOLD  Lovenox (enoxaparin)  Agrylin (Anagrelide)    3 DAY HOLD  Xarelto (rivaroxaban)    5 DAY HOLD  Coumadin (Warfarin)  Brilinta (ticagrelor) 7 DAY HOLD  Anacin, Bufferin, Ecotrin, Excedrin, Aggrenox (Aspirin)  Pradexa (Dabigatran)  Elmiron (Pentosan)  Plavix (Clopidogrel Bisulfate)  Pletal (Cilostazol)    10 DAY HOLD  Effient (Prasugel)    14 DAY HOLD  Ticlid (ticlopidine)        Non-steroidal Anti-inflammatories (NSAIDs) DO NOT TAKE any non-steroidal anti-inflammatory medications (NSAIDs) listed on the table below. MEDICATIONS CAN BE RESTARTED AFTER YOUR PROCEDURE. Celebrex is OK to take and does not need to be discontinued.     Medications to stop:  1 DAY HOLD  Advil, Motrin (Ibuprofen)  Voltaren (Diclofenac)  Toradol (Ketorolac)    3 DAY HOLD  Arthrotec (diciofenac sodium/misoprostol)  Clinoril (Sulindac)  Indocin (Indomethacin)  Lodine (Etodolac)  Vicoprofen (Hydrocodone and Ibuprofen)  Apixaban (Eliquis)    4 DAY HOLD  Mobic (Meloxicam)  Naprosyn (Naproxen)   7 DAY HOLD  Aleve (Naproxen sodium)  Darvon compound (contains aspirin)  Norgesic Forte (contains aspirin)  Oruvall (Ketoprofen)  Percodan (contains aspirin)  Relafen (Nabumetone)  Salsalate  Trilisate  Vitamin E (more than 400 mg per day)  Any medication containing aspirin    14 DAY HOLD  Daypro (Oxaprozin)  Feldene (Piroxicam)            To speak with a nurse, schedule/reschedule/cancel a clinic appointment, or request a medication refill call: [phone]    You can also reach us by [employer] [facility]: [email or web address]

## 2023-12-06 NOTE — LETTER
12/6/2023       RE: Aleena Ontiveros  [address]  [facility] [address]       Dear Colleague,    Thank you for referring your patient, Aleena Ontiveros, to the [facility] CLINIC FOR COMPREHENSIVE PAIN MANAGEMENT [place] at [facility]. Please see a copy of my visit note below.    Aleena is a 63 year old who is being evaluated via a billable video visit.      How would you like to obtain your AVS? MyChart  If the video visit is dropped, the invitation should be resent by: Text to cell phone: [phone]  Will anyone else be joining your video visit? No        [facility]    Date of visit: 12/6/2023    Chief complaint:   Chief Complaint   Patient presents with    Follow Up     Follow-up Piriformis right side muscle pain       Interval history:  Aleena Ontiveros was last seen by me on 9/14/2023.      Recommendations/plan at the last visit included:  Physical Therapy:  continue daily HEPs  Clinical Health Psychologist to address issues of relaxation, behavioral change, coping style, and other factors important to improvement.  Not indicated  Diagnostic Studies:  none  Medication Management:  Refill Duloxetine provided  Further procedures recommended: Call when ready to schedule repeat Piriformis Muscle injection on right side  Recommendations to PCP: none  Follow up: 6 months or sooner if needed    Since her last visit, Aleena Ontiveros reports:    Last injection was in August 2023 and it was very helpful  Usually injections help for sometime  Feels like gabapentin and duloxetine are very helpful   Feels the pain is better than prior doing the injection    Pain scores:  Pain intensity on average is 7 on a scale of 0-10.     Current pain treatments:   Gabapentin 300 mg 5 times per day  Tylenol 325 mg 5 times per day  Duloxetine 60 mg, takes in the evening and able to sleep, will consider trying to take it in the morning to see if that makes a difference  with her pain     Past pain treatments:      Side Effects: denies any problems    Medications:  Current Outpatient Medications   Medication Sig Dispense Refill    acetaminophen (TYLENOL) 500 MG tablet Take 2 Tabs Every 8 Hours as needed for pain 450 tablet 1    albuterol (PROAIR HFA/PROVENTIL HFA/VENTOLIN HFA) 108 (90 Base) MCG/ACT inhaler Inhale 2 puffs into the lungs every 4 hours as needed for shortness of breath / dyspnea or wheezing      amLODIPine (NORVASC) 5 MG tablet Take 5 mg by mouth At Bedtime       diclofenac (VOLTAREN) 1 % topical gel Apply 1-2 g topically 4 times daily 100 g 1    DULoxetine (CYMBALTA) 60 MG capsule Take 1 capsule (60 mg) by mouth daily 90 capsule 1    estradiol (ESTRACE) 0.1 MG/GM cream Place 2 g vaginally At Bedtime       Fexofenadine HCl (ALLEGRA PO) Take 180 mg by mouth every morning       fluticasone (FLONASE) 50 MCG/ACT spray Spray 1 spray into both nostrils 2 times daily       fluticasone-salmeterol (ADVAIR) 500-50 MCG/DOSE inhaler Inhale 1 puff into the lungs every 12 hours      gabapentin (NEURONTIN) 100 MG capsule Take 1-2 caps Four times per day as needed for pain 450 capsule 1    levothyroxine (SYNTHROID) 125 MCG tablet Take 1 tablet (125 mcg) by mouth every morning      lisinopril-hydrochlorothiazide (ZESTORETIC) 20-12.5 MG tablet Take 2 tablets by mouth daily      Montelukast Sodium (SINGULAIR PO) Take 10 mg by mouth At Bedtime       multivitamin w/minerals (THERA-VIT-M) tablet Take 2 tablets by mouth 2 times daily      Omega-3 Fatty Acids (FISH OIL) 500 MG CAPS Take 2 capsules by mouth daily      gabapentin (NEURONTIN) 400 MG capsule 300 mg (Patient not taking: Reported on 12/6/2023)         Medical History: any changes in medical history since they were last seen? No    Review of Systems:  The 14 system ROS was reviewed from the intake questionnaire, and is positive for: back pain, muscle pain  Any bowel or bladder problems: denies  Mood: stable    Physical Exam: Virtual  Visit  There were no vitals taken for this visit.  General: AAOx3, NAD      Assessment:   Piriformis Muscle Pain  Piriformis syndrome    Aleena Ontiveros is a 63 year old female who is seen at the pain clinic for follow up to discuss when to repeat her next right sided piriformis muscle injection. She states the injection is very helpful and is noting the effects are beginning to wear off. She has done PT in the past, but her former therapist left and she would like to resume PT with a new therapist.     Plan:  Physical Therapy:  Referral placed  Clinical Health Psychologist to address issues of relaxation, behavioral change, coping style, and other factors important to improvement.  none  Diagnostic Studies:  none  Medication Management:  refill of Duloxetine provided  Further procedures recommended: Right Piriformis Muscle Injection  Recommendations to PCP: none  Follow up: 6 weeks after procedure          Again, thank you for allowing me to participate in the care of your patient.      Sincerely,    Ana Luna MD

## 2023-12-06 NOTE — PROGRESS NOTES
Aleena is a 63 year old who is being evaluated via a billable video visit.      How would you like to obtain your AVS? [employer]  If the video visit is dropped, the invitation should be resent by: Text to cell phone: [phone]  Will anyone else be joining your video visit? No        Video-Visit Details    Type of service:  Video Visit     Originating Location (pt. Location): Home    Distant Location (provider location):  On-site  Platform used for Video Visit: Vibes

## 2023-12-06 NOTE — PROGRESS NOTES
[facility]    Date of visit: 12/6/2023    Chief complaint:   Chief Complaint   Patient presents with    Follow Up     Follow-up Piriformis right side muscle pain       Interval history:  Aleena Ontiveros was last seen by me on 9/14/2023.      Recommendations/plan at the last visit included:  Physical Therapy:  continue daily HEPs  Clinical Health Psychologist to address issues of relaxation, behavioral change, coping style, and other factors important to improvement.  Not indicated  Diagnostic Studies:  none  Medication Management:  Refill Duloxetine provided  Further procedures recommended: Call when ready to schedule repeat Piriformis Muscle injection on right side  Recommendations to PCP: none  Follow up: 6 months or sooner if needed    Since her last visit, Aleena Ontiveros reports:    Last injection was in August 2023 and it was very helpful  Usually injections help for sometime  Feels like gabapentin and duloxetine are very helpful   Feels the pain is better than prior doing the injection    Pain scores:  Pain intensity on average is 7 on a scale of 0-10.     Current pain treatments:   Gabapentin 300 mg 5 times per day  Tylenol 325 mg 5 times per day  Duloxetine 60 mg, takes in the evening and able to sleep, will consider trying to take it in the morning to see if that makes a difference with her pain     Past pain treatments:      Side Effects: denies any problems    Medications:  Current Outpatient Medications   Medication Sig Dispense Refill    acetaminophen (TYLENOL) 500 MG tablet Take 2 Tabs Every 8 Hours as needed for pain 450 tablet 1    albuterol (PROAIR HFA/PROVENTIL HFA/VENTOLIN HFA) 108 (90 Base) MCG/ACT inhaler Inhale 2 puffs into the lungs every 4 hours as needed for shortness of breath / dyspnea or wheezing      amLODIPine (NORVASC) 5 MG tablet Take 5 mg by mouth At Bedtime       diclofenac (VOLTAREN) 1 % topical gel Apply 1-2 g topically 4 times daily 100 g 1    DULoxetine (CYMBALTA)  60 MG capsule Take 1 capsule (60 mg) by mouth daily 90 capsule 1    estradiol (ESTRACE) 0.1 MG/GM cream Place 2 g vaginally At Bedtime       Fexofenadine HCl (ALLEGRA PO) Take 180 mg by mouth every morning       fluticasone (FLONASE) 50 MCG/ACT spray Spray 1 spray into both nostrils 2 times daily       fluticasone-salmeterol (ADVAIR) 500-50 MCG/DOSE inhaler Inhale 1 puff into the lungs every 12 hours      gabapentin (NEURONTIN) 100 MG capsule Take 1-2 caps Four times per day as needed for pain 450 capsule 1    levothyroxine (SYNTHROID) 125 MCG tablet Take 1 tablet (125 mcg) by mouth every morning      lisinopril-hydrochlorothiazide (ZESTORETIC) 20-12.5 MG tablet Take 2 tablets by mouth daily      Montelukast Sodium (SINGULAIR PO) Take 10 mg by mouth At Bedtime       multivitamin w/minerals (THERA-VIT-M) tablet Take 2 tablets by mouth 2 times daily      Omega-3 Fatty Acids (FISH OIL) 500 MG CAPS Take 2 capsules by mouth daily      gabapentin (NEURONTIN) 400 MG capsule 300 mg (Patient not taking: Reported on 12/6/2023)         Medical History: any changes in medical history since they were last seen? No    Review of Systems:  The 14 system ROS was reviewed from the intake questionnaire, and is positive for: back pain, muscle pain  Any bowel or bladder problems: denies  Mood: stable    Physical Exam: Virtual Visit  There were no vitals taken for this visit.  General: AAOx3, NAD      Assessment:   Piriformis Muscle Pain  Piriformis syndrome    Aleena Ontiveros is a 63 year old female who is seen at the pain clinic for follow up to discuss when to repeat her next right sided piriformis muscle injection. She states the injection is very helpful and is noting the effects are beginning to wear off. She has done PT in the past, but her former therapist left and she would like to resume PT with a new therapist.     Plan:  Physical Therapy:  Referral placed  Clinical Health Psychologist to address issues of relaxation, behavioral  change, coping style, and other factors important to improvement.  none  Diagnostic Studies:  none  Medication Management:  refill of Duloxetine provided  Further procedures recommended: Right Piriformis Muscle Injection  Recommendations to PCP: none  Follow up: 6 weeks after procedure    Ana Luna MD    Pain Medicine  Department of Anesthesiology  [facility]

## 2023-12-06 NOTE — NURSING NOTE
Patient presents with:  Follow Up: Follow-up Piriformis right side muscle pain      Severe Pain (7)     Pain Medications       Analgesics Other Refills Start End     acetaminophen (TYLENOL) 500 MG tablet    1 7/9/2021     Sig: Take 2 Tabs Every 8 Hours as needed for pain    Class: E-Prescribe            What medications are you using for pain? Tylenol, duloxetine, Gabapentin    (New patients only) Have you been seen by another pain clinic/ provider? no    (Return Patients only) What refills are you needing today? no

## 2023-12-07 ASSESSMENT — ACTIVITIES OF DAILY LIVING (ADL)
KNEE_ACTIVITY_OF_DAILY_LIVING_SUM: 37
GOING DOWN 1 FLIGHT OF STAIRS: MODERATE DIFFICULTY
STEPPING UP AND DOWN CURBS: SLIGHT DIFFICULTY
GETTING_INTO_AND_OUT_OF_A_BATHTUB: SLIGHT DIFFICULTY
SWELLING: THE SYMPTOM AFFECTS MY ACTIVITY MODERATELY
GO UP STAIRS: ACTIVITY IS SOMEWHAT DIFFICULT
PUTTING ON SOCKS AND SHOES: MODERATE DIFFICULTY
LIGHT_TO_MODERATE_WORK: SLIGHT DIFFICULTY
KNEEL ON THE FRONT OF YOUR KNEE: ACTIVITY IS VERY DIFFICULT
RECREATIONAL ACTIVITIES: EXTREME DIFFICULTY
HOW_WOULD_YOU_RATE_THE_CURRENT_FUNCTION_OF_YOUR_KNEE_DURING_YOUR_USUAL_DAILY_ACTIVITIES_ON_A_SCALE_FROM_0_TO_100_WITH_100_BEING_YOUR_LEVEL_OF_KNEE_FUNCTION_PRIOR_TO_YOUR_INJURY_AND_0_BEING_THE_INABILITY_TO_PERFORM_ANY_OF_YOUR_USUAL_DAILY_ACTIVITIES?: 50
WEAKNESS: THE SYMPTOM AFFECTS MY ACTIVITY MODERATELY
GOING UP 1 FLIGHT OF STAIRS: MODERATE DIFFICULTY
WALKING_APPROXIMATELY_10_MINUTES: EXTREME DIFFICULTY
HOS_ADL_ITEM_SCORE_TOTAL: 34
WALKING_15_MINUTES_OR_GREATER: UNABLE TO DO
LIMPING: THE SYMPTOM AFFECTS MY ACTIVITY SEVERELY
TWISTING/PIVOTING ON INVOLVED LEG: MODERATE DIFFICULTY
SIT WITH YOUR KNEE BENT: ACTIVITY IS NOT DIFFICULT
WALK: ACTIVITY IS FAIRLY DIFFICULT
HOW_WOULD_YOU_RATE_THE_OVERALL_FUNCTION_OF_YOUR_KNEE_DURING_YOUR_USUAL_DAILY_ACTIVITIES?: ABNORMAL
HEAVY_WORK: EXTREME DIFFICULTY
GIVING WAY, BUCKLING OR SHIFTING OF KNEE: THE SYMPTOM AFFECTS MY ACTIVITY SLIGHTLY
WALKING_DOWN_STEEP_HILLS: UNABLE TO DO
GO DOWN STAIRS: ACTIVITY IS FAIRLY DIFFICULT
HOS_ADL_SCORE(%): 50
ROLLING OVER IN BED: SLIGHT DIFFICULTY
AS_A_RESULT_OF_YOUR_KNEE_INJURY,_HOW_WOULD_YOU_RATE_YOUR_CURRENT_LEVEL_OF_DAILY_ACTIVITY?: ABNORMAL
STIFFNESS: THE SYMPTOM AFFECTS MY ACTIVITY SLIGHTLY
STANDING FOR 15 MINUTES: SLIGHT DIFFICULTY
STAND: ACTIVITY IS MINIMALLY DIFFICULT
KNEE_ACTIVITY_OF_DAILY_LIVING_SCORE: 56.93
WALKING_UP_STEEP_HILLS: UNABLE TO DO
WALKING_INITIALLY: SLIGHT DIFFICULTY
HOW_WOULD_YOU_RATE_YOUR_CURRENT_LEVEL_OF_FUNCTION_DURING_YOUR_USUAL_ACTIVITIES_OF_DAILY_LIVING_FROM_0_TO_100_WITH_100_BEING_YOUR_LEVEL_OF_FUNCTION_PRIOR_TO_YOUR_HIP_PROBLEM_AND_0_BEING_THE_INABILITY_TO_PERFORM_ANY_OF_YOUR_USUAL_DAILY_ACTIVITIES?: 25
SITTING FOR 15 MINUTES: SLIGHT DIFFICULTY
GETTING INTO AND OUT OF AN AVERAGE CAR: SLIGHT DIFFICULTY
RAW_SCORE: 39.85
RISE FROM A CHAIR: ACTIVITY IS MINIMALLY DIFFICULT
HOS_ADL_HIGHEST_POTENTIAL_SCORE: 68
DEEP SQUATTING: NO DIFFICULTY AT ALL
SQUAT: ACTIVITY IS NOT DIFFICULT

## 2023-12-08 ENCOUNTER — THERAPY VISIT (OUTPATIENT)
Dept: PHYSICAL THERAPY | Facility: CLINIC | Age: 63
End: 2023-12-08
Attending: ANESTHESIOLOGY
Payer: COMMERCIAL

## 2023-12-08 ENCOUNTER — TELEPHONE (OUTPATIENT)
Dept: PALLIATIVE MEDICINE | Facility: CLINIC | Age: 63
End: 2023-12-08

## 2023-12-08 DIAGNOSIS — M79.18 PIRIFORMIS MUSCLE PAIN: ICD-10-CM

## 2023-12-08 DIAGNOSIS — G57.01 PIRIFORMIS SYNDROME OF RIGHT SIDE: ICD-10-CM

## 2023-12-08 DIAGNOSIS — M54.41 RIGHT-SIDED LOW BACK PAIN WITH RIGHT-SIDED SCIATICA: Primary | ICD-10-CM

## 2023-12-08 PROCEDURE — 97110 THERAPEUTIC EXERCISES: CPT | Mod: GP | Performed by: PHYSICAL THERAPIST

## 2023-12-08 PROCEDURE — 97161 PT EVAL LOW COMPLEX 20 MIN: CPT | Mod: GP | Performed by: PHYSICAL THERAPIST

## 2023-12-08 NOTE — PROGRESS NOTES
PHYSICAL THERAPY EVALUATION  Type of Visit: Evaluation    Patient presents to PT with referral for treatment with diagnosis of piriformis syndrome.  She has extensive spine surgery history as follows.  Spine Surgical Hx:  1974 - PISF T4-L4 with Carpio gavino instrumentation x 2 (Dr. Bandar Rose, [place]) for AIS.  10/01/2019 - 3-column osteotomy L5-S1 with sacral dome osteotomy; TLIF with Cunningham laminectomy L5-S1; bilateral PISF L2-pelvis using bilateral double S2AI screws; use of Infuse BMP Large kit (Christina/Rashid/Mara) for Gr 4 isthmic spondylolisthesis L5-S1 with severe sagittal malalignment.  [Implants:  Medtronic Solera and Ballast screw systems, with dual headed screws, reductions screws; 5.5 mm CoCr rods x 3].  12/21/2020 - Rev PISF L2-pelvis with partial removal of old Carpio gavino instrumentation; rev decomp of R S1 nerve root via R sacral dome osteoplasty; use of Magnifuse allograft; repair of dural tear R L4-5 (Christina/Mara) for persistent R S1 radiculopathy; pseudarthrosis L4-5.  [Implants: Medtronic Solera and Ballast].    Currently she complains of right low back pain with radiation to right buttock and posterior thigh, and occasionally to distal LE.  She reports numbness at bilateral feet, right > left.  Her pain is  worse during and after sitting; and better with moving around although walking is limited to 10 min due to pain.  Initial findings include weakness right ankle plantarflexion, right EHL weakness, and poor abdominal recruitment.  Subjective       Presenting condition or subjective complaint: Back, hip, knee, feet weakness numbness and pain  Date of onset: 12/06/23 (MD referral date)    Relevant medical history: Arthritis; Asthma; Cold or hot arm or leg; Foot drop; High blood pressure; Osteoarthritis; Overweight; Significant weakness; Sleep disorder like apnea   Dates & types of surgery: 2021 back surgery revision, 2019 back surgery replace fusions etc, 4470-5176 TKRs,  hysterectomy, thyroidectomy, fusion etc on fingers, tonsillectomy, cryo ablation, two C sections, ovarian cyst removals,    Prior diagnostic imaging/testing results: MRI; CT scan; X-ray; EMG     Prior therapy history for the same diagnosis, illness or injury: [id number] then different offices as therapist moved.    Prior Level of Function  Transfers:   Ambulation:   ADL:   IADL:     Living Environment  Social support: With a significant other or spouse   Type of home: House   Stairs to enter the home: Yes 5 Is there a railing: Yes   Ramp: No   Stairs inside the home: Yes 12 Is there a railing: Yes   Help at home: Home and Yard maintenance tasks  Equipment owned: Straight Cane; Walker with wheels; Crutches     Employment: Yes Theatre   Hobbies/Interests: [employer] skiing anything outdoors    Patient goals for therapy: Be physically active! Be strong!    Pain assessment:      Objective   LUMBAR SPINE EVALUATION  PAIN: pain up to 6/10 with sitting, after sitting and walking > 10 min.  Located at right LB, buttock > distal LE.  INTEGUMENTARY (edema, incisions): WNL  POSTURE:   GAIT:   Weightbearing Status: WBAT  Assistive Device(s): None  Gait Deviations:  antalgic, decreased pushoff right.  Varus bilateral knees  BALANCE/PROPRIOCEPTION:   WEIGHTBEARING ALIGNMENT:  bilateral knee varus  NON-WEIGHTBEARING ALIGNMENT:    ROM:  loss of spinal ROM in all planes to history Harrinton gavino surgery  PELVIC/SI SCREEN:   STRENGTH:     MYOTOMES:  unable to to walk on right.  Unable to actively extend left great toe.  Resisted hip flexion, extension and ankle DF are strong bilat  DTR S:    Left Right   C5 (Biceps)     C6 (Brachioradialis)     C7 (Triceps)     L4 (Quad) 3 3   S1 (Achilles) 0 0     CORD SIGNS:   DERMATOMES:                                                              FLEXIBILITY:   LUMBAR/HIP Special Tests:  mild loss of right hip IR vs left without  symptom production    PELVIS/SI SPECIAL TESTS:   FUNCTIONAL TESTS:   PALPATION:   SPINAL SEGMENTAL CONCLUSIONS:       Assessment & Plan   CLINICAL IMPRESSIONS  Medical Diagnosis: right piriformis syndrome    Treatment Diagnosis: right LBP with radiationg to buttock and post thigh and occasiaonlly distal LE   Impression/Assessment: Patient is a 63 year old female with low back and right LE complaints.  The following significant findings have been identified: Pain, Decreased ROM/flexibility, Decreased strength, Impaired muscle performance, and Decreased activity tolerance. These impairments interfere with their ability to perform self care tasks, work tasks, recreational activities, household chores, driving , household mobility, and community mobility as compared to previous level of function.     Clinical Decision Making (Complexity):  Clinical Presentation: Stable/Uncomplicated  Clinical Presentation Rationale: based on medical and personal factors listed in PT evaluation  Clinical Decision Making (Complexity): Low complexity    PLAN OF CARE  Treatment Interventions:  Interventions: Manual Therapy, Neuromuscular Re-education, Therapeutic Activity, Therapeutic Exercise    Long Term Goals     PT Goal 1  Goal Identifier: ambulation  Goal Description: walk 30 min with 0-2/10 pain  Rationale: to maximize safety and independence within the community  Goal Progress: currently can amb 10 min with 6/10 pain  Target Date: 03/01/24  PT Goal 2  Goal Identifier: sitting  Goal Description: sit 1 hour with 2/10 pain  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   Patient/Family/caregiver agrees with Plan of Care.     Evaluation Time:     PT Rubyal, Low Complexity Minutes (17824): 20       Signing Clinician: Elaina Cormier PT

## 2023-12-12 ENCOUNTER — TELEPHONE (OUTPATIENT)
Dept: PALLIATIVE MEDICINE | Facility: CLINIC | Age: 63
End: 2023-12-12
Payer: COMMERCIAL

## 2023-12-12 PROBLEM — G57.01 PIRIFORMIS SYNDROME OF RIGHT SIDE: Status: ACTIVE | Noted: 2023-12-06

## 2023-12-12 NOTE — TELEPHONE ENCOUNTER
RN reviewed patient chart. Pre procedure instructions were reviewed with patient.    Letty Camacho RNCC

## 2023-12-12 NOTE — TELEPHONE ENCOUNTER
Patient is scheduled for surgery with Dr. Luna    Spoke with: patient    Date of Surgery: 12/21/23    Location: [facility]    Informed patient they will need an adult  yes    Additional comments: Patient is aware of date and time of the procedure.        Danni Ruiz MA on 12/12/2023 at 11:14 AM

## 2023-12-13 ENCOUNTER — THERAPY VISIT (OUTPATIENT)
Dept: PHYSICAL THERAPY | Facility: CLINIC | Age: 63
End: 2023-12-13
Attending: ANESTHESIOLOGY
Payer: COMMERCIAL

## 2023-12-13 DIAGNOSIS — M54.41 RIGHT-SIDED LOW BACK PAIN WITH RIGHT-SIDED SCIATICA: Primary | ICD-10-CM

## 2023-12-13 PROCEDURE — 97110 THERAPEUTIC EXERCISES: CPT | Mod: GP | Performed by: PHYSICAL THERAPIST

## 2023-12-13 PROCEDURE — 97140 MANUAL THERAPY 1/> REGIONS: CPT | Mod: GP | Performed by: PHYSICAL THERAPIST

## 2023-12-18 ENCOUNTER — THERAPY VISIT (OUTPATIENT)
Dept: PHYSICAL THERAPY | Facility: CLINIC | Age: 63
End: 2023-12-18
Payer: COMMERCIAL

## 2023-12-18 DIAGNOSIS — M54.41 RIGHT-SIDED LOW BACK PAIN WITH RIGHT-SIDED SCIATICA: Primary | ICD-10-CM

## 2023-12-18 PROCEDURE — 97110 THERAPEUTIC EXERCISES: CPT | Mod: GP | Performed by: PHYSICAL THERAPIST

## 2023-12-18 PROCEDURE — 97140 MANUAL THERAPY 1/> REGIONS: CPT | Mod: GP | Performed by: PHYSICAL THERAPIST

## 2023-12-20 NOTE — PROGRESS NOTES
From: Bev Hernandez  To: Leigh Ann Goldman  Sent: 12/19/2023 7:47 PM CST  Subject: Wegovy    Dr Goldman,  I have been on the 1.0 of Wegovy. I've had 2 doses and I have had some issues with this new dosage. I gave myself a shot last Monday. I was nauseous and couldn't eat for 2 solid days. I also still had horrible heartburn. I saw Sammie Avalos on Friday. She was concerned and said I should email you. I am truly afraid to take another dosage. Can I possibly go back down a dosage. I know I see you in early January, but I thought I should let you know now.   Thank you,  Bev Hernandez    POD # 1  S/P Left TKA    Patient awake in bed.  Pain controlled.  Tolerating oral intake.  No events overnight.     Alert and orient to person, place, and time.  Vital Sign Ranges  Temperature Temp  Av.1  F (36.2  C)  Min: 95.9  F (35.5  C)  Max: 98.1  F (36.7  C)   Blood pressure Systolic (24hrs), Av , Min:97 , Max:148        Diastolic (24hrs), Av, Min:60, Max:96      Pulse No Data Recorded   Respirations Resp  Avg: 15.6  Min: 12  Max: 23   Pulse oximetry SpO2  Av.2 %  Min: 95 %  Max: 100 %       Left knee dressing is clean, dry, and intact.  Bilateral calves are soft, non-tender.  Left lower extremity is NVI.    Hgb Pending    A/P  1. S/P Left TKA   Continue Lovenox for DVT prophylaxis.  Finish antibiotics today.  Mobilize with PT/OT WBAT.  Continue current pain regiment.    2. Disposition   Anticipate d/c to TCU, likely Friday.    Kath White PA-C  [phone]    CAW  [phone]

## 2023-12-21 ENCOUNTER — ANCILLARY PROCEDURE (OUTPATIENT)
Dept: RADIOLOGY | Facility: AMBULATORY SURGERY CENTER | Age: 63
End: 2023-12-21
Attending: ANESTHESIOLOGY
Payer: COMMERCIAL

## 2023-12-21 ENCOUNTER — HOSPITAL ENCOUNTER (OUTPATIENT)
Facility: AMBULATORY SURGERY CENTER | Age: 63
Discharge: HOME | End: 2023-12-21
Attending: ANESTHESIOLOGY | Admitting: ANESTHESIOLOGY
Payer: COMMERCIAL

## 2023-12-21 VITALS
WEIGHT: 170 LBS | HEART RATE: 88 BPM | HEIGHT: 62 IN | OXYGEN SATURATION: 95 % | BODY MASS INDEX: 31.28 KG/M2 | TEMPERATURE: 97.9 F | SYSTOLIC BLOOD PRESSURE: 135 MMHG | RESPIRATION RATE: 15 BRPM | DIASTOLIC BLOOD PRESSURE: 68 MMHG

## 2023-12-21 DIAGNOSIS — G57.01 PIRIFORMIS SYNDROME OF RIGHT SIDE: ICD-10-CM

## 2023-12-21 PROCEDURE — 20552 NJX 1/MLT TRIGGER POINT 1/2: CPT

## 2023-12-21 RX ORDER — LIDOCAINE HYDROCHLORIDE 10 MG/ML
INJECTION, SOLUTION EPIDURAL; INFILTRATION; INTRACAUDAL; PERINEURAL DAILY PRN
Status: DISCONTINUED | OUTPATIENT
Start: 2023-12-21 | End: 2023-12-21 | Stop reason: HOSPADM

## 2023-12-21 RX ORDER — TRIAMCINOLONE ACETONIDE 40 MG/ML
INJECTION, SUSPENSION INTRA-ARTICULAR; INTRAMUSCULAR DAILY PRN
Status: DISCONTINUED | OUTPATIENT
Start: 2023-12-21 | End: 2023-12-21 | Stop reason: HOSPADM

## 2023-12-21 RX ORDER — BUPIVACAINE HYDROCHLORIDE 2.5 MG/ML
INJECTION, SOLUTION EPIDURAL; INFILTRATION; INTRACAUDAL DAILY PRN
Status: DISCONTINUED | OUTPATIENT
Start: 2023-12-21 | End: 2023-12-21 | Stop reason: HOSPADM

## 2023-12-21 NOTE — DISCHARGE INSTRUCTIONS
Home Care Instructions after a Piriformis Muscle Injection  The piriformis muscle is a small muscle located deep in the buttock (behind the gluteus prateek).     Activity  -You may resume most normal activity levels with the exception of strenuous activity. It is important for us to know if your pain with normal activity is relieved after this injection.  -DO NOT shower for 24 hours  -DO NOT remove bandaid for 24 hours    Pain  -You may experience soreness at the injection site for one or two days  -You may use an ice pack for 20 minutes every 2 hours for the first 24 hours  -You may use a heating pad after the first 24 hours  -You may use Tylenol (acetaminophen) every 4 hours or other pain medicines as     directed by your physician    You may experience numbness radiating into your legs or arms (depending on the procedure location). This numbness may last several hours. Until sensation returns to normal; please use caution in walking, climbing stairs, and stepping out of your vehicle, etc.    DID YOU RECEIVE STEROIDS TODAY?  Yes    Common side effects of steroids:  Not everyone will experience corticosteroid side effects. If side effects are experienced, they will gradually subside in the 7-10 day period following an injection. Most common side effects include:  -Flushed face and/or chest  -Feeling of warmth, particularly in the face but could be an overall feeling of warmth  -Increased blood sugar in diabetic patients  -Menstrual irregularities my occur. If taking hormone-based birth control an alternate method of birth control is recommended  -Sleep disturbances and/or mood swings are possible  -Leg cramps      PLEASE KEEP TRACK OF YOUR SYMPTOMS AND NOTE YOUR IMPROVEMENT FOR YOUR DOCTOR.     Please contact us if you have:  -Severe pain  -Fever more than 101.5 degrees Fahrenheit  -Signs of infection at the injection site (redness, swelling, or drainage)    FOR PAIN CENTER PATIENTS:  If you have questions, please  contact the Pain Clinic at [phone] Option #1 between the hours of 7:00 am and 3:00 pm Monday through Friday. After office hours you can contact the on call provider by dialing [phone]. If you need immediate attention, we recommend that you go to a hospital emergency room or dial [phone].      FOR PM&R PATIENTS:  For patients seen by the PM and R service, please call [phone]. (Monday through Friday 8a-5p.  After business hours and weekends call [phone] and ask for the PM and R doctor on call). If you need to fax a pain diary to PM&R the fax number is [phone]. If you are unable to fax, uploading to [employer] is encouraged, then send to provider. If you have procedure scheduling questions please call [phone] Option #2.

## 2023-12-21 NOTE — H&P
ABBREVIATED H&P [facility]      Patient Name: Aleena Ontiveros   MRN: [id number]   YOB: 1960     1. Reason for Procedure:  Procedure Summary       Date: 12/21/23 [place] [facility] [place] / [facility]    Anesthesia Start:  Anesthesia Stop:     Procedure: Piriformis Muscle Injection, Fluoroscopy Guided (Right: Back) Diagnosis:       Piriformis syndrome of right side      (Piriformis syndrome of right side [G57.01])    Providers: Ana Luna MD Responsible Provider:     Anesthesia Type: Not recorded ASA Status: Not recorded            2. History:   Past Medical History:   Diagnosis Date    Anemia     Anxiety     Arthritis     osteoarthritis of both knee    Chronic osteoarthritis     Hypertension     Hypothyroidism     PONV (postoperative nausea and vomiting)     Seasonal allergic rhinitis     Uncomplicated asthma        Comorbidities: None    Any history of sleep apnea? No    Any history of problems with sedation? No    3. Physical:    General: Normal  Skin:  Normal.  Respiratory: Clear to auscultation bilateral, no wheezing  Cardio:  Regular rate and rhythm  Abdomen: Soft, nontender, nondistended, no palpable masses.  Musculoskeletal: Normal  Neuro: Sensory exam normal, motor exam 5/5, bilateral upper and lower extremities       4. Current Medications (if not in Epic):   Current Outpatient Medications   Medication Sig Dispense Refill    acetaminophen (TYLENOL) 500 MG tablet Take 2 Tabs Every 8 Hours as needed for pain 450 tablet 1    albuterol (PROAIR HFA/PROVENTIL HFA/VENTOLIN HFA) 108 (90 Base) MCG/ACT inhaler Inhale 2 puffs into the lungs every 4 hours as needed for shortness of breath / dyspnea or wheezing      amLODIPine (NORVASC) 5 MG tablet Take 5 mg by mouth At Bedtime       diclofenac (VOLTAREN) 1 % topical gel Apply 1-2 g topically 4 times daily 100 g 1    DULoxetine (CYMBALTA) 60 MG capsule Take 1  capsule (60 mg) by mouth daily 90 capsule 3    estradiol (ESTRACE) 0.1 MG/GM cream Place 2 g vaginally At Bedtime       Fexofenadine HCl (ALLEGRA PO) Take 180 mg by mouth every morning       fluticasone (FLONASE) 50 MCG/ACT spray Spray 1 spray into both nostrils 2 times daily       fluticasone-salmeterol (ADVAIR) 500-50 MCG/DOSE inhaler Inhale 1 puff into the lungs every 12 hours      gabapentin (NEURONTIN) 100 MG capsule Take 1-2 caps Four times per day as needed for pain 450 capsule 1    levothyroxine (SYNTHROID) 125 MCG tablet Take 1 tablet (125 mcg) by mouth every morning      lisinopril-hydrochlorothiazide (ZESTORETIC) 20-12.5 MG tablet Take 2 tablets by mouth daily      Montelukast Sodium (SINGULAIR PO) Take 10 mg by mouth At Bedtime       multivitamin w/minerals (THERA-VIT-M) tablet Take 2 tablets by mouth 2 times daily      Omega-3 Fatty Acids (FISH OIL) 500 MG CAPS Take 2 capsules by mouth daily          5. Allergies and Reactions:  is allergic to adhesive tape and erythromycin.

## 2023-12-21 NOTE — OP NOTE
Piriformis Injection    The patient's identity, the procedure to be performed and the specific site of the procedure was verified in accordance with [facility] Protocol.    Diagnosis: Piriformis muscle pain   Pre-Procedure Pain Score: 5 /10  Procedure Note: Informed consent was obtained (risks and benefits discussed and all questions answered).  The patient was positioned comfortably in the prone position. The patient was then prepped and draped in a sterile fashion.  There was no evidence of infection at the site of needle insertion.  Fluoroscopy was used to indentify the anatomy of the sacrum and pelvis. A 25 gauge spinal needle was then advanced under fluoroscopic guidance to contact the lateral border of the mid-sacrum. The needle was then advanced beyond the border of the sacrum into the piriformis muscle, position was confirmed with characteristic spread of contrast. Medication was then carefully injected after negative aspiration.The patient tolerated the procedure well and was discharged from the hospital 30 minutes later.    Side: right   Medication:  40mg Triamcinolone  3ml 0.25% Bupivicaine  Post-Procedure Pain Score: 1/10  Counseling: Greater than 50% of this patient visit was spent in counseling the patient regarding the treatment of their pain, coordinating their overall treatment plan and assessing their progress. The patient was given discharge instructions and verbalizes understanding, including understanding of those signs and symptoms that would require emergency care.

## 2024-01-03 ENCOUNTER — THERAPY VISIT (OUTPATIENT)
Dept: PHYSICAL THERAPY | Facility: CLINIC | Age: 64
End: 2024-01-03
Attending: ANESTHESIOLOGY
Payer: COMMERCIAL

## 2024-01-03 DIAGNOSIS — M54.41 RIGHT-SIDED LOW BACK PAIN WITH RIGHT-SIDED SCIATICA: Primary | ICD-10-CM

## 2024-01-03 PROCEDURE — 97140 MANUAL THERAPY 1/> REGIONS: CPT | Mod: GP | Performed by: PHYSICAL THERAPIST

## 2024-01-03 PROCEDURE — 97110 THERAPEUTIC EXERCISES: CPT | Mod: GP | Performed by: PHYSICAL THERAPIST

## 2024-01-12 ENCOUNTER — THERAPY VISIT (OUTPATIENT)
Dept: PHYSICAL THERAPY | Facility: CLINIC | Age: 64
End: 2024-01-12
Payer: COMMERCIAL

## 2024-01-12 DIAGNOSIS — M54.41 RIGHT-SIDED LOW BACK PAIN WITH RIGHT-SIDED SCIATICA: Primary | ICD-10-CM

## 2024-01-12 PROCEDURE — 97110 THERAPEUTIC EXERCISES: CPT | Mod: GP | Performed by: PHYSICAL THERAPIST

## 2024-01-12 PROCEDURE — 97140 MANUAL THERAPY 1/> REGIONS: CPT | Mod: GP | Performed by: PHYSICAL THERAPIST

## 2024-01-31 ENCOUNTER — THERAPY VISIT (OUTPATIENT)
Dept: PHYSICAL THERAPY | Facility: CLINIC | Age: 64
End: 2024-01-31
Payer: COMMERCIAL

## 2024-01-31 DIAGNOSIS — M54.41 RIGHT-SIDED LOW BACK PAIN WITH RIGHT-SIDED SCIATICA: Primary | ICD-10-CM

## 2024-01-31 PROCEDURE — 97110 THERAPEUTIC EXERCISES: CPT | Mod: GP | Performed by: PHYSICAL THERAPIST

## 2024-01-31 PROCEDURE — 97140 MANUAL THERAPY 1/> REGIONS: CPT | Mod: GP | Performed by: PHYSICAL THERAPIST

## 2024-02-07 ENCOUNTER — THERAPY VISIT (OUTPATIENT)
Dept: PHYSICAL THERAPY | Facility: CLINIC | Age: 64
End: 2024-02-07
Payer: COMMERCIAL

## 2024-02-07 DIAGNOSIS — M54.41 RIGHT-SIDED LOW BACK PAIN WITH RIGHT-SIDED SCIATICA: Primary | ICD-10-CM

## 2024-02-07 PROCEDURE — 97140 MANUAL THERAPY 1/> REGIONS: CPT | Mod: GP | Performed by: PHYSICAL THERAPIST

## 2024-02-07 PROCEDURE — 97110 THERAPEUTIC EXERCISES: CPT | Mod: GP | Performed by: PHYSICAL THERAPIST

## 2024-03-13 ENCOUNTER — THERAPY VISIT (OUTPATIENT)
Dept: PHYSICAL THERAPY | Facility: CLINIC | Age: 64
End: 2024-03-13
Payer: COMMERCIAL

## 2024-03-13 DIAGNOSIS — M54.41 RIGHT-SIDED LOW BACK PAIN WITH RIGHT-SIDED SCIATICA: Primary | ICD-10-CM

## 2024-03-13 PROCEDURE — 97110 THERAPEUTIC EXERCISES: CPT | Mod: GP | Performed by: PHYSICAL THERAPIST

## 2024-03-13 PROCEDURE — 97140 MANUAL THERAPY 1/> REGIONS: CPT | Mod: GP | Performed by: PHYSICAL THERAPIST

## 2024-03-17 ENCOUNTER — HEALTH MAINTENANCE LETTER (OUTPATIENT)
Age: 64
End: 2024-03-17

## 2024-04-03 ENCOUNTER — THERAPY VISIT (OUTPATIENT)
Dept: PHYSICAL THERAPY | Facility: CLINIC | Age: 64
End: 2024-04-03
Payer: COMMERCIAL

## 2024-04-03 DIAGNOSIS — M54.41 RIGHT-SIDED LOW BACK PAIN WITH RIGHT-SIDED SCIATICA: Primary | ICD-10-CM

## 2024-04-03 PROCEDURE — 97110 THERAPEUTIC EXERCISES: CPT | Mod: GP | Performed by: PHYSICAL THERAPIST

## 2024-04-03 PROCEDURE — 97140 MANUAL THERAPY 1/> REGIONS: CPT | Mod: GP | Performed by: PHYSICAL THERAPIST

## 2024-04-16 ENCOUNTER — LAB REQUISITION (OUTPATIENT)
Dept: LAB | Facility: CLINIC | Age: 64
End: 2024-04-16
Payer: COMMERCIAL

## 2024-04-16 DIAGNOSIS — R21 RASH AND OTHER NONSPECIFIC SKIN ERUPTION: ICD-10-CM

## 2024-04-16 LAB
BASOPHILS # BLD AUTO: 0.1 10E3/UL (ref 0–0.2)
BASOPHILS NFR BLD AUTO: 1 %
EOSINOPHIL # BLD AUTO: 0.5 10E3/UL (ref 0–0.7)
EOSINOPHIL NFR BLD AUTO: 7 %
ERYTHROCYTE [DISTWIDTH] IN BLOOD BY AUTOMATED COUNT: 12.8 % (ref 10–15)
HCT VFR BLD AUTO: 31.4 % (ref 35–47)
HGB BLD-MCNC: 10.2 G/DL (ref 11.7–15.7)
IMM GRANULOCYTES # BLD: 0 10E3/UL
IMM GRANULOCYTES NFR BLD: 0 %
LYMPHOCYTES # BLD AUTO: 1.3 10E3/UL (ref 0.8–5.3)
LYMPHOCYTES NFR BLD AUTO: 19 %
MCH RBC QN AUTO: 29.6 PG (ref 26.5–33)
MCHC RBC AUTO-ENTMCNC: 32.5 G/DL (ref 31.5–36.5)
MCV RBC AUTO: 91 FL (ref 78–100)
MONOCYTES # BLD AUTO: 0.4 10E3/UL (ref 0–1.3)
MONOCYTES NFR BLD AUTO: 6 %
NEUTROPHILS # BLD AUTO: 4.5 10E3/UL (ref 1.6–8.3)
NEUTROPHILS NFR BLD AUTO: 67 %
NRBC # BLD AUTO: 0 10E3/UL
NRBC BLD AUTO-RTO: 0 /100
PLATELET # BLD AUTO: 321 10E3/UL (ref 150–450)
RBC # BLD AUTO: 3.45 10E6/UL (ref 3.8–5.2)
WBC # BLD AUTO: 6.8 10E3/UL (ref 4–11)

## 2024-04-16 PROCEDURE — 86481 TB AG RESPONSE T-CELL SUSP: CPT | Mod: ORL | Performed by: DERMATOLOGY

## 2024-04-16 PROCEDURE — 85025 COMPLETE CBC W/AUTO DIFF WBC: CPT | Mod: ORL | Performed by: DERMATOLOGY

## 2024-04-16 PROCEDURE — 86060 ANTISTREPTOLYSIN O TITER: CPT | Mod: ORL | Performed by: DERMATOLOGY

## 2024-04-17 LAB
QUANTIFERON MITOGEN: 0.47 IU/ML
QUANTIFERON NIL TUBE: 0.05 IU/ML
QUANTIFERON TB1 TUBE: 0.05 IU/ML
QUANTIFERON TB2 TUBE: 0.05

## 2024-04-18 LAB
ASO AB SERPL-ACNC: <55 IU/ML
GAMMA INTERFERON BACKGROUND BLD IA-ACNC: 0.05 IU/ML
M TB IFN-G BLD-IMP: ABNORMAL
M TB IFN-G CD4+ BCKGRND COR BLD-ACNC: 0.42 IU/ML
MITOGEN IGNF BCKGRD COR BLD-ACNC: 0 IU/ML
MITOGEN IGNF BCKGRD COR BLD-ACNC: 0 IU/ML

## 2024-05-10 ENCOUNTER — THERAPY VISIT (OUTPATIENT)
Dept: PHYSICAL THERAPY | Facility: CLINIC | Age: 64
End: 2024-05-10
Payer: COMMERCIAL

## 2024-05-10 DIAGNOSIS — M54.41 RIGHT-SIDED LOW BACK PAIN WITH RIGHT-SIDED SCIATICA: Primary | ICD-10-CM

## 2024-05-10 PROCEDURE — 97140 MANUAL THERAPY 1/> REGIONS: CPT | Mod: GP | Performed by: PHYSICAL THERAPIST

## 2024-05-10 PROCEDURE — 97110 THERAPEUTIC EXERCISES: CPT | Mod: GP | Performed by: PHYSICAL THERAPIST

## 2024-06-17 PROBLEM — Z71.89 ADVANCED DIRECTIVES, COUNSELING/DISCUSSION: Status: RESOLVED | Noted: 2017-08-24 | Resolved: 2024-06-17

## 2024-06-19 ENCOUNTER — THERAPY VISIT (OUTPATIENT)
Dept: PHYSICAL THERAPY | Facility: CLINIC | Age: 64
End: 2024-06-19
Payer: COMMERCIAL

## 2024-06-19 DIAGNOSIS — M54.41 RIGHT-SIDED LOW BACK PAIN WITH RIGHT-SIDED SCIATICA: Primary | ICD-10-CM

## 2024-06-19 PROCEDURE — 97140 MANUAL THERAPY 1/> REGIONS: CPT | Mod: GP | Performed by: PHYSICAL THERAPIST

## 2024-06-19 PROCEDURE — 97110 THERAPEUTIC EXERCISES: CPT | Mod: GP | Performed by: PHYSICAL THERAPIST

## 2024-07-03 ENCOUNTER — MYC MEDICAL ADVICE (OUTPATIENT)
Dept: ANESTHESIOLOGY | Facility: CLINIC | Age: 64
End: 2024-07-03
Payer: COMMERCIAL

## 2024-07-05 ENCOUNTER — TELEPHONE (OUTPATIENT)
Dept: PALLIATIVE MEDICINE | Facility: CLINIC | Age: 64
End: 2024-07-05
Payer: COMMERCIAL

## 2024-07-05 DIAGNOSIS — M79.18 PIRIFORMIS MUSCLE PAIN: Primary | ICD-10-CM

## 2024-07-09 ENCOUNTER — TELEPHONE (OUTPATIENT)
Dept: PALLIATIVE MEDICINE | Facility: CLINIC | Age: 64
End: 2024-07-09
Payer: COMMERCIAL

## 2024-07-11 ENCOUNTER — TELEPHONE (OUTPATIENT)
Dept: PALLIATIVE MEDICINE | Facility: CLINIC | Age: 64
End: 2024-07-11
Payer: COMMERCIAL

## 2024-07-11 NOTE — TELEPHONE ENCOUNTER
Third attempt: Phoned the patient and left VM. Stated to call and schedule injection procedure with dr. Luna at [phone].

## 2024-07-29 ENCOUNTER — THERAPY VISIT (OUTPATIENT)
Dept: PHYSICAL THERAPY | Facility: CLINIC | Age: 64
End: 2024-07-29
Payer: COMMERCIAL

## 2024-07-29 DIAGNOSIS — M54.41 RIGHT-SIDED LOW BACK PAIN WITH RIGHT-SIDED SCIATICA: Primary | ICD-10-CM

## 2024-07-29 PROCEDURE — 97110 THERAPEUTIC EXERCISES: CPT | Mod: GP | Performed by: PHYSICAL THERAPIST

## 2024-07-29 PROCEDURE — 97140 MANUAL THERAPY 1/> REGIONS: CPT | Mod: GP | Performed by: PHYSICAL THERAPIST

## 2024-07-29 NOTE — PROGRESS NOTES
07/29/24 0500   Appointment Info   Signing clinician's name / credentials Elaina Cormier PT [facility]   Total/Authorized Visits E+T 8   Visits Used 12   Medical Diagnosis right piriformis syndrome   PT Tx Diagnosis right LBP with radiating to buttock and post thigh and occasionally distal LE   Other pertinent information s/p L5/S1 revision (hx of Carpio gavino with more recent SIJ fusion and lumbar decompression fusion --Carpio rods from distant past)  Hx B TKA   Progress Note/Certification   Onset of illness/injury or Date of Surgery 12/06/23  (MD referral date)   Therapy Frequency 1x/week to bi monthly   Predicted Duration 12 weeks   GOALS   PT Goals 2   PT Goal 1   Goal Identifier ambulation   Goal Description walk 30 min with 0-2/10 pain   Rationale to maximize safety and independence within the community   Goal Progress walking .25 mi painful at left lat hip now as well as PF region   Target Date 03/01/24   Date Met   (progressing toward)   PT Goal 2   Goal Identifier sitting   Goal Description sit 1 hour with 2/10 pain   Rationale   (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 Yesterday ant/lat right hip pain. Initial R PF/buttock pain is less intense but not gone.   Objective Measures   Objective Measures Objective Measure 1   Objective Measure 1   Objective Measure 10 min late.  Lateral right hip pain with SLS, tender gr troch.   Treatment Interventions (PT)   Interventions Therapeutic Procedure/Exercise;Manual Therapy;Therapeutic Activity   Therapeutic Procedure/Exercise   Therapeutic Procedures: strength, endurance, ROM, flexibility minutes (71553) 15   Ther Proc 1 NUSTEP   Ther Proc 1 - Details WL 3, 10 min   PTRx Ther Proc 1 Terminal Knee Extension Wall/Ball   PTRx Ther Proc 1 - Details No Notes   PTRx Ther Proc 2 Clamshell Feet  together   PTRx Ther Proc 2 - Details 10-20 x each leg   PTRx Ther Proc 3 Supine Abdominal Exercise #4 (Leg Extension)   PTRx Ther Proc 3 - Details 5-10 pair   PTRx Ther Proc 4 Roll Ins Hooklying   PTRx Ther Proc 4 - Details 5 sec hold 10x   PTRx Ther Proc 5 Roll Outs Hooklying   PTRx Ther Proc 5 - Details 5 sec hold 10x   PTRx Ther Proc 6 Sit to Stand   PTRx Ther Proc 6 - Details 10x without arms today!   PTRx Ther Proc 7 Seated Toe Raises/Heel Raises   PTRx Ther Proc 7 - Details No Notes   PTRx Ther Proc 8 Toe Raises   PTRx Ther Proc 8 - Details 10x twice a daytry to put as much weight over right foot as able when lowering   PTRx Ther Proc 9 Supine Abdominal Exercise #3 (Marching)   PTRx Ther Proc 9 - Details 5 pair working up to 10 pair   PTRx Ther Proc 10 Knee Bends   PTRx Ther Proc 10 - Details 10x at kitchen sink as toleratedcueing not to pull on bar and to concentrate on equal weight bearing over whole right foot   PTRx Ther Proc 11 Bridging #1   PTRx Ther Proc 11 - Details 10 x then tailbone pain   PTRx Ther Proc 12 Hip AROM Standing Abduction   PTRx Ther Proc 12 - Details 15x each legcueing to avoid right knee recurvatum  when standing on right leg   Therapeutic Activity   Ther Act 1 discussed sitting at work.  for part of job sits on gym ball which is tolerated welll, but needs to spend alot of time on tall stool to overlook window  onto stage   Neuromuscular Re-education   PTRx Neuro Re-ed 1 Balance Single Leg Stance Supported and Unsupported   PTRx Neuro Re-ed 1 - Details work on for 10 sec at a time reaching for support as needing.  NO FLAILING!   PTRx Neuro Re-ed 2 Abdominal Brace Transverse Abdominis   PTRx Neuro Re-ed 2 - Details 10x   Manual Therapy   Manual Therapy: Mobilization, MFR, MLD, friction massage minutes (54655) 15   Manual Therapy 1 MFR R glut med/min distal to attachments, R PF   Manual Therapy 1 - Details in L SL   Skilled Intervention for pain decreased muscle tension and decreased  pain   Patient Response/Progress tolerated well   Education   Learner/Method Patient;Listening;Reading;Demonstration;Pictures/Video;No Barriers to Learning   Plan   Home program per PTRx   Plan for next session DC and continue HEP   Comments   Comments 10 min late       DISCHARGE  Reason for Discharge: independent in HEP for self management of symptoms    Equipment Issued: none    Discharge Plan: Patient to continue home program.    Referring Provider:  Ana Luna

## 2024-10-30 DIAGNOSIS — M54.18 RIGHT SACRAL RADICULOPATHY: ICD-10-CM

## 2024-10-30 DIAGNOSIS — G57.01 PIRIFORMIS SYNDROME OF RIGHT SIDE: ICD-10-CM

## 2024-10-30 NOTE — TELEPHONE ENCOUNTER
Refill request    Medication: DULoxetine (CYMBALTA) 60 MG capsule     Sig: Take 1 capsule (60 mg) by mouth daily     Dispensed: 90  Refills: 3  Last prescribed to patient: 12/6/23     Last clinic appointment: 12/6/23-virtual  Next clinic appointment: none listed    Preferred pharmacy:    [id number] - [place], [place] - [address] AT [facility] [address]    Refill request routed to the provider to review.

## 2024-10-31 ENCOUNTER — TELEPHONE (OUTPATIENT)
Dept: ANESTHESIOLOGY | Facility: CLINIC | Age: 64
End: 2024-10-31
Payer: COMMERCIAL

## 2024-10-31 NOTE — TELEPHONE ENCOUNTER
Patient confirmed scheduled appointment:     Date: 11/14/24  Time: 3 PM  Visit type: Return Pain  Visit mode: In Person  Provider: Dr. Ana Luna  [facility]

## 2024-10-31 NOTE — TELEPHONE ENCOUNTER
Patient confirmed scheduled appointment:     Date: 11/20/24  Time: 1:30 PM  Visit type: Return Pain  Visit mode: In Person  Provider: Dr. Ana Luna  [place] [facility] [place] Notes: Rescheduled per patient request. [email or web address]

## 2024-11-01 RX ORDER — DULOXETIN HYDROCHLORIDE 60 MG/1
60 CAPSULE, DELAYED RELEASE ORAL DAILY
Qty: 90 CAPSULE | Refills: 3 | Status: SHIPPED | OUTPATIENT
Start: 2024-11-01

## 2024-11-20 ENCOUNTER — TELEPHONE (OUTPATIENT)
Dept: ANESTHESIOLOGY | Facility: CLINIC | Age: 64
End: 2024-11-20

## 2024-12-12 NOTE — NURSING NOTE
LPN reviewed AVS with Pt.  Pt verbalized an understanding of information, and was asked to contact clinic with questions.    Loly Cisneros LPN     Patient calling in regards to currently being at the dental office to have his wisdom teeth removed. Dental office requested a medical clearance from Dr. Larkin be faxed before they will do the removal. Fax [phone]

## 2024-12-19 ENCOUNTER — TELEPHONE (OUTPATIENT)
Dept: ANESTHESIOLOGY | Facility: CLINIC | Age: 64
End: 2024-12-19
Payer: COMMERCIAL

## 2024-12-19 NOTE — TELEPHONE ENCOUNTER
Left Voicemail (1st Attempt) and Sent Mychart (1st Attempt) for waitlisted patient to offer the following appointment:     Provider: Dr. Luna  Appointment Type: Return med spine  Date: 12/20/24  Time: 9:15 AM  [facility]     Please note there is no guarantee this appointment will be available as it is first come first serve.

## 2025-01-16 ENCOUNTER — TELEPHONE (OUTPATIENT)
Dept: PALLIATIVE MEDICINE | Facility: CLINIC | Age: 65
End: 2025-01-16
Payer: COMMERCIAL

## 2025-01-16 PROBLEM — M79.18 PIRIFORMIS MUSCLE PAIN: Status: ACTIVE | Noted: 2023-08-10

## 2025-01-16 NOTE — TELEPHONE ENCOUNTER
Called patient to schedule procedure with Dr. Dawkins    Date of Procedure: 1/24/25    Arrival time given: Yes: Arrival Time 1:30pm       Procedure Location: [facility]     Verified Location with Patient:  Yes  Address provided to the patient     Pre-op H&P Required:  No: Local anesthesia        Post-Op/Follow Up Appt:  Not Indicated in Request      Informed patient they will need a  to drive them home:  Yes    Patients : Spouse     Patient is aware that pre-op RN from the procedure center will call 2-3 days prior to scheduled procedure to confirm arrival time and review any instructions:  Yes       Additional Comments: N/A        Danni Ruiz MA on 1/16/2025 at 9:40 AM      P: [phone]*

## 2025-01-24 ENCOUNTER — HOSPITAL ENCOUNTER (OUTPATIENT)
Facility: AMBULATORY SURGERY CENTER | Age: 65
Discharge: HOME | End: 2025-01-24
Attending: ANESTHESIOLOGY | Admitting: ANESTHESIOLOGY
Payer: COMMERCIAL

## 2025-01-24 ENCOUNTER — ANCILLARY PROCEDURE (OUTPATIENT)
Dept: RADIOLOGY | Facility: AMBULATORY SURGERY CENTER | Age: 65
End: 2025-01-24
Attending: ANESTHESIOLOGY
Payer: COMMERCIAL

## 2025-01-24 VITALS
BODY MASS INDEX: 37.73 KG/M2 | OXYGEN SATURATION: 97 % | HEIGHT: 62 IN | HEART RATE: 87 BPM | WEIGHT: 205 LBS | RESPIRATION RATE: 15 BRPM | DIASTOLIC BLOOD PRESSURE: 81 MMHG | TEMPERATURE: 97.4 F | SYSTOLIC BLOOD PRESSURE: 158 MMHG

## 2025-01-24 DIAGNOSIS — M79.18 PIRIFORMIS MUSCLE PAIN: ICD-10-CM

## 2025-01-24 PROBLEM — N39.0 UTI (URINARY TRACT INFECTION): Status: RESOLVED | Noted: 2019-10-16 | Resolved: 2025-01-24

## 2025-01-24 PROBLEM — D62 ACUTE POSTHEMORRHAGIC ANEMIA: Status: ACTIVE | Noted: 2017-12-16

## 2025-01-24 PROBLEM — E03.9 HYPOTHYROIDISM, UNSPECIFIED: Status: ACTIVE | Noted: 2019-10-11

## 2025-01-24 PROBLEM — M76.32 ILIOTIBIAL BAND SYNDROME, LEFT LEG: Status: ACTIVE | Noted: 2019-10-11

## 2025-01-24 PROBLEM — F41.9 ANXIETY DISORDER, UNSPECIFIED: Status: ACTIVE | Noted: 2019-10-11

## 2025-01-24 PROBLEM — K59.01 SLOW TRANSIT CONSTIPATION: Status: RESOLVED | Noted: 2017-08-15 | Resolved: 2025-01-24

## 2025-01-24 PROBLEM — J45.40 MODERATE PERSISTENT ASTHMA, UNCOMPLICATED: Status: ACTIVE | Noted: 2019-10-11

## 2025-01-24 PROBLEM — R33.9 RETENTION OF URINE, UNSPECIFIED: Status: ACTIVE | Noted: 2019-10-11

## 2025-01-24 PROBLEM — I95.9 HYPOTENSION, UNSPECIFIED: Status: ACTIVE | Noted: 2019-10-15

## 2025-01-24 PROBLEM — F51.02 ADJUSTMENT INSOMNIA: Status: ACTIVE | Noted: 2019-10-11

## 2025-01-24 PROCEDURE — 20552 NJX 1/MLT TRIGGER POINT 1/2: CPT | Mod: RT | Performed by: ANESTHESIOLOGY

## 2025-01-24 PROCEDURE — 20552 NJX 1/MLT TRIGGER POINT 1/2: CPT | Mod: RT

## 2025-01-24 PROCEDURE — 77002 NEEDLE LOCALIZATION BY XRAY: CPT | Mod: 26 | Performed by: ANESTHESIOLOGY

## 2025-01-24 RX ORDER — BUPIVACAINE HYDROCHLORIDE 2.5 MG/ML
INJECTION, SOLUTION EPIDURAL; INFILTRATION; INTRACAUDAL PRN
Status: DISCONTINUED | OUTPATIENT
Start: 2025-01-24 | End: 2025-01-24 | Stop reason: HOSPADM

## 2025-01-24 RX ORDER — NAPROXEN 250 MG/1
1 TABLET ORAL
COMMUNITY
Start: 2024-04-06

## 2025-01-24 RX ORDER — METHYLPREDNISOLONE ACETATE 40 MG/ML
INJECTION, SUSPENSION INTRA-ARTICULAR; INTRALESIONAL; INTRAMUSCULAR; SOFT TISSUE PRN
Status: DISCONTINUED | OUTPATIENT
Start: 2025-01-24 | End: 2025-01-24 | Stop reason: HOSPADM

## 2025-01-24 RX ORDER — AZITHROMYCIN 250 MG/1
TABLET, FILM COATED ORAL
COMMUNITY
Start: 2024-03-08

## 2025-01-24 RX ORDER — LIDOCAINE HYDROCHLORIDE 10 MG/ML
INJECTION, SOLUTION EPIDURAL; INFILTRATION; INTRACAUDAL; PERINEURAL PRN
Status: DISCONTINUED | OUTPATIENT
Start: 2025-01-24 | End: 2025-01-24 | Stop reason: HOSPADM

## 2025-01-24 RX ORDER — PANTOPRAZOLE SODIUM 40 MG/1
1 TABLET, DELAYED RELEASE ORAL
COMMUNITY
Start: 2024-04-22

## 2025-01-24 NOTE — DISCHARGE INSTRUCTIONS
Home Care Instructions after a Piriformis Muscle Injection  The piriformis muscle is a small muscle located deep in the buttock (behind the gluteus prateek).     Activity  -You may resume most normal activity levels with the exception of strenuous activity. It is important for us to know if your pain with normal activity is relieved after this injection.  -DO NOT shower for 24 hours  -DO NOT remove bandaid for 24 hours    Pain  -You may experience soreness at the injection site for one or two days  -You may use an ice pack for 20 minutes every 2 hours for the first 24 hours  -You may use a heating pad after the first 24 hours  -You may use Tylenol (acetaminophen) every 4 hours or other pain medicines as     directed by your physician    You may experience numbness radiating into your legs or arms (depending on the procedure location). This numbness may last several hours. Until sensation returns to normal; please use caution in walking, climbing stairs, and stepping out of your vehicle, etc.    DID YOU RECEIVE STEROIDS TODAY?  Yes    Common side effects of steroids:  Not everyone will experience corticosteroid side effects. If side effects are experienced, they will gradually subside in the 7-10 day period following an injection. Most common side effects include:  -Flushed face and/or chest  -Feeling of warmth, particularly in the face but could be an overall feeling of warmth  -Increased blood sugar in diabetic patients  -Menstrual irregularities my occur. If taking hormone-based birth control an alternate method of birth control is recommended  -Sleep disturbances and/or mood swings are possible  -Leg cramps      PLEASE KEEP TRACK OF YOUR SYMPTOMS AND NOTE YOUR IMPROVEMENT FOR YOUR DOCTOR.     Please contact us if you have:  -Severe pain  -Fever more than 101.5 degrees Fahrenheit  -Signs of infection at the injection site (redness, swelling, or drainage)    FOR PAIN CENTER PATIENTS:  If you have questions, please  contact the Pain Clinic at [phone] Option #1 between the hours of 7:00 am and 3:00 pm Monday through Friday. After office hours you can contact the on call provider by dialing [phone]. If you need immediate attention, we recommend that you go to a hospital emergency room or dial [phone].      FOR PM&R PATIENTS:  For patients seen by the PM and R service, please call [phone]. (Monday through Friday 8a-5p.  After business hours and weekends call [phone] and ask for the PM and R doctor on call). If you need to fax a pain diary to PM&R the fax number is [phone]. If you are unable to fax, uploading to [employer] is encouraged, then send to provider. If you have procedure scheduling questions please call [phone] Option #2.

## 2025-01-24 NOTE — OP NOTE
Patient: Aleena Ontiveros Age: 64 year old   MRN: [id number] Attending: Dr. Luna     Date of Visit: January 24, 2025      PAIN MEDICINE CLINIC PROCEDURE NOTE    ATTENDING CLINICIAN:    Ana Luna MD    ASSISTANT CLINICIAN:      PREPROCEDURE DIAGNOSES:  1.  Right piriformis muscle pain      POSTPROCEDURE DIAGNOSES:  1.  Right piriformis muscle pain      PROCEDURE(S) PERFORMED:  1.  Right piriformis muscle injection  2.  Fluoroscopic guidance for the above-namdromed procedure(s)      ANESTHESIA:  Local.    BLOOD LOSS:  Minimal.    DRAINS AND SPECIMENS:  None.    COMPLICATIONS:  None.    INDICATIONS:  Aleena Ontiveros is a 64 year old female with a history of  chronic low back pain secondary to sacrolitis .  At this time, the patient's symptoms and exam are most consistent with pyriformis syndrome. During the last clinic visit we recommended a diagnostic and therapeutic injection of her right piriformis muscle. The patient stated that the patient was in their usual state of health and denied recent anticoagulant use or recent infections.  Therefore, the plan is to perform above mentioned procedure.     Procedure Details:  The patient was met in the procedure room, where the patient was identified by name, medical record number and date of birth.  All of the patient s last minute questions were answered. Written informed consent was obtained and saved in the electronic medical record, after the risks, benefits, and alternatives were discussed with the patient.      A formal time-out procedure was performed, as per protocol, including patient name, title of procedure, and site of procedure, and all in the room concurred.  Routine monitors were applied.      The patient was placed in the prone position on the procedure room table.  All pressure points were checked and comfortably padded.  Routine monitors were placed.  Vital signs were stable.    A chlorhexidine prep was completed followed by sterile draping per standard  procedure.     AP fluoroscopic guidance was used to identify the right SI joint(s). Target site is located 1.5 cm inferior, 1.5 cm lateral to the lower border of right  SI joint. After 1% lidocaine infiltration using a 25 gauge 1.5 inch needle, a 22 G 3.5 inch spinal needle was introduced and advanced intermittently using tactile feel and confirming with intermittent lateral viewsuntil it  penetrates piriformis muscle, a change in resistance (more firm) observed. The patient didn't notice any feeling sharp pain shooting down their leg as the needle tip advanced. Then 1-2 ml of Isovue 220  contrast injected.  Contrast flow showed a diagonal pattern from cephalad to caudad as it goes toward the femoral attachment site of the piriformis muscle. Lateral view is also taken.    Then after negative aspiration for heme,4 mL of a treatment mixture containing 1 mL of depomedrol and 3 mL of bupivacaine 0.25% was injected.  The needle was then removed.      Light pressure was held at the puncture site(s) to prevent ecchymosis and oozing.  The patient's skin was cleansed, and hemostasis was confirmed.  Band-aids were applied to the needle injection site(s).      Condition:    The patient remained awake and alert throughout the procedure.  The patient tolerated the procedure well and was monitored for approximately 15 minutes afterward in the post procedure area.  There were no immediate post procedure complications noted.  The patient was then discharged to home as per protocol.      Pre-procedure pain score: 6/10  Post-procedure pain score: 6/10

## 2025-01-24 NOTE — H&P
ABBREVIATED H&P [facility]      Patient Name: Aleena Ontiveros   MRN: [id number]   YOB: 1960     1. Reason for Procedure:  Procedure Summary       Date: 01/24/25 [place] [facility] [place]OM 06 / [facility]    Anesthesia Start:  Anesthesia Stop:     Procedure: Piriformis Muscle Injection (Right: Buttocks) Diagnosis:       Piriformis muscle pain      (Piriformis muscle pain [M79.18])    Providers: Ana Luna MD Responsible Provider:     Anesthesia Type: Not recorded ASA Status: Not recorded            2. History:   Past Medical History:   Diagnosis Date    Anemia     Anxiety     Arthritis     osteoarthritis of both knee    Chronic osteoarthritis     Hypertension     Hypothyroidism     PONV (postoperative nausea and vomiting)     Seasonal allergic rhinitis     Uncomplicated asthma        Comorbidities: None    Any history of sleep apnea? No    Any history of problems with sedation? No    3. Physical:    General: Normal  Skin:  Normal.  Respiratory: Clear to auscultation bilateral, no wheezing  Cardio:  Regular rate and rhythm  Abdomen: Soft, nontender, nondistended, no palpable masses.  Musculoskeletal: Normal  Neuro: Sensory exam normal, motor exam 5/5, bilateral upper and lower extremities      4. Current Medications (if not in Epic):   Current Outpatient Medications   Medication Sig Dispense Refill    acetaminophen (TYLENOL) 500 MG tablet Take 2 Tabs Every 8 Hours as needed for pain 450 tablet 1    albuterol (PROAIR HFA/PROVENTIL HFA/VENTOLIN HFA) 108 (90 Base) MCG/ACT inhaler Inhale 2 puffs into the lungs every 4 hours as needed for shortness of breath / dyspnea or wheezing      amLODIPine (NORVASC) 5 MG tablet Take 5 mg by mouth At Bedtime       azithromycin (ZITHROMAX) 250 MG tablet       DULoxetine (CYMBALTA) 60 MG capsule Take 1 capsule (60 mg) by mouth daily. 90 capsule 3    Fexofenadine HCl (ALLEGRA PO) Take  180 mg by mouth every morning       fluticasone-salmeterol (ADVAIR) 500-50 MCG/DOSE inhaler Inhale 1 puff into the lungs every 12 hours      gabapentin (NEURONTIN) 100 MG capsule Take 1-2 caps Four times per day as needed for pain 450 capsule 1    levothyroxine (SYNTHROID) 125 MCG tablet Take 1 tablet (125 mcg) by mouth every morning      lisinopril-hydrochlorothiazide (ZESTORETIC) 20-12.5 MG tablet Take 2 tablets by mouth daily      Montelukast Sodium (SINGULAIR PO) Take 10 mg by mouth At Bedtime       naproxen (NAPROSYN) 250 MG tablet Take 1 tablet by mouth 2 times daily.      pantoprazole (PROTONIX) 40 MG EC tablet Take 1 tablet by mouth daily at 2 pm.      diclofenac (VOLTAREN) 1 % topical gel Apply 1-2 g topically 4 times daily 100 g 1    estradiol (ESTRACE) 0.1 MG/GM cream Place 2 g vaginally At Bedtime       fluticasone (FLONASE) 50 MCG/ACT spray Spray 1 spray into both nostrils 2 times daily       multivitamin w/minerals (THERA-VIT-M) tablet Take 2 tablets by mouth 2 times daily      Omega-3 Fatty Acids (FISH OIL) 500 MG CAPS Take 2 capsules by mouth daily          5. Allergies and Reactions:  is allergic to adhesive tape and erythromycin.

## 2025-03-22 ENCOUNTER — HEALTH MAINTENANCE LETTER (OUTPATIENT)
Age: 65
End: 2025-03-22

## 2025-03-28 NOTE — TELEPHONE ENCOUNTER
RN reviewed patient chart. Pre procedure instructions were reviewed with patient.    Letty Camacho RNCC     No

## 2025-04-04 ENCOUNTER — APPOINTMENT (OUTPATIENT)
Dept: MRI IMAGING | Facility: CLINIC | Age: 65
End: 2025-04-04
Payer: COMMERCIAL

## 2025-04-04 ENCOUNTER — APPOINTMENT (OUTPATIENT)
Dept: CT IMAGING | Facility: CLINIC | Age: 65
End: 2025-04-04
Attending: EMERGENCY MEDICINE
Payer: COMMERCIAL

## 2025-04-04 ENCOUNTER — HOSPITAL ENCOUNTER (EMERGENCY)
Facility: CLINIC | Age: 65
Discharge: HOME | End: 2025-04-04
Attending: EMERGENCY MEDICINE | Admitting: EMERGENCY MEDICINE
Payer: COMMERCIAL

## 2025-04-04 VITALS
SYSTOLIC BLOOD PRESSURE: 133 MMHG | RESPIRATION RATE: 25 BRPM | BODY MASS INDEX: 39.31 KG/M2 | TEMPERATURE: 98 F | WEIGHT: 214.95 LBS | HEART RATE: 77 BPM | OXYGEN SATURATION: 96 % | DIASTOLIC BLOOD PRESSURE: 69 MMHG

## 2025-04-04 DIAGNOSIS — R55 SYNCOPE AND COLLAPSE: ICD-10-CM

## 2025-04-04 DIAGNOSIS — R42 VERTIGO: ICD-10-CM

## 2025-04-04 LAB
ALBUMIN UR-MCNC: NEGATIVE MG/DL
ANION GAP SERPL CALCULATED.3IONS-SCNC: 10 MMOL/L (ref 7–15)
APPEARANCE UR: CLEAR
APTT PPP: 27 SECONDS (ref 22–38)
ATRIAL RATE - MUSE: 69 BPM
BASOPHILS # BLD AUTO: 0.1 10E3/UL (ref 0–0.2)
BASOPHILS NFR BLD AUTO: 1 %
BILIRUB UR QL STRIP: NEGATIVE
BUN SERPL-MCNC: 26.9 MG/DL (ref 8–23)
CALCIUM SERPL-MCNC: 9.8 MG/DL (ref 8.8–10.4)
CHLORIDE SERPL-SCNC: 98 MMOL/L (ref 98–107)
COLOR UR AUTO: ABNORMAL
CREAT SERPL-MCNC: 1.29 MG/DL (ref 0.51–0.95)
DIASTOLIC BLOOD PRESSURE - MUSE: NORMAL MMHG
EGFRCR SERPLBLD CKD-EPI 2021: 46 ML/MIN/1.73M2
EOSINOPHIL # BLD AUTO: 0.4 10E3/UL (ref 0–0.7)
EOSINOPHIL NFR BLD AUTO: 6 %
ERYTHROCYTE [DISTWIDTH] IN BLOOD BY AUTOMATED COUNT: 14.1 % (ref 10–15)
GLUCOSE SERPL-MCNC: 111 MG/DL (ref 70–99)
GLUCOSE UR STRIP-MCNC: NEGATIVE MG/DL
HCO3 SERPL-SCNC: 28 MMOL/L (ref 22–29)
HCT VFR BLD AUTO: 29.5 % (ref 35–47)
HGB BLD-MCNC: 9.8 G/DL (ref 11.7–15.7)
HGB UR QL STRIP: NEGATIVE
IMM GRANULOCYTES # BLD: 0.1 10E3/UL
IMM GRANULOCYTES NFR BLD: 1 %
INR PPP: 1.03 (ref 0.85–1.15)
INTERPRETATION ECG - MUSE: NORMAL
KETONES UR STRIP-MCNC: NEGATIVE MG/DL
LEUKOCYTE ESTERASE UR QL STRIP: NEGATIVE
LYMPHOCYTES # BLD AUTO: 1.2 10E3/UL (ref 0.8–5.3)
LYMPHOCYTES NFR BLD AUTO: 16 %
MCH RBC QN AUTO: 27.7 PG (ref 26.5–33)
MCHC RBC AUTO-ENTMCNC: 33.2 G/DL (ref 31.5–36.5)
MCV RBC AUTO: 83 FL (ref 78–100)
MONOCYTES # BLD AUTO: 0.5 10E3/UL (ref 0–1.3)
MONOCYTES NFR BLD AUTO: 7 %
MUCOUS THREADS #/AREA URNS LPF: PRESENT /LPF
NEUTROPHILS # BLD AUTO: 5.3 10E3/UL (ref 1.6–8.3)
NEUTROPHILS NFR BLD AUTO: 70 %
NITRATE UR QL: NEGATIVE
NRBC # BLD AUTO: 0 10E3/UL
NRBC BLD AUTO-RTO: 0 /100
P AXIS - MUSE: 60 DEGREES
PH UR STRIP: 7 [PH] (ref 5–7)
PLATELET # BLD AUTO: 244 10E3/UL (ref 150–450)
POTASSIUM SERPL-SCNC: 3.2 MMOL/L (ref 3.4–5.3)
PR INTERVAL - MUSE: 166 MS
QRS DURATION - MUSE: 98 MS
QT - MUSE: 394 MS
QTC - MUSE: 422 MS
R AXIS - MUSE: -2 DEGREES
RBC # BLD AUTO: 3.54 10E6/UL (ref 3.8–5.2)
RBC URINE: 0 /HPF
SODIUM SERPL-SCNC: 136 MMOL/L (ref 135–145)
SP GR UR STRIP: 1.02 (ref 1–1.03)
SQUAMOUS EPITHELIAL: <1 /HPF
SYSTOLIC BLOOD PRESSURE - MUSE: NORMAL MMHG
T AXIS - MUSE: 2 DEGREES
TROPONIN T SERPL HS-MCNC: 30 NG/L
TROPONIN T SERPL HS-MCNC: 30 NG/L
UROBILINOGEN UR STRIP-MCNC: NORMAL MG/DL
VENTRICULAR RATE- MUSE: 69 BPM
WBC # BLD AUTO: 7.5 10E3/UL (ref 4–11)
WBC URINE: 1 /HPF

## 2025-04-04 PROCEDURE — 70496 CT ANGIOGRAPHY HEAD: CPT

## 2025-04-04 PROCEDURE — 85730 THROMBOPLASTIN TIME PARTIAL: CPT | Performed by: EMERGENCY MEDICINE

## 2025-04-04 PROCEDURE — 99285 EMERGENCY DEPT VISIT HI MDM: CPT | Mod: 25

## 2025-04-04 PROCEDURE — 70553 MRI BRAIN STEM W/O & W/DYE: CPT

## 2025-04-04 PROCEDURE — 99292 CRITICAL CARE ADDL 30 MIN: CPT | Mod: FS

## 2025-04-04 PROCEDURE — 99291 CRITICAL CARE FIRST HOUR: CPT | Mod: FS

## 2025-04-04 PROCEDURE — 80051 ELECTROLYTE PANEL: CPT | Performed by: EMERGENCY MEDICINE

## 2025-04-04 PROCEDURE — 81003 URINALYSIS AUTO W/O SCOPE: CPT | Performed by: EMERGENCY MEDICINE

## 2025-04-04 PROCEDURE — 93005 ELECTROCARDIOGRAM TRACING: CPT

## 2025-04-04 PROCEDURE — 250N000011 HC RX IP 250 OP 636: Performed by: EMERGENCY MEDICINE

## 2025-04-04 PROCEDURE — 255N000002 HC RX 255 OP 636

## 2025-04-04 PROCEDURE — 250N000009 HC RX 250: Performed by: EMERGENCY MEDICINE

## 2025-04-04 PROCEDURE — 36415 COLL VENOUS BLD VENIPUNCTURE: CPT | Performed by: EMERGENCY MEDICINE

## 2025-04-04 PROCEDURE — 70450 CT HEAD/BRAIN W/O DYE: CPT

## 2025-04-04 PROCEDURE — 96361 HYDRATE IV INFUSION ADD-ON: CPT

## 2025-04-04 PROCEDURE — 84484 ASSAY OF TROPONIN QUANT: CPT | Performed by: EMERGENCY MEDICINE

## 2025-04-04 PROCEDURE — A9585 GADOBUTROL INJECTION: HCPCS

## 2025-04-04 PROCEDURE — 85025 COMPLETE CBC W/AUTO DIFF WBC: CPT | Performed by: EMERGENCY MEDICINE

## 2025-04-04 PROCEDURE — 258N000003 HC RX IP 258 OP 636: Performed by: EMERGENCY MEDICINE

## 2025-04-04 PROCEDURE — 85610 PROTHROMBIN TIME: CPT | Performed by: EMERGENCY MEDICINE

## 2025-04-04 PROCEDURE — 80048 BASIC METABOLIC PNL TOTAL CA: CPT | Performed by: EMERGENCY MEDICINE

## 2025-04-04 PROCEDURE — 96360 HYDRATION IV INFUSION INIT: CPT | Mod: 59

## 2025-04-04 RX ORDER — GADOBUTROL 604.72 MG/ML
10 INJECTION INTRAVENOUS ONCE
Status: COMPLETED | OUTPATIENT
Start: 2025-04-04 | End: 2025-04-04

## 2025-04-04 RX ORDER — MECLIZINE HYDROCHLORIDE 25 MG/1
25 TABLET ORAL 3 TIMES DAILY PRN
Qty: 30 TABLET | Refills: 0 | Status: SHIPPED | OUTPATIENT
Start: 2025-04-04

## 2025-04-04 RX ORDER — IOPAMIDOL 755 MG/ML
67 INJECTION, SOLUTION INTRAVASCULAR ONCE
Status: COMPLETED | OUTPATIENT
Start: 2025-04-04 | End: 2025-04-04

## 2025-04-04 RX ORDER — SCOPOLAMINE 1 MG/3D
1 PATCH, EXTENDED RELEASE TRANSDERMAL
Status: DISCONTINUED | OUTPATIENT
Start: 2025-04-04 | End: 2025-04-04 | Stop reason: HOSPADM

## 2025-04-04 RX ADMIN — IOPAMIDOL 67 ML: 755 INJECTION, SOLUTION INTRAVENOUS at 13:31

## 2025-04-04 RX ADMIN — GADOBUTROL 10 ML: 604.72 INJECTION INTRAVENOUS at 19:12

## 2025-04-04 RX ADMIN — SODIUM CHLORIDE 1000 ML: 9 INJECTION, SOLUTION INTRAVENOUS at 16:11

## 2025-04-04 RX ADMIN — SCOLOPAMINE TRANSDERMAL SYSTEM 1 PATCH: 1 PATCH, EXTENDED RELEASE TRANSDERMAL at 20:24

## 2025-04-04 RX ADMIN — SODIUM CHLORIDE 100 ML: 9 INJECTION, SOLUTION INTRAVENOUS at 13:31

## 2025-04-04 ASSESSMENT — ACTIVITIES OF DAILY LIVING (ADL)
ADLS_ACUITY_SCORE: 48

## 2025-04-04 ASSESSMENT — COLUMBIA-SUICIDE SEVERITY RATING SCALE - C-SSRS
6. HAVE YOU EVER DONE ANYTHING, STARTED TO DO ANYTHING, OR PREPARED TO DO ANYTHING TO END YOUR LIFE?: NO
1. IN THE PAST MONTH, HAVE YOU WISHED YOU WERE DEAD OR WISHED YOU COULD GO TO SLEEP AND NOT WAKE UP?: NO
2. HAVE YOU ACTUALLY HAD ANY THOUGHTS OF KILLING YOURSELF IN THE PAST MONTH?: NO

## 2025-04-04 NOTE — ED NOTES
Bed: ST03  Expected date:   Expected time:   Means of arrival:   Comments:  851 STROKE ALERT 64F AMS, syncope, lwk 1230

## 2025-04-04 NOTE — CONSULTS
"[facility]     Stroke Code Note          History of Present Illness     Chief Complaint: Dizziness and Syncope    Aleena Ontiveros is a 64 year old right-handed female with a past medical history significant for anemia, anxiety, hypertension, hypothyroidism, asthma who presents today after syncopal episode while at work. Aleena was at work when she suddenly developed lightheadedness/dizziness and proceeded to fall backwards and hit the back of her head.  Aleena had some difficulty describing what the dizziness felt like, but noted it was a \"swirly sensation.\" This occurred at 12:30 pm. She notes she was asymptomatic prior to that time. After sitting up she noted her lightheadedness/dizziness improved but still persisted. She was able to eat a snack and drink water and noted some improvement of her symptoms. When attempting to walk with EMS that she again felt the lightheadedness/dizziness and had to sit down.  She denied any associated unilateral weakness, numbness, changes in her speech, or vision loss. She denies any prior episodes of ?fainting but reports earlier this week having felt dizzy which required her to call in from work, which her daughter at bedside reports is very rare for her. Of note, she noted her right ear felt funny. No prior history of stroke. In the ED, Aleena reports her lightheadedness/dizziness is improved.  While laying supine she notes feeling lightheaded but not dizzy.  She notes some mild dizziness when moving from a supine to seated position and then upon standing again has dizziness. She was able to walk at bedside with standby assist without obvious truncal ataxia.  Presenting blood pressure was 115/87    Discussed TNK and given Aleena's improvement of symptoms and nonfocal examination she is not felt to be TNK candidate as the risks outweigh the potential benefits.  Discussed this recommendation with Aleena and her daughter at bedside who both verbally agreed with this " "plan         Past Medical History     Stroke risk factors: HTN    Preadmission antithrombotic regimen: None    Modified Scarlet Score (Pre-morbid)  0 - No symptoms.                 Assessment and Plan       Episode of syncope with dizziness/lightheadedness with subsequent gait instability, improving, unclear etiology. Differential includes peripheral etiology versus toxic/metabolic/infectious etiology versus dehydration versus cardiac arrhythmia versus lower suspicion for neurovascular etiology.     Intravenous Thrombolysis  Not given due to:   - minor/isolated/quickly resolving symptoms     Endovascular Treatment  Not initiated due to absence of proximal vessel occlusion     Plan:  - brain MRI with and without contrast with coronal DWI; please page stroke neurology when completed for review and for further recommendations  - Symptomatic management of dizziness per ED provider  - Recommend checking Orthostatic vitals   - Recommend metabolic/infectious/toxic workup per ED team       Patient case discussed with and patient seen with vascular neurology attending, Dr. Rainey.    Janna Figueredo PA-C  Vascular Neurology    To page me or covering stroke neurology team member, click here: [facility]  Choose \"On Call\" tab at top, then select \"NEUROLOGY/ALL SITES\" from middle drop-down box, press Enter, then look for \"stroke\" or \"telestroke\" for your site.  ___________________________________________________________________      Imaging/Labs   (personally reviewed CT Head and CTA Head and Neck)    CT head: No evidence of hemorrhage.   CTA head/neck: No LVO. Moderate left anterior M2/M3 segment narrowing.          Physical Examination     BP: 131/72   Pulse: 77   Resp: 18           SpO2: 98 %       Weight: 97.5 kg (214 lb 15.2 oz)    Wt Readings from Last 2 Encounters:   04/04/25 97.5 kg (214 lb 15.2 oz)   01/24/25 93 kg (205 lb)     General Exam  General:  patient lying in bed without any acute distress    HEENT:  " normocephalic/atraumatic  Pulmonary:  no respiratory distress    Neuro Exam  Mental Status:  alert, oriented to age, month and situation, follows commands, speech clear and fluent, naming and repetition normal  Cranial Nerves:  visual fields intact, no nystagmus, EOMI with normal smooth pursuit, facial sensation intact and symmetric, facial movements symmetric, hearing not formally tested but intact to conversation, no dysarthria, tongue protrusion midline  Motor:  normal muscle tone and bulk, no abnormal movements, able to move all limbs spontaneously, no pronator drift, no lower extremity drift  Reflexes:  Deferred  Sensory:  light touch sensation intact and symmetric throughout upper and lower extremities, no extinction on double simultaneous stimulation   Coordination:  normal finger-to-nose and heel-to-shin bilaterally without dysmetria, finger taps symmetric bilaterally, Station/Gait:  able to independently go from a standing position to seated and then supine, able to walk side step with standby assist, no obvious truncal ataxia       Stroke Scales     NIHSS  1a. Level of Consciousness 0-->Alert, keenly responsive   1b. LOC Questions 0-->Answers both questions correctly   1c. LOC Commands 0-->Performs both tasks correctly   2.   Best Gaze 0-->Normal   3.   Visual 0-->No visual loss   4.   Facial Palsy 0-->Normal symmetrical movements   5a. Motor Arm, Left 0-->No drift, limb holds 90 (or 45) degrees for full 10 secs   5b. Motor Arm, Right 0-->No drift, limb holds 90 (or 45) degrees for full 10 secs   6a. Motor Leg, Left 0-->No drift, leg holds 30 degree position for full 5 secs   6b. Motor Leg, right 0-->No drift, leg holds 30 degree position for full 5 secs   7.   Limb Ataxia 0-->Absent   8.   Sensory 0-->Normal, no sensory loss   9.   Best Language 0-->No aphasia, normal   10. Dysarthria 0-->Normal   11. Extinction and Inattention  0-->No abnormality   Total 0 (04/04/25 1357)       Labs     CBC  Lab Results    Component Value Date    HGB 9.8 (L) 04/04/2025    HCT 29.5 (L) 04/04/2025    WBC 7.5 04/04/2025     04/04/2025       BMP  Lab Results   Component Value Date     04/04/2025    POTASSIUM 3.2 (L) 04/04/2025    CHLORIDE 98 04/04/2025    CO2 28 04/04/2025    BUN 26.9 (H) 04/04/2025    CR 1.29 (H) 04/04/2025     (H) 04/04/2025    SIXTO 9.8 04/04/2025       INR  INR   Date Value Ref Range Status   04/04/2025 1.03 0.85 - 1.15 Final   10/01/2019 1.11 0.86 - 1.14 Final   10/01/2019 1.05 0.86 - 1.14 Final       Data   Stroke Code Data  (for stroke code without tele)  Stroke code activated 04/04/25  1328   First stroke provider response 04/04/25  1330   Last known normal 04/04/25  1229   Time of discovery (or onset of symptoms) 04/04/25  1230   Head CT read by Stroke Neuro Provider 04/04/25  1338   Was stroke code de-escalated? Yes  04/04/25  1405        Clinically Significant Risk Factors Present on Admission        # Hypokalemia: Lowest K = 3.2 mmol/L in last 2 days, will replace as needed            # Hypertension: Noted on problem list      # Anemia: based on hgb <11           # Asthma: noted on problem list        Time Spent on this Encounter   Billing: I personally examined and evaluated the patient today. At the time of my evaluation and management the patient was critical condition today due to syncope versus strokelike symptoms. I personally managed stroke code. Key decisions made today included examination, review of imaging, need for further imaging. I spent a total of 50 minutes providing critical care services, evaluating the patient, directing care and reviewing laboratory values and radiologic reports.

## 2025-04-04 NOTE — ED TRIAGE NOTES
Pt comes via EMS with Edith today from [facility] [email or web address] after pt had a syncopal episode with room spinning/dizziness. Pt was out for approx 20 seconds. Pt continued to have room spinning and vertigo since syncope. Pt had some nausea en route and was given 4mg zofran by EMS. Pt had some dizziness last week that lasted about 8 hours per pt.      Triage Assessment (Adult)       Row Name 04/04/25 9954          Respiratory WDL    Respiratory WDL WDL        Cardiac WDL    Cardiac WDL WDL        Cognitive/Neuro/Behavioral WDL    Cognitive/Neuro/Behavioral WDL WDL

## 2025-04-04 NOTE — ED PROVIDER NOTES
"  Emergency Department Note      History of Present Illness     Chief Complaint   Dizziness and Syncope      HPI   Aleena Ontiveros is a 64 year old female with a history of hypertension and presenting with syncope and dizziness. As per EMS report,  patient [place] working [place] the [employer] after having a suddenly-onset 20-second-long episode, describing her looking similar to \"a deer in headlights.\" She had experience vertigo-like dizziness which resolved on their own; she had a similar episode about a week ago lasting 8 -hours. Patient had near-syncopal episode en route for EMS. Her vitals were stable. Upon evaluation, patient endorses mild nausea. Patient denies emesis, blurry vision, double vision or headaches.     Independent Historian   EMS as detailed above.    Review of External Notes   25: Clinic note reviewed    Past Medical History     Medical History and Problem List   Anemia  Anxiety  Asthma  Arthritis  Chronic osteoarthritis  Hypertension  Hypothyroidism  PONV (postoperative nausea and vomiting)  Seasonal allergic rhinitis  Uncomplicated asthma    Medications   Amlodipine   Albuterol   Duloxetine  Gabapentin   Levothyroxine   Lisinopril-hydrochlorothiazide   Montelukast   Naproxen     Surgical History      Bilateral TKA  Spinal fusion   Hysterectomy   Tonsillectomy   Sinal fusion of wrist   Thumb mass resection   Thyroidectomy  Multiple trigger finger releases      Physical Exam     Patient Vitals for the past 24 hrs:   BP Pulse Resp SpO2 Weight   25 1435 -- -- -- 96 % --   25 1430 125/70 70 -- 96 % --   25 1345 131/72 77 -- 98 % --   25 1330 115/87 81 18 96 % 97.5 kg (214 lb 15.2 oz)     Physical Exam  General: No acute distress  Head: No obvious trauma to head.  Ears, Nose, Throat:  External ears normal.  Nose normal.  No pharyngeal erythema, swelling or exudate.  Midline uvula. Moist mucus membranes.  Eyes:  Conjunctivae clear. EOMI. PERRL. Bilateral horizontal " nystagmus.   Neck: Normal range of motion.  Neck supple.   CV: Regular rate and rhythm.  No murmurs.      Respiratory: Effort normal and breath sounds normal.  No wheezing or crackles.   Gastrointestinal: Soft.  No distension. There is no tenderness.  There is no rigidity, no rebound and no guarding.   Musculoskeletal: Normal range of motion.  Non tender extremities to palpations. No lower extremity edema  Neuro: Alert. Moving all extremities appropriately.  Normal speech. CN II-XII grossly intact, no pronator drift, normal finger-nose-finger, visual fields intact.  Gross muscle strength intact of the proximal and distal bilateral upper and lower extremities.  Sensation intact to light touch in all 4 extremities.   Skin: Skin is warm and dry.  No rash noted.   Psych: Normal mood and affect. Behavior is normal.      Diagnostics     Lab Results   Labs Ordered and Resulted from Time of ED Arrival to Time of ED Departure   BASIC METABOLIC PANEL - Abnormal       Result Value    Sodium 136      Potassium 3.2 (*)     Chloride 98      Carbon Dioxide (CO2) 28      Anion Gap 10      Urea Nitrogen 26.9 (*)     Creatinine 1.29 (*)     GFR Estimate 46 (*)     Calcium 9.8      Glucose 111 (*)    TROPONIN T, HIGH SENSITIVITY - Abnormal    Troponin T, High Sensitivity 30 (*)    CBC WITH PLATELETS AND DIFFERENTIAL - Abnormal    WBC Count 7.5      RBC Count 3.54 (*)     Hemoglobin 9.8 (*)     Hematocrit 29.5 (*)     MCV 83      MCH 27.7      MCHC 33.2      RDW 14.1      Platelet Count 244      % Neutrophils 70      % Lymphocytes 16      % Monocytes 7      % Eosinophils 6      % Basophils 1      % Immature Granulocytes 1      NRBCs per 100 WBC 0      Absolute Neutrophils 5.3      Absolute Lymphocytes 1.2      Absolute Monocytes 0.5      Absolute Eosinophils 0.4      Absolute Basophils 0.1      Absolute Immature Granulocytes 0.1      Absolute NRBCs 0.0     TROPONIN T, HIGH SENSITIVITY - Abnormal    Troponin T, High Sensitivity 30 (*)     INR - Normal    INR 1.03     PARTIAL THROMBOPLASTIN TIME - Normal    aPTT 27     GLUCOSE MONITOR NURSING POCT       Imaging   CTA Head Neck with Contrast   Final Result   CONCLUSION:    HEAD CT:   1.  No evidence of acute hemorrhage, mass effect, or acute vascular territorial infarction. Consider MRI for further evaluation, as clinically appropriate.      HEAD CTA:    1.  No evidence of proximal large vessel occlusion.   2.  Moderate left anterior M2/M3 segment narrowing.      NECK CTA:   1.  No evidence of hemodynamically significant stenosis based on NASCET criteria.   2.  No evidence for dissection.      Results discussed with Jaiden Randall on 4/4/2025 1:50 PM CDT.      CT Head w/o Contrast   Final Result   CONCLUSION:    HEAD CT:   1.  No evidence of acute hemorrhage, mass effect, or acute vascular territorial infarction. Consider MRI for further evaluation, as clinically appropriate.      HEAD CTA:    1.  No evidence of proximal large vessel occlusion.   2.  Moderate left anterior M2/M3 segment narrowing.      NECK CTA:   1.  No evidence of hemodynamically significant stenosis based on NASCET criteria.   2.  No evidence for dissection.      Results discussed with Jaiden Randall on 4/4/2025 1:50 PM CDT.      MR Brain w/o & w Contrast    (Results Pending)       EKG   ECG results from 04/04/25   EKG 12-lead, tracing only     Value    Systolic Blood Pressure     Diastolic Blood Pressure     Ventricular Rate 69    Atrial Rate 69    KY Interval 166    QRS Duration 98        QTc 422    P Axis 60    R AXIS -2    T Axis 2    Interpretation ECG      Sinus rhythm  Incomplete right bundle branch block  When compared with ECG of 16-Oct-2019 00:27,  No significant change         Independent Interpretation   CT Head: No intracranial hemorrhage or midline shift.    ED Course      Medications Administered   Medications   iopamidol (ISOVUE-370) solution 67 mL (67 mLs Intravenous $Given 4/4/25 5982)     And   sodium  chloride 0.9 % bag for CT scan flush (100 mLs As instructed $Given 4/4/25 1331)   sodium chloride 0.9% BOLUS 1,000 mL (1,000 mLs Intravenous $New Bag 4/4/25 1611)       Procedures   Procedures     Discussion of Management   Neurology, Janna Ritchie, CASSIE     ED Course   ED Course as of 04/04/25 1712   Fri Apr 04, 2025   1325 I obtained the history and examined the patient as noted above.     1332 I spoke to Janna Ritchie from stroke neuro.        Additional Documentation  None    Medical Decision Making / Diagnosis     CMS Diagnoses: None    MIPS       None    MDM   Aleena Ontiveros is a 64 year old female presenting via EMS with vertigo.  She also had a syncopal episode at onset of symptoms.  For this reason, a code stroke is called.  CT imaging is negative for acute stroke findings.  I discussed the patient with stroke neurology and radiology.  Stroke neurology recommends obtaining an MRI.  I reevaluated the patient and she states that her vertigo is improved.  She is given IV fluids.  Currently awaiting MRI at time of signout to my oncoming colleague.  If the MRI is positive for stroke, she will be admitted.  If it is negative, she will be discharged with meclizine and the information for the dizzy clinic to set up a follow-up appointment.     Disposition   Care of the patient was transferred to my colleague Dr. Jacobsen pending MRI.     Diagnosis     ICD-10-CM    1. Vertigo  R42            Discharge Medications   New Prescriptions    No medications on file     Scribe Disclosure:  I, Karolyn Baker, am serving as a scribe at 1:52 PM on 4/4/2025 to document services personally performed by Jaiden Randall MD based on my observations and the provider's statements to me.        Jaiden Randall MD  04/04/25 2373

## 2025-04-05 NOTE — ED NOTES
"Road test failed, dizzy when standing up and after walking more than approximately 10 feet. Described as \"feet not feeling in touch with the floor\". Held daughter's hand as guide while walking.  "

## 2025-04-05 NOTE — ED NOTES
"Patient contributed some of her dizziness to \"not eating or drinking all day\". Pt given finished 1L of fluids and given plenty of food and water. Re-road tested, Patient was able to walk to and from the bathroom with no assistance. Denies any dizziness unless she turned her \"head to the right side\". MD updated.   "

## 2025-05-06 ENCOUNTER — HOSPITAL ENCOUNTER (OUTPATIENT)
Dept: MAMMOGRAPHY | Facility: CLINIC | Age: 65
Discharge: HOME | End: 2025-05-06
Attending: FAMILY MEDICINE | Admitting: FAMILY MEDICINE
Payer: COMMERCIAL

## 2025-05-06 DIAGNOSIS — Z12.31 VISIT FOR SCREENING MAMMOGRAM: ICD-10-CM

## 2025-05-06 PROCEDURE — 77063 BREAST TOMOSYNTHESIS BI: CPT

## 2025-05-21 ENCOUNTER — HOSPITAL ENCOUNTER (OUTPATIENT)
Dept: MRI IMAGING | Facility: CLINIC | Age: 65
Discharge: HOME | End: 2025-05-21
Attending: ANESTHESIOLOGY
Payer: COMMERCIAL

## 2025-05-21 DIAGNOSIS — M47.816 LUMBAR SPONDYLOSIS: ICD-10-CM

## 2025-05-21 PROCEDURE — A9585 GADOBUTROL INJECTION: HCPCS | Performed by: ANESTHESIOLOGY

## 2025-05-21 PROCEDURE — 255N000002 HC RX 255 OP 636: Performed by: ANESTHESIOLOGY

## 2025-05-21 PROCEDURE — 72158 MRI LUMBAR SPINE W/O & W/DYE: CPT

## 2025-05-21 RX ORDER — GADOBUTROL 604.72 MG/ML
10 INJECTION INTRAVENOUS ONCE
Status: COMPLETED | OUTPATIENT
Start: 2025-05-21 | End: 2025-05-21

## 2025-05-21 RX ADMIN — GADOBUTROL 10 ML: 604.72 INJECTION INTRAVENOUS at 21:08

## 2025-08-15 DIAGNOSIS — M54.18 RIGHT SACRAL RADICULOPATHY: ICD-10-CM

## 2025-08-15 DIAGNOSIS — G57.01 PIRIFORMIS SYNDROME OF RIGHT SIDE: ICD-10-CM

## 2025-08-18 ENCOUNTER — TELEPHONE (OUTPATIENT)
Dept: ANESTHESIOLOGY | Facility: CLINIC | Age: 65
End: 2025-08-18
Payer: COMMERCIAL

## 2025-08-18 RX ORDER — DULOXETIN HYDROCHLORIDE 60 MG/1
60 CAPSULE, DELAYED RELEASE ORAL DAILY
Qty: 90 CAPSULE | Refills: 3 | Status: SHIPPED | OUTPATIENT
Start: 2025-08-18

## (undated) DEVICE — SOL NACL 0.9% IRRIG 1000ML BOTTLE 07138-09

## (undated) DEVICE — SYR 50ML LL W/O NDL 309653

## (undated) DEVICE — NDL SPINAL 22GA 3.5" QUINCKE 405181

## (undated) DEVICE — PACK TOTAL KNEE SOP15TKFSD

## (undated) DEVICE — IOM SUPPLIES

## (undated) DEVICE — SUCTION IRR SYSTEM W/O TIP INTERPULSE HANDPIECE 0210-100-000

## (undated) DEVICE — BRUSH SURGICAL SCRUB W/4% CHLORHEXIDINE GLUCONATE SOL 4458A

## (undated) DEVICE — IOM MONITORING FIRST 7 HOURS ----- 15 MIN

## (undated) DEVICE — DRSG AQUACEL AG 3.5X13.75" HYDROFIBER 412012

## (undated) DEVICE — Device

## (undated) DEVICE — SU VICRYL 2-0 CT-1 27" UND J259H

## (undated) DEVICE — LINEN BACK PACK 5440

## (undated) DEVICE — MARKER SPHERES PASSIVE MEDT PACK 5 8801075

## (undated) DEVICE — BLADE SAW SAGITTAL STRK 19.5X90X1.20MM 2108-109-000S17

## (undated) DEVICE — CATH TRAY FOLEY SURESTEP 16FR WDRAIN BAG STLK LATEX A300316A

## (undated) DEVICE — BONE CLEANING TIP INTERPULSE  0210-010-000

## (undated) DEVICE — DRSG GAUZE 4X4" 3033

## (undated) DEVICE — SURGICEL HEMOSTAT 4X8" 1952

## (undated) DEVICE — SU MONOCRYL 3-0 PS-2 18" UND Y497G

## (undated) DEVICE — DRAPE POUCH INSTRUMENT 1018

## (undated) DEVICE — ESU GROUND PAD UNIVERSAL W/O CORD

## (undated) DEVICE — DRAPE LAP W/ARMBOARD 29410

## (undated) DEVICE — GLOVE PROTEXIS MICRO 6.0  2D73PM60

## (undated) DEVICE — CELL SAVER

## (undated) DEVICE — DRSG XEROFORM 5X9" 8884431605

## (undated) DEVICE — POSITIONER ARMBOARD FOAM 1PAIR LF FP-ARMB1

## (undated) DEVICE — IMM KNEE 20" 0814-2660

## (undated) DEVICE — GOWN REINFORCED XXLG 9071

## (undated) DEVICE — TRAY PAIN INJECTION 97A 640

## (undated) DEVICE — GLOVE PROTEXIS POWDER FREE 6.5 ORTHOPEDIC 2D73ET65

## (undated) DEVICE — SPECIMEN CONTAINER 5OZ STERILE 2600SA

## (undated) DEVICE — SOLUTION WOUND CLEANSING 3/4OZ 10% PVP EA-L3011FB-50

## (undated) DEVICE — LINEN TOWEL PACK X5 5464

## (undated) DEVICE — SU VICRYL 0 CT-1 3X27" J430T

## (undated) DEVICE — BLADE BONE MILL STRK 5.0MM MED 5400-701-000

## (undated) DEVICE — SU VICRYL 0 CT-1 CR 8X18" J740D

## (undated) DEVICE — DRAPE CONVERTORS U-DRAPE 60X72" 8476

## (undated) DEVICE — ADH SKIN CLOSURE PREMIERPRO EXOFIN 1.0ML 3470

## (undated) DEVICE — MANIFOLD NEPTUNE 4 PORT 700-20

## (undated) DEVICE — SOL WATER IRRIG 1000ML BOTTLE 2F7114

## (undated) DEVICE — DRAPE MAYO STAND 23X54 8337

## (undated) DEVICE — ESU CORD BIPOLAR GREEN 10-4000

## (undated) DEVICE — COVER ULTRASOUND PROBE W/GEL FLEXI-FEEL 6"X58" LF  25-FF658

## (undated) DEVICE — LINEN GOWN X4 5410

## (undated) DEVICE — GLOVE PROTEXIS MICRO 8.0  2D73PM80

## (undated) DEVICE — SPONGE COTTONOID 1X3" 80-1408

## (undated) DEVICE — PREP CHLORAPREP W/ORANGE TINT 10.5ML 260715

## (undated) DEVICE — GLOVE PROTEXIS BLUE W/NEU-THERA 8.5  2D73EB85

## (undated) DEVICE — SU VICRYL 0 CT-1 27" UND J260H

## (undated) DEVICE — SYR 03ML LL W/O NDL 309657

## (undated) DEVICE — GOWN XLG DISP 9545

## (undated) DEVICE — SPONGE COTTONOID NEURO 1/2"X1/2" 30-054

## (undated) DEVICE — IOM SUPPLIES/CASE FEE

## (undated) DEVICE — GLOVE PROTEXIS POWDER FREE 8.5 ORTHOPEDIC 2D73ET85

## (undated) DEVICE — NDL 30GA 0.5" 305106

## (undated) DEVICE — DRAPE STERI TOWEL LG 1010

## (undated) DEVICE — GLOVE PROTEXIS MICRO 7.0  2D73PM70

## (undated) DEVICE — SOL NACL 0.9% IRRIG 1000ML BOTTLE 2F7124

## (undated) DEVICE — GLOVE PROTEXIS W/NEU-THERA 7.5  2D73TE75

## (undated) DEVICE — IMPLANTABLE DEVICE: Type: IMPLANTABLE DEVICE | Site: SPINE LUMBAR | Status: NON-FUNCTIONAL

## (undated) DEVICE — IMPLANTABLE DEVICE
Type: IMPLANTABLE DEVICE | Site: SPINE LUMBAR | Status: NON-FUNCTIONAL
Removed: 2019-10-01

## (undated) DEVICE — NDL COUNTER 20CT 31142493

## (undated) DEVICE — BLADE SAW RECIP STRK LONG 70X12.5X0.9MM 0277-096-278

## (undated) DEVICE — PREP CHLORAPREP 26ML TINTED ORANGE  260815

## (undated) DEVICE — STPL SKIN 35W ROTATING HEAD PRW35

## (undated) DEVICE — DRAPE IOBAN INCISE 36X23" 6651EZ

## (undated) DEVICE — DRAPE O ARM TUBE 9732722

## (undated) DEVICE — SU VICRYL 1 CT-1 36" UND J947H

## (undated) DEVICE — LIGHT HANDLE X2

## (undated) DEVICE — DRAIN HEMOVAC RESERVOIR KIT 10FR 1/8" MED 00-2550-002-10

## (undated) DEVICE — SUCTION MINISQUAIR SMOKE EVAC CAPTURE DEVICE SQ20012-01

## (undated) DEVICE — SU VICRYL 2-0 CT-2 CR 8X18" J726D

## (undated) DEVICE — BONE CEMENT MIXEVAC III HI VAC KIT  0206-015-000

## (undated) DEVICE — DRAPE SHEET REV FOLD 3/4 9349

## (undated) DEVICE — IMP SCR MEDT 5.5/6.0MM SOLERA 7.5X40MM MA 55840007540
Type: IMPLANTABLE DEVICE | Site: SACRUM | Status: NON-FUNCTIONAL
Removed: 2019-10-01

## (undated) DEVICE — IMPLANTABLE DEVICE
Type: IMPLANTABLE DEVICE | Site: SACRUM | Status: NON-FUNCTIONAL
Removed: 2019-10-01

## (undated) DEVICE — GLOVE PROTEXIS POWDER FREE SMT 8.0  2D72PT80X

## (undated) DEVICE — WRAP EZY KNEE

## (undated) DEVICE — BASIN SET MAJOR

## (undated) DEVICE — DRSG DRAIN 4X4" 7086

## (undated) DEVICE — TOOL DISSECT MIDAS MR8 9CM MTL CUTTR 3MM DI MR8-9MC30

## (undated) DEVICE — SU PROLENE 6-0 RB-2DA 30" 8711H

## (undated) DEVICE — GLOVE BIOGEL PI MICRO SZ 6.0 48560

## (undated) DEVICE — NDL SPINAL 18GA 3.5" 405184

## (undated) DEVICE — GOWN IMPERVIOUS SPECIALTY XL/XLONG 39049

## (undated) DEVICE — CAST PADDING 6" STERILE 9046S

## (undated) DEVICE — GLOVE PROTEXIS BLUE W/NEU-THERA 6.5  2D73EB65

## (undated) DEVICE — DRSG TEGADERM 4X4 3/4" 1626W

## (undated) DEVICE — MIDAS REX DISSECTING TOOL  14MH30

## (undated) DEVICE — SPONGE COTTONOID 3/4X3/4" 80-1401

## (undated) DEVICE — PROBE COVER INTRAOPERATIVE 5"X96" PC1308

## (undated) DEVICE — SYR 10ML LL W/O NDL 302995

## (undated) DEVICE — PACK SPINE INSTRUMENT DRAPE 76091-ACM7820

## (undated) DEVICE — SOL NACL 0.9% IRRIG 3000ML BAG 2B7477

## (undated) DEVICE — GOWN IMPERVIOUS SPECIALTY XLG/XLONG 32474

## (undated) DEVICE — SU MONOCRYL 2-0 CT-2 27" Y333H

## (undated) DEVICE — NDL SPINAL 22GA 5" QUINCKE 405148

## (undated) DEVICE — DRSG XEROFORM 3X4" 8884432000

## (undated) DEVICE — DRSG KERLIX FLUFFS X5

## (undated) DEVICE — LINEN TOWEL PACK X30 5481

## (undated) DEVICE — SU VICRYL 1 CT-1 CR 8X18" J741D

## (undated) DEVICE — PREP CHLORAPREP 26ML TINTED HI-LITE ORANGE 930815

## (undated) DEVICE — IOM MONITORING ----- 15 MIN

## (undated) DEVICE — SUCTION TIP YANKAUER STR K87

## (undated) DEVICE — IMP SCR MEDT 5.5/6.0MM SOLERA 7.5X30MM MA 55840007530
Type: IMPLANTABLE DEVICE | Site: SACRUM | Status: NON-FUNCTIONAL
Removed: 2019-10-01

## (undated) DEVICE — COVER CAMERA IN-LIGHT DISP LT-C02

## (undated) DEVICE — TOOL DISSECT MIDAS MR8 14CM MATCH HEAD 3MM MR8-14MH30

## (undated) DEVICE — SU PROLENE 6-0 BV-1 DA 24" 8805H

## (undated) DEVICE — GLOVE PROTEXIS BLUE W/NEU-THERA 7.5  2D73EB75

## (undated) DEVICE — ESU ELEC BLADE 2.75" COATED/INSULATED E1455

## (undated) DEVICE — ESU PENCIL W/HOLSTER E2350H

## (undated) DEVICE — TUBING SUCTION MEDI-VAC 1/4"X20' N620A

## (undated) DEVICE — DRSG ABDOMINAL 07 1/2X8" 7197D

## (undated) DEVICE — SPONGE SURGIFOAM 100 1974

## (undated) DEVICE — SOL ISOPROPYL RUBBING ALCOHOL USP 70% 4OZ HDX-20 I0020

## (undated) DEVICE — ESU BIPOLAR SEALER AQUAMANTYS 6MM 23-112-1

## (undated) RX ORDER — LIDOCAINE HYDROCHLORIDE 20 MG/ML
INJECTION, SOLUTION EPIDURAL; INFILTRATION; INTRACAUDAL; PERINEURAL
Status: DISPENSED
Start: 2017-12-13

## (undated) RX ORDER — ACETAMINOPHEN 325 MG/1
TABLET ORAL
Status: DISPENSED
Start: 2020-12-21

## (undated) RX ORDER — LIDOCAINE HYDROCHLORIDE 20 MG/ML
INJECTION, SOLUTION EPIDURAL; INFILTRATION; INTRACAUDAL; PERINEURAL
Status: DISPENSED
Start: 2017-08-10

## (undated) RX ORDER — FENTANYL CITRATE 50 UG/ML
INJECTION, SOLUTION INTRAMUSCULAR; INTRAVENOUS
Status: DISPENSED
Start: 2019-10-01

## (undated) RX ORDER — ACETAMINOPHEN 325 MG/1
TABLET ORAL
Status: DISPENSED
Start: 2019-10-02

## (undated) RX ORDER — LIDOCAINE HYDROCHLORIDE 20 MG/ML
INJECTION, SOLUTION EPIDURAL; INFILTRATION; INTRACAUDAL; PERINEURAL
Status: DISPENSED
Start: 2020-12-21

## (undated) RX ORDER — HYDROMORPHONE HYDROCHLORIDE 1 MG/ML
INJECTION, SOLUTION INTRAMUSCULAR; INTRAVENOUS; SUBCUTANEOUS
Status: DISPENSED
Start: 2019-10-01

## (undated) RX ORDER — CYCLOBENZAPRINE HCL 5 MG
TABLET ORAL
Status: DISPENSED
Start: 2019-10-01

## (undated) RX ORDER — DEXAMETHASONE SODIUM PHOSPHATE 10 MG/ML
INJECTION, SOLUTION INTRAMUSCULAR; INTRAVENOUS
Status: DISPENSED
Start: 2020-11-09

## (undated) RX ORDER — FENTANYL CITRATE-0.9 % NACL/PF 10 MCG/ML
PLASTIC BAG, INJECTION (ML) INTRAVENOUS
Status: DISPENSED
Start: 2020-12-21

## (undated) RX ORDER — FENTANYL CITRATE 50 UG/ML
INJECTION, SOLUTION INTRAMUSCULAR; INTRAVENOUS
Status: DISPENSED
Start: 2020-12-21

## (undated) RX ORDER — ONDANSETRON 2 MG/ML
INJECTION INTRAMUSCULAR; INTRAVENOUS
Status: DISPENSED
Start: 2020-12-21

## (undated) RX ORDER — PROPOFOL 10 MG/ML
INJECTION, EMULSION INTRAVENOUS
Status: DISPENSED
Start: 2020-12-21

## (undated) RX ORDER — GABAPENTIN 300 MG/1
CAPSULE ORAL
Status: DISPENSED
Start: 2019-10-01

## (undated) RX ORDER — DEXAMETHASONE SODIUM PHOSPHATE 4 MG/ML
INJECTION, SOLUTION INTRA-ARTICULAR; INTRALESIONAL; INTRAMUSCULAR; INTRAVENOUS; SOFT TISSUE
Status: DISPENSED
Start: 2017-12-13

## (undated) RX ORDER — FENTANYL CITRATE 50 UG/ML
INJECTION, SOLUTION INTRAMUSCULAR; INTRAVENOUS
Status: DISPENSED
Start: 2017-12-13

## (undated) RX ORDER — CEFAZOLIN SODIUM 1 G/3ML
INJECTION, POWDER, FOR SOLUTION INTRAMUSCULAR; INTRAVENOUS
Status: DISPENSED
Start: 2020-12-21

## (undated) RX ORDER — HYDROMORPHONE HYDROCHLORIDE 1 MG/ML
INJECTION, SOLUTION INTRAMUSCULAR; INTRAVENOUS; SUBCUTANEOUS
Status: DISPENSED
Start: 2017-12-13

## (undated) RX ORDER — DEXAMETHASONE SODIUM PHOSPHATE 4 MG/ML
INJECTION, SOLUTION INTRA-ARTICULAR; INTRALESIONAL; INTRAMUSCULAR; INTRAVENOUS; SOFT TISSUE
Status: DISPENSED
Start: 2017-08-10

## (undated) RX ORDER — CEFAZOLIN SODIUM 2 G/100ML
INJECTION, SOLUTION INTRAVENOUS
Status: DISPENSED
Start: 2017-08-10

## (undated) RX ORDER — GABAPENTIN 100 MG/1
CAPSULE ORAL
Status: DISPENSED
Start: 2019-10-01

## (undated) RX ORDER — PHENYLEPHRINE HCL IN 0.9% NACL 1 MG/10 ML
SYRINGE (ML) INTRAVENOUS
Status: DISPENSED
Start: 2019-10-01

## (undated) RX ORDER — BUPIVACAINE HYDROCHLORIDE AND EPINEPHRINE 2.5; 5 MG/ML; UG/ML
INJECTION, SOLUTION EPIDURAL; INFILTRATION; INTRACAUDAL; PERINEURAL
Status: DISPENSED
Start: 2019-10-01

## (undated) RX ORDER — DEXAMETHASONE SODIUM PHOSPHATE 4 MG/ML
INJECTION, SOLUTION INTRA-ARTICULAR; INTRALESIONAL; INTRAMUSCULAR; INTRAVENOUS; SOFT TISSUE
Status: DISPENSED
Start: 2020-12-21

## (undated) RX ORDER — PROPOFOL 10 MG/ML
INJECTION, EMULSION INTRAVENOUS
Status: DISPENSED
Start: 2017-08-10

## (undated) RX ORDER — SUFENTANIL CITRATE 50 UG/ML
INJECTION EPIDURAL; INTRAVENOUS
Status: DISPENSED
Start: 2020-12-21

## (undated) RX ORDER — FENTANYL CITRATE 50 UG/ML
INJECTION, SOLUTION INTRAMUSCULAR; INTRAVENOUS
Status: DISPENSED
Start: 2017-08-10

## (undated) RX ORDER — GABAPENTIN 300 MG/1
CAPSULE ORAL
Status: DISPENSED
Start: 2020-12-21

## (undated) RX ORDER — PROPOFOL 10 MG/ML
INJECTION, EMULSION INTRAVENOUS
Status: DISPENSED
Start: 2019-10-01

## (undated) RX ORDER — ACETAMINOPHEN 325 MG/1
TABLET ORAL
Status: DISPENSED
Start: 2019-10-01

## (undated) RX ORDER — PROPOFOL 10 MG/ML
INJECTION, EMULSION INTRAVENOUS
Status: DISPENSED
Start: 2017-12-13

## (undated) RX ORDER — CEFAZOLIN SODIUM 1 G/3ML
INJECTION, POWDER, FOR SOLUTION INTRAMUSCULAR; INTRAVENOUS
Status: DISPENSED
Start: 2019-10-02

## (undated) RX ORDER — ACETAMINOPHEN 325 MG/1
TABLET ORAL
Status: DISPENSED
Start: 2020-11-09

## (undated) RX ORDER — DEXTROSE MONOHYDRATE, SODIUM CHLORIDE, AND POTASSIUM CHLORIDE 50; 1.49; 4.5 G/1000ML; G/1000ML; G/1000ML
INJECTION, SOLUTION INTRAVENOUS
Status: DISPENSED
Start: 2019-10-01

## (undated) RX ORDER — AMOXICILLIN 250 MG
CAPSULE ORAL
Status: DISPENSED
Start: 2019-10-02

## (undated) RX ORDER — ONDANSETRON 2 MG/ML
INJECTION INTRAMUSCULAR; INTRAVENOUS
Status: DISPENSED
Start: 2017-12-13

## (undated) RX ORDER — SCOLOPAMINE TRANSDERMAL SYSTEM 1 MG/1
PATCH, EXTENDED RELEASE TRANSDERMAL
Status: DISPENSED
Start: 2017-08-10

## (undated) RX ORDER — CEFAZOLIN SODIUM 1 G/3ML
INJECTION, POWDER, FOR SOLUTION INTRAMUSCULAR; INTRAVENOUS
Status: DISPENSED
Start: 2019-10-01

## (undated) RX ORDER — IBUPROFEN 200 MG
TABLET ORAL
Status: DISPENSED
Start: 2020-11-09

## (undated) RX ORDER — CEFAZOLIN SODIUM 2 G/100ML
INJECTION, SOLUTION INTRAVENOUS
Status: DISPENSED
Start: 2017-12-13

## (undated) RX ORDER — GLYCOPYRROLATE 0.2 MG/ML
INJECTION, SOLUTION INTRAMUSCULAR; INTRAVENOUS
Status: DISPENSED
Start: 2017-08-10

## (undated) RX ORDER — LIDOCAINE HYDROCHLORIDE 10 MG/ML
INJECTION, SOLUTION EPIDURAL; INFILTRATION; INTRACAUDAL; PERINEURAL
Status: DISPENSED
Start: 2020-11-09

## (undated) RX ORDER — CELECOXIB 200 MG/1
CAPSULE ORAL
Status: DISPENSED
Start: 2019-10-01

## (undated) RX ORDER — KETAMINE HCL IN 0.9 % NACL 50 MG/5 ML
SYRINGE (ML) INTRAVENOUS
Status: DISPENSED
Start: 2019-10-01

## (undated) RX ORDER — METHOCARBAMOL 750 MG/1
TABLET, FILM COATED ORAL
Status: DISPENSED
Start: 2020-12-22

## (undated) RX ORDER — CEFAZOLIN SODIUM 2 G/100ML
INJECTION, SOLUTION INTRAVENOUS
Status: DISPENSED
Start: 2019-10-01

## (undated) RX ORDER — HYDROMORPHONE HYDROCHLORIDE 1 MG/ML
INJECTION, SOLUTION INTRAMUSCULAR; INTRAVENOUS; SUBCUTANEOUS
Status: DISPENSED
Start: 2020-12-21